# Patient Record
Sex: FEMALE | Race: WHITE | NOT HISPANIC OR LATINO | Employment: UNEMPLOYED | URBAN - METROPOLITAN AREA
[De-identification: names, ages, dates, MRNs, and addresses within clinical notes are randomized per-mention and may not be internally consistent; named-entity substitution may affect disease eponyms.]

---

## 2022-01-01 ENCOUNTER — APPOINTMENT (OUTPATIENT)
Dept: RADIOLOGY | Facility: HOSPITAL | Age: 82
DRG: 871 | End: 2022-01-01
Payer: MEDICARE

## 2022-01-01 ENCOUNTER — HOSPITAL ENCOUNTER (INPATIENT)
Facility: HOSPITAL | Age: 82
LOS: 3 days | DRG: 871 | End: 2022-09-17
Attending: EMERGENCY MEDICINE | Admitting: FAMILY MEDICINE
Payer: MEDICARE

## 2022-01-01 VITALS
TEMPERATURE: 98.2 F | OXYGEN SATURATION: 98 % | HEART RATE: 92 BPM | SYSTOLIC BLOOD PRESSURE: 104 MMHG | DIASTOLIC BLOOD PRESSURE: 52 MMHG | RESPIRATION RATE: 16 BRPM | BODY MASS INDEX: 20.06 KG/M2 | WEIGHT: 130 LBS

## 2022-01-01 DIAGNOSIS — I50.33 ACUTE ON CHRONIC DIASTOLIC CHF (CONGESTIVE HEART FAILURE) (HCC): ICD-10-CM

## 2022-01-01 DIAGNOSIS — J18.9 SEPSIS DUE TO PNEUMONIA (HCC): Primary | ICD-10-CM

## 2022-01-01 DIAGNOSIS — A41.9 SEPSIS DUE TO PNEUMONIA (HCC): Primary | ICD-10-CM

## 2022-01-01 DIAGNOSIS — E87.1 HYPONATREMIA: ICD-10-CM

## 2022-01-01 DIAGNOSIS — J96.11 CHRONIC HYPOXEMIC RESPIRATORY FAILURE (HCC): ICD-10-CM

## 2022-01-01 DIAGNOSIS — S31.000A WOUND OF SACRAL REGION, INITIAL ENCOUNTER: ICD-10-CM

## 2022-01-01 DIAGNOSIS — R65.10 SIRS (SYSTEMIC INFLAMMATORY RESPONSE SYNDROME) (HCC): ICD-10-CM

## 2022-01-01 DIAGNOSIS — D72.829 LEUKOCYTOSIS: ICD-10-CM

## 2022-01-01 DIAGNOSIS — I50.9 ACUTE EXACERBATION OF CHF (CONGESTIVE HEART FAILURE) (HCC): ICD-10-CM

## 2022-01-01 DIAGNOSIS — J96.21 ACUTE ON CHRONIC RESPIRATORY FAILURE WITH HYPOXIA (HCC): ICD-10-CM

## 2022-01-01 LAB
ALBUMIN SERPL BCP-MCNC: 1.8 G/DL (ref 3.5–5)
ALBUMIN SERPL BCP-MCNC: 2.7 G/DL (ref 3.5–5)
ALP SERPL-CCNC: 87 U/L (ref 46–116)
ALP SERPL-CCNC: 93 U/L (ref 46–116)
ALT SERPL W P-5'-P-CCNC: 12 U/L (ref 12–78)
ALT SERPL W P-5'-P-CCNC: 14 U/L (ref 12–78)
ANION GAP SERPL CALCULATED.3IONS-SCNC: 4 MMOL/L (ref 4–13)
ANION GAP SERPL CALCULATED.3IONS-SCNC: 6 MMOL/L (ref 4–13)
APTT PPP: 32 SECONDS (ref 23–37)
ARTERIAL PATENCY WRIST A: NO
AST SERPL W P-5'-P-CCNC: 15 U/L (ref 5–45)
AST SERPL W P-5'-P-CCNC: 22 U/L (ref 5–45)
ATRIAL RATE: 117 BPM
BACTERIA BLD CULT: ABNORMAL
BACTERIA UR CULT: ABNORMAL
BACTERIA UR CULT: ABNORMAL
BACTERIA UR QL AUTO: ABNORMAL /HPF
BASE EX.OXY STD BLDV CALC-SCNC: 77.1 % (ref 60–80)
BASE EX.OXY STD BLDV CALC-SCNC: 94.6 % (ref 60–80)
BASE EXCESS BLDV CALC-SCNC: 6.7 MMOL/L
BASE EXCESS BLDV CALC-SCNC: 7.4 MMOL/L
BASOPHILS # BLD AUTO: 0.03 THOUSANDS/ΜL (ref 0–0.1)
BASOPHILS NFR BLD AUTO: 0 % (ref 0–1)
BILIRUB SERPL-MCNC: 0.27 MG/DL (ref 0.2–1)
BILIRUB SERPL-MCNC: 0.41 MG/DL (ref 0.2–1)
BILIRUB UR QL STRIP: NEGATIVE
BUN SERPL-MCNC: 14 MG/DL (ref 5–25)
BUN SERPL-MCNC: 17 MG/DL (ref 5–25)
CALCIUM ALBUM COR SERPL-MCNC: 10.2 MG/DL (ref 8.3–10.1)
CALCIUM ALBUM COR SERPL-MCNC: 10.4 MG/DL (ref 8.3–10.1)
CALCIUM SERPL-MCNC: 8.4 MG/DL (ref 8.3–10.1)
CALCIUM SERPL-MCNC: 9.4 MG/DL (ref 8.3–10.1)
CARDIAC TROPONIN I PNL SERPL HS: 20 NG/L
CHLORIDE SERPL-SCNC: 93 MMOL/L (ref 96–108)
CHLORIDE SERPL-SCNC: 93 MMOL/L (ref 96–108)
CLARITY UR: ABNORMAL
CO2 SERPL-SCNC: 31 MMOL/L (ref 21–32)
CO2 SERPL-SCNC: 32 MMOL/L (ref 21–32)
COLOR UR: YELLOW
CREAT SERPL-MCNC: 0.24 MG/DL (ref 0.6–1.3)
CREAT SERPL-MCNC: 0.33 MG/DL (ref 0.6–1.3)
EOSINOPHIL # BLD AUTO: 0.16 THOUSAND/ΜL (ref 0–0.61)
EOSINOPHIL NFR BLD AUTO: 1 % (ref 0–6)
ERYTHROCYTE [DISTWIDTH] IN BLOOD BY AUTOMATED COUNT: 17.3 % (ref 11.6–15.1)
ERYTHROCYTE [DISTWIDTH] IN BLOOD BY AUTOMATED COUNT: 17.4 % (ref 11.6–15.1)
FLUAV RNA RESP QL NAA+PROBE: NEGATIVE
FLUBV RNA RESP QL NAA+PROBE: NEGATIVE
GFR SERPL CREATININE-BSD FRML MDRD: 105 ML/MIN/1.73SQ M
GFR SERPL CREATININE-BSD FRML MDRD: 117 ML/MIN/1.73SQ M
GLUCOSE SERPL-MCNC: 103 MG/DL (ref 65–140)
GLUCOSE SERPL-MCNC: 142 MG/DL (ref 65–140)
GLUCOSE UR STRIP-MCNC: NEGATIVE MG/DL
GRAM STN SPEC: ABNORMAL
HCO3 BLDV-SCNC: 31.2 MMOL/L (ref 24–30)
HCO3 BLDV-SCNC: 33.1 MMOL/L (ref 24–30)
HCT VFR BLD AUTO: 27.4 % (ref 34.8–46.1)
HCT VFR BLD AUTO: 27.9 % (ref 34.8–46.1)
HFNC FLOW LPM: 50
HGB BLD-MCNC: 8.3 G/DL (ref 11.5–15.4)
HGB BLD-MCNC: 8.6 G/DL (ref 11.5–15.4)
HGB UR QL STRIP.AUTO: NEGATIVE
IMM GRANULOCYTES # BLD AUTO: 0.27 THOUSAND/UL (ref 0–0.2)
IMM GRANULOCYTES NFR BLD AUTO: 2 % (ref 0–2)
INR PPP: 0.93 (ref 0.84–1.19)
KETONES UR STRIP-MCNC: NEGATIVE MG/DL
LACTATE SERPL-SCNC: 2.1 MMOL/L (ref 0.5–2)
LACTATE SERPL-SCNC: 2.3 MMOL/L (ref 0.5–2)
LEUKOCYTE ESTERASE UR QL STRIP: ABNORMAL
LYMPHOCYTES # BLD AUTO: 0.66 THOUSANDS/ΜL (ref 0.6–4.47)
LYMPHOCYTES NFR BLD AUTO: 4 % (ref 14–44)
MCH RBC QN AUTO: 30.4 PG (ref 26.8–34.3)
MCH RBC QN AUTO: 30.5 PG (ref 26.8–34.3)
MCHC RBC AUTO-ENTMCNC: 30.3 G/DL (ref 31.4–37.4)
MCHC RBC AUTO-ENTMCNC: 30.8 G/DL (ref 31.4–37.4)
MCV RBC AUTO: 100 FL (ref 82–98)
MCV RBC AUTO: 99 FL (ref 82–98)
MECA+MECC ISLT/SPM QL: DETECTED
MONOCYTES # BLD AUTO: 1.05 THOUSAND/ΜL (ref 0.17–1.22)
MONOCYTES NFR BLD AUTO: 6 % (ref 4–12)
MRSA NOSE QL CULT: NORMAL
MUCOUS THREADS UR QL AUTO: ABNORMAL
NEUTROPHILS # BLD AUTO: 16.2 THOUSANDS/ΜL (ref 1.85–7.62)
NEUTS SEG NFR BLD AUTO: 87 % (ref 43–75)
NITRITE UR QL STRIP: POSITIVE
NON VENT HFNC FIO2: 80
NON VENT TYPE HFNC: ABNORMAL
NON-SQ EPI CELLS URNS QL MICRO: ABNORMAL /HPF
NRBC BLD AUTO-RTO: 0 /100 WBCS
NT-PROBNP SERPL-MCNC: 1755 PG/ML
O2 CT BLDV-SCNC: 13.3 ML/DL
O2 CT BLDV-SCNC: 8.9 ML/DL
P AXIS: 79 DEGREES
PCO2 BLDV: 44.7 MM HG (ref 42–50)
PCO2 BLDV: 53.9 MM HG (ref 42–50)
PH BLDV: 7.41 [PH] (ref 7.3–7.4)
PH BLDV: 7.46 [PH] (ref 7.3–7.4)
PH UR STRIP.AUTO: 6 [PH]
PLATELET # BLD AUTO: 395 THOUSANDS/UL (ref 149–390)
PLATELET # BLD AUTO: 418 THOUSANDS/UL (ref 149–390)
PMV BLD AUTO: 9 FL (ref 8.9–12.7)
PMV BLD AUTO: 9.8 FL (ref 8.9–12.7)
PO2 BLDV: 44.6 MM HG (ref 35–45)
PO2 BLDV: 81.3 MM HG (ref 35–45)
POTASSIUM SERPL-SCNC: 4 MMOL/L (ref 3.5–5.3)
POTASSIUM SERPL-SCNC: 4.3 MMOL/L (ref 3.5–5.3)
PR INTERVAL: 142 MS
PROCALCITONIN SERPL-MCNC: 0.2 NG/ML
PROCALCITONIN SERPL-MCNC: 0.26 NG/ML
PROT SERPL-MCNC: 6.8 G/DL (ref 6.4–8.4)
PROT SERPL-MCNC: 7.2 G/DL (ref 6.4–8.4)
PROT UR STRIP-MCNC: ABNORMAL MG/DL
PROTHROMBIN TIME: 12.6 SECONDS (ref 11.6–14.5)
QRS AXIS: -48 DEGREES
QRSD INTERVAL: 90 MS
QT INTERVAL: 296 MS
QTC INTERVAL: 412 MS
RBC # BLD AUTO: 2.73 MILLION/UL (ref 3.81–5.12)
RBC # BLD AUTO: 2.82 MILLION/UL (ref 3.81–5.12)
RBC #/AREA URNS AUTO: ABNORMAL /HPF
RSV RNA RESP QL NAA+PROBE: NEGATIVE
S EPIDERMIDIS DNA BLD POS QL NAA+NON-PRB: DETECTED
SARS-COV-2 RNA RESP QL NAA+PROBE: NEGATIVE
SODIUM SERPL-SCNC: 128 MMOL/L (ref 135–147)
SODIUM SERPL-SCNC: 131 MMOL/L (ref 135–147)
SP GR UR STRIP.AUTO: 1.02 (ref 1–1.03)
T WAVE AXIS: 63 DEGREES
UROBILINOGEN UR STRIP-ACNC: <2 MG/DL
VENTRICULAR RATE: 117 BPM
WBC # BLD AUTO: 18.37 THOUSAND/UL (ref 4.31–10.16)
WBC # BLD AUTO: 27.85 THOUSAND/UL (ref 4.31–10.16)
WBC #/AREA URNS AUTO: ABNORMAL /HPF
WBC CLUMPS # UR AUTO: PRESENT /UL

## 2022-01-01 PROCEDURE — NC001 PR NO CHARGE: Performed by: INTERNAL MEDICINE

## 2022-01-01 PROCEDURE — 96367 TX/PROPH/DG ADDL SEQ IV INF: CPT

## 2022-01-01 PROCEDURE — 85730 THROMBOPLASTIN TIME PARTIAL: CPT

## 2022-01-01 PROCEDURE — 99291 CRITICAL CARE FIRST HOUR: CPT | Performed by: EMERGENCY MEDICINE

## 2022-01-01 PROCEDURE — 93005 ELECTROCARDIOGRAM TRACING: CPT

## 2022-01-01 PROCEDURE — 94760 N-INVAS EAR/PLS OXIMETRY 1: CPT

## 2022-01-01 PROCEDURE — 85610 PROTHROMBIN TIME: CPT

## 2022-01-01 PROCEDURE — 99497 ADVNCD CARE PLAN 30 MIN: CPT | Performed by: INTERNAL MEDICINE

## 2022-01-01 PROCEDURE — 87040 BLOOD CULTURE FOR BACTERIA: CPT

## 2022-01-01 PROCEDURE — 87154 CUL TYP ID BLD PTHGN 6+ TRGT: CPT

## 2022-01-01 PROCEDURE — 80053 COMPREHEN METABOLIC PANEL: CPT | Performed by: PHYSICIAN ASSISTANT

## 2022-01-01 PROCEDURE — 87077 CULTURE AEROBIC IDENTIFY: CPT

## 2022-01-01 PROCEDURE — 99239 HOSP IP/OBS DSCHRG MGMT >30: CPT | Performed by: STUDENT IN AN ORGANIZED HEALTH CARE EDUCATION/TRAINING PROGRAM

## 2022-01-01 PROCEDURE — 87186 SC STD MICRODIL/AGAR DIL: CPT

## 2022-01-01 PROCEDURE — 80053 COMPREHEN METABOLIC PANEL: CPT

## 2022-01-01 PROCEDURE — 99291 CRITICAL CARE FIRST HOUR: CPT | Performed by: INTERNAL MEDICINE

## 2022-01-01 PROCEDURE — 84145 PROCALCITONIN (PCT): CPT | Performed by: INTERNAL MEDICINE

## 2022-01-01 PROCEDURE — 99233 SBSQ HOSP IP/OBS HIGH 50: CPT | Performed by: INTERNAL MEDICINE

## 2022-01-01 PROCEDURE — 99283 EMERGENCY DEPT VISIT LOW MDM: CPT | Performed by: INTERNAL MEDICINE

## 2022-01-01 PROCEDURE — 94640 AIRWAY INHALATION TREATMENT: CPT

## 2022-01-01 PROCEDURE — 81001 URINALYSIS AUTO W/SCOPE: CPT

## 2022-01-01 PROCEDURE — 36415 COLL VENOUS BLD VENIPUNCTURE: CPT

## 2022-01-01 PROCEDURE — 94002 VENT MGMT INPAT INIT DAY: CPT

## 2022-01-01 PROCEDURE — 96365 THER/PROPH/DIAG IV INF INIT: CPT

## 2022-01-01 PROCEDURE — 0241U HB NFCT DS VIR RESP RNA 4 TRGT: CPT

## 2022-01-01 PROCEDURE — 87081 CULTURE SCREEN ONLY: CPT | Performed by: EMERGENCY MEDICINE

## 2022-01-01 PROCEDURE — 93010 ELECTROCARDIOGRAM REPORT: CPT | Performed by: INTERNAL MEDICINE

## 2022-01-01 PROCEDURE — 99223 1ST HOSP IP/OBS HIGH 75: CPT | Performed by: INTERNAL MEDICINE

## 2022-01-01 PROCEDURE — 82805 BLOOD GASES W/O2 SATURATION: CPT

## 2022-01-01 PROCEDURE — 99232 SBSQ HOSP IP/OBS MODERATE 35: CPT | Performed by: FAMILY MEDICINE

## 2022-01-01 PROCEDURE — 99285 EMERGENCY DEPT VISIT HI MDM: CPT

## 2022-01-01 PROCEDURE — 85025 COMPLETE CBC W/AUTO DIFF WBC: CPT

## 2022-01-01 PROCEDURE — 84484 ASSAY OF TROPONIN QUANT: CPT

## 2022-01-01 PROCEDURE — 84145 PROCALCITONIN (PCT): CPT

## 2022-01-01 PROCEDURE — 99498 ADVNCD CARE PLAN ADDL 30 MIN: CPT | Performed by: INTERNAL MEDICINE

## 2022-01-01 PROCEDURE — 82805 BLOOD GASES W/O2 SATURATION: CPT | Performed by: PHYSICIAN ASSISTANT

## 2022-01-01 PROCEDURE — 83605 ASSAY OF LACTIC ACID: CPT

## 2022-01-01 PROCEDURE — 87086 URINE CULTURE/COLONY COUNT: CPT

## 2022-01-01 PROCEDURE — 96374 THER/PROPH/DIAG INJ IV PUSH: CPT

## 2022-01-01 PROCEDURE — 83880 ASSAY OF NATRIURETIC PEPTIDE: CPT

## 2022-01-01 PROCEDURE — 99497 ADVNCD CARE PLAN 30 MIN: CPT | Performed by: PHYSICIAN ASSISTANT

## 2022-01-01 PROCEDURE — 85027 COMPLETE CBC AUTOMATED: CPT | Performed by: INTERNAL MEDICINE

## 2022-01-01 PROCEDURE — 71045 X-RAY EXAM CHEST 1 VIEW: CPT

## 2022-01-01 RX ORDER — METHYLPREDNISOLONE SODIUM SUCCINATE 40 MG/ML
40 INJECTION, POWDER, LYOPHILIZED, FOR SOLUTION INTRAMUSCULAR; INTRAVENOUS EVERY 12 HOURS SCHEDULED
Status: DISCONTINUED | OUTPATIENT
Start: 2022-01-01 | End: 2022-01-01

## 2022-01-01 RX ORDER — ASPIRIN 81 MG/1
81 TABLET, CHEWABLE ORAL DAILY
Status: DISCONTINUED | OUTPATIENT
Start: 2022-01-01 | End: 2022-01-01

## 2022-01-01 RX ORDER — HALOPERIDOL 5 MG/ML
0.5 INJECTION INTRAMUSCULAR EVERY 2 HOUR PRN
Status: DISCONTINUED | OUTPATIENT
Start: 2022-01-01 | End: 2022-01-01 | Stop reason: HOSPADM

## 2022-01-01 RX ORDER — VANCOMYCIN HYDROCHLORIDE 1 G/200ML
20 INJECTION, SOLUTION INTRAVENOUS ONCE
Status: COMPLETED | OUTPATIENT
Start: 2022-01-01 | End: 2022-01-01

## 2022-01-01 RX ORDER — LEVOTHYROXINE SODIUM 0.03 MG/1
25 TABLET ORAL
Status: DISCONTINUED | OUTPATIENT
Start: 2022-01-01 | End: 2022-01-01

## 2022-01-01 RX ORDER — FUROSEMIDE 10 MG/ML
40 INJECTION INTRAMUSCULAR; INTRAVENOUS
Status: DISCONTINUED | OUTPATIENT
Start: 2022-01-01 | End: 2022-01-01

## 2022-01-01 RX ORDER — HEPARIN SODIUM 5000 [USP'U]/ML
5000 INJECTION, SOLUTION INTRAVENOUS; SUBCUTANEOUS EVERY 8 HOURS SCHEDULED
Status: DISCONTINUED | OUTPATIENT
Start: 2022-01-01 | End: 2022-01-01

## 2022-01-01 RX ORDER — LORAZEPAM 2 MG/ML
0.5 INJECTION INTRAMUSCULAR EVERY 6 HOURS PRN
Status: DISCONTINUED | OUTPATIENT
Start: 2022-01-01 | End: 2022-01-01

## 2022-01-01 RX ORDER — ALBUMIN (HUMAN) 12.5 G/50ML
12.5 SOLUTION INTRAVENOUS ONCE
Status: COMPLETED | OUTPATIENT
Start: 2022-01-01 | End: 2022-01-01

## 2022-01-01 RX ORDER — LORAZEPAM 2 MG/ML
0.5 INJECTION INTRAMUSCULAR ONCE
Status: COMPLETED | OUTPATIENT
Start: 2022-01-01 | End: 2022-01-01

## 2022-01-01 RX ORDER — BUSPIRONE HYDROCHLORIDE 10 MG/1
10 TABLET ORAL 3 TIMES DAILY
Status: DISCONTINUED | OUTPATIENT
Start: 2022-01-01 | End: 2022-01-01

## 2022-01-01 RX ORDER — LORAZEPAM 2 MG/ML
1 INJECTION INTRAMUSCULAR
Status: COMPLETED | OUTPATIENT
Start: 2022-01-01 | End: 2022-01-01

## 2022-01-01 RX ORDER — ACETAMINOPHEN 325 MG/1
650 TABLET ORAL EVERY 6 HOURS PRN
Status: DISCONTINUED | OUTPATIENT
Start: 2022-01-01 | End: 2022-01-01

## 2022-01-01 RX ORDER — SILDENAFIL CITRATE 20 MG/1
10 TABLET ORAL 3 TIMES DAILY
Status: DISCONTINUED | OUTPATIENT
Start: 2022-01-01 | End: 2022-01-01

## 2022-01-01 RX ORDER — ACETAMINOPHEN 325 MG/1
650 TABLET ORAL EVERY 8 HOURS SCHEDULED
Status: DISCONTINUED | OUTPATIENT
Start: 2022-01-01 | End: 2022-01-01

## 2022-01-01 RX ORDER — SACCHAROMYCES BOULARDII 250 MG
250 CAPSULE ORAL 2 TIMES DAILY
Status: DISCONTINUED | OUTPATIENT
Start: 2022-01-01 | End: 2022-01-01

## 2022-01-01 RX ORDER — SODIUM CHLORIDE FOR INHALATION 0.9 %
3 VIAL, NEBULIZER (ML) INHALATION
Status: DISCONTINUED | OUTPATIENT
Start: 2022-01-01 | End: 2022-01-01

## 2022-01-01 RX ORDER — LORAZEPAM 2 MG/ML
0.5 INJECTION INTRAMUSCULAR EVERY 2 HOUR PRN
Status: DISCONTINUED | OUTPATIENT
Start: 2022-01-01 | End: 2022-01-01 | Stop reason: HOSPADM

## 2022-01-01 RX ORDER — VANCOMYCIN HYDROCHLORIDE 1 G/200ML
20 INJECTION, SOLUTION INTRAVENOUS EVERY 12 HOURS
Status: DISCONTINUED | OUTPATIENT
Start: 2022-01-01 | End: 2022-01-01

## 2022-01-01 RX ORDER — ALBUMIN (HUMAN) 12.5 G/50ML
25 SOLUTION INTRAVENOUS ONCE
Status: COMPLETED | OUTPATIENT
Start: 2022-01-01 | End: 2022-01-01

## 2022-01-01 RX ORDER — FUROSEMIDE 10 MG/ML
20 INJECTION INTRAMUSCULAR; INTRAVENOUS ONCE
Status: CANCELLED | OUTPATIENT
Start: 2022-01-01

## 2022-01-01 RX ORDER — ONDANSETRON 2 MG/ML
4 INJECTION INTRAMUSCULAR; INTRAVENOUS EVERY 6 HOURS PRN
Status: DISCONTINUED | OUTPATIENT
Start: 2022-01-01 | End: 2022-01-01 | Stop reason: HOSPADM

## 2022-01-01 RX ORDER — GLYCOPYRROLATE 0.2 MG/ML
0.1 INJECTION INTRAMUSCULAR; INTRAVENOUS EVERY 4 HOURS PRN
Status: DISCONTINUED | OUTPATIENT
Start: 2022-01-01 | End: 2022-01-01 | Stop reason: HOSPADM

## 2022-01-01 RX ORDER — LEVALBUTEROL 1.25 MG/.5ML
1.25 SOLUTION, CONCENTRATE RESPIRATORY (INHALATION)
Status: DISCONTINUED | OUTPATIENT
Start: 2022-01-01 | End: 2022-01-01

## 2022-01-01 RX ADMIN — MORPHINE SULFATE 2 MG: 2 INJECTION, SOLUTION INTRAMUSCULAR; INTRAVENOUS at 21:32

## 2022-01-01 RX ADMIN — LEVALBUTEROL HYDROCHLORIDE 1.25 MG: 1.25 SOLUTION, CONCENTRATE RESPIRATORY (INHALATION) at 19:18

## 2022-01-01 RX ADMIN — SILDENAFIL 10 MG: 20 TABLET ORAL at 16:54

## 2022-01-01 RX ADMIN — FUROSEMIDE 40 MG: 10 INJECTION, SOLUTION INTRAMUSCULAR; INTRAVENOUS at 15:01

## 2022-01-01 RX ADMIN — LORAZEPAM 1 MG: 2 INJECTION INTRAMUSCULAR; INTRAVENOUS at 21:32

## 2022-01-01 RX ADMIN — BUSPIRONE HYDROCHLORIDE 10 MG: 10 TABLET ORAL at 08:56

## 2022-01-01 RX ADMIN — LORAZEPAM 0.5 MG: 2 INJECTION INTRAMUSCULAR; INTRAVENOUS at 09:11

## 2022-01-01 RX ADMIN — MORPHINE SULFATE 2 MG: 2 INJECTION, SOLUTION INTRAMUSCULAR; INTRAVENOUS at 03:50

## 2022-01-01 RX ADMIN — MORPHINE SULFATE 2 MG: 2 INJECTION, SOLUTION INTRAMUSCULAR; INTRAVENOUS at 04:54

## 2022-01-01 RX ADMIN — SILDENAFIL 10 MG: 20 TABLET ORAL at 15:01

## 2022-01-01 RX ADMIN — VANCOMYCIN HYDROCHLORIDE 1000 MG: 1 INJECTION, SOLUTION INTRAVENOUS at 10:05

## 2022-01-01 RX ADMIN — ISODIUM CHLORIDE 3 ML: 0.03 SOLUTION RESPIRATORY (INHALATION) at 08:24

## 2022-01-01 RX ADMIN — SODIUM BICARBONATE 20 MG: 84 INJECTION, SOLUTION INTRAVENOUS at 17:06

## 2022-01-01 RX ADMIN — LORAZEPAM 0.5 MG: 2 INJECTION INTRAMUSCULAR; INTRAVENOUS at 17:06

## 2022-01-01 RX ADMIN — MORPHINE SULFATE 2 MG: 2 INJECTION, SOLUTION INTRAMUSCULAR; INTRAVENOUS at 08:55

## 2022-01-01 RX ADMIN — MORPHINE SULFATE 2 MG: 2 INJECTION, SOLUTION INTRAMUSCULAR; INTRAVENOUS at 04:28

## 2022-01-01 RX ADMIN — LEVOTHYROXINE SODIUM 25 MCG: 25 TABLET ORAL at 05:18

## 2022-01-01 RX ADMIN — NOREPINEPHRINE BITARTRATE 2 MCG/MIN: 1 SOLUTION INTRAVENOUS at 00:13

## 2022-01-01 RX ADMIN — FUROSEMIDE 40 MG: 10 INJECTION, SOLUTION INTRAMUSCULAR; INTRAVENOUS at 16:54

## 2022-01-01 RX ADMIN — METHYLPREDNISOLONE SODIUM SUCCINATE 40 MG: 40 INJECTION, POWDER, FOR SOLUTION INTRAMUSCULAR; INTRAVENOUS at 22:13

## 2022-01-01 RX ADMIN — GLYCOPYRROLATE 0.1 MG: 0.2 INJECTION, SOLUTION INTRAMUSCULAR; INTRAVENOUS at 21:42

## 2022-01-01 RX ADMIN — MORPHINE SULFATE 2 MG: 2 INJECTION, SOLUTION INTRAMUSCULAR; INTRAVENOUS at 21:10

## 2022-01-01 RX ADMIN — ALBUMIN (HUMAN) 25 G: 0.25 INJECTION, SOLUTION INTRAVENOUS at 21:02

## 2022-01-01 RX ADMIN — CEFEPIME 2000 MG: 2 INJECTION, POWDER, FOR SOLUTION INTRAVENOUS at 09:27

## 2022-01-01 RX ADMIN — CEFEPIME 2000 MG: 2 INJECTION, POWDER, FOR SOLUTION INTRAVENOUS at 20:32

## 2022-01-01 RX ADMIN — FUROSEMIDE 40 MG: 10 INJECTION, SOLUTION INTRAMUSCULAR; INTRAVENOUS at 07:52

## 2022-01-01 RX ADMIN — MORPHINE SULFATE 2 MG: 2 INJECTION, SOLUTION INTRAMUSCULAR; INTRAVENOUS at 13:59

## 2022-01-01 RX ADMIN — ISODIUM CHLORIDE 3 ML: 0.03 SOLUTION RESPIRATORY (INHALATION) at 19:18

## 2022-01-01 RX ADMIN — ACETAMINOPHEN 650 MG: 325 TABLET ORAL at 05:18

## 2022-01-01 RX ADMIN — ASPIRIN 81 MG CHEWABLE TABLET 81 MG: 81 TABLET CHEWABLE at 14:33

## 2022-01-01 RX ADMIN — HALOPERIDOL LACTATE 0.5 MG: 5 INJECTION, SOLUTION INTRAMUSCULAR at 04:07

## 2022-01-01 RX ADMIN — GLYCOPYRROLATE 0.1 MG: 0.2 INJECTION, SOLUTION INTRAMUSCULAR; INTRAVENOUS at 15:00

## 2022-01-01 RX ADMIN — GUAIFENESIN 400 MG: 200 SOLUTION ORAL at 15:01

## 2022-01-01 RX ADMIN — GUAIFENESIN 400 MG: 200 SOLUTION ORAL at 22:13

## 2022-01-01 RX ADMIN — GUAIFENESIN 400 MG: 200 SOLUTION ORAL at 08:55

## 2022-01-01 RX ADMIN — ISODIUM CHLORIDE 3 ML: 0.03 SOLUTION RESPIRATORY (INHALATION) at 12:58

## 2022-01-01 RX ADMIN — MORPHINE SULFATE 2 MG: 2 INJECTION, SOLUTION INTRAMUSCULAR; INTRAVENOUS at 02:17

## 2022-01-01 RX ADMIN — MORPHINE SULFATE 2 MG: 2 INJECTION, SOLUTION INTRAMUSCULAR; INTRAVENOUS at 22:59

## 2022-01-01 RX ADMIN — VANCOMYCIN HYDROCHLORIDE 1000 MG: 1 INJECTION, SOLUTION INTRAVENOUS at 21:22

## 2022-01-01 RX ADMIN — HEPARIN SODIUM 5000 UNITS: 5000 INJECTION INTRAVENOUS; SUBCUTANEOUS at 14:27

## 2022-01-01 RX ADMIN — Medication 250 MG: at 08:56

## 2022-01-01 RX ADMIN — GLYCOPYRROLATE 0.1 MG: 0.2 INJECTION, SOLUTION INTRAMUSCULAR; INTRAVENOUS at 07:52

## 2022-01-01 RX ADMIN — MORPHINE SULFATE 2 MG: 2 INJECTION, SOLUTION INTRAMUSCULAR; INTRAVENOUS at 01:05

## 2022-01-01 RX ADMIN — MORPHINE SULFATE 2 MG: 2 INJECTION, SOLUTION INTRAMUSCULAR; INTRAVENOUS at 04:07

## 2022-01-01 RX ADMIN — SODIUM CHLORIDE 0.5 MG/HR: 9 INJECTION INTRAMUSCULAR; INTRAVENOUS; SUBCUTANEOUS at 05:30

## 2022-01-01 RX ADMIN — GLYCOPYRROLATE 0.1 MG: 0.2 INJECTION, SOLUTION INTRAMUSCULAR; INTRAVENOUS at 18:10

## 2022-01-01 RX ADMIN — LORAZEPAM 1 MG: 2 INJECTION INTRAMUSCULAR; INTRAVENOUS at 21:09

## 2022-01-01 RX ADMIN — LEVALBUTEROL HYDROCHLORIDE 1.25 MG: 1.25 SOLUTION, CONCENTRATE RESPIRATORY (INHALATION) at 12:58

## 2022-01-01 RX ADMIN — ASPIRIN 81 MG CHEWABLE TABLET 81 MG: 81 TABLET CHEWABLE at 08:56

## 2022-01-01 RX ADMIN — MORPHINE SULFATE 2 MG: 2 INJECTION, SOLUTION INTRAMUSCULAR; INTRAVENOUS at 02:40

## 2022-01-01 RX ADMIN — MORPHINE SULFATE 2 MG: 2 INJECTION, SOLUTION INTRAMUSCULAR; INTRAVENOUS at 00:24

## 2022-01-01 RX ADMIN — GLYCOPYRROLATE 0.1 MG: 0.2 INJECTION, SOLUTION INTRAMUSCULAR; INTRAVENOUS at 02:40

## 2022-01-01 RX ADMIN — GLYCERIN, HYPROMELLOSE, POLYETHYLENE GLYCOL 2 DROP: .2; .2; 1 LIQUID OPHTHALMIC at 18:01

## 2022-01-01 RX ADMIN — ACETAMINOPHEN 650 MG: 325 TABLET ORAL at 15:00

## 2022-01-01 RX ADMIN — MORPHINE SULFATE 2 MG: 2 INJECTION, SOLUTION INTRAMUSCULAR; INTRAVENOUS at 12:07

## 2022-01-01 RX ADMIN — ALBUMIN (HUMAN) 12.5 G: 0.25 INJECTION, SOLUTION INTRAVENOUS at 22:12

## 2022-01-01 RX ADMIN — Medication 250 MG: at 17:26

## 2022-01-01 RX ADMIN — FUROSEMIDE 40 MG: 10 INJECTION, SOLUTION INTRAMUSCULAR; INTRAVENOUS at 08:55

## 2022-01-01 RX ADMIN — LORAZEPAM 0.5 MG: 2 INJECTION INTRAMUSCULAR; INTRAVENOUS at 09:53

## 2022-01-01 RX ADMIN — LORAZEPAM 0.5 MG: 2 INJECTION INTRAMUSCULAR; INTRAVENOUS at 00:56

## 2022-01-01 RX ADMIN — METHYLPREDNISOLONE SODIUM SUCCINATE 40 MG: 40 INJECTION, POWDER, FOR SOLUTION INTRAMUSCULAR; INTRAVENOUS at 08:56

## 2022-01-01 RX ADMIN — MORPHINE SULFATE 2 MG: 2 INJECTION, SOLUTION INTRAMUSCULAR; INTRAVENOUS at 22:28

## 2022-01-01 RX ADMIN — MORPHINE SULFATE 2 MG: 2 INJECTION, SOLUTION INTRAMUSCULAR; INTRAVENOUS at 01:17

## 2022-01-01 RX ADMIN — MORPHINE SULFATE 2 MG: 2 INJECTION, SOLUTION INTRAMUSCULAR; INTRAVENOUS at 21:51

## 2022-01-01 RX ADMIN — LEVALBUTEROL HYDROCHLORIDE 1.25 MG: 1.25 SOLUTION, CONCENTRATE RESPIRATORY (INHALATION) at 19:58

## 2022-01-01 RX ADMIN — METHYLPREDNISOLONE SODIUM SUCCINATE 40 MG: 40 INJECTION, POWDER, FOR SOLUTION INTRAMUSCULAR; INTRAVENOUS at 14:24

## 2022-01-01 RX ADMIN — HEPARIN SODIUM 5000 UNITS: 5000 INJECTION INTRAVENOUS; SUBCUTANEOUS at 05:18

## 2022-01-01 RX ADMIN — GLYCOPYRROLATE 0.1 MG: 0.2 INJECTION, SOLUTION INTRAMUSCULAR; INTRAVENOUS at 00:51

## 2022-01-01 RX ADMIN — GUAIFENESIN 400 MG: 200 SOLUTION ORAL at 16:54

## 2022-01-01 RX ADMIN — BUSPIRONE HYDROCHLORIDE 10 MG: 10 TABLET ORAL at 00:47

## 2022-01-01 RX ADMIN — SILDENAFIL 10 MG: 20 TABLET ORAL at 08:55

## 2022-01-01 RX ADMIN — MORPHINE SULFATE 2 MG: 2 INJECTION, SOLUTION INTRAMUSCULAR; INTRAVENOUS at 23:29

## 2022-01-01 RX ADMIN — MORPHINE SULFATE 2 MG: 2 INJECTION, SOLUTION INTRAMUSCULAR; INTRAVENOUS at 00:49

## 2022-01-01 RX ADMIN — LEVALBUTEROL HYDROCHLORIDE 1.25 MG: 1.25 SOLUTION, CONCENTRATE RESPIRATORY (INHALATION) at 08:24

## 2022-01-01 RX ADMIN — ISODIUM CHLORIDE 3 ML: 0.03 SOLUTION RESPIRATORY (INHALATION) at 15:53

## 2022-01-01 RX ADMIN — ACETAMINOPHEN 650 MG: 325 TABLET ORAL at 19:34

## 2022-01-01 RX ADMIN — LORAZEPAM 1 MG: 2 INJECTION INTRAMUSCULAR; INTRAVENOUS at 22:42

## 2022-01-01 RX ADMIN — BUSPIRONE HYDROCHLORIDE 10 MG: 10 TABLET ORAL at 16:58

## 2022-01-01 RX ADMIN — HEPARIN SODIUM 5000 UNITS: 5000 INJECTION INTRAVENOUS; SUBCUTANEOUS at 22:13

## 2022-01-01 RX ADMIN — VANCOMYCIN HYDROCHLORIDE 1000 MG: 1 INJECTION, SOLUTION INTRAVENOUS at 10:11

## 2022-01-01 RX ADMIN — ISODIUM CHLORIDE 3 ML: 0.03 SOLUTION RESPIRATORY (INHALATION) at 19:58

## 2022-06-14 ENCOUNTER — ANESTHESIA (EMERGENCY)
Dept: PERIOP | Facility: HOSPITAL | Age: 82
DRG: 853 | End: 2022-06-14
Payer: COMMERCIAL

## 2022-06-14 ENCOUNTER — APPOINTMENT (EMERGENCY)
Dept: RADIOLOGY | Facility: HOSPITAL | Age: 82
End: 2022-06-14
Payer: COMMERCIAL

## 2022-06-14 ENCOUNTER — HOSPITAL ENCOUNTER (EMERGENCY)
Facility: HOSPITAL | Age: 82
End: 2022-06-14
Attending: EMERGENCY MEDICINE
Payer: COMMERCIAL

## 2022-06-14 ENCOUNTER — ANESTHESIA EVENT (EMERGENCY)
Dept: PERIOP | Facility: HOSPITAL | Age: 82
DRG: 853 | End: 2022-06-14
Payer: COMMERCIAL

## 2022-06-14 ENCOUNTER — HOSPITAL ENCOUNTER (INPATIENT)
Facility: HOSPITAL | Age: 82
LOS: 29 days | Discharge: NURSING, REHAB OR LONG-TERM CARE | DRG: 853 | End: 2022-07-13
Attending: SURGERY | Admitting: SURGERY
Payer: COMMERCIAL

## 2022-06-14 VITALS
HEART RATE: 89 BPM | TEMPERATURE: 97.6 F | DIASTOLIC BLOOD PRESSURE: 59 MMHG | HEIGHT: 68 IN | WEIGHT: 127.65 LBS | SYSTOLIC BLOOD PRESSURE: 113 MMHG | OXYGEN SATURATION: 93 % | BODY MASS INDEX: 19.35 KG/M2 | RESPIRATION RATE: 20 BRPM

## 2022-06-14 DIAGNOSIS — S31.000A WOUND OF SACRAL REGION, INITIAL ENCOUNTER: Primary | ICD-10-CM

## 2022-06-14 DIAGNOSIS — M34.9 SCLERODERMA (HCC): ICD-10-CM

## 2022-06-14 DIAGNOSIS — S31.000A WOUND OF SACRAL REGION, INITIAL ENCOUNTER: ICD-10-CM

## 2022-06-14 DIAGNOSIS — R29.90 STROKE-LIKE EPISODE: ICD-10-CM

## 2022-06-14 DIAGNOSIS — E03.9 ACQUIRED HYPOTHYROIDISM: ICD-10-CM

## 2022-06-14 DIAGNOSIS — I27.20 PULMONARY HYPERTENSION (HCC): ICD-10-CM

## 2022-06-14 DIAGNOSIS — J18.9 PNEUMONIA: ICD-10-CM

## 2022-06-14 DIAGNOSIS — H91.93 BILATERAL HEARING LOSS, UNSPECIFIED HEARING LOSS TYPE: ICD-10-CM

## 2022-06-14 DIAGNOSIS — M79.89 NECROTIZING SOFT TISSUE INFECTION: Primary | ICD-10-CM

## 2022-06-14 DIAGNOSIS — J96.01 ACUTE RESPIRATORY FAILURE WITH HYPOXIA (HCC): ICD-10-CM

## 2022-06-14 DIAGNOSIS — R33.9 URINARY RETENTION: ICD-10-CM

## 2022-06-14 LAB
ALBUMIN SERPL BCP-MCNC: 2.2 G/DL (ref 3.5–5)
ALP SERPL-CCNC: 111 U/L (ref 46–116)
ALT SERPL W P-5'-P-CCNC: 26 U/L (ref 12–78)
ANION GAP SERPL CALCULATED.3IONS-SCNC: 4 MMOL/L (ref 4–13)
APTT PPP: 31 SECONDS (ref 23–37)
AST SERPL W P-5'-P-CCNC: 19 U/L (ref 5–45)
BACTERIA UR QL AUTO: ABNORMAL /HPF
BASOPHILS # BLD MANUAL: 0 THOUSAND/UL (ref 0–0.1)
BASOPHILS NFR MAR MANUAL: 0 % (ref 0–1)
BILIRUB SERPL-MCNC: 0.19 MG/DL (ref 0.2–1)
BILIRUB UR QL STRIP: NEGATIVE
BUN SERPL-MCNC: 19 MG/DL (ref 5–25)
CALCIUM ALBUM COR SERPL-MCNC: 10.3 MG/DL (ref 8.3–10.1)
CALCIUM SERPL-MCNC: 8.9 MG/DL (ref 8.3–10.1)
CHLORIDE SERPL-SCNC: 97 MMOL/L (ref 100–108)
CLARITY UR: CLEAR
CO2 SERPL-SCNC: 32 MMOL/L (ref 21–32)
COLOR UR: ABNORMAL
CREAT SERPL-MCNC: 0.45 MG/DL (ref 0.6–1.3)
CRP SERPL QL: 35.9 MG/L
EOSINOPHIL # BLD MANUAL: 0 THOUSAND/UL (ref 0–0.4)
EOSINOPHIL NFR BLD MANUAL: 0 % (ref 0–6)
ERYTHROCYTE [DISTWIDTH] IN BLOOD BY AUTOMATED COUNT: 15.8 % (ref 11.6–15.1)
FLUAV RNA RESP QL NAA+PROBE: NEGATIVE
FLUBV RNA RESP QL NAA+PROBE: NEGATIVE
GFR SERPL CREATININE-BSD FRML MDRD: 95 ML/MIN/1.73SQ M
GLUCOSE SERPL-MCNC: 117 MG/DL (ref 65–140)
GLUCOSE UR STRIP-MCNC: NEGATIVE MG/DL
HCT VFR BLD AUTO: 27.9 % (ref 34.8–46.1)
HGB BLD-MCNC: 8.6 G/DL (ref 11.5–15.4)
HGB UR QL STRIP.AUTO: NEGATIVE
HYPERCHROMIA BLD QL SMEAR: PRESENT
INR PPP: 0.94 (ref 0.84–1.19)
KETONES UR STRIP-MCNC: NEGATIVE MG/DL
LACTATE SERPL-SCNC: 1.2 MMOL/L (ref 0.5–2)
LEUKOCYTE ESTERASE UR QL STRIP: ABNORMAL
LYMPHOCYTES # BLD AUTO: 0.53 THOUSAND/UL (ref 0.6–4.47)
LYMPHOCYTES # BLD AUTO: 3 % (ref 14–44)
MCH RBC QN AUTO: 30.2 PG (ref 26.8–34.3)
MCHC RBC AUTO-ENTMCNC: 30.8 G/DL (ref 31.4–37.4)
MCV RBC AUTO: 98 FL (ref 82–98)
METAMYELOCYTES NFR BLD MANUAL: 4 % (ref 0–1)
MONOCYTES # BLD AUTO: 0.18 THOUSAND/UL (ref 0–1.22)
MONOCYTES NFR BLD: 1 % (ref 4–12)
MYELOCYTES NFR BLD MANUAL: 2 % (ref 0–1)
NEUTROPHILS # BLD MANUAL: 15.98 THOUSAND/UL (ref 1.85–7.62)
NEUTS BAND NFR BLD MANUAL: 19 % (ref 0–8)
NEUTS SEG NFR BLD AUTO: 71 % (ref 43–75)
NITRITE UR QL STRIP: NEGATIVE
NON-SQ EPI CELLS URNS QL MICRO: ABNORMAL /HPF
NRBC BLD AUTO-RTO: 1 /100 WBC (ref 0–2)
PH UR STRIP.AUTO: 7.5 [PH]
PLATELET # BLD AUTO: 476 THOUSANDS/UL (ref 149–390)
PLATELET BLD QL SMEAR: ABNORMAL
PMV BLD AUTO: 9.1 FL (ref 8.9–12.7)
POLYCHROMASIA BLD QL SMEAR: PRESENT
POTASSIUM SERPL-SCNC: 4.3 MMOL/L (ref 3.5–5.3)
PROCALCITONIN SERPL-MCNC: 0.1 NG/ML
PROT SERPL-MCNC: 7 G/DL (ref 6.4–8.2)
PROT UR STRIP-MCNC: NEGATIVE MG/DL
PROTHROMBIN TIME: 12.4 SECONDS (ref 11.6–14.5)
RBC # BLD AUTO: 2.85 MILLION/UL (ref 3.81–5.12)
RBC #/AREA URNS AUTO: ABNORMAL /HPF
RBC MORPH BLD: PRESENT
RSV RNA RESP QL NAA+PROBE: NEGATIVE
SARS-COV-2 RNA RESP QL NAA+PROBE: NEGATIVE
SODIUM SERPL-SCNC: 133 MMOL/L (ref 136–145)
SP GR UR STRIP.AUTO: 1.01 (ref 1–1.03)
UROBILINOGEN UR QL STRIP.AUTO: 0.2 E.U./DL
WBC # BLD AUTO: 17.76 THOUSAND/UL (ref 4.31–10.16)
WBC #/AREA URNS AUTO: ABNORMAL /HPF

## 2022-06-14 PROCEDURE — 11046 DBRDMT MUSC&/FSCA EA ADDL: CPT | Performed by: SURGERY

## 2022-06-14 PROCEDURE — 71045 X-RAY EXAM CHEST 1 VIEW: CPT

## 2022-06-14 PROCEDURE — G1004 CDSM NDSC: HCPCS

## 2022-06-14 PROCEDURE — 87040 BLOOD CULTURE FOR BACTERIA: CPT | Performed by: EMERGENCY MEDICINE

## 2022-06-14 PROCEDURE — 74176 CT ABD & PELVIS W/O CONTRAST: CPT

## 2022-06-14 PROCEDURE — 86140 C-REACTIVE PROTEIN: CPT | Performed by: EMERGENCY MEDICINE

## 2022-06-14 PROCEDURE — 0241U HB NFCT DS VIR RESP RNA 4 TRGT: CPT | Performed by: EMERGENCY MEDICINE

## 2022-06-14 PROCEDURE — 99285 EMERGENCY DEPT VISIT HI MDM: CPT | Performed by: EMERGENCY MEDICINE

## 2022-06-14 PROCEDURE — 96367 TX/PROPH/DG ADDL SEQ IV INF: CPT

## 2022-06-14 PROCEDURE — 81001 URINALYSIS AUTO W/SCOPE: CPT | Performed by: EMERGENCY MEDICINE

## 2022-06-14 PROCEDURE — 85027 COMPLETE CBC AUTOMATED: CPT | Performed by: EMERGENCY MEDICINE

## 2022-06-14 PROCEDURE — 36415 COLL VENOUS BLD VENIPUNCTURE: CPT | Performed by: EMERGENCY MEDICINE

## 2022-06-14 PROCEDURE — 97606 NEG PRS WND THER DME>50 SQCM: CPT | Performed by: SURGERY

## 2022-06-14 PROCEDURE — 70450 CT HEAD/BRAIN W/O DYE: CPT

## 2022-06-14 PROCEDURE — 93005 ELECTROCARDIOGRAM TRACING: CPT

## 2022-06-14 PROCEDURE — 99223 1ST HOSP IP/OBS HIGH 75: CPT | Performed by: SURGERY

## 2022-06-14 PROCEDURE — 85730 THROMBOPLASTIN TIME PARTIAL: CPT | Performed by: EMERGENCY MEDICINE

## 2022-06-14 PROCEDURE — 99285 EMERGENCY DEPT VISIT HI MDM: CPT

## 2022-06-14 PROCEDURE — 0KBP0ZZ EXCISION OF LEFT HIP MUSCLE, OPEN APPROACH: ICD-10-PCS | Performed by: SURGERY

## 2022-06-14 PROCEDURE — 96365 THER/PROPH/DIAG IV INF INIT: CPT

## 2022-06-14 PROCEDURE — 85007 BL SMEAR W/DIFF WBC COUNT: CPT | Performed by: EMERGENCY MEDICINE

## 2022-06-14 PROCEDURE — 85610 PROTHROMBIN TIME: CPT | Performed by: EMERGENCY MEDICINE

## 2022-06-14 PROCEDURE — 84145 PROCALCITONIN (PCT): CPT | Performed by: EMERGENCY MEDICINE

## 2022-06-14 PROCEDURE — 83605 ASSAY OF LACTIC ACID: CPT | Performed by: EMERGENCY MEDICINE

## 2022-06-14 PROCEDURE — 96375 TX/PRO/DX INJ NEW DRUG ADDON: CPT

## 2022-06-14 PROCEDURE — 0KBN0ZZ EXCISION OF RIGHT HIP MUSCLE, OPEN APPROACH: ICD-10-PCS | Performed by: SURGERY

## 2022-06-14 PROCEDURE — 80053 COMPREHEN METABOLIC PANEL: CPT | Performed by: EMERGENCY MEDICINE

## 2022-06-14 PROCEDURE — 99284 EMERGENCY DEPT VISIT MOD MDM: CPT

## 2022-06-14 PROCEDURE — 11043 DBRDMT MUSC&/FSCA 1ST 20/<: CPT | Performed by: SURGERY

## 2022-06-14 RX ORDER — SODIUM CHLORIDE 9 MG/ML
INJECTION, SOLUTION INTRAVENOUS CONTINUOUS PRN
Status: DISCONTINUED | OUTPATIENT
Start: 2022-06-14 | End: 2022-06-14

## 2022-06-14 RX ORDER — PROPOFOL 10 MG/ML
INJECTION, EMULSION INTRAVENOUS AS NEEDED
Status: DISCONTINUED | OUTPATIENT
Start: 2022-06-14 | End: 2022-06-14

## 2022-06-14 RX ORDER — ALBUMIN, HUMAN INJ 5% 5 %
SOLUTION INTRAVENOUS CONTINUOUS PRN
Status: DISCONTINUED | OUTPATIENT
Start: 2022-06-14 | End: 2022-06-14

## 2022-06-14 RX ORDER — ONDANSETRON 2 MG/ML
4 INJECTION INTRAMUSCULAR; INTRAVENOUS EVERY 6 HOURS PRN
Status: DISCONTINUED | OUTPATIENT
Start: 2022-06-14 | End: 2022-07-13 | Stop reason: HOSPADM

## 2022-06-14 RX ORDER — SUCCINYLCHOLINE/SOD CL,ISO/PF 100 MG/5ML
SYRINGE (ML) INTRAVENOUS AS NEEDED
Status: DISCONTINUED | OUTPATIENT
Start: 2022-06-14 | End: 2022-06-14

## 2022-06-14 RX ORDER — OXYCODONE HCL 5 MG/5 ML
2.5 SOLUTION, ORAL ORAL EVERY 4 HOURS PRN
Status: DISCONTINUED | OUTPATIENT
Start: 2022-06-14 | End: 2022-07-13 | Stop reason: HOSPADM

## 2022-06-14 RX ORDER — ACETAMINOPHEN 160 MG
TABLET,DISINTEGRATING ORAL AS NEEDED
Status: DISCONTINUED | OUTPATIENT
Start: 2022-06-14 | End: 2022-06-14 | Stop reason: HOSPADM

## 2022-06-14 RX ORDER — SODIUM CHLORIDE FOR INHALATION 3 %
4 VIAL, NEBULIZER (ML) INHALATION ONCE
Status: COMPLETED | OUTPATIENT
Start: 2022-06-14 | End: 2022-06-14

## 2022-06-14 RX ORDER — LEVALBUTEROL 1.25 MG/.5ML
1.25 SOLUTION, CONCENTRATE RESPIRATORY (INHALATION)
Status: DISCONTINUED | OUTPATIENT
Start: 2022-06-14 | End: 2022-06-14

## 2022-06-14 RX ORDER — SODIUM CHLORIDE FOR INHALATION 3 %
4 VIAL, NEBULIZER (ML) INHALATION
Status: DISCONTINUED | OUTPATIENT
Start: 2022-06-14 | End: 2022-06-14 | Stop reason: HOSPADM

## 2022-06-14 RX ORDER — ALBUTEROL SULFATE 2.5 MG/3ML
2.5 SOLUTION RESPIRATORY (INHALATION) ONCE AS NEEDED
Status: DISCONTINUED | OUTPATIENT
Start: 2022-06-14 | End: 2022-06-15 | Stop reason: HOSPADM

## 2022-06-14 RX ORDER — LABETALOL HYDROCHLORIDE 5 MG/ML
10 INJECTION, SOLUTION INTRAVENOUS EVERY 6 HOURS PRN
Status: DISCONTINUED | OUTPATIENT
Start: 2022-06-14 | End: 2022-06-22

## 2022-06-14 RX ORDER — LIDOCAINE HYDROCHLORIDE 10 MG/ML
INJECTION, SOLUTION EPIDURAL; INFILTRATION; INTRACAUDAL; PERINEURAL AS NEEDED
Status: DISCONTINUED | OUTPATIENT
Start: 2022-06-14 | End: 2022-06-14

## 2022-06-14 RX ORDER — LEVALBUTEROL 1.25 MG/.5ML
1.25 SOLUTION, CONCENTRATE RESPIRATORY (INHALATION) ONCE
Status: COMPLETED | OUTPATIENT
Start: 2022-06-14 | End: 2022-06-14

## 2022-06-14 RX ORDER — DEXAMETHASONE SODIUM PHOSPHATE 10 MG/ML
INJECTION, SOLUTION INTRAMUSCULAR; INTRAVENOUS AS NEEDED
Status: DISCONTINUED | OUTPATIENT
Start: 2022-06-14 | End: 2022-06-14

## 2022-06-14 RX ORDER — LANOLIN ALCOHOL/MO/W.PET/CERES
3 CREAM (GRAM) TOPICAL
Status: DISCONTINUED | OUTPATIENT
Start: 2022-06-14 | End: 2022-07-13 | Stop reason: HOSPADM

## 2022-06-14 RX ORDER — MAGNESIUM HYDROXIDE 1200 MG/15ML
LIQUID ORAL AS NEEDED
Status: DISCONTINUED | OUTPATIENT
Start: 2022-06-14 | End: 2022-06-14 | Stop reason: HOSPADM

## 2022-06-14 RX ORDER — CLINDAMYCIN PHOSPHATE 600 MG/50ML
600 INJECTION INTRAVENOUS ONCE
Status: COMPLETED | OUTPATIENT
Start: 2022-06-14 | End: 2022-06-14

## 2022-06-14 RX ORDER — OXYCODONE HCL 5 MG/5 ML
5 SOLUTION, ORAL ORAL EVERY 4 HOURS PRN
Status: DISCONTINUED | OUTPATIENT
Start: 2022-06-14 | End: 2022-07-13 | Stop reason: HOSPADM

## 2022-06-14 RX ORDER — ACETAMINOPHEN 160 MG/5ML
650 SUSPENSION, ORAL (FINAL DOSE FORM) ORAL EVERY 6 HOURS
Status: DISCONTINUED | OUTPATIENT
Start: 2022-06-14 | End: 2022-07-13 | Stop reason: HOSPADM

## 2022-06-14 RX ORDER — METRONIDAZOLE 500 MG/100ML
500 INJECTION, SOLUTION INTRAVENOUS
Status: CANCELLED | OUTPATIENT
Start: 2022-06-15 | End: 2022-06-16

## 2022-06-14 RX ORDER — CEFEPIME HYDROCHLORIDE 2 G/50ML
2000 INJECTION, SOLUTION INTRAVENOUS ONCE
Status: DISCONTINUED | OUTPATIENT
Start: 2022-06-14 | End: 2022-06-14 | Stop reason: HOSPADM

## 2022-06-14 RX ORDER — SODIUM CHLORIDE, SODIUM LACTATE, POTASSIUM CHLORIDE, CALCIUM CHLORIDE 600; 310; 30; 20 MG/100ML; MG/100ML; MG/100ML; MG/100ML
100 INJECTION, SOLUTION INTRAVENOUS CONTINUOUS
Status: DISCONTINUED | OUTPATIENT
Start: 2022-06-15 | End: 2022-06-15

## 2022-06-14 RX ORDER — ONDANSETRON 2 MG/ML
INJECTION INTRAMUSCULAR; INTRAVENOUS AS NEEDED
Status: DISCONTINUED | OUTPATIENT
Start: 2022-06-14 | End: 2022-06-14

## 2022-06-14 RX ORDER — HYDROMORPHONE HCL/PF 1 MG/ML
0.5 SYRINGE (ML) INJECTION ONCE
Status: COMPLETED | OUTPATIENT
Start: 2022-06-14 | End: 2022-06-14

## 2022-06-14 RX ORDER — HYDROMORPHONE HCL IN WATER/PF 6 MG/30 ML
0.2 PATIENT CONTROLLED ANALGESIA SYRINGE INTRAVENOUS
Status: DISCONTINUED | OUTPATIENT
Start: 2022-06-14 | End: 2022-06-15 | Stop reason: HOSPADM

## 2022-06-14 RX ORDER — ASPIRIN 325 MG
325 TABLET ORAL ONCE
Status: DISCONTINUED | OUTPATIENT
Start: 2022-06-14 | End: 2022-06-14 | Stop reason: HOSPADM

## 2022-06-14 RX ORDER — HEPARIN SODIUM 5000 [USP'U]/ML
5000 INJECTION, SOLUTION INTRAVENOUS; SUBCUTANEOUS EVERY 8 HOURS SCHEDULED
Status: DISCONTINUED | OUTPATIENT
Start: 2022-06-15 | End: 2022-06-19

## 2022-06-14 RX ORDER — FENTANYL CITRATE 50 UG/ML
INJECTION, SOLUTION INTRAMUSCULAR; INTRAVENOUS AS NEEDED
Status: DISCONTINUED | OUTPATIENT
Start: 2022-06-14 | End: 2022-06-14

## 2022-06-14 RX ORDER — ONDANSETRON 2 MG/ML
4 INJECTION INTRAMUSCULAR; INTRAVENOUS ONCE AS NEEDED
Status: DISCONTINUED | OUTPATIENT
Start: 2022-06-14 | End: 2022-06-15 | Stop reason: HOSPADM

## 2022-06-14 RX ADMIN — LEVALBUTEROL HYDROCHLORIDE 1.25 MG: 1.25 SOLUTION, CONCENTRATE RESPIRATORY (INHALATION) at 17:59

## 2022-06-14 RX ADMIN — HYDROMORPHONE HYDROCHLORIDE 0.2 MG: 0.2 INJECTION, SOLUTION INTRAMUSCULAR; INTRAVENOUS; SUBCUTANEOUS at 23:10

## 2022-06-14 RX ADMIN — LIDOCAINE HYDROCHLORIDE 50 MG: 10 INJECTION, SOLUTION EPIDURAL; INFILTRATION; INTRACAUDAL; PERINEURAL at 21:34

## 2022-06-14 RX ADMIN — HYDROMORPHONE HYDROCHLORIDE 0.2 MG: 0.2 INJECTION, SOLUTION INTRAMUSCULAR; INTRAVENOUS; SUBCUTANEOUS at 22:54

## 2022-06-14 RX ADMIN — CLINDAMYCIN PHOSPHATE 600 MG: 600 INJECTION, SOLUTION INTRAVENOUS at 19:17

## 2022-06-14 RX ADMIN — SODIUM CHLORIDE: 0.9 INJECTION, SOLUTION INTRAVENOUS at 21:26

## 2022-06-14 RX ADMIN — DEXAMETHASONE SODIUM PHOSPHATE 5 MG: 10 INJECTION, SOLUTION INTRAMUSCULAR; INTRAVENOUS at 21:43

## 2022-06-14 RX ADMIN — FENTANYL CITRATE 50 MCG: 50 INJECTION INTRAMUSCULAR; INTRAVENOUS at 22:45

## 2022-06-14 RX ADMIN — ONDANSETRON 4 MG: 2 INJECTION INTRAMUSCULAR; INTRAVENOUS at 21:43

## 2022-06-14 RX ADMIN — SODIUM CHLORIDE SOLN NEBU 3% 4 ML: 3 NEBU SOLN at 17:59

## 2022-06-14 RX ADMIN — Medication 100 MG: at 21:34

## 2022-06-14 RX ADMIN — ALBUMIN (HUMAN): 12.5 INJECTION, SOLUTION INTRAVENOUS at 21:59

## 2022-06-14 RX ADMIN — SODIUM CHLORIDE, SODIUM LACTATE, POTASSIUM CHLORIDE, AND CALCIUM CHLORIDE 100 ML/HR: .6; .31; .03; .02 INJECTION, SOLUTION INTRAVENOUS at 23:39

## 2022-06-14 RX ADMIN — HYDROMORPHONE HYDROCHLORIDE 0.5 MG: 1 INJECTION, SOLUTION INTRAMUSCULAR; INTRAVENOUS; SUBCUTANEOUS at 18:47

## 2022-06-14 RX ADMIN — ALBUMIN (HUMAN): 12.5 INJECTION, SOLUTION INTRAVENOUS at 21:29

## 2022-06-14 RX ADMIN — HYDROMORPHONE HYDROCHLORIDE 0.2 MG: 0.2 INJECTION, SOLUTION INTRAMUSCULAR; INTRAVENOUS; SUBCUTANEOUS at 23:24

## 2022-06-14 RX ADMIN — VANCOMYCIN HYDROCHLORIDE 750 MG: 750 INJECTION, SOLUTION INTRAVENOUS at 17:51

## 2022-06-14 RX ADMIN — FENTANYL CITRATE 50 MCG: 50 INJECTION INTRAMUSCULAR; INTRAVENOUS at 21:48

## 2022-06-14 RX ADMIN — PROPOFOL 80 MG: 10 INJECTION, EMULSION INTRAVENOUS at 21:34

## 2022-06-14 NOTE — ED NOTES
When writer of this note went into pt room her daughter stated that pt self suctions at care center and needs to be suctioned  Pt stated that she did not need to suction today because she had very little sections  Writer of this note attempted to suction with success but respiratory was made aware to assist  Nurse Miguelina Gordon at bedside assisting with suction and attempting to get second blood culture  Pt Spo2 fluctuating at 91-93% room air  Placed pt on 2L  Respiratory now at bedside        Sandy Mccarthy RN  06/14/22 8672

## 2022-06-14 NOTE — RESPIRATORY THERAPY NOTE
NT suctioned for moderate amount clear secretions and also with yankauer for small amount clear secretions

## 2022-06-14 NOTE — LETTER
[facility] CRITICAL CARE UNIT  [address] [place], [place] [id number]  Dept: [phone]    July 3, 2022     Patient: Jose Raul Watson   YOB: 1940   Date of Visit: 6/14/2022       To Whom it May Concern:    Jose Raul Watson is under my professional care  She was seen in the hospital from 6/14/2022   to 07/03/22  If you have any questions or concerns, please don't hesitate to call           Sincerely,          Solange Gallo MD

## 2022-06-14 NOTE — ED PROVIDER NOTES
History  Chief Complaint   Patient presents with    Wound Check     Pt arrived EMS from [facility] for wound check on sacrum  Pt denies N/V  Pt denies fever  Pt states pain 10/10  HPI  81F sacral wound since April 2022  Became bedbound around that time secondary to worsening of scleroderma  Wound has been cared for by nursing staff, noted to have deeper tunneling over the past month, now with greater drainage over the past few days  Patient denies fevers, chills, nausea, vomiting, chest pain, abdominal pain  Patient has severe pain/pressure when sitting up or rolling but no pain when lying supine  States that she was treated with single dose of vancomycin the previous day  Patient had a PICC line performed yesterday for this purpose  Patient separately stating that over the course of the past 2 weeks she has had multiple stroke-like episodes, which she believes most recently was 10 days ago including a left-sided facial droop, left leg weakness, and difficulty writing with her right hand  Patient denies any continued word-finding difficulty, difficulty writing  Patient does have a continue left-sided facial droop and is noted to be weaker on the left leg than the right leg  Patient denies prior history of stroke  Patient denies headache  Patient denies focal numbness  Patient denies any recent falls or head trauma  None       Past Medical History:   Diagnosis Date    Dysphagia, oropharyngeal phase 06/06/2022       No past surgical history on file  No family history on file  I have reviewed and agree with the history as documented  E-Cigarette/Vaping     E-Cigarette/Vaping Substances          Review of Systems   Constitutional: Negative for chills, fatigue and fever  HENT: Negative for sore throat  Eyes: Negative for visual disturbance  Respiratory: Negative for cough, chest tightness and shortness of breath  Cardiovascular: Negative for chest pain     Gastrointestinal: Negative for abdominal distention, abdominal pain, constipation, diarrhea, nausea and vomiting  Endocrine: Negative for polydipsia and polyuria  Genitourinary: Negative for dysuria and hematuria  Musculoskeletal: Negative for arthralgias and myalgias  Skin: Positive for color change and wound  Negative for pallor and rash  Neurological: Positive for facial asymmetry and weakness  Negative for dizziness, speech difficulty, light-headedness, numbness and headaches  Psychiatric/Behavioral: Negative for confusion  All other systems reviewed and are negative  Physical Exam  Physical Exam  Vitals reviewed  Constitutional:       General: She is not in acute distress  Appearance: She is well-developed  She is not diaphoretic  Comments: Chronically ill-appearing but nondistressed   HENT:      Head: Normocephalic and atraumatic  Comments: Moist mucous membranes     Right Ear: External ear normal       Left Ear: External ear normal       Nose: Nose normal       Mouth/Throat:      Pharynx: No oropharyngeal exudate  Eyes:      Pupils: Pupils are equal, round, and reactive to light  Cardiovascular:      Rate and Rhythm: Normal rate and regular rhythm  Heart sounds: Normal heart sounds  No murmur heard  No friction rub  No gallop  Comments: Regular rate and rhythm, no murmurs rubs or gallops, extremities warm and well perfused  Pulmonary:      Effort: Pulmonary effort is normal  No respiratory distress  Breath sounds: Normal breath sounds  No wheezing  Comments: No increased work of breathing  Lungs clear to auscultation bilaterally  Satting 96% on room air indicating adequate oxygenation  On re-examination, patient with worsening secretions, suctioning her own mouth, transition to 5 L nasal cannula, able to speak in complete  Chest:      Chest wall: No tenderness  Abdominal:      General: Bowel sounds are normal  There is no distension        Palpations: Abdomen is soft  There is no mass  Tenderness: There is no abdominal tenderness  There is no guarding  Comments: Abdomen soft, nontender, nondistended   Musculoskeletal:         General: No deformity  Normal range of motion  Cervical back: Normal range of motion  Comments: Large sacral decubitus ulcer in place, packing removed  Moderate purulence, trace surrounding erythema without significant warmth or tenderness  No palpable crepitus  No associated necrotic tissue  Lymphadenopathy:      Cervical: No cervical adenopathy  Skin:     General: Skin is warm and dry  Capillary Refill: Capillary refill takes less than 2 seconds  Neurological:      Mental Status: She is alert and oriented to person, place, and time  Comments: Left-sided facial droop noted the left corner of the mouth at rest   CN 2 through 12 otherwise intact  Full sensation bilaterally on face and in upper and lower extremities  Bilateral upper extremity strength and range of motion significantly limited secondary to patient's scleroderma   3/5 Strength left lower extremity, 4/5 right lower extremity         Vital Signs  ED Triage Vitals [06/14/22 1531]   Temperature Pulse Respirations Blood Pressure SpO2   97 6 °F (36 4 °C) 64 18 120/58 96 %      Temp Source Heart Rate Source Patient Position - Orthostatic VS BP Location FiO2 (%)   Oral Monitor Lying Left arm --      Pain Score       10 - Worst Possible Pain           Vitals:    06/14/22 1531 06/14/22 1838   BP: 120/58 113/59   Pulse: 64 89   Patient Position - Orthostatic VS: Lying          Visual Acuity      ED Medications  Medications   vancomycin (VANCOCIN) IVPB (premix in dextrose) 750 mg 150 mL (0 mg/kg × 57 9 kg Intravenous Stopped 6/14/22 1851)   levalbuterol (XOPENEX) inhalation solution 1 25 mg (1 25 mg Nebulization Given 6/14/22 1759)   sodium chloride 3 % inhalation solution 4 mL (4 mL Nebulization Given 6/14/22 1759)   clindamycin (CLEOCIN) IVPB (premix in dextrose) 600 mg 50 mL (600 mg Intravenous New Bag 6/14/22 1917)   HYDROmorphone (DILAUDID) injection 0 5 mg (0 5 mg Intravenous Given 6/14/22 1847)       Diagnostic Studies  Results Reviewed     Procedure Component Value Units Date/Time    COVID/FLU/RSV [[id number]]  (Normal) Collected: 06/14/22 1836    Lab Status: Final result Specimen: Nares from Nose Updated: 06/14/22 1929     SARS-CoV-2 Negative     INFLUENZA A PCR Negative     INFLUENZA B PCR Negative     RSV PCR Negative    Narrative:      FOR PEDIATRIC PATIENTS - copy/paste COVID Guidelines URL to browser: [email or web address]  [employer]    SARS-CoV-2 assay is a Nucleic Acid Amplification assay intended for the  qualitative detection of nucleic acid from SARS-CoV-2 in nasopharyngeal  swabs  Results are for the presumptive identification of SARS-CoV-2 RNA  Positive results are indicative of infection with SARS-CoV-2, the virus  causing COVID-19, but do not rule out bacterial infection or co-infection  with other viruses  Laboratories within the [place] and its  territories are required to report all positive results to the appropriate  public health authorities  Negative results do not preclude SARS-CoV-2  infection and should not be used as the sole basis for treatment or other  patient management decisions  Negative results must be combined with  clinical observations, patient history, and epidemiological information  This test has not been FDA cleared or approved  This test has been authorized by FDA under an Emergency Use Authorization  (EUA)  This test is only authorized for the duration of time the  declaration that circumstances exist justifying the authorization of the  emergency use of an in vitro diagnostic tests for detection of SARS-CoV-2  virus and/or diagnosis of COVID-19 infection under section 564(b)(1) of  the Act, 21 U  S C  527UKS-2(X)(2), unless the authorization is terminated  or revoked sooner  The test has been validated but independent review by FDA  and CLIA is pending  Test performed using [employer] GeneXpert: This RT-PCR assay targets N2,  a region unique to SARS-CoV-2  A conserved region in the E-gene was chosen  for pan-Sarbecovirus detection which includes SARS-CoV-2  C-reactive protein [[id number]]  (Abnormal) Collected: 06/14/22 1634    Lab Status: Final result Specimen: Blood from Arm, Right Updated: 06/14/22 1837     CRP 35 9 mg/L     Blood culture #1 [[id number]] Collected: 06/14/22 1749    Lab Status: In process Specimen: Blood from Arm, Right Updated: 06/14/22 1802    CBC and differential [[id number]]  (Abnormal) Collected: 06/14/22 1634    Lab Status: Final result Specimen: Blood from Arm, Right Updated: 06/14/22 1724     WBC 17 76 Thousand/uL      RBC 2 85 Million/uL      Hemoglobin 8 6 g/dL      Hematocrit 27 9 %      MCV 98 fL      MCH 30 2 pg      MCHC 30 8 g/dL      RDW 15 8 %      MPV 9 1 fL      Platelets 945 Thousands/uL     Narrative: This is an appended report  These results have been appended to a previously verified report      Manual Differential(PHLEBS Do Not Order) [[id number]]  (Abnormal) Collected: 06/14/22 1634    Lab Status: Final result Specimen: Blood from Arm, Right Updated: 06/14/22 1724     Segmented % 71 %      Bands % 19 %      Lymphocytes % 3 %      Monocytes % 1 %      Eosinophils, % 0 %      Basophils % 0 %      Metamyelocytes% 4 %      Myelocytes % 2 %      Absolute Neutrophils 15 98 Thousand/uL      Lymphocytes Absolute 0 53 Thousand/uL      Monocytes Absolute 0 18 Thousand/uL      Eosinophils Absolute 0 00 Thousand/uL      Basophils Absolute 0 00 Thousand/uL      Total Counted --     nRBC 1 /100 WBC      RBC Morphology Present     Hypochromia Present     Polychromasia Present     Platelet Estimate Increased    Procalcitonin [[id number]]  (Normal) Collected: 06/14/22 1634    Lab Status: Final result Specimen: Blood from Arm, Right Updated: 06/14/22 1713     Procalcitonin 0 10 ng/ml     Lactic acid [[id number]]  (Normal) Collected: 06/14/22 1634    Lab Status: Final result Specimen: Blood from Arm, Right Updated: 06/14/22 1709     LACTIC ACID 1 2 mmol/L     Narrative:      Result may be elevated if tourniquet was used during collection  Blood culture #2 [[id number]] Collected: 06/14/22 1651    Lab Status:  In process Specimen: Blood from Arm, Left Updated: 06/14/22 1707    Comprehensive metabolic panel [[id number]]  (Abnormal) Collected: 06/14/22 1634    Lab Status: Final result Specimen: Blood from Arm, Right Updated: 06/14/22 1704     Sodium 133 mmol/L      Potassium 4 3 mmol/L      Chloride 97 mmol/L      CO2 32 mmol/L      ANION GAP 4 mmol/L      BUN 19 mg/dL      Creatinine 0 45 mg/dL      Glucose 117 mg/dL      Calcium 8 9 mg/dL      Corrected Calcium 10 3 mg/dL      AST 19 U/L      ALT 26 U/L      Alkaline Phosphatase 111 U/L      Total Protein 7 0 g/dL      Albumin 2 2 g/dL      Total Bilirubin 0 19 mg/dL      eGFR 95 ml/min/1 73sq m     Narrative:      [place] guidelines for Chronic Kidney Disease (CKD):     Stage 1 with normal or high GFR (GFR > 90 mL/min/1 73 square meters)    Stage 2 Mild CKD (GFR = 60-89 mL/min/1 73 square meters)    Stage 3A Moderate CKD (GFR = 45-59 mL/min/1 73 square meters)    Stage 3B Moderate CKD (GFR = 30-44 mL/min/1 73 square meters)    Stage 4 Severe CKD (GFR = 15-29 mL/min/1 73 square meters)    Stage 5 End Stage CKD (GFR <15 mL/min/1 73 square meters)  Note: GFR calculation is accurate only with a steady state creatinine    Protime-INR [[id number]]  (Normal) Collected: 06/14/22 1634    Lab Status: Final result Specimen: Blood from Arm, Right Updated: 06/14/22 1658     Protime 12 4 seconds      INR 0 94    APTT [[id number]]  (Normal) Collected: 06/14/22 1634    Lab Status: Final result Specimen: Blood from Arm, Right Updated: 06/14/22 1658     PTT 31 seconds     Urine Microscopic [[id number]]  (Abnormal) Collected: 06/14/22 1640    Lab Status: Final result Specimen: Urine, Indwelling Tejada Catheter Updated: 06/14/22 1658     RBC, UA 0-1 /hpf      WBC, UA 4-10 /hpf      Epithelial Cells Occasional /hpf      Bacteria, UA Occasional /hpf     UA w Reflex to Microscopic w Reflex to Culture [[id number]]  (Abnormal) Collected: 06/14/22 1640    Lab Status: Final result Specimen: Urine, Indwelling Tejada Catheter Updated: 06/14/22 1648     Color, UA Light Yellow     Clarity, UA Clear     Specific Gravity, UA 1 015     pH, UA 7 5     Leukocytes, UA Small     Nitrite, UA Negative     Protein, UA Negative mg/dl      Glucose, UA Negative mg/dl      Ketones, UA Negative mg/dl      Urobilinogen, UA 0 2 E U /dl      Bilirubin, UA Negative     Blood, UA Negative                 CT head without contrast   Final Result by Fernandez Jennings MD (06/14 4666)      No acute intracranial hemorrhage  If symptoms persistent or worsening focal neurologic deficit, proceed with noncontrast brain MRI and/or CTA  Workstation performed: [id number]         CT abdomen pelvis wo contrast   Final Result by Fernandez Jennings MD (06/14 7068)      Soft tissue air and soft tissue defect identified posterior to the sacrum suggesting infection  I cannot exclude superimposed posterior sacral osteomyelitis although no bony erosion identified  Soft tissue air identified around the posterior aspect of the rectum in addition to the left side of the rectum/perineum, correlate for necrotizing fasciitis soft tissue infection  Surgical consult      Bilateral lower lobe pneumonia  Left upper pole renal 1 cm likely hemorrhagic cyst, correlate with nonemergent outpatient renal ultrasound               I personally discussed this study with Armin Betancourt on 6/14/2022 at 5:54 PM                         Workstation performed: [id number]         XR chest 1 view portable    (Results Pending)              Procedures  Procedures ED Course                                             MDM  Number of Diagnoses or Management Options  Necrotizing soft tissue infection  Pneumonia  Stroke-like episode  Wound of sacral region, initial encounter  Diagnosis management comments: Plan for sepsis labs including CBC, CMP, lactate, procalcitonin, blood cultures, urinalysis, PTT  Patient denying constitutional symptoms in ED  Noted to have some surrounding cellulitis in purulence and wound  Treated with vanc/cefepime/clindamycin  Plan for CT scan to visualize depth of wound  Patient additionally describing stroke-like episodes  Most recent episode approximately 10 days ago, patient outside of any window for tPA or clot retrieval   Plan for CT head, admission on stroke pathway  CT demonstrating tracking air concerning for necrotizing soft tissue infection  Discussed this with radiologist and with Dr Eden Bob with surgery who recommended transfer to [address] [place] [address] given degree of tissue involvement for escalation of care  Patient and daughter updated on this and agreeable with plan  Unremarkable CT head  Patient continuing to deny constitutional symptoms in emergency department  Patient with worsening respiratory secretions in emergency department requiring suctioning and increased supplemental oxygen  Patient accepted by Dr Flaco Lundberg with general surgery, stable at time of transfer        Disposition  Final diagnoses:   Necrotizing soft tissue infection   Wound of sacral region, initial encounter   Pneumonia   Stroke-like episode     Time reflects when diagnosis was documented in both MDM as applicable and the Disposition within this note     Time User Action Codes Description Comment    6/14/2022  6:55 PM Daphnie Owen [M79 89] Necrotizing soft tissue infection     6/14/2022  6:55 PM Daphnie Owen [S31 000A] Wound of sacral region, initial encounter     6/14/2022  6:55 PM Daphnie Owen [J18 9] Pneumonia 6/14/2022  6:55 PM Keyla Stovermann Add [R29 90] Stroke-like episode       ED Disposition     ED Disposition   Transfer to Another Facility-In Network    Condition   --    Date/Time   Tue Jun 14, 2022  6:54 PM    Comment   Bridgett Beltrán should be transferred out to B  MD Documentation    Sergio Sutton Most Recent Value   Patient Condition The patient has been stabilized such that within reasonable medical probability, no material deterioration of the patient condition or the condition of the unborn child(roberth) is likely to result from the transfer   Reason for Transfer Level of Care needed not available at this facility   Benefits of Transfer Specialized equipment and/or services available at the receiving facility (Include comment)________________________   Accepting Physician [address] Name, [address] [place] [address]   Sending [facility]   Provider Certification General risk, such as traffic hazards, adverse weather conditions, rough terrain or turbulence, possible failure of equipment (including vehicle or aircraft), or consequences of actions of persons outside the control of the transport personnel      RN Documentation    Flowsheet Row Most [facility] Name, [address] [place] [address]   Transport Mode Ambulance   Level of Care Advanced life support      Follow-up Information    None         There are no discharge medications for this patient  No discharge procedures on file      PDMP Review     None          ED Provider  Electronically Signed by           Key Oreilly MD  06/15/22 8913

## 2022-06-14 NOTE — EMTALA/ACUTE CARE TRANSFER
[address] [facility] [address]  [place] [address]  Dept: [phone]      EMTALA TRANSFER CONSENT    NAME Ifeoma Quiros                                         1940                              MRN [id number]    I have been informed of my rights regarding examination, treatment, and transfer   by Dr Mayela Gunter MD    Benefits: Specialized equipment and/or services available at the receiving facility (Include comment)________________________    Risks:        Consent for Transfer:  I acknowledge that my medical condition has been evaluated and explained to me by the emergency department physician or other qualified medical person and/or my attending physician, who has recommended that I be transferred to the service of  Accepting Physician: Krysten Gerber at [address] Name, [address] : [address]  The above potential benefits of such transfer, the potential risks associated with such transfer, and the probable risks of not being transferred have been explained to me, and I fully understand them  The doctor has explained that, in my case, the benefits of transfer outweigh the risks  I agree to be transferred  I authorize the performance of emergency medical procedures and treatments upon me in both transit and upon arrival at the receiving facility  Additionally, I authorize the release of any and all medical records to the receiving facility and request they be transported with me, if possible  I understand that the safest mode of transportation during a medical emergency is an ambulance and that the Hospital advocates the use of this mode of transport  Risks of traveling to the receiving facility by car, including absence of medical control, life sustaining equipment, such as oxygen, and medical personnel has been explained to me and I fully understand them      (BESSY CORRECT BOX BELOW)  [  ]  I consent to the stated transfer and to be transported by ambulance/helicopter  [  ]  I consent to the stated transfer, but refuse transportation by ambulance and accept full responsibility for my transportation by car  I understand the risks of non-ambulance transfers and I exonerate the Hospital and its staff from any deterioration in my condition that results from this refusal     X___________________________________________    DATE  22  TIME________  Signature of patient or legally responsible individual signing on patient behalf           RELATIONSHIP TO PATIENT_________________________          Provider Certification    NAME Estrella Olmstead                                         1940                              MRN [id number]    A medical screening exam was performed on the above named patient  Based on the examination:    Condition Necessitating Transfer The primary encounter diagnosis was Necrotizing soft tissue infection  Diagnoses of Wound of sacral region, initial encounter, Pneumonia, and Stroke-like episode were also pertinent to this visit      Patient Condition: The patient has been stabilized such that within reasonable medical probability, no material deterioration of the patient condition or the condition of the unborn child(roberth) is likely to result from the transfer    Reason for Transfer: Level of Care needed not available at this facility    Transfer Requirements: [address]   · Space available and qualified personnel available for treatment as acknowledged by    · Agreed to accept transfer and to provide appropriate medical treatment as acknowledged by       [employer]  · Appropriate medical records of the examination and treatment of the patient are provided at the time of transfer   [address], [address] _______  · Transfer will be performed by qualified personnel from    and appropriate transfer equipment as required, including the use of necessary and appropriate life support measures  Provider Certification: I have examined the patient and explained the following risks and benefits of being transferred/refusing transfer to the patient/family:  General risk, such as traffic hazards, adverse weather conditions, rough terrain or turbulence, possible failure of equipment (including vehicle or aircraft), or consequences of actions of persons outside the control of the transport personnel      Based on these reasonable risks and benefits to the patient and/or the unborn child(roberth), and based upon the information available at the time of the patients examination, I certify that the medical benefits reasonably to be expected from the provision of appropriate medical treatments at another medical facility outweigh the increasing risks, if any, to the individuals medical condition, and in the case of labor to the unborn child, from effecting the transfer      X____________________________________________ DATE 06/14/22        TIME_______      ORIGINAL - SEND TO MEDICAL RECORDS   COPY - SEND WITH PATIENT DURING TRANSFER

## 2022-06-14 NOTE — ED NOTES
During pt neb treatment pt become SOB with secretions  Pt suctioned, provider at bedside, and respiratory called for deep suction   Will continue to monitor pt and suction periodically      Evens Kaur RN  06/14/22 9423

## 2022-06-15 LAB
ABO GROUP BLD: NORMAL
ALBUMIN SERPL BCP-MCNC: 2.1 G/DL (ref 3.5–5)
ALP SERPL-CCNC: 94 U/L (ref 46–116)
ALT SERPL W P-5'-P-CCNC: 19 U/L (ref 12–78)
ANION GAP SERPL CALCULATED.3IONS-SCNC: 4 MMOL/L (ref 4–13)
AST SERPL W P-5'-P-CCNC: 15 U/L (ref 5–45)
BILIRUB SERPL-MCNC: 0.36 MG/DL (ref 0.2–1)
BLD GP AB SCN SERPL QL: NEGATIVE
BUN SERPL-MCNC: 18 MG/DL (ref 5–25)
CALCIUM ALBUM COR SERPL-MCNC: 10.2 MG/DL (ref 8.3–10.1)
CALCIUM SERPL-MCNC: 8.7 MG/DL (ref 8.3–10.1)
CHLORIDE SERPL-SCNC: 99 MMOL/L (ref 100–108)
CO2 SERPL-SCNC: 31 MMOL/L (ref 21–32)
CREAT SERPL-MCNC: 0.4 MG/DL (ref 0.6–1.3)
ERYTHROCYTE [DISTWIDTH] IN BLOOD BY AUTOMATED COUNT: 15.9 % (ref 11.6–15.1)
ERYTHROCYTE [DISTWIDTH] IN BLOOD BY AUTOMATED COUNT: 17 % (ref 11.6–15.1)
GFR SERPL CREATININE-BSD FRML MDRD: 99 ML/MIN/1.73SQ M
GLUCOSE SERPL-MCNC: 107 MG/DL (ref 65–140)
HCT VFR BLD AUTO: 22.1 % (ref 34.8–46.1)
HCT VFR BLD AUTO: 23.3 % (ref 34.8–46.1)
HGB BLD-MCNC: 6.9 G/DL (ref 11.5–15.4)
HGB BLD-MCNC: 6.9 G/DL (ref 11.5–15.4)
LACTATE SERPL-SCNC: 0.8 MMOL/L (ref 0.5–2)
MAGNESIUM SERPL-MCNC: 2.1 MG/DL (ref 1.6–2.6)
MCH RBC QN AUTO: 30 PG (ref 26.8–34.3)
MCH RBC QN AUTO: 30.4 PG (ref 26.8–34.3)
MCHC RBC AUTO-ENTMCNC: 29.6 G/DL (ref 31.4–37.4)
MCHC RBC AUTO-ENTMCNC: 30.8 G/DL (ref 31.4–37.4)
MCV RBC AUTO: 101 FL (ref 82–98)
MCV RBC AUTO: 99 FL (ref 82–98)
NRBC BLD AUTO-RTO: 0 /100 WBCS
PHOSPHATE SERPL-MCNC: 3.4 MG/DL (ref 2.3–4.1)
PLATELET # BLD AUTO: 301 THOUSANDS/UL (ref 149–390)
PLATELET # BLD AUTO: 350 THOUSANDS/UL (ref 149–390)
PMV BLD AUTO: 9.1 FL (ref 8.9–12.7)
PMV BLD AUTO: 9.3 FL (ref 8.9–12.7)
POTASSIUM SERPL-SCNC: 4.5 MMOL/L (ref 3.5–5.3)
PROT SERPL-MCNC: 6.1 G/DL (ref 6.4–8.2)
RBC # BLD AUTO: 2.24 MILLION/UL (ref 3.81–5.12)
RBC # BLD AUTO: 2.3 MILLION/UL (ref 3.81–5.12)
RH BLD: POSITIVE
SODIUM SERPL-SCNC: 134 MMOL/L (ref 136–145)
SPECIMEN EXPIRATION DATE: NORMAL
WBC # BLD AUTO: 10.28 THOUSAND/UL (ref 4.31–10.16)
WBC # BLD AUTO: 12.97 THOUSAND/UL (ref 4.31–10.16)

## 2022-06-15 PROCEDURE — 85025 COMPLETE CBC W/AUTO DIFF WBC: CPT | Performed by: SURGERY

## 2022-06-15 PROCEDURE — 86920 COMPATIBILITY TEST SPIN: CPT

## 2022-06-15 PROCEDURE — 83735 ASSAY OF MAGNESIUM: CPT | Performed by: SURGERY

## 2022-06-15 PROCEDURE — 97163 PT EVAL HIGH COMPLEX 45 MIN: CPT

## 2022-06-15 PROCEDURE — 86901 BLOOD TYPING SEROLOGIC RH(D): CPT | Performed by: SURGERY

## 2022-06-15 PROCEDURE — P9016 RBC LEUKOCYTES REDUCED: HCPCS

## 2022-06-15 PROCEDURE — 86850 RBC ANTIBODY SCREEN: CPT | Performed by: SURGERY

## 2022-06-15 PROCEDURE — 99024 POSTOP FOLLOW-UP VISIT: CPT | Performed by: SURGERY

## 2022-06-15 PROCEDURE — 97167 OT EVAL HIGH COMPLEX 60 MIN: CPT

## 2022-06-15 PROCEDURE — 85027 COMPLETE CBC AUTOMATED: CPT

## 2022-06-15 PROCEDURE — 86900 BLOOD TYPING SEROLOGIC ABO: CPT | Performed by: SURGERY

## 2022-06-15 PROCEDURE — 84100 ASSAY OF PHOSPHORUS: CPT | Performed by: SURGERY

## 2022-06-15 PROCEDURE — 80053 COMPREHEN METABOLIC PANEL: CPT | Performed by: SURGERY

## 2022-06-15 PROCEDURE — 83605 ASSAY OF LACTIC ACID: CPT | Performed by: NURSE PRACTITIONER

## 2022-06-15 RX ORDER — METRONIDAZOLE 500 MG/100ML
500 INJECTION, SOLUTION INTRAVENOUS
Status: ACTIVE | OUTPATIENT
Start: 2022-06-15 | End: 2022-06-16

## 2022-06-15 RX ORDER — VANCOMYCIN HYDROCHLORIDE 1 G/200ML
15 INJECTION, SOLUTION INTRAVENOUS EVERY 12 HOURS
Status: DISCONTINUED | OUTPATIENT
Start: 2022-06-15 | End: 2022-06-15

## 2022-06-15 RX ORDER — SODIUM CHLORIDE, SODIUM GLUCONATE, SODIUM ACETATE, POTASSIUM CHLORIDE, MAGNESIUM CHLORIDE, SODIUM PHOSPHATE, DIBASIC, AND POTASSIUM PHOSPHATE .53; .5; .37; .037; .03; .012; .00082 G/100ML; G/100ML; G/100ML; G/100ML; G/100ML; G/100ML; G/100ML
500 INJECTION, SOLUTION INTRAVENOUS ONCE
Status: COMPLETED | OUTPATIENT
Start: 2022-06-15 | End: 2022-06-15

## 2022-06-15 RX ADMIN — CEFEPIME HYDROCHLORIDE 2000 MG: 2 INJECTION, POWDER, FOR SOLUTION INTRAVENOUS at 02:27

## 2022-06-15 RX ADMIN — VANCOMYCIN HYDROCHLORIDE 1250 MG: 10 INJECTION, POWDER, LYOPHILIZED, FOR SOLUTION INTRAVENOUS at 15:41

## 2022-06-15 RX ADMIN — ACETAMINOPHEN 650 MG: 650 SUSPENSION ORAL at 17:08

## 2022-06-15 RX ADMIN — VANCOMYCIN HYDROCHLORIDE 1250 MG: 10 INJECTION, POWDER, LYOPHILIZED, FOR SOLUTION INTRAVENOUS at 03:40

## 2022-06-15 RX ADMIN — SODIUM CHLORIDE, SODIUM GLUCONATE, SODIUM ACETATE, POTASSIUM CHLORIDE, MAGNESIUM CHLORIDE, SODIUM PHOSPHATE, DIBASIC, AND POTASSIUM PHOSPHATE 500 ML: .53; .5; .37; .037; .03; .012; .00082 INJECTION, SOLUTION INTRAVENOUS at 07:54

## 2022-06-15 RX ADMIN — HEPARIN SODIUM 5000 UNITS: 5000 INJECTION INTRAVENOUS; SUBCUTANEOUS at 21:16

## 2022-06-15 RX ADMIN — SODIUM CHLORIDE, SODIUM LACTATE, POTASSIUM CHLORIDE, AND CALCIUM CHLORIDE 500 ML: .6; .31; .03; .02 INJECTION, SOLUTION INTRAVENOUS at 21:13

## 2022-06-15 RX ADMIN — HEPARIN SODIUM 5000 UNITS: 5000 INJECTION INTRAVENOUS; SUBCUTANEOUS at 04:57

## 2022-06-15 RX ADMIN — CEFEPIME HYDROCHLORIDE 2000 MG: 2 INJECTION, POWDER, FOR SOLUTION INTRAVENOUS at 18:23

## 2022-06-15 RX ADMIN — ACETAMINOPHEN 650 MG: 650 SUSPENSION ORAL at 04:56

## 2022-06-15 RX ADMIN — CEFEPIME HYDROCHLORIDE 2000 MG: 2 INJECTION, POWDER, FOR SOLUTION INTRAVENOUS at 09:44

## 2022-06-15 RX ADMIN — HEPARIN SODIUM 5000 UNITS: 5000 INJECTION INTRAVENOUS; SUBCUTANEOUS at 15:41

## 2022-06-15 NOTE — PHYSICAL THERAPY NOTE
Physical Therapy Evaluation     Patient's Name: Jeannie Vargas    Admitting Diagnosis  Necrotizing fasciitis [M72 6]    Problem List  Patient Active Problem List   Diagnosis    Sacral wound       Past Medical History  Past Medical History:   Diagnosis Date    Dysphagia, oropharyngeal phase 06/06/2022       Past Surgical History  Past Surgical History:   Procedure Laterality Date    WOUND DEBRIDEMENT N/A 6/14/2022    Procedure: DEBRIDEMENT WOUND [facility] OUT); SACRUM AND BUTTOCKS;  Surgeon: Tessie Bonds MD;  Location: [facility];  Service: General        06/15/22 1502   PT Last Visit   PT Visit Date 06/15/22   Note Type   Note type Evaluation   Pain Assessment   Pain Assessment Tool 0-10   Pain Score 1   Pain Location/Orientation Location: [address] [place] [address] Pain Intervention(s) Repositioned   Restrictions/Precautions   Weight Bearing Precautions Per Order No   Braces or Orthoses   (NONE)   Other Precautions Pain; Fall Risk;O2;Multiple lines  (wound vac, 2  L O2)   Home Living   Type of [address] [place] [address] Two level;Stairs to enter with rails; Able to live on main level with bedroom/bathroom  (4 argenis)   [address] [place] [address],[address]; Other (Comment)  (rollator)   Additional Comments Per patient, in March of 2022 she was residing at home with her spouse in a 2 SH (1st floor set up, 4 ARGENIS)  At that time, she was I with mobility (no use of AD inside the home and rollator outside of the home)  She was I with ADLS and iADLs  0 falls  Patient was admitted to hospital in March of 2022 after reportedly having a bad reaction to a medication she was taking  She got swollen and weak and since March has been either been in the hospital or rehab  Patient reports the first rehab she went to she mainly sat in a   The most recent rehab where patient was admitted from ([facility]) patient states that approx 1 week ago she was patricia to stand and march in place however largely she is in bed "because of my wound"   Patient reports receiving therapy 5 days/week  Patient desires to get "healed" and go home with her spouse  Prior Function   Level of [place] Independent with ADLs and functional mobility   Lives With Spouse   Receives Help From Family   ADL Assistance Independent   IADLs Independent   Falls in the last 6 months 0   General   Additional Pertinent History 80year old female admitted to [facility] on 6/14/2022 as transfer from [facility] for worsening sacral wound  Patient was transferred to [facility] and underwent sacrum and buttock debridement and wound vac application on 4/72  Family/Caregiver Present Yes  (sister)   Cognition   Overall Cognitive Status WFL   Arousal/Participation Alert   Orientation Level Oriented X4   Memory Within functional limits   Following Commands Follows one step commands with increased time or repetition   Comments hard of hearing (does best speaking into patient's R ear), pleasant, cooperative   Subjective   Subjective "I want to get healed"   RUE Assessment   RUE Assessment X  (gross weakness; see OT evaluation)   LUE Assessment   LUE Assessment X  (gross weakness; see OT evaluation)   RLE Assessment   RLE Assessment X  (bed level assessment, grossly 2/5)   LLE Assessment   LLE Assessment X  (bed level assessment, grossly 2/5)   Bed Mobility   Rolling R 1  Dependent   Additional items Assist x 2   Rolling L 1  Dependent   Additional items Assist x 2   Supine to Sit 1  Dependent   Additional items Assist x 2   Sit to Supine 1  Dependent   Additional items Assist x 2   Additional Comments From sidelying position, patient required dependent AX2 for supine-->sit transfer  She maintained sitting EOB x 1 minutes (did not prolong sit due to sacral wound and hypotension)  Did not test standing secondary to hypotension     Balance   Static Sitting Poor -   Endurance Deficit   Endurance Deficit Yes   Endurance Deficit Description gross weakness, decreased nutrition   Activity Tolerance   Activity Tolerance Patient tolerated treatment well  (hypotension; BP supine pre mobility 83/46 and supine post mobility 92/44)   Medical Staff Made Aware This high complexity evaluation was performed with an occupational therapist due to the patient's co-morbidities, clinically unstable presentation, and present impairments which are a regression from the patient's baseline  Nurse Made Aware rajeev to see per RN Hope   Assessment   Prognosis Fair   Problem List Decreased strength;Decreased endurance; Impaired balance;Decreased mobility;Pain;Decreased skin integrity   Assessment PT completed evaluation of 80year old female admitted to [facility] on 6/14/2022 as transfer from [facility] for worsening sacral wound  Patient was transferred to [facility] and underwent sacrum and buttock debridement and wound vac application on 5/04  Current status instabilities include wound vac, supplemental O2, IV fluids, falls risk, and a regression in functional status from baseline  PMH is significant for scleroderma, interstital lung disease, dysphagia (currently utilizing tube feeds), pulmonary HTN  Per patient, in March of 2022 she was residing at home with her spouse in a 2 SH (1st floor set up, 4 MARY)  At that time, she was I with mobility (no use of AD inside the home and rollator outside of the home)  She was I with ADLS and iADLs  0 falls  Patient was admitted to hospital in March of 2022 after reportedly having a bad reaction to a medication she was taking  She got swollen and weak and since March has been either been in the hospital or rehab  Patient reports the first rehab she went to she mainly sat in a   The most recent rehab where patient was admitted from ([facility]) patient states that approx 1 week ago she was patricia to stand and march in place however largely she is in bed "because of my wound"  Patient reports receiving therapy 5 days/week  Patient desires to get "healed" and go home with her spouse       Current impairments include pain, decreased skin integrity, strength, and balance  During PT evaluation patient's gross strength was assess in supine  She required dependent AX2 to roll toward L and R sides (incontinent of bowel)  From sidelying position, patient required dependent AX2 for supine-->sit transfer  She maintained sitting EOB x 1 minutes (did not prolong sit due to sacral wound and hypotension)  Did not test standing secondary to hypotension  In future sessions plan to have patient participate in bedlevel there-ex and also to trial standing as appropriate  PT d/c recommendation is for return to rehab  Patient will continue to benefit from continued skilled PT this admission to achieve maximal function and safety  Goals   Patient Goals "to be healed"   LT Expiration Date 06/29/22   Long Term Goal #1 1) Perform bed mobility min-AX1 to participate in frequent repositioning and improve skin integrity; 2) Improve b/l LE strength by 1/2 grade in order to improve efficiency of tranfers; 3) Improve overall activity tolerance to 60 minutes in order to increase patient's ability to engage in mobility tasks (PT TO SEE NEXT SESSION TO ASSESS STANDING AS APPROPRIATE)   PT Treatment Day 0   Plan   Treatment/Interventions LE strengthening/ROM; Therapeutic exercise; Endurance training;Patient/family training;Equipment eval/education; Bed mobility;OT;Spoke to nursing;Continued evaluation   PT Frequency 2-3x/wk   Recommendation   PT Discharge Recommendation Return to facility with rehabilitation services   AM-PAC Basic Mobility Inpatient   Turning in Bed Without Bedrails 1   Lying on Back to Sitting on Edge of Flat Bed 1   Moving Bed to Chair 1   Standing Up From Chair 1   Walk in Room 1   Climb 3-5 Stairs 1   Basic Mobility Inpatient Raw Score 6   Turning Head Towards Sound 3   Follow Simple Instructions 3   Low Function Basic Mobility Raw Score 12   Low Function Basic Mobility Standardized Score 18 33   Highest Level Of Mobility   -[facility] Goal 2: Bed activities/Dependent transfer   [address] [facility] Achieved 3: Sit at edge of bed     The patient's AM-PAC Basic Mobility Inpatient Standardized Score is less than 42 9, suggesting this patient may benefit from discharge to post-acute rehabilitation services  Please also refer to the recommendation of the Physical Therapist for safe discharge planning          Keisha Britton, PT, DPT

## 2022-06-15 NOTE — PLAN OF CARE
Problem: PHYSICAL THERAPY ADULT  Goal: Performs mobility at highest level of function for planned discharge setting  See evaluation for individualized goals  Description: Treatment/Interventions: LE strengthening/ROM, Therapeutic exercise, Endurance training, Patient/family training, Equipment eval/education, Bed mobility, OT, Spoke to nursing, Continued evaluation          See flowsheet documentation for full assessment, interventions and recommendations  Note: Prognosis: Fair  Problem List: Decreased strength, Decreased endurance, Impaired balance, Decreased mobility, Pain, Decreased skin integrity  Assessment: PT completed evaluation of 80year old female admitted to [facility] on 6/14/2022 as transfer from [facility] for worsening sacral wound  Patient was transferred to [facility] and underwent sacrum and buttock debridement and wound vac application on 5/41  PMH is significant for scleroderma, interstital lung disease, dysphagia (currently utilizing tube feeds), pulmonary HTN  Per patient, in March of 2022 she was residing at home with her spouse in a 2 SH (1st floor set up, 4 MARY)  At that time, she was I with mobility (no use of AD inside the home and rollator outside of the home)  She was I with ADLS and iADLs  0 falls  Patient was admitted to hospital in March of 2022 after reportedly having a bad reaction to a medication she was taking  She got swollen and weak and since March has been either been in the hospital or rehab  Patient reports the first rehab she went to she mainly sat in a   The most recent rehab where patient was admitted from ([facility]) patient states that approx 1 week ago she was patricia to stand and march in place however largely she is in bed "because of my wound"  Patient reports receiving therapy 5 days/week  Patient desires to get "healed" and go home with her spouse  Current impairments include pain, decreased skin integrity, strength, and balance   During PT evaluation patient's gross strength was assess in supine  She required dependent AX2 to roll toward L and R sides (incontinent of bowel)  From sidelying position, patient required dependent AX2 for supine-->sit transfer  She maintained sitting EOB x 1 minutes (did not prolong sit due to sacral wound and hypotension)  Did not test standing secondary to hypotension  In future sessions plan to have patient participate in bedlevel there-ex and also to trial standing as appropriate  PT d/c recommendation is for return to rehab  Patient will continue to benefit from continued skilled PT this admission to achieve maximal function and safety  PT Discharge Recommendation: (S) Return to facility with rehabilitation services          See flowsheet documentation for full assessment

## 2022-06-15 NOTE — H&P
H&P - General Surgery  Zainab Mccoy 80 y o  female MRN: [id number]  Unit/Bed#: ED 14 Encounter: [id number]        Assessment/Plan     Assessment:  80 F with scleroderma, bed-bound, interstitial lung disease, dysphagia, pulmonary hypertension who presents with stage IV sacral wound with concern for necrotizing soft tissue infection  Plan:   OR for debridement  o Risks and benefits discussed  o Consent obtained   NPO   IVF   ABX   Further workup for facial droop, stroke-like symptoms   Please tigertext on call red surgery resident role or acute care floor call role with any questions    History of Present Illness     HPI:  Zainab Mccoy is a 80 y o  female  with scleroderma, bed-bound, interstitial lung disease, dysphagia, pulmonary hypertension who presents for evaluation of worsening sacral wound  She was sent in from rehab center to the emergency room at [facility] for evaluation  Patient has been bed-bound for approximately the last 3 months with worsening sacral wound  States that it has been intermittently draining that she does get frequent turning from nursing staff  Daughter confirms this  However, they were concerned with worsening pain and drainage  At emergency room, patient was found have leukocytosis to 17, no lactic acidosis, and CT with concerns for soft tissue air and necrotizing soft tissue infection  In regards to facial droop patient and family member states that this has been happening over the past 10 days to 2 weeks  She had been seen by neurologist   Neurologist had advised this this could be due to a concurrent musculoskeletal condition  It has been advised that further CTA or MRI could be pursued if further neurologic symptoms  Additionally, patient needs frequent suctioning for copious amount of secretions that are worsened with lying flat       Review of Systems   Constitutional: Negative for activity change, chills and fever     HENT: Negative for congestion, ear pain and sore throat  Eyes: Negative for pain and visual disturbance  Respiratory: Negative for chest tightness and shortness of breath  Cardiovascular: Negative for chest pain and palpitations  Gastrointestinal: Negative for abdominal distention and abdominal pain  Endocrine: Negative for cold intolerance and heat intolerance  Genitourinary: Negative for difficulty urinating and dysuria  Musculoskeletal: Negative for arthralgias and myalgias  Skin: Negative for color change and pallor  Neurological: Negative for dizziness and weakness  Hematological: Negative for adenopathy  Does not bruise/bleed easily  Psychiatric/Behavioral: Negative for agitation and behavioral problems  All other systems reviewed and are negative  Historical Information   Past Medical History:   Diagnosis Date    Dysphagia, oropharyngeal phase 06/06/2022     History reviewed  No pertinent surgical history  Social History   Social History     Substance and Sexual Activity   Alcohol Use None     Social History     Substance and Sexual Activity   Drug Use Not on file     Social History     Tobacco Use   Smoking Status Not on file   Smokeless Tobacco Not on file     Family History: History reviewed  No pertinent family history      Meds/Allergies   all medications and allergies reviewed  Allergies   Allergen Reactions    Uptravi [Selexipag] Anaphylaxis    Sulfa Antibiotics Tachycardia    Penicillins Rash       Objective   First Vitals:   Blood Pressure: 129/59 (06/14/22 2015)  Pulse: 94 (06/14/22 2015)  Temperature: 98 6 °F (37 °C) (06/14/22 2015)  Temp Source: Tympanic (06/14/22 2015)  Respirations: 16 (06/14/22 2015)  Weight - Scale: 57 6 kg (127 lb) (06/14/22 2015)  SpO2: 96 % (06/14/22 2015)    Current Vitals:   Blood Pressure: 129/59 (06/14/22 2015)  Pulse: 94 (06/14/22 2015)  Temperature: 98 6 °F (37 °C) (06/14/22 2015)  Temp Source: Tympanic (06/14/22 2015)  Respirations: 16 (06/14/22 2015)  Weight - Scale: 57 6 kg (127 lb) (06/14/22 2015)  SpO2: 96 % (06/14/22 2015)    No intake or output data in the 24 hours ending 06/14/22 2108    Invasive Devices  Report    Peripheral Intravenous Line  Duration           Long-Dwell Peripheral IV (Midline) 06/14/22 Right Brachial <1 day                Physical Exam  Vitals reviewed  Constitutional:       General: She is not in acute distress  Appearance: She is not toxic-appearing  HENT:      Head: Normocephalic and atraumatic  Right Ear: External ear normal       Left Ear: External ear normal       Nose: Nose normal  No rhinorrhea  Mouth/Throat:      Mouth: Mucous membranes are moist       Pharynx: Oropharynx is clear  Eyes:      General: No scleral icterus  Extraocular Movements: Extraocular movements intact  Conjunctiva/sclera: Conjunctivae normal       Pupils: Pupils are equal, round, and reactive to light  Cardiovascular:      Rate and Rhythm: Normal rate and regular rhythm  Pulses: Normal pulses  Pulmonary:      Effort: Pulmonary effort is normal  No respiratory distress  Abdominal:      Comments: Soft, nontender nondistended   Genitourinary:     Comments: See below for images  Musculoskeletal:         General: No swelling or deformity  Cervical back: Normal range of motion and neck supple  Skin:     General: Skin is warm  Coloration: Skin is not jaundiced  Neurological:      General: No focal deficit present  Mental Status: She is alert and oriented to person, place, and time  Psychiatric:         Mood and Affect: Mood normal          Behavior: Behavior normal                Lab Results: I have personally reviewed pertinent lab results  Imaging: I have personally reviewed pertinent reports  EKG, Pathology, and Other Studies: I have personally reviewed pertinent reports        Code Status: No Order  Advance Directive and Living Will:      Power of :    POLST:

## 2022-06-15 NOTE — PROGRESS NOTES
Vancomycin Assessment    Jose Raul Watson is a 80 y o  female who is currently receiving vancomycin 1250mg iv q12 (pt recieved a 750mg dose at [address] at approx 1800 06/14/2022 started 1250mg 8 hours after 750mg dose) for skin-soft tissue infection     Relevant clinical data and objective history reviewed:  Creatinine   Date Value Ref Range Status   06/14/2022 0 45 (L) 0 60 - 1 30 mg/dL Final     Comment:     Standardized to IDMS reference method     /57   Pulse 70   Temp 97 6 °F (36 4 °C) (Oral)   Resp 16   Ht 5' 7 5" (1 715 m)   Wt 66 5 kg (146 lb 11 2 oz)   SpO2 98%   BMI 22 64 kg/m²   No intake/output data recorded  Lab Results   Component Value Date/Time    BUN 19 06/14/2022 04:34 PM    WBC 17 76 (H) 06/14/2022 04:34 PM    HGB 8 6 (L) 06/14/2022 04:34 PM    HCT 27 9 (L) 06/14/2022 04:34 PM    MCV 98 06/14/2022 04:34 PM     (H) 06/14/2022 04:34 PM     Temp Readings from Last 3 Encounters:   06/15/22 97 6 °F (36 4 °C) (Oral)   06/14/22 97 6 °F (36 4 °C) (Oral)     Vancomycin Days of Therapy: 1    Assessment/Plan  The patient is currently on vancomycin utilizing scheduled dosing based on actual body weight  Baseline risks associated with therapy include: concomitant nephrotoxic medications, advanced age, and dehydration  The patient is currently receiving 1250mg iv q12 (pt recieved a 750mg dose at [address] at approx 1800 06/14/2022 started 1250mg 8 hours after 750mg dose) and is clinically appropriate and dose will be continued  Pharmacy will also follow closely for s/sx of nephrotoxicity, infusion reactions, and appropriateness of therapy  BMP and CBC will be ordered per protocol  Plan for trough as patient approaches steady state, prior to the 5th  dose at approximately 1330 06/16/2022  Due to infection severity, will target a trough of 15-20 (appropriate for most indications)     Pharmacy will continue to follow the patients culture results and clinical progress daily      Rd Gloria, Pharmacist

## 2022-06-15 NOTE — CONSULTS
Suggest starting Jevity 1 5 cycled from 4pm-6am (14hrs ) with goal 90ml/hr (start at 45ml/hr gradually increase by 10ml/hr Q4hrs to goal 90ml/hr add 200ml free H20 flushes Q6hrs  Add additional 240ml free h20 flushes during daytime via PEG  Recommend Robert mixed with h20 as slurry TID via PEG  Providing total 1890 kcal iwth 80gms Pro  ~1778ml free H20  with med flushes   Monitor labs, wt & tolerance

## 2022-06-15 NOTE — QUICK NOTE
Spoke with Dr Elisha Garcia from [facility]  Consult placed for medicine with the plan for patient to be seen by them tonight or tomorrow morning and then transfer service to them secondary to her medical issues

## 2022-06-15 NOTE — RESPIRATORY THERAPY NOTE
06/15/22 0325   Respiratory Protocol   Protocol Initiated? Yes   Protocol Selection Respiratory   Language Barrier? No   Medical & Social History Reviewed? Yes   Diagnostic Studies Reviewed? Yes   Physical Assessment Performed? Yes   Respiratory Plan Discontinue Protocol   Respiratory Assessment   Assessment Type Assess only   General Appearance Awake; Alert   Respiratory Pattern Normal   Chest Assessment Chest expansion symmetrical   Bilateral Breath Sounds Diminished;Coarse   Cough None   Resp Comments Pt assessed per protocol, bs are diminish but coarse  Pt was admitted for sacral wound  No respiratory medication / diagnosis on file  At this time, I will d/c respiratory protocol and reassess later

## 2022-06-15 NOTE — ANESTHESIA PREPROCEDURE EVALUATION
Procedure:  DEBRIDEMENT WOUND [facility] OUT) (N/A Buttocks)    Relevant Problems   No relevant active problems     Hemoglobin    8 6 Low           Bedbound secondary to Scleroderma  CREST    Patient separately stating that over the course of the past 2 weeks she has had multiple stroke-like episodes, which she believes most recently was 10 days ago including a left-sided facial droop, left leg weakness, and difficulty writing with her right hand  Patient denies any continued word-finding difficulty, difficulty writing  Patient does have a continue left-sided facial droop and is noted to be weaker on the left leg than the right leg  Patient denies prior history of stroke  Left-sided facial droop noted the left corner of the mouth at rest   ILD (interstitial lung disease)    Pulmonary hypertension secondary to scleroderma         Physical Exam    Airway    Mallampati score: II  TM Distance: >3 FB  Neck ROM: full     Dental       Cardiovascular      Pulmonary      Other Findings        Anesthesia Plan  ASA Score- 4 Emergent    Anesthesia Type- general with ASA Monitors  Additional Monitors:   Airway Plan: ETT  Comment: Possible post op ventilation due to pulmonary issues secondary to Scleroderma / CREST  Alisha Cervantes Plan Factors-    Chart reviewed  Induction- intravenous  Postoperative Plan-     Informed Consent- Anesthetic plan and risks discussed with patient  I personally reviewed this patient with the CRNA  Discussed and agreed on the Anesthesia Plan with the LISA Cervantes

## 2022-06-15 NOTE — RESPIRATORY THERAPY NOTE
RT Protocol Note  Paco Schuler 80 y o  female MRN: [id number]  Unit/Bed#: [facility] 714-01 Encounter: [id number]    Assessment    Principal Problem:    Sacral wound      Home Pulmonary Medications:         Past Medical History:   Diagnosis Date    Dysphagia, oropharyngeal phase 06/06/2022     Social History     Socioeconomic History    Marital status: /Civil Union     Spouse name: None    Number of children: None    Years of education: None    Highest education level: None   Occupational History    None   Tobacco Use    Smoking status: Never Smoker    Smokeless tobacco: Never Used   Substance and Sexual Activity    Alcohol use: Never    Drug use: None    Sexual activity: None   Other Topics Concern    None   Social History Narrative    None     Social Determinants of Health     Financial Resource Strain: Not on file   Food Insecurity: Not on file   Transportation Needs: Not on file   Physical Activity: Not on file   Stress: Not on file   Social Connections: Not on file   Intimate Partner Violence: Not on file   Housing Stability: Not on file       Subjective         Objective    Physical Exam:   Assessment Type: (P) Assess only  General Appearance: (P) Awake, Alert  Respiratory Pattern: (P) Normal  Chest Assessment: (P) Chest expansion symmetrical  Bilateral Breath Sounds: (P) Diminished, Coarse  Cough: (P) None    Vitals:  Blood pressure (!) 96/44, pulse 64, temperature 97 6 °F (36 4 °C), temperature source Oral, resp  rate 16, height 5' 7 5" (1 715 m), weight 66 5 kg (146 lb 11 2 oz), SpO2 99 %  Imaging and other studies:           Plan    Respiratory Plan: (P) No distress/Pulmonary history, Discontinue Protocol        Resp Comments: (P) Pt assessed per protocol, bs are diminish but coarse  Pt was admitted for sacral wound  No respiratory medication / diagnosis on file  At this time, I will d/c respiratory protocol and reassess later

## 2022-06-15 NOTE — ANESTHESIA POSTPROCEDURE EVALUATION
Post-Op Assessment Note    CV Status:  Stable    Pain management: adequate     Mental Status:  Alert and awake   Hydration Status:  Stable   PONV Controlled:  Controlled   Airway Patency:  Patent      Post Op Vitals Reviewed: Yes      Staff: Anesthesiologist, CRNA         No complications documented      /67 (06/14/22 2243)    Temp 97 9 °F (36 6 °C) (06/14/22 2243)    Pulse 76 (06/14/22 2243)   Resp 16 (06/14/22 2243)    SpO2 100 % (06/14/22 2243)

## 2022-06-15 NOTE — UTILIZATION REVIEW
Initial Clinical Review    Admission: Date/Time/Statement:   Admission Orders (From admission, onward)     Ordered        06/14/22 2134  Inpatient Admission  Once                      Orders Placed This Encounter   Procedures    Inpatient Admission     Standing Status:   Standing     Number of Occurrences:   1     Order Specific Question:   Level of Care     Answer:   Med Surg [16]     Order Specific Question:   Estimated length of stay     Answer:   More than 2 Midnights     Order Specific Question:   Certification     Answer:   I certify that inpatient services are medically necessary for this patient for a duration of greater than two midnights  See H&P and MD Progress Notes for additional information about the patient's course of treatment  ED Arrival Information     Expected   6/14/2022     Arrival   6/14/2022 20:07    Acuity   Urgent            Means of arrival   Ambulance    Escorted by   [facility]    Service   Surgery-General    Admission type   Urgent            Arrival complaint   Ajay allen           Chief Complaint   Patient presents with    Medical Problem     Pt is ryan transfer here for surgical consult for sacral wound      6/14/2022 (4 hours)  [address] Emergency Department   Necrotizing soft tissue infection  Wound of sacral region, initial encounter  Pneumonia  Stroke-like episode  Transfer to [facility] for higher level of care  Prior to arrival iv dilaudid, iv vancomycin, iv dilaudid  Blood cultures drawn  Initial Presentation: 80 y o  female received from [facility] ed via ems for inpatient med surg admission to further evaluate and treat sacral wound suspicious for necrotizing tissue infection  PMHX:  Patient resides in nursing home and is bed bound  Scleroderma, interstitial lung disease, dysphagia , stage 5 sacral wound, hypertension  Plan includes: NPO, OR for debridement, iv fluids and iv antibiotics      SURGERY DATE: 2022   Preop Diagnosis:  Necrotizing soft tissue infection [M79 89]     Post-Op Diagnosis Codes:     Necrotizing soft tissue infection [M79 89]     Procedure  DEBRIDEMENT WOUND ([address] OUT); SACRUM AND BUTTOCKS     Estimated Blood Loss:   Minimal    Anesthesia Type:   General     Operative Indications:  Necrotizing soft tissue infection [M79 89]        Operative Findings:  -predebridment: [id number] sacral wound with necrotic slough  -post debridement: [id number] cm to bone (Stage IV)   - Underminin cm laterally, 5cm inferiorly, 2 cm superiorly     Complications:   None           Date: 6-15-22   Day 2: inpatient med surg  Post op day 1  Continue vac therapy  Minimal output  Continue iv cefepime, iv flagyl, iv vancomycin, lactated ringers 100/hr  Map 54, hypotensive  Give 500 ml multi- electrolyte bolus  Requires 2 L O2nc to maintain sats at least 98%  Breath sounds diminished and coarse  Patient is on tube feeds  Diet NPO  HB 6 9  transfuse 1U PRBC         Arrival    22   98 6 °F (37 °C) 94 16 129/59 96 %      Tympanic Monitor         10 - Worst Possible Pain          06/15/22 66 5 kg (146 lb 11 2 oz)     Additional Vital Signs:     Date/Time Temp Pulse Resp BP MAP  SpO2 O2 Flow Rate (L/min) Nasal Cannula O2 Flow Rate (L/min) O2 Device   06/15/22 1045 -- 61 -- 88/37 Abnormal  54 Abnormal  100 % -- -- --   06/15/22 1015 98 1 °F (36 7 °C) 57 18 84/37 Abnormal  -- 100 % -- 2 L/min Nasal cannula   06/15/22 1012 98 1 °F (36 7 °C) 60 18 84/37 Abnormal  -- 100 % -- 2 L/min Nasal cannula   06/15/22 1010 -- 64 -- 85/37 Abnormal  53 Abnormal  100 % -- -- --   06/15/22 1005 -- -- 18 -- -- -- -- -- --   06/15/22 10:04:57 98 3 °F (36 8 °C) -- -- -- -- -- -- -- --   06/15/22 0958 -- 58 -- 92/43 Abnormal  -- 99 % -- -- --   06/15/22 08 -- -- -- 88/40 Abnormal  56 Abnormal  -- -- 2 L/min Nasal cannula   06/15/22 07:36:02 -- -- -- 65/00 NSXLSIXI  61 Abnormal  -- -- -- --   06/15/22 0500 -- -- -- -- -- -- -- 2 L/min --   06/15/22 04:56:41 97 3 °F (36 3 °C)   Abnormal  67 -- 104/47 Abnormal  66 97 % -- -- --   06/15/22 04:54:13 -- 69 -- 104/47 Abnormal  66 97 % -- -- --   06/15/22 03:25:49 -- -- -- 96/44 Abnormal  61 Abnormal  -- -- -- --   06/15/22 0300 -- 64 -- 92/46 Abnormal  61 Abnormal  99 % -- -- --   06/15/22 0200 -- 67 -- 103/51 68 97 % -- -- --   06/15/22 0122 -- -- -- -- -- -- -- 2 L/min --   06/15/22 0100 97 6 °F (36 4 °C) -- 16 -- -- -- -- -- --   06/15/22 0030 97 6 °F (36 4 °C) -- -- 115/57 76 -- -- -- --   06/14/22 2345 -- 70 16 106/54 70 98 % -- 3 L/min Nasal cannula   06/14/22 2330 97 6 °F (36 4 °C) 76 16 100/53 65 96 % -- 4 L/min --   06/14/22 2315 -- 82  18 106/53 68 94 % 4 L/min -- Nasal cannula   06/14/22 2300 -- 85 16 111/54 72 95 % -- 4 L/min Nasal cannula   06/14/22 2243 97 9 °F (36 6 °C) 76 16 140/67 104 100 % -- 6 L/min Simple mask   06/14/22 2230 -- -- -- -- -- -- -- -- --   06/14/22 2105 -- -- -- -- -- -- -- -- Mid flow nasal cannula   06/14/22 2015 98 6 °F (37 °C) 94 16 129/59 -- 96 % -- 6 L/min Mid flow nasal cannula             Pertinent Labs/Diagnostic Test Results:             Physical examination Large sacral decubitus ulcer in place, packing removed  Moderate purulence, trace surrounding erythema without significant warmth or tenderness  No palpable crepitus  No associated necrotic tissue  CT abdomen pelvis wo contrast   Final  (06/14 1755)       Soft tissue air and soft tissue defect identified posterior to the sacrum suggesting infection  I cannot exclude superimposed posterior sacral osteomyelitis although no bony erosion identified        Soft tissue air identified around the posterior aspect of the rectum in addition to the left side of the rectum/perineum, correlate for necrotizing fasciitis soft tissue infection   Surgical consult       Bilateral lower lobe pneumonia        Left upper pole renal 1 cm likely hemorrhagic cyst, correlate with nonemergent outpatient renal ultrasound            Results from last 7 days   Lab Units 06/14/22  1836   SARS-COV-2  Negative     Results from last 7 days   Lab Units 06/15/22  0614 06/14/22  1634   WBC Thousand/uL 12 97* 17 76*   HEMOGLOBIN g/dL 6 9* 8 6*   HEMATOCRIT % 23 3* 27 9*   PLATELETS Thousands/uL 350 476*   BANDS PCT %  --  19*         Results from last 7 days   Lab Units 06/15/22  0614 06/14/22  1634   SODIUM mmol/L 134* 133*   POTASSIUM mmol/L 4 5 4 3   CHLORIDE mmol/L 99* 97*   CO2 mmol/L 31 32   ANION GAP mmol/L 4 4   BUN mg/dL 18 19   CREATININE mg/dL 0 40* 0 45*   EGFR ml/min/1 73sq m 99 95   CALCIUM mg/dL 8 7 8 9   MAGNESIUM mg/dL 2 1  --    PHOSPHORUS mg/dL 3 4  --      Results from last 7 days   Lab Units 06/15/22  0614 06/14/22  1634   AST U/L 15 19   ALT U/L 19 26   ALK PHOS U/L 94 111   TOTAL PROTEIN g/dL 6 1* 7 0   ALBUMIN g/dL 2 1* 2 2*   TOTAL BILIRUBIN mg/dL 0 36 0 19*         Results from last 7 days   Lab Units 06/15/22  0614 06/14/22  1634   GLUCOSE RANDOM mg/dL 107 117       Results from last 7 days   Lab Units 06/14/22  1634   PROTIME seconds 12 4   INR  0 94   PTT seconds 31         Results from last 7 days   Lab Units 06/14/22  1634   PROCALCITONIN ng/ml 0 10     Results from last 7 days   Lab Units 06/15/22  0814 06/14/22  1634   LACTIC ACID mmol/L 0 8 1 2       Results from last 7 days   Lab Units 06/14/22  1634   CRP mg/L 35 9*       Results from last 7 days   Lab Units 06/14/22  1640   CLARITY UA  Clear   COLOR UA  Light Yellow   SPEC GRAV UA  1 015   PH UA  7 5   GLUCOSE UA mg/dl Negative   KETONES UA mg/dl Negative   BLOOD UA  Negative   PROTEIN UA mg/dl Negative   NITRITE UA  Negative   BILIRUBIN UA  Negative   UROBILINOGEN UA E U /dl 0 2   LEUKOCYTES UA  Small*   WBC UA /hpf 4-10*   RBC UA /hpf 0-1   BACTERIA UA /hpf Occasional   EPITHELIAL CELLS WET PREP /hpf Occasional     Results from last 7 days   Lab Units 06/14/22  3798   INFLUENZA A PCR  Negative   INFLUENZA B PCR  Negative   RSV PCR  Negative       Results from last 7 days   Lab Units 06/14/22  1749 06/14/22  1651   BLOOD CULTURE  Received in Microbiology Lab  Culture in Progress  Received in Microbiology Lab  Culture in Progress  ED Treatment:   Medication Administration from 06/14/2022 1856 to 06/14/2022 2114     None        Past Medical History:   Diagnosis Date    Dysphagia, oropharyngeal phase 06/06/2022     Present on Admission:  **None**      Admitting Diagnosis: Necrotizing fasciitis [M72 6]     Age/Sex: 80 y o  female    Scheduled Medications:  acetaminophen, 650 mg, Per G Tube, Q6H  cefepime, 2,000 mg, Intravenous, Q8H  heparin (porcine), 5,000 Units, Subcutaneous, Q8H BELLA  metroNIDAZOLE, 500 mg, Intravenous, On Call To OR  vancomycin, 20 mg/kg, Intravenous, Q12H      Continuous IV Infusions:  lactated ringers, 100 mL/hr, Intravenous, Continuous      PRN Meds:  labetalol, 10 mg, Intravenous, Q6H PRN  melatonin, 3 mg, Oral, HS PRN  ondansetron, 4 mg, Intravenous, Q6H PRN  oxyCODONE, 2 5 mg, Per G Tube, Q4H PRN  oxyCODONE, 5 mg, Per G Tube, Q4H PRN        IP CONSULT TO CASE MANAGEMENT  IP CONSULT TO NUTRITION SERVICES  IP CONSULT TO PHARMACY  IP CONSULT TO INTERNAL MEDICINE    Network Utilization Review Department  ATTENTION: Please call with any questions or concerns to [phone] and carefully listen to the prompts so that you are directed to the right person  All voicemails are confidential   Nury Michel all requests for admission clinical reviews, approved or denied determinations and any other requests to dedicated fax number below belonging to the campus where the patient is receiving treatment   List of dedicated fax numbers for the Facilities:  [address] DENIALS (Administrative/Medical Necessity) [phone]   [address] (Maternity/NICU/Pediatrics) [phone]   5000 [facility] Esha Lips [phone]   8049 [facility] [phone]   [address] [address] [place] [address] Kate Coronado [address] [phone]   [address] [phone]

## 2022-06-15 NOTE — PLAN OF CARE
Problem: MOBILITY - ADULT  Goal: Maintain or return to baseline ADL function  Description: INTERVENTIONS:  -  Assess patient's ability to carry out ADLs; assess patient's baseline for ADL function and identify physical deficits which impact ability to perform ADLs (bathing, care of mouth/teeth, toileting, grooming, dressing, etc )  - Assess/evaluate cause of self-care deficits   - Assess range of motion  - Assess patient's mobility; develop plan if impaired  - Assess patient's need for assistive devices and provide as appropriate  - Encourage maximum independence but intervene and supervise when necessary  - Involve family in performance of ADLs  - Assess for home care needs following discharge   - Consider OT consult to assist with ADL evaluation and planning for discharge  - Provide patient education as appropriate  Outcome: Progressing  Goal: Maintains/Returns to pre admission functional level  Description: INTERVENTIONS:  - Perform BMAT or MOVE assessment daily    - Set and communicate daily mobility goal to care team and patient/family/caregiver  - Collaborate with rehabilitation services on mobility goals if consulted  - Perform Range of Motion 3 times a day  - Reposition patient every 2 hours        - Out of bed for toileting  - Record patient progress and toleration of activity level   Outcome: Progressing     Problem: Potential for Falls  Goal: Patient will remain free of falls  Description: INTERVENTIONS:  - Educate patient/family on patient safety including physical limitations  - Instruct patient to call for assistance with activity   - Consult OT/PT to assist with strengthening/mobility   - Keep Call bell within reach  - Keep bed low and locked with side rails adjusted as appropriate  - Keep care items and personal belongings within reach  - Initiate and maintain comfort rounds  - Make Fall Risk Sign visible to staff  - Offer Toileting every 2 Hours, in advance of need  - Initiate/Maintain bed alarm    - Apply yellow socks and bracelet for high fall risk patients  - Consider moving patient to room near nurses station  Outcome: Progressing     Problem: Prexisting or High Potential for Compromised Skin Integrity  Goal: Skin integrity is maintained or improved  Description: INTERVENTIONS:  - Identify patients at risk for skin breakdown  - Assess and monitor skin integrity  - Assess and monitor nutrition and hydration status  - Monitor labs   - Assess for incontinence   - Turn and reposition patient  - Assist with mobility/ambulation  - Relieve pressure over bony prominences  - Avoid friction and shearing  - Provide appropriate hygiene as needed including keeping skin clean and dry  - Evaluate need for skin moisturizer/barrier cream  - Collaborate with interdisciplinary team   - Patient/family teaching  - Consider wound care consult   Outcome: Progressing     Problem: PAIN - ADULT  Goal: Verbalizes/displays adequate comfort level or baseline comfort level  Description: Interventions:  - Encourage patient to monitor pain and request assistance  - Assess pain using appropriate pain scale  - Administer analgesics based on type and severity of pain and evaluate response  - Implement non-pharmacological measures as appropriate and evaluate response  - Consider cultural and social influences on pain and pain management  - Notify physician/advanced practitioner if interventions unsuccessful or patient reports new pain  Outcome: Progressing     Problem: INFECTION - ADULT  Goal: Absence or prevention of progression during hospitalization  Description: INTERVENTIONS:  - Assess and monitor for signs and symptoms of infection  - Monitor lab/diagnostic results  - Monitor all insertion sites, i e  indwelling lines, tubes, and drains  - Monitor endotracheal if appropriate and nasal secretions for changes in amount and color  - Roby appropriate cooling/warming therapies per order  - Administer medications as ordered  - Instruct and encourage patient and family to use good hand hygiene technique  - Identify and instruct in appropriate isolation precautions for identified infection/condition  Outcome: Progressing  Goal: Absence of fever/infection during neutropenic period  Description: INTERVENTIONS:  - Monitor WBC    Outcome: Progressing     Problem: SAFETY ADULT  Goal: Maintain or return to baseline ADL function  Description: INTERVENTIONS:  -  Assess patient's ability to carry out ADLs; assess patient's baseline for ADL function and identify physical deficits which impact ability to perform ADLs (bathing, care of mouth/teeth, toileting, grooming, dressing, etc )  - Assess/evaluate cause of self-care deficits   - Assess range of motion  - Assess patient's mobility; develop plan if impaired  - Assess patient's need for assistive devices and provide as appropriate  - Encourage maximum independence but intervene and supervise when necessary  - Involve family in performance of ADLs  - Assess for home care needs following discharge   - Consider OT consult to assist with ADL evaluation and planning for discharge  - Provide patient education as appropriate  Outcome: Progressing  Goal: Maintains/Returns to pre admission functional level  Description: INTERVENTIONS:  - Perform BMAT or MOVE assessment daily    - Set and communicate daily mobility goal to care team and patient/family/caregiver  - Collaborate with rehabilitation services on mobility goals if consulted  - Perform Range of Motion 3 times a day  - Reposition patient every 2 hours      - Out of bed for toileting  - Record patient progress and toleration of activity level   Outcome: Progressing  Goal: Patient will remain free of falls  Description: INTERVENTIONS:  - Educate patient/family on patient safety including physical limitations  - Instruct patient to call for assistance with activity   - Consult OT/PT to assist with strengthening/mobility   - Keep Call bell within reach  - Keep bed low and locked with side rails adjusted as appropriate  - Keep care items and personal belongings within reach  - Initiate and maintain comfort rounds  - Make Fall Risk Sign visible to staff  - Offer Toileting every 2 Hours, in advance of need  - Initiate/Maintain bed alarm    - Apply yellow socks and bracelet for high fall risk patients  - Consider moving patient to room near nurses station  Outcome: Progressing     Problem: DISCHARGE PLANNING  Goal: Discharge to home or other facility with appropriate resources  Description: INTERVENTIONS:  - Identify barriers to discharge w/patient and caregiver  - Arrange for needed discharge resources and transportation as appropriate  - Identify discharge learning needs (meds, wound care, etc )  - Arrange for interpretive services to assist at discharge as needed  - Refer to Case Management Department for coordinating discharge planning if the patient needs post-hospital services based on physician/advanced practitioner order or complex needs related to functional status, cognitive ability, or social support system  Outcome: Progressing     Problem: Knowledge Deficit  Goal: Patient/family/caregiver demonstrates understanding of disease process, treatment plan, medications, and discharge instructions  Description: Complete learning assessment and assess knowledge base    Interventions:  - Provide teaching at level of understanding  - Provide teaching via preferred learning methods  Outcome: Progressing

## 2022-06-15 NOTE — MALNUTRITION/BMI
This medical record reflects one or more clinical indicators suggestive of malnutrition and underweight    Malnutrition Findings:   Adult Malnutrition type: Chronic illness  Adult Degree of Malnutrition: Other severe protein calorie malnutrition  Malnutrition Characteristics: Muscle loss, Fat loss, Weight loss, Inadequate energy                  360 Statement: related to disease/condition evidenced by increased kcal/pro needs for wound healing Stage IV sacral decub, BMI 18 4 adjusted per prior facility wt 6/7/22;severe buccal fat pads loss, severe deltoid muscle loss, Patient lost 31# (20 6%) since 3/15/22 past 3 months with illness <75% energy intake versus needs for > 1 month  Treating with EN support and Robert TID via PEG for wound healing    BMI Findings:  Adult BMI Classifications: Underweight < 18 5   Adjusted BMI using wt from SNF from 6/7/22 of 54kg is 18 4 kg/m2        Body mass index is 22 64 kg/m²  See Nutrition note dated 06/15/2022   for additional details  Completed nutrition assessment is viewable in the nutrition documentation

## 2022-06-15 NOTE — PLAN OF CARE
Problem: OCCUPATIONAL THERAPY ADULT  Goal: Performs self-care activities at highest level of function for planned discharge setting  See evaluation for individualized goals  Description: Treatment Interventions: ADL retraining, Functional transfer training, UE strengthening/ROM, Endurance training, Cognitive reorientation, Patient/family training, Fine motor coordination activities, Compensatory technique education, Continued evaluation, Energy conservation, Activityengagement, Equipment evaluation/education          See flowsheet documentation for full assessment, interventions and recommendations  Note: Limitation: Decreased ADL status, Decreased UE ROM, Decreased UE strength, Decreased Safe judgement during ADL, Decreased endurance, Decreased fine motor control, Decreased self-care trans, Decreased high-level ADLs  Prognosis: Fair  Assessment: Pt is a 80 y o  female who was admitted to [facility] on 6/14/2022 with Sacral wound , s/p wash out 6/14  Pt's  has a past medical history of Dysphagia, oropharyngeal phase  At baseline pt was completing ADLs independently in March  Per pt report, in March 2022, pt was I with ADLs and functional mobility and was living with her   Pt had a poor reaction to a medication she was taking and reports become very weak  Pt has been either in a hospital or rehab since then  Pt was at [facility] approx 1 week ago and was mostly in her W C or bed  Pt lives in a [facility] with her   Currently pt requires total A for overall ADLS and total A for bed mobility  Pt currently presents with impairments in the following categories -difficulty performing ADLS and difficulty performing IADLS  activity tolerance, endurance, standing balance/tolerance, sitting balance/tolerance, UE strength, UE ROM, FMC, GMC and memory   These impairments, as well as pt's fatigue and pain  limit pt's ability to safely engage in all baseline areas of occupation, includingeating, grooming, bathing, dressing, toileting, functional mobility/transfers, house maintenance and medication management From OT standpoint, recommend STR upon D/C  OT will continue to follow to address the below stated goals  OT Discharge Recommendation: Post acute rehabilitation services  OT - OK to Discharge:  Yes

## 2022-06-15 NOTE — OP NOTE
OPERATIVE REPORT  PATIENT NAME: Kristal Robert    :  1940  MRN: [id number]  Pt Location: [facility] OR ROOM 10    SURGERY DATE: 2022    Surgeon(s) and Role:     * Lucinda Phillips MD - Primary     * Ale Blanco MD - Assisting     * Fany Toussaint MD - Assisting    Preop Diagnosis:  Necrotizing soft tissue infection [M79 89]    Post-Op Diagnosis Codes:     * Necrotizing soft tissue infection [M79 89]    Procedure(s) (LRB):  DEBRIDEMENT WOUND ([facility] OUT); SACRUM AND BUTTOCKS (N/A)    Specimen(s):  * No specimens in log *    Estimated Blood Loss:   Minimal    Drains:  * No LDAs found *    Anesthesia Type:   General    Operative Indications:  Necrotizing soft tissue infection [M79 89]      Operative Findings:  -predebridment: [id number] sacral wound with necrotic slough  -post debridement: 47w95e1 cm to bone (Stage IV)   - Underminin cm laterally, 5cm inferiorly, 2 cm superiorly    Complications:   None    Procedure and Technique:  Patient was brought to preop holding area and identified both verbally by name and by armband  Patient was brought to the operating room, and placed prone on the operating room table  General anesthesia was induced, airway was secured with ETT  Patient was prepped and draped in the usual sterile fashion  Time-out was called, and all were in agreement begin the procedure  Wound dimensions were measured as above  The wound was inspected for undermining and undrained pockets of purulence  Necrotic slough is noted through the base of the wound and on the edges of the wound with fat necrosis on the edges  Skin edges contain necrotic slough  Skin edges were sharply incised with a 10 blade scalpel  The base and the edges of the wound were debrided with electrocautery  Tissue was excised circumferentially  Tissue excised included skin, subcutaneous tissue, fat, and muscle  Debridement continued until bone  A curette was used through the area of the wound    Healthy bleeding tissue was observed  Hemostasis was achieved with direct pressure, electrocautery, and hydrogen peroxide  The wound was pulse lavaged with 3 L solution  On final inspection the wound bed was clean  There were no pockets of purulence  No concern for necrotizing soft tissue infection  Therefore, a wound VAC was applied to the wound bed  One black sponge was placed within the wound bed and was bridged to the left hip of the patient  The VAC was secured and set to -125 mmHg  All sponge and instrument counts x2 were correct  The patient was awakened from anesthesia extubated and transported to PACU in stable condition  I was present for the entire procedure  Dr Katerine Cuello was present for the entire procedure         I was present for the entire procedure    Patient Disposition:  PACU       SIGNATURE: Marge Andersen MD  DATE: June 14, 2022  TIME: 11:03 PM

## 2022-06-15 NOTE — SPEECH THERAPY NOTE
Speech Language/Pathology  Speech-Language Pathology Screen      Patient Name: Tate Ellison    XJMMU'P Date: 6/15/2022     Problem List  Principal Problem:    Sacral wound      Past Medical History  Past Medical History:   Diagnosis Date    Dysphagia, oropharyngeal phase 06/06/2022       Past Surgical History  History reviewed  No pertinent surgical history  Summary   Order received, chart reviewed, called pt's nsg home- she is NPO with only tube feeds there  No formal swallow eval at this time  Please continue oral care while in hospital      Current Medical Status  Pt is a 80 y o  female who presented to [facility] with a sacral wound since April 2022  Became bedbound around that time secondary to worsening of scleroderma  Wound has been cared for by nursing staff, noted to have deeper tunneling over the past month, now with greater drainage over the past few days  Patient denies fevers, chills, nausea, vomiting, chest pain, abdominal pain  Patient has severe pain/pressure when sitting up or rolling but no pain when lying supine  States that she was treated with single dose of vancomycin the previous day  Patient had a PICC line performed yesterday for this purpose  Patient separately stating that over the course of the past 2 weeks she has had multiple stroke-like episodes, which she believes most recently was 10 days ago including a left-sided facial droop, left leg weakness, and difficulty writing with her right hand  Patient denies any continued word-finding difficulty, difficulty writing  Patient does have a continue left-sided facial droop and is noted to be weaker on the left leg than the right leg  Patient denies prior history of stroke  Patient denies headache  Patient denies focal numbness  Patient denies any recent falls or head trauma  Pt underwent debridement & wash out in OR         Social/Education/Vocational Hx:  Pt lives in [address] [place] [address] Baseline Diet: NPO with tube feeds

## 2022-06-15 NOTE — PROGRESS NOTES
Progress Note - General Surgery   [facility] 80 y o  female MRN: [id number]  [facility] Encounter: [id number]    Assessment:  80 F with scleroderma, bed-bound, interstitial lung disease, dysphagia, pulmonary hypertension with stage IV sacral wound  With B/l pneumonia on recent imaging  VAC: minimal SS output    Plan:   Continue VAC   Appreciate RT assistance with management of secretions   Continue ABX for pneumonia   Please TigerText on call Red Surgery or Acute Care Surgery Floor Call with any questions     Subjective/Objective     Subjective:   No acute events overnight  Pain controlled  Frequent suctioning needed  Pertinent review of systems as above  All other review of systems negative  Objective:    Blood pressure (!) 104/47, pulse 67, temperature (!) 97 3 °F (36 3 °C), resp  rate 16, height 5' 7 5" (1 715 m), weight 66 5 kg (146 lb 11 2 oz), SpO2 97 %  ,Body mass index is 22 64 kg/m²  Intake/Output Summary (Last 24 hours) at 6/15/2022 0556  Last data filed at 6/15/2022 0504  Gross per 24 hour   Intake 1000 ml   Output 600 ml   Net 400 ml       Invasive Devices  Report    Peripheral Intravenous Line  Duration           Long-Dwell Peripheral IV (Midline) 06/14/22 Right Brachial <1 day                Physical Exam:   Gen:  NAD  HEENT: NCAT  MMM  CV: well perfused  Lungs: Normal respiratory effort  Abd: soft, nt/nd,incisions cdi  : vac intact  Skin: warm/ dry  Extremities: no peripheral edema, no clubbing or cyanosis  Neuro:  AxO x3      Results from last 7 days   Lab Units 06/14/22  1634   WBC Thousand/uL 17 76*   HEMOGLOBIN g/dL 8 6*   HEMATOCRIT % 27 9*   PLATELETS Thousands/uL 476*     Results from last 7 days   Lab Units 06/14/22  1634   POTASSIUM mmol/L 4 3   CHLORIDE mmol/L 97*   CO2 mmol/L 32   BUN mg/dL 19   CREATININE mg/dL 0 45*   CALCIUM mg/dL 8 9     Results from last 7 days   Lab Units 06/14/22  1634   INR  0 94   PTT seconds 31        I have personally reviewed pertinent films in PACS      Medications:   Scheduled Meds:  Current Facility-Administered Medications   Medication Dose Route Frequency Provider Last Rate    acetaminophen  650 mg Per G Tube Q6H Olinda Ansari MD      cefepime  2,000 mg Intravenous Q8H Cleaster Cowden, MD 2,000 mg (06/15/22 0227)    heparin (porcine)  5,000 Units Subcutaneous [facility] Maicol White MD      labetalol  10 mg Intravenous Q6H PRN Olinda Ansari MD      lactated ringers  100 mL/hr Intravenous Continuous Cleaster Cowden,  mL/hr (06/14/22 2339)    melatonin  3 mg Oral HS PRN Olinda Ansari MD      metroNIDAZOLE  500 mg Intravenous On Call To Fidelia Malave MD      ondansetron  4 mg Intravenous Q6H PRN Olinda Ansari MD      oxyCODONE  2 5 mg Per G Tube Q4H PRN Olinda Ansari MD      oxyCODONE  5 mg Per G Tube Q4H PRN Olinda Ansari MD      vancomycin  20 mg/kg Intravenous Q12H Cleaster Cowden, MD 1,250 mg (06/15/22 0340)     Continuous Infusions:lactated ringers, 100 mL/hr, Last Rate: 100 mL/hr (06/14/22 2339)      PRN Meds:  labetalol, 10 mg, Q6H PRN  melatonin, 3 mg, HS PRN  ondansetron, 4 mg, Q6H PRN  oxyCODONE, 2 5 mg, Q4H PRN  oxyCODONE, 5 mg, Q4H PRN      VTE Pharmacologic Prophylaxis: Heparin  VTE Mechanical Prophylaxis: sequential compression device

## 2022-06-15 NOTE — PLAN OF CARE
Problem: MOBILITY - ADULT  Goal: Maintain or return to baseline ADL function  Description: INTERVENTIONS:  -  Assess patient's ability to carry out ADLs; assess patient's baseline for ADL function and identify physical deficits which impact ability to perform ADLs (bathing, care of mouth/teeth, toileting, grooming, dressing, etc )  - Assess/evaluate cause of self-care deficits   - Assess range of motion  - Assess patient's mobility; develop plan if impaired  - Assess patient's need for assistive devices and provide as appropriate  - Encourage maximum independence but intervene and supervise when necessary  - Involve family in performance of ADLs  - Assess for home care needs following discharge   - Consider OT consult to assist with ADL evaluation and planning for discharge  - Provide patient education as appropriate  Outcome: Progressing  Goal: Maintains/Returns to pre admission functional level  Description: INTERVENTIONS:  - Perform BMAT or MOVE assessment daily    - Set and communicate daily mobility goal to care team and patient/family/caregiver  - Collaborate with rehabilitation services on mobility goals if consulted  - Perform Range of Motion 3 times a day  - Reposition patient every 2 hours    - Dangle patient 3 times a day  - Stand patient 3 times a day  - Ambulate patient 3 times a day  - Out of bed to chair 3 times a day   - Out of bed for meals 3 times a day  - Out of bed for toileting  - Record patient progress and toleration of activity level   Outcome: Progressing     Problem: Potential for Falls  Goal: Patient will remain free of falls  Description: INTERVENTIONS:  - Educate patient/family on patient safety including physical limitations  - Instruct patient to call for assistance with activity   - Consult OT/PT to assist with strengthening/mobility   - Keep Call bell within reach  - Keep bed low and locked with side rails adjusted as appropriate  - Keep care items and personal belongings within reach  - Initiate and maintain comfort rounds  - Make Fall Risk Sign visible to staff  - Offer Toileting every 2 Hours, in advance of need  - Initiate/Maintain bed alarm  - Obtain necessary fall risk management equipment: alarms  - Apply yellow socks and bracelet for high fall risk patients  - Consider moving patient to room near nurses station  Outcome: Progressing     Problem: Prexisting or High Potential for Compromised Skin Integrity  Goal: Skin integrity is maintained or improved  Description: INTERVENTIONS:  - Identify patients at risk for skin breakdown  - Assess and monitor skin integrity  - Assess and monitor nutrition and hydration status  - Monitor labs   - Assess for incontinence   - Turn and reposition patient  - Assist with mobility/ambulation  - Relieve pressure over bony prominences  - Avoid friction and shearing  - Provide appropriate hygiene as needed including keeping skin clean and dry  - Evaluate need for skin moisturizer/barrier cream  - Collaborate with interdisciplinary team   - Patient/family teaching  - Consider wound care consult   Outcome: Progressing     Problem: PAIN - ADULT  Goal: Verbalizes/displays adequate comfort level or baseline comfort level  Description: Interventions:  - Encourage patient to monitor pain and request assistance  - Assess pain using appropriate pain scale  - Administer analgesics based on type and severity of pain and evaluate response  - Implement non-pharmacological measures as appropriate and evaluate response  - Consider cultural and social influences on pain and pain management  - Notify physician/advanced practitioner if interventions unsuccessful or patient reports new pain  Outcome: Progressing     Problem: INFECTION - ADULT  Goal: Absence or prevention of progression during hospitalization  Description: INTERVENTIONS:  - Assess and monitor for signs and symptoms of infection  - Monitor lab/diagnostic results  - Monitor all insertion sites, i e  indwelling lines, tubes, and drains  - Monitor endotracheal if appropriate and nasal secretions for changes in amount and color  - [place] appropriate cooling/warming therapies per order  - Administer medications as ordered  - Instruct and encourage patient and family to use good hand hygiene technique  - Identify and instruct in appropriate isolation precautions for identified infection/condition  Outcome: Progressing  Goal: Absence of fever/infection during neutropenic period  Description: INTERVENTIONS:  - Monitor WBC    Outcome: Progressing     Problem: SAFETY ADULT  Goal: Maintain or return to baseline ADL function  Description: INTERVENTIONS:  -  Assess patient's ability to carry out ADLs; assess patient's baseline for ADL function and identify physical deficits which impact ability to perform ADLs (bathing, care of mouth/teeth, toileting, grooming, dressing, etc )  - Assess/evaluate cause of self-care deficits   - Assess range of motion  - Assess patient's mobility; develop plan if impaired  - Assess patient's need for assistive devices and provide as appropriate  - Encourage maximum independence but intervene and supervise when necessary  - Involve family in performance of ADLs  - Assess for home care needs following discharge   - Consider OT consult to assist with ADL evaluation and planning for discharge  - Provide patient education as appropriate  Outcome: Progressing  Goal: Maintains/Returns to pre admission functional level  Description: INTERVENTIONS:  - Perform BMAT or MOVE assessment daily    - Set and communicate daily mobility goal to care team and patient/family/caregiver  - Collaborate with rehabilitation services on mobility goals if consulted  - Perform Range of Motion 3 times a day  - Reposition patient every 2 hours    - Dangle patient 3 times a day  - Stand patient 3 times a day  - Ambulate patient 3 times a day  - Out of bed to chair 3 times a day   - Out of bed for meals 3 times a day  - Out of bed for toileting  - Record patient progress and toleration of activity level   Outcome: Progressing  Goal: Patient will remain free of falls  Description: INTERVENTIONS:  - Educate patient/family on patient safety including physical limitations  - Instruct patient to call for assistance with activity   - Consult OT/PT to assist with strengthening/mobility   - Keep Call bell within reach  - Keep bed low and locked with side rails adjusted as appropriate  - Keep care items and personal belongings within reach  - Initiate and maintain comfort rounds  - Make Fall Risk Sign visible to staff  - Offer Toileting every 2 Hours, in advance of need  - Initiate/Maintain bed alarm  - Obtain necessary fall risk management equipment: alarms  - Apply yellow socks and bracelet for high fall risk patients  - Consider moving patient to room near nurses station  Outcome: Progressing     Problem: DISCHARGE PLANNING  Goal: Discharge to home or other facility with appropriate resources  Description: INTERVENTIONS:  - Identify barriers to discharge w/patient and caregiver  - Arrange for needed discharge resources and transportation as appropriate  - Identify discharge learning needs (meds, wound care, etc )  - Arrange for interpretive services to assist at discharge as needed  - Refer to Case Management Department for coordinating discharge planning if the patient needs post-hospital services based on physician/advanced practitioner order or complex needs related to functional status, cognitive ability, or social support system  Outcome: Progressing     Problem: Knowledge Deficit  Goal: Patient/family/caregiver demonstrates understanding of disease process, treatment plan, medications, and discharge instructions  Description: Complete learning assessment and assess knowledge base    Interventions:  - Provide teaching at level of understanding  - Provide teaching via preferred learning methods  Outcome: Progressing Problem: Nutrition/Hydration-ADULT  Goal: Nutrient/Hydration intake appropriate for improving, restoring or maintaining nutritional needs  Description: Monitor and assess patient's nutrition/hydration status for malnutrition  Collaborate with interdisciplinary team and initiate plan and interventions as ordered  Monitor patient's weight and dietary intake as ordered or per policy  Utilize nutrition screening tool and intervene as necessary  Determine patient's food preferences and provide high-protein, high-caloric foods as appropriate       INTERVENTIONS:  - Monitor oral intake, urinary output, labs, and treatment plans  - Assess nutrition and hydration status and recommend course of action  - Evaluate amount of meals eaten  - Assist patient with eating if necessary   - Allow adequate time for meals  - Recommend/ encourage appropriate diets, oral nutritional supplements, and vitamin/mineral supplements  - Order, calculate, and assess calorie counts as needed  - Recommend, monitor, and adjust tube feedings and TPN/PPN based on assessed needs  - Assess need for intravenous fluids  - Provide specific nutrition/hydration education as appropriate  - Include patient/family/caregiver in decisions related to nutrition  Outcome: Progressing

## 2022-06-15 NOTE — OCCUPATIONAL THERAPY NOTE
Occupational Therapy Evaluation     Patient Name: Rachel Guerrero  XZZAZ'D Date: 6/15/2022  Problem List  Principal Problem:    Sacral wound    Past Medical History  Past Medical History:   Diagnosis Date    Dysphagia, oropharyngeal phase 06/06/2022     Past Surgical History  Past Surgical History:   Procedure Laterality Date    WOUND DEBRIDEMENT N/A 6/14/2022    Procedure: DEBRIDEMENT WOUND [facility] OUT); SACRUM AND BUTTOCKS;  Surgeon: Kelli Mcneil MD;  Location: [facility];  Service: General           06/15/22 1430   OT Last Visit   OT Visit Date 06/15/22   Note Type   Note type Evaluation   Restrictions/Precautions   Weight Bearing Precautions Per Order No   Other Precautions Pain; Fall Risk;Multiple lines;O2;Hard of hearing  (2L of O2, wound vac  Pt hears better on the R)   Pain Assessment   Pain Assessment Tool 0-10   Pain Score 1   Pain Location/Orientation Location: [address] [place] [address] Pain Intervention(s) Repositioned   Home Living   Type of [address] [place] [address] Two level;Stairs to enter with rails; Able to live on main level with bedroom/bathroom  (4 MARY, FFSU)   Bathroom Shower/Tub Walk-in shower   Bathroom Toilet Standard   Bathroom Equipment Grab bars in shower; [address]; Other (Comment)  (rollator)   Additional Comments Per pt report, in March 2022, pt was I with ADLs and functional mobility and was living with her   Pt had a poor reaction to a medication she was taking and reports become very weak  Pt has been either in a hospital or rehab since then  Pt was at [facility] approx 1 week ago and was mostly in her W C or bed     Prior Function   Level of [place] Independent with ADLs and functional mobility   Lives With Spouse   Receives Help From Family   ADL Assistance Independent   IADLs Independent   Falls in the last 6 months 0   Vocational Retired   Lifestyle   Autonomy Pt reports that in March she was I with [facility]ing, and mobility w/o AD, at [address] assist with ADL's/IADL's, assist of 1 with RW with mobility/transfers  Reciprocal Relationships Pt has a supportive sister and a    Service to Others Pt is retired   Intrinsic Gratification Pt enjoys reading   ADL   Eating Assistance 1  Total Assistance   Eating Deficit   (Pt has PEG tube)   Grooming Assistance 1  Total Assistance   UB Bathing Assistance 1  Total Assistance   LB Bathing Assistance 1  Total Assistance   UB Dressing Assistance 1  Total Assistance   LB Dressing Assistance 1  Total Assistance   Toileting Assistance  1  Total Assistance   Functional Assistance 1  Total Assistance   Bed Mobility   Rolling R 1  Dependent   Additional items Assist x 2   Rolling L 1  Dependent   Additional items Assist x 2   Supine to Sit 1  Dependent   Additional items Assist x 2   Sit to Supine 1  Dependent   Additional items Assist x 2   Additional Comments pt sat at EOB for 1 min and Mod A for sitting balance/trunk control   Balance   Static Sitting Poor   Activity Tolerance   Activity Tolerance Patient tolerated treatment well  (limited by hypotension  BP in supine 83/46 and post EOB sitting BP 92/44)   Medical Staff Made Aware PT Vanessa due to the patient's co-morbidities, clinically unstable presentation, and present impairments which are a regression from the patient's baseline  Nurse Made Aware RN notified and cleared to see pt   RUE Assessment   RUE Assessment X  (PROM shoulder limited to approx 80 degrees  elbow to distal 3-/5)   LUE Assessment   LUE Assessment X  (PROM shoulder limited to 90  elbow to distal 3-/5)   Hand Function   Gross Motor Coordination Impaired   Fine Motor Coordination Impaired   Vision - Complex Assessment   Tracking   (tracked therapist around room)   Acuity Able to read clock/calendar on wall without difficulty; Able to read employee name badge without difficulty   Cognition   Arousal/Participation Alert; Cooperative   Attention Attends with cues to redirect   Orientation Level Oriented X4   Memory Within functional limits  (continue to assess)   Following Commands Follows one step commands with increased time or repetition   Comments Pt is very hard of hearing vs slow processing/increased time to respond  Pt is able to follow simple commands with increased time and/or repetition Continue to assess   Assessment   Limitation Decreased ADL status; Decreased UE ROM; Decreased UE strength;Decreased Safe judgement during ADL;Decreased endurance;Decreased fine motor control;Decreased self-care trans;Decreased high-level ADLs   Prognosis Fair   Assessment Pt is a 80 y o  female who was [facility] to [facility] on 6/14/2022 with Sacral wound , s/p wash out 6/14  Pt's  has a past medical history of Dysphagia, oropharyngeal phase  At baseline pt was completing ADLs independently in March  Per pt report, in March 2022, pt was I with ADLs and functional mobility and was living with her   Pt had a poor reaction to a medication she was taking and reports become very weak  Pt has been either in a hospital or rehab since then  Pt was at [facility] approx 1 week ago and was mostly in her W C or bed  Pt lives in a [facility] with her   Currently pt requires total A for overall ADLS and total A for bed mobility  Pt currently presents with impairments in the following categories -difficulty performing ADLS and difficulty performing IADLS  activity tolerance, endurance, standing balance/tolerance, sitting balance/tolerance, UE strength, UE ROM, FMC, GMC and memory  These impairments, as well as pt's fatigue and pain  limit pt's ability to safely engage in all baseline areas of occupation, includingeating, grooming, bathing, dressing, toileting, functional mobility/transfers, house maintenance and medication management From OT standpoint, recommend STR upon D/C  OT will continue to follow to address the below stated goals     Goals   Patient Goals To get better   LTG Time Frame 10-14   Long Term Goal #1 see goals below   Plan   Treatment Interventions ADL retraining;Functional transfer training;UE strengthening/ROM; Endurance training;Cognitive reorientation;Patient/family training;Fine motor coordination activities; Compensatory technique education;Continued evaluation; Energy conservation; Activityengagement;Equipment evaluation/education   Goal Expiration Date 06/29/22   OT Frequency 3-5x/wk   Recommendation   OT Discharge Recommendation Post acute rehabilitation services   OT - OK to Discharge Yes   AM-PAC Daily Activity Inpatient   Lower Body Dressing 1   Bathing 1   Toileting 1   Upper Body Dressing 1   Grooming 1   Eating 1   Daily Activity Raw Score 6   Turning Head Towards Sound 3   Follow Simple Instructions 4   Low Function Daily Activity Raw Score 13   Low Function Daily Activity Standardized Score 23 16   AM-PAC Applied Cognition Inpatient   Following a Speech/Presentation 3   Understanding Ordinary Conversation 3   Taking Medications 3   Remembering Where Things Are Placed or Put Away 3   Remembering List of 4-5 Errands 3   Taking Care of Complicated Tasks 1   Applied Cognition Raw Score 16   Applied Cognition Standardized Score 35 03   Occupational Therapy Goals:  Pt will increase bed mobility to min A to participate in functional activities  Pt will increase static and dynamic sitting  balance to Mod I to participate in functional activities  Pt will increase activity tolerance to participate in OT session for 10-15 minutes to increase independence with purposeful tasks  Pt will complete UB/LB dressing with Min A to increase independence and safety  Pt will complete bathing w/ min A and use of DME and AD as needed to increase independence and safety  Pt will complete toileting with Min A to increase independence and safety  Pt will be AxOx4 for tx sessions without environmental cues    Pt will participate in cognition assessment with good carryover for safe discharge  Pt will follow 5 step directions to complete purposeful tasks  Pt will demonstrate good problem solving skills/sequencing during ADL tasks

## 2022-06-15 NOTE — RESTORATIVE TECHNICIAN NOTE
Restorative Technician Note      Patient Name: Laureano Ornelas     Note Type: Mobility (Pt on hold per nursing 2* to low BP's )    [facility] JEREMIAH BS, Restorative Technician, [employer]

## 2022-06-16 ENCOUNTER — APPOINTMENT (INPATIENT)
Dept: RADIOLOGY | Facility: HOSPITAL | Age: 82
DRG: 853 | End: 2022-06-16
Attending: INTERNAL MEDICINE
Payer: COMMERCIAL

## 2022-06-16 PROBLEM — E43 SEVERE PROTEIN-CALORIE MALNUTRITION (HCC): Status: ACTIVE | Noted: 2022-06-16

## 2022-06-16 PROBLEM — J18.9 PNEUMONIA: Status: ACTIVE | Noted: 2022-06-16

## 2022-06-16 PROBLEM — R33.9 URINARY RETENTION: Status: ACTIVE | Noted: 2022-01-01

## 2022-06-16 PROBLEM — M34.9 SCLERODERMA (HCC): Status: ACTIVE | Noted: 2022-01-01

## 2022-06-16 PROBLEM — E03.9 ACQUIRED HYPOTHYROIDISM: Status: ACTIVE | Noted: 2022-06-06

## 2022-06-16 PROBLEM — R13.12 DYSPHAGIA, OROPHARYNGEAL PHASE: Status: ACTIVE | Noted: 2022-01-01

## 2022-06-16 PROBLEM — D64.9 ANEMIA: Status: ACTIVE | Noted: 2022-01-01

## 2022-06-16 PROBLEM — J96.01 ACUTE RESPIRATORY FAILURE WITH HYPOXIA (HCC): Status: ACTIVE | Noted: 2022-01-01

## 2022-06-16 LAB
ABO GROUP BLD BPU: NORMAL
ANION GAP SERPL CALCULATED.3IONS-SCNC: 5 MMOL/L (ref 4–13)
BPU ID: NORMAL
BUN SERPL-MCNC: 21 MG/DL (ref 5–25)
CALCIUM SERPL-MCNC: 8.1 MG/DL (ref 8.3–10.1)
CHLORIDE SERPL-SCNC: 101 MMOL/L (ref 100–108)
CO2 SERPL-SCNC: 30 MMOL/L (ref 21–32)
CREAT SERPL-MCNC: 0.32 MG/DL (ref 0.6–1.3)
CROSSMATCH: NORMAL
ERYTHROCYTE [DISTWIDTH] IN BLOOD BY AUTOMATED COUNT: 16.9 % (ref 11.6–15.1)
ERYTHROCYTE [DISTWIDTH] IN BLOOD BY AUTOMATED COUNT: 17 % (ref 11.6–15.1)
FERRITIN SERPL-MCNC: 186 NG/ML (ref 8–388)
FOLATE SERPL-MCNC: 6.6 NG/ML (ref 3.1–17.5)
GFR SERPL CREATININE-BSD FRML MDRD: 106 ML/MIN/1.73SQ M
GLUCOSE SERPL-MCNC: 133 MG/DL (ref 65–140)
HCT VFR BLD AUTO: 20.8 % (ref 34.8–46.1)
HCT VFR BLD AUTO: 22.6 % (ref 34.8–46.1)
HCT VFR BLD AUTO: 29.4 % (ref 34.8–46.1)
HGB BLD-MCNC: 6.5 G/DL (ref 11.5–15.4)
HGB BLD-MCNC: 6.8 G/DL (ref 11.5–15.4)
HGB BLD-MCNC: 9.3 G/DL (ref 11.5–15.4)
IRON SATN MFR SERPL: 23 % (ref 15–50)
IRON SERPL-MCNC: 37 UG/DL (ref 50–170)
MCH RBC QN AUTO: 30.1 PG (ref 26.8–34.3)
MCH RBC QN AUTO: 30.5 PG (ref 26.8–34.3)
MCHC RBC AUTO-ENTMCNC: 30.1 G/DL (ref 31.4–37.4)
MCHC RBC AUTO-ENTMCNC: 31.3 G/DL (ref 31.4–37.4)
MCV RBC AUTO: 100 FL (ref 82–98)
MCV RBC AUTO: 98 FL (ref 82–98)
NRBC BLD AUTO-RTO: 1 /100 WBCS
PLATELET # BLD AUTO: 319 THOUSANDS/UL (ref 149–390)
PLATELET # BLD AUTO: 342 THOUSANDS/UL (ref 149–390)
PMV BLD AUTO: 9.3 FL (ref 8.9–12.7)
PMV BLD AUTO: 9.8 FL (ref 8.9–12.7)
POTASSIUM SERPL-SCNC: 3.9 MMOL/L (ref 3.5–5.3)
RBC # BLD AUTO: 2.13 MILLION/UL (ref 3.81–5.12)
RBC # BLD AUTO: 2.26 MILLION/UL (ref 3.81–5.12)
RETICS # AUTO: ABNORMAL 10*3/UL (ref 14097–95744)
RETICS # CALC: 3.56 % (ref 0.37–1.87)
SODIUM SERPL-SCNC: 136 MMOL/L (ref 136–145)
TIBC SERPL-MCNC: 161 UG/DL (ref 250–450)
TSH SERPL DL<=0.05 MIU/L-ACNC: 3.08 UIU/ML (ref 0.45–4.5)
UNIT DISPENSE STATUS: NORMAL
UNIT PRODUCT CODE: NORMAL
UNIT PRODUCT VOLUME: 350 ML
UNIT RH: NORMAL
VANCOMYCIN TROUGH SERPL-MCNC: 32.9 UG/ML (ref 10–20)
VIT B12 SERPL-MCNC: 1931 PG/ML (ref 100–900)
WBC # BLD AUTO: 10.1 THOUSAND/UL (ref 4.31–10.16)
WBC # BLD AUTO: 13.4 THOUSAND/UL (ref 4.31–10.16)

## 2022-06-16 PROCEDURE — 85045 AUTOMATED RETICULOCYTE COUNT: CPT | Performed by: INTERNAL MEDICINE

## 2022-06-16 PROCEDURE — P9016 RBC LEUKOCYTES REDUCED: HCPCS

## 2022-06-16 PROCEDURE — 83540 ASSAY OF IRON: CPT | Performed by: INTERNAL MEDICINE

## 2022-06-16 PROCEDURE — 82728 ASSAY OF FERRITIN: CPT | Performed by: INTERNAL MEDICINE

## 2022-06-16 PROCEDURE — 82607 VITAMIN B-12: CPT | Performed by: INTERNAL MEDICINE

## 2022-06-16 PROCEDURE — 97606 NEG PRS WND THER DME>50 SQCM: CPT | Performed by: SURGERY

## 2022-06-16 PROCEDURE — 83550 IRON BINDING TEST: CPT | Performed by: INTERNAL MEDICINE

## 2022-06-16 PROCEDURE — 2W05X6Z CHANGE PRESSURE DRESSING ON BACK: ICD-10-PCS | Performed by: STUDENT IN AN ORGANIZED HEALTH CARE EDUCATION/TRAINING PROGRAM

## 2022-06-16 PROCEDURE — 80202 ASSAY OF VANCOMYCIN: CPT | Performed by: SURGERY

## 2022-06-16 PROCEDURE — 99233 SBSQ HOSP IP/OBS HIGH 50: CPT | Performed by: INTERNAL MEDICINE

## 2022-06-16 PROCEDURE — 71045 X-RAY EXAM CHEST 1 VIEW: CPT

## 2022-06-16 PROCEDURE — 80048 BASIC METABOLIC PNL TOTAL CA: CPT

## 2022-06-16 PROCEDURE — 85027 COMPLETE CBC AUTOMATED: CPT

## 2022-06-16 PROCEDURE — 85027 COMPLETE CBC AUTOMATED: CPT | Performed by: PHYSICIAN ASSISTANT

## 2022-06-16 PROCEDURE — 94664 DEMO&/EVAL PT USE INHALER: CPT

## 2022-06-16 PROCEDURE — 94760 N-INVAS EAR/PLS OXIMETRY 1: CPT

## 2022-06-16 PROCEDURE — 94669 MECHANICAL CHEST WALL OSCILL: CPT

## 2022-06-16 PROCEDURE — 99024 POSTOP FOLLOW-UP VISIT: CPT | Performed by: SURGERY

## 2022-06-16 PROCEDURE — 85018 HEMOGLOBIN: CPT | Performed by: INTERNAL MEDICINE

## 2022-06-16 PROCEDURE — 94760 N-INVAS EAR/PLS OXIMETRY 1: CPT | Performed by: SOCIAL WORKER

## 2022-06-16 PROCEDURE — 84443 ASSAY THYROID STIM HORMONE: CPT | Performed by: INTERNAL MEDICINE

## 2022-06-16 PROCEDURE — 82746 ASSAY OF FOLIC ACID SERUM: CPT | Performed by: INTERNAL MEDICINE

## 2022-06-16 PROCEDURE — 85014 HEMATOCRIT: CPT | Performed by: INTERNAL MEDICINE

## 2022-06-16 RX ORDER — VANCOMYCIN HYDROCHLORIDE 1 G/200ML
15 INJECTION, SOLUTION INTRAVENOUS DAILY PRN
Status: DISCONTINUED | OUTPATIENT
Start: 2022-06-17 | End: 2022-06-17

## 2022-06-16 RX ORDER — LEVOTHYROXINE SODIUM 0.03 MG/1
25 TABLET ORAL
Status: DISCONTINUED | OUTPATIENT
Start: 2022-06-17 | End: 2022-07-13 | Stop reason: HOSPADM

## 2022-06-16 RX ORDER — SACCHAROMYCES BOULARDII 250 MG
250 CAPSULE ORAL 2 TIMES DAILY
Status: DISCONTINUED | OUTPATIENT
Start: 2022-06-16 | End: 2022-07-13 | Stop reason: HOSPADM

## 2022-06-16 RX ORDER — LEVOTHYROXINE SODIUM 0.03 MG/1
25 TABLET ORAL
Status: DISCONTINUED | OUTPATIENT
Start: 2022-06-17 | End: 2022-06-16

## 2022-06-16 RX ADMIN — ACETAMINOPHEN 650 MG: 650 SUSPENSION ORAL at 11:57

## 2022-06-16 RX ADMIN — VANCOMYCIN HYDROCHLORIDE 1250 MG: 10 INJECTION, POWDER, LYOPHILIZED, FOR SOLUTION INTRAVENOUS at 02:10

## 2022-06-16 RX ADMIN — HEPARIN SODIUM 5000 UNITS: 5000 INJECTION INTRAVENOUS; SUBCUTANEOUS at 05:55

## 2022-06-16 RX ADMIN — CEFEPIME HYDROCHLORIDE 2000 MG: 2 INJECTION, POWDER, FOR SOLUTION INTRAVENOUS at 01:05

## 2022-06-16 RX ADMIN — HEPARIN SODIUM 5000 UNITS: 5000 INJECTION INTRAVENOUS; SUBCUTANEOUS at 15:54

## 2022-06-16 RX ADMIN — CEFEPIME HYDROCHLORIDE 2000 MG: 2 INJECTION, POWDER, FOR SOLUTION INTRAVENOUS at 10:57

## 2022-06-16 RX ADMIN — RIOCIGUAT 2.5 MG: 2.5 TABLET, FILM COATED ORAL at 21:43

## 2022-06-16 RX ADMIN — ACETAMINOPHEN 650 MG: 650 SUSPENSION ORAL at 21:43

## 2022-06-16 RX ADMIN — Medication 250 MG: at 21:51

## 2022-06-16 RX ADMIN — HEPARIN SODIUM 5000 UNITS: 5000 INJECTION INTRAVENOUS; SUBCUTANEOUS at 21:43

## 2022-06-16 RX ADMIN — GLYCERIN, HYPROMELLOSE, POLYETHYLENE GLYCOL 1 DROP: .2; .2; 1 LIQUID OPHTHALMIC at 21:45

## 2022-06-16 RX ADMIN — CEFEPIME HYDROCHLORIDE 2000 MG: 2 INJECTION, POWDER, FOR SOLUTION INTRAVENOUS at 17:21

## 2022-06-16 RX ADMIN — ACETAMINOPHEN 649.6 MG: 650 SUSPENSION ORAL at 05:55

## 2022-06-16 RX ADMIN — ACETAMINOPHEN 650 MG: 650 SUSPENSION ORAL at 17:20

## 2022-06-16 NOTE — PLAN OF CARE
Problem: MOBILITY - ADULT  Goal: Maintain or return to baseline ADL function  Description: INTERVENTIONS:  -  Assess patient's ability to carry out ADLs; assess patient's baseline for ADL function and identify physical deficits which impact ability to perform ADLs (bathing, care of mouth/teeth, toileting, grooming, dressing, etc )  - Assess/evaluate cause of self-care deficits   - Assess range of motion  - Assess patient's mobility; develop plan if impaired  - Assess patient's need for assistive devices and provide as appropriate  - Encourage maximum independence but intervene and supervise when necessary  - Involve family in performance of ADLs  - Assess for home care needs following discharge   - Consider OT consult to assist with ADL evaluation and planning for discharge  - Provide patient education as appropriate  Outcome: Progressing  Goal: Maintains/Returns to pre admission functional level  Description: INTERVENTIONS:  - Perform BMAT or MOVE assessment daily    - Set and communicate daily mobility goal to care team and patient/family/caregiver  - Collaborate with rehabilitation services on mobility goals if consulted  - Perform Range of Motion 3 times a day  - Reposition patient every 2 hours    - Dangle patient 3 times a day  - Stand patient 3 times a day  - Ambulate patient 3 times a day  - Out of bed to chair 3 times a day   - Out of bed for meals 3 times a day  - Out of bed for toileting  - Record patient progress and toleration of activity level   Outcome: Progressing     Problem: Potential for Falls  Goal: Patient will remain free of falls  Description: INTERVENTIONS:  - Educate patient/family on patient safety including physical limitations  - Instruct patient to call for assistance with activity   - Consult OT/PT to assist with strengthening/mobility   - Keep Call bell within reach  - Keep bed low and locked with side rails adjusted as appropriate  - Keep care items and personal belongings within reach  - Initiate and maintain comfort rounds  - Make Fall Risk Sign visible to staff  - Offer Toileting every 2 Hours, in advance of need  - Initiate/Maintain bed alarm  - Obtain necessary fall risk management equipment: alarms  - Apply yellow socks and bracelet for high fall risk patients  - Consider moving patient to room near nurses station  Outcome: Progressing     Problem: Prexisting or High Potential for Compromised Skin Integrity  Goal: Skin integrity is maintained or improved  Description: INTERVENTIONS:  - Identify patients at risk for skin breakdown  - Assess and monitor skin integrity  - Assess and monitor nutrition and hydration status  - Monitor labs   - Assess for incontinence   - Turn and reposition patient  - Assist with mobility/ambulation  - Relieve pressure over bony prominences  - Avoid friction and shearing  - Provide appropriate hygiene as needed including keeping skin clean and dry  - Evaluate need for skin moisturizer/barrier cream  - Collaborate with interdisciplinary team   - Patient/family teaching  - Consider wound care consult   Outcome: Progressing     Problem: PAIN - ADULT  Goal: Verbalizes/displays adequate comfort level or baseline comfort level  Description: Interventions:  - Encourage patient to monitor pain and request assistance  - Assess pain using appropriate pain scale  - Administer analgesics based on type and severity of pain and evaluate response  - Implement non-pharmacological measures as appropriate and evaluate response  - Consider cultural and social influences on pain and pain management  - Notify physician/advanced practitioner if interventions unsuccessful or patient reports new pain  Outcome: Progressing     Problem: INFECTION - ADULT  Goal: Absence or prevention of progression during hospitalization  Description: INTERVENTIONS:  - Assess and monitor for signs and symptoms of infection  - Monitor lab/diagnostic results  - Monitor all insertion sites, i e  indwelling lines, tubes, and drains  - Monitor endotracheal if appropriate and nasal secretions for changes in amount and color  - Alex appropriate cooling/warming therapies per order  - Administer medications as ordered  - Instruct and encourage patient and family to use good hand hygiene technique  - Identify and instruct in appropriate isolation precautions for identified infection/condition  Outcome: Progressing  Goal: Absence of fever/infection during neutropenic period  Description: INTERVENTIONS:  - Monitor WBC    Outcome: Progressing     Problem: SAFETY ADULT  Goal: Maintain or return to baseline ADL function  Description: INTERVENTIONS:  -  Assess patient's ability to carry out ADLs; assess patient's baseline for ADL function and identify physical deficits which impact ability to perform ADLs (bathing, care of mouth/teeth, toileting, grooming, dressing, etc )  - Assess/evaluate cause of self-care deficits   - Assess range of motion  - Assess patient's mobility; develop plan if impaired  - Assess patient's need for assistive devices and provide as appropriate  - Encourage maximum independence but intervene and supervise when necessary  - Involve family in performance of ADLs  - Assess for home care needs following discharge   - Consider OT consult to assist with ADL evaluation and planning for discharge  - Provide patient education as appropriate  Outcome: Progressing  Goal: Maintains/Returns to pre admission functional level  Description: INTERVENTIONS:  - Perform BMAT or MOVE assessment daily    - Set and communicate daily mobility goal to care team and patient/family/caregiver  - Collaborate with rehabilitation services on mobility goals if consulted  - Perform Range of Motion 3 times a day  - Reposition patient every 2 hours    - Dangle patient 3 times a day  - Stand patient 3 times a day  - Ambulate patient 3 times a day  - Out of bed to chair 3 times a day   - Out of bed for meals 3 times a day  - Out of bed for toileting  - Record patient progress and toleration of activity level   Outcome: Progressing  Goal: Patient will remain free of falls  Description: INTERVENTIONS:  - Educate patient/family on patient safety including physical limitations  - Instruct patient to call for assistance with activity   - Consult OT/PT to assist with strengthening/mobility   - Keep Call bell within reach  - Keep bed low and locked with side rails adjusted as appropriate  - Keep care items and personal belongings within reach  - Initiate and maintain comfort rounds  - Make Fall Risk Sign visible to staff  - Offer Toileting every 2 Hours, in advance of need  - Initiate/Maintain bed alarm  - Obtain necessary fall risk management equipment: alarms  - Apply yellow socks and bracelet for high fall risk patients  - Consider moving patient to room near nurses station  Outcome: Progressing     Problem: DISCHARGE PLANNING  Goal: Discharge to home or other facility with appropriate resources  Description: INTERVENTIONS:  - Identify barriers to discharge w/patient and caregiver  - Arrange for needed discharge resources and transportation as appropriate  - Identify discharge learning needs (meds, wound care, etc )  - Arrange for interpretive services to assist at discharge as needed  - Refer to Case Management Department for coordinating discharge planning if the patient needs post-hospital services based on physician/advanced practitioner order or complex needs related to functional status, cognitive ability, or social support system  Outcome: Progressing     Problem: Knowledge Deficit  Goal: Patient/family/caregiver demonstrates understanding of disease process, treatment plan, medications, and discharge instructions  Description: Complete learning assessment and assess knowledge base    Interventions:  - Provide teaching at level of understanding  - Provide teaching via preferred learning methods  Outcome: Progressing Problem: Nutrition/Hydration-ADULT  Goal: Nutrient/Hydration intake appropriate for improving, restoring or maintaining nutritional needs  Description: Monitor and assess patient's nutrition/hydration status for malnutrition  Collaborate with interdisciplinary team and initiate plan and interventions as ordered  Monitor patient's weight and dietary intake as ordered or per policy  Utilize nutrition screening tool and intervene as necessary  Determine patient's food preferences and provide high-protein, high-caloric foods as appropriate       INTERVENTIONS:  - Monitor oral intake, urinary output, labs, and treatment plans  - Assess nutrition and hydration status and recommend course of action  - Evaluate amount of meals eaten  - Assist patient with eating if necessary   - Allow adequate time for meals  - Recommend/ encourage appropriate diets, oral nutritional supplements, and vitamin/mineral supplements  - Order, calculate, and assess calorie counts as needed  - Recommend, monitor, and adjust tube feedings and TPN/PPN based on assessed needs  - Assess need for intravenous fluids  - Provide specific nutrition/hydration education as appropriate  - Include patient/family/caregiver in decisions related to nutrition  Outcome: Progressing

## 2022-06-16 NOTE — PLAN OF CARE
Problem: MOBILITY - ADULT  Goal: Maintain or return to baseline ADL function  Description: INTERVENTIONS:  -  Assess patient's ability to carry out ADLs; assess patient's baseline for ADL function and identify physical deficits which impact ability to perform ADLs (bathing, care of mouth/teeth, toileting, grooming, dressing, etc )  - Assess/evaluate cause of self-care deficits   - Assess range of motion  - Assess patient's mobility; develop plan if impaired  - Assess patient's need for assistive devices and provide as appropriate  - Encourage maximum independence but intervene and supervise when necessary  - Involve family in performance of ADLs  - Assess for home care needs following discharge   - Consider OT consult to assist with ADL evaluation and planning for discharge  - Provide patient education as appropriate  Outcome: Progressing  Goal: Maintains/Returns to pre admission functional level  Description: INTERVENTIONS:  - Perform BMAT or MOVE assessment daily    - Set and communicate daily mobility goal to care team and patient/family/caregiver  - Collaborate with rehabilitation services on mobility goals if consulted  - Perform Range of Motion 2 times a day  - Reposition patient every 2 hours    - Dangle patient 2 times a day  - Stand patient 2 times a day  - Ambulate patient 2 times a day  - Out of bed to chair 2 times a day   - Out of bed for meals 2 times a day  - Out of bed for toileting  - Record patient progress and toleration of activity level   Outcome: Progressing     Problem: Potential for Falls  Goal: Patient will remain free of falls  Description: INTERVENTIONS:  - Educate patient/family on patient safety including physical limitations  - Instruct patient to call for assistance with activity   - Consult OT/PT to assist with strengthening/mobility   - Keep Call bell within reach  - Keep bed low and locked with side rails adjusted as appropriate  - Keep care items and personal belongings within reach  - Initiate and maintain comfort rounds  - Make Fall Risk Sign visible to staff  - Offer Toileting every 2 Hours, in advance of need  - Initiate/Maintain bed alarm  - Apply yellow socks and bracelet for high fall risk patients  - Consider moving patient to room near nurses station  Outcome: Progressing     Problem: Prexisting or High Potential for Compromised Skin Integrity  Goal: Skin integrity is maintained or improved  Description: INTERVENTIONS:  - Identify patients at risk for skin breakdown  - Assess and monitor skin integrity  - Assess and monitor nutrition and hydration status  - Monitor labs   - Assess for incontinence   - Turn and reposition patient  - Assist with mobility/ambulation  - Relieve pressure over bony prominences  - Avoid friction and shearing  - Provide appropriate hygiene as needed including keeping skin clean and dry  - Evaluate need for skin moisturizer/barrier cream  - Collaborate with interdisciplinary team   - Patient/family teaching  - Consider wound care consult   Outcome: Progressing     Problem: PAIN - ADULT  Goal: Verbalizes/displays adequate comfort level or baseline comfort level  Description: Interventions:  - Encourage patient to monitor pain and request assistance  - Assess pain using appropriate pain scale  - Administer analgesics based on type and severity of pain and evaluate response  - Implement non-pharmacological measures as appropriate and evaluate response  - Consider cultural and social influences on pain and pain management  - Notify physician/advanced practitioner if interventions unsuccessful or patient reports new pain  Outcome: Progressing     Problem: INFECTION - ADULT  Goal: Absence or prevention of progression during hospitalization  Description: INTERVENTIONS:  - Assess and monitor for signs and symptoms of infection  - Monitor lab/diagnostic results  - Monitor all insertion sites, i e  indwelling lines, tubes, and drains  - Monitor endotracheal if appropriate and nasal secretions for changes in amount and color  - [employer] appropriate cooling/warming therapies per order  - Administer medications as ordered  - Instruct and encourage patient and family to use good hand hygiene technique  - Identify and instruct in appropriate isolation precautions for identified infection/condition  Outcome: Progressing  Goal: Absence of fever/infection during neutropenic period  Description: INTERVENTIONS:  - Monitor WBC    Outcome: Progressing     Problem: SAFETY ADULT  Goal: Maintain or return to baseline ADL function  Description: INTERVENTIONS:  -  Assess patient's ability to carry out ADLs; assess patient's baseline for ADL function and identify physical deficits which impact ability to perform ADLs (bathing, care of mouth/teeth, toileting, grooming, dressing, etc )  - Assess/evaluate cause of self-care deficits   - Assess range of motion  - Assess patient's mobility; develop plan if impaired  - Assess patient's need for assistive devices and provide as appropriate  - Encourage maximum independence but intervene and supervise when necessary  - Involve family in performance of ADLs  - Assess for home care needs following discharge   - Consider OT consult to assist with ADL evaluation and planning for discharge  - Provide patient education as appropriate  Outcome: Progressing  Goal: Maintains/Returns to pre admission functional level  Description: INTERVENTIONS:  - Perform BMAT or MOVE assessment daily    - Set and communicate daily mobility goal to care team and patient/family/caregiver  - Collaborate with rehabilitation services on mobility goals if consulted  - Perform Range of Motion 2 times a day  - Reposition patient every 2 hours    - Dangle patient 2 times a day  - Stand patient 2 times a day  - Ambulate patient 2 times a day  - Out of bed to chair 2 times a day   - Out of bed for meals 2 times a day  - Out of bed for toileting  - Record patient progress and toleration of activity level   Outcome: Progressing  Goal: Patient will remain free of falls  Description: INTERVENTIONS:  - Educate patient/family on patient safety including physical limitations  - Instruct patient to call for assistance with activity   - Consult OT/PT to assist with strengthening/mobility   - Keep Call bell within reach  - Keep bed low and locked with side rails adjusted as appropriate  - Keep care items and personal belongings within reach  - Initiate and maintain comfort rounds  - Make Fall Risk Sign visible to staff  - Offer Toileting every 2 Hours, in advance of need  - Initiate/Maintain bed alarm  - Apply yellow socks and bracelet for high fall risk patients  - Consider moving patient to room near nurses station  Outcome: Progressing     Problem: DISCHARGE PLANNING  Goal: Discharge to home or other facility with appropriate resources  Description: INTERVENTIONS:  - Identify barriers to discharge w/patient and caregiver  - Arrange for needed discharge resources and transportation as appropriate  - Identify discharge learning needs (meds, wound care, etc )  - Arrange for interpretive services to assist at discharge as needed  - Refer to Case Management Department for coordinating discharge planning if the patient needs post-hospital services based on physician/advanced practitioner order or complex needs related to functional status, cognitive ability, or social support system  Outcome: Progressing     Problem: Knowledge Deficit  Goal: Patient/family/caregiver demonstrates understanding of disease process, treatment plan, medications, and discharge instructions  Description: Complete learning assessment and assess knowledge base    Interventions:  - Provide teaching at level of understanding  - Provide teaching via preferred learning methods  Outcome: Progressing     Problem: Nutrition/Hydration-ADULT  Goal: Nutrient/Hydration intake appropriate for improving, restoring or maintaining nutritional needs  Description: Monitor and assess patient's nutrition/hydration status for malnutrition  Collaborate with interdisciplinary team and initiate plan and interventions as ordered  Monitor patient's weight and dietary intake as ordered or per policy  Utilize nutrition screening tool and intervene as necessary  Determine patient's food preferences and provide high-protein, high-caloric foods as appropriate       INTERVENTIONS:  - Monitor oral intake, urinary output, labs, and treatment plans  - Assess nutrition and hydration status and recommend course of action  - Evaluate amount of meals eaten  - Assist patient with eating if necessary   - Allow adequate time for meals  - Recommend/ encourage appropriate diets, oral nutritional supplements, and vitamin/mineral supplements  - Order, calculate, and assess calorie counts as needed  - Recommend, monitor, and adjust tube feedings and TPN/PPN based on assessed needs  - Assess need for intravenous fluids  - Provide specific nutrition/hydration education as appropriate  - Include patient/family/caregiver in decisions related to nutrition  Outcome: Progressing

## 2022-06-16 NOTE — PROGRESS NOTES
Vancomycin IV Pharmacy-to-Dose Consultation    Sonya Weaver is a 80 y o  female who is currently receiving vancomycin IV with management by the Pharmacy Consult service  Assessment/Plan:    The patient's chart was reviewed  Renal function is stable  There are no signs or symptoms of nephrotoxicity and/or infusion reactions documented  Based on today's assessment and supratherapeutic level, will adjust current vancomycin (Day # 3) dosing to 1000 mg (15 mg/kg) IV daily PRN when random vancomycin level is less than 20 with a plan for random level to be drawn 6/17 with AM labs    We will continue to follow the patient's culture results and clinical progress daily      Thank you,  Shelly Peter, PharmD, [facility] 6 Pharmacist  [phone]

## 2022-06-16 NOTE — CASE MANAGEMENT
Case Management Assessment & Discharge Planning Note    Patient name Alejandra Perez  Location [address] 7[address] 166-11 MRN [id number]  : 1940 Date 2022       Current Admission Date: 2022  Current Admission Diagnosis:Sacral wound   Patient Active Problem List    Diagnosis Date Noted    Severe protein-calorie malnutrition ([facility] ) 2022    Pneumonia 2022    Sacral wound 2022    Dysphagia, oropharyngeal phase 2022    Scleroderma ([facility] ) 2022    Acquired hypothyroidism 2022    Anemia 2022    Urinary retention 2022    Acute respiratory failure with hypoxia ([facility] [address] ) 2022      LOS (days): 2  Geometric Mean LOS (GMLOS) (days): 7 30  Days to GMLOS:5 5     OBJECTIVE:    Risk of Unplanned Readmission Score: 13 68         Current admission status: Inpatient  Referral Reason: SNF, Initial    Preferred Pharmacy:   PATIENT/FAMILY REPORTS NO PREFERRED PHARMACY  No address on file      Primary Care Provider: No primary care provider on file  Primary Insurance: [facility]  Secondary Insurance:     ASSESSMENT:  [address] Proxies    There are no active Health Care Proxies on file                   Readmission Root Cause  30 Day Readmission: No    Patient Information  Mental Status: Alert  During Assessment patient was accompanied by: Not accompanied during assessment  Assessment information provided by[de-identified] Daughter  Support Systems: Daughter  Type of Current Residence:  ([facility])  In the last 12 months, was there a time when you were not able to pay the mortgage or rent on time?: No  In the last 12 months, how many places have you lived?: 1  In the last 12 months, was there a time when you did not have a steady place to sleep or slept in a shelter (including now)?: No  Homeless/housing insecurity resource given?: N/A  Living Arrangements: Lives w/ Spouse/significant other  Is patient a ?: No    Activities of Daily Living Prior to Admission  Functional Status: Total dependent  Completes ADLs independently?: No  Level of ADL dependence: Total Dependent  Ambulates independently?: No  Level of ambulatory dependence:  Total Dependent  Does patient use assisted devices?: No  Does patient currently own DME?: Yes  What DME does the patient currently own?: Wheelchair  Does patient have a history of Outpatient Therapy (PT/OT)?: No  Does the patient have a history of Short-Term Rehab?: Yes (Pt adm from [facility])  Does patient currently have Antonio Maurer?: No         Patient Information Continued  Income Source: Pension/snf  Does patient have prescription coverage?: Yes  Within the past 12 months, you worried that your food would run out before you got the money to buy more : Never true  Within the past 12 months, the food you bought just didn't last and you didn't have money to get more : Never true  Food insecurity resource given?: N/A  Does patient receive dialysis treatments?: No  Does patient have a history of substance abuse?: No  Does patient have a history of Mental Health Diagnosis?: No    PHQ 2/9 Screening   Reviewed PHQ 2/9 Depression Screening Score?: No    Means of Transportation  Means of Transport to Appts[de-identified] Other (Comment) (SNF transports to and from appts)  In the past 12 months, has lack of transportation kept you from medical appointments or from getting medications?: No  In the past 12 months, has lack of transportation kept you from meetings, work, or from getting things needed for daily living?: No  Was application for public transport provided?: N/A        DISCHARGE DETAILS:    Discharge planning discussed with[de-identified] CM spoke with the pt's dtr  Freedom of Choice: Yes  Comments - Freedom of Choice: FOC explored, pt adm from [facility]  CM contacted family/caregiver?: Yes  Were Treatment Team discharge recommendations reviewed with patient/caregiver?: Yes     Were patient/caregiver advised of the risks associated with not following Treatment Team discharge recommendations?: Yes    Contacts  Relationship to Patient[de-identified] Family  Contact Method: Phone  Reason/Outcome: Continuity of Care, Discharge [address]         Is the patient interested in [facility] at discharge?: No    DME Referral Provided  Referral made for DME?: No    Other Referral/Resources/Interventions Provided:  Interventions: Short Term Rehab    Would you like to participate in our [address] service program?  : No - Declined    Treatment Team Recommendation: SNF  Discharge Destination Plan[de-identified] SNF  Transport at Discharge : BLS Ambulance         Additional Comments: Pt adm from [facility]  CM contacted the admission office at [facility], and confirmed the pt was receiving short term rehab as of 6/3/22  Authorization from [employer] will be needed to facilitate return  CM will continue to follow

## 2022-06-16 NOTE — RESPIRATORY THERAPY NOTE
Resp Care   06/16/22 0846   Respiratory Protocol   Protocol Initiated? Yes   Protocol Selection Airway Clearance   Language Barrier? No   Medical & Social History Reviewed? Yes   Diagnostic Studies Reviewed? Yes   Physical Assessment Performed? Yes   Respiratory Plan Discontinue Protocol   Airway Clearance Plan Percussive Vest   Respiratory Assessment   Assessment Type Assess only   General Appearance Alert; Awake   Respiratory Pattern Dyspnea at rest   Chest Assessment Chest expansion symmetrical   Bilateral Breath Sounds Rhonchi   Suction Nasal   Resp Comments pt assessed post sacral wound debridement  called by RN to see pt for increased congestion and c/o SOB  pt assessed per RCP and sx for large amounts of creamy/yellow secretions  pt placed on vest per ACP  pt uses no resp meds at home  has hx of ILD and PH  spoke to resident and agrees with vest  will continue to monitor     Nasal Suctioning/Secretions   Suction Type Nasal   Suction Device Catheter   Secretion Amount Large   Secretion Color Yellow

## 2022-06-16 NOTE — PROCEDURES
[facility]  Raghu Bee 80 y o  female MRN: [id number]  [facility] Encounter: [id number]    V  A C  Procedure Note    Date: 6/16/2022   Time: 2:08 PM      Location of wound:  Sacrum    Sponges removed:  2 Black Sponges  0 White Sponges    Dimensions of wound: 8 cm x 7 cm x 2 5 cm    Description of wound:  Wound base is predominantly in clean and dry  Some areas of fibrinous exudate directly overlying the bone  No purulence or necrotic tissue appreciated        Sponges placed:  2 Black Sponges ( 1 film and side +1 bridge)  0 White Sponges    VAC settings:  125 mmHg  Continuous    Pt tolerated procedure well  VAC sticker placed to wound dressing, per protocol      Vianney Stratton MD  6/16/2022

## 2022-06-16 NOTE — ASSESSMENT & PLAN NOTE
Patient with history of dysphagia, peg tube in place  Continue current tube feeds; appreciate nutritional recommendations

## 2022-06-16 NOTE — NUTRITION
06/16/22 1545   Recommendations/Interventions   Recommendations to Provider Suggest decrease Robert 1 pkt mixed as slurry via PEG to BID once patient d/c back to facility

## 2022-06-16 NOTE — ASSESSMENT & PLAN NOTE
Patient noted to have low hemoglobin on admission-6 9  Has received 1 unit PRBCs on 06/15, but repeat CBC now 6 5  Will obtain iron panel with reticulocytes; MCV high normal-considered microcytic anemia  Have also obtain B12 and folate labs; transfuse 1 unit PRBCs and monitor for improvement

## 2022-06-16 NOTE — ASSESSMENT & PLAN NOTE
Patient with worsening respiratory requirement; currently on 10 L nasal cannula  Appreciate respiratory recommendations, has been suctioned with improvement in oxygenation  X-ray chest shows mild vascular congestion; will repeat chest x-ray today  Consider volume overload (appears euvolemic on exam) versus PE (unlikely, heart rate normal and blood pressure stable) versus pneumonia (bilateral lower lobe PNA demonstrated on CT chest)  Currently on antibiotics for treatment of sacral ulcer; consult to ID  Respiratory protocol, monitor consult pulmonology as needed  History of pulmonary hypertension; continue home Adempas and Opsumit    --patient placed on step-down level 2 and confirmed with patient and family that she is full code and accepting of intubation as needed

## 2022-06-16 NOTE — ASSESSMENT & PLAN NOTE
Patient with history of urinary retention and spasm; previously on myrbetriq and Gemtesa  Continue lilly catheter at this time; treat symptomatically  Follows with urology in the outpatient setting

## 2022-06-16 NOTE — ASSESSMENT & PLAN NOTE
Malnutrition Findings:   Adult Malnutrition type: Chronic illness  Adult Degree of Malnutrition: Other severe protein calorie malnutrition  Malnutrition Characteristics: Muscle loss, Fat loss, Weight loss, Inadequate energy          Severe protein-calorie malnutrition in the setting of chronic disease, as evidenced by severe buccal fat pad loss, severe deltoid muscle loss, 20 6% weight loss in past 3 months, and <75% energy intake vs needs for >1 month, requiring dietician consult EN support and Robert tid  360 Statement: related to disease/condition evidenced by increased kcal/pro needs for wound healing Stage IV sacral decub, BMI 18 4 adjusted per prior facility wt 6/7/22;severe buccal fat pads loss, severe deltoid muscle loss, Patient lost 31# (20 6%) since 3/15/22 past 3 months with illness <75% energy intake versus needs for > 1 month  Treating with EN support and Robert TID via PEG for wound healing    BMI Findings:  Adult BMI Classifications: Underweight < 18 5        Body mass index is 22 64 kg/m²

## 2022-06-16 NOTE — ASSESSMENT & PLAN NOTE
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  Underwent surgical debridement on 06/14/2022 with general surgery; transferred to medicine service for chronic illness  Currently on vanc and cefepime for antibiotic therapy, will continue at this time as per surgery  Patient with 2/4 sirs criteria (white count and heart rate); monitor and consult ID as needed  Will consider discontinuing vanc if patient shows improvement  Repositioning; wound care consult  Supportive care, pain medications  Nutritional support

## 2022-06-16 NOTE — PROGRESS NOTES
Progress Note - general Surgery  Ronald Paradise 80 y o  female MRN: [id number]  Unit/Bed#: [facility] 714-01 Encounter: [id number]    Assessment:  80 y o  female with sacral decubitus ulcer, is now 2 Days Post-Op s/p Procedure(s) (LRB):  DEBRIDEMENT WOUND ([facility] OUT); SACRUM AND BUTTOCKS (N/A)  No signs of infection  Patient appears to have anemia with hemoglobin of 6 9  Received 1 PRBC yesterday for hypotension  Continues to be hypotensive  Plan:  - Diet Enteral/Parenteral; Tube Feeding No Oral Diet; Jevity 1 5; Cyclic; 90; 16 hours; 105; Water; Every 6 hours  - Pain and Nausea control PRN  - Incentive spirometry  - continue antibiotics for pneumonia  - plan for internal medicine to assume care patient  - will change wound VAC today  - patient appears asymptomatic but will likely receive PRBC today for Hgb < 7    Subjective/Objective     Subjective:  No acute events overnight  Objective:   Vitals: Blood pressure (!) 104/42, pulse 73, temperature 98 °F (36 7 °C), temperature source Oral, resp  rate 19, height 5' 7 5" (1 715 m), weight 66 5 kg (146 lb 11 2 oz), SpO2 95 %  ,Body mass index is 22 64 kg/m²  I/O       06/14 0701  06/15 0700 06/15 0701 06/16 0700    I V  (mL/kg) 500 (7 5)     Blood  350    IV Piggyback 500     Total Intake(mL/kg) 1000 (15) 350 (5 3)    Urine (mL/kg/hr) 600 1000 (0 6)    Total Output 600 1000    Net +400 -650                Physical Exam:  Gen: NAD, Aox3, Comfortable in bed  Chest: Normal work of breathing, no respiratory distress  Abd: Soft, ND, NT  Ext: No Edema  Buttocks:  Wound VAC in place to suction  Skin: Warm, Dry, Intact      Lab, Imaging and other studies:   I have personally reviewed pertinent reports    , CBC with diff:   Lab Results   Component Value Date    WBC 10 28 (H) 06/15/2022    HGB 6 9 (LL) 06/15/2022    HCT 22 1 (L) 06/15/2022    MCV 99 (H) 06/15/2022     06/15/2022    MCH 30 4 06/15/2022    MCHC 30 8 (L) 06/15/2022    RDW 17 0 (H) 06/15/2022    MPV 9 1 06/15/2022   VTE Pharmacologic Prophylaxis: Heparin  VTE Mechanical Prophylaxis: sequential compression device        Lei Ramirez MD  6/16/2022  6:27 AM

## 2022-06-16 NOTE — PROGRESS NOTES
[facility]  Progress Note Uli Shore 1940, 80 y o  female MRN: [id number]  Unit/Bed#: [facility] 714-01 Encounter: [id number]  Primary Care Provider: No primary care provider on file  Date and time admitted to hospital: 6/14/2022  8:07 PM    * Sacral wound  Assessment & Plan  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  Underwent surgical debridement on 06/14/2022 with general surgery; transferred to medicine service for chronic illness  Currently on vanc and cefepime for antibiotic therapy, will continue at this time as per surgery  Patient with 2/4 sirs criteria (white count and heart rate); monitor and consult ID as needed  Will consider discontinuing vanc if patient shows improvement  Repositioning; wound care consult  Supportive care, pain medications  Nutritional support      Pneumonia  Assessment & Plan  See accompanying plan under acute respiratory failure    Acute respiratory failure with hypoxia [facility])  Assessment & Plan  Patient with worsening respiratory requirement; currently on 10 L nasal cannula  Appreciate respiratory recommendations, has been suctioned with improvement in oxygenation  X-ray chest shows mild vascular congestion; will repeat chest x-ray today  Consider volume overload (appears euvolemic on exam) versus PE (unlikely, heart rate normal and blood pressure stable) versus pneumonia (bilateral lower lobe PNA demonstrated on CT chest)  Currently on antibiotics for treatment of sacral ulcer; consult to ID  Respiratory protocol, monitor consult pulmonology as needed  History of pulmonary hypertension; continue home Adempas and Opsumit    --patient placed on step-down level 2 and confirmed with patient and family that she is full code and accepting of intubation as needed      Urinary retention  Assessment & Plan  Patient with history of urinary retention and spasm; previously on myrbetriq and Gemtesa  Continue lilly catheter at this time; treat symptomatically  Follows with urology in the outpatient setting     Anemia  Assessment & Plan  Patient noted to have low hemoglobin on admission-6 9  Has received 1 unit PRBCs on 06/15, but repeat CBC now 6 5  Will obtain iron panel with reticulocytes; MCV high normal-considered microcytic anemia  Have also obtain B12 and folate labs; transfuse 1 unit PRBCs and monitor for improvement      Acquired hypothyroidism  Assessment & Plan  As per recent PCP note, appears to take 25 mcg daily  Continue home medication, TSH ordered    Scleroderma ([facility] [address] )  Assessment & Plan  Follows with Rheumatology in the outpatient setting    Dysphagia, oropharyngeal phase  Assessment & Plan  Patient with history of dysphagia, peg tube in place  Continue current tube feeds; appreciate nutritional recommendations    Severe protein-calorie malnutrition ([facility] [address] )  Assessment & Plan  Malnutrition Findings:   Adult Malnutrition type: Chronic illness  Adult Degree of Malnutrition: Other severe protein calorie malnutrition  Malnutrition Characteristics: Muscle loss, Fat loss, Weight loss, Inadequate energy          Severe protein-calorie malnutrition in the setting of chronic disease, as evidenced by severe buccal fat pad loss, severe deltoid muscle loss, 20 6% weight loss in past 3 months, and <75% energy intake vs needs for >1 month, requiring dietician consult EN support and Robert tid  360 Statement: related to disease/condition evidenced by increased kcal/pro needs for wound healing Stage IV sacral decub, BMI 18 4 adjusted per prior facility wt 6/7/22;severe buccal fat pads loss, severe deltoid muscle loss, Patient lost 31# (20 6%) since 3/15/22 past 3 months with illness <75% energy intake versus needs for > 1 month  Treating with EN support and Robert TID via PEG for wound healing    BMI Findings:  Adult BMI Classifications: Underweight < 18 5        Body mass index is 22 64 kg/m²             VTE Pharmacologic Prophylaxis:   Pharmacologic: Heparin  Mechanical VTE Prophylaxis in Place: Yes    Patient Centered Rounds: I have performed bedside rounds with nursing staff today  Discussions with Specialists or Other Care Team Provider:  Surgery    Education and Discussions with Family / Patient: Discussed treatment plan with family and patient who agree with current plan; encouraged to ask questions and participate  Time Spent for Care: 1 hour  More than 50% of total time spent on counseling and coordination of care as described above  Current Length of Stay: 2 day(s)    Current Patient Status: Inpatient   Certification Statement: The patient will continue to require additional inpatient hospital stay due to Sacral wound and respiratory failure    Discharge Plan: To be determined    Code Status: Level 1 - Full Code      Subjective:   Patient seen examined bedside, has worsening respiratory failure  Appears comfortable but now requiring 10 L mid flow  Appears to have bibasilar pneumonia on recent CT chest, treating with broad-spectrum antibiotics  Patient also status post sacral wound debridement with General surgery, wound VAC in place and broad-spectrum antibiotics  Have consulted ID to assist with antibiotic therapy; vanc at therapeutic levels and cefepime scheduled  Patient full code, alert oriented x3 and able to answer all examiner questions appropriate  Patient also with a history of pulmonary hypertension  Review of care everywhere is somewhat challenging as she has care at multiple institutions, but patient states that she takes meds for PH  If does not improve overnight, will consult pulmonology to follow along as well; respiratory protocol in place now  Med list to be faxed from her current facility at [facility]; will continue current treatment and add medications once med list accepted  Patient requesting p r n  Eye drops and probiotic      Objective:     Vitals:   Temp (24hrs), Av 2 °F (36 8 °C), Min:97 6 °F (36 4 °C), Max:98 6 °F (37 °C)    Temp:  [97 6 °F (36 4 °C)-98 6 °F (37 °C)] 98 3 °F (36 8 °C)  HR:  [62-95] 67  Resp:  [18-19] 18  BP: ()/(37-84) 126/70  SpO2:  [88 %-98 %] 98 %  Body mass index is 22 64 kg/m²  Input and Output Summary (last 24 hours): Intake/Output Summary (Last 24 hours) at 6/16/2022 1644  Last data filed at 6/16/2022 1001  Gross per 24 hour   Intake --   Output 1175 ml   Net -1175 ml       Physical Exam:     Physical Exam  Vitals and nursing note reviewed  Constitutional:       General: She is not in acute distress  Appearance: She is well-developed  She is ill-appearing  HENT:      Head: Normocephalic and atraumatic  Eyes:      Conjunctiva/sclera: Conjunctivae normal    Cardiovascular:      Rate and Rhythm: Normal rate and regular rhythm  Heart sounds: No murmur heard  Pulmonary:      Effort: Pulmonary effort is normal  No respiratory distress  Breath sounds: Rhonchi present  Abdominal:      Palpations: Abdomen is soft  Tenderness: There is no abdominal tenderness  Comments: Peg tube in place   Musculoskeletal:         General: Deformity present  Cervical back: Neck supple  Skin:     General: Skin is warm and dry  Findings: Erythema, lesion and rash present  Comments: Stage IV sacral ulcer with wound VAC in place   Neurological:      Mental Status: She is alert and oriented to person, place, and time  Motor: Weakness present  Psychiatric:         Behavior: Behavior normal             Additional Data:     Labs:    Results from last 7 days   Lab Units 06/16/22  1158 06/15/22  0614 06/14/22  1634   WBC Thousand/uL 13 40*   < > 17 76*   HEMOGLOBIN g/dL 6 5*   < > 8 6*   HEMATOCRIT % 20 8*   < > 27 9*   PLATELETS Thousands/uL 342   < > 476*   BANDS PCT %  --   --  19*   LYMPHO PCT %  --   --  3*   MONO PCT %  --   --  1*   EOS PCT %  --   --  0    < > = values in this interval not displayed       Results from last 7 days   Lab Units 06/16/22  0423 06/15/22  0614   SODIUM mmol/L 136 134*   POTASSIUM mmol/L 3 9 4 5   CHLORIDE mmol/L 101 99*   CO2 mmol/L 30 31   BUN mg/dL 21 18   CREATININE mg/dL 0 32* 0 40*   ANION GAP mmol/L 5 4   CALCIUM mg/dL 8 1* 8 7   ALBUMIN g/dL  --  2 1*   TOTAL BILIRUBIN mg/dL  --  0 36   ALK PHOS U/L  --  94   ALT U/L  --  19   AST U/L  --  15   GLUCOSE RANDOM mg/dL 133 107     Results from last 7 days   Lab Units 06/14/22  1634   INR  0 94             Results from last 7 days   Lab Units 06/15/22  0814 06/14/22  1634   LACTIC ACID mmol/L 0 8 1 2   PROCALCITONIN ng/ml  --  0 10           * I Have Reviewed All Lab Data Listed Above  * Additional Pertinent Lab Tests Reviewed: All Labs Within Last 24 Hours Reviewed    Imaging:    Imaging Reports Reviewed Today Include:  CT chest  Imaging Personally Reviewed by Myself Includes:      Recent Cultures (last 7 days):     Results from last 7 days   Lab Units 06/14/22  1749 06/14/22  1651   BLOOD CULTURE  No Growth at 24 hrs  No Growth at 24 hrs         Last 24 Hours Medication List:   Current Facility-Administered Medications   Medication Dose Route Frequency Provider Last Rate    acetaminophen  650 mg Per G Tube Q6H Reinaldo Jackson MD      cefepime  2,000 mg Intravenous Ludin Delgado MD 2,000 mg (06/16/22 1057)    heparin (porcine)  5,000 Units Subcutaneous [facility] Maicol Murillo MD      labetalol  10 mg Intravenous Q6H PRN Reinaldo Jackson MD      [START ON 6/17/2022] levothyroxine  25 mcg Per PEG Tube Early Morning Bob Ramos MD      melatonin  3 mg Oral HS PRN Reinaldo Jackson MD      NON FORMULARY   Per G Tube TID Bob Ramos MD      [START ON 6/17/2022] NON FORMULARY   Per G Tube Daily Bob Ramos MD      ondansetron  4 mg Intravenous Q6H PRN Reinaldo Jackson MD      oxyCODONE  2 5 mg Per G Tube Q4H PRN Reinaldo Jackson MD      oxyCODONE  5 mg Per G Tube Q4H PRN [facility] [START ON 6/17/2022] vancomycin  15 mg/kg Intravenous Daily PRN Jeannette Lowe MD          Today, Patient Was Seen By: Traci Mason MD    ** Please Note: Dictation voice to text software may have been used in the creation of this document   **

## 2022-06-17 LAB
ABO GROUP BLD BPU: NORMAL
ALBUMIN SERPL BCP-MCNC: 2 G/DL (ref 3.5–5)
ALP SERPL-CCNC: 83 U/L (ref 46–116)
ALT SERPL W P-5'-P-CCNC: 19 U/L (ref 12–78)
ANION GAP SERPL CALCULATED.3IONS-SCNC: 2 MMOL/L (ref 4–13)
AST SERPL W P-5'-P-CCNC: 20 U/L (ref 5–45)
BILIRUB SERPL-MCNC: 0.29 MG/DL (ref 0.2–1)
BPU ID: NORMAL
BUN SERPL-MCNC: 28 MG/DL (ref 5–25)
CALCIUM ALBUM COR SERPL-MCNC: 9.7 MG/DL (ref 8.3–10.1)
CALCIUM SERPL-MCNC: 8.1 MG/DL (ref 8.3–10.1)
CHLORIDE SERPL-SCNC: 99 MMOL/L (ref 100–108)
CO2 SERPL-SCNC: 33 MMOL/L (ref 21–32)
CREAT SERPL-MCNC: 0.28 MG/DL (ref 0.6–1.3)
CROSSMATCH: NORMAL
ERYTHROCYTE [DISTWIDTH] IN BLOOD BY AUTOMATED COUNT: 17.2 % (ref 11.6–15.1)
GFR SERPL CREATININE-BSD FRML MDRD: 111 ML/MIN/1.73SQ M
GLUCOSE SERPL-MCNC: 106 MG/DL (ref 65–140)
HCT VFR BLD AUTO: 29.1 % (ref 34.8–46.1)
HGB BLD-MCNC: 9.3 G/DL (ref 11.5–15.4)
MAGNESIUM SERPL-MCNC: 2 MG/DL (ref 1.6–2.6)
MCH RBC QN AUTO: 30.8 PG (ref 26.8–34.3)
MCHC RBC AUTO-ENTMCNC: 32 G/DL (ref 31.4–37.4)
MCV RBC AUTO: 96 FL (ref 82–98)
NRBC BLD AUTO-RTO: 1 /100 WBCS
PHOSPHATE SERPL-MCNC: 1.7 MG/DL (ref 2.3–4.1)
PLATELET # BLD AUTO: 297 THOUSANDS/UL (ref 149–390)
PMV BLD AUTO: 9.3 FL (ref 8.9–12.7)
POTASSIUM SERPL-SCNC: 3.8 MMOL/L (ref 3.5–5.3)
PROCALCITONIN SERPL-MCNC: 0.13 NG/ML
PROT SERPL-MCNC: 5.9 G/DL (ref 6.4–8.2)
RBC # BLD AUTO: 3.02 MILLION/UL (ref 3.81–5.12)
SODIUM SERPL-SCNC: 134 MMOL/L (ref 136–145)
UNIT DISPENSE STATUS: NORMAL
UNIT PRODUCT CODE: NORMAL
UNIT PRODUCT VOLUME: 350 ML
UNIT RH: NORMAL
VANCOMYCIN SERPL-MCNC: 23.6 UG/ML (ref 10–20)
WBC # BLD AUTO: 14.02 THOUSAND/UL (ref 4.31–10.16)

## 2022-06-17 PROCEDURE — 94669 MECHANICAL CHEST WALL OSCILL: CPT

## 2022-06-17 PROCEDURE — 99233 SBSQ HOSP IP/OBS HIGH 50: CPT | Performed by: INTERNAL MEDICINE

## 2022-06-17 PROCEDURE — 94669 MECHANICAL CHEST WALL OSCILL: CPT | Performed by: SOCIAL WORKER

## 2022-06-17 PROCEDURE — 80053 COMPREHEN METABOLIC PANEL: CPT | Performed by: INTERNAL MEDICINE

## 2022-06-17 PROCEDURE — 85027 COMPLETE CBC AUTOMATED: CPT | Performed by: INTERNAL MEDICINE

## 2022-06-17 PROCEDURE — 83735 ASSAY OF MAGNESIUM: CPT | Performed by: INTERNAL MEDICINE

## 2022-06-17 PROCEDURE — 99223 1ST HOSP IP/OBS HIGH 75: CPT | Performed by: INTERNAL MEDICINE

## 2022-06-17 PROCEDURE — 94760 N-INVAS EAR/PLS OXIMETRY 1: CPT

## 2022-06-17 PROCEDURE — 84145 PROCALCITONIN (PCT): CPT | Performed by: INTERNAL MEDICINE

## 2022-06-17 PROCEDURE — 80202 ASSAY OF VANCOMYCIN: CPT | Performed by: INTERNAL MEDICINE

## 2022-06-17 PROCEDURE — 84100 ASSAY OF PHOSPHORUS: CPT | Performed by: INTERNAL MEDICINE

## 2022-06-17 RX ORDER — ATOVAQUONE 750 MG/5ML
750 SUSPENSION ORAL
Status: DISCONTINUED | OUTPATIENT
Start: 2022-06-17 | End: 2022-07-13 | Stop reason: HOSPADM

## 2022-06-17 RX ORDER — ASPIRIN 81 MG/1
81 TABLET, CHEWABLE ORAL DAILY
Status: DISCONTINUED | OUTPATIENT
Start: 2022-06-18 | End: 2022-07-13 | Stop reason: HOSPADM

## 2022-06-17 RX ORDER — ACETAMINOPHEN 160 MG/5ML
650 SUSPENSION, ORAL (FINAL DOSE FORM) ORAL EVERY 4 HOURS PRN
Status: DISCONTINUED | OUTPATIENT
Start: 2022-06-17 | End: 2022-07-03

## 2022-06-17 RX ORDER — PANTOPRAZOLE SODIUM 40 MG/10ML
40 INJECTION, POWDER, LYOPHILIZED, FOR SOLUTION INTRAVENOUS
Status: DISCONTINUED | OUTPATIENT
Start: 2022-06-18 | End: 2022-07-13 | Stop reason: HOSPADM

## 2022-06-17 RX ORDER — LORATADINE 10 MG/1
10 TABLET ORAL DAILY
Status: DISCONTINUED | OUTPATIENT
Start: 2022-06-18 | End: 2022-07-13 | Stop reason: HOSPADM

## 2022-06-17 RX ORDER — MELATONIN
1000 DAILY
Status: DISCONTINUED | OUTPATIENT
Start: 2022-06-18 | End: 2022-07-13 | Stop reason: HOSPADM

## 2022-06-17 RX ADMIN — Medication 250 MG: at 17:36

## 2022-06-17 RX ADMIN — ACETAMINOPHEN 650 MG: 650 SUSPENSION ORAL at 05:33

## 2022-06-17 RX ADMIN — GLYCERIN, HYPROMELLOSE, POLYETHYLENE GLYCOL 1 DROP: .2; .2; 1 LIQUID OPHTHALMIC at 21:24

## 2022-06-17 RX ADMIN — RIOCIGUAT 2.5 MG: 2.5 TABLET, FILM COATED ORAL at 15:29

## 2022-06-17 RX ADMIN — Medication 250 MG: at 08:48

## 2022-06-17 RX ADMIN — ACETAMINOPHEN 650 MG: 650 SUSPENSION ORAL at 21:26

## 2022-06-17 RX ADMIN — RIOCIGUAT 2.5 MG: 2.5 TABLET, FILM COATED ORAL at 21:25

## 2022-06-17 RX ADMIN — CEFEPIME HYDROCHLORIDE 2000 MG: 2 INJECTION, POWDER, FOR SOLUTION INTRAVENOUS at 10:29

## 2022-06-17 RX ADMIN — RIOCIGUAT 2.5 MG: 2.5 TABLET, FILM COATED ORAL at 08:49

## 2022-06-17 RX ADMIN — ACETAMINOPHEN 650 MG: 650 SUSPENSION ORAL at 15:47

## 2022-06-17 RX ADMIN — PREDNISONE 30 MG: 20 TABLET ORAL at 21:24

## 2022-06-17 RX ADMIN — POTASSIUM & SODIUM PHOSPHATES POWDER PACK 280-160-250 MG 2 PACKET: 280-160-250 PACK at 10:23

## 2022-06-17 RX ADMIN — MACITENTAN 10 MG: 10 TABLET, FILM COATED ORAL at 08:50

## 2022-06-17 RX ADMIN — ATOVAQUONE 750 MG: 750 SUSPENSION ORAL at 21:24

## 2022-06-17 RX ADMIN — CEFEPIME HYDROCHLORIDE 2000 MG: 2 INJECTION, POWDER, FOR SOLUTION INTRAVENOUS at 02:57

## 2022-06-17 RX ADMIN — HEPARIN SODIUM 5000 UNITS: 5000 INJECTION INTRAVENOUS; SUBCUTANEOUS at 15:26

## 2022-06-17 RX ADMIN — GLYCERIN, HYPROMELLOSE, POLYETHYLENE GLYCOL 1 DROP: .2; .2; 1 LIQUID OPHTHALMIC at 08:48

## 2022-06-17 RX ADMIN — ACETAMINOPHEN 650 MG: 650 SUSPENSION ORAL at 10:23

## 2022-06-17 RX ADMIN — HEPARIN SODIUM 5000 UNITS: 5000 INJECTION INTRAVENOUS; SUBCUTANEOUS at 05:34

## 2022-06-17 RX ADMIN — GLYCERIN, HYPROMELLOSE, POLYETHYLENE GLYCOL 1 DROP: .2; .2; 1 LIQUID OPHTHALMIC at 15:29

## 2022-06-17 RX ADMIN — HEPARIN SODIUM 5000 UNITS: 5000 INJECTION INTRAVENOUS; SUBCUTANEOUS at 21:24

## 2022-06-17 RX ADMIN — LEVOTHYROXINE SODIUM 25 MCG: 25 TABLET ORAL at 05:33

## 2022-06-17 NOTE — CONSULTS
Vancomycin IV Pharmacy-to-Dose Consultation    Chencho Padilla is a 80 y o  female who was receiving vancomycin IV with management by the Pharmacy Consult service  The patient's vancomycin therapy has been discontinued  Thank you for allowing us to take part in this patient's care  Pharmacy will sign-off at this point; please call if there are any questions        Fab CamarenaD, Rishi [address] Pharmacist   [phone]

## 2022-06-17 NOTE — CONSULTS
Consultation - Infectious Disease   Kyle Fields 80 y o  female MRN: [id number]  Unit/Bed#: [facility] 667-65 Encounter: [id number]      IMPRESSION & RECOMMENDATIONS:   Impression/Recommendations:  1  SIRS, POA  HR, WBC  Due to #2  No appreciable source of acute infection  Serial procalcitonin, UA, Flu/RSV/COVID PCR, blood cultures, CXR, CT C/A/P negative  Infiltrates at bases of lungs on CT are quire minimal and may be due to chronic aspiratoon versus atelectasis versus CREST  Doubt clinical pneumonia  Hemodynamically stable and non-toxic  Rec:  · Discontinue antibiotics and follow closely  · Follow temperatures closely  · Check CBC in AM  · Supportive care as per the primary service    2  Stage IV sacral ulcer  Presented to deterioration with necrotic slough, likely due to ongoing pressure injury  Status post excisional debridement down to bone, VAC 6/14  No intraoperative purulence noted  No cellulitis  Rec:  · Continue LWC/VAC  · Offloading per nursing  · No role for long-term IV antibiotics at this point    3  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, status post IVIG, IV steroids  On recent steroid taper, should now be on 20 mg daily until seen outpatient by Rheum at [facility]  Rec:  · Would restart steroids  · Continue Atovaquone for PCP prophylaxis  · Outpatient Rheum follow-up at [facility]    4  Dysphagia  Status post PEG  History of aspiration, mucus plugging in the past     5   Urinary retention  With chronic indwelling Tejada catheter  6  PAH  On Opsumit, Viociguat  The above impression and plan was discussed in detail with Dr Oliverio Tabor  The patient is stable from an ID standpoint  I will reassess the patient on Monday 6/20  Please call in the interim with new questions  Antibiotics:  Cefepime # 4  Vancomycin # 4    Thank you for this consultation  We will follow along with you      HISTORY OF PRESENT ILLNESS:  Reason for Consult:  Sacral wound    HPI: Magan Styles Silviano is a 80 y o  female with PMH of CREST, recent prolonged hospitalization at [facility] May through early June, presenting with muscle weakness  Diagnosed with dermatomyositis treated with IVIG, IV steroids  Some clinical improvement noted  Started on a prednisone taper per Rheumatology with atovaquone PCP prophylaxis  [facility]  Was referred to the ED at [facility] on 06/14 for wound check in the setting of deterioration of a chronic sacral wound over the past several weeks  Upon presentation she was noted to be afebrile but had mild tachycardia and leukocytosis  A CT scan suggested possible necrotizing infection  She was transferred to [facility]  She was evaluated by surgery and taken to the OR on 06/14 for debridement of mostly necrotic tissue  No intraoperative purulence was described  A VAC was placed  She has been on vancomycin and cefepime  No purulence or necrotic tissue was noted at follow-up wound VAC change on 06/16  In performing this consult, I have reviewed prior admission and outpatient visit records in detail  REVIEW OF SYSTEMS:  Feels phlegm that she needs to be suctioned  Larissa tinsley  A complete system-based review of systems is otherwise negative      PAST MEDICAL HISTORY:  Past Medical History:   Diagnosis Date    Dysphagia, oropharyngeal phase 06/06/2022     Past Surgical History:   Procedure Laterality Date    WOUND DEBRIDEMENT N/A 6/14/2022    Procedure: DEBRIDEMENT WOUND [facility] OUT); SACRUM AND BUTTOCKS;  Surgeon: Maggie Spatz, MD;  Location: [facility];  Service: General       FAMILY HISTORY:  Non-contributory    SOCIAL HISTORY:  Social History     Substance and Sexual Activity   Alcohol Use Never     Social History     Substance and Sexual Activity   Drug Use Not on file     Social History     Tobacco Use   Smoking Status Never Smoker   Smokeless Tobacco Never Used       ALLERGIES:  Allergies   Allergen Reactions   Pasqual Decent [Selexipag] Anaphylaxis    Sulfa Antibiotics Tachycardia    Penicillins Rash       MEDICATIONS:  All current active medications have been reviewed  PHYSICAL EXAM:  Vitals:  Temp:  [96 7 °F (35 9 °C)-98 9 °F (37 2 °C)] 97 8 °F (36 6 °C)  HR:  [67-74] 74  Resp:  [18] 18  BP: (108-134)/(48-70) 108/55  SpO2:  [94 %-98 %] 94 %  Temp (24hrs), Av 1 °F (36 7 °C), Min:96 7 °F (35 9 °C), Max:98 9 °F (37 2 °C)  Current: Temperature: 97 8 °F (36 6 °C)     Physical Exam:  General:  Chronically ill appearing female, in no acute distress  Eyes:  Conjunctive clear with no hemorrhages or effusions  Oropharynx:  No ulcers, no lesions  Neck:  Supple, no lymphadenopathy  Lungs:  Weak respiratory excursion, coarse upper airway sounds  Cardiac:  Regular rate and rhythm, extremities well perfused  Abdomen:  Soft, non-tender, non-distended  Extremities:  No peripheral cyanosis, clubbing, or edema  Skin:  No rashes, no ulcers  Neurological:  Diffuse weakness  :  Tejada with clear yellow urine    LABS, IMAGING, & OTHER STUDIES:  Lab Results:  I have personally reviewed pertinent labs  Results from last 7 days   Lab Units 2242 223 06/15/22  0614 22  1634   POTASSIUM mmol/L 3 8 3 9 4 5 4 3   CHLORIDE mmol/L 99* 101 99* 97*   CO2 mmol/L 33* 30 31 32   BUN mg/dL 28* 21 18 19   CREATININE mg/dL 0 28* 0 32* 0 40* 0 45*   EGFR ml/min/1 73sq m 111 106 99 95   CALCIUM mg/dL 8 1* 8 1* 8 7 8 9   AST U/L 20  --  15 19   ALT U/L 19  --  19 26   ALK PHOS U/L 83  --  94 111     Results from last 7 days   Lab Units 22  0542 225 22  1158 22  0423   WBC Thousand/uL 14 02*  --  13 40* 10 10   HEMOGLOBIN g/dL 9 3* 9 3* 6 5* 6 8*   PLATELETS Thousands/uL 297  --  342 319     Results from last 7 days   Lab Units 22  1749 22  1651   BLOOD CULTURE  No Growth at 48 hrs  No Growth at 48 hrs         Imaging Studies:   I have personally reviewed pertinent imaging study reports and images in PACS  CT chest reviewed personally faint bibasilar infiltrates    EKG, Pathology, and Other Studies:   I have personally reviewed pertinent reports

## 2022-06-17 NOTE — PLAN OF CARE
Problem: MOBILITY - ADULT  Goal: Maintain or return to baseline ADL function  Description: INTERVENTIONS:  -  Assess patient's ability to carry out ADLs; assess patient's baseline for ADL function and identify physical deficits which impact ability to perform ADLs (bathing, care of mouth/teeth, toileting, grooming, dressing, etc )  - Assess/evaluate cause of self-care deficits   - Assess range of motion  - Assess patient's mobility; develop plan if impaired  - Assess patient's need for assistive devices and provide as appropriate  - Encourage maximum independence but intervene and supervise when necessary  - Involve family in performance of ADLs  - Assess for home care needs following discharge   - Consider OT consult to assist with ADL evaluation and planning for discharge  - Provide patient education as appropriate  Outcome: Progressing  Goal: Maintains/Returns to pre admission functional level  Description: INTERVENTIONS:  - Perform BMAT or MOVE assessment daily    - Set and communicate daily mobility goal to care team and patient/family/caregiver  - Collaborate with rehabilitation services on mobility goals if consulted  - Perform Range of Motion 3 times a day  - Reposition patient every 2 hours    - Out of bed for toileting  - Record patient progress and toleration of activity level   Outcome: Progressing     Problem: Potential for Falls  Goal: Patient will remain free of falls  Description: INTERVENTIONS:  - Educate patient/family on patient safety including physical limitations  - Instruct patient to call for assistance with activity   - Consult OT/PT to assist with strengthening/mobility   - Keep Call bell within reach  - Keep bed low and locked with side rails adjusted as appropriate  - Keep care items and personal belongings within reach  - Initiate and maintain comfort rounds  - Make Fall Risk Sign visible to staff  - Offer Toileting every 2 Hours, in advance of need  - Initiate/Maintain bedalarm  - Obtain necessary fall risk management equipment:   - Apply yellow socks and bracelet for high fall risk patients  - Consider moving patient to room near nurses station  Outcome: Progressing     Problem: Prexisting or High Potential for Compromised Skin Integrity  Goal: Skin integrity is maintained or improved  Description: INTERVENTIONS:  - Identify patients at risk for skin breakdown  - Assess and monitor skin integrity  - Assess and monitor nutrition and hydration status  - Monitor labs   - Assess for incontinence   - Turn and reposition patient  - Assist with mobility/ambulation  - Relieve pressure over bony prominences  - Avoid friction and shearing  - Provide appropriate hygiene as needed including keeping skin clean and dry  - Evaluate need for skin moisturizer/barrier cream  - Collaborate with interdisciplinary team   - Patient/family teaching  - Consider wound care consult   Outcome: Progressing     Problem: PAIN - ADULT  Goal: Verbalizes/displays adequate comfort level or baseline comfort level  Description: Interventions:  - Encourage patient to monitor pain and request assistance  - Assess pain using appropriate pain scale  - Administer analgesics based on type and severity of pain and evaluate response  - Implement non-pharmacological measures as appropriate and evaluate response  - Consider cultural and social influences on pain and pain management  - Notify physician/advanced practitioner if interventions unsuccessful or patient reports new pain  Outcome: Progressing     Problem: INFECTION - ADULT  Goal: Absence or prevention of progression during hospitalization  Description: INTERVENTIONS:  - Assess and monitor for signs and symptoms of infection  - Monitor lab/diagnostic results  - Monitor all insertion sites, i e  indwelling lines, tubes, and drains  - Monitor endotracheal if appropriate and nasal secretions for changes in amount and color  - [place] appropriate cooling/warming therapies per order  - Administer medications as ordered  - Instruct and encourage patient and family to use good hand hygiene technique  - Identify and instruct in appropriate isolation precautions for identified infection/condition  Outcome: Progressing  Goal: Absence of fever/infection during neutropenic period  Description: INTERVENTIONS:  - Monitor WBC    Outcome: Progressing     Problem: SAFETY ADULT  Goal: Maintain or return to baseline ADL function  Description: INTERVENTIONS:  -  Assess patient's ability to carry out ADLs; assess patient's baseline for ADL function and identify physical deficits which impact ability to perform ADLs (bathing, care of mouth/teeth, toileting, grooming, dressing, etc )  - Assess/evaluate cause of self-care deficits   - Assess range of motion  - Assess patient's mobility; develop plan if impaired  - Assess patient's need for assistive devices and provide as appropriate  - Encourage maximum independence but intervene and supervise when necessary  - Involve family in performance of ADLs  - Assess for home care needs following discharge   - Consider OT consult to assist with ADL evaluation and planning for discharge  - Provide patient education as appropriate  Outcome: Progressing  Goal: Maintains/Returns to pre admission functional level  Description: INTERVENTIONS:  - Perform BMAT or MOVE assessment daily    - Set and communicate daily mobility goal to care team and patient/family/caregiver  - Collaborate with rehabilitation services on mobility goals if consulted  - Perform Range of Motion 3 times a day  - Reposition patient every 2 hours    - Out of bed for toileting  - Record patient progress and toleration of activity level   Outcome: Progressing  Goal: Patient will remain free of falls  Description: INTERVENTIONS:  - Educate patient/family on patient safety including physical limitations  - Instruct patient to call for assistance with activity   - Consult OT/PT to assist with strengthening/mobility   - Keep Call bell within reach  - Keep bed low and locked with side rails adjusted as appropriate  - Keep care items and personal belongings within reach  - Initiate and maintain comfort rounds  - Make Fall Risk Sign visible to staff  - Offer Toileting every 2 Hours, in advance of need  - Initiate/Maintain bedalarm  - Obtain necessary fall risk management equipment:   - Apply yellow socks and bracelet for high fall risk patients  - Consider moving patient to room near nurses station  Outcome: Progressing     Problem: DISCHARGE PLANNING  Goal: Discharge to home or other facility with appropriate resources  Description: INTERVENTIONS:  - Identify barriers to discharge w/patient and caregiver  - Arrange for needed discharge resources and transportation as appropriate  - Identify discharge learning needs (meds, wound care, etc )  - Arrange for interpretive services to assist at discharge as needed  - Refer to Case Management Department for coordinating discharge planning if the patient needs post-hospital services based on physician/advanced practitioner order or complex needs related to functional status, cognitive ability, or social support system  Outcome: Progressing     Problem: Knowledge Deficit  Goal: Patient/family/caregiver demonstrates understanding of disease process, treatment plan, medications, and discharge instructions  Description: Complete learning assessment and assess knowledge base  Interventions:  - Provide teaching at level of understanding  - Provide teaching via preferred learning methods  Outcome: Progressing     Problem: Nutrition/Hydration-ADULT  Goal: Nutrient/Hydration intake appropriate for improving, restoring or maintaining nutritional needs  Description: Monitor and assess patient's nutrition/hydration status for malnutrition  Collaborate with interdisciplinary team and initiate plan and interventions as ordered    Monitor patient's weight and dietary intake as ordered or per policy  Utilize nutrition screening tool and intervene as necessary  Determine patient's food preferences and provide high-protein, high-caloric foods as appropriate       INTERVENTIONS:  - Monitor oral intake, urinary output, labs, and treatment plans  - Assess nutrition and hydration status and recommend course of action  - Evaluate amount of meals eaten  - Assist patient with eating if necessary   - Allow adequate time for meals  - Recommend/ encourage appropriate diets, oral nutritional supplements, and vitamin/mineral supplements  - Order, calculate, and assess calorie counts as needed  - Recommend, monitor, and adjust tube feedings and TPN/PPN based on assessed needs  - Assess need for intravenous fluids  - Provide specific nutrition/hydration education as appropriate  - Include patient/family/caregiver in decisions related to nutrition  Outcome: Progressing

## 2022-06-17 NOTE — PLAN OF CARE
Problem: Nutrition/Hydration-ADULT  Goal: Nutrient/Hydration intake appropriate for improving, restoring or maintaining nutritional needs  Description: Monitor and assess patient's nutrition/hydration status for malnutrition  Collaborate with interdisciplinary team and initiate plan and interventions as ordered  Monitor patient's weight and dietary intake as ordered or per policy  Utilize nutrition screening tool and intervene as necessary  Determine patient's food preferences and provide high-protein, high-caloric foods as appropriate       INTERVENTIONS:  - Monitor oral intake, urinary output, labs, and treatment plans  - Assess nutrition and hydration status and recommend course of action  - Evaluate amount of meals eaten  - Assist patient with eating if necessary   - Allow adequate time for meals  - Recommend/ encourage appropriate diets, oral nutritional supplements, and vitamin/mineral supplements  - Order, calculate, and assess calorie counts as needed  - Recommend, monitor, and adjust tube feedings and TPN/PPN based on assessed needs  - Assess need for intravenous fluids  - Provide specific nutrition/hydration education as appropriate  - Include patient/family/caregiver in decisions related to nutrition  6/17/2022 1830 by Charley Rodrigez RD  Outcome: Progressing  6/17/2022 1829 by Charley Rodrigez RD  Outcome: Progressing

## 2022-06-17 NOTE — PROGRESS NOTES
Vancomycin IV Pharmacy-to-Dose Consultation    Luciano Goltz is a 80 y o  female who is currently receiving Vancomycin IV with management by the Pharmacy Consult service  Assessment/Plan:  The patient was reviewed  Renal function is stable and no signs or symptoms of nephrotoxicity and/or infusion reactions were documented in the chart  Based on todays assessment, continue current vancomycin (day # 4) dosing of 1000mg (15mg/kg) IV daily prn level is less than 20 with a plan for a random level to be drawn on 6/18 with am labs  We will continue to follow the patients culture results and clinical progress daily      Susan Patten, Pharmacist

## 2022-06-17 NOTE — CONSULTS
[address] 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  Underwent surgical debridement on 06/14/2022 with general surgery; transferred to medicine service for chronic illness  Currently on vanc and cefepime for antibiotic therapy, will continue at this time as per surgery  Patient with 2/4 sirs criteria (white count and heart rate); monitor and consult ID as needed  Will consider discontinuing vanc if patient shows improvement  Repositioning; wound care consult  Supportive care, pain medications  Nutritional support      Pneumonia  Assessment & Plan  See accompanying plan under acute respiratory failure    Acute respiratory failure with hypoxia [facility])  Assessment & Plan  Patient with worsening respiratory requirement; currently on 10 L nasal cannula  Appreciate respiratory recommendations, has been suctioned with improvement in oxygenation  X-ray chest shows mild vascular congestion; will repeat chest x-ray today  Consider volume overload (appears euvolemic on exam) versus PE (unlikely, heart rate normal and blood pressure stable) versus pneumonia (bilateral lower lobe PNA demonstrated on CT chest)  Currently on antibiotics for treatment of sacral ulcer; consult to ID  Respiratory protocol, monitor consult pulmonology as needed  History of pulmonary hypertension; continue home Adempas and Opsumit    --patient placed on step-down level 2 and confirmed with patient and family that she is full code and accepting of intubation as needed      Urinary retention  Assessment & Plan  Patient with history of urinary retention and spasm; previously on myrbetriq and Gemtesa  Continue lilly catheter at this time; treat symptomatically  Follows with urology in the outpatient setting     Anemia  Assessment & Plan  Patient noted to have low hemoglobin on admission-6 9  Has received 1 unit PRBCs on 06/15, but repeat CBC now 6 5  Will obtain iron panel with reticulocytes; MCV high normal-considered microcytic anemia  Have also obtain B12 and folate labs; transfuse 1 unit PRBCs and monitor for improvement      Acquired hypothyroidism  Assessment & Plan  As per recent PCP note, appears to take 25 mcg daily  Continue home medication, TSH ordered    Scleroderma ([facility] [address] )  Assessment & Plan  Follows with Rheumatology in the outpatient setting    Dysphagia, oropharyngeal phase  Assessment & Plan  Patient with history of dysphagia, peg tube in place  Continue current tube feeds; appreciate nutritional recommendations    Severe protein-calorie malnutrition ([facility] [address] )  Assessment & Plan  Malnutrition Findings:   Adult Malnutrition type: Chronic illness  Adult Degree of Malnutrition: Other severe protein calorie malnutrition  Malnutrition Characteristics: Muscle loss, Fat loss, Weight loss, Inadequate energy          Severe protein-calorie malnutrition in the setting of chronic disease, as evidenced by severe buccal fat pad loss, severe deltoid muscle loss, 20 6% weight loss in past 3 months, and <75% energy intake vs needs for >1 month, requiring dietician consult EN support and Robert tid  360 Statement: related to disease/condition evidenced by increased kcal/pro needs for wound healing Stage IV sacral decub, BMI 18 4 adjusted per prior facility wt 6/7/22;severe buccal fat pads loss, severe deltoid muscle loss, Patient lost 31# (20 6%) since 3/15/22 past 3 months with illness <75% energy intake versus needs for > 1 month  Treating with EN support and Robert TID via PEG for wound healing    BMI Findings:  Adult BMI Classifications: Underweight < 18 5        Body mass index is 22 64 kg/m²           VTE Pharmacologic Prophylaxis: Pharmacologic: Heparin  Mechanical VTE Prophylaxis in Place: Yes    Patient Centered Rounds: I have performed bedside rounds with nursing staff today  Discussions with Specialists or Other Care Team Provider:  Surgery    Education and Discussions with Family / Patient: Discussed treatment plan with family and patient who agree with current plan; encouraged to ask questions and participate  Time Spent for Care: 1 hour  More than 50% of total time spent on counseling and coordination of care as described above  Current Length of Stay: 3 day(s)    Current Patient Status: Inpatient   Certification Statement: The patient will continue to require additional inpatient hospital stay due to Sacral wound and respiratory failure    Discharge Plan: To be determined    Code Status: Level 1 - Full Code      Subjective:   Patient seen examined bedside, has worsening respiratory failure  Appears comfortable but now requiring 10 L mid flow  Appears to have bibasilar pneumonia on recent CT chest, treating with broad-spectrum antibiotics  Patient also status post sacral wound debridement with General surgery, wound VAC in place and broad-spectrum antibiotics  Have consulted ID to assist with antibiotic therapy; vanc at therapeutic levels and cefepime scheduled  Patient full code, alert oriented x3 and able to answer all examiner questions appropriate  Patient also with a history of pulmonary hypertension  Review of care everywhere is somewhat challenging as she has care at multiple institutions, but patient states that she takes meds for PH  If does not improve overnight, will consult pulmonology to follow along as well; respiratory protocol in place now  Med list to be faxed from her current facility at [facility]; will continue current treatment and add medications once med list accepted  Patient requesting p r n  Eye drops and probiotic      Objective:     Vitals:   Temp (24hrs), Av 1 °F (36 7 °C), Min:96 7 °F (35 9 °C), Max:98 9 °F (37 2 °C)    Temp:  [96 7 °F (35 9 °C)-98 9 °F (37 2 °C)] 97 8 °F (36 6 °C)  HR:  [67-92] 74  Resp:  [18] 18  BP: (108-157)/(48-71) 108/55  SpO2:  [93 %-98 %] 94 %  Body mass index is 22 64 kg/m²  Input and Output Summary (last 24 hours): Intake/Output Summary (Last 24 hours) at 6/17/2022 0910  Last data filed at 6/16/2022 2212  Gross per 24 hour   Intake 350 ml   Output 2225 ml   Net -1875 ml       Physical Exam:     Physical Exam  Vitals and nursing note reviewed  Constitutional:       General: She is not in acute distress  Appearance: She is well-developed  She is ill-appearing  HENT:      Head: Normocephalic and atraumatic  Eyes:      Conjunctiva/sclera: Conjunctivae normal    Cardiovascular:      Rate and Rhythm: Normal rate and regular rhythm  Heart sounds: No murmur heard  Pulmonary:      Effort: Pulmonary effort is normal  No respiratory distress  Breath sounds: Rhonchi present  Abdominal:      Palpations: Abdomen is soft  Tenderness: There is no abdominal tenderness  Comments: Peg tube in place   Musculoskeletal:         General: Deformity present  Cervical back: Neck supple  Skin:     General: Skin is warm and dry  Findings: Erythema, lesion and rash present  Comments: Stage IV sacral ulcer with wound VAC in place   Neurological:      Mental Status: She is alert and oriented to person, place, and time  Motor: Weakness present  Psychiatric:         Behavior: Behavior normal             Additional Data:     Labs:    Results from last 7 days   Lab Units 06/17/22  0542 06/15/22  0614 06/14/22  1634   WBC Thousand/uL 14 02*   < > 17 76*   HEMOGLOBIN g/dL 9 3*   < > 8 6*   HEMATOCRIT % 29 1*   < > 27 9*   PLATELETS Thousands/uL 297   < > 476*   BANDS PCT %  --   --  19*   LYMPHO PCT %  --   --  3*   MONO PCT %  --   --  1*   EOS PCT %  --   --  0    < > = values in this interval not displayed       Results from last 7 days   Lab Units 06/17/22  0542   SODIUM mmol/L 134*   POTASSIUM mmol/L 3 8   CHLORIDE mmol/L 99*   CO2 mmol/L 33*   BUN mg/dL 28*   CREATININE mg/dL 0 28*   ANION GAP mmol/L 2*   CALCIUM mg/dL 8 1*   ALBUMIN g/dL 2 0*   TOTAL BILIRUBIN mg/dL 0 29   ALK PHOS U/L 83   ALT U/L 19   AST U/L 20   GLUCOSE RANDOM mg/dL 106     Results from last 7 days   Lab Units 06/14/22  1634   INR  0 94             Results from last 7 days   Lab Units 06/17/22  0542 06/15/22  0814 06/14/22  1634   LACTIC ACID mmol/L  --  0 8 1 2   PROCALCITONIN ng/ml 0 13  --  0 10           * I Have Reviewed All Lab Data Listed Above  * Additional Pertinent Lab Tests Reviewed: All Labs Within Last 24 Hours Reviewed    Imaging:    Imaging Reports Reviewed Today Include:  CT chest  Imaging Personally Reviewed by Myself Includes:      Recent Cultures (last 7 days):     Results from last 7 days   Lab Units 06/14/22  1749 06/14/22  1651   BLOOD CULTURE  No Growth at 48 hrs  No Growth at 48 hrs         Last 24 Hours Medication List:   Current Facility-Administered Medications   Medication Dose Route Frequency Provider Last Rate    acetaminophen  650 mg Per G Tube Q6H Archana Muñoz MD      cefepime  2,000 mg Intravenous Casi Casillas MD 2,000 mg (06/17/22 0257)    glycerin-hypromellose-  1 drop Both Eyes TID Hollie Das MD      heparin (porcine)  5,000 Units Subcutaneous [facility] Maicol Paul MD      labetalol  10 mg Intravenous Q6H PRN Archana Muñoz MD      levothyroxine  25 mcg Per PEG Tube Early Morning Hollie Das MD      Macitentan  10 mg Per G Tube Daily Hollie Das MD      melatonin  3 mg Oral HS PRN Archana Muñoz MD      ondansetron  4 mg Intravenous Q6H PRN Archana Muñoz MD      oxyCODONE  2 5 mg Per G Tube Q4H PRN Archana Muñoz MD      oxyCODONE  5 mg Per G Tube Q4H PRN Archana Muñoz MD      potassium-sodium phosphates  2 packet Per G Tube Once Hollie Das MD  Riociguat  2 5 mg Per G Tube TID Mary Adame MD      saccharomyces boulardii  250 mg Per G Tube BID Mary Adame MD      vancomycin  15 mg/kg Intravenous Daily PRN Mary Adame MD          Today, Patient Was Seen By: Srini Galdamez MD    ** Please Note: Dictation voice to text software may have been used in the creation of this document   **

## 2022-06-17 NOTE — ASSESSMENT & PLAN NOTE
As per recent PCP note, appears to take 25 mcg daily  Continue home medication, TSH within normal limits

## 2022-06-17 NOTE — RESPIRATORY THERAPY NOTE
Resp care   06/17/22 0752   Respiratory Assessment   Resp Comments Pt less congested  NT sx not needed  Oral sx performed, sx small amt white  BS clear after tx     Oxygen Therapy/Pulse Ox   O2 Device Nasal cannula   Nasal Cannula O2 Flow Rate (L/min) 6 L/min   Calculated FIO2 (%) - Nasal Cannula 44   SpO2 94 %

## 2022-06-17 NOTE — PROGRESS NOTES
[facility]  Progress Note Max Shore 1940, 80 y o  female MRN: [id number]  Unit/Bed#: 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  Underwent surgical debridement on 06/14/2022 with general surgery; transferred to medicine service for chronic illness  Currently on vanc and cefepime for antibiotic therapy, will continue at this time as per surgery  Patient with 2/4 sirs criteria (white count and heart rate); monitor and consult ID as needed  Will consider discontinuing vanc if patient shows improvement  Repositioning; wound care consult  Supportive care, pain medications  Nutritional support      Assessment & Plan  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  Underwent surgical debridement on 06/14/2022 with general surgery; transferred to medicine service for chronic illness  Currently on vanc and cefepime for antibiotic therapy, will continue at this time as per surgery  Patient with 2/4 sirs criteria (white count and heart rate); monitor and consult ID as needed  Will consider discontinuing vanc if patient shows improvement  Repositioning; wound care consult  Supportive care, pain medications  Nutritional support      Pneumonia  Assessment & Plan  See accompanying plan under acute respiratory failure    Assessment & Plan  See accompanying plan under acute respiratory failure    Acute respiratory failure with hypoxia [facility])  Assessment & Plan  Patient with worsening respiratory requirement; initially require 10 L nasal cannula  Appreciate respiratory recommendations, has been suctioned with improvement in oxygenation  X-ray chest shows mild vascular congestion  Consider volume overload (appears euvolemic on exam) versus PE (unlikely, heart rate normal and blood pressure stable) versus pneumonia (bilateral lower lobe PNA demonstrated on CT chest)  Currently on antibiotics for treatment of sacral ulcer; consult to ID  Respiratory protocol, monitor consult pulmonology as needed  History of pulmonary hypertension; continue home Adempas and Opsumit    --patient placed on step-down level 2 and confirmed with patient and family that she is full code and accepting of intubation as needed  6/17-oxygen requirement improved overnight to 3 L; procalcitonin negative x2; currently afebrile and white count is only sirs criteria  Id recommending holding off on antibiotic therapy; all antibiotics held  Assessment & Plan  Patient with worsening respiratory requirement; currently on 10 L nasal cannula  Appreciate respiratory recommendations, has been suctioned with improvement in oxygenation  X-ray chest shows mild vascular congestion; will repeat chest x-ray today  Consider volume overload (appears euvolemic on exam) versus PE (unlikely, heart rate normal and blood pressure stable) versus pneumonia (bilateral lower lobe PNA demonstrated on CT chest)  Currently on antibiotics for treatment of sacral ulcer; consult to ID  Respiratory protocol, monitor consult pulmonology as needed  History of pulmonary hypertension; continue home Adempas and Opsumit    --patient placed on step-down level 2 and confirmed with patient and family that she is full code and accepting of intubation as needed      Urinary retention  Assessment & Plan  Patient with history of urinary retention and spasm; previously on myrbetriq and Gemtesa  Continue lilly catheter at this time; treat symptomatically  Follows with urology in the outpatient setting     Assessment & Plan  Patient with history of urinary retention and spasm; previously on myrbetriq and Gemtesa  Continue lilly catheter at this time; treat symptomatically  Follows with urology in the outpatient setting Anemia  Assessment & Plan  Patient noted to have low hemoglobin on admission-6 9  Has received 1 unit PRBCs on 06/15, but repeat CBC was 6 5  Iron panel shows low iron and TIBC consistent with anemia of chronic disease; reticulocytes elevated  B12 and folate within normal limits; will consider iron supplementation once infection resolved  In the meantime, hemoglobin stable at 9 3, monitor      Assessment & Plan  Patient noted to have low hemoglobin on admission-6 9  Has received 1 unit PRBCs on 06/15, but repeat CBC now 6 5  Will obtain iron panel with reticulocytes; MCV high normal-considered microcytic anemia  Have also obtain B12 and folate labs; transfuse 1 unit PRBCs and monitor for improvement      Acquired hypothyroidism  Assessment & Plan  As per recent PCP note, appears to take 25 mcg daily  Continue home medication, TSH within normal limits    Assessment & Plan  As per recent PCP note, appears to take 25 mcg daily  Continue home medication, TSH ordered    [facility]  Assessment & Plan  Follows with Rheumatology in the outpatient setting  Will restart prednisone 30 mg daily  Atovaquone for PCP prophylaxis    Assessment & Plan  Follows with Rheumatology in the outpatient setting    Dysphagia, oropharyngeal phase  Assessment & Plan  Patient with history of dysphagia, peg tube in place  Continue current tube feeds; appreciate nutritional recommendations    Assessment & Plan  Patient with history of dysphagia, peg tube in place  Continue current tube feeds; appreciate nutritional recommendations    Severe protein-calorie malnutrition ([facility] )  Assessment & Plan  Malnutrition Findings:   Adult Malnutrition type: Chronic illness  Adult Degree of Malnutrition: Other severe protein calorie malnutrition  Malnutrition Characteristics: Muscle loss, Fat loss, Weight loss, Inadequate energy           360 Statement: related to disease/condition evidenced by increased kcal/pro needs for wound healing Stage IV sacral decub, BMI 18 4 adjusted per prior facility wt 6/7/22;severe buccal fat pads loss, severe deltoid muscle loss, Patient lost 31# (20 6%) since 3/15/22 past 3 months with illness <75% energy intake versus needs for > 1 month  Treating with EN support and Robert TID via PEG for wound healing    BMI Findings:  Adult BMI Classifications: Underweight < 18 5        Body mass index is 22 64 kg/m²  Assessment & Plan  Malnutrition Findings:   Adult Malnutrition type: Chronic illness  Adult Degree of Malnutrition: Other severe protein calorie malnutrition  Malnutrition Characteristics: Muscle loss, Fat loss, Weight loss, Inadequate energy          Severe protein-calorie malnutrition in the setting of chronic disease, as evidenced by severe buccal fat pad loss, severe deltoid muscle loss, 20 6% weight loss in past 3 months, and <75% energy intake vs needs for >1 month, requiring dietician consult EN support and Robert tid  360 Statement: related to disease/condition evidenced by increased kcal/pro needs for wound healing Stage IV sacral decub, BMI 18 4 adjusted per prior facility wt 6/7/22;severe buccal fat pads loss, severe deltoid muscle loss, Patient lost 31# (20 6%) since 3/15/22 past 3 months with illness <75% energy intake versus needs for > 1 month  Treating with EN support and Robert TID via PEG for wound healing    BMI Findings:  Adult BMI Classifications: Underweight < 18 5        Body mass index is 22 64 kg/m²  VTE Pharmacologic Prophylaxis:   Pharmacologic: Heparin  Mechanical VTE Prophylaxis in Place: Yes    Patient Centered Rounds: I have performed bedside rounds with nursing staff today  Discussions with Specialists or Other Care Team Provider:  Infectious Disease    Education and Discussions with Family / Patient: Discussed treatment plan with family and patient who agree with current plan; encouraged to ask questions and participate  Time Spent for Care: 1 hour    More than 50% of total time spent on counseling and coordination of care as described above  Current Length of Stay: 3 day(s)    Current Patient Status: Inpatient   Certification Statement: The patient will continue to require additional inpatient hospital stay due to Treatment of respiratory failure and sacral wound    Discharge Plan: To be determined,> 72 hours    Code Status: Level 1 - Full Code      Subjective:   Patient seen examined bedside, appears much improved over yesterday  Oxygen requirement down to 2-3 L  Id recommending stopping antibiotics, antibiotics on hold  As per past notes and med list from her current rehab facility, have started steroids and PCP prophylaxis  Long meeting with family at bedside; will consult Rheumatology in the future  Await further surgical recommendations  Objective:     Vitals:   Temp (24hrs), Av 1 °F (36 7 °C), Min:96 7 °F (35 9 °C), Max:98 9 °F (37 2 °C)    Temp:  [96 7 °F (35 9 °C)-98 9 °F (37 2 °C)] 97 8 °F (36 6 °C)  HR:  [74] 74  Resp:  [18] 18  BP: (108-115)/(48-55) 108/55  SpO2:  [94 %-98 %] 94 %  Body mass index is 22 64 kg/m²  Input and Output Summary (last 24 hours): Intake/Output Summary (Last 24 hours) at 2022  Last data filed at 2022 2212  Gross per 24 hour   Intake 350 ml   Output 2050 ml   Net -1700 ml       Physical Exam:     Physical Exam  Vitals and nursing note reviewed  Constitutional:       General: She is not in acute distress  Appearance: She is well-developed  She is ill-appearing  HENT:      Head: Normocephalic and atraumatic  Eyes:      Conjunctiva/sclera: Conjunctivae normal    Cardiovascular:      Rate and Rhythm: Normal rate and regular rhythm  Heart sounds: No murmur heard  Pulmonary:      Effort: Pulmonary effort is normal  No respiratory distress  Breath sounds: Rhonchi present  Abdominal:      Palpations: Abdomen is soft  Tenderness: There is no abdominal tenderness        Comments: Peg tube in place   Musculoskeletal:         General: Deformity present  Cervical back: Neck supple  Skin:     General: Skin is warm and dry  Findings: Erythema, lesion and rash present  Comments: Stage IV sacral ulcer with wound VAC in place   Neurological:      Mental Status: She is alert and oriented to person, place, and time  Motor: Weakness present  Psychiatric:         Behavior: Behavior normal            Additional Data:     Labs:    Results from last 7 days   Lab Units 06/17/22  0542 06/15/22  0614 06/14/22  1634   WBC Thousand/uL 14 02*   < > 17 76*   HEMOGLOBIN g/dL 9 3*   < > 8 6*   HEMATOCRIT % 29 1*   < > 27 9*   PLATELETS Thousands/uL 297   < > 476*   BANDS PCT %  --   --  19*   LYMPHO PCT %  --   --  3*   MONO PCT %  --   --  1*   EOS PCT %  --   --  0    < > = values in this interval not displayed  Results from last 7 days   Lab Units 06/17/22  0542   SODIUM mmol/L 134*   POTASSIUM mmol/L 3 8   CHLORIDE mmol/L 99*   CO2 mmol/L 33*   BUN mg/dL 28*   CREATININE mg/dL 0 28*   ANION GAP mmol/L 2*   CALCIUM mg/dL 8 1*   ALBUMIN g/dL 2 0*   TOTAL BILIRUBIN mg/dL 0 29   ALK PHOS U/L 83   ALT U/L 19   AST U/L 20   GLUCOSE RANDOM mg/dL 106     Results from last 7 days   Lab Units 06/14/22  1634   INR  0 94             Results from last 7 days   Lab Units 06/17/22  0542 06/15/22  0814 06/14/22  1634   LACTIC ACID mmol/L  --  0 8 1 2   PROCALCITONIN ng/ml 0 13  --  0 10           * I Have Reviewed All Lab Data Listed Above  * Additional Pertinent Lab Tests Reviewed: All Labs Within Last 24 Hours Reviewed    Imaging:    Imaging Reports Reviewed Today Include:   Imaging Personally Reviewed by Myself Includes:      Recent Cultures (last 7 days):     Results from last 7 days   Lab Units 06/14/22  1749 06/14/22  1651   BLOOD CULTURE  No Growth at 48 hrs  No Growth at 48 hrs         Last 24 Hours Medication List:   Current Facility-Administered Medications   Medication Dose Route Frequency Provider Last Rate    acetaminophen  650 mg Per G Tube Q6H Leesa Martinez MD      acetaminophen  650 mg Per G Tube Q4H PRN Debby Finch MD      [START ON 6/18/2022] aspirin  81 mg Per G Tube Daily Debby Finch MD      atovaquone  750 mg Per G Tube Daily With Breakfast Debby Finch MD      [START ON 6/18/2022] cholecalciferol  1,000 Units Per G Tube Daily Debby Finch MD      glycerin-hypromellose-  1 drop Both Eyes TID Debby Finch MD      heparin (porcine)  5,000 Units Subcutaneous [facility] [address] Maicol Chavarria MD      labetalol  10 mg Intravenous Q6H PRN Leesa Martinez MD      levothyroxine  25 mcg Per PEG Tube Early Morning Debby Finch MD     [facility] ON 6/18/2022] loratadine  10 mg Per G Tube Daily Debby Finch MD      Macitentan  10 mg Per G Tube Daily Debby Finch MD      melatonin  3 mg Oral HS PRN Leesa Martinez MD      ondansetron  4 mg Intravenous Q6H PRN Leesa Martinez MD      oxyCODONE  2 5 mg Per G Tube Q4H PRN Leesa Martinez MD      oxyCODONE  5 mg Per G Tube Q4H PRN Leesa Martinez MD      [START ON 6/18/2022] pantoprazole  40 mg Intravenous Q24H [address] Debby Finch MD      predniSONE  30 mg Per G Tube Daily Debby Finch MD      Riociguat  2 5 mg Per G Tube TID Debby Finch MD      saccharomyces boulardii  250 mg Per Geofm Councilman BID Debby Finch MD          Today, Patient Was Seen By: Korey Lubin MD    ** Please Note: Dictation voice to text software may have been used in the creation of this document   **

## 2022-06-17 NOTE — ASSESSMENT & PLAN NOTE
Patient noted to have low hemoglobin on admission-6 9  Has received 1 unit PRBCs on 06/15, but repeat CBC was 6 5  Iron panel shows low iron and TIBC consistent with anemia of chronic disease; reticulocytes elevated  B12 and folate within normal limits; will consider iron supplementation once infection resolved  In the meantime, hemoglobin stable at 9 3, monitor

## 2022-06-17 NOTE — ASSESSMENT & PLAN NOTE
Patient with worsening respiratory requirement; initially require 10 L nasal cannula  Appreciate respiratory recommendations, has been suctioned with improvement in oxygenation  X-ray chest shows mild vascular congestion  Consider volume overload (appears euvolemic on exam) versus PE (unlikely, heart rate normal and blood pressure stable) versus pneumonia (bilateral lower lobe PNA demonstrated on CT chest)  Currently on antibiotics for treatment of sacral ulcer; consult to ID  Respiratory protocol, monitor consult pulmonology as needed  History of pulmonary hypertension; continue home Adempas and Opsumit    --patient placed on step-down level 2 and confirmed with patient and family that she is full code and accepting of intubation as needed  6/17-oxygen requirement improved overnight to 3 L; procalcitonin negative x2; currently afebrile and white count is only sirs criteria  Id recommending holding off on antibiotic therapy; all antibiotics held

## 2022-06-17 NOTE — NUTRITION
06/17/22 4225   Recommendations/Interventions   Recommendations to Provider Continue Jevity 1 5 cycled from 4pm-6am x 14hrs at 90ml/hr Add 200ml free h20 Q6hrs   Add 1 pkg Robert mixed with 120ml H20 as slurry via PEG BID to promote wound healing and  also meet fluid needs

## 2022-06-17 NOTE — PLAN OF CARE
Problem: MOBILITY - ADULT  Goal: Maintain or return to baseline ADL function  Description: INTERVENTIONS:  -  Assess patient's ability to carry out ADLs; assess patient's baseline for ADL function and identify physical deficits which impact ability to perform ADLs (bathing, care of mouth/teeth, toileting, grooming, dressing, etc )  - Assess/evaluate cause of self-care deficits   - Assess range of motion  - Assess patient's mobility; develop plan if impaired  - Assess patient's need for assistive devices and provide as appropriate  - Encourage maximum independence but intervene and supervise when necessary  - Involve family in performance of ADLs  - Assess for home care needs following discharge   - Consider OT consult to assist with ADL evaluation and planning for discharge  - Provide patient education as appropriate  Outcome: Progressing  Goal: Maintains/Returns to pre admission functional level  Description: INTERVENTIONS:  - Perform BMAT or MOVE assessment daily    - Set and communicate daily mobility goal to care team and patient/family/caregiver  - Collaborate with rehabilitation services on mobility goals if consulted  - Perform Range of Motion 2 times a day  - Reposition patient every 2 hours    - Dangle patient 2 times a day  - Stand patient 2 times a day  - Ambulate patient 2 times a day  - Out of bed to chair 2 times a day   - Out of bed for meals 2 times a day  - Out of bed for toileting  - Record patient progress and toleration of activity level   Outcome: Progressing     Problem: Potential for Falls  Goal: Patient will remain free of falls  Description: INTERVENTIONS:  - Educate patient/family on patient safety including physical limitations  - Instruct patient to call for assistance with activity   - Consult OT/PT to assist with strengthening/mobility   - Keep Call bell within reach  - Keep bed low and locked with side rails adjusted as appropriate  - Keep care items and personal belongings within reach  - Initiate and maintain comfort rounds  - Make Fall Risk Sign visible to staff  - Offer Toileting every 2 Hours, in advance of need  - Initiate/Maintain bed alarm  - Apply yellow socks and bracelet for high fall risk patients  - Consider moving patient to room near nurses station  Outcome: Progressing     Problem: Prexisting or High Potential for Compromised Skin Integrity  Goal: Skin integrity is maintained or improved  Description: INTERVENTIONS:  - Identify patients at risk for skin breakdown  - Assess and monitor skin integrity  - Assess and monitor nutrition and hydration status  - Monitor labs   - Assess for incontinence   - Turn and reposition patient  - Assist with mobility/ambulation  - Relieve pressure over bony prominences  - Avoid friction and shearing  - Provide appropriate hygiene as needed including keeping skin clean and dry  - Evaluate need for skin moisturizer/barrier cream  - Collaborate with interdisciplinary team   - Patient/family teaching  - Consider wound care consult   Outcome: Progressing     Problem: PAIN - ADULT  Goal: Verbalizes/displays adequate comfort level or baseline comfort level  Description: Interventions:  - Encourage patient to monitor pain and request assistance  - Assess pain using appropriate pain scale  - Administer analgesics based on type and severity of pain and evaluate response  - Implement non-pharmacological measures as appropriate and evaluate response  - Consider cultural and social influences on pain and pain management  - Notify physician/advanced practitioner if interventions unsuccessful or patient reports new pain  Outcome: Progressing     Problem: INFECTION - ADULT  Goal: Absence or prevention of progression during hospitalization  Description: INTERVENTIONS:  - Assess and monitor for signs and symptoms of infection  - Monitor lab/diagnostic results  - Monitor all insertion sites, i e  indwelling lines, tubes, and drains  - Monitor endotracheal if appropriate and nasal secretions for changes in amount and color  - [place] appropriate cooling/warming therapies per order  - Administer medications as ordered  - Instruct and encourage patient and family to use good hand hygiene technique  - Identify and instruct in appropriate isolation precautions for identified infection/condition  Outcome: Progressing  Goal: Absence of fever/infection during neutropenic period  Description: INTERVENTIONS:  - Monitor WBC    Outcome: Progressing     Problem: SAFETY ADULT  Goal: Maintain or return to baseline ADL function  Description: INTERVENTIONS:  -  Assess patient's ability to carry out ADLs; assess patient's baseline for ADL function and identify physical deficits which impact ability to perform ADLs (bathing, care of mouth/teeth, toileting, grooming, dressing, etc )  - Assess/evaluate cause of self-care deficits   - Assess range of motion  - Assess patient's mobility; develop plan if impaired  - Assess patient's need for assistive devices and provide as appropriate  - Encourage maximum independence but intervene and supervise when necessary  - Involve family in performance of ADLs  - Assess for home care needs following discharge   - Consider OT consult to assist with ADL evaluation and planning for discharge  - Provide patient education as appropriate  Outcome: Progressing  Goal: Maintains/Returns to pre admission functional level  Description: INTERVENTIONS:  - Perform BMAT or MOVE assessment daily    - Set and communicate daily mobility goal to care team and patient/family/caregiver  - Collaborate with rehabilitation services on mobility goals if consulted  - Perform Range of Motion 2 times a day  - Reposition patient every 2 hours    - Dangle patient 2 times a day  - Stand patient 2 times a day  - Ambulate patient 2 times a day  - Out of bed to chair 2 times a day   - Out of bed for meals 2 times a day  - Out of bed for toileting  - Record patient progress and toleration of activity level   Outcome: Progressing  Goal: Patient will remain free of falls  Description: INTERVENTIONS:  - Educate patient/family on patient safety including physical limitations  - Instruct patient to call for assistance with activity   - Consult OT/PT to assist with strengthening/mobility   - Keep Call bell within reach  - Keep bed low and locked with side rails adjusted as appropriate  - Keep care items and personal belongings within reach  - Initiate and maintain comfort rounds  - Make Fall Risk Sign visible to staff  - Offer Toileting every 2 Hours, in advance of need  - Initiate/Maintain bed alarm  - Apply yellow socks and bracelet for high fall risk patients  - Consider moving patient to room near nurses station  Outcome: Progressing     Problem: DISCHARGE PLANNING  Goal: Discharge to home or other facility with appropriate resources  Description: INTERVENTIONS:  - Identify barriers to discharge w/patient and caregiver  - Arrange for needed discharge resources and transportation as appropriate  - Identify discharge learning needs (meds, wound care, etc )  - Arrange for interpretive services to assist at discharge as needed  - Refer to Case Management Department for coordinating discharge planning if the patient needs post-hospital services based on physician/advanced practitioner order or complex needs related to functional status, cognitive ability, or social support system  Outcome: Progressing     Problem: Knowledge Deficit  Goal: Patient/family/caregiver demonstrates understanding of disease process, treatment plan, medications, and discharge instructions  Description: Complete learning assessment and assess knowledge base    Interventions:  - Provide teaching at level of understanding  - Provide teaching via preferred learning methods  Outcome: Progressing     Problem: Nutrition/Hydration-ADULT  Goal: Nutrient/Hydration intake appropriate for improving, restoring or maintaining nutritional needs  Description: Monitor and assess patient's nutrition/hydration status for malnutrition  Collaborate with interdisciplinary team and initiate plan and interventions as ordered  Monitor patient's weight and dietary intake as ordered or per policy  Utilize nutrition screening tool and intervene as necessary  Determine patient's food preferences and provide high-protein, high-caloric foods as appropriate       INTERVENTIONS:  - Monitor oral intake, urinary output, labs, and treatment plans  - Assess nutrition and hydration status and recommend course of action  - Evaluate amount of meals eaten  - Assist patient with eating if necessary   - Allow adequate time for meals  - Recommend/ encourage appropriate diets, oral nutritional supplements, and vitamin/mineral supplements  - Order, calculate, and assess calorie counts as needed  - Recommend, monitor, and adjust tube feedings and TPN/PPN based on assessed needs  - Assess need for intravenous fluids  - Provide specific nutrition/hydration education as appropriate  - Include patient/family/caregiver in decisions related to nutrition  Outcome: Progressing

## 2022-06-17 NOTE — ASSESSMENT & PLAN NOTE
Follows with Rheumatology in the outpatient setting  Will restart prednisone 30 mg daily  Atovaquone for PCP prophylaxis

## 2022-06-17 NOTE — ASSESSMENT & PLAN NOTE
Malnutrition Findings:   Adult Malnutrition type: Chronic illness  Adult Degree of Malnutrition: Other severe protein calorie malnutrition  Malnutrition Characteristics: Muscle loss, Fat loss, Weight loss, Inadequate energy           360 Statement: related to disease/condition evidenced by increased kcal/pro needs for wound healing Stage IV sacral decub, BMI 18 4 adjusted per prior facility wt 6/7/22;severe buccal fat pads loss, severe deltoid muscle loss, Patient lost 31# (20 6%) since 3/15/22 past 3 months with illness <75% energy intake versus needs for > 1 month  Treating with EN support and Robert TID via PEG for wound healing    BMI Findings:  Adult BMI Classifications: Underweight < 18 5        Body mass index is 22 64 kg/m²

## 2022-06-18 ENCOUNTER — APPOINTMENT (INPATIENT)
Dept: NON INVASIVE DIAGNOSTICS | Facility: HOSPITAL | Age: 82
DRG: 853 | End: 2022-06-18
Payer: COMMERCIAL

## 2022-06-18 PROBLEM — H91.90 HEARING LOSS: Status: ACTIVE | Noted: 2022-06-18

## 2022-06-18 LAB
ALBUMIN SERPL BCP-MCNC: 2.2 G/DL (ref 3.5–5)
ALP SERPL-CCNC: 79 U/L (ref 46–116)
ALT SERPL W P-5'-P-CCNC: 17 U/L (ref 12–78)
ANION GAP SERPL CALCULATED.3IONS-SCNC: 4 MMOL/L (ref 4–13)
ANISOCYTOSIS BLD QL SMEAR: PRESENT
AST SERPL W P-5'-P-CCNC: 17 U/L (ref 5–45)
BASOPHILS # BLD MANUAL: 0 THOUSAND/UL (ref 0–0.1)
BASOPHILS NFR MAR MANUAL: 0 % (ref 0–1)
BILIRUB SERPL-MCNC: 0.33 MG/DL (ref 0.2–1)
BUN SERPL-MCNC: 28 MG/DL (ref 5–25)
CALCIUM ALBUM COR SERPL-MCNC: 9.7 MG/DL (ref 8.3–10.1)
CALCIUM SERPL-MCNC: 8.3 MG/DL (ref 8.3–10.1)
CHLORIDE SERPL-SCNC: 96 MMOL/L (ref 100–108)
CO2 SERPL-SCNC: 31 MMOL/L (ref 21–32)
CREAT SERPL-MCNC: 0.36 MG/DL (ref 0.6–1.3)
EOSINOPHIL # BLD MANUAL: 0 THOUSAND/UL (ref 0–0.4)
EOSINOPHIL NFR BLD MANUAL: 0 % (ref 0–6)
ERYTHROCYTE [DISTWIDTH] IN BLOOD BY AUTOMATED COUNT: 17.3 % (ref 11.6–15.1)
GFR SERPL CREATININE-BSD FRML MDRD: 102 ML/MIN/1.73SQ M
GLUCOSE SERPL-MCNC: 188 MG/DL (ref 65–140)
HCT VFR BLD AUTO: 28.8 % (ref 34.8–46.1)
HGB BLD-MCNC: 8.9 G/DL (ref 11.5–15.4)
HYPERCHROMIA BLD QL SMEAR: PRESENT
LYMPHOCYTES # BLD AUTO: 0.42 THOUSAND/UL (ref 0.6–4.47)
LYMPHOCYTES # BLD AUTO: 3 % (ref 14–44)
MAGNESIUM SERPL-MCNC: 2.3 MG/DL (ref 1.6–2.6)
MCH RBC QN AUTO: 30.8 PG (ref 26.8–34.3)
MCHC RBC AUTO-ENTMCNC: 30.9 G/DL (ref 31.4–37.4)
MCV RBC AUTO: 100 FL (ref 82–98)
MONOCYTES # BLD AUTO: 0.28 THOUSAND/UL (ref 0–1.22)
MONOCYTES NFR BLD: 2 % (ref 4–12)
NEUTROPHILS # BLD MANUAL: 13.37 THOUSAND/UL (ref 1.85–7.62)
NEUTS BAND NFR BLD MANUAL: 8 % (ref 0–8)
NEUTS SEG NFR BLD AUTO: 87 % (ref 43–75)
NRBC BLD AUTO-RTO: 1 /100 WBC (ref 0–2)
PHOSPHATE SERPL-MCNC: 1.9 MG/DL (ref 2.3–4.1)
PLATELET # BLD AUTO: 288 THOUSANDS/UL (ref 149–390)
PLATELET BLD QL SMEAR: ADEQUATE
PMV BLD AUTO: 9.7 FL (ref 8.9–12.7)
POIKILOCYTOSIS BLD QL SMEAR: PRESENT
POTASSIUM SERPL-SCNC: 4.2 MMOL/L (ref 3.5–5.3)
PROT SERPL-MCNC: 6.1 G/DL (ref 6.4–8.2)
RBC # BLD AUTO: 2.89 MILLION/UL (ref 3.81–5.12)
RBC MORPH BLD: PRESENT
SODIUM SERPL-SCNC: 131 MMOL/L (ref 136–145)
STOMATOCYTES BLD QL SMEAR: PRESENT
WBC # BLD AUTO: 14.07 THOUSAND/UL (ref 4.31–10.16)

## 2022-06-18 PROCEDURE — 97606 NEG PRS WND THER DME>50 SQCM: CPT | Performed by: SURGERY

## 2022-06-18 PROCEDURE — 94760 N-INVAS EAR/PLS OXIMETRY 1: CPT

## 2022-06-18 PROCEDURE — 99233 SBSQ HOSP IP/OBS HIGH 50: CPT | Performed by: INTERNAL MEDICINE

## 2022-06-18 PROCEDURE — C9113 INJ PANTOPRAZOLE SODIUM, VIA: HCPCS | Performed by: INTERNAL MEDICINE

## 2022-06-18 PROCEDURE — 83735 ASSAY OF MAGNESIUM: CPT | Performed by: INTERNAL MEDICINE

## 2022-06-18 PROCEDURE — 84100 ASSAY OF PHOSPHORUS: CPT | Performed by: INTERNAL MEDICINE

## 2022-06-18 PROCEDURE — 85007 BL SMEAR W/DIFF WBC COUNT: CPT | Performed by: INTERNAL MEDICINE

## 2022-06-18 PROCEDURE — 93923 UPR/LXTR ART STDY 3+ LVLS: CPT

## 2022-06-18 PROCEDURE — 85027 COMPLETE CBC AUTOMATED: CPT | Performed by: INTERNAL MEDICINE

## 2022-06-18 PROCEDURE — 80053 COMPREHEN METABOLIC PANEL: CPT | Performed by: INTERNAL MEDICINE

## 2022-06-18 PROCEDURE — 94664 DEMO&/EVAL PT USE INHALER: CPT

## 2022-06-18 PROCEDURE — 94669 MECHANICAL CHEST WALL OSCILL: CPT

## 2022-06-18 RX ORDER — ALBUMIN (HUMAN) 12.5 G/50ML
12.5 SOLUTION INTRAVENOUS ONCE
Status: COMPLETED | OUTPATIENT
Start: 2022-06-18 | End: 2022-06-18

## 2022-06-18 RX ADMIN — GLYCERIN, HYPROMELLOSE, POLYETHYLENE GLYCOL 1 DROP: .2; .2; 1 LIQUID OPHTHALMIC at 16:26

## 2022-06-18 RX ADMIN — GLYCERIN, HYPROMELLOSE, POLYETHYLENE GLYCOL 1 DROP: .2; .2; 1 LIQUID OPHTHALMIC at 21:23

## 2022-06-18 RX ADMIN — HEPARIN SODIUM 5000 UNITS: 5000 INJECTION INTRAVENOUS; SUBCUTANEOUS at 04:52

## 2022-06-18 RX ADMIN — RIOCIGUAT 2.5 MG: 2.5 TABLET, FILM COATED ORAL at 08:24

## 2022-06-18 RX ADMIN — Medication 250 MG: at 16:07

## 2022-06-18 RX ADMIN — Medication 250 MG: at 08:23

## 2022-06-18 RX ADMIN — POTASSIUM & SODIUM PHOSPHATES POWDER PACK 280-160-250 MG 2 PACKET: 280-160-250 PACK at 16:07

## 2022-06-18 RX ADMIN — ASPIRIN 81 MG CHEWABLE TABLET 81 MG: 81 TABLET CHEWABLE at 08:23

## 2022-06-18 RX ADMIN — PREDNISONE 30 MG: 20 TABLET ORAL at 08:23

## 2022-06-18 RX ADMIN — ACETAMINOPHEN 650 MG: 650 SUSPENSION ORAL at 22:17

## 2022-06-18 RX ADMIN — ACETAMINOPHEN 650 MG: 650 SUSPENSION ORAL at 10:06

## 2022-06-18 RX ADMIN — Medication 1000 UNITS: at 08:23

## 2022-06-18 RX ADMIN — LORATADINE 10 MG: 10 TABLET ORAL at 08:23

## 2022-06-18 RX ADMIN — ACETAMINOPHEN 650 MG: 650 SUSPENSION ORAL at 04:53

## 2022-06-18 RX ADMIN — ATOVAQUONE 750 MG: 750 SUSPENSION ORAL at 10:07

## 2022-06-18 RX ADMIN — ACETAMINOPHEN 650 MG: 650 SUSPENSION ORAL at 16:07

## 2022-06-18 RX ADMIN — OXYCODONE HYDROCHLORIDE 5 MG: 5 SOLUTION ORAL at 22:21

## 2022-06-18 RX ADMIN — GLYCERIN, HYPROMELLOSE, POLYETHYLENE GLYCOL 1 DROP: .2; .2; 1 LIQUID OPHTHALMIC at 08:42

## 2022-06-18 RX ADMIN — HEPARIN SODIUM 5000 UNITS: 5000 INJECTION INTRAVENOUS; SUBCUTANEOUS at 21:23

## 2022-06-18 RX ADMIN — POTASSIUM & SODIUM PHOSPHATES POWDER PACK 280-160-250 MG 2 PACKET: 280-160-250 PACK at 21:23

## 2022-06-18 RX ADMIN — HEPARIN SODIUM 5000 UNITS: 5000 INJECTION INTRAVENOUS; SUBCUTANEOUS at 13:10

## 2022-06-18 RX ADMIN — RIOCIGUAT 2.5 MG: 2.5 TABLET, FILM COATED ORAL at 16:08

## 2022-06-18 RX ADMIN — ALBUMIN (HUMAN) 12.5 G: 0.25 INJECTION, SOLUTION INTRAVENOUS at 03:05

## 2022-06-18 RX ADMIN — LEVOTHYROXINE SODIUM 25 MCG: 25 TABLET ORAL at 04:51

## 2022-06-18 RX ADMIN — RIOCIGUAT 2.5 MG: 2.5 TABLET, FILM COATED ORAL at 22:17

## 2022-06-18 RX ADMIN — MACITENTAN 10 MG: 10 TABLET, FILM COATED ORAL at 08:24

## 2022-06-18 RX ADMIN — PANTOPRAZOLE SODIUM 40 MG: 40 INJECTION, POWDER, FOR SOLUTION INTRAVENOUS at 08:23

## 2022-06-18 NOTE — PROGRESS NOTES
[address] [facility]  Progress Note Sanjiv Shore 1940, 80 y o  female MRN: [id number]  Unit/Bed#: [facility] 714-01 Encounter: [id number]  Primary Care Provider: No primary care provider on file  Date and time admitted to hospital: 6/14/2022  8:07 PM    * Sacral wound  Assessment & Plan  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  Underwent surgical debridement on 06/14/2022 with general surgery; transferred to medicine service for chronic illness  Initially placed on vanc and cefepime for antibiotic therapy; however ID evaluate and stabbed antibiotics  Repositioning; wound care consult  Supportive care, pain medications  Nutritional support      Hearing loss  Assessment & Plan  Patient reports 3 months of progressively worsening hearing  As per previous admission, concerning for atovaquone versus Lasix  Discussed with ENT, have declines seeing patient in hospital; consider outpatient referral  Trial different antibiotic prophylaxis    Pneumonia  Assessment & Plan  See accompanying plan under acute respiratory failure    Acute respiratory failure with hypoxia [facility])  Assessment & Plan  Patient with worsening respiratory requirement; initially require 10 L nasal cannula  Appreciate respiratory recommendations, has been suctioned with improvement in oxygenation  X-ray chest shows mild vascular congestion  Consider volume overload (appears euvolemic on exam) versus PE (unlikely, heart rate normal and blood pressure stable) versus pneumonia (bilateral lower lobe PNA demonstrated on CT chest)  Currently on antibiotics for treatment of sacral ulcer; consult to ID  Respiratory protocol, monitor consult pulmonology as needed  History of pulmonary hypertension; continue home Adempas and Opsumit    --patient placed on step-down level 2 and confirmed with patient and family that she is full code and accepting of intubation as needed      6/17-oxygen requirement improved overnight to 3 L; procalcitonin negative x2; currently afebrile and white count is only sirs criteria  Id recommending holding off on antibiotic therapy; all antibiotics held      Urinary retention  Assessment & Plan  Patient with history of urinary retention and spasm; previously on myrbetriq and Gemtesa  Continue lilly catheter at this time; treat symptomatically  Follows with urology in the outpatient setting     Anemia  Assessment & Plan  Patient noted to have low hemoglobin on admission-6 9  Has received 1 unit PRBCs on 06/15, but repeat CBC was 6 5  Iron panel shows low iron and TIBC consistent with anemia of chronic disease; reticulocytes elevated  B12 and folate within normal limits; will consider iron supplementation once infection resolved  In the meantime, hemoglobin stable at 8 9, monitor      Acquired hypothyroidism  Assessment & Plan  As per recent PCP note, appears to take 25 mcg daily  Continue home medication, TSH within normal limits    Scleroderma ([facility] [address] )  Assessment & Plan  Follows with Rheumatology in the outpatient setting  Will restart prednisone 30 mg daily  Atovaquone for PCP prophylaxis    Dysphagia, oropharyngeal phase  Assessment & Plan  Patient with history of dysphagia, peg tube in place  Continue current tube feeds; appreciate nutritional recommendations    Severe protein-calorie malnutrition ([facility] [address] )  Assessment & Plan  Malnutrition Findings:   Adult Malnutrition type: Chronic illness  Adult Degree of Malnutrition: Other severe protein calorie malnutrition  Malnutrition Characteristics: Muscle loss, Fat loss, Weight loss, Inadequate energy           360 Statement: related to disease/condition evidenced by increased kcal/pro needs for wound healing Stage IV sacral decub, BMI 18 4 adjusted per prior facility wt 6/7/22;severe buccal fat pads loss, severe deltoid muscle loss, Patient lost 31# (20 6%) since 3/15/22 past 3 months with illness <75% energy intake versus needs for > 1 month Treating with EN support and Robert TID via PEG for wound healing    BMI Findings:  Adult BMI Classifications: Underweight < 18 5        Body mass index is 22 64 kg/m²  VTE Pharmacologic Prophylaxis:   Pharmacologic: Heparin  Mechanical VTE Prophylaxis in Place: Yes    Patient Centered Rounds: I have performed bedside rounds with nursing staff today  Discussions with Specialists or Other Care Team Provider:  ENT    Education and Discussions with Family / Patient: Discussed treatment plan with family and patient who agree with current plan; encouraged to ask questions and participate  Time Spent for Care: 1 hour  More than 50% of total time spent on counseling and coordination of care as described above  Current Length of Stay: 4 day(s)    Current Patient Status: Inpatient   Certification Statement: The patient will continue to require additional inpatient hospital stay due to Treatment of sacral ulcer    Discharge Plan: To be determined    Code Status: Level 1 - Full Code      Subjective:   Patient seen examined bedside, no acute distress or discomfort noted  Oxygen requirement now 2 L nasal cannula; as per ID, antibiotic stopped  White count remains elevated, but stable likely secondary to wound debridement  Wound VAC in place; await further surgical recommendations  Family pointed out the patient's arms and hands seem well perfused; legs and feet appear cooler to the touch  Have ordered ABIs  Blood pressure soft (102/50 on manual), monitor and consider fluid resuscitation  Reviewed previous records w ith family, steroids and atovaquone restarted  Patient scheduled to have outpatient follow-up for scleroderma at [facility]; but will discuss with Rheumatology here to make sure that we have appropriate therapy      Objective:     Vitals:   Temp (24hrs), Av 9 °F (37 2 °C), Min:98 3 °F (36 8 °C), Max:99 4 °F (37 4 °C)    Temp:  [98 3 °F (36 8 °C)-99 4 °F (37 4 °C)] 98 3 °F (36 8 °C)  HR:  [] 69  Resp:  [15-18] 16  BP: ()/(37-66) 100/50  SpO2:  [91 %-99 %] 96 %  Body mass index is 22 64 kg/m²  Input and Output Summary (last 24 hours): Intake/Output Summary (Last 24 hours) at 6/18/2022 1710  Last data filed at 6/18/2022 1626  Gross per 24 hour   Intake --   Output 2800 ml   Net -2800 ml       Physical Exam:     Physical Exam  Vitals and nursing note reviewed  Constitutional:       General: She is not in acute distress  Appearance: She is well-developed  She is ill-appearing  HENT:      Head: Normocephalic and atraumatic  Ears:      Comments: Hard of hearing  Eyes:      Conjunctiva/sclera: Conjunctivae normal    Cardiovascular:      Rate and Rhythm: Normal rate and regular rhythm  Heart sounds: No murmur heard  Pulmonary:      Effort: Pulmonary effort is normal  No respiratory distress  Breath sounds: Rhonchi present  Abdominal:      Palpations: Abdomen is soft  Tenderness: There is no abdominal tenderness  Comments: Peg tube in place   Musculoskeletal:         General: Deformity present  Cervical back: Neck supple  Skin:     General: Skin is warm and dry  Findings: Erythema, lesion and rash present  Comments: Stage IV sacral ulcer with wound VAC in place   Neurological:      Mental Status: She is alert and oriented to person, place, and time  Motor: Weakness present        Gait: Gait abnormal    Psychiatric:         Behavior: Behavior normal            Additional Data:     Labs:    Results from last 7 days   Lab Units 06/18/22  0451   WBC Thousand/uL 14 07*   HEMOGLOBIN g/dL 8 9*   HEMATOCRIT % 28 8*   PLATELETS Thousands/uL 288   BANDS PCT % 8   LYMPHO PCT % 3*   MONO PCT % 2*   EOS PCT % 0     Results from last 7 days   Lab Units 06/18/22  0451   SODIUM mmol/L 131*   POTASSIUM mmol/L 4 2   CHLORIDE mmol/L 96*   CO2 mmol/L 31   BUN mg/dL 28*   CREATININE mg/dL 0 36*   ANION GAP mmol/L 4   CALCIUM mg/dL 8 3 ALBUMIN g/dL 2 2*   TOTAL BILIRUBIN mg/dL 0 33   ALK PHOS U/L 79   ALT U/L 17   AST U/L 17   GLUCOSE RANDOM mg/dL 188*     Results from last 7 days   Lab Units 06/14/22  1634   INR  0 94             Results from last 7 days   Lab Units 06/17/22  0542 06/15/22  0814 06/14/22  1634   LACTIC ACID mmol/L  --  0 8 1 2   PROCALCITONIN ng/ml 0 13  --  0 10           * I Have Reviewed All Lab Data Listed Above  * Additional Pertinent Lab Tests Reviewed: All Labs Within Last 24 Hours Reviewed    Imaging:    Imaging Reports Reviewed Today Include:   Imaging Personally Reviewed by Myself Includes:      Recent Cultures (last 7 days):     Results from last 7 days   Lab Units 06/14/22  1749 06/14/22  1651   BLOOD CULTURE  No Growth at 72 hrs  No Growth at 72 hrs         Last 24 Hours Medication List:   Current Facility-Administered Medications   Medication Dose Route Frequency Provider Last Rate    acetaminophen  650 mg Per G Tube Q6H Carlos Purdy MD      acetaminophen  650 mg Per G Tube Q4H PRN Jessica Sotomayor MD      aspirin  81 mg Per G Tube Daily Jessica Sotomayor MD      atovaquone  750 mg Per G Tube Daily With Breakfast Jessica Sotomayor MD      cholecalciferol  1,000 Units Per G Tube Daily Jessica Sotomayor MD      glycerin-hypromellose-  1 drop Both Eyes TID Jessica Sotomayor MD      heparin (porcine)  5,000 Units Subcutaneous [facility] [address] Maicol Gomez MD      labetalol  10 mg Intravenous Q6H PRN Carlos Purdy MD      levothyroxine  25 mcg Per PEG Tube Early Morning Jessica Sotomayor MD      loratadine  10 mg Per G Tube Daily Jessica Sotomayor MD      Macitentan  10 mg Per G Tube Daily Jessica Sotomayor MD      melatonin  3 mg Oral HS PRN Carlos Purdy MD      ondansetron  4 mg Intravenous Q6H PRN Carlos Purdy MD      oxyCODONE  2 5 mg Per G Tube Q4H PRN Carlos Purdy MD      oxyCODONE  5 mg Per G Tube Q4H PRN Carlos Purdy MD      pantoprazole  40 mg Intravenous Q24H [address] Edwin Hood MD      potassium-sodium phosphates  2 packet Per G Tube 4x Daily (with meals and at bedtime) Edwin Hood MD      predniSONE  30 mg Per G Tube Daily Edwin Hood MD      Riociguat  2 5 mg Per G Tube TID Edwin Hood MD      saccharomyces boulardii  250 mg Per Giraldo Shady BID Edwin Hood MD          Today, Patient Was Seen By: Marco Haro MD    ** Please Note: Dictation voice to text software may have been used in the creation of this document   **

## 2022-06-18 NOTE — PROGRESS NOTES
Acute Care Surgery  Bedside V A C  Procedure Note    Location of wound: Sacral    Dressings and Foam removed:  2 Black Foam    Dimensions of wound: 10 cm x 10 cm x 1 5 cm w 4cm lateral undermining    Description of wound: Wound base appears clean w healthy bleeding tissue, viable, some fibrinous exudate present mainly centrally, area wiped w sterile 4x4 gauze to remove some fibrinous material, patted dry w gauze to assure no active bleeding/oozing  No purulent discharge or signs of infection, no surrounding erythema  The periwound skin remains clean and intact and unremarkable  VAC dressing application:  The periwound skin was cleaned and dried  1 black foam was cut to size of the wound and placed into the wound  The dressings were then covered with VAC drape  Additional VAC drape and black foam was used to create a bridge to the patient's left hip laterally and a base for the track pad  The track pad was then placed over the base of black foam  The VAC was then set to 125 mmHg low Continuous suction  The patient tolerated the procedure well and there were no complications  The patient did not require any excisional debridement during today's dressing change  VAC settings:  125 mmHg  Continuous    Wound Images: Additional Notes: The [facility] sticker placed over the dressing per protocol  The next [facility] dressing change will be planned for Monday 6/20      Yara Duran MD  6/18/2022 5:23 PM

## 2022-06-18 NOTE — PLAN OF CARE
Problem: MOBILITY - ADULT  Goal: Maintain or return to baseline ADL function  Description: INTERVENTIONS:  -  Assess patient's ability to carry out ADLs; assess patient's baseline for ADL function and identify physical deficits which impact ability to perform ADLs (bathing, care of mouth/teeth, toileting, grooming, dressing, etc )  - Assess/evaluate cause of self-care deficits   - Assess range of motion  - Assess patient's mobility; develop plan if impaired  - Assess patient's need for assistive devices and provide as appropriate  - Encourage maximum independence but intervene and supervise when necessary  - Involve family in performance of ADLs  - Assess for home care needs following discharge   - Consider OT consult to assist with ADL evaluation and planning for discharge  - Provide patient education as appropriate  Outcome: Progressing  Goal: Maintains/Returns to pre admission functional level  Description: INTERVENTIONS:  - Perform BMAT or MOVE assessment daily    - Set and communicate daily mobility goal to care team and patient/family/caregiver  - Collaborate with rehabilitation services on mobility goals if consulted  - Perform Range of Motion 2 times a day  - Reposition patient every 2 hours    - Dangle patient 2 times a day  - Stand patient 2 times a day  - Ambulate patient 2 times a day  - Out of bed to chair 2 times a day   - Out of bed for meals 2 times a day  - Out of bed for toileting  - Record patient progress and toleration of activity level   Outcome: Progressing     Problem: Potential for Falls  Goal: Patient will remain free of falls  Description: INTERVENTIONS:  - Educate patient/family on patient safety including physical limitations  - Instruct patient to call for assistance with activity   - Consult OT/PT to assist with strengthening/mobility   - Keep Call bell within reach  - Keep bed low and locked with side rails adjusted as appropriate  - Keep care items and personal belongings within reach  - Initiate and maintain comfort rounds  - Make Fall Risk Sign visible to staff  - Offer Toileting every 2 Hours, in advance of need  - Initiate/Maintain bed alarm  - Apply yellow socks and bracelet for high fall risk patients  - Consider moving patient to room near nurses station  Outcome: Progressing     Problem: Prexisting or High Potential for Compromised Skin Integrity  Goal: Skin integrity is maintained or improved  Description: INTERVENTIONS:  - Identify patients at risk for skin breakdown  - Assess and monitor skin integrity  - Assess and monitor nutrition and hydration status  - Monitor labs   - Assess for incontinence   - Turn and reposition patient  - Assist with mobility/ambulation  - Relieve pressure over bony prominences  - Avoid friction and shearing  - Provide appropriate hygiene as needed including keeping skin clean and dry  - Evaluate need for skin moisturizer/barrier cream  - Collaborate with interdisciplinary team   - Patient/family teaching  - Consider wound care consult   Outcome: Progressing     Problem: PAIN - ADULT  Goal: Verbalizes/displays adequate comfort level or baseline comfort level  Description: Interventions:  - Encourage patient to monitor pain and request assistance  - Assess pain using appropriate pain scale  - Administer analgesics based on type and severity of pain and evaluate response  - Implement non-pharmacological measures as appropriate and evaluate response  - Consider cultural and social influences on pain and pain management  - Notify physician/advanced practitioner if interventions unsuccessful or patient reports new pain  Outcome: Progressing     Problem: INFECTION - ADULT  Goal: Absence or prevention of progression during hospitalization  Description: INTERVENTIONS:  - Assess and monitor for signs and symptoms of infection  - Monitor lab/diagnostic results  - Monitor all insertion sites, i e  indwelling lines, tubes, and drains  - Monitor endotracheal if appropriate and nasal secretions for changes in amount and color  - [place] appropriate cooling/warming therapies per order  - Administer medications as ordered  - Instruct and encourage patient and family to use good hand hygiene technique  - Identify and instruct in appropriate isolation precautions for identified infection/condition  Outcome: Progressing  Goal: Absence of fever/infection during neutropenic period  Description: INTERVENTIONS:  - Monitor WBC    Outcome: Progressing     Problem: SAFETY ADULT  Goal: Maintain or return to baseline ADL function  Description: INTERVENTIONS:  -  Assess patient's ability to carry out ADLs; assess patient's baseline for ADL function and identify physical deficits which impact ability to perform ADLs (bathing, care of mouth/teeth, toileting, grooming, dressing, etc )  - Assess/evaluate cause of self-care deficits   - Assess range of motion  - Assess patient's mobility; develop plan if impaired  - Assess patient's need for assistive devices and provide as appropriate  - Encourage maximum independence but intervene and supervise when necessary  - Involve family in performance of ADLs  - Assess for home care needs following discharge   - Consider OT consult to assist with ADL evaluation and planning for discharge  - Provide patient education as appropriate  Outcome: Progressing  Goal: Maintains/Returns to pre admission functional level  Description: INTERVENTIONS:  - Perform BMAT or MOVE assessment daily    - Set and communicate daily mobility goal to care team and patient/family/caregiver  - Collaborate with rehabilitation services on mobility goals if consulted  - Perform Range of Motion 2 times a day  - Reposition patient every 2 hours    - Dangle patient 2 times a day  - Stand patient 2 times a day  - Ambulate patient 2 times a day  - Out of bed to chair 2 times a day   - Out of bed for meals 2 times a day  - Out of bed for toileting  - Record patient progress and toleration of activity level   Outcome: Progressing  Goal: Patient will remain free of falls  Description: INTERVENTIONS:  - Educate patient/family on patient safety including physical limitations  - Instruct patient to call for assistance with activity   - Consult OT/PT to assist with strengthening/mobility   - Keep Call bell within reach  - Keep bed low and locked with side rails adjusted as appropriate  - Keep care items and personal belongings within reach  - Initiate and maintain comfort rounds  - Make Fall Risk Sign visible to staff  - Offer Toileting every 2 Hours, in advance of need  - Initiate/Maintain bed alarm  - Apply yellow socks and bracelet for high fall risk patients  - Consider moving patient to room near nurses station  Outcome: Progressing     Problem: DISCHARGE PLANNING  Goal: Discharge to home or other facility with appropriate resources  Description: INTERVENTIONS:  - Identify barriers to discharge w/patient and caregiver  - Arrange for needed discharge resources and transportation as appropriate  - Identify discharge learning needs (meds, wound care, etc )  - Arrange for interpretive services to assist at discharge as needed  - Refer to Case Management Department for coordinating discharge planning if the patient needs post-hospital services based on physician/advanced practitioner order or complex needs related to functional status, cognitive ability, or social support system  Outcome: Progressing     Problem: Knowledge Deficit  Goal: Patient/family/caregiver demonstrates understanding of disease process, treatment plan, medications, and discharge instructions  Description: Complete learning assessment and assess knowledge base    Interventions:  - Provide teaching at level of understanding  - Provide teaching via preferred learning methods  Outcome: Progressing     Problem: Nutrition/Hydration-ADULT  Goal: Nutrient/Hydration intake appropriate for improving, restoring or maintaining nutritional needs  Description: Monitor and assess patient's nutrition/hydration status for malnutrition  Collaborate with interdisciplinary team and initiate plan and interventions as ordered  Monitor patient's weight and dietary intake as ordered or per policy  Utilize nutrition screening tool and intervene as necessary  Determine patient's food preferences and provide high-protein, high-caloric foods as appropriate       INTERVENTIONS:  - Monitor oral intake, urinary output, labs, and treatment plans  - Assess nutrition and hydration status and recommend course of action  - Evaluate amount of meals eaten  - Assist patient with eating if necessary   - Allow adequate time for meals  - Recommend/ encourage appropriate diets, oral nutritional supplements, and vitamin/mineral supplements  - Order, calculate, and assess calorie counts as needed  - Recommend, monitor, and adjust tube feedings and TPN/PPN based on assessed needs  - Assess need for intravenous fluids  - Provide specific nutrition/hydration education as appropriate  - Include patient/family/caregiver in decisions related to nutrition  Outcome: Progressing

## 2022-06-18 NOTE — ASSESSMENT & PLAN NOTE
Patient noted to have low hemoglobin on admission-6 9  Has received 1 unit PRBCs on 06/15, but repeat CBC was 6 5  Iron panel shows low iron and TIBC consistent with anemia of chronic disease; reticulocytes elevated  B12 and folate within normal limits; will consider iron supplementation once infection resolved  In the meantime, hemoglobin stable at 8 9, monitor

## 2022-06-18 NOTE — ASSESSMENT & PLAN NOTE
Patient reports 3 months of progressively worsening hearing  As per previous admission, concerning for atovaquone versus Lasix  Discussed with ENT, have declines seeing patient in hospital; consider outpatient referral  Trial different antibiotic prophylaxis

## 2022-06-19 ENCOUNTER — APPOINTMENT (INPATIENT)
Dept: RADIOLOGY | Facility: HOSPITAL | Age: 82
DRG: 853 | End: 2022-06-19
Payer: COMMERCIAL

## 2022-06-19 LAB
ALBUMIN SERPL BCP-MCNC: 2.1 G/DL (ref 3.5–5)
ALP SERPL-CCNC: 86 U/L (ref 46–116)
ALT SERPL W P-5'-P-CCNC: 19 U/L (ref 12–78)
ANION GAP SERPL CALCULATED.3IONS-SCNC: 3 MMOL/L (ref 4–13)
ANISOCYTOSIS BLD QL SMEAR: PRESENT
AST SERPL W P-5'-P-CCNC: 19 U/L (ref 5–45)
ATRIAL RATE: 95 BPM
BASOPHILS # BLD MANUAL: 0.15 THOUSAND/UL (ref 0–0.1)
BASOPHILS NFR MAR MANUAL: 1 % (ref 0–1)
BILIRUB SERPL-MCNC: 0.39 MG/DL (ref 0.2–1)
BUN SERPL-MCNC: 38 MG/DL (ref 5–25)
CALCIUM ALBUM COR SERPL-MCNC: 9.7 MG/DL (ref 8.3–10.1)
CALCIUM SERPL-MCNC: 8.2 MG/DL (ref 8.3–10.1)
CHLORIDE SERPL-SCNC: 98 MMOL/L (ref 100–108)
CO2 SERPL-SCNC: 32 MMOL/L (ref 21–32)
CREAT SERPL-MCNC: 0.36 MG/DL (ref 0.6–1.3)
EOSINOPHIL # BLD MANUAL: 0.15 THOUSAND/UL (ref 0–0.4)
EOSINOPHIL NFR BLD MANUAL: 1 % (ref 0–6)
ERYTHROCYTE [DISTWIDTH] IN BLOOD BY AUTOMATED COUNT: 17.4 % (ref 11.6–15.1)
GFR SERPL CREATININE-BSD FRML MDRD: 102 ML/MIN/1.73SQ M
GLUCOSE SERPL-MCNC: 133 MG/DL (ref 65–140)
HCT VFR BLD AUTO: 28.2 % (ref 34.8–46.1)
HGB BLD-MCNC: 8.6 G/DL (ref 11.5–15.4)
LYMPHOCYTES # BLD AUTO: 1.34 THOUSAND/UL (ref 0.6–4.47)
LYMPHOCYTES # BLD AUTO: 9 % (ref 14–44)
MAGNESIUM SERPL-MCNC: 2.2 MG/DL (ref 1.6–2.6)
MCH RBC QN AUTO: 30.6 PG (ref 26.8–34.3)
MCHC RBC AUTO-ENTMCNC: 30.5 G/DL (ref 31.4–37.4)
MCV RBC AUTO: 100 FL (ref 82–98)
METAMYELOCYTES NFR BLD MANUAL: 1 % (ref 0–1)
MONOCYTES # BLD AUTO: 0.75 THOUSAND/UL (ref 0–1.22)
MONOCYTES NFR BLD: 5 % (ref 4–12)
MYELOCYTES NFR BLD MANUAL: 2 % (ref 0–1)
NEUTROPHILS # BLD MANUAL: 12.08 THOUSAND/UL (ref 1.85–7.62)
NEUTS BAND NFR BLD MANUAL: 11 % (ref 0–8)
NEUTS SEG NFR BLD AUTO: 70 % (ref 43–75)
NRBC BLD AUTO-RTO: 1 /100 WBC (ref 0–2)
NT-PROBNP SERPL-MCNC: 1860 PG/ML
P AXIS: 77 DEGREES
PHOSPHATE SERPL-MCNC: 2.6 MG/DL (ref 2.3–4.1)
PLATELET # BLD AUTO: 283 THOUSANDS/UL (ref 149–390)
PLATELET BLD QL SMEAR: ADEQUATE
PMV BLD AUTO: 9.7 FL (ref 8.9–12.7)
POLYCHROMASIA BLD QL SMEAR: PRESENT
POTASSIUM SERPL-SCNC: 4.1 MMOL/L (ref 3.5–5.3)
PR INTERVAL: 146 MS
PROT SERPL-MCNC: 6 G/DL (ref 6.4–8.2)
QRS AXIS: -44 DEGREES
QRSD INTERVAL: 96 MS
QT INTERVAL: 362 MS
QTC INTERVAL: 454 MS
RBC # BLD AUTO: 2.81 MILLION/UL (ref 3.81–5.12)
RBC MORPH BLD: PRESENT
SODIUM SERPL-SCNC: 133 MMOL/L (ref 136–145)
T WAVE AXIS: 68 DEGREES
VENTRICULAR RATE: 95 BPM
WBC # BLD AUTO: 14.91 THOUSAND/UL (ref 4.31–10.16)

## 2022-06-19 PROCEDURE — C9113 INJ PANTOPRAZOLE SODIUM, VIA: HCPCS | Performed by: INTERNAL MEDICINE

## 2022-06-19 PROCEDURE — 99233 SBSQ HOSP IP/OBS HIGH 50: CPT | Performed by: INTERNAL MEDICINE

## 2022-06-19 PROCEDURE — 80053 COMPREHEN METABOLIC PANEL: CPT | Performed by: INTERNAL MEDICINE

## 2022-06-19 PROCEDURE — 2W05X6Z CHANGE PRESSURE DRESSING ON BACK: ICD-10-PCS | Performed by: STUDENT IN AN ORGANIZED HEALTH CARE EDUCATION/TRAINING PROGRAM

## 2022-06-19 PROCEDURE — 94664 DEMO&/EVAL PT USE INHALER: CPT

## 2022-06-19 PROCEDURE — 83880 ASSAY OF NATRIURETIC PEPTIDE: CPT | Performed by: INTERNAL MEDICINE

## 2022-06-19 PROCEDURE — 85027 COMPLETE CBC AUTOMATED: CPT | Performed by: INTERNAL MEDICINE

## 2022-06-19 PROCEDURE — 94668 MNPJ CHEST WALL SBSQ: CPT

## 2022-06-19 PROCEDURE — 93923 UPR/LXTR ART STDY 3+ LVLS: CPT | Performed by: SURGERY

## 2022-06-19 PROCEDURE — 94669 MECHANICAL CHEST WALL OSCILL: CPT

## 2022-06-19 PROCEDURE — 71045 X-RAY EXAM CHEST 1 VIEW: CPT

## 2022-06-19 PROCEDURE — 83735 ASSAY OF MAGNESIUM: CPT | Performed by: INTERNAL MEDICINE

## 2022-06-19 PROCEDURE — 85007 BL SMEAR W/DIFF WBC COUNT: CPT | Performed by: INTERNAL MEDICINE

## 2022-06-19 PROCEDURE — 93010 ELECTROCARDIOGRAM REPORT: CPT | Performed by: INTERNAL MEDICINE

## 2022-06-19 PROCEDURE — 84100 ASSAY OF PHOSPHORUS: CPT | Performed by: INTERNAL MEDICINE

## 2022-06-19 PROCEDURE — 94760 N-INVAS EAR/PLS OXIMETRY 1: CPT

## 2022-06-19 RX ORDER — ENOXAPARIN SODIUM 100 MG/ML
40 INJECTION SUBCUTANEOUS
Status: DISCONTINUED | OUTPATIENT
Start: 2022-06-19 | End: 2022-07-13 | Stop reason: HOSPADM

## 2022-06-19 RX ADMIN — RIOCIGUAT 2.5 MG: 2.5 TABLET, FILM COATED ORAL at 15:52

## 2022-06-19 RX ADMIN — Medication 250 MG: at 07:39

## 2022-06-19 RX ADMIN — GLYCERIN, HYPROMELLOSE, POLYETHYLENE GLYCOL 1 DROP: .2; .2; 1 LIQUID OPHTHALMIC at 07:43

## 2022-06-19 RX ADMIN — ATOVAQUONE 750 MG: 750 SUSPENSION ORAL at 07:43

## 2022-06-19 RX ADMIN — ACETAMINOPHEN 650 MG: 650 SUSPENSION ORAL at 21:39

## 2022-06-19 RX ADMIN — Medication 1000 UNITS: at 07:39

## 2022-06-19 RX ADMIN — ENOXAPARIN SODIUM 40 MG: 40 INJECTION SUBCUTANEOUS at 15:51

## 2022-06-19 RX ADMIN — LEVOTHYROXINE SODIUM 25 MCG: 25 TABLET ORAL at 05:41

## 2022-06-19 RX ADMIN — MACITENTAN 10 MG: 10 TABLET, FILM COATED ORAL at 07:43

## 2022-06-19 RX ADMIN — RIOCIGUAT 2.5 MG: 2.5 TABLET, FILM COATED ORAL at 21:37

## 2022-06-19 RX ADMIN — HEPARIN SODIUM 5000 UNITS: 5000 INJECTION INTRAVENOUS; SUBCUTANEOUS at 05:41

## 2022-06-19 RX ADMIN — ACETAMINOPHEN 650 MG: 650 SUSPENSION ORAL at 05:41

## 2022-06-19 RX ADMIN — GLYCERIN, HYPROMELLOSE, POLYETHYLENE GLYCOL 1 DROP: .2; .2; 1 LIQUID OPHTHALMIC at 15:54

## 2022-06-19 RX ADMIN — PREDNISONE 30 MG: 20 TABLET ORAL at 07:39

## 2022-06-19 RX ADMIN — PANTOPRAZOLE SODIUM 40 MG: 40 INJECTION, POWDER, FOR SOLUTION INTRAVENOUS at 07:40

## 2022-06-19 RX ADMIN — POTASSIUM & SODIUM PHOSPHATES POWDER PACK 280-160-250 MG 2 PACKET: 280-160-250 PACK at 07:38

## 2022-06-19 RX ADMIN — RIOCIGUAT 2.5 MG: 2.5 TABLET, FILM COATED ORAL at 07:42

## 2022-06-19 RX ADMIN — ACETAMINOPHEN 650 MG: 650 SUSPENSION ORAL at 15:51

## 2022-06-19 RX ADMIN — ASPIRIN 81 MG CHEWABLE TABLET 81 MG: 81 TABLET CHEWABLE at 07:38

## 2022-06-19 RX ADMIN — GLYCERIN, HYPROMELLOSE, POLYETHYLENE GLYCOL 1 DROP: .2; .2; 1 LIQUID OPHTHALMIC at 21:37

## 2022-06-19 RX ADMIN — Medication 250 MG: at 16:10

## 2022-06-19 RX ADMIN — ACETAMINOPHEN 650 MG: 650 SUSPENSION ORAL at 11:41

## 2022-06-19 RX ADMIN — LORATADINE 10 MG: 10 TABLET ORAL at 07:40

## 2022-06-19 NOTE — PLAN OF CARE
Problem: MOBILITY - ADULT  Goal: Maintain or return to baseline ADL function  Description: INTERVENTIONS:  -  Assess patient's ability to carry out ADLs; assess patient's baseline for ADL function and identify physical deficits which impact ability to perform ADLs (bathing, care of mouth/teeth, toileting, grooming, dressing, etc )  - Assess/evaluate cause of self-care deficits   - Assess range of motion  - Assess patient's mobility; develop plan if impaired  - Assess patient's need for assistive devices and provide as appropriate  - Encourage maximum independence but intervene and supervise when necessary  - Involve family in performance of ADLs  - Assess for home care needs following discharge   - Consider OT consult to assist with ADL evaluation and planning for discharge  - Provide patient education as appropriate  Outcome: Progressing  Goal: Maintains/Returns to pre admission functional level  Description: INTERVENTIONS:  - Perform BMAT or MOVE assessment daily    - Set and communicate daily mobility goal to care team and patient/family/caregiver  - Collaborate with rehabilitation services on mobility goals if consulted  - Perform Range of Motion 2 times a day  - Reposition patient every 2 hours    - Dangle patient 2 times a day  - Stand patient 2 times a day  - Ambulate patient 2 times a day  - Out of bed to chair 2 times a day   - Out of bed for meals 2 times a day  - Out of bed for toileting  - Record patient progress and toleration of activity level   Outcome: Progressing     Problem: Potential for Falls  Goal: Patient will remain free of falls  Description: INTERVENTIONS:  - Educate patient/family on patient safety including physical limitations  - Instruct patient to call for assistance with activity   - Consult OT/PT to assist with strengthening/mobility   - Keep Call bell within reach  - Keep bed low and locked with side rails adjusted as appropriate  - Keep care items and personal belongings within reach  - Initiate and maintain comfort rounds  - Make Fall Risk Sign visible to staff  - Offer Toileting every 2 Hours, in advance of need  - Initiate/Maintain bed alarm  - Apply yellow socks and bracelet for high fall risk patients  - Consider moving patient to room near nurses station  Outcome: Progressing     Problem: Prexisting or High Potential for Compromised Skin Integrity  Goal: Skin integrity is maintained or improved  Description: INTERVENTIONS:  - Identify patients at risk for skin breakdown  - Assess and monitor skin integrity  - Assess and monitor nutrition and hydration status  - Monitor labs   - Assess for incontinence   - Turn and reposition patient  - Assist with mobility/ambulation  - Relieve pressure over bony prominences  - Avoid friction and shearing  - Provide appropriate hygiene as needed including keeping skin clean and dry  - Evaluate need for skin moisturizer/barrier cream  - Collaborate with interdisciplinary team   - Patient/family teaching  - Consider wound care consult   Outcome: Progressing     Problem: PAIN - ADULT  Goal: Verbalizes/displays adequate comfort level or baseline comfort level  Description: Interventions:  - Encourage patient to monitor pain and request assistance  - Assess pain using appropriate pain scale  - Administer analgesics based on type and severity of pain and evaluate response  - Implement non-pharmacological measures as appropriate and evaluate response  - Consider cultural and social influences on pain and pain management  - Notify physician/advanced practitioner if interventions unsuccessful or patient reports new pain  Outcome: Progressing     Problem: INFECTION - ADULT  Goal: Absence or prevention of progression during hospitalization  Description: INTERVENTIONS:  - Assess and monitor for signs and symptoms of infection  - Monitor lab/diagnostic results  - Monitor all insertion sites, i e  indwelling lines, tubes, and drains  - Monitor endotracheal if appropriate and nasal secretions for changes in amount and color  - [place] appropriate cooling/warming therapies per order  - Administer medications as ordered  - Instruct and encourage patient and family to use good hand hygiene technique  - Identify and instruct in appropriate isolation precautions for identified infection/condition  Outcome: Progressing  Goal: Absence of fever/infection during neutropenic period  Description: INTERVENTIONS:  - Monitor WBC    Outcome: Progressing     Problem: SAFETY ADULT  Goal: Maintain or return to baseline ADL function  Description: INTERVENTIONS:  -  Assess patient's ability to carry out ADLs; assess patient's baseline for ADL function and identify physical deficits which impact ability to perform ADLs (bathing, care of mouth/teeth, toileting, grooming, dressing, etc )  - Assess/evaluate cause of self-care deficits   - Assess range of motion  - Assess patient's mobility; develop plan if impaired  - Assess patient's need for assistive devices and provide as appropriate  - Encourage maximum independence but intervene and supervise when necessary  - Involve family in performance of ADLs  - Assess for home care needs following discharge   - Consider OT consult to assist with ADL evaluation and planning for discharge  - Provide patient education as appropriate  Outcome: Progressing  Goal: Maintains/Returns to pre admission functional level  Description: INTERVENTIONS:  - Perform BMAT or MOVE assessment daily    - Set and communicate daily mobility goal to care team and patient/family/caregiver  - Collaborate with rehabilitation services on mobility goals if consulted  - Perform Range of Motion 2 times a day  - Reposition patient every 2 hours    - Dangle patient 2 times a day  - Stand patient 2 times a day  - Ambulate patient 2 times a day  - Out of bed to chair 2 times a day   - Out of bed for meals 2 times a day  - Out of bed for toileting  - Record patient progress and toleration of activity level   Outcome: Progressing  Goal: Patient will remain free of falls  Description: INTERVENTIONS:  - Educate patient/family on patient safety including physical limitations  - Instruct patient to call for assistance with activity   - Consult OT/PT to assist with strengthening/mobility   - Keep Call bell within reach  - Keep bed low and locked with side rails adjusted as appropriate  - Keep care items and personal belongings within reach  - Initiate and maintain comfort rounds  - Make Fall Risk Sign visible to staff  - Offer Toileting every 2 Hours, in advance of need  - Initiate/Maintain bed alarm  - Apply yellow socks and bracelet for high fall risk patients  - Consider moving patient to room near nurses station  Outcome: Progressing     Problem: DISCHARGE PLANNING  Goal: Discharge to home or other facility with appropriate resources  Description: INTERVENTIONS:  - Identify barriers to discharge w/patient and caregiver  - Arrange for needed discharge resources and transportation as appropriate  - Identify discharge learning needs (meds, wound care, etc )  - Arrange for interpretive services to assist at discharge as needed  - Refer to Case Management Department for coordinating discharge planning if the patient needs post-hospital services based on physician/advanced practitioner order or complex needs related to functional status, cognitive ability, or social support system  Outcome: Progressing     Problem: Knowledge Deficit  Goal: Patient/family/caregiver demonstrates understanding of disease process, treatment plan, medications, and discharge instructions  Description: Complete learning assessment and assess knowledge base    Interventions:  - Provide teaching at level of understanding  - Provide teaching via preferred learning methods  Outcome: Progressing     Problem: Nutrition/Hydration-ADULT  Goal: Nutrient/Hydration intake appropriate for improving, restoring or maintaining nutritional needs  Description: Monitor and assess patient's nutrition/hydration status for malnutrition  Collaborate with interdisciplinary team and initiate plan and interventions as ordered  Monitor patient's weight and dietary intake as ordered or per policy  Utilize nutrition screening tool and intervene as necessary  Determine patient's food preferences and provide high-protein, high-caloric foods as appropriate       INTERVENTIONS:  - Monitor oral intake, urinary output, labs, and treatment plans  - Assess nutrition and hydration status and recommend course of action  - Evaluate amount of meals eaten  - Assist patient with eating if necessary   - Allow adequate time for meals  - Recommend/ encourage appropriate diets, oral nutritional supplements, and vitamin/mineral supplements  - Order, calculate, and assess calorie counts as needed  - Recommend, monitor, and adjust tube feedings and TPN/PPN based on assessed needs  - Assess need for intravenous fluids  - Provide specific nutrition/hydration education as appropriate  - Include patient/family/caregiver in decisions related to nutrition  Outcome: Progressing

## 2022-06-19 NOTE — ASSESSMENT & PLAN NOTE
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

## 2022-06-19 NOTE — ASSESSMENT & PLAN NOTE
Patient noted to have low hemoglobin on admission-6 9  Has received 1 unit PRBCs on 06/15, but repeat CBC was 6 5  Iron panel shows low iron and TIBC consistent with anemia of chronic disease; reticulocytes elevated  B12 and folate within normal limits; will consider iron supplementation once infection resolved  In the meantime, hemoglobin stable at 8 6, monitor

## 2022-06-19 NOTE — ASSESSMENT & PLAN NOTE
Follows with Rheumatology in the outpatient setting  Will restart prednisone 30 mg daily  Atovaquone for PCP prophylaxis (consider substituting Bactrim or other prophylactic agent in setting of possible ototoxicity)

## 2022-06-19 NOTE — PROGRESS NOTES
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  Underwent surgical debridement on 06/14/2022 with general surgery; transferred to medicine service for chronic illness  Initially placed on vanc and cefepime for antibiotic therapy; however ID evaluated and stopped antibiotics  Repositioning; wound care consult  Supportive care, pain medications  Nutritional support  Wound VAC in place with frequent wound VAC changes as per surgical team  A discussion with surgical team yesterday, no further surgery planned; wound VAC change on 6/20 (see photo in surgical note of 06/18/2022)      Hearing loss  Assessment & Plan  Patient reports 3 months of progressively worsening hearing  As per previous admission, concerning for atovaquone versus Lasix  Discussed with ENT, have declines seeing patient in hospital; consider outpatient referral  Trial different antibiotic prophylaxis    Pneumonia  Assessment & Plan  See accompanying plan under acute respiratory failure    Acute respiratory failure with hypoxia [facility])  Assessment & Plan  Patient with worsening respiratory requirement; initially require 10 L nasal cannula  Appreciate respiratory recommendations, has been suctioned with improvement in oxygenation  X-ray chest shows mild vascular congestion  Consider volume overload (appears euvolemic on exam) versus PE (unlikely, heart rate normal and blood pressure stable) versus pneumonia (bilateral lower lobe PNA demonstrated on CT chest)  Currently on antibiotics for treatment of sacral ulcer; consult to ID  Respiratory protocol, monitor consult pulmonology as needed  History of pulmonary hypertension; continue home Adempas and Opsumit    --patient placed on step-down level 2 and confirmed with patient and family that she is full code and accepting of intubation as needed  6/17-oxygen requirement improved overnight to 3 L; procalcitonin negative x2; currently afebrile and white count is only sirs criteria  Id recommending holding off on antibiotic therapy; all antibiotics held      Urinary retention  Assessment & Plan  Patient with history of urinary retention and spasm; previously on myrbetriq and Gemtesa  Continue lilly catheter at this time; treat symptomatically  Follows with urology in the outpatient setting     Anemia  Assessment & Plan  Patient noted to have low hemoglobin on admission-6 9  Has received 1 unit PRBCs on 06/15, but repeat CBC was 6 5  Iron panel shows low iron and TIBC consistent with anemia of chronic disease; reticulocytes elevated  B12 and folate within normal limits; will consider iron supplementation once infection resolved  In the meantime, hemoglobin stable at 8 6, monitor      Acquired hypothyroidism  Assessment & Plan  As per recent PCP note, appears to take 25 mcg daily  Continue home medication, TSH within normal limits    Scleroderma ([facility] [address] )  Assessment & Plan  Follows with Rheumatology in the outpatient setting  Will restart prednisone 30 mg daily  Atovaquone for PCP prophylaxis (consider substituting Bactrim or other prophylactic agent in setting of possible ototoxicity)    Dysphagia, oropharyngeal phase  Assessment & Plan  Patient with history of dysphagia, peg tube in place  Continue current tube feeds; appreciate nutritional recommendations    Severe protein-calorie malnutrition ([address] )  Assessment & Plan  Malnutrition Findings:   Adult Malnutrition type: Chronic illness  Adult Degree of Malnutrition: Other severe protein calorie malnutrition  Malnutrition Characteristics: Muscle loss, Fat loss, Weight loss, Inadequate energy           360 Statement: related to disease/condition evidenced by increased kcal/pro needs for wound healing Stage IV sacral decub, BMI 18 4 adjusted per prior facility wt 6/7/22;severe buccal fat pads loss, severe deltoid muscle loss, Patient lost 31# (20 6%) since 3/15/22 past 3 months with illness <75% energy intake versus needs for > 1 month  Treating with EN support and Robert TID via PEG for wound healing    BMI Findings:  Adult BMI Classifications: Underweight < 18 5        Body mass index is 22 64 kg/m²  VTE Pharmacologic Prophylaxis:   Pharmacologic: Enoxaparin (Lovenox)  Mechanical VTE Prophylaxis in Place: Yes    Patient Centered Rounds: I have performed bedside rounds with nursing staff today  Discussions with Specialists or Other Care Team Provider:  Pulmonology    Education and Discussions with Family / Patient: Discussed treatment plan with family and patient who agree with current plan; encouraged to ask questions and participate  Time Spent for Care: 45 minutes  More than 50% of total time spent on counseling and coordination of care as described above  Current Length of Stay: 5 day(s)    Current Patient Status: Inpatient   Certification Statement: The patient will continue to require additional inpatient hospital stay due to Treatment of acute respiratory failure and sepsis    Discharge Plan: To be determined    Code Status: Level 1 - Full Code      Subjective:   Patient seen and examined bedside, no acute distress or discomfort noted  ABIs from yesterday were normal; blood pressure remains borderline low  Discussed with Rheumatology yesterday and today; will continue a chart check in place additional labs orders as needed  To be seen by Rheumatology and person the patient still here on Wednesday  Rheumatology agrees with current steroid therapy  Also discussed with pulmonology regarding possibility of interstitial lung disease  CT chest reviewed and feel that patient may be fluid overloaded    Recommend BNP, echo, and repeat chest x-ray  Really appreciate pulmonology recommendations, labs and studies ordered; on exam, patient appears euvolemic  Reduced breath sounds bilaterally; but no crackles appreciated  UA ordered; based on BNP and echo, may consult heart failure team   Patient states she has required home oxygen for over a year; typically sleeping with it nightly  Desaturated 87% SpO2 on room air during examination  As per ID, monitor off antibiotics; this was echoed by pulmonology  Surgery reports no further debridement; will continue routine wound VAC exchanges while patient in hospital; otherwise could have wound VAC changes done at rehab facility  Objective:     Vitals:   Temp (24hrs), Av 6 °F (36 4 °C), Min:97 °F (36 1 °C), Max:98 3 °F (36 8 °C)    Temp:  [97 °F (36 1 °C)-98 3 °F (36 8 °C)] 97 8 °F (36 6 °C)  HR:  [69-80] 80  Resp:  [15-17] 17  BP: (100-107)/(47-50) 103/47  SpO2:  [92 %-96 %] 95 %  Body mass index is 22 64 kg/m²  Input and Output Summary (last 24 hours): Intake/Output Summary (Last 24 hours) at 2022 1412  Last data filed at 2022 2300  Gross per 24 hour   Intake --   Output 1850 ml   Net -1850 ml       Physical Exam:     Physical Exam  Vitals and nursing note reviewed  Constitutional:       General: She is not in acute distress  Appearance: She is well-developed  She is ill-appearing  HENT:      Head: Normocephalic and atraumatic  Ears:      Comments: Hard of hearing  Eyes:      Conjunctiva/sclera: Conjunctivae normal    Cardiovascular:      Rate and Rhythm: Normal rate and regular rhythm  Heart sounds: No murmur heard  Pulmonary:      Effort: Pulmonary effort is normal  No respiratory distress  Comments: Reduced breath sounds  Abdominal:      Palpations: Abdomen is soft  Tenderness: There is no abdominal tenderness  Comments: Peg tube in place   Musculoskeletal:         General: Deformity present        Cervical back: Neck supple  Skin:     General: Skin is warm and dry  Findings: Erythema, lesion and rash present  Comments: Stage IV sacral ulcer with wound VAC in place   Neurological:      Mental Status: She is alert and oriented to person, place, and time  Motor: Weakness present  Gait: Gait abnormal    Psychiatric:         Behavior: Behavior normal            Additional Data:     Labs:    Results from last 7 days   Lab Units 06/19/22  0537   WBC Thousand/uL 14 91*   HEMOGLOBIN g/dL 8 6*   HEMATOCRIT % 28 2*   PLATELETS Thousands/uL 283   BANDS PCT % 11*   LYMPHO PCT % 9*   MONO PCT % 5   EOS PCT % 1     Results from last 7 days   Lab Units 06/19/22  0537   SODIUM mmol/L 133*   POTASSIUM mmol/L 4 1   CHLORIDE mmol/L 98*   CO2 mmol/L 32   BUN mg/dL 38*   CREATININE mg/dL 0 36*   ANION GAP mmol/L 3*   CALCIUM mg/dL 8 2*   ALBUMIN g/dL 2 1*   TOTAL BILIRUBIN mg/dL 0 39   ALK PHOS U/L 86   ALT U/L 19   AST U/L 19   GLUCOSE RANDOM mg/dL 133     Results from last 7 days   Lab Units 06/14/22  1634   INR  0 94             Results from last 7 days   Lab Units 06/17/22  0542 06/15/22  0814 06/14/22  1634   LACTIC ACID mmol/L  --  0 8 1 2   PROCALCITONIN ng/ml 0 13  --  0 10           * I Have Reviewed All Lab Data Listed Above  * Additional Pertinent Lab Tests Reviewed: All Labs Within Last 24 Hours Reviewed    Imaging:    Imaging Reports Reviewed Today Include:  Chest x-ray, ZA  Imaging Personally Reviewed by Myself Includes:  Chest x-ray    Recent Cultures (last 7 days):     Results from last 7 days   Lab Units 06/14/22  1749 06/14/22  1651   BLOOD CULTURE  No Growth After 4 Days  No Growth After 4 Days         Last 24 Hours Medication List:   Current Facility-Administered Medications   Medication Dose Route Frequency Provider Last Rate    acetaminophen  650 mg Per G Tube Q6H Enzo Quarles MD      acetaminophen  650 mg Per G Tube Q4H PRN Madi Hernandez MD      aspirin  81 mg Per Ethyl Kayser Daily Madi Hernandez MD  atovaquone  750 mg Per G Tube Daily With Breakfast Onesimo Hernandez MD      cholecalciferol  1,000 Units Per G Tube Daily Onesimo Hernandez MD      glycerin-hypromellose-  1 drop Both Eyes TID Onesimo Hernandez MD      heparin (porcine)  5,000 Units Subcutaneous [facility] Maicol Tejeda MD      labetalol  10 mg Intravenous Q6H PRN Giles Phillips MD      levothyroxine  25 mcg Per PEG Tube Early Morning Onesimo Hernandez MD      loratadine  10 mg Per G Tube Daily Onesimo Hernandez MD      Macitentan  10 mg Per G Tube Daily Onesimo Hernandez MD      melatonin  3 mg Oral HS PRN Giles Phillips MD      ondansetron  4 mg Intravenous Q6H PRN Giles Phillips MD      oxyCODONE  2 5 mg Per G Tube Q4H PRN Giles Phillips, MD      oxyCODONE  5 mg Per G Tube Q4H PRN Giles Phillips, MD      pantoprazole  40 mg Intravenous Q24H [address] Onesimo Hernandez MD      predniSONE  30 mg Per G Tube Daily Onesimo Hernandez MD      Riociguat  2 5 mg Per G Tube TID Onesimo Hernandez MD      saccharomyces boulardii  250 mg Per Glennda Tyson BID Onesimo Hernandez MD          Today, Patient Was Seen By: Jayna Coello MD    ** Please Note: Dictation voice to text software may have been used in the creation of this document   **

## 2022-06-20 ENCOUNTER — APPOINTMENT (INPATIENT)
Dept: NON INVASIVE DIAGNOSTICS | Facility: HOSPITAL | Age: 82
DRG: 853 | End: 2022-06-20
Payer: COMMERCIAL

## 2022-06-20 LAB
ANION GAP SERPL CALCULATED.3IONS-SCNC: 3 MMOL/L (ref 4–13)
AORTIC ROOT: 3 CM
AORTIC VALVE MEAN VELOCITY: 16.2 M/S
APICAL FOUR CHAMBER EJECTION FRACTION: 60 %
ASCENDING AORTA: 3 CM
AV AREA BY CONTINUOUS VTI: 1.8 CM2
AV AREA PEAK VELOCITY: 1.7 CM2
AV LVOT MEAN GRADIENT: 4 MMHG
AV LVOT PEAK GRADIENT: 7 MMHG
AV MEAN GRADIENT: 12 MMHG
AV PEAK GRADIENT: 19 MMHG
AV VALVE AREA: 1.82 CM2
AV VELOCITY RATIO: 0.59
BACTERIA BLD CULT: NORMAL
BACTERIA BLD CULT: NORMAL
BACTERIA UR QL AUTO: ABNORMAL /HPF
BILIRUB UR QL STRIP: NEGATIVE
BUN SERPL-MCNC: 34 MG/DL (ref 5–25)
CALCIUM SERPL-MCNC: 8.1 MG/DL (ref 8.3–10.1)
CHLORIDE SERPL-SCNC: 99 MMOL/L (ref 100–108)
CLARITY UR: CLEAR
CO2 SERPL-SCNC: 34 MMOL/L (ref 21–32)
COLOR UR: YELLOW
CREAT SERPL-MCNC: 0.34 MG/DL (ref 0.6–1.3)
DOP CALC AO PEAK VEL: 2.18 M/S
DOP CALC AO VTI: 46.09 CM
DOP CALC LVOT AREA: 2.83 CM2
DOP CALC LVOT DIAMETER: 1.9 CM
DOP CALC LVOT PEAK VEL VTI: 29.55 CM
DOP CALC LVOT PEAK VEL: 1.28 M/S
DOP CALC LVOT STROKE INDEX: 47.2 ML/M2
DOP CALC LVOT STROKE VOLUME: 83.74
E WAVE DECELERATION TIME: 241 MS
ERYTHROCYTE [DISTWIDTH] IN BLOOD BY AUTOMATED COUNT: 17.8 % (ref 11.6–15.1)
FRACTIONAL SHORTENING: 35 (ref 28–44)
GFR SERPL CREATININE-BSD FRML MDRD: 104 ML/MIN/1.73SQ M
GLUCOSE SERPL-MCNC: 162 MG/DL (ref 65–140)
GLUCOSE UR STRIP-MCNC: NEGATIVE MG/DL
HCT VFR BLD AUTO: 27.7 % (ref 34.8–46.1)
HGB BLD-MCNC: 8.6 G/DL (ref 11.5–15.4)
HGB UR QL STRIP.AUTO: ABNORMAL
INTERVENTRICULAR SEPTUM IN DIASTOLE (PARASTERNAL SHORT AXIS VIEW): 0.9 CM
INTERVENTRICULAR SEPTUM: 0.9 CM (ref 0.6–1.1)
IVC: 16 MM
KETONES UR STRIP-MCNC: NEGATIVE MG/DL
LAAS-AP2: 19.4 CM2
LAAS-AP4: 22.8 CM2
LEFT ATRIUM SIZE: 3.5 CM
LEFT INTERNAL DIMENSION IN SYSTOLE: 2.4 CM (ref 2.1–4)
LEFT VENTRICULAR INTERNAL DIMENSION IN DIASTOLE: 3.7 CM (ref 3.5–6)
LEFT VENTRICULAR POSTERIOR WALL IN END DIASTOLE: 0.9 CM
LEFT VENTRICULAR STROKE VOLUME: 40 ML
LEUKOCYTE ESTERASE UR QL STRIP: ABNORMAL
LVSV (TEICH): 40 ML
MCH RBC QN AUTO: 31.3 PG (ref 26.8–34.3)
MCHC RBC AUTO-ENTMCNC: 31 G/DL (ref 31.4–37.4)
MCV RBC AUTO: 101 FL (ref 82–98)
MV E'TISSUE VEL-SEP: 8 CM/S
MV PEAK A VEL: 1.09 M/S
MV PEAK E VEL: 148 CM/S
MV STENOSIS PRESSURE HALF TIME: 70 MS
MV VALVE AREA P 1/2 METHOD: 3.14
NITRITE UR QL STRIP: NEGATIVE
NON-SQ EPI CELLS URNS QL MICRO: ABNORMAL /HPF
PH UR STRIP.AUTO: 7 [PH]
PLATELET # BLD AUTO: 259 THOUSANDS/UL (ref 149–390)
PMV BLD AUTO: 9.8 FL (ref 8.9–12.7)
POTASSIUM SERPL-SCNC: 4.2 MMOL/L (ref 3.5–5.3)
PROT UR STRIP-MCNC: ABNORMAL MG/DL
RA PRESSURE ESTIMATED: 3 MMHG
RBC # BLD AUTO: 2.75 MILLION/UL (ref 3.81–5.12)
RBC #/AREA URNS AUTO: ABNORMAL /HPF
RIGHT ATRIUM AREA SYSTOLE A4C: 20.4 CM2
RIGHT VENTRICLE ID DIMENSION: 4.2 CM
RV PSP: 56 MMHG
SL CV LEFT ATRIUM LENGTH A2C: 5.8 CM
SL CV LV EF: 70
SL CV PED ECHO LEFT VENTRICLE DIASTOLIC VOLUME (MOD BIPLANE) 2D: 60 ML
SL CV PED ECHO LEFT VENTRICLE SYSTOLIC VOLUME (MOD BIPLANE) 2D: 20 ML
SODIUM SERPL-SCNC: 136 MMOL/L (ref 136–145)
SP GR UR STRIP.AUTO: 1.02 (ref 1–1.03)
TR MAX PG: 53 MMHG
TR PEAK VELOCITY: 3.7 M/S
TRICUSPID VALVE PEAK REGURGITATION VELOCITY: 3.65 M/S
UROBILINOGEN UR STRIP-ACNC: <2 MG/DL
WBC # BLD AUTO: 13.16 THOUSAND/UL (ref 4.31–10.16)
WBC #/AREA URNS AUTO: ABNORMAL /HPF

## 2022-06-20 PROCEDURE — 85027 COMPLETE CBC AUTOMATED: CPT | Performed by: INTERNAL MEDICINE

## 2022-06-20 PROCEDURE — 97606 NEG PRS WND THER DME>50 SQCM: CPT | Performed by: PHYSICIAN ASSISTANT

## 2022-06-20 PROCEDURE — 99232 SBSQ HOSP IP/OBS MODERATE 35: CPT | Performed by: INTERNAL MEDICINE

## 2022-06-20 PROCEDURE — 93306 TTE W/DOPPLER COMPLETE: CPT

## 2022-06-20 PROCEDURE — 81001 URINALYSIS AUTO W/SCOPE: CPT | Performed by: INTERNAL MEDICINE

## 2022-06-20 PROCEDURE — C9113 INJ PANTOPRAZOLE SODIUM, VIA: HCPCS | Performed by: INTERNAL MEDICINE

## 2022-06-20 PROCEDURE — 99233 SBSQ HOSP IP/OBS HIGH 50: CPT | Performed by: INTERNAL MEDICINE

## 2022-06-20 PROCEDURE — 80048 BASIC METABOLIC PNL TOTAL CA: CPT | Performed by: INTERNAL MEDICINE

## 2022-06-20 PROCEDURE — 93306 TTE W/DOPPLER COMPLETE: CPT | Performed by: INTERNAL MEDICINE

## 2022-06-20 RX ORDER — MINERAL OIL AND PETROLATUM 150; 830 MG/G; MG/G
OINTMENT OPHTHALMIC
Status: DISCONTINUED | OUTPATIENT
Start: 2022-06-20 | End: 2022-07-02

## 2022-06-20 RX ORDER — FUROSEMIDE 10 MG/ML
20 INJECTION INTRAMUSCULAR; INTRAVENOUS ONCE
Status: COMPLETED | OUTPATIENT
Start: 2022-06-20 | End: 2022-06-20

## 2022-06-20 RX ADMIN — ACETAMINOPHEN 650 MG: 650 SUSPENSION ORAL at 22:24

## 2022-06-20 RX ADMIN — RIOCIGUAT 2.5 MG: 2.5 TABLET, FILM COATED ORAL at 09:02

## 2022-06-20 RX ADMIN — GLYCERIN, HYPROMELLOSE, POLYETHYLENE GLYCOL 1 DROP: .2; .2; 1 LIQUID OPHTHALMIC at 17:32

## 2022-06-20 RX ADMIN — ASPIRIN 81 MG CHEWABLE TABLET 81 MG: 81 TABLET CHEWABLE at 09:01

## 2022-06-20 RX ADMIN — PANTOPRAZOLE SODIUM 40 MG: 40 INJECTION, POWDER, FOR SOLUTION INTRAVENOUS at 09:01

## 2022-06-20 RX ADMIN — ACETAMINOPHEN 650 MG: 650 SUSPENSION ORAL at 17:32

## 2022-06-20 RX ADMIN — GLYCERIN, HYPROMELLOSE, POLYETHYLENE GLYCOL 1 DROP: .2; .2; 1 LIQUID OPHTHALMIC at 09:02

## 2022-06-20 RX ADMIN — ENOXAPARIN SODIUM 40 MG: 40 INJECTION SUBCUTANEOUS at 09:00

## 2022-06-20 RX ADMIN — SILVER NITRATE APPLICATORS 5 APPLICATOR: 25; 75 STICK TOPICAL at 18:40

## 2022-06-20 RX ADMIN — ACETAMINOPHEN 650 MG: 650 SUSPENSION ORAL at 13:13

## 2022-06-20 RX ADMIN — OXYCODONE HYDROCHLORIDE 5 MG: 5 SOLUTION ORAL at 13:13

## 2022-06-20 RX ADMIN — ATOVAQUONE 750 MG: 750 SUSPENSION ORAL at 09:01

## 2022-06-20 RX ADMIN — Medication 250 MG: at 09:00

## 2022-06-20 RX ADMIN — PREDNISONE 30 MG: 20 TABLET ORAL at 09:00

## 2022-06-20 RX ADMIN — LEVOTHYROXINE SODIUM 25 MCG: 25 TABLET ORAL at 05:28

## 2022-06-20 RX ADMIN — RIOCIGUAT 2.5 MG: 2.5 TABLET, FILM COATED ORAL at 17:33

## 2022-06-20 RX ADMIN — FUROSEMIDE 20 MG: 10 INJECTION, SOLUTION INTRAMUSCULAR; INTRAVENOUS at 18:52

## 2022-06-20 RX ADMIN — LORATADINE 10 MG: 10 TABLET ORAL at 09:00

## 2022-06-20 RX ADMIN — Medication 250 MG: at 17:32

## 2022-06-20 RX ADMIN — MINERAL OIL AND WHITE PETROLATUM: 150; 830 OINTMENT OPHTHALMIC at 22:23

## 2022-06-20 RX ADMIN — Medication 1000 UNITS: at 09:00

## 2022-06-20 RX ADMIN — MACITENTAN 10 MG: 10 TABLET, FILM COATED ORAL at 09:01

## 2022-06-20 RX ADMIN — ACETAMINOPHEN 650 MG: 650 SUSPENSION ORAL at 05:28

## 2022-06-20 NOTE — PROGRESS NOTES
[address] [facility]  Progress Note Nickie Shore 1940, 80 y o  female MRN: [id number]  Unit/Bed#: [facility] 714-01 Encounter: [id number]  Primary Care Provider: No primary care provider on file     Date and time admitted to hospital: 6/14/2022  8:07 PM    * Sacral wound  Assessment & Plan  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  Underwent surgical debridement on 06/14/2022 with general surgery; transferred to medicine service for chronic illness  Initially placed on vanc and cefepime for antibiotic therapy; however ID evaluated and stopped antibiotics  Repositioning; wound care consult  Supportive care, pain medications  Nutritional support  Wound VAC in place with frequent wound VAC changes as per surgical team  A discussion with surgical team yesterday, no further surgery planned; wound VAC change on 6/20 (see photo in surgical note of 06/18/2022)      Hearing loss  Assessment & Plan  Patient reports 3 months of progressively worsening hearing  As per previous admission, concerning for atovaquone versus Lasix  Discussed with ENT, have declines seeing patient in hospital; consider outpatient referral  Trial different antibiotic prophylaxis    Pneumonia  Assessment & Plan  See accompanying plan under acute respiratory failure    Acute respiratory failure with hypoxia [facility])  Assessment & Plan  Patient with worsening respiratory requirement; initially require 10 L nasal cannula  Appreciate respiratory recommendations, has been suctioned with improvement in oxygenation  X-ray chest shows mild vascular congestion  Consider volume overload (appears euvolemic on exam) versus PE (unlikely, heart rate normal and blood pressure stable) versus pneumonia (bilateral lower lobe PNA demonstrated on CT chest)  Currently on antibiotics for treatment of sacral ulcer; consult to ID  Respiratory protocol, monitor consult pulmonology as needed  History of pulmonary hypertension; continue home Adempas and Opsumit    --patient placed on step-down level 2 and confirmed with patient and family that she is full code and accepting of intubation as needed  6/17-oxygen requirement improved overnight to 3 L; procalcitonin negative x2; currently afebrile and white count is only sirs criteria  Id recommending holding off on antibiotic therapy; all antibiotics held      Urinary retention  Assessment & Plan  Patient with history of urinary retention and spasm; previously on myrbetriq and Gemtesa  Continue lilly catheter at this time; treat symptomatically  Follows with urology in the outpatient setting     Anemia  Assessment & Plan  Patient noted to have low hemoglobin on admission-6 9  Has received 1 unit PRBCs on 06/15, but repeat CBC was 6 5  Iron panel shows low iron and TIBC consistent with anemia of chronic disease; reticulocytes elevated  B12 and folate within normal limits; will consider iron supplementation once infection resolved  In the meantime, hemoglobin stable at 8 6, monitor      Acquired hypothyroidism  Assessment & Plan  As per recent PCP note, appears to take 25 mcg daily  Continue home medication, TSH within normal limits    Scleroderma ([facility] [address] )  Assessment & Plan  Follows with Rheumatology in the outpatient setting  Will restart prednisone 30 mg daily  Atovaquone for PCP prophylaxis (consider substituting Bactrim or other prophylactic agent in setting of possible ototoxicity)    Dysphagia, oropharyngeal phase  Assessment & Plan  Patient with history of dysphagia, peg tube in place  Continue current tube feeds; appreciate nutritional recommendations    Severe protein-calorie malnutrition ([address] )  Assessment & Plan  Malnutrition Findings:   Adult Malnutrition type: Chronic illness  Adult Degree of Malnutrition: Other severe protein calorie malnutrition  Malnutrition Characteristics: Muscle loss, Fat loss, Weight loss, Inadequate energy           360 Statement: related to disease/condition evidenced by increased kcal/pro needs for wound healing Stage IV sacral decub, BMI 18 4 adjusted per prior facility wt 6/7/22;severe buccal fat pads loss, severe deltoid muscle loss, Patient lost 31# (20 6%) since 3/15/22 past 3 months with illness <75% energy intake versus needs for > 1 month  Treating with EN support and Robert TID via PEG for wound healing    BMI Findings:  Adult BMI Classifications: Underweight < 18 5        Body mass index is 22 53 kg/m²  VTE Pharmacologic Prophylaxis:   Pharmacologic: Enoxaparin (Lovenox)  Mechanical VTE Prophylaxis in Place: Yes    Patient Centered Rounds: I have performed bedside rounds with nursing staff today  Discussions with Specialists or Other Care Team Provider:     Education and Discussions with Family / Patient: Discussed treatment plan with family and patient who agree with current plan; encouraged to ask questions and participate  Time Spent for Care: 45 minutes  More than 50% of total time spent on counseling and coordination of care as described above  Current Length of Stay: 6 day(s)    Current Patient Status: Inpatient   Certification Statement: The patient will continue to require additional inpatient hospital stay due to Improve respiratory status    Discharge Plan: To be determined, likely 24-48 hours    Code Status: Level 1 - Full Code      Subjective:   Patient seen and examined bedside, no acute distress or discomfort noted  Patient currently on 2 L nasal cannula with SpO2 of 97%  Sister at bedside verified the patient has been wearing oxygen q h s  for over 1 year  2 L nasal cannula continuous is a progression of her chronic respiratory failure; however echo shows slight pulmonary hypertension with EF 70%    Chest x-ray and CT show possible pneumonia for which I was treating her initially; procalcitonin is negative x2, though, and Infectious Disease and pulmonology have reviewed CT chest and feel this may be related to underlying interstitial lung disease with scleroderma; id recommending monitor off antibiotics  Will trial 20 mg of Lasix X 1 and see if there is improvement in her respiratory status overnight  Either way, she is currently resident of skilled nursing facility in [place]; skilled nursing facility can titrate oxygen and patient can follow-up in the outpatient setting at [facility] as is currently planned  Have discussed with rheumatology who reviewed patient's chart and agrees with steroids and patient  follow-up with rheumatology as previously arranged  Surgery reports no further surgical intervention and wound VAC can be changed periodically at skilled nursing facility; as per most recent note, state they have given paperwork to  for ordering portable wound VAC  All this explained to patient and her sister at bedside; however pain made it clear that they remain extremely unhappy with the plan  When asked, patient's sister said she does not know if her sisters current skilled nursing facility has the patient's best interest at heart  Additionally, she feels that wound VAC changes should be done in the hospital and not at a skilled nursing facility  She also was concerned about patient's respiratory status which has improved significantly and may, quite frankly, be at her baseline  I explained to both that we would try Lasix tonight and reassess respiratory status tomorrow  Additionally, I arranged have case management speak to patient and her sister at bedside to discuss her current skilled nursing facility placement  Both seem to agree with the plan as I described it to them; however, as I was walking out of the room, I could over hear them saying they did not like the plan to 1 another    Unclear what objections they have at this point; attempted to alleviate any concerns they may have and explained to them repeatedly throughout this admission  that patient would not be discharged before she was medically stable  Objective:     Vitals:   Temp (24hrs), Av 1 °F (36 7 °C), Min:97 4 °F (36 3 °C), Max:98 8 °F (37 1 °C)    Temp:  [97 4 °F (36 3 °C)-98 8 °F (37 1 °C)] 98 6 °F (37 °C)  HR:  [69-83] 69  Resp:  [18-19] 19  BP: (104-112)/(47-49) 104/47  SpO2:  [94 %-100 %] 97 %  Body mass index is 22 53 kg/m²  Input and Output Summary (last 24 hours): Intake/Output Summary (Last 24 hours) at 2022 1749  Last data filed at 2022 1101  Gross per 24 hour   Intake 140 ml   Output 755 ml   Net -615 ml       Physical Exam:     Physical Exam  Vitals and nursing note reviewed  Constitutional:       General: She is not in acute distress  Appearance: She is well-developed  She is ill-appearing  HENT:      Head: Normocephalic and atraumatic  Ears:      Comments: Hard of hearing  Eyes:      Conjunctiva/sclera: Conjunctivae normal    Cardiovascular:      Rate and Rhythm: Normal rate and regular rhythm  Heart sounds: No murmur heard  Pulmonary:      Effort: Pulmonary effort is normal  No respiratory distress  Comments: Reduced breath sounds  Abdominal:      Palpations: Abdomen is soft  Tenderness: There is no abdominal tenderness  Comments: Peg tube in place   Musculoskeletal:         General: Deformity present  Cervical back: Neck supple  Skin:     General: Skin is warm and dry  Findings: Erythema, lesion and rash present  Comments: Stage IV sacral ulcer with wound VAC in place   Neurological:      Mental Status: She is alert and oriented to person, place, and time  Motor: Weakness present        Gait: Gait abnormal    Psychiatric:         Behavior: Behavior normal            Additional Data:     Labs:    Results from last 7 days   Lab Units 22  0527 22  0537   WBC Thousand/uL 13 16* 14 91*   HEMOGLOBIN g/dL 8 6* 8 6*   HEMATOCRIT % 27 7* 28 2*   PLATELETS Thousands/uL 259 283   BANDS PCT %  --  11*   LYMPHO PCT %  --  9*   MONO PCT %  --  5   EOS PCT %  --  1     Results from last 7 days   Lab Units 06/20/22  0527 06/19/22  0537   SODIUM mmol/L 136 133*   POTASSIUM mmol/L 4 2 4 1   CHLORIDE mmol/L 99* 98*   CO2 mmol/L 34* 32   BUN mg/dL 34* 38*   CREATININE mg/dL 0 34* 0 36*   ANION GAP mmol/L 3* 3*   CALCIUM mg/dL 8 1* 8 2*   ALBUMIN g/dL  --  2 1*   TOTAL BILIRUBIN mg/dL  --  0 39   ALK PHOS U/L  --  86   ALT U/L  --  19   AST U/L  --  19   GLUCOSE RANDOM mg/dL 162* 133     Results from last 7 days   Lab Units 06/14/22  1634   INR  0 94             Results from last 7 days   Lab Units 06/17/22  0542 06/15/22  0814 06/14/22  1634   LACTIC ACID mmol/L  --  0 8 1 2   PROCALCITONIN ng/ml 0 13  --  0 10           * I Have Reviewed All Lab Data Listed Above  * Additional Pertinent Lab Tests Reviewed: All Labs Within Last 24 Hours Reviewed    Imaging:    Imaging Reports Reviewed Today Include:  Chest x-ray, echo  Imaging Personally Reviewed by Myself Includes:      Recent Cultures (last 7 days):     Results from last 7 days   Lab Units 06/14/22  1749 06/14/22  1651   BLOOD CULTURE  No Growth After 5 Days  No Growth After 5 Days         Last 24 Hours Medication List:   Current Facility-Administered Medications   Medication Dose Route Frequency Provider Last Rate    acetaminophen  650 mg Per G Tube Q6H Alexis Griffin MD      acetaminophen  650 mg Per G Tube Q4H PRN Junie Briceño MD      aspirin  81 mg Per G Tube Daily Junie Briceño MD      atovaquone  750 mg Per G Tube Daily With Breakfast Junie Briceño MD      cholecalciferol  1,000 Units Per G Tube Daily Junie Briceño MD      enoxaparin  40 mg Subcutaneous Q24H [facility] Junie Briceño MD      glycerin-hypromellose-  1 drop Both Eyes TID Junie Briceño MD      labetalol  10 mg Intravenous Q6H PRN Alexis Griffin MD      levothyroxine  25 mcg Per PEG Tube Early Morning Junie Briceño MD      loratadine  10 mg Per Tomás Pin Daily Junie Briceño MD     [facility] Macitentan  10 mg Per G Tube Daily Flaquita Emerson MD      melatonin  3 mg Oral HS PRN Zuleika Fisher MD      ondansetron  4 mg Intravenous Q6H PRN Zuleika Fisher MD      oxyCODONE  2 5 mg Per G Tube Q4H PRN Zuleika Fisher MD      oxyCODONE  5 mg Per G Tube Q4H PRN Zuleika Fisher MD      pantoprazole  40 mg Intravenous Q24H [address] Flaquita Emerson MD      predniSONE  30 mg Per G Tube Daily Flaquita Emerson MD      Riociguat  2 5 mg Per G Tube TID Flaquita Emerson MD      saccharomyces boulardii  250 mg Per G Tube BID Flaquita Emerson MD      silver nitrate-potassium nitrate  5 applicator Topical Once Rimma Spicer MD          Today, Patient Was Seen By: Raudel Wright MD    ** Please Note: Dictation voice to text software may have been used in the creation of this document   **

## 2022-06-20 NOTE — PROGRESS NOTES
Acute Care Surgery  Bedside V A C  Procedure Note    A timeout was performed with the patient's nurse, Emil Zhao, prior to beginning the dressing change  The nurse remained present to confirm the correct dressing counts on removal of the VAC dressing and was debriefed at the completion of the dressing change  Location of wound:  Sacrum    Dressings and Foam removed:  2 Black Foam      Dimensions of wound:  10 cm x 8 cm x 1 cm    Description of wound: On inspection of the wound today, wound base is healthy and pink  There is granulation tissue that is starting to form  Fibrotic tissue was removed with sterile gauze  There was no necrotic or nonviable tissue requiring debridement  Approximately The periwound skin remains clean and intact and unremarkable  There was no active bleeding at time of completion of VAC  VAC dressing application:  The periwound skin was cleaned and dried  1 black foam were cut to size of the wound and placed into the wound  The dressings were then covered with VAC drape  Additional VAC drape and black foam was used to create a bridge to the patient's left lateral hip and a base for the track pad  The track pad was then placed over the base of black foam  The VAC was then set to 125 mmHg low Continuous suction  The patient tolerated the procedure well and there were no complications  The patient did not require any excisional debridement during today's dressing change  VAC settings:  125 mmHg  Continuous    Wound Images: Total VAC count: Total of 2 VAC sponges:  1 for bridge, 1 in wound    Additional Notes: The [facility] sticker placed over the dressing per protocol  The next [facility] dressing change will be planned for 06/22/2022  The [facility] paperwork was completed and give to the case management staff to arrange home VAC therapy  This dressing change took greater than 26 minutes to complete      Cayden Hunter PA-C  6/20/2022 11:55 AM

## 2022-06-20 NOTE — ASSESSMENT & PLAN NOTE
Malnutrition Findings:   Adult Malnutrition type: Chronic illness  Adult Degree of Malnutrition: Other severe protein calorie malnutrition  Malnutrition Characteristics: Muscle loss, Fat loss, Weight loss, Inadequate energy           360 Statement: related to disease/condition evidenced by increased kcal/pro needs for wound healing Stage IV sacral decub, BMI 18 4 adjusted per prior facility wt 6/7/22;severe buccal fat pads loss, severe deltoid muscle loss, Patient lost 31# (20 6%) since 3/15/22 past 3 months with illness <75% energy intake versus needs for > 1 month  Treating with EN support and Robert TID via PEG for wound healing    BMI Findings:  Adult BMI Classifications: Underweight < 18 5        Body mass index is 22 53 kg/m²

## 2022-06-20 NOTE — PROGRESS NOTES
Progress Note - Infectious Disease   Minal Michaels 80 y o  female MRN: [id number]  [facility] Encounter: [id number]      Impression/Recommendations:  1  SIRS, POA  HR, WBC  Due to #2  No appreciable source of acute infection  Serial procalcitonin, UA, Flu/RSV/COVID PCR, blood cultures, CXR, CT C/A/P negative  Infiltrates at bases of lungs on CT are quire minimal and may be due to chronic aspiration versus atelectasis versus CREST  Doubt clinical pneumonia  Hemodynamically stable and non-toxic  Rec:  · Continue to follow closely off antibiotics  · Follow temperatures and WBC closely  · Supportive care as per the primary service     2   Stage IV sacral ulcer  Presented to deterioration with necrotic slough, likely due to ongoing pressure injury  Status post excisional debridement down to bone, VAC 6/14  No intraoperative purulence noted  No cellulitis  Rec:  · Continue LWC/VAC  · Offloading per nursing  · No role for long-term IV antibiotics at this point     3  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, status post IVIG, IV steroids  On recent steroid taper, should now be on 20 mg daily until seen outpatient by Rheum at [facility]  Rec:  · Continue steroids  · Continue Atovaquone for PCP prophylaxis  · Outpatient Rheum follow-up at [facility]     4   Dysphagia  Status post PEG  History of aspiration, mucus plugging in the past      5   Urinary retention  With chronic indwelling Tejada catheter      6  PAH  On Opsumit, Viociguat  The patient is stable from an ID standpoint     Antibiotics:  Off antibiotics #3    Subjective:  Patient seen on AM rounds  Has a lot of oral secretions which she has been using [id number] [facility] [address] for  Wants to know why her percussion vest was D/C'ed as she thought it was helping  Medhat Manifold [email or web address]    24 Hour Events:  No documented fevers, chills, sweats, nausea, vomiting, or diarrhea      Objective:  Vitals:  Temp:  [97 4 °F (36 3 °C)-98 8 °F (37 1 °C)] 98 8 °F (37 1 °C)  HR:  [70-83] 75  Resp:  [18] 18  BP: (108-112)/(48-49) 111/48  SpO2:  [94 %-100 %] 98 %  Temp (24hrs), Av °F (36 7 °C), Min:97 4 °F (36 3 °C), Max:98 8 °F (37 1 °C)  Current: Temperature: 98 8 °F (37 1 °C)    Physical Exam:   General:  No acute distress  Psychiatric:  Awake and alert  Pulmonary:  Normal respiratory excursion without accessory muscle use  Abdomen:  Soft, nontender  Extremities:  No edema  Skin:  No rashes    Lab Results:  I have personally reviewed pertinent labs  Results from last 7 days   Lab Units 22  0527 22  0537 22  0451 22  0542   POTASSIUM mmol/L 4 2 4 1 4 2 3 8   CHLORIDE mmol/L 99* 98* 96* 99*   CO2 mmol/L 34* 32 31 33*   BUN mg/dL 34* 38* 28* 28*   CREATININE mg/dL 0 34* 0 36* 0 36* 0 28*   EGFR ml/min/1 73sq m 104 102 102 111   CALCIUM mg/dL 8 1* 8 2* 8 3 8 1*   AST U/L  --  19 17 20   ALT U/L  --  19 17 19   ALK PHOS U/L  --  86 79 83     Results from last 7 days   Lab Units 22  0527 22  0537 22  0451   WBC Thousand/uL 13 16* 14 91* 14 07*   HEMOGLOBIN g/dL 8 6* 8 6* 8 9*   PLATELETS Thousands/uL 259 283 288     Results from last 7 days   Lab Units 22  1749 22  1651   BLOOD CULTURE  No Growth After 5 Days  No Growth After 5 Days  Imaging Studies:   I have personally reviewed pertinent imaging study reports and images in PACS  EKG, Pathology, and Other Studies:   I have personally reviewed pertinent reports

## 2022-06-20 NOTE — PLAN OF CARE
Problem: MOBILITY - ADULT  Goal: Maintain or return to baseline ADL function  Description: INTERVENTIONS:  -  Assess patient's ability to carry out ADLs; assess patient's baseline for ADL function and identify physical deficits which impact ability to perform ADLs (bathing, care of mouth/teeth, toileting, grooming, dressing, etc )  - Assess/evaluate cause of self-care deficits   - Assess range of motion  - Assess patient's mobility; develop plan if impaired  - Assess patient's need for assistive devices and provide as appropriate  - Encourage maximum independence but intervene and supervise when necessary  - Involve family in performance of ADLs  - Assess for home care needs following discharge   - Consider OT consult to assist with ADL evaluation and planning for discharge  - Provide patient education as appropriate  Outcome: Progressing  Goal: Maintains/Returns to pre admission functional level  Description: INTERVENTIONS:  - Perform BMAT or MOVE assessment daily    - Set and communicate daily mobility goal to care team and patient/family/caregiver  - Collaborate with rehabilitation services on mobility goals if consulted  - Perform Range of Motion  times a day  - Reposition patient every  hours    - Dangle patient  times a day  - Stand patient  times a day  - Ambulate patient  times a day  - Out of bed to chair  times a day   - Out of bed for meals  times a day  - Out of bed for toileting  - Record patient progress and toleration of activity level   Outcome: Progressing     Problem: Potential for Falls  Goal: Patient will remain free of falls  Description: INTERVENTIONS:  - Educate patient/family on patient safety including physical limitations  - Instruct patient to call for assistance with activity   - Consult OT/PT to assist with strengthening/mobility   - Keep Call bell within reach  - Keep bed low and locked with side rails adjusted as appropriate  - Keep care items and personal belongings within reach  - Initiate and maintain comfort rounds  - Make Fall Risk Sign visible to staff  - Offer Toileting every Hours, in advance of need  - Initiate/Maintain alarm  - Obtain necessary fall risk management equipment:   - Apply yellow socks and bracelet for high fall risk patients  - Consider moving patient to room near nurses station  Outcome: Progressing     Problem: Prexisting or High Potential for Compromised Skin Integrity  Goal: Skin integrity is maintained or improved  Description: INTERVENTIONS:  - Identify patients at risk for skin breakdown  - Assess and monitor skin integrity  - Assess and monitor nutrition and hydration status  - Monitor labs   - Assess for incontinence   - Turn and reposition patient  - Assist with mobility/ambulation  - Relieve pressure over bony prominences  - Avoid friction and shearing  - Provide appropriate hygiene as needed including keeping skin clean and dry  - Evaluate need for skin moisturizer/barrier cream  - Collaborate with interdisciplinary team   - Patient/family teaching  - Consider wound care consult   Outcome: Progressing     Problem: PAIN - ADULT  Goal: Verbalizes/displays adequate comfort level or baseline comfort level  Description: Interventions:  - Encourage patient to monitor pain and request assistance  - Assess pain using appropriate pain scale  - Administer analgesics based on type and severity of pain and evaluate response  - Implement non-pharmacological measures as appropriate and evaluate response  - Consider cultural and social influences on pain and pain management  - Notify physician/advanced practitioner if interventions unsuccessful or patient reports new pain  Outcome: Progressing     Problem: INFECTION - ADULT  Goal: Absence or prevention of progression during hospitalization  Description: INTERVENTIONS:  - Assess and monitor for signs and symptoms of infection  - Monitor lab/diagnostic results  - Monitor all insertion sites, i e  indwelling lines, tubes, and drains  - Monitor endotracheal if appropriate and nasal secretions for changes in amount and color  - [place] appropriate cooling/warming therapies per order  - Administer medications as ordered  - Instruct and encourage patient and family to use good hand hygiene technique  - Identify and instruct in appropriate isolation precautions for identified infection/condition  Outcome: Progressing  Goal: Absence of fever/infection during neutropenic period  Description: INTERVENTIONS:  - Monitor WBC    Outcome: Progressing     Problem: SAFETY ADULT  Goal: Maintain or return to baseline ADL function  Description: INTERVENTIONS:  -  Assess patient's ability to carry out ADLs; assess patient's baseline for ADL function and identify physical deficits which impact ability to perform ADLs (bathing, care of mouth/teeth, toileting, grooming, dressing, etc )  - Assess/evaluate cause of self-care deficits   - Assess range of motion  - Assess patient's mobility; develop plan if impaired  - Assess patient's need for assistive devices and provide as appropriate  - Encourage maximum independence but intervene and supervise when necessary  - Involve family in performance of ADLs  - Assess for home care needs following discharge   - Consider OT consult to assist with ADL evaluation and planning for discharge  - Provide patient education as appropriate  Outcome: Progressing  Goal: Maintains/Returns to pre admission functional level  Description: INTERVENTIONS:  - Perform BMAT or MOVE assessment daily    - Set and communicate daily mobility goal to care team and patient/family/caregiver  - Collaborate with rehabilitation services on mobility goals if consulted  - Perform Range of Motion  times a day  - Reposition patient every  hours    - Dangle patient  times a day  - Stand patient  times a day  - Ambulate patient  times a day  - Out of bed to chair  times a day   - Out of bed for meals  times a day  - Out of bed for toileting  - Record patient progress and toleration of activity level   Outcome: Progressing  Goal: Patient will remain free of falls  Description: INTERVENTIONS:  - Educate patient/family on patient safety including physical limitations  - Instruct patient to call for assistance with activity   - Consult OT/PT to assist with strengthening/mobility   - Keep Call bell within reach  - Keep bed low and locked with side rails adjusted as appropriate  - Keep care items and personal belongings within reach  - Initiate and maintain comfort rounds  - Make Fall Risk Sign visible to staff  - Offer Toileting every  Hours, in advance of need  - Initiate/Maintain alarm  - Obtain necessary fall risk management equipment:   - Apply yellow socks and bracelet for high fall risk patients  - Consider moving patient to room near nurses station  Outcome: Progressing     Problem: DISCHARGE PLANNING  Goal: Discharge to home or other facility with appropriate resources  Description: INTERVENTIONS:  - Identify barriers to discharge w/patient and caregiver  - Arrange for needed discharge resources and transportation as appropriate  - Identify discharge learning needs (meds, wound care, etc )  - Arrange for interpretive services to assist at discharge as needed  - Refer to Case Management Department for coordinating discharge planning if the patient needs post-hospital services based on physician/advanced practitioner order or complex needs related to functional status, cognitive ability, or social support system  Outcome: Progressing     Problem: Knowledge Deficit  Goal: Patient/family/caregiver demonstrates understanding of disease process, treatment plan, medications, and discharge instructions  Description: Complete learning assessment and assess knowledge base    Interventions:  - Provide teaching at level of understanding  - Provide teaching via preferred learning methods  Outcome: Progressing     Problem: Nutrition/Hydration-ADULT  Goal: Nutrient/Hydration intake appropriate for improving, restoring or maintaining nutritional needs  Description: Monitor and assess patient's nutrition/hydration status for malnutrition  Collaborate with interdisciplinary team and initiate plan and interventions as ordered  Monitor patient's weight and dietary intake as ordered or per policy  Utilize nutrition screening tool and intervene as necessary  Determine patient's food preferences and provide high-protein, high-caloric foods as appropriate       INTERVENTIONS:  - Monitor oral intake, urinary output, labs, and treatment plans  - Assess nutrition and hydration status and recommend course of action  - Evaluate amount of meals eaten  - Assist patient with eating if necessary   - Allow adequate time for meals  - Recommend/ encourage appropriate diets, oral nutritional supplements, and vitamin/mineral supplements  - Order, calculate, and assess calorie counts as needed  - Recommend, monitor, and adjust tube feedings and TPN/PPN based on assessed needs  - Assess need for intravenous fluids  - Provide specific nutrition/hydration education as appropriate  - Include patient/family/caregiver in decisions related to nutrition  Outcome: Progressing

## 2022-06-20 NOTE — QUICK NOTE
Informed by nursing that patient had saturated her VAC dressing and sheets with bloody output, asked to come evaluate VAC and seal   Upon observation of VAC observed bloody drainage around VAC tape, adhered clot to VAC sponge, upon takedown of VAC dressing additional clot was found around the lateral edges of the sacral wound, cleared wound of any loose clot and inspected the area for active oozing/bleeding  Some pooling was noted in the right inferiolateral portion of the wound, silver nitrate applied to this area, wound was inspected with additional silver nitrate applied in areas of oozing  Sacral wound was packed with saline soaked Kerlix, covered with ABD x2   Patient tolerated this well without complications  No oozing/bleeding was observed at the completion of dressing change  Nursing made aware, told reach out with any questions or concerns      Plan:  - WTD Kerlix dressing with overlying ABD x2 to remain for now, will reassess with team tomorrow 6/21  - AM CBC tomorrow 6/21  - monitor for continued oozing/bleeding  - care per primary team

## 2022-06-21 LAB
ANION GAP SERPL CALCULATED.3IONS-SCNC: 4 MMOL/L (ref 4–13)
BASOPHILS # BLD AUTO: 0.04 THOUSANDS/ΜL (ref 0–0.1)
BASOPHILS NFR BLD AUTO: 0 % (ref 0–1)
BUN SERPL-MCNC: 29 MG/DL (ref 5–25)
CALCIUM SERPL-MCNC: 8.3 MG/DL (ref 8.3–10.1)
CHLORIDE SERPL-SCNC: 98 MMOL/L (ref 100–108)
CO2 SERPL-SCNC: 32 MMOL/L (ref 21–32)
CREAT SERPL-MCNC: 0.36 MG/DL (ref 0.6–1.3)
EOSINOPHIL # BLD AUTO: 0.14 THOUSAND/ΜL (ref 0–0.61)
EOSINOPHIL NFR BLD AUTO: 1 % (ref 0–6)
ERYTHROCYTE [DISTWIDTH] IN BLOOD BY AUTOMATED COUNT: 18.1 % (ref 11.6–15.1)
GFR SERPL CREATININE-BSD FRML MDRD: 102 ML/MIN/1.73SQ M
GLUCOSE SERPL-MCNC: 153 MG/DL (ref 65–140)
HCT VFR BLD AUTO: 24.8 % (ref 34.8–46.1)
HGB BLD-MCNC: 7.4 G/DL (ref 11.5–15.4)
IMM GRANULOCYTES # BLD AUTO: 0.36 THOUSAND/UL (ref 0–0.2)
IMM GRANULOCYTES NFR BLD AUTO: 2 % (ref 0–2)
LYMPHOCYTES # BLD AUTO: 1.56 THOUSANDS/ΜL (ref 0.6–4.47)
LYMPHOCYTES NFR BLD AUTO: 10 % (ref 14–44)
MCH RBC QN AUTO: 30.3 PG (ref 26.8–34.3)
MCHC RBC AUTO-ENTMCNC: 29.8 G/DL (ref 31.4–37.4)
MCV RBC AUTO: 102 FL (ref 82–98)
MONOCYTES # BLD AUTO: 1.09 THOUSAND/ΜL (ref 0.17–1.22)
MONOCYTES NFR BLD AUTO: 7 % (ref 4–12)
NEUTROPHILS # BLD AUTO: 11.95 THOUSANDS/ΜL (ref 1.85–7.62)
NEUTS SEG NFR BLD AUTO: 80 % (ref 43–75)
NRBC BLD AUTO-RTO: 0 /100 WBCS
PLATELET # BLD AUTO: 258 THOUSANDS/UL (ref 149–390)
PMV BLD AUTO: 9.5 FL (ref 8.9–12.7)
POTASSIUM SERPL-SCNC: 4 MMOL/L (ref 3.5–5.3)
RBC # BLD AUTO: 2.44 MILLION/UL (ref 3.81–5.12)
SODIUM SERPL-SCNC: 134 MMOL/L (ref 136–145)
WBC # BLD AUTO: 15.14 THOUSAND/UL (ref 4.31–10.16)

## 2022-06-21 PROCEDURE — 80048 BASIC METABOLIC PNL TOTAL CA: CPT | Performed by: INTERNAL MEDICINE

## 2022-06-21 PROCEDURE — C9113 INJ PANTOPRAZOLE SODIUM, VIA: HCPCS | Performed by: INTERNAL MEDICINE

## 2022-06-21 PROCEDURE — 97530 THERAPEUTIC ACTIVITIES: CPT

## 2022-06-21 PROCEDURE — 99232 SBSQ HOSP IP/OBS MODERATE 35: CPT | Performed by: INTERNAL MEDICINE

## 2022-06-21 PROCEDURE — 85025 COMPLETE CBC W/AUTO DIFF WBC: CPT | Performed by: INTERNAL MEDICINE

## 2022-06-21 RX ADMIN — ATOVAQUONE 750 MG: 750 SUSPENSION ORAL at 08:35

## 2022-06-21 RX ADMIN — PREDNISONE 30 MG: 20 TABLET ORAL at 08:35

## 2022-06-21 RX ADMIN — ASPIRIN 81 MG CHEWABLE TABLET 81 MG: 81 TABLET CHEWABLE at 08:35

## 2022-06-21 RX ADMIN — ENOXAPARIN SODIUM 40 MG: 40 INJECTION SUBCUTANEOUS at 08:34

## 2022-06-21 RX ADMIN — Medication 1000 UNITS: at 08:35

## 2022-06-21 RX ADMIN — GLYCERIN, HYPROMELLOSE, POLYETHYLENE GLYCOL 2 DROP: .2; .2; 1 LIQUID OPHTHALMIC at 18:00

## 2022-06-21 RX ADMIN — ACETAMINOPHEN 650 MG: 650 SUSPENSION ORAL at 18:00

## 2022-06-21 RX ADMIN — LORATADINE 10 MG: 10 TABLET ORAL at 08:35

## 2022-06-21 RX ADMIN — Medication 250 MG: at 10:18

## 2022-06-21 RX ADMIN — ACETAMINOPHEN 649.6 MG: 650 SUSPENSION ORAL at 10:18

## 2022-06-21 RX ADMIN — PANTOPRAZOLE SODIUM 40 MG: 40 INJECTION, POWDER, FOR SOLUTION INTRAVENOUS at 08:35

## 2022-06-21 RX ADMIN — RIOCIGUAT 2.5 MG: 2.5 TABLET, FILM COATED ORAL at 22:34

## 2022-06-21 RX ADMIN — GLYCERIN, HYPROMELLOSE, POLYETHYLENE GLYCOL 2 DROP: .2; .2; 1 LIQUID OPHTHALMIC at 08:35

## 2022-06-21 RX ADMIN — MACITENTAN 10 MG: 10 TABLET, FILM COATED ORAL at 08:35

## 2022-06-21 RX ADMIN — LEVOTHYROXINE SODIUM 25 MCG: 25 TABLET ORAL at 05:00

## 2022-06-21 RX ADMIN — ACETAMINOPHEN 650 MG: 650 SUSPENSION ORAL at 05:00

## 2022-06-21 RX ADMIN — SODIUM CHLORIDE 250 ML: 0.9 INJECTION, SOLUTION INTRAVENOUS at 13:11

## 2022-06-21 RX ADMIN — ACETAMINOPHEN 649.6 MG: 650 SUSPENSION ORAL at 22:35

## 2022-06-21 RX ADMIN — Medication 250 MG: at 18:00

## 2022-06-21 NOTE — PROGRESS NOTES
[address] [facility]  Progress Note Mary Ellen Shore 1940, 80 y o  female MRN: [id number]  [facility]: [id number]  Primary Care Provider: No primary care provider on file  Date and time admitted to hospital: 6/14/2022  8:07 PM    Hearing loss  Assessment & Plan  Patient reports 3 months of progressively worsening hearing  As per previous admission, concerning for atovaquone versus Lasix  Discussed with ENT, have declines seeing patient in hospital; consider outpatient referral  Trial different antibiotic prophylaxis    Pneumonia  Assessment & Plan  See accompanying plan under acute respiratory failure    Acute respiratory failure with hypoxia [facility])  Assessment & Plan  Patient with worsening respiratory requirement; initially require 10 L nasal cannula  Appreciate respiratory recommendations, has been suctioned with improvement in oxygenation  X-ray chest shows mild vascular congestion  Consider volume overload (appears euvolemic on exam) versus PE (unlikely, heart rate normal and blood pressure stable) versus pneumonia (bilateral lower lobe PNA demonstrated on CT chest)  Currently on antibiotics for treatment of sacral ulcer; consult to ID  Respiratory protocol, monitor consult pulmonology as needed  History of pulmonary hypertension; continue home Adempas and Opsumit    --patient placed on step-down level 2 and confirmed with patient and family that she is full code and accepting of intubation as needed      Now on 2 L     Urinary retention  Assessment & Plan  Patient with history of urinary retention and spasm; previously on myrbetriq and Gemtesa  Continue lilly catheter at this time; treat symptomatically  Follows with urology in the outpatient setting     Anemia  Assessment & Plan  Patient noted to have low hemoglobin on admission-6 9  Has received 1 unit PRBCs on 06/15, but repeat CBC was 6 5  Iron panel shows low iron and TIBC consistent with anemia of chronic disease; reticulocytes elevated  B12 and folate within normal limits; will consider iron supplementation once infection resolved  In the meantime, hemoglobin stable at 7 4, monitor      Acquired hypothyroidism  Assessment & Plan  As per recent PCP note, appears to take 25 mcg daily  Continue home medication, TSH within normal limits    [facility]  Assessment & Plan  Follows with Rheumatology in the outpatient setting  Will restart prednisone 30 mg daily  Atovaquone for PCP prophylaxis (consider substituting Bactrim or other prophylactic agent in setting of possible ototoxicity)    Dysphagia, oropharyngeal phase  Assessment & Plan  Patient with history of dysphagia, peg tube in place  Continue current tube feeds; appreciate nutritional recommendations    Severe protein-calorie malnutrition ([facility] )  Assessment & Plan  Malnutrition Findings:   Adult Malnutrition type: Chronic illness  Adult Degree of Malnutrition: Other severe protein calorie malnutrition  Malnutrition Characteristics: Muscle loss, Fat loss, Weight loss, Inadequate energy           360 Statement: related to disease/condition evidenced by increased kcal/pro needs for wound healing Stage IV sacral decub, BMI 18 4 adjusted per prior facility wt 6/7/22;severe buccal fat pads loss, severe deltoid muscle loss, Patient lost 31# (20 6%) since 3/15/22 past 3 months with illness <75% energy intake versus needs for > 1 month  Treating with EN support and Robert TID via PEG for wound healing    BMI Findings:  Adult BMI Classifications: Underweight < 18 5        Body mass index is 22 53 kg/m²         * Sacral wound  Assessment & Plan  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  Underwent surgical debridement on 06/14/2022 with general surgery; transferred to medicine service for chronic illness  Initially placed on vanc and cefepime for antibiotic therapy; however ID evaluated and stopped antibiotics  Repositioning; wound care consult  Supportive care, pain medications  Nutritional support  Wound VAC in place with frequent wound VAC changes as per surgical team  A discussion with surgical team yesterday, no further surgery planned; wound VAC change on              VTE Pharmacologic Prophylaxis: VTE Score: 3 Moderate Risk (Score 3-4) - Pharmacological DVT Prophylaxis Ordered: heparin  Patient Centered Rounds: I performed bedside rounds with nursing staff today  Discussions with Specialists or Other Care Team Provider: Discussed with nursing  Education and Discussions with Family / Patient: Updated  (daughter and sister and brother ) at bedside  Time Spent for Care: 30 minutes  More than 50% of total time spent on counseling and coordination of care as described above  Current Length of Stay: 7 day(s)  Current Patient Status: Inpatient   Certification Statement: The patient will continue to require additional inpatient hospital stay due to sacral ulcer , hypotension, [facility] rising   Discharge Plan: Anticipate discharge in 48 hrs to rehab facility  Code Status: Level 1 - Full Code    Subjective:   Patient seen and examined   No new symptoms   No dizziness   No headache   No weakness   No syncope    Objective:     Vitals:   Temp (24hrs), Av 4 °F (36 9 °C), Min:97 8 °F (36 6 °C), Max:98 7 °F (37 1 °C)    Temp:  [97 8 °F (36 6 °C)-98 7 °F (37 1 °C)] 97 8 °F (36 6 °C)  HR:  [74-81] 74  Resp:  [19] 19  BP: ()/(42-53) 100/44  SpO2:  [95 %-98 %] 98 %  Body mass index is 22 53 kg/m²  Input and Output Summary (last 24 hours): Intake/Output Summary (Last 24 hours) at 2022 1643  Last data filed at 2022 1421  Gross per 24 hour   Intake 80 ml   Output 2050 ml   Net -1970 ml       Physical Exam:   Physical Exam  Constitutional:       General: She is not in acute distress  Appearance: She is ill-appearing (chroncially )  She is not toxic-appearing or diaphoretic     HENT:      Head: Normocephalic  Eyes:      Pupils: Pupils are equal, round, and reactive to light  Cardiovascular:      Rate and Rhythm: Normal rate  Pulmonary:      Effort: Pulmonary effort is normal    Abdominal:      General: Abdomen is flat  Skin:     Coloration: Skin is pale  Comments: Viewed ulcer on imaging done by wound and surgery      Psychiatric:         Mood and Affect: Mood normal           Additional Data:     Labs:  Results from last 7 days   Lab Units 06/21/22  0505 06/20/22  0527 06/19/22  0537   WBC Thousand/uL 15 14*   < > 14 91*   HEMOGLOBIN g/dL 7 4*   < > 8 6*   HEMATOCRIT % 24 8*   < > 28 2*   PLATELETS Thousands/uL 258   < > 283   BANDS PCT %  --   --  11*   NEUTROS PCT % 80*  --   --    LYMPHS PCT % 10*  --   --    LYMPHO PCT %  --   --  9*   MONOS PCT % 7  --   --    MONO PCT %  --   --  5   EOS PCT % 1  --  1    < > = values in this interval not displayed  Results from last 7 days   Lab Units 06/21/22  0505 06/20/22  0527 06/19/22  0537   SODIUM mmol/L 134*   < > 133*   POTASSIUM mmol/L 4 0   < > 4 1   CHLORIDE mmol/L 98*   < > 98*   CO2 mmol/L 32   < > 32   BUN mg/dL 29*   < > 38*   CREATININE mg/dL 0 36*   < > 0 36*   ANION GAP mmol/L 4   < > 3*   CALCIUM mg/dL 8 3   < > 8 2*   ALBUMIN g/dL  --   --  2 1*   TOTAL BILIRUBIN mg/dL  --   --  0 39   ALK PHOS U/L  --   --  86   ALT U/L  --   --  19   AST U/L  --   --  19   GLUCOSE RANDOM mg/dL 153*   < > 133    < > = values in this interval not displayed  Results from last 7 days   Lab Units 06/17/22  0542 06/15/22  0814   LACTIC ACID mmol/L  --  0 8   PROCALCITONIN ng/ml 0 13  --        Lines/Drains:  Invasive Devices  Report    Peripheral Intravenous Line  Duration           Long-Dwell Peripheral IV (Midline) 06/14/22 Right Brachial 7 days    Peripheral IV 06/19/22 Dorsal (posterior); Left;Proximal Forearm 2 days          Drain  Duration           Gastrostomy/Enterostomy -- days    Urethral Catheter -- days Urinary Catheter:  Goal for removal: NA                Imaging: No pertinent imaging reviewed  Recent Cultures (last 7 days):   Results from last 7 days   Lab Units 06/14/22  1749 06/14/22  1651   BLOOD CULTURE  No Growth After 5 Days  No Growth After 5 Days  Last 24 Hours Medication List:   Current Facility-Administered Medications   Medication Dose Route Frequency Provider Last Rate    acetaminophen  650 mg Per G Tube Q6H Duane Doyle MD      acetaminophen  650 mg Per G Tube Q4H PRN Edu Olson MD      artificial tear   Both Eyes HS Edu Olson MD      aspirin  81 mg Per G Tube Daily Edu Olson MD      atovaquone  750 mg Per G Tube Daily With Breakfast Edu Olson MD      cholecalciferol  1,000 Units Per G Tube Daily Edu Olson MD      enoxaparin  40 mg Subcutaneous Q24H [address] Edu Olson MD      glycerin-hypromellose-  2 drop Both Eyes BID Edu Olson MD      labetalol  10 mg Intravenous Q6H PRN Duane Doyle MD      levothyroxine  25 mcg Per PEG Tube Early Morning Edu Olson MD      loratadine  10 mg Per G Tube Daily Edu Olson MD      Macitentan  10 mg Per G Tube Daily Edu Olson MD      melatonin  3 mg Oral HS PRN Duane Doyle MD      ondansetron  4 mg Intravenous Q6H PRN Duane Doyle MD      oxyCODONE  2 5 mg Per G Tube Q4H PRN Duane Doyle MD      oxyCODONE  5 mg Per G Tube Q4H PRN Duane Doyle MD      pantoprazole  40 mg Intravenous Q24H [address] Edu Olson MD      predniSONE  30 mg Per G Tube Daily Edu Olson MD      Riociguat  2 5 mg Per G Tube TID Edu Olson MD      saccharomyces boulardii  250 mg Per Bharat Bars BID Edu Olson MD          Today, Patient Was Seen By: Moody Rushing MD    **Please Note: This note may have been constructed using a voice recognition system  **

## 2022-06-21 NOTE — QUICK NOTE
Wound check completed  Good hemostasis seen  Some fibrinous material at base  Did gentle scraping  Placed mesalt, WTD kerlex and allevyn applied  Will re-eval tomorrow for wound vac

## 2022-06-21 NOTE — PLAN OF CARE
Problem: PHYSICAL THERAPY ADULT  Goal: Performs mobility at highest level of function for planned discharge setting  See evaluation for individualized goals  Description: Treatment/Interventions: LE strengthening/ROM, Therapeutic exercise, Endurance training, Patient/family training, Equipment eval/education, Bed mobility, OT, Spoke to nursing, Continued evaluation          See flowsheet documentation for full assessment, interventions and recommendations  Note: Prognosis: Fair  Problem List: Decreased strength, Impaired balance, Decreased endurance, Decreased mobility, Decreased coordination, Decreased cognition, Decreased safety awareness, Impaired judgement, Pain  Assessment: Pt seen for session for completion of mobility assessment  Session consisted of setup, bed mob, time spent EOB w/ wt shifting activities, transfers/standing trial, repositioning  Pt cooperative, limited by pain, dizziness, fatigue  Needed a bit less assist w/ bed mob, and note improved sitting tolerance  some BP drop but less symptomatic   able to bear some wt in standing, but not enough to transition to chair or ambulate  continue to recommend rehab at d/c           PT Discharge Recommendation: Return to facility with rehabilitation services          See flowsheet documentation for full assessment

## 2022-06-21 NOTE — DISCHARGE INSTR - OTHER ORDERS
Skin care plans:  1  Irrigate sacral wound with normal saline, apply 1/4 strength dakins moistened gauze, cover with ABD, secure with medfix/paper tape  Change daily and PRN soilage/displacement  2  Apply skin nourishing cream the entire skin daily for moisture  3  Turn and reposition patient every  2 hours   4  Elevate heels off of bed with pillows to offload pressure   5  Apply EHOB waffle cushion to chair when OOB, if able  6  Cleanse meseret-anal with soap and water, pat dry, and apply calazime cream TID and PRN  7  Cleanse L heel wound with NSS, pat dry, and apply calcium alginate and cover with bordered Allevyn foam dressing  [place] dressing with T  Change every other day and as needed for soilage/dislodgement  8  Cleanse B/L elbow wound with NSS, pat dry, and apply bordered Allevyn foam dressing  [place] dressing with T  Change every other day and as needed for soilage/dislodgement  9  Continue on P-500 mattress, low air loss mattress at facility upon discharge   10   Cleanse b/l groin with soap and water, pat dry, and dust the skin lightly with nystatin powder BID

## 2022-06-21 NOTE — ASSESSMENT & PLAN NOTE
Patient with worsening respiratory requirement; initially require 10 L nasal cannula  Appreciate respiratory recommendations, has been suctioned with improvement in oxygenation  X-ray chest shows mild vascular congestion  Consider volume overload (appears euvolemic on exam) versus PE (unlikely, heart rate normal and blood pressure stable) versus pneumonia (bilateral lower lobe PNA demonstrated on CT chest)  Currently on antibiotics for treatment of sacral ulcer; consult to ID  Respiratory protocol, monitor consult pulmonology as needed  History of pulmonary hypertension; continue home Adempas and Opsumit    --patient placed on step-down level 2 and confirmed with patient and family that she is full code and accepting of intubation as needed      Now on 2 L

## 2022-06-21 NOTE — PROGRESS NOTES
Progress Note - Infectious Disease   Tate Ellison 80 y o  female MRN: [id number]  [facility] Encounter: [id number]      Impression/Recommendations:  1   SIRS, POA   HR, WBC   Due to #2   No appreciable source of acute infection   Serial procalcitonin, UA, Flu/RSV/COVID PCR, blood cultures, CXR, CT C/A/P negative   Infiltrates at bases of lungs on CT are quire minimal and may be due to chronic aspiration versus atelectasis versus CREST   Doubt clinical pneumonia   Hemodynamically stable and non-toxic  Persistent WBC likely non-infectious and multifactorial due to steroids, reactive to anemia  Remains stable off antibiotics  Rec:  · Continue to follow closely off antibiotics  · Follow temperatures and WBC closely  · Supportive care as per the primary service     2   Stage IV sacral ulcer   Presented to deterioration with necrotic slough, likely due to ongoing pressure injury   Status post excisional debridement down to bone, VAC 6/14   No intraoperative purulence noted   No cellulitis  Complicated by bleeding 5/45 requiring VAC removal  Rec:  · Continue LWC per surgery  · Offloading per nursing  · No role for long-term IV antibiotics at this point     3   CREST   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, status post IVIG, IV steroids   On recent steroid taper, should now be on 20 mg daily until seen outpatient by Rheum at [facility]  Rec:  · Continue steroids  · Continue Atovaquone for PCP prophylaxis  · Outpatient Rheum follow-up at [facility]     4   Dysphagia   Status post PEG   History of aspiration, mucus plugging in the past      5   Urinary retention   With chronic indwelling Tejada catheter      6   PAH   On Opsumit, Viociguat      Antibiotics:  Off antibiotics #4    Subjective:  Patient seen on AM rounds  Feels well today  Offers no complaints  Denies fevers, chills, sweats, nausea, vomiting, or diarrhea      24 Hour Events:  No documented fevers, chills, sweats, nausea, vomiting, or diarrhea  Developed bleeding from wound requiring VAC removal   Hgb dropped 1g  Objective:  Vitals:  Temp:  [98 4 °F (36 9 °C)-98 7 °F (37 1 °C)] 98 4 °F (36 9 °C)  HR:  [69-81] 81  Resp:  [19] 19  BP: ()/(46-53) 104/53  SpO2:  [95 %-98 %] 98 %  Temp (24hrs), Av 6 °F (37 °C), Min:98 4 °F (36 9 °C), Max:98 7 °F (37 1 °C)  Current: Temperature: 98 4 °F (36 9 °C)    Physical Exam:   General:  No acute distress  Psychiatric:  Awake and alert  Pulmonary:  Normal respiratory excursion without accessory muscle use  Abdomen:  Soft, nontender  Extremities:  No edema  Skin:  No rashes    Lab Results:  I have personally reviewed pertinent labs  Results from last 7 days   Lab Units 22  0505 22  0527 22  0537 22  0451 22  0542   POTASSIUM mmol/L 4 0 4 2 4 1 4 2 3 8   CHLORIDE mmol/L 98* 99* 98* 96* 99*   CO2 mmol/L 32 34* 32 31 33*   BUN mg/dL 29* 34* 38* 28* 28*   CREATININE mg/dL 0 36* 0 34* 0 36* 0 36* 0 28*   EGFR ml/min/1 73sq m 102 104 102 102 111   CALCIUM mg/dL 8 3 8 1* 8 2* 8 3 8 1*   AST U/L  --   --  19 17 20   ALT U/L  --   --  19 17 19   ALK PHOS U/L  --   --  86 79 83     Results from last 7 days   Lab Units 22  0505 22  0527 22  0537   WBC Thousand/uL 15 14* 13 16* 14 91*   HEMOGLOBIN g/dL 7 4* 8 6* 8 6*   PLATELETS Thousands/uL 258 259 283     Results from last 7 days   Lab Units 22  1749 22  1651   BLOOD CULTURE  No Growth After 5 Days  No Growth After 5 Days  Imaging Studies:   I have personally reviewed pertinent imaging study reports and images in PACS  EKG, Pathology, and Other Studies:   I have personally reviewed pertinent reports

## 2022-06-21 NOTE — ASSESSMENT & PLAN NOTE
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  Underwent surgical debridement on 06/14/2022 with general surgery; transferred to medicine service for chronic illness  Initially placed on vanc and cefepime for antibiotic therapy; however ID evaluated and stopped antibiotics  Repositioning; wound care consult  Supportive care, pain medications  Nutritional support  Wound VAC in place with frequent wound VAC changes as per surgical team  A discussion with surgical team yesterday, no further surgery planned; wound VAC change on 6/20

## 2022-06-21 NOTE — CASE MANAGEMENT
Case Management Discharge Planning Note    Patient name Waymond Simmonds  Location [facility]-01 MRN [id number]  : 1940 Date 2022       Current Admission Date: 2022  Current Admission Diagnosis:Sacral wound   Patient Active Problem List    Diagnosis Date Noted    Hearing loss 2022    Severe protein-calorie malnutrition ([facility] [address] ) 2022    Pneumonia 2022    Sacral wound 2022    Dysphagia, oropharyngeal phase 2022    Scleroderma ([facility] [address] ) 2022    Acquired hypothyroidism 2022    Anemia 2022    Urinary retention 2022    Acute respiratory failure with hypoxia ([facility] [address] ) 2022      LOS (days): 7  Geometric Mean LOS (GMLOS) (days): 9 60  Days to GMLOS:2 9     OBJECTIVE:  Risk of Unplanned Readmission Score: 17 38         Current admission status: Inpatient   Preferred Pharmacy:   PATIENT/FAMILY REPORTS NO PREFERRED PHARMACY  No address on file      Primary Care Provider: No primary care provider on file  Primary Insurance: [facility]  Secondary Insurance:     DISCHARGE DETAILS:                          Additional Comments: Patient transferred to [facility]  Reviewed chart, patient is being followed by [facility], where patient had been receiving STR as of 6/3/22  Patient accepted for return to [facility], pending insurance authorization through [place] [employer]  CM to follow

## 2022-06-21 NOTE — WOUND OSTOMY CARE
Consult Note - Wound   Nikky Weldon 80 y o  female MRN: [id number]  Unit/Bed#: [facility] Encounter: [id number]      History and Present Illness: Patient is seen for wound care consult today   80year old female with scleroderma , bed-bound, interstitial lung disease,dysphagia ,pulmonary hypertension and the stage 4 on admission  The patient is in bed for the assessment of the and is on the P - 500 low air loss mattress   She is a Mod A of 2 for rolling in the bed   Tejada in place and noted incontinence of stool   She is being followed by surgery for debridement and the plan is to place a wound VAC for management of the large stage 4   Assessment Findings:   1  Bilateral heels dry and intact with allevyn foams on for prevention   Patient does not want her heels offloaded with the pillow and is not sure about  Wearing the prevalon boots due to movement   Discussed and to just try the boots for offloading   2  Sacral large stage 4 - POA Noted bone in the base of the wound with granular tissue and slough in the center of the wound bed  remaining   Surgery is following and has debrided the wound   Will evaluate on 6/22 for [facility] placement   Discussed with the surgical PA the plan   3  Left elbow - hospital acquired unstageable area with slough in the wound bed of yellow and noted on bony area - allevyn foam placed  4  Right elbow unable to assess at this time   Will reassess   Skin care plans:  1Sacral - cleanse with Normal saline then apply moistened kerlix with saline then top with allevyn foam change daily - VAC to be placed by surgery 6/22   2-Bilateral heels apply allevyn foam gianna with a P and date peel and check skin integrity every shift change every 3 days   3-Elevate heels to offload pressure  4-Ehob cushion when out of bed  5-Turn/repoisiton q2h or when medically stable for pressure re-distribution on skin  6-Moisturize skin daily with skin nourishing cream  7   Left elbow - cleanse with Normal saline then apply allevynfoam gianna with a T and date change every other day   8  P 500 low air loss           Vitals: Blood pressure (!) 100/44, pulse 74, temperature 97 8 °F (36 6 °C), temperature source Oral, resp  rate 19, height 5' 7 5" (1 715 m), weight 66 2 kg (146 lb), SpO2 98 %  ,Body mass index is 22 53 kg/m²  Wound 06/14/22 Pressure Injury Buttocks N/A (Active)   Wound Image   06/21/22 1259   Wound Description Beefy red;Fragile; Exposed bone;Slough 06/21/22 1259   Pressure Injury Stage 4 06/21/22 1259   Romy-wound Assessment Fragile; Maceration 06/21/22 1259   Wound Length (cm) 11 cm 06/21/22 1259   Wound Width (cm) 10 cm 06/21/22 1259   Wound Depth (cm) 6 cm 06/21/22 1259   Wound Surface Area (cm^2) 110 cm^2 06/21/22 1259   Wound Volume (cm^3) 660 cm^3 06/21/22 1259   Calculated Wound Volume (cm^3) 660 cm^3 06/21/22 1259   Undermining 4 06/21/22 1259   Undermining is depth extending from 12-11 06/21/22 1259   Wound Site Closure TERE 06/21/22 0825   Drainage Amount Small 06/21/22 1259   Drainage Description Serosanguineous 06/21/22 1259   Non-staged Wound Description Full thickness 06/21/22 1259   Treatments Cleansed;Irrigation with NSS;Site care 06/21/22 1259   Dressing Foam, Silicon (eg  Allevyn, etc); Moist to Moist 06/21/22 1259   Wound packed? Yes 06/21/22 1259   Packing- # removed 1 06/21/22 1259   Packing- # inserted 1 06/21/22 1259   Dressing Changed Changed 06/21/22 1259   Patient Tolerance Tolerated well 06/21/22 1259   Dressing Status New drainage 06/21/22 0825       Wound 06/21/22 Pressure Injury Arm Left;Posterior;Proximal;Lower (Active)   Wound Image   06/21/22 1253   Wound Description Clean;Fragile; Tan 06/21/22 1253   Pressure Injury Stage U 06/21/22 1253   Romy-wound Assessment Clean;Dry; Intact 06/21/22 1253   Wound Length (cm) 1 cm 06/21/22 1253   Wound Width (cm) 0 7 cm 06/21/22 1253   Wound Depth (cm) 0 cm 06/21/22 1253   Wound Surface Area (cm^2) 0 7 cm^2 06/21/22 1253 Wound Volume (cm^3) 0 cm^3 06/21/22 1253   Calculated Wound Volume (cm^3) 0 cm^3 06/21/22 1253   Drainage Amount None 06/21/22 1253   Treatments Site care 06/21/22 1253   Dressing Foam, Silicon (eg  Allevyn, etc) 06/21/22 1253   Dressing Changed Reinforced 06/21/22 1253   Patient Tolerance Tolerated well 06/21/22 1253       Wound 06/21/22 Pressure Injury Other (Comment) Elbow Posterior;Right (Active)   Wound Description Clean;Dry; Intact;Fragile;Pink 06/21/22 0825   Pressure Injury Stage 2 06/21/22 0825   Romy-wound Assessment Clean;Dry; Intact;Pink;Fragile 06/21/22 0825   Wound Site Closure TERE 06/21/22 0825   Drainage Amount Small 06/21/22 0825   Drainage Description Clear 06/21/22 0825   Treatments Cleansed;Site care 06/21/22 0825   Dressing Foam, Silicon (eg  Allevyn, etc) 06/21/22 0825   Dressing Status Clean;Dry; Intact 06/21/22 0825       Wound care will follow weekly call or tiger text with questions     Jerome Tejeda RN BSN CWOCN

## 2022-06-21 NOTE — ASSESSMENT & PLAN NOTE
Patient noted to have low hemoglobin on admission-6 9  Has received 1 unit PRBCs on 06/15, but repeat CBC was 6 5  Iron panel shows low iron and TIBC consistent with anemia of chronic disease; reticulocytes elevated  B12 and folate within normal limits; will consider iron supplementation once infection resolved  In the meantime, hemoglobin stable at 7 4, monitor

## 2022-06-22 LAB
ABO GROUP BLD: NORMAL
ALBUMIN SERPL BCP-MCNC: 1.7 G/DL (ref 3.5–5)
ALP SERPL-CCNC: 87 U/L (ref 46–116)
ALT SERPL W P-5'-P-CCNC: 21 U/L (ref 12–78)
ANION GAP SERPL CALCULATED.3IONS-SCNC: 3 MMOL/L (ref 4–13)
AST SERPL W P-5'-P-CCNC: 20 U/L (ref 5–45)
BASOPHILS # BLD AUTO: 0.04 THOUSANDS/ΜL (ref 0–0.1)
BASOPHILS NFR BLD AUTO: 0 % (ref 0–1)
BILIRUB SERPL-MCNC: 0.15 MG/DL (ref 0.2–1)
BLD GP AB SCN SERPL QL: NEGATIVE
BUN SERPL-MCNC: 22 MG/DL (ref 5–25)
CALCIUM ALBUM COR SERPL-MCNC: 10.1 MG/DL (ref 8.3–10.1)
CALCIUM SERPL-MCNC: 8.3 MG/DL (ref 8.3–10.1)
CHLORIDE SERPL-SCNC: 99 MMOL/L (ref 100–108)
CO2 SERPL-SCNC: 32 MMOL/L (ref 21–32)
CREAT SERPL-MCNC: 0.28 MG/DL (ref 0.6–1.3)
EOSINOPHIL # BLD AUTO: 0.08 THOUSAND/ΜL (ref 0–0.61)
EOSINOPHIL NFR BLD AUTO: 1 % (ref 0–6)
ERYTHROCYTE [DISTWIDTH] IN BLOOD BY AUTOMATED COUNT: 18.3 % (ref 11.6–15.1)
GFR SERPL CREATININE-BSD FRML MDRD: 111 ML/MIN/1.73SQ M
GLUCOSE SERPL-MCNC: 120 MG/DL (ref 65–140)
HCT VFR BLD AUTO: 24.1 % (ref 34.8–46.1)
HGB BLD-MCNC: 7.2 G/DL (ref 11.5–15.4)
IMM GRANULOCYTES # BLD AUTO: 0.3 THOUSAND/UL (ref 0–0.2)
IMM GRANULOCYTES NFR BLD AUTO: 2 % (ref 0–2)
LACTATE SERPL-SCNC: 1.1 MMOL/L (ref 0.5–2)
LYMPHOCYTES # BLD AUTO: 1.54 THOUSANDS/ΜL (ref 0.6–4.47)
LYMPHOCYTES NFR BLD AUTO: 11 % (ref 14–44)
MCH RBC QN AUTO: 30.6 PG (ref 26.8–34.3)
MCHC RBC AUTO-ENTMCNC: 29.9 G/DL (ref 31.4–37.4)
MCV RBC AUTO: 103 FL (ref 82–98)
MONOCYTES # BLD AUTO: 0.95 THOUSAND/ΜL (ref 0.17–1.22)
MONOCYTES NFR BLD AUTO: 7 % (ref 4–12)
NEUTROPHILS # BLD AUTO: 11.18 THOUSANDS/ΜL (ref 1.85–7.62)
NEUTS SEG NFR BLD AUTO: 79 % (ref 43–75)
NRBC BLD AUTO-RTO: 0 /100 WBCS
PLATELET # BLD AUTO: 264 THOUSANDS/UL (ref 149–390)
PMV BLD AUTO: 10 FL (ref 8.9–12.7)
POTASSIUM SERPL-SCNC: 3.9 MMOL/L (ref 3.5–5.3)
PROT SERPL-MCNC: 5.7 G/DL (ref 6.4–8.2)
RBC # BLD AUTO: 2.35 MILLION/UL (ref 3.81–5.12)
RH BLD: POSITIVE
SODIUM SERPL-SCNC: 134 MMOL/L (ref 136–145)
SPECIMEN EXPIRATION DATE: NORMAL
WBC # BLD AUTO: 14.09 THOUSAND/UL (ref 4.31–10.16)

## 2022-06-22 PROCEDURE — P9016 RBC LEUKOCYTES REDUCED: HCPCS

## 2022-06-22 PROCEDURE — 86850 RBC ANTIBODY SCREEN: CPT | Performed by: PHYSICIAN ASSISTANT

## 2022-06-22 PROCEDURE — 86923 COMPATIBILITY TEST ELECTRIC: CPT

## 2022-06-22 PROCEDURE — 99223 1ST HOSP IP/OBS HIGH 75: CPT | Performed by: INTERNAL MEDICINE

## 2022-06-22 PROCEDURE — 99232 SBSQ HOSP IP/OBS MODERATE 35: CPT | Performed by: INTERNAL MEDICINE

## 2022-06-22 PROCEDURE — 86901 BLOOD TYPING SEROLOGIC RH(D): CPT | Performed by: PHYSICIAN ASSISTANT

## 2022-06-22 PROCEDURE — C9113 INJ PANTOPRAZOLE SODIUM, VIA: HCPCS | Performed by: INTERNAL MEDICINE

## 2022-06-22 PROCEDURE — 97606 NEG PRS WND THER DME>50 SQCM: CPT | Performed by: PHYSICIAN ASSISTANT

## 2022-06-22 PROCEDURE — 86900 BLOOD TYPING SEROLOGIC ABO: CPT | Performed by: PHYSICIAN ASSISTANT

## 2022-06-22 PROCEDURE — 30233N1 TRANSFUSION OF NONAUTOLOGOUS RED BLOOD CELLS INTO PERIPHERAL VEIN, PERCUTANEOUS APPROACH: ICD-10-PCS | Performed by: INTERNAL MEDICINE

## 2022-06-22 PROCEDURE — 85025 COMPLETE CBC W/AUTO DIFF WBC: CPT | Performed by: PHYSICIAN ASSISTANT

## 2022-06-22 PROCEDURE — 83605 ASSAY OF LACTIC ACID: CPT | Performed by: INTERNAL MEDICINE

## 2022-06-22 PROCEDURE — 80053 COMPREHEN METABOLIC PANEL: CPT | Performed by: PHYSICIAN ASSISTANT

## 2022-06-22 RX ADMIN — MACITENTAN 10 MG: 10 TABLET, FILM COATED ORAL at 08:11

## 2022-06-22 RX ADMIN — ACETAMINOPHEN 650 MG: 650 SUSPENSION ORAL at 05:34

## 2022-06-22 RX ADMIN — RIOCIGUAT 2.5 MG: 2.5 TABLET, FILM COATED ORAL at 16:22

## 2022-06-22 RX ADMIN — MINERAL OIL AND WHITE PETROLATUM: 150; 830 OINTMENT OPHTHALMIC at 05:34

## 2022-06-22 RX ADMIN — GLYCERIN, HYPROMELLOSE, POLYETHYLENE GLYCOL 2 DROP: .2; .2; 1 LIQUID OPHTHALMIC at 08:12

## 2022-06-22 RX ADMIN — ACETAMINOPHEN 650 MG: 650 SUSPENSION ORAL at 10:19

## 2022-06-22 RX ADMIN — ASPIRIN 81 MG CHEWABLE TABLET 81 MG: 81 TABLET CHEWABLE at 08:11

## 2022-06-22 RX ADMIN — LEVOTHYROXINE SODIUM 25 MCG: 25 TABLET ORAL at 05:35

## 2022-06-22 RX ADMIN — LORATADINE 10 MG: 10 TABLET ORAL at 08:11

## 2022-06-22 RX ADMIN — Medication 250 MG: at 16:24

## 2022-06-22 RX ADMIN — PANTOPRAZOLE SODIUM 40 MG: 40 INJECTION, POWDER, FOR SOLUTION INTRAVENOUS at 08:11

## 2022-06-22 RX ADMIN — GLYCERIN, HYPROMELLOSE, POLYETHYLENE GLYCOL 2 DROP: .2; .2; 1 LIQUID OPHTHALMIC at 16:41

## 2022-06-22 RX ADMIN — Medication 1000 UNITS: at 08:11

## 2022-06-22 RX ADMIN — Medication 250 MG: at 08:11

## 2022-06-22 RX ADMIN — ACETAMINOPHEN 650 MG: 650 SUSPENSION ORAL at 16:21

## 2022-06-22 RX ADMIN — RIOCIGUAT 2.5 MG: 2.5 TABLET, FILM COATED ORAL at 08:12

## 2022-06-22 RX ADMIN — ACETAMINOPHEN 650 MG: 650 SUSPENSION ORAL at 23:43

## 2022-06-22 RX ADMIN — RIOCIGUAT 2.5 MG: 2.5 TABLET, FILM COATED ORAL at 23:44

## 2022-06-22 RX ADMIN — SODIUM CHLORIDE 250 ML: 0.9 INJECTION, SOLUTION INTRAVENOUS at 08:37

## 2022-06-22 RX ADMIN — ENOXAPARIN SODIUM 40 MG: 40 INJECTION SUBCUTANEOUS at 08:11

## 2022-06-22 RX ADMIN — ATOVAQUONE 750 MG: 750 SUSPENSION ORAL at 08:11

## 2022-06-22 RX ADMIN — OXYCODONE HYDROCHLORIDE 2.5 MG: 5 SOLUTION ORAL at 10:22

## 2022-06-22 RX ADMIN — PREDNISONE 30 MG: 20 TABLET ORAL at 08:11

## 2022-06-22 RX ADMIN — MINERAL OIL AND WHITE PETROLATUM: 150; 830 OINTMENT OPHTHALMIC at 23:44

## 2022-06-22 NOTE — ASSESSMENT & PLAN NOTE
Patient with worsening respiratory requirement; initially require 10 L nasal cannula  Appreciate respiratory recommendations, has been suctioned with improvement in oxygenation  X-ray chest shows mild vascular congestion  Consider volume overload (appears euvolemic on exam) versus PE (unlikely, heart rate normal and blood pressure stable) versus pneumonia (bilateral lower lobe PNA demonstrated on CT chest)  Currently on antibiotics for treatment of sacral ulcer; consult to ID  Respiratory protocol, monitor consult pulmonology as needed  History of pulmonary hypertension; continue home Adempas and Opsumit    --patient placed on step-down level 2 and confirmed with patient and family that she is full code and accepting of intubation as needed  Consulted Pulmonology primarily for hypotension and patient on meds for pulmonary hypertension- they will see patient today

## 2022-06-22 NOTE — CONSULTS
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  6  Heart failure with preserved ejection fraction (EF 70%, G2DD)    Plan:  · At this point she has chronically been on these pulmonary hypertension medications and her SBP generally runs in the 100s to 110s  This is not far from her baseline  · The risk of adjustment in these medications would outweigh the benefit as she is asymptomatic at this point with no significant respiratory complaints  · Likely she has on going issues will bleeding from the wound with delayed response to bleeding from bone marrow to mount adequate increased circulating volume  · She has received a blood transfusion and would continue to monitor hemoglobin to look for appropriate responsiveness to volume resuscitation  · She should remain on steroid taper for dermatomyositis flare  Rheumatology has been consulted  · May required diuresis at some point if BP improves with volume resuscitation  · Continue to titrate supplemental O2 to a SpO2 goal of >88%  · There may be some adrenal insufficiency due to the prednisone taper and recent surgery also contributing      History of Present Illness   HPI:  Alejandra Perez is a 80 y o  female who has a complex past medical history which is significant from a pulmonary standpoint for limited (CREST) scleroderma, dermatomyositis (5/2022 s/p IVIG and steroid taper), ?pulmonary fibrosis, severe pulmonary hypertension,and KOREY infection s/p ABX 10 years prior presented to [facility] on 6/14 from [facility] for a wound check of her sacrum  She was having severe pain with sitting/rolling  She had a PICC line placed the day prior to presentation and received a 1 time dose of vancomycin  Additionally she stated she had multiple "stroke-like" episodes over the weeks prior to presentation and was noted to have left-sided facial droop, left leg weakness and right arm weakness  She underwent CT scans of her head which found not acute intracranial abnormalities and CT abdomen/pelvis which there was concern for soft tissue air concerning for a necrotizing infection  She was transferred to [facility] for surgical evaluation  She underwent emergent debridement and washout of the sacral wound on 6/14  Since this time she has been maintained with wound VAC changes  On admission there was concern for PNA however, imaging was not consistent and ID was consulted  After receiving 2 days of antibiotics they were discontinued and there have been no further signs of infection  She origininally required 10 L of supplemental O2 on admission however, has now been de-escalated to 2 L NC  On 6/20 the patient developed worsening hypotension  Hemoglobin at that time was noted to drop from 8 6 to 7 4  Also that day the [facility] dressing was noted to be saturated with blood and an adherent clot was noted on wound change with some pooling noted  BP has remained Pulmonary has been consulted regarding the persistent hypotension and if her pulmonary hypertension medications were playing a role in her persistent hypotension  On examination the patient states she is fatigued but does not feel increasingly short of breath  She had sacral pain but had recently undergone a wound dressing change and had received oxycodone for the pain  Review of systems:  12 point review of systems was completed and was otherwise negative except as listed in HPI        Historical Information   Past Medical History:   Diagnosis Date    Dysphagia, oropharyngeal phase 06/06/2022     Past Surgical History:   Procedure Laterality Date    WOUND DEBRIDEMENT N/A 6/14/2022    Procedure: DEBRIDEMENT WOUND [facility] OUT); SACRUM AND BUTTOCKS;  Surgeon: Darlene Arteaga MD;  Location: [facility];  Service: General     History reviewed  No pertinent family history  Occupational History: former ICU RN    Social History:   Social History     Socioeconomic History    Marital status: /Civil Union     Spouse name: Not on file    Number of children: Not on file    Years of education: Not on file    Highest education level: Not on file   Occupational History    Not on file   Tobacco Use    Smoking status: Never Smoker    Smokeless tobacco: Never Used   Substance and Sexual Activity    Alcohol use: Never    Drug use: Not on file    Sexual activity: Not on file   Other Topics Concern    Not on file   Social History Narrative    Not on file     Social Determinants of Health     Financial Resource Strain: Not on file   Food Insecurity: No Food Insecurity    Worried About Running Out of Food in the Last Year: Never true    [address] in the Last Year: Never true   Transportation Needs: No Transportation Needs    Lack of Transportation (Medical): No    Lack of Transportation (Non-Medical):  No   Physical Activity: Not on file   Stress: Not on file   Social Connections: Not on file   Intimate Partner Violence: Not on file   Housing Stability: Low Risk     Unable to Pay for Housing in the Last Year: No    Number of Places Lived in the Last Year: 1    Unstable Housing in the Last Year: No         Meds/Allergies   Current Facility-Administered Medications   Medication Dose Route Frequency    acetaminophen (TYLENOL) oral suspension 650 mg  650 mg Per G Tube Q6H    acetaminophen (TYLENOL) oral suspension 650 mg  650 mg Per G Tube Q4H PRN    artificial tear (LUBRIFRESH P M ) ophthalmic ointment   Both Eyes HS    aspirin chewable tablet 81 mg  81 mg Per G Tube Daily    atovaquone (MEPRON) oral suspension 750 mg  750 mg Per G Tube Daily With Breakfast    cholecalciferol (VITAMIN D3) tablet 1,000 Units  1,000 Units Per G Tube Daily    enoxaparin (LOVENOX) subcutaneous injection 40 mg  40 mg Subcutaneous Q24H [facility]    glycerin-hypromellose- (ARTIFICIAL TEARS) ophthalmic solution 2 drop  2 drop Both Eyes BID    labetalol (NORMODYNE) injection 10 mg  10 mg Intravenous Q6H PRN    levothyroxine tablet 25 mcg  25 mcg Per PEG Tube Early Morning    loratadine (CLARITIN) tablet 10 mg  10 mg Per G Tube Daily    Macitentan (OPSUMIT) tablet 10 mg  10 mg Per G Tube Daily    melatonin tablet 3 mg  3 mg Oral HS PRN    ondansetron (ZOFRAN) injection 4 mg  4 mg Intravenous Q6H PRN    oxyCODONE (ROXICODONE) oral solution 2 5 mg  2 5 mg Per G Tube Q4H PRN    oxyCODONE (ROXICODONE) oral solution 5 mg  5 mg Per G Tube Q4H PRN    pantoprazole (PROTONIX) injection 40 mg  40 mg Intravenous Q24H BELLA    predniSONE tablet 30 mg  30 mg Per G Tube Daily    Riociguat TABS 2 5 mg  2 5 mg Per G Tube TID    saccharomyces boulardii (FLORASTOR) capsule 250 mg  250 mg Per G Tube BID     No medications prior to admission  Allergies   Allergen Reactions   Venetta Pushpa [Selexipag] Anaphylaxis    Sulfa Antibiotics Tachycardia    Penicillins Rash       Vitals: Blood pressure (!) 92/39, pulse 68, temperature 98 4 °F (36 9 °C), temperature source Axillary, resp  rate 16, height 5' 7 5" (1 715 m), weight 66 2 kg (146 lb), SpO2 98 %  , 3 L NC, Body mass index is 22 53 kg/m²  Intake/Output Summary (Last 24 hours) at 6/22/2022 1116  Last data filed at 6/22/2022 1009  Gross per 24 hour   Intake 420 ml   Output 1600 ml   Net -1180 ml       Physical Exam  Vitals and nursing note reviewed  Constitutional:       General: She is not in acute distress  Appearance: Normal appearance  She is well-developed and normal weight  She is ill-appearing  She is not toxic-appearing  Interventions: She is not intubated  HENT:      Head: Normocephalic and atraumatic  Right Ear: External ear normal       Left Ear: External ear normal       Nose: Nose normal       Mouth/Throat:      Mouth: Mucous membranes are moist       Pharynx: Oropharynx is clear  Eyes:      General: No scleral icterus  Conjunctiva/sclera: Conjunctivae normal    Cardiovascular:      Rate and Rhythm: Normal rate and regular rhythm  Pulses: Normal pulses  Heart sounds: Normal heart sounds  No murmur heard  No friction rub  No gallop  Pulmonary:      Effort: Pulmonary effort is normal  No tachypnea, bradypnea, accessory muscle usage or respiratory distress  She is not intubated  Breath sounds: Normal breath sounds and air entry  No decreased air movement  No decreased breath sounds, wheezing, rhonchi or rales  Abdominal:      General: Abdomen is flat  Bowel sounds are normal       Palpations: Abdomen is soft  Musculoskeletal:         General: No swelling or tenderness  Cervical back: Neck supple  No tenderness  Skin:     General: Skin is warm and dry  Neurological:      General: No focal deficit present  Mental Status: She is alert and oriented to person, place, and time  Mental status is at baseline  Labs: I have personally reviewed pertinent lab results    Laboratory and Diagnostics  Results from last 7 days   Lab Units 06/22/22  0204 06/21/22  0505 06/20/22  0527 06/19/22  0537 06/18/22  0451 06/17/22  0542 06/16/22 2025 06/16/22  1158   WBC Thousand/uL 14 09* 15 14* 13 16* 14 91* 14 07* 14 02*  --  13 40*   HEMOGLOBIN g/dL 7 2* 7 4* 8 6* 8 6* 8 9* 9 3* 9 3* 6 5*   HEMATOCRIT % 24 1* 24 8* 27 7* 28 2* 28 8* 29 1* 29 4* 20 8*   PLATELETS Thousands/uL 264 258 259 283 288 297  --  342   NEUTROS PCT % 79* 80*  --   --   --   --   --   --    BANDS PCT %  --   --   --  11* 8  --   --   --    MONOS PCT % 7 7  --   --   --   --   --   --    MONO PCT %  --   --   --  5 2*  -- --   --      Results from last 7 days   Lab Units 06/22/22  0204 06/21/22  0505 06/20/22  0527 06/19/22  0537 06/18/22  0451 06/17/22  0542 06/16/22  0423   SODIUM mmol/L 134* 134* 136 133* 131* 134* 136   POTASSIUM mmol/L 3 9 4 0 4 2 4 1 4 2 3 8 3 9   CHLORIDE mmol/L 99* 98* 99* 98* 96* 99* 101   CO2 mmol/L 32 32 34* 32 31 33* 30   ANION GAP mmol/L 3* 4 3* 3* 4 2* 5   BUN mg/dL 22 29* 34* 38* 28* 28* 21   CREATININE mg/dL 0 28* 0 36* 0 34* 0 36* 0 36* 0 28* 0 32*   CALCIUM mg/dL 8 3 8 3 8 1* 8 2* 8 3 8 1* 8 1*   GLUCOSE RANDOM mg/dL 120 153* 162* 133 188* 106 133   ALT U/L 21  --   --  19 17 19  --    AST U/L 20  --   --  19 17 20  --    ALK PHOS U/L 87  --   --  86 79 83  --    ALBUMIN g/dL 1 7*  --   --  2 1* 2 2* 2 0*  --    TOTAL BILIRUBIN mg/dL 0 15*  --   --  0 39 0 33 0 29  --      Results from last 7 days   Lab Units 06/19/22  0537 06/18/22  0451 06/17/22  0542   MAGNESIUM mg/dL 2 2 2 3 2 0   PHOSPHORUS mg/dL 2 6 1 9* 1 7*               Results from last 7 days   Lab Units 06/22/22  0850   LACTIC ACID mmol/L 1 1     Results from last 7 days   Lab Units 06/16/22  0423   FERRITIN ng/mL 186                 Results from last 7 days   Lab Units 06/17/22  0542   PROCALCITONIN ng/ml 0 13       ABG:       Imaging and other studies: I have personally reviewed pertinent reports  and I have personally reviewed pertinent films in PACS  6/14/2022 CT abd/p: LOWER CHEST:  Bibasilar groundglass opacities and streaky opacities  Small bilateral pleural effusions  CXR 6/16/322: Diffuse interstitial lung interstitial prominence consistent with pulmonary edema  Scattered areas of more focal consolidation  CXR 6/19/22: Persistent bilateral pulmonary opacities unchanged to slightly more prominent       Pulmonary function testing: per documentation from OSH: mild obstructive disease and severely decreased DLCO    EKG, Pathology, and Other Studies: I have personally reviewed pertinent reports      Echo 6/20/222   Left Ventricle: Left ventricular cavity size is normal  Wall thickness is normal  There is no concentric hypertrophy  The left ventricular ejection fraction is 70%  Systolic function is vigorous  Wall motion is normal  Diastolic function is moderately abnormal, consistent with grade II (pseudonormal) relaxation  Left atrial filling pressure is elevated    IVS: There is systolic flattening of the interventricular septum consistent with right ventricle pressure overload    Right Ventricle: Right ventricular cavity size is mildly dilated  Wall thickness is increased    Left Atrium: The atrium is mildly dilated    Right Atrium: The atrium is mildly dilated    Atrial Septum: The septum bows into the right atrium, suggesting increased left atrial pressure    Aortic Valve: The leaflets are moderately thickened  The leaflets are moderately calcified  There is mildly reduced mobility  There is mild stenosis  The aortic valve peak velocity is 2 18 m/s  The aortic valve mean gradient is 12 mmHg  The DVI is 0 59  The aortic valve area is 1 82 cm2    Mitral Valve: There is mild annular calcification    Tricuspid Valve: There is moderate regurgitation  The right ventricular systolic pressure is moderately elevated  The estimated right ventricular systolic pressure is 15 22 mmHg    Pulmonary Artery: Notching of the RVOT Doppler waveform is noted    Pulmonary Veins: There is systolic blunting in the pulmonary veins      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 with presence of RVOT notching  Code Status: Level 1 - Full Code    VTE Pharmacologic Prophylaxis: Enoxaparin (Lovenox)  VTE Mechanical Prophylaxis: sequential compression device    Disclaimer: Portions of the record may have been created with voice recognition software  Occasional wrong word or "sound a like" substitutions may have occurred due to the inherent limitations of voice recognition software  Careful consideration should be taken to recognize, using context, where substitutions have occurred      Alessandra Hooper DO   Pulmonary/Critical Care Fellowship PGY-IV  [facility]

## 2022-06-22 NOTE — PROGRESS NOTES
[facility] [facility]  Progress Note Amanuel hSore 1940, 80 y o  female MRN: [id number]  [facility]: [id number]  Primary Care Provider: No primary care provider on file  Date and time admitted to hospital: 6/14/2022  8:07 PM    Hearing loss  Assessment & Plan  Patient reports 3 months of progressively worsening hearing  As per previous admission, concerning for atovaquone versus Lasix  Discussed with ENT, have declines seeing patient in hospital; consider outpatient referral  Trial different antibiotic prophylaxis    Pneumonia  Assessment & Plan  See accompanying plan under acute respiratory failure  Not on abx currently  CXR shows bilateral infiltrations on 6/19  Appreciate Pulmonology input    Acute respiratory failure with hypoxia [facility])  Assessment & Plan  Patient with worsening respiratory requirement; initially require 10 L nasal cannula  Appreciate respiratory recommendations, has been suctioned with improvement in oxygenation  X-ray chest shows mild vascular congestion  Consider volume overload (appears euvolemic on exam) versus PE (unlikely, heart rate normal and blood pressure stable) versus pneumonia (bilateral lower lobe PNA demonstrated on CT chest)  Currently on antibiotics for treatment of sacral ulcer; consult to ID  Respiratory protocol, monitor consult pulmonology as needed  History of pulmonary hypertension; continue home Adempas and Opsumit    --patient placed on step-down level 2 and confirmed with patient and family that she is full code and accepting of intubation as needed  Consulted Pulmonology primarily for hypotension and patient on meds for pulmonary hypertension- they will see patient today       Urinary retention  Assessment & Plan  Patient with history of urinary retention and spasm; previously on myrbetriq and Gemtesa  Continue lilly catheter at this time; treat symptomatically  Follows with urology in the outpatient setting     Anemia  Assessment & Plan  Patient noted to have low hemoglobin on admission-6 9  Has received 1 unit PRBCs on 06/15, but repeat CBC was 6 5  Iron panel shows low iron and TIBC consistent with anemia of chronic disease; reticulocytes elevated  B12 and folate within normal limits; will consider iron supplementation once infection resolved  In the meantime, hemoglobin stable at 7 2, monitor      Acquired hypothyroidism  Assessment & Plan  As per recent PCP note, appears to take 25 mcg daily  Continue home medication, TSH within normal limits    Scleroderma ([facility] )  Assessment & Plan  Follows with Rheumatology in the outpatient setting  Will restart prednisone 30 mg daily  Atovaquone for PCP prophylaxis (consider substituting Bactrim or other prophylactic agent in setting of possible ototoxicity)    Dysphagia, oropharyngeal phase  Assessment & Plan  Patient with history of dysphagia, peg tube in place  Continue current tube feeds; appreciate nutritional recommendations    Severe protein-calorie malnutrition ([facility] )  Assessment & Plan  Malnutrition Findings:   Adult Malnutrition type: Chronic illness  Adult Degree of Malnutrition: Other severe protein calorie malnutrition  Malnutrition Characteristics: Muscle loss, Fat loss, Weight loss, Inadequate energy           360 Statement: related to disease/condition evidenced by increased kcal/pro needs for wound healing Stage IV sacral decub, BMI 18 4 adjusted per prior facility wt 6/7/22;severe buccal fat pads loss, severe deltoid muscle loss, Patient lost 31# (20 6%) since 3/15/22 past 3 months with illness <75% energy intake versus needs for > 1 month  Treating with EN support and Robert TID via PEG for wound healing    BMI Findings:  Adult BMI Classifications: Underweight < 18 5        Body mass index is 22 53 kg/m²         * Sacral wound  Assessment & Plan  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  Underwent surgical debridement on 2022 with general surgery; transferred to medicine service for chronic illness  Initially placed on vanc and cefepime for antibiotic therapy; however ID evaluated and stopped antibiotics  Repositioning; wound care consult  Supportive care, pain medications  Nutritional support  Wound VAC in place with frequent wound VAC changes as per surgical team  A discussion with surgical team today- they have cleaned wound and examined  They will update family with regard to healing  VTE Pharmacologic Prophylaxis: VTE Score: 3 Moderate Risk (Score 3-4) - Pharmacological DVT Prophylaxis Ordered: heparin  Patient Centered Rounds: I performed bedside rounds with nursing staff today  Discussions with Specialists or Other Care Team Provider: discussed with Pulmonology they will see patient  Discussed with surgery - they will update family regarding wound  Education and Discussions with Family / Patient: called daughter Larry Painter -   Time Spent for Care: 30 minutes  More than 50% of total time spent on counseling and coordination of care as described above  Current Length of Stay: 8 day(s)  Current Patient Status: Inpatient   Certification Statement: The patient will continue to require additional inpatient hospital stay due to ongoing hypotension   Discharge Plan: Anticipate discharge in 48 hrs to depending on family  Code Status: Level 1 - Full Code    Subjective:   Patient seen and examined   No new complaints  Denies dizziness, no syncope      Objective:     Vitals:   Temp (24hrs), Av 2 °F (36 8 °C), Min:97 °F (36 1 °C), Max:99 2 °F (37 3 °C)    Temp:  [97 °F (36 1 °C)-99 2 °F (37 3 °C)] 98 4 °F (36 9 °C)  HR:  [50-76] 68  Resp:  [15-18] 16  BP: (85-99)/(39-47) 92/39  SpO2:  [95 %-99 %] 98 %  Body mass index is 22 53 kg/m²  Input and Output Summary (last 24 hours):      Intake/Output Summary (Last 24 hours) at 2022 1248  Last data filed at 2022 1009  Gross per 24 hour   Intake 420 ml   Output 1600 ml   Net -1180 ml       Physical Exam:   Physical Exam  Constitutional:       General: She is not in acute distress  Appearance: She is not ill-appearing (chronically ill), toxic-appearing or diaphoretic  HENT:      Head: Normocephalic  Nose: Nose normal    Eyes:      General: No scleral icterus  Right eye: No discharge  Left eye: No discharge  Pupils: Pupils are equal, round, and reactive to light  Cardiovascular:      Rate and Rhythm: Normal rate  Pulmonary:      Effort: Pulmonary effort is normal  No respiratory distress  Breath sounds: No stridor  No wheezing, rhonchi or rales  Chest:      Chest wall: No tenderness  Abdominal:      General: Abdomen is flat  There is no distension  Palpations: There is no mass  Tenderness: There is no abdominal tenderness  There is no right CVA tenderness, left CVA tenderness, guarding or rebound  Hernia: No hernia is present  Musculoskeletal:         General: No swelling, tenderness, deformity or signs of injury  Right lower leg: No edema  Left lower leg: No edema  Skin:     Capillary Refill: Capillary refill takes less than 2 seconds  Coloration: Skin is pale  Skin is not jaundiced  Findings: No bruising, erythema, lesion or rash  Comments: Sacral ulcer  Neurological:      General: No focal deficit present  Mental Status: She is alert  Cranial Nerves: No cranial nerve deficit  Sensory: No sensory deficit  Motor: No weakness        Coordination: Coordination normal       Gait: Gait normal       Deep Tendon Reflexes: Reflexes normal    Psychiatric:         Mood and Affect: Mood normal           Additional Data:     Labs:  Results from last 7 days   Lab Units 06/22/22  0204 06/20/22  0527 06/19/22  0537   WBC Thousand/uL 14 09*   < > 14 91*   HEMOGLOBIN g/dL 7 2*   < > 8 6*   HEMATOCRIT % 24 1*   < > 28 2*   PLATELETS Thousands/uL 264   < > 283   BANDS PCT %  --   --  11*   NEUTROS PCT % 79*   < >  --    LYMPHS PCT % 11*   < >  --    LYMPHO PCT %  --   --  9*   MONOS PCT % 7   < >  --    MONO PCT %  --   --  5   EOS PCT % 1   < > 1    < > = values in this interval not displayed  Results from last 7 days   Lab Units 06/22/22  0204   SODIUM mmol/L 134*   POTASSIUM mmol/L 3 9   CHLORIDE mmol/L 99*   CO2 mmol/L 32   BUN mg/dL 22   CREATININE mg/dL 0 28*   ANION GAP mmol/L 3*   CALCIUM mg/dL 8 3   ALBUMIN g/dL 1 7*   TOTAL BILIRUBIN mg/dL 0 15*   ALK PHOS U/L 87   ALT U/L 21   AST U/L 20   GLUCOSE RANDOM mg/dL 120                 Results from last 7 days   Lab Units 06/22/22  0850 06/17/22  0542   LACTIC ACID mmol/L 1 1  --    PROCALCITONIN ng/ml  --  0 13       Lines/Drains:  Invasive Devices  Report    Peripheral Intravenous Line  Duration           Long-Dwell Peripheral IV (Midline) 06/14/22 Right Brachial 7 days    Peripheral IV 06/19/22 Dorsal (posterior); Left;Proximal Forearm 3 days          Drain  Duration           Gastrostomy/Enterostomy -- days    Urethral Catheter -- days              Urinary Catheter:  Goal for removal: NA                Imaging: No pertinent imaging reviewed      Recent Cultures (last 7 days):         Last 24 Hours Medication List:   Current Facility-Administered Medications   Medication Dose Route Frequency Provider Last Rate    acetaminophen  650 mg Per G Tube Q6H Alexey Bender MD      acetaminophen  650 mg Per G Tube Q4H PRN Deanna Vyas MD      artificial tear   Both Eyes HS Deanna Vyas MD      aspirin  81 mg Per G Tube Daily Deanna Vyas MD      atovaquone  750 mg Per G Tube Daily With Breakfast Deanna Vyas MD      cholecalciferol  1,000 Units Per G Tube Daily Deanna Vyas MD      enoxaparin  40 mg Subcutaneous Q24H [facility] Deanna Vyas MD      glycerin-hypromellose-  2 drop Both Eyes BID Deanna Vyas MD      levothyroxine  25 mcg Per PEG Tube Early Morning Deanna Vyas MD      loratadine  10 mg Per Kelly Damon Tube Daily Tania Esquivel MD      Macitentan  10 mg Per G Tube Daily Tania Esquivel MD      melatonin  3 mg Oral HS PRN Ana Machado MD      ondansetron  4 mg Intravenous Q6H PRN Ana Machado MD      oxyCODONE  2 5 mg Per G Tube Q4H PRN Ana Machado MD      oxyCODONE  5 mg Per G Tube Q4H PRN Ana Machado MD      pantoprazole  40 mg Intravenous Q24H [address] Tania Esquivel MD      predniSONE  30 mg Per G Tube Daily Tania Esquivel MD      Riociguat  2 5 mg Per G Tube TID Tania Esquivel MD      saccharomyces boulardii  250 mg Per Edwinna Farrow BID Tania Esquivel MD          Today, Patient Was Seen By: Low Barber MD    **Please Note: This note may have been constructed using a voice recognition system  **

## 2022-06-22 NOTE — ASSESSMENT & PLAN NOTE
Patient noted to have low hemoglobin on admission-6 9  Has received 1 unit PRBCs on 06/15, but repeat CBC was 6 5  Iron panel shows low iron and TIBC consistent with anemia of chronic disease; reticulocytes elevated  B12 and folate within normal limits; will consider iron supplementation once infection resolved  In the meantime, hemoglobin stable at 7 2, monitor

## 2022-06-22 NOTE — PROGRESS NOTES
Progress Note - Infectious Disease   Raghu Bee 80 y o  female MRN: [id number]  [facility] [id number]      Impression/Recommendations:  1   SIRS, POA   HR, WBC   Due to #2   No appreciable source of acute infection   Serial procalcitonin, UA, Flu/RSV/COVID PCR, blood cultures, CXR, CT C/A/P negative   Infiltrates at bases of lungs on CT are quire minimal and may be due to chronic aspiration versus atelectasis versus CREST   Doubt clinical pneumonia   Hemodynamically stable and non-toxic  Persistent WBC likely non-infectious and multifactorial due to steroids, reactive to anemia  Remains stable off antibiotics  Rec:  · Continue to follow closely off antibiotics  · Follow temperatures and WBC closely  · Supportive care as per the primary service     2   Stage IV sacral ulcer   Presented to deterioration with necrotic slough, likely due to ongoing pressure injury   Status post excisional debridement down to bone, VAC 6/14   No intraoperative purulence noted   No cellulitis  Complicated by bleeding 5/27 requiring VAC removal   VAC replaced 6/22  Rec:  · Continue LWC per surgery  · Offloading per nursing  · No role for long-term IV antibiotics at this point     3   CREST   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, status post IVIG, IV steroids   On recent steroid taper, should now be on 20 mg daily until [facility]  Rec:  · Continue steroids  · Continue Atovaquone for PCP prophylaxis  · Outpatient Rheum follow-up at [facility]     4   Dysphagia   Status post PEG   History of aspiration, mucus plugging in the past      5   Urinary retention   With chronic indwelling Tejada catheter      6   PAH   On Opsumit, Viociguat      Antibiotics:  Off antibiotics #5    Subjective:  Patient seen on AM rounds  Busy morning, now tired  Denies fevers, chills, sweats, nausea, vomiting, or diarrhea      24 Hour Events:  No documented fevers, chills, sweats, nausea, vomiting, or diarrhea  VAC replaced by surgery  Objective:  Vitals:  Temp:  [97 °F (36 1 °C)-99 2 °F (37 3 °C)] 97 9 °F (36 6 °C)  HR:  [50-76] 68  Resp:  [15-18] 16  BP: ()/(39-47) 92/39  SpO2:  [95 %-99 %] 98 %  Temp (24hrs), Av 2 °F (36 8 °C), Min:97 °F (36 1 °C), Max:99 2 °F (37 3 °C)  Current: Temperature: 97 9 °F (36 6 °C)    Physical Exam:   General:  No acute distress  Psychiatric:  Awake and alert  Pulmonary:  Normal respiratory excursion without accessory muscle use  Abdomen:  Soft, nontender  Extremities:  No edema  Skin:  No rashes    Lab Results:  I have personally reviewed pertinent labs  Results from last 7 days   Lab Units 22  0204 22  0505 22  0527 22  0537 22  0451   POTASSIUM mmol/L 3 9 4 0 4 2 4 1 4 2   CHLORIDE mmol/L 99* 98* 99* 98* 96*   CO2 mmol/L 32 32 34* 32 31   BUN mg/dL 22 29* 34* 38* 28*   CREATININE mg/dL 0 28* 0 36* 0 34* 0 36* 0 36*   EGFR ml/min/1 73sq m 111 102 104 102 102   CALCIUM mg/dL 8 3 8 3 8 1* 8 2* 8 3   AST U/L 20  --   --  19 17   ALT U/L 21  --   --  19 17   ALK PHOS U/L 87  --   --  86 79     Results from last 7 days   Lab Units 22  0204 22  0505 22  0527   WBC Thousand/uL 14 09* 15 14* 13 16*   HEMOGLOBIN g/dL 7 2* 7 4* 8 6*   PLATELETS Thousands/uL 264 258 259           Imaging Studies:   I have personally reviewed pertinent imaging study reports and images in PACS  EKG, Pathology, and Other Studies:   I have personally reviewed pertinent reports

## 2022-06-22 NOTE — PLAN OF CARE
Problem: MOBILITY - ADULT  Goal: Maintain or return to baseline ADL function  Description: INTERVENTIONS:  -  Assess patient's ability to carry out ADLs; assess patient's baseline for ADL function and identify physical deficits which impact ability to perform ADLs (bathing, care of mouth/teeth, toileting, grooming, dressing, etc )  - Assess/evaluate cause of self-care deficits   - Assess range of motion  - Assess patient's mobility; develop plan if impaired  - Assess patient's need for assistive devices and provide as appropriate  - Encourage maximum independence but intervene and supervise when necessary  - Involve family in performance of ADLs  - Assess for home care needs following discharge   - Consider OT consult to assist with ADL evaluation and planning for discharge  - Provide patient education as appropriate  Outcome: Progressing  Goal: Maintains/Returns to pre admission functional level  Description: INTERVENTIONS:  - Perform BMAT or MOVE assessment daily    - Set and communicate daily mobility goal to care team and patient/family/caregiver  - Collaborate with rehabilitation services on mobility goals if consulted  - Perform Range of Motion 3 times a day  - Reposition patient every 2 hours    - Dangle patient 3 times a day  - Stand patient 3 times a day  - Ambulate patient 3 times a day  - Out of bed to chair 3 times a day   - Out of bed for meals 3 times a day  - Out of bed for toileting  - Record patient progress and toleration of activity level   Outcome: Progressing     Problem: Potential for Falls  Goal: Patient will remain free of falls  Description: INTERVENTIONS:  - Educate patient/family on patient safety including physical limitations  - Instruct patient to call for assistance with activity   - Consult OT/PT to assist with strengthening/mobility   - Keep Call bell within reach  - Keep bed low and locked with side rails adjusted as appropriate  - Keep care items and personal belongings within reach  - Initiate and maintain comfort rounds  - Make Fall Risk Sign visible to staff  - Offer Toileting every 2 Hours, in advance of need  - Initiate/Maintain bed alarm  - Obtain necessary fall risk management equipment: bed alarm  - Apply yellow socks and bracelet for high fall risk patients  - Consider moving patient to room near nurses station  Outcome: Progressing     Problem: Prexisting or High Potential for Compromised Skin Integrity  Goal: Skin integrity is maintained or improved  Description: INTERVENTIONS:  - Identify patients at risk for skin breakdown  - Assess and monitor skin integrity  - Assess and monitor nutrition and hydration status  - Monitor labs   - Assess for incontinence   - Turn and reposition patient  - Assist with mobility/ambulation  - Relieve pressure over bony prominences  - Avoid friction and shearing  - Provide appropriate hygiene as needed including keeping skin clean and dry  - Evaluate need for skin moisturizer/barrier cream  - Collaborate with interdisciplinary team   - Patient/family teaching  - Consider wound care consult   Outcome: Progressing     Problem: PAIN - ADULT  Goal: Verbalizes/displays adequate comfort level or baseline comfort level  Description: Interventions:  - Encourage patient to monitor pain and request assistance  - Assess pain using appropriate pain scale  - Administer analgesics based on type and severity of pain and evaluate response  - Implement non-pharmacological measures as appropriate and evaluate response  - Consider cultural and social influences on pain and pain management  - Notify physician/advanced practitioner if interventions unsuccessful or patient reports new pain  Outcome: Progressing     Problem: INFECTION - ADULT  Goal: Absence or prevention of progression during hospitalization  Description: INTERVENTIONS:  - Assess and monitor for signs and symptoms of infection  - Monitor lab/diagnostic results  - Monitor all insertion sites, i e  indwelling lines, tubes, and drains  - Monitor endotracheal if appropriate and nasal secretions for changes in amount and color  - [place] appropriate cooling/warming therapies per order  - Administer medications as ordered  - Instruct and encourage patient and family to use good hand hygiene technique  - Identify and instruct in appropriate isolation precautions for identified infection/condition  Outcome: Progressing  Goal: Absence of fever/infection during neutropenic period  Description: INTERVENTIONS:  - Monitor WBC    Outcome: Progressing     Problem: SAFETY ADULT  Goal: Maintain or return to baseline ADL function  Description: INTERVENTIONS:  -  Assess patient's ability to carry out ADLs; assess patient's baseline for ADL function and identify physical deficits which impact ability to perform ADLs (bathing, care of mouth/teeth, toileting, grooming, dressing, etc )  - Assess/evaluate cause of self-care deficits   - Assess range of motion  - Assess patient's mobility; develop plan if impaired  - Assess patient's need for assistive devices and provide as appropriate  - Encourage maximum independence but intervene and supervise when necessary  - Involve family in performance of ADLs  - Assess for home care needs following discharge   - Consider OT consult to assist with ADL evaluation and planning for discharge  - Provide patient education as appropriate  Outcome: Progressing  Goal: Maintains/Returns to pre admission functional level  Description: INTERVENTIONS:  - Perform BMAT or MOVE assessment daily    - Set and communicate daily mobility goal to care team and patient/family/caregiver  - Collaborate with rehabilitation services on mobility goals if consulted  - Perform Range of Motion 3 times a day  - Reposition patient every 2 hours    - Dangle patient 3 times a day  - Stand patient 3 times a day  - Ambulate patient 3 times a day  - Out of bed to chair 3 times a day   - Out of bed for meals 3 times a day  - Out of bed for toileting  - Record patient progress and toleration of activity level   Outcome: Progressing  Goal: Patient will remain free of falls  Description: INTERVENTIONS:  - Educate patient/family on patient safety including physical limitations  - Instruct patient to call for assistance with activity   - Consult OT/PT to assist with strengthening/mobility   - Keep Call bell within reach  - Keep bed low and locked with side rails adjusted as appropriate  - Keep care items and personal belongings within reach  - Initiate and maintain comfort rounds  - Make Fall Risk Sign visible to staff  - Offer Toileting every 2 Hours, in advance of need  - Initiate/Maintain bed alarm  - Obtain necessary fall risk management equipment: bed alarm  - Apply yellow socks and bracelet for high fall risk patients  - Consider moving patient to room near nurses station  Outcome: Progressing     Problem: DISCHARGE PLANNING  Goal: Discharge to home or other facility with appropriate resources  Description: INTERVENTIONS:  - Identify barriers to discharge w/patient and caregiver  - Arrange for needed discharge resources and transportation as appropriate  - Identify discharge learning needs (meds, wound care, etc )  - Arrange for interpretive services to assist at discharge as needed  - Refer to Case Management Department for coordinating discharge planning if the patient needs post-hospital services based on physician/advanced practitioner order or complex needs related to functional status, cognitive ability, or social support system  Outcome: Progressing     Problem: Knowledge Deficit  Goal: Patient/family/caregiver demonstrates understanding of disease process, treatment plan, medications, and discharge instructions  Description: Complete learning assessment and assess knowledge base    Interventions:  - Provide teaching at level of understanding  - Provide teaching via preferred learning methods  Outcome: Progressing Problem: Nutrition/Hydration-ADULT  Goal: Nutrient/Hydration intake appropriate for improving, restoring or maintaining nutritional needs  Description: Monitor and assess patient's nutrition/hydration status for malnutrition  Collaborate with interdisciplinary team and initiate plan and interventions as ordered  Monitor patient's weight and dietary intake as ordered or per policy  Utilize nutrition screening tool and intervene as necessary  Determine patient's food preferences and provide high-protein, high-caloric foods as appropriate       INTERVENTIONS:  - Monitor oral intake, urinary output, labs, and treatment plans  - Assess nutrition and hydration status and recommend course of action  - Evaluate amount of meals eaten  - Assist patient with eating if necessary   - Allow adequate time for meals  - Recommend/ encourage appropriate diets, oral nutritional supplements, and vitamin/mineral supplements  - Order, calculate, and assess calorie counts as needed  - Recommend, monitor, and adjust tube feedings and TPN/PPN based on assessed needs  - Assess need for intravenous fluids  - Provide specific nutrition/hydration education as appropriate  - Include patient/family/caregiver in decisions related to nutrition  Outcome: Progressing

## 2022-06-22 NOTE — ASSESSMENT & PLAN NOTE
See accompanying plan under acute respiratory failure  Not on abx currently     CXR shows bilateral infiltrations on 6/19  Appreciate Pulmonology input

## 2022-06-22 NOTE — ASSESSMENT & PLAN NOTE
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  Underwent surgical debridement on 06/14/2022 with general surgery; transferred to medicine service for chronic illness  Initially placed on vanc and cefepime for antibiotic therapy; however ID evaluated and stopped antibiotics  Repositioning; wound care consult  Supportive care, pain medications  Nutritional support  Wound VAC in place with frequent wound VAC changes as per surgical team  A discussion with surgical team today- they have cleaned wound and examined  They will update family with regard to healing

## 2022-06-22 NOTE — CONSULTS
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  Has also been diagnosed and treated for pulmonary HTN  She is followed by a rheumatologist at [facility]  She is currently c/o some mild hand stiffness but no swelling  She is being treated here for a sacral decubitus ulcer  Her CREST symptoms are esophageal dysmotility, Raynaud's and GERD and dysphagia  She also has telangiectasias but no visible calcinosis  Heart with III/VI MALAIKA  No sclerodactyly on PE  Imp/P: CREST sydrome with pulmonary HTN  Will check RNA James III antibody  Continue to treat her pulmonary HTN  Patient to f/u with her private rheumatologist upon hospital discharge

## 2022-06-22 NOTE — PROGRESS NOTES
Acute Care Surgery  Bedside V A C  Procedure Note    A timeout was performed prior to beginning the dressing change  There was no previous VAC within the wound bed  Previous dressing of packing was removed and a new VAC would be placed  Location of wound:  Sacral    Dressings and Foam removed:  Mesalt ribbon and foam dressing removed    Dimensions of wound:  11 cm x 8 cm x 3 cm    Description of wound: On inspection of the wound today, there appears to be less fibrinous tissue  There was no necrotic or nonviable tissue requiring debridement  The periwound was then dressed with Maxorb dressing  VAC dressing application:  The periwound skin was cleaned and dried  1 black foam were cut to size of the wound and placed into the wound  The dressings were then covered with VAC drape  Additional VAC drape and black foam was used to create a bridge to the patient's right hip and a base for the track pad  The track pad was then placed over the base of black foam  The VAC was then set to 125 mmHg low Continuous suction  The patient tolerated the procedure well and there were no complications  The patient did not require any excisional debridement during today's dressing change  VAC settings:  125 mmHg  Continuous    Wound Images: Total VAC count:   1 in wound and 1 for bridge = total 2  Maxorb dressing on top of periwound areas  Additional Notes: [facility] sticker placed over the dressing per protocol  The next [facility] dressing change will be planned for 06/24/2022  [facility] paperwork was completed and give to the case management staff to arrange home VAC therapy  This dressing change took greater than 32 minutes to complete      Jesus Godinez PA-C  6/22/2022 9:29 AM

## 2022-06-22 NOTE — RESPIRATORY THERAPY NOTE
Pt requsted to be NT suctioned   Suctioned right nare times for some thick yellow secretions  Pt's main complaint is "Stuffy nose" requested nasal speay from RN

## 2022-06-22 NOTE — ASSESSMENT & PLAN NOTE
Reason for Disposition   [1] Caller requesting NON-URGENT health information AND [2] PCP's office is the best resource    Protocols used: INFORMATION ONLY CALL-A-    Pt has questions regarding discharge diet. Diet reviewed per discharge paperwork. Pt also advised to call clinic tomorrow and pt verbalizes understanding.    Malnutrition Findings:   Adult Malnutrition type: Chronic illness  Adult Degree of Malnutrition: Other severe protein calorie malnutrition  Malnutrition Characteristics: Muscle loss, Fat loss, Weight loss, Inadequate energy           360 Statement: related to disease/condition evidenced by increased kcal/pro needs for wound healing Stage IV sacral decub, BMI 18 4 adjusted per prior facility wt 6/7/22;severe buccal fat pads loss, severe deltoid muscle loss, Patient lost 31# (20 6%) since 3/15/22 past 3 months with illness <75% energy intake versus needs for > 1 month  Treating with EN support and Robert TID via PEG for wound healing    BMI Findings:  Adult BMI Classifications: Underweight < 18 5        Body mass index is 22 53 kg/m²

## 2022-06-22 NOTE — WOUND OSTOMY CARE
Progress Note - Wound   Paco Schuler 80 y o  female MRN: [id number]  Unit/Bed#: [facility] 204-01 Encounter: [id number]        Assessment:   Patient is seen for wound care follow-up  80year old female with scleroderma , bed-bound, interstitial lung disease,dysphagia ,pulmonary hypertension and the stage 4 on admission  The patient is in bed for the assessment of the and is on the P - 500 low air loss mattress   She is a Mod A of 2 for rolling in the bed   Tejada in place and noted incontinence of stool   Findings:    1  POA Buttocks pressure wound- wound vac placed today by surgery  2  HA Unstageable left elbow- covered with Dermagran and Allevyn foam   3 HA Unstageable on right elbow - 100% slough and romy-wound is intact  No drainage noted  To be cleansed and covered with Lisa Loss and covered with an Allevyn foam  To be changed every other day          Orders listed below and wound care will continue to follow, call or tiger text with questions  Bedside nurse updated of findings and orders  Flowsheets below with pictures and measurements  Skin care plans:  1Sacral -VAC placed by surgery 6/22   2-Bilateral heels apply allevyn foam gianna with a P and date peel and check skin integrity every shift change every 3 days   3-Elevate heels to offload pressure  4-Ehob cushion when out of bed  5-Turn/repoisiton q2h or when medically stable for pressure re-distribution on skin  6-Moisturize skin daily with skin nourishing cream  7  Left and Right elbow - cleanse with Normal saline then apply Dermagran and allevyn foam gianna with a T and date change every other day  8  P 500 low air loss           Wound 06/21/22 Pressure Injury Other (Comment) Elbow Posterior;Right (Active)   Wound Image   06/22/22 1357   Wound Description Yellow 06/22/22 1357   Pressure Injury Stage U 06/22/22 1357   Romy-wound Assessment Intact; Erythema 06/22/22 1357   Wound Length (cm) 0 5 cm 06/22/22 1357   Wound Width (cm) 0 5 cm 06/22/22 1357   Wound Depth (cm) 0 2 cm 06/22/22 1357   Wound Surface Area (cm^2) 0 25 cm^2 06/22/22 1357   Wound Volume (cm^3) 0 05 cm^3 06/22/22 1357   Calculated Wound Volume (cm^3) 0 05 cm^3 06/22/22 1357   Wound Site Closure TERE 06/22/22 1200   Drainage Amount None 06/22/22 1357   Drainage Description Clear 06/22/22 1200   Treatments Cleansed 06/22/22 1357   Dressing Foam, Silicon (eg  Allevyn, etc); Dermagran gauze 06/22/22 1357   Dressing Changed Reinforced 06/22/22 1357   Patient Tolerance Tolerated well 06/22/22 1357   Dressing Status Clean;Dry; Intact 06/22/22 1357            Patient seen with Reyes Ovalle RN, BSN, Osman Kuo RN, BSN

## 2022-06-23 ENCOUNTER — APPOINTMENT (INPATIENT)
Dept: RADIOLOGY | Facility: HOSPITAL | Age: 82
DRG: 853 | End: 2022-06-23
Attending: INTERNAL MEDICINE
Payer: COMMERCIAL

## 2022-06-23 LAB
ABO GROUP BLD BPU: NORMAL
ALBUMIN SERPL BCP-MCNC: 1.9 G/DL (ref 3.5–5)
ALP SERPL-CCNC: 85 U/L (ref 46–116)
ALT SERPL W P-5'-P-CCNC: 19 U/L (ref 12–78)
ANION GAP SERPL CALCULATED.3IONS-SCNC: 1 MMOL/L (ref 4–13)
AST SERPL W P-5'-P-CCNC: 21 U/L (ref 5–45)
BASOPHILS # BLD AUTO: 0.04 THOUSANDS/ΜL (ref 0–0.1)
BASOPHILS NFR BLD AUTO: 0 % (ref 0–1)
BILIRUB SERPL-MCNC: 0.19 MG/DL (ref 0.2–1)
BPU ID: NORMAL
BUN SERPL-MCNC: 20 MG/DL (ref 5–25)
CALCIUM ALBUM COR SERPL-MCNC: 10 MG/DL (ref 8.3–10.1)
CALCIUM SERPL-MCNC: 8.3 MG/DL (ref 8.3–10.1)
CHLORIDE SERPL-SCNC: 100 MMOL/L (ref 100–108)
CO2 SERPL-SCNC: 34 MMOL/L (ref 21–32)
CREAT SERPL-MCNC: 0.32 MG/DL (ref 0.6–1.3)
CROSSMATCH: NORMAL
EOSINOPHIL # BLD AUTO: 0.13 THOUSAND/ΜL (ref 0–0.61)
EOSINOPHIL NFR BLD AUTO: 1 % (ref 0–6)
ERYTHROCYTE [DISTWIDTH] IN BLOOD BY AUTOMATED COUNT: 20 % (ref 11.6–15.1)
GFR SERPL CREATININE-BSD FRML MDRD: 106 ML/MIN/1.73SQ M
GLUCOSE SERPL-MCNC: 116 MG/DL (ref 65–140)
HCT VFR BLD AUTO: 28.4 % (ref 34.8–46.1)
HGB BLD-MCNC: 8.5 G/DL (ref 11.5–15.4)
IMM GRANULOCYTES # BLD AUTO: 0.19 THOUSAND/UL (ref 0–0.2)
IMM GRANULOCYTES NFR BLD AUTO: 2 % (ref 0–2)
LYMPHOCYTES # BLD AUTO: 1.11 THOUSANDS/ΜL (ref 0.6–4.47)
LYMPHOCYTES NFR BLD AUTO: 9 % (ref 14–44)
MCH RBC QN AUTO: 29.6 PG (ref 26.8–34.3)
MCHC RBC AUTO-ENTMCNC: 29.9 G/DL (ref 31.4–37.4)
MCV RBC AUTO: 99 FL (ref 82–98)
MONOCYTES # BLD AUTO: 0.88 THOUSAND/ΜL (ref 0.17–1.22)
MONOCYTES NFR BLD AUTO: 7 % (ref 4–12)
NEUTROPHILS # BLD AUTO: 10.02 THOUSANDS/ΜL (ref 1.85–7.62)
NEUTS SEG NFR BLD AUTO: 81 % (ref 43–75)
NRBC BLD AUTO-RTO: 1 /100 WBCS
PLATELET # BLD AUTO: 251 THOUSANDS/UL (ref 149–390)
PMV BLD AUTO: 9.7 FL (ref 8.9–12.7)
POTASSIUM SERPL-SCNC: 4.2 MMOL/L (ref 3.5–5.3)
PROT SERPL-MCNC: 6.1 G/DL (ref 6.4–8.2)
RBC # BLD AUTO: 2.87 MILLION/UL (ref 3.81–5.12)
SODIUM SERPL-SCNC: 135 MMOL/L (ref 136–145)
UNIT DISPENSE STATUS: NORMAL
UNIT PRODUCT CODE: NORMAL
UNIT PRODUCT VOLUME: 350 ML
UNIT RH: NORMAL
WBC # BLD AUTO: 12.37 THOUSAND/UL (ref 4.31–10.16)

## 2022-06-23 PROCEDURE — 97110 THERAPEUTIC EXERCISES: CPT

## 2022-06-23 PROCEDURE — 99232 SBSQ HOSP IP/OBS MODERATE 35: CPT | Performed by: INTERNAL MEDICINE

## 2022-06-23 PROCEDURE — 94640 AIRWAY INHALATION TREATMENT: CPT

## 2022-06-23 PROCEDURE — 99497 ADVNCD CARE PLAN 30 MIN: CPT | Performed by: PHYSICIAN ASSISTANT

## 2022-06-23 PROCEDURE — 97530 THERAPEUTIC ACTIVITIES: CPT

## 2022-06-23 PROCEDURE — 94760 N-INVAS EAR/PLS OXIMETRY 1: CPT

## 2022-06-23 PROCEDURE — 94668 MNPJ CHEST WALL SBSQ: CPT

## 2022-06-23 PROCEDURE — 85025 COMPLETE CBC W/AUTO DIFF WBC: CPT | Performed by: INTERNAL MEDICINE

## 2022-06-23 PROCEDURE — 80053 COMPREHEN METABOLIC PANEL: CPT | Performed by: INTERNAL MEDICINE

## 2022-06-23 PROCEDURE — 71045 X-RAY EXAM CHEST 1 VIEW: CPT

## 2022-06-23 PROCEDURE — 94664 DEMO&/EVAL PT USE INHALER: CPT

## 2022-06-23 PROCEDURE — 99222 1ST HOSP IP/OBS MODERATE 55: CPT | Performed by: PHYSICIAN ASSISTANT

## 2022-06-23 PROCEDURE — C9113 INJ PANTOPRAZOLE SODIUM, VIA: HCPCS | Performed by: INTERNAL MEDICINE

## 2022-06-23 PROCEDURE — 94669 MECHANICAL CHEST WALL OSCILL: CPT

## 2022-06-23 PROCEDURE — 99498 ADVNCD CARE PLAN ADDL 30 MIN: CPT | Performed by: PHYSICIAN ASSISTANT

## 2022-06-23 RX ORDER — SODIUM CHLORIDE FOR INHALATION 3 %
4 VIAL, NEBULIZER (ML) INHALATION
Status: DISCONTINUED | OUTPATIENT
Start: 2022-06-23 | End: 2022-06-23

## 2022-06-23 RX ORDER — SODIUM CHLORIDE FOR INHALATION 3 %
4 VIAL, NEBULIZER (ML) INHALATION 3 TIMES DAILY
Status: DISCONTINUED | OUTPATIENT
Start: 2022-06-23 | End: 2022-06-28

## 2022-06-23 RX ORDER — SODIUM CHLORIDE FOR INHALATION 0.9 %
VIAL, NEBULIZER (ML) INHALATION
Status: COMPLETED
Start: 2022-06-23 | End: 2022-06-23

## 2022-06-23 RX ORDER — LEVALBUTEROL 1.25 MG/.5ML
SOLUTION, CONCENTRATE RESPIRATORY (INHALATION)
Status: COMPLETED
Start: 2022-06-23 | End: 2022-06-23

## 2022-06-23 RX ORDER — LEVALBUTEROL INHALATION SOLUTION 0.63 MG/3ML
0.63 SOLUTION RESPIRATORY (INHALATION)
Status: DISCONTINUED | OUTPATIENT
Start: 2022-06-23 | End: 2022-06-28

## 2022-06-23 RX ADMIN — LEVALBUTEROL HYDROCHLORIDE 0.63 MG: 0.63 SOLUTION RESPIRATORY (INHALATION) at 19:29

## 2022-06-23 RX ADMIN — SODIUM CHLORIDE SOLN NEBU 3% 4 ML: 3 NEBU SOLN at 19:29

## 2022-06-23 RX ADMIN — PANTOPRAZOLE SODIUM 40 MG: 40 INJECTION, POWDER, FOR SOLUTION INTRAVENOUS at 08:21

## 2022-06-23 RX ADMIN — ACETAMINOPHEN 650 MG: 650 SUSPENSION ORAL at 06:13

## 2022-06-23 RX ADMIN — Medication 250 MG: at 18:17

## 2022-06-23 RX ADMIN — LEVALBUTEROL HYDROCHLORIDE 1.25 MG: 1.25 SOLUTION, CONCENTRATE RESPIRATORY (INHALATION) at 12:28

## 2022-06-23 RX ADMIN — RIOCIGUAT 2.5 MG: 2.5 TABLET, FILM COATED ORAL at 08:20

## 2022-06-23 RX ADMIN — LORATADINE 10 MG: 10 TABLET ORAL at 08:19

## 2022-06-23 RX ADMIN — ACETAMINOPHEN 650 MG: 650 SUSPENSION ORAL at 18:13

## 2022-06-23 RX ADMIN — MACITENTAN 10 MG: 10 TABLET, FILM COATED ORAL at 08:21

## 2022-06-23 RX ADMIN — SODIUM CHLORIDE 500 ML: 0.9 INJECTION, SOLUTION INTRAVENOUS at 08:26

## 2022-06-23 RX ADMIN — Medication 250 MG: at 08:20

## 2022-06-23 RX ADMIN — Medication 1000 UNITS: at 08:19

## 2022-06-23 RX ADMIN — ASPIRIN 81 MG CHEWABLE TABLET 81 MG: 81 TABLET CHEWABLE at 08:19

## 2022-06-23 RX ADMIN — GLYCERIN, HYPROMELLOSE, POLYETHYLENE GLYCOL 2 DROP: .2; .2; 1 LIQUID OPHTHALMIC at 18:14

## 2022-06-23 RX ADMIN — ATOVAQUONE 750 MG: 750 SUSPENSION ORAL at 08:20

## 2022-06-23 RX ADMIN — ACETAMINOPHEN 650 MG: 650 SUSPENSION ORAL at 11:30

## 2022-06-23 RX ADMIN — LEVOTHYROXINE SODIUM 25 MCG: 25 TABLET ORAL at 06:13

## 2022-06-23 RX ADMIN — ENOXAPARIN SODIUM 40 MG: 40 INJECTION SUBCUTANEOUS at 08:19

## 2022-06-23 RX ADMIN — RIOCIGUAT 2.5 MG: 2.5 TABLET, FILM COATED ORAL at 18:16

## 2022-06-23 RX ADMIN — ISODIUM CHLORIDE 3 ML: 0.03 SOLUTION RESPIRATORY (INHALATION) at 12:28

## 2022-06-23 RX ADMIN — GLYCERIN, HYPROMELLOSE, POLYETHYLENE GLYCOL 2 DROP: .2; .2; 1 LIQUID OPHTHALMIC at 18:13

## 2022-06-23 RX ADMIN — ACETAMINOPHEN 650 MG: 650 SUSPENSION ORAL at 23:07

## 2022-06-23 RX ADMIN — PREDNISONE 30 MG: 20 TABLET ORAL at 08:19

## 2022-06-23 NOTE — CASE MANAGEMENT
Case Management Progress Note    Patient name Angy Moffat  Location [facility]U 204/[facility] 204-01 MRN [id number]  : 1940 Date 2022       LOS (days): 9  Geometric Mean LOS (GMLOS) (days): 9 60  Days to GMLOS:1        OBJECTIVE:        Current admission status: Inpatient  Preferred Pharmacy:   PATIENT/FAMILY REPORTS NO PREFERRED PHARMACY  No address on file      Primary Care Provider: No primary care provider on file  Primary Insurance: [address] [place] [address],[address] [facility]  Secondary Insurance:     PROGRESS NOTE:      Spoke to patient's daughter Elvira Roman via telephone  Daughter is concerned about [facility]'s ability to provide necessary wound care and daily suctioning for patient  Elvira Roman has SNF list that she had researched previously, will call CM later today with additional facilities to try  In the meantime, she would like CM to make an additional referral to [facility] in [place], [place], and reach out to [facility] to discuss their ability to provide the necessary care  Referrals placed, message left for Lorilj SARAH to follow  Lulu Lee HOSSEIN [facility]UM[facility]  2022  11:02 AM      Spoke again to patient's daughter Elvira Roman via telephone  She requested additional SNF referrals to [facility]; [facility]; [id number] [place] [address]; [facility] Alcocer; and [facility]  Referrals placed  Discussed with Elvira Roman that team is requesting a family meeting for tomorrow; she will be at bedside at 6 AM, as well as her aunt, and she can have her father (patient's spouse/POA) available by telephone  CM to follow      Lulu Lee [facility] [place]  2022  4:10 PM

## 2022-06-23 NOTE — PROGRESS NOTES
Progress Note - Infectious Disease   Rachel Guerrero 80 y o  female MRN: [id number]  Unit/Bed#: [facility] 204-01 Encounter: [id number]      Impression/Recommendations:  1   SIRS, POA   HR, WBC   Due to #2   No appreciable source of acute infection   Serial procalcitonin, UA, Flu/RSV/COVID PCR, blood cultures, CXR, CT C/A/P negative   Infiltrates at bases of lungs on CT are quire minimal and may be due to chronic aspiration versus atelectasis versus CREST   Doubt clinical pneumonia   Hemodynamically stable and non-toxic   Persistent WBC likely non-infectious and multifactorial due to steroids, reactive to anemia, slowly improving   Remains stable off antibiotics  Rec:  · Continue to follow closely off antibiotics  · Follow temperatures and WBC closely  · Supportive care as per the primary service     2   Stage IV sacral ulcer   Presented to deterioration with necrotic slough, likely due to ongoing pressure injury   Status post excisional debridement down to bone, VAC 6/14   No intraoperative purulence noted   No cellulitis   Complicated by bleeding 9/70 requiring VAC removal   VAC replaced 6/22  Rec:  · Continue LWC per surgery  · Offloading per nursing  · No role for long-term IV antibiotics at this point     3   CREST   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, status post IVIG, IV steroids   On recent steroid taper, should now be on 20 mg daily until seen outpatient by Rheum at [facility]  Rec:  · Continue steroids  · Continue Atovaquone for PCP prophylaxis  · Outpatient Rheum follow-up at [facility]     4   Dysphagia   Status post PEG   History of aspiration, mucus plugging in the past      5   Urinary retention   With chronic indwelling Tejada catheter      6   PAH   On Opsumit, Viociguat      The patient is stable from an ID standpoint  We will sign off for now  Please call with new questions  Antibiotics:  Off antibiotics #6    Subjective:  Patient seen on AM rounds    Denies fevers, chills, sweats, nausea, vomiting, or diarrhea  24 Hour Events:  No documented fevers, chills, sweats, nausea, vomiting, or diarrhea  Objective:  Vitals:  Temp:  [98 °F (36 7 °C)-98 7 °F (37 1 °C)] 98 7 °F (37 1 °C)  HR:  [64-78] 75  Resp:  [15-16] 15  BP: ()/(39-49) 94/43  SpO2:  [96 %-100 %] 97 %  Temp (24hrs), Av 4 °F (36 9 °C), Min:98 °F (36 7 °C), Max:98 7 °F (37 1 °C)  Current: Temperature: 98 7 °F (37 1 °C)    Physical Exam:   General:  No acute distress  Psychiatric:  Awake and alert  Pulmonary:  Normal respiratory excursion without accessory muscle use  Abdomen:  Soft, nontender  Extremities:  No edema  Skin:  No rashes    Lab Results:  I have personally reviewed pertinent labs  Results from last 7 days   Lab Units 22  0838 22  0204 22  0505 22  0527 22  0537   POTASSIUM mmol/L 4 2 3 9 4 0   < > 4 1   CHLORIDE mmol/L 100 99* 98*   < > 98*   CO2 mmol/L 34* 32 32   < > 32   BUN mg/dL 20 22 29*   < > 38*   CREATININE mg/dL 0 32* 0 28* 0 36*   < > 0 36*   EGFR ml/min/1 73sq m 106 111 102   < > 102   CALCIUM mg/dL 8 3 8 3 8 3   < > 8 2*   AST U/L 21 20  --   --  19   ALT U/L 19 21  --   --  19   ALK PHOS U/L 85 87  --   --  86    < > = values in this interval not displayed  Results from last 7 days   Lab Units 22  0838 22  0204 22  0505   WBC Thousand/uL 12 37* 14 09* 15 14*   HEMOGLOBIN g/dL 8 5* 7 2* 7 4*   PLATELETS Thousands/uL 251 264 258           Imaging Studies:   I have personally reviewed pertinent imaging study reports and images in PACS  EKG, Pathology, and Other Studies:   I have personally reviewed pertinent reports

## 2022-06-23 NOTE — ASSESSMENT & PLAN NOTE
Patient noted to have low hemoglobin on admission-6 9  Has received 1 unit PRBCs on 06/15, but repeat CBC was 6 5  Iron panel shows low iron and TIBC consistent with anemia of chronic disease; reticulocytes elevated  B12 and folate within normal limits; will consider iron supplementation once infection resolved  In the meantime, hemoglobin stable at 8 5 monitor

## 2022-06-23 NOTE — RESPIRATORY THERAPY NOTE
RT Protocol Note  Delta Randy 80 y o  female MRN: [id number]  [facility] [id number]    Assessment    Principal Problem:    Sacral wound  Active Problems:    Severe protein-calorie malnutrition ([address] )    Dysphagia, oropharyngeal phase    Scleroderma ([address] )    Acquired hypothyroidism    Anemia    Urinary retention    Acute respiratory failure with hypoxia (HCC)    Pneumonia    Hearing loss      Home Pulmonary Medications:NA       Past Medical History:   Diagnosis Date    Dysphagia, oropharyngeal phase 06/06/2022     Social History     Socioeconomic History    Marital status: /Civil Union     Spouse name: None    Number of children: None    Years of education: None    Highest education level: None   Occupational History    None   Tobacco Use    Smoking status: Never Smoker    Smokeless tobacco: Never Used   Substance and Sexual Activity    Alcohol use: Never    Drug use: None    Sexual activity: None   Other Topics Concern    None   Social History Narrative    None     Social Determinants of Health     Financial Resource Strain: Not on file   Food Insecurity: No Food Insecurity    Worried About Running Out of Food in the Last Year: Never true    Ran Out of Food in the Last Year: Never true   Transportation Needs: No Transportation Needs    Lack of Transportation (Medical): No    Lack of Transportation (Non-Medical):  No   Physical Activity: Not on file   Stress: Not on file   Social Connections: Not on file   Intimate Partner Violence: Not on file   Housing Stability: Low Risk     Unable to Pay for Housing in the Last Year: No    Number of Places Lived in the Last Year: 1    Unstable Housing in the Last Year: No       Subjective         Objective    Physical Exam:   Assessment Type: Assess only  General Appearance: Alert, Awake  Respiratory Pattern: Labored  Chest Assessment: Chest expansion symmetrical  Bilateral Breath Sounds: Coarse, Rhonchi, Diminished  Cough: Weak, Non-productive  Suction: Oral, Nasal    Vitals:  Blood pressure (!) 94/43, pulse 75, temperature 98 7 °F (37 1 °C), temperature source Axillary, resp  rate 15, height 5' 7 5" (1 715 m), weight 66 2 kg (146 lb), SpO2 97 %  Imaging and other studies: I have personally reviewed pertinent reports  Plan    Respiratory Plan: Mild Distress pathway  Airway Clearance Plan: Percussive Vest     Resp Comments: (P) Pt assessed for RT protocol and airway clearance  She has a history of Scleroderma  Pt is having lg ammounts of thick secretions that she is unable to clear on her own  Pt was NT and orally suctioned for lg ammounts of thick mucus  Pt has very weak cough w/ bilat rhonchi/wheeze  Pt ordered vest therapy and nebs TID for recretion management

## 2022-06-23 NOTE — QUICK NOTE
Called to bedside because patient had increased oxygen requirements and was struggling to clear secretions  Respiratory at bedside and 3% saline nebulizers as well as percussion vest and Xopenex ordered  IV fluids stopped  Furthermore updated sister at bedside  Given that patient remains hypotensive and now has increased oxygen requirements and appears to be struggling with clearing secretions most likely all secondary to underlying chronic conditions and deconditioning from those  Her hypertension is probably from heart failure and pulmonary hypertension and the leukocytosis although still present is stable and in fact trending down  Discussed with critical care fellow who did bring up possibility of osteomyelitis treatment  Did discuss with Infectious Disease regarding any possible benefit from adding antibiotics at this time and id did get back to me stating that I antibiotics are not needed  Overall poor prognosis and since she does not seem to be getting better, but also continues to be hypotensive and now with some worsening of her respiratory status all team is in agreement that a family meeting would be advised and I reached out to CM to see if this can be set up in the near future, particularly if there is no improvement  As of now patient is a level 1 Full code

## 2022-06-23 NOTE — CONSULTS
Consultation - [facility] [place] [facility]  80 y o   female  [facility] 204[facility]01   MRN: [id number]  Encounter: [id number]    ASSESSMENT:    Patient Active Problem List   Diagnosis    Sacral wound    Severe protein-calorie malnutrition ([address] )    Dysphagia, oropharyngeal phase    Scleroderma ([address] )    Acquired hypothyroidism    Anemia    Urinary retention    Acute respiratory failure with hypoxia (HCC)    Pneumonia    Hearing loss       Active problems addressed:  Stage IV sacral decubitus wound  Severe protein calorie malnutrition  S/p PEG  Acute hypoxic respiratory failure  Rayna[place]'s [place]rest  Dermatomyositis  Goals of care     PLAN:    1  Goals:    Ongoing medical optimization with strict limit of DNR/DNI   Ongoing goals of care pending patient's ability to near her goal of becoming more physically active and eventually returning home   She and her family are aware of high risk for sudden onset deterioration for any number of reasons   Remainder of GOC discussion detailed in HPI    Code status: Level 3 - DNAR and DNI   Decisional apparatus:  Patient does have capacity to make medical decisions on my exam today  If such capacity is lost, patient's substitute decision maker would default to  by [place] 169  Advance Directive / Living Will / POLST:  None on file    2  Social Support:   Supportive listening provided   Patient is supported by her 3 children (daughter and 2 sons), sister, brother, and spouse   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69   Patient's spouse is a retired dentist      3  Symptom management:   Per yossi    I have reviewed the patient's controlled substance dispensing history in the Prescription Drug Monitoring Program in compliance with the [facility] regulations before prescribing any controlled substances  We appreciate the opportunity to participate in this patient's care  We will continue to follow   Please do not hesitate to contact our on-call provider through our clinic answering service at [phone] should you have acute symptom control concerns  IDENTIFICATION:  Inpatient consult to Palliative Care  Consult performed by: Lucía Thomas PA-C  Consult ordered by: Cyndy Boone MD        Reason for Consult / Principal Problem: goals of care    HISTORY OF PRESENT ILLNESS:    Dung Murillo is a 80 y o  female with a palliative diagnoses of severe protein calorie malnutrition, dysphagia s/p PEG and stage IV sacral decubitus ulcer  She also has a history of Raynaud's CREST, pulmonary HTN, ILD, dysphagia, pulmonary HTN  She received IVIG and on steroid for recently diagnosed dermatomyositis in 2022  She was admitted on  from rehab due to worsening sacral wound  CT on admission was concerning for necrotizing soft tissue infection  She underwent OR debridement with wound vac on   She then started to have acute respiratory failure and was started on antibiotics for PNA  She is still requiring frequent vac changes  On , she appears to have increased oxygen requirement with increased secretions  Given overall poor prognosis, PSC consulted for [address]  Interview and exam limited by: n/a    Record of Family Meeting - Palliative and Peg Shore 80 y o  female [phone]    Recommendations and Plan:  · Ongoing medical optimization with limit of DNR/DNI  · Patient would ultimately like to return home  Ideally, when medically stable and after recuperating physical strength  However, she is aware that hospice is an option should she wish to focus on comfort focused care as opposed to medical management  A family meeting was held to provide medical update and discuss goals of care  This meeting was necessary for determine the appropriate course of treatment    Time of Meetinam  Meeting Location: patient's room  Participants:   Patient  Sister, Brother  Daughter  Spouse Lainey Ford) and son Yessi Gerber) present via phone  Dr Jeanna Garcia, TYREL Art PA-C     Patient Participation: alert, coherent, and able to participate by verbalizing her wishes and asking appropriate questions    Patient Support System: family as above     Advanced Directive of POLST available: none on file    Topics of Discussion:  · Dr Jennifer Xiao provided medical update including concern of hypotension and worsening hypoxia yesterday  Patient is currently stable, but nevertheless still very fragile from multiple comorbidities including stage IV sacral ulcer, anemia, pulmonary hypertension, malnutrition, impaired wound healing secondary to scleroderma  · Addressed code status at which time patient clearly and repeatedly requests limit of DNR/DNI  She expresses that she would NOT consider ventilator dependence or trach dependence to be a meaningful QOL and would not want even short term intubation  Family ultimately came to support patient's wishes  · Son inquired about hospice cares considering patient's previously expressed wish to go home  · Discussed that home hospice would be an available option to her should she choose to pursue comfort focused care  · Patient expresses she would want that "eventually"  When asked what may influence her decisions to make that transition she stated "I'd like to be more physically able"  She is aware that that is a possibility, but could also have further setbacks which prohibit her from regaining strength  Other Content of Meeting:  · Time spent providing supportive listening  · Spouse expresses being treatment focused  He did appear to have some opposition to DNR/DNI limit and to palliative involvement, but ultimately supported her decision  · Discussed nature of patient's time being limited to "some degree" and encouraging patient considers the best use of her time according to her wishes  Time Involved in Meetin minutes, beginning at approximately  11am and ending at approximately 12 noon  Review of Systems   Constitutional: Positive for fatigue  Negative for activity change  HENT: Positive for trouble swallowing  Respiratory: Positive for shortness of breath  Genitourinary: Positive for difficulty urinating (urinary catheter in place)  Skin:        Sacral wound   Neurological:        Hard of hearing   Psychiatric/Behavioral: Negative for confusion  All other systems reviewed and are negative  Past Medical History:   Diagnosis Date    Dysphagia, oropharyngeal phase 06/06/2022     Past Surgical History:   Procedure Laterality Date    WOUND DEBRIDEMENT N/A 6/14/2022    Procedure: DEBRIDEMENT WOUND [facility] OUT); SACRUM AND BUTTOCKS;  Surgeon: Sophie Travis MD;  [facility];  Service: General     Social History     Socioeconomic History    Marital status: /Civil Union     Spouse name: Not on file    Number of children: Not on file    Years of education: Not on file    Highest education level: Not on file   Occupational History    Not on file   Tobacco Use    Smoking status: Never Smoker    Smokeless tobacco: Never Used   Substance and Sexual Activity    Alcohol use: Never    Drug use: Not on file    Sexual activity: Not on file   Other Topics Concern    Not on file   Social History Narrative    Not on file     Social Determinants of Health     Financial Resource Strain: Not on file   Food Insecurity: No Food Insecurity    Worried About [facility] [facility] in the Last Year: Never true    [address] in the Last Year: Never true   Transportation Needs: No Transportation Needs    Lack of Transportation (Medical): No    Lack of Transportation (Non-Medical):  No   Physical Activity: Not on file   Stress: Not on file   Social Connections: Not on file   Intimate Partner Violence: Not on file   Housing Stability: Low Risk     Unable to Pay for Housing in the Last Year: No    Number of Places Lived in the Last Year: 1    Unstable Housing in the Last Year: No     History reviewed  No pertinent family history  MEDICATIONS / ALLERGIES:  all current active meds have been reviewed    Allergies   Allergen Reactions    Uptravi [Selexipag] Anaphylaxis    Sulfa Antibiotics Tachycardia    Penicillins Rash       OBJECTIVE:  BP 91/51   Pulse 68   Temp 98 7 °F (37 1 °C) (Axillary)   Resp 18   Ht 5' 7 5" (1 715 m)   Wt 66 2 kg (146 lb)   SpO2 100%   BMI 22 53 kg/m²   Physical Exam:  Constitutional: Appears frail  In no acute physical or emotional distress  Head: Normocephalic and atraumatic  Eyes: EOM are normal  No ocular discharge  No scleral icterus  Neck: No visible adenopathy or masses  Respiratory: Effort normal  No stridor  No respiratory distress  On O2 via NC  Gastrointestinal: No abdominal distension  Musculoskeletal: No edema  Neurological: Alert, oriented and appropriately conversant  Skin: Dry, no diaphoresis  Wound visualized via media tab photos  Psychiatric: Displays a normal mood and affect  Behavior, judgment and thought content appear normal      Lab Results:   I have personally reviewed pertinent labs  , CBC:   Lab Results   Component Value Date    WBC 10 70 (H) 06/24/2022    HGB 8 2 (L) 06/24/2022    HCT 25 6 (L) 06/24/2022    MCV 97 06/24/2022     06/24/2022    MCH 30 9 06/24/2022    MCHC 32 0 06/24/2022    RDW 20 0 (H) 06/24/2022    MPV 9 7 06/24/2022    NRBC 0 06/24/2022   , CMP:   Lab Results   Component Value Date    SODIUM 134 (L) 06/24/2022    K 4 0 06/24/2022    CL 99 (L) 06/24/2022    CO2 30 06/24/2022    BUN 19 06/24/2022    CREATININE 0 27 (L) 06/24/2022    CALCIUM 8 5 06/24/2022    AST 19 06/24/2022    ALT 21 06/24/2022    ALKPHOS 88 06/24/2022    EGFR 113 06/24/2022     Imaging Studies: I have personally reviewed pertinent reports  EKG, Pathology, and Other Studies: I have personally reviewed pertinent reports        Counseling / Coordination of Care:  Counseling / Coordination of Care  Total floor / unit time spent today 75+ minutes  Greater than 50% of total time was spent with the patient and / or family counseling and / or coordination of care  A description of the counseling / coordination of care: provided medical updates, discussed palliative care, discussed hospice care, determined competency, determined goals of care, determined POA, determined social/family support, discussed plans of care, discussed symptom management, provided psychosocial support   Time spent in family meeting from approximately 11am-12noon in addition to time spent documenting and coordinating care with multidisciplinary team      ASTRID [address] and Supportive Care

## 2022-06-23 NOTE — OCCUPATIONAL THERAPY NOTE
Occupational Therapy Treatment Note      Jose Raul Watson    6/23/2022    Principal Problem:    Sacral wound  Active Problems:    Severe protein-calorie malnutrition (HCC)    Dysphagia, oropharyngeal phase    Scleroderma (HCC)    Acquired hypothyroidism    Anemia    Urinary retention    Acute respiratory failure with hypoxia (HCC)    Pneumonia    Hearing loss      Past Medical History:   Diagnosis Date    Dysphagia, oropharyngeal phase 06/06/2022       Past Surgical History:   Procedure Laterality Date    WOUND DEBRIDEMENT N/A 6/14/2022    Procedure: DEBRIDEMENT WOUND [facility] OUT); SACRUM AND BUTTOCKS;  Surgeon: Jim Rossi MD;  [facility];  Service: General        06/23/22 1414   OT Last Visit   OT Visit Date 06/23/22   Note Type   Note Type Treatment   Restrictions/Precautions   Weight Bearing Precautions Per Order No   Other Precautions Multiple lines;Telemetry;O2;Fall Risk;Pain   Lifestyle   Autonomy Pt reports that in March she was I with ADLS, IADLS, and mobility w/o AD, at [facility] assist with ADL's/IADL's, assist of 1 with RW with mobility/transfers  Reciprocal Relationships Pt has a supportive sister and a    Service to Others Pt is retired   Intrinsic Gratification Pt enjoys reading   Pain Assessment   Pain Assessment Tool FLACC   Pain Rating: FLACC (Rest) - Face 1   Pain Rating: FLACC (Rest) - Legs 0   Pain Rating: FLACC (Rest) - Activity 0   Pain Rating: FLACC (Rest) - Cry 1   Pain Rating: FLACC (Rest) - Consolability 0   Score: FLACC (Rest) 2   Pain Rating: FLACC (Activity) - Face 1   Pain Rating: FLACC (Activity) - Legs 0   Pain Rating: FLACC (Activity) - Activity 0   Pain Rating: FLACC (Activity) - Cry 1   Pain Rating: FLACC (Activity) - Consolability 0   Score: FLACC (Activity) 2   Bed Mobility   Rolling R 2  Maximal assistance   Additional items Assist x 1; Increased time required;Verbal cues;LE management   Rolling L 2  Maximal assistance   Additional items Assist x 1; Increased time required;Verbal cues;LE management   Supine to Sit 2  Maximal assistance   Additional items Assist x 2;HOB elevated; Increased time required;Verbal cues;LE management   Sit to Supine 2  Maximal assistance   Additional items Assist x 2; Increased time required;Verbal cues;LE management   Additional Comments Pt sat EOB w/ Min A for sitting balance/trunk control; sat EOB w/ Min A x1 for approx 10 mins  Has posterior lean  VC/TC for upright posture   Therapeutic Exercise - ROM   UE-ROM Yes   ROM- Right Upper Extremities   R Shoulder AAROM; Flexion   R Elbow AAROM;Elbow flexion;Elbow extension   R Hand AAROM; Thumb; Index finger; Long finger;Little finger;Ring finger   R Position Seated   R Weight/Reps/Sets 1 set of 5   RUE ROM Comment tight end feel w/ shoulder flexion; pt has difficulty extending elbows  Has increased pain w/ full ROM of shoulder  ROM - Left Upper Extremities    L Shoulder AAROM   L Elbow AAROM;Elbow flexion;Elbow extension   L Hand Long finger; Index finger;Ring finger;Little finger;AAROM   L Position Seated   L Weight/Reps/Sets 1 set of 5   LUE ROM Comment tight end feel w/ shoulder ROM   Coordination   Fine Motor fair grasp bilaterally   Cognition   Overall Cognitive Status Impaired   Arousal/Participation Responsive; Cooperative   Attention Attends with cues to redirect   Orientation Level Oriented to person;Oriented to place;Oriented to time;Oriented to situation   Memory Decreased recall of precautions   Following Commands Follows one step commands without difficulty   Comments Pt is pleasant and cooperative; needs occasional cues for safety   Activity Tolerance   Activity Tolerance Patient limited by pain; Patient limited by fatigue   Medical Staff Made Aware PT, RN   Assessment   Assessment Patient participated in Skilled OT session 6/23/2022 with interventions consisting of Energy Conservation techniques, deep breathing technique, safety awareness and fall prevention techniques, therapeutic exercise to: increase functional use of BUEs, increase BUE muscle strength ,  therapeutic activities to: increase activity tolerance, increase standing tolerance time with unilateral UE support to complete sink level ADLs, increase dynamic sit/ stand balance during functional activity , increase postural control and increase trunk control   Patient agreeable to OT treatment session, upon arrival patient was found supine in bed  In comparison to previous session, patient with improvements in bed mobility, EOB sitting tolerance, endurance, activity tolerance and UE ROM  Patient requiring verbal cues for safety  Patient continues to be functioning below baseline level, occupational performance remains limited secondary to factors listed above and increased risk for falls and injury  From OT standpoint, recommendation at time of d/c would be Short Term Rehab when medically stable  Patient to benefit from continued Occupational Therapy treatment while in the hospital to address deficits as defined above and maximize level of functional independence with ADLs and functional mobility  Plan   Treatment Interventions ADL retraining;Functional transfer training;UE strengthening/ROM; Endurance training;Cognitive reorientation;Patient/family training; Compensatory technique education;Continued evaluation; Activityengagement; Energy conservation;Equipment evaluation/education; Fine motor coordination activities   Goal Expiration Date 06/29/22   OT Treatment Day 1   OT Frequency 3-5x/wk   Recommendation   OT Discharge Recommendation Post acute rehabilitation services   OT - OK to Discharge Yes  (when medically stable)   Additional Comments  The patient's raw score on the AM-PAC Daily Activity inpatient short form is 11, standardized score is 29 04, less than 39 4  Patients at this level are likely to benefit from discharge to post-acute rehabilitation services   Please refer to the recommendation of the Occupational Therapist for safe discharge planning  Additional Comments 2 Pt seen as a co-treatment due to the patient's co-morbidities, clinically unstable presentation, and present impairments which are a regression from the patient's baseline     AM-PAC Daily Activity Inpatient   Lower Body Dressing 1   Bathing 1   Toileting 1   Upper Body Dressing 2   Grooming 3   Eating 3   Daily Activity Raw Score 11   Daily Activity Standardized Score (Calc for Raw Score >=11) 29 04   AM-PAC Applied Cognition Inpatient   Following a Speech/Presentation 3   Understanding Ordinary Conversation 4   Taking Medications 3   Remembering Where Things Are Placed or Put Away 3   Remembering List of 4-5 Errands 3   Taking Care of Complicated Tasks 2   Applied Cognition Raw Score 18   Applied Cognition Standardized Score 38 07       Bess Mustafa MS, OTR/L

## 2022-06-23 NOTE — ASSESSMENT & PLAN NOTE
Follows with Rheumatology in the outpatient setting  Will restart prednisone 30 mg daily  Atovaquone for PCP prophylaxis (consider substituting Bactrim or other prophylactic agent in setting of possible ototoxicity)  Appreciate rheumatology input yesterday

## 2022-06-23 NOTE — PLAN OF CARE
Problem: OCCUPATIONAL THERAPY ADULT  Goal: Performs self-care activities at highest level of function for planned discharge setting  See evaluation for individualized goals  Description: Treatment Interventions: ADL retraining, Functional transfer training, UE strengthening/ROM, Endurance training, Cognitive reorientation, Patient/family training, Compensatory technique education, Continued evaluation, Activityengagement, Energy conservation, Equipment evaluation/education, Fine motor coordination activities          See flowsheet documentation for full assessment, interventions and recommendations  Outcome: Progressing  Note: Limitation: Decreased ADL status, Decreased UE ROM, Decreased UE strength, Decreased Safe judgement during ADL, Decreased endurance, Decreased fine motor control, Decreased self-care trans, Decreased high-level ADLs  Prognosis: Fair  Assessment: Patient participated in Skilled OT session 6/23/2022 with interventions consisting of Energy Conservation techniques, deep breathing technique, safety awareness and fall prevention techniques, therapeutic exercise to: increase functional use of BUEs, increase BUE muscle strength ,  therapeutic activities to: increase activity tolerance, increase standing tolerance time with unilateral UE support to complete sink level ADLs, increase dynamic sit/ stand balance during functional activity , increase postural control and increase trunk control   Patient agreeable to OT treatment session, upon arrival patient was found supine in bed  In comparison to previous session, patient with improvements in bed mobility, EOB sitting tolerance, endurance, activity tolerance and UE ROM  Patient requiring verbal cues for safety  Patient continues to be functioning below baseline level, occupational performance remains limited secondary to factors listed above and increased risk for falls and injury     From OT standpoint, recommendation at time of d/c would be Short Term Rehab when medically stable  Patient to benefit from continued Occupational Therapy treatment while in the hospital to address deficits as defined above and maximize level of functional independence with ADLs and functional mobility       OT Discharge Recommendation: Post acute rehabilitation services  OT - OK to Discharge: Yes (when medically stable)  Magda Wilson MS, OTR/L

## 2022-06-23 NOTE — PLAN OF CARE
Problem: PHYSICAL THERAPY ADULT  Goal: Performs mobility at highest level of function for planned discharge setting  See evaluation for individualized goals  Description: Treatment/Interventions: LE strengthening/ROM, Therapeutic exercise, Endurance training, Patient/family training, Equipment eval/education, Bed mobility, OT, Spoke to nursing, Continued evaluation          See flowsheet documentation for full assessment, interventions and recommendations  Note: Prognosis: Fair  Problem List: Decreased strength, Decreased endurance, Impaired balance, Decreased mobility, Decreased cognition, Decreased safety awareness, Impaired hearing  Assessment: Pt presents to therapy today with reduced mobility, poor endurance, high risk of falling, poor activity tolerance, [place], confusion, poor sitting tolerance, reduced ROM of BLE And B UE, high risk of falling    These impairments limit the patient by requiring increased assistance for mobility and places her at rsik of falling  Pt would benefit from continued skilled therapy while in the hospital to improve overall mobility and work towards a safe d/c  Recommend return to facility  At end of session patient was left supine with call bell within reach  Pt's family member present  Pt and her family given HEP AP, quad sets, glute sets and UE AROM  The patient's AM-PAC Basic Mobility Inpatient Short Form Raw Score is 7  A Raw score of less than or equal to 16 suggests the patient may benefit from discharge to post-acute rehabilitation services  Please also refer to the recommendation of the Physical Therapist for safe discharge planning  PT Discharge Recommendation: Return to facility with rehabilitation services          See flowsheet documentation for full assessment

## 2022-06-23 NOTE — PROGRESS NOTES
Progress Note - Pulmonary   Laureano Sovereign 80 y o  female MRN: [id number]  Unit/Bed#: [facility] 204-01 Encounter: [id number]      Assessment:  · Acute hypoxemic respiratory failure  Multifactorial   Improving  · Pulmonary hypertension  · Sacral wound infection  · Scleroderma  · Dermatomyositis is on steroids taper  Plan:  · Titrate oxygen to maintain O2 saturation above 88%  · Continue pulmonary hypertension medications [place] and [place]  · Aspiration precautions  Subjective: The patient is feeling better  She had no pain today  Breathing is at her baseline  Denies significant cough  Her oxygen requirement is improving  The blood pressure is better  It was 116/48 when it was in the room  Vitals: Blood pressure (!) 94/43, pulse 75, temperature 98 7 °F (37 1 °C), temperature source Axillary, resp  rate 15, height 5' 7 5" (1 715 m), weight 66 2 kg (146 lb), SpO2 97 %  , Body mass index is 22 53 kg/m²  Intake/Output Summary (Last 24 hours) at 6/23/2022 1139  Last data filed at 6/23/2022 1056  Gross per 24 hour   Intake --   Output 1975 ml   Net -1975 ml       Physical Exam  Gen: Awake, alert, oriented x 3, no acute distress  HEENT: Mucous membranes moist, no oral lesions, no thrush  NECK: No accessory muscle use, JVP not elevated  Cardiac: Regular, single S1, single S2, no murmurs, no rubs, no gallops  Lungs:  Decreased breath sounds    Abdomen: normoactive bowel sounds, soft nontender, nondistended, no rebound or rigidity, no guarding  Extremities: no cyanosis, no clubbing, no edema    Labs:   CBC:   Lab Results   Component Value Date    WBC 12 37 (H) 06/23/2022    HGB 8 5 (L) 06/23/2022    HCT 28 4 (L) 06/23/2022    MCV 99 (H) 06/23/2022     06/23/2022    MCH 29 6 06/23/2022    MCHC 29 9 (L) 06/23/2022    RDW 20 0 (H) 06/23/2022    MPV 9 7 06/23/2022    NRBC 1 06/23/2022   , CMP:   Lab Results   Component Value Date    SODIUM 135 (L) 06/23/2022    K 4 2 06/23/2022     06/23/2022 CO2 34 (H) 06/23/2022    BUN 20 06/23/2022    CREATININE 0 32 (L) 06/23/2022    CALCIUM 8 3 06/23/2022    AST 21 06/23/2022    ALT 19 06/23/2022    ALKPHOS 85 06/23/2022    EGFR 106 06/23/2022           Monroe Sanchez MD

## 2022-06-23 NOTE — ASSESSMENT & PLAN NOTE
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

## 2022-06-23 NOTE — PROGRESS NOTES
[address] [facility]  Progress Note Agustina Shore 1940, 80 y o  female MRN: [id number]  [facility]: [id number]  Primary Care Provider: No primary care provider on file  Date and time admitted to hospital: 6/14/2022  8:07 PM    Hearing loss  Assessment & Plan  Patient reports 3 months of progressively worsening hearing  As per previous admission, concerning for atovaquone versus Lasix  Discussed with ENT, have declines seeing patient in hospital; consider outpatient referral  Trial different antibiotic prophylaxis    Pneumonia  Assessment & Plan  See accompanying plan under acute respiratory failure  Not on abx currently  CXR shows bilateral infiltrations on 6/19  Appreciate Pulmonology input    Acute respiratory failure with hypoxia [facility])  Assessment & Plan  Patient with worsening respiratory requirement; initially require 10 L nasal cannula  Appreciate respiratory recommendations, has been suctioned with improvement in oxygenation  X-ray chest shows mild vascular congestion  Consider volume overload (appears euvolemic on exam) versus PE (unlikely, heart rate normal and blood pressure stable) versus pneumonia (bilateral lower lobe PNA demonstrated on CT chest)  Currently on antibiotics for treatment of sacral ulcer; consult to ID  Respiratory protocol, monitor consult pulmonology as needed  History of pulmonary hypertension; continue home Adempas and Opsumit    --patient placed on step-down level 2 and confirmed with patient and family that she is full code and accepting of intubation as needed        Consulted Pulmonology primarily for hypotension and patient on meds for pulmonary hypertension- no adjustment to meds      Urinary retention  Assessment & Plan  Patient with history of urinary retention and spasm; previously on myrbetriq and Gemtesa  Continue lilly catheter at this time; treat symptomatically  Follows with urology in the outpatient setting Anemia  Assessment & Plan  Patient noted to have low hemoglobin on admission-6 9  Has received 1 unit PRBCs on 06/15, but repeat CBC was 6 5  Iron panel shows low iron and TIBC consistent with anemia of chronic disease; reticulocytes elevated  B12 and folate within normal limits; will consider iron supplementation once infection resolved  In the meantime, hemoglobin stable at 8 5 monitor      Acquired hypothyroidism  Assessment & Plan  As per recent PCP note, appears to take 25 mcg daily  Continue home medication, TSH within normal limits    [facility]  Assessment & Plan  Follows with Rheumatology in the outpatient setting  Will restart prednisone 30 mg daily  Atovaquone for PCP prophylaxis (consider substituting Bactrim or other prophylactic agent in setting of possible ototoxicity)  Appreciate rheumatology input yesterday    Dysphagia, oropharyngeal phase  Assessment & Plan  Patient with history of dysphagia, peg tube in place  Continue current tube feeds; appreciate nutritional recommendations    Severe protein-calorie malnutrition ([facility] [address] )  Assessment & Plan  Malnutrition Findings:   Adult Malnutrition type: Chronic illness  Adult Degree of Malnutrition: Other severe protein calorie malnutrition  Malnutrition Characteristics: Muscle loss, Fat loss, Weight loss, Inadequate energy           360 Statement: related to disease/condition evidenced by increased kcal/pro needs for wound healing Stage IV sacral decub, BMI 18 4 adjusted per prior facility wt 6/7/22;severe buccal fat pads loss, severe deltoid muscle loss, Patient lost 31# (20 6%) since 3/15/22 past 3 months with illness <75% energy intake versus needs for > 1 month  Treating with EN support and Robert TID via PEG for wound healing    BMI Findings:  Adult BMI Classifications: Underweight < 18 5        Body mass index is 22 53 kg/m²         * Sacral wound  Assessment & Plan  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  Underwent surgical debridement on 2022 with general surgery; transferred to medicine service for chronic illness  Initially placed on vanc and cefepime for antibiotic therapy; however ID evaluated and stopped antibiotics  Repositioning; wound care consult  Supportive care, pain medications  Nutritional support  Wound VAC in place with frequent wound VAC changes as per surgical team  A discussion with surgical team yesterday- they have cleaned wound and examined  They will update family with regard to healing  VTE Pharmacologic Prophylaxis: VTE Score: 3 Moderate Risk (Score 3-4) - Pharmacological DVT Prophylaxis Ordered: heparin  Patient Centered Rounds: I performed bedside rounds with nursing staff today  Discussions with Specialists or Other Care Team Provider: Discussed with nursing - they will get medical release form signed by patient/POA and fax to [facility] to obtain biopsy results  Education and Discussions with Family / Patient: Updated  (daughter) via phone  Time Spent for Care: 30 minutes  More than 50% of total time spent on counseling and coordination of care as described above  Current Length of Stay: 9 day(s)  Current Patient Status: Inpatient   Certification Statement: The patient will continue to require additional inpatient hospital stay due to iV fluids  Hypotension  Discharge Plan: Anticipate discharge in 48-72 hrs to rehab facility  Likely Monday-  aware and working on discharge destination  They will call daughter today       Code Status: Level 1 - Full Code    Subjective:   Patient seen and examined  Feeling "better"  No complaints  No dizziness, no lightheadedness  No SOB or CP    Objective:     Vitals:   Temp (24hrs), Av 4 °F (36 9 °C), Min:98 °F (36 7 °C), Max:98 7 °F (37 1 °C)    Temp:  [98 °F (36 7 °C)-98 7 °F (37 1 °C)] 98 7 °F (37 1 °C)  HR:  [64-78] 75  Resp:  [15-16] 15  BP: ()/(39-49) 94/43  SpO2:  [96 %-100 %] 97 %  Body mass index is 22 53 kg/m²  Input and Output Summary (last 24 hours): Intake/Output Summary (Last 24 hours) at 6/23/2022 1013  Last data filed at 6/23/2022 3672  Gross per 24 hour   Intake --   Output 1725 ml   Net -1725 ml       Physical Exam:   Physical Exam  Constitutional:       General: She is not in acute distress  Appearance: She is ill-appearing ( chronic, cachectic )  She is not toxic-appearing  Eyes:      Pupils: Pupils are equal, round, and reactive to light  Cardiovascular:      Rate and Rhythm: Normal rate  Pulmonary:      Effort: No respiratory distress  Breath sounds: No wheezing, rhonchi or rales  Comments: Few crackles both bases    Chest:      Chest wall: No tenderness  Abdominal:      General: Abdomen is flat  There is no distension  Palpations: There is no mass  Tenderness: There is no abdominal tenderness  There is no right CVA tenderness or guarding  Skin:     Capillary Refill: Capillary refill takes less than 2 seconds  Coloration: Skin is pale  Skin is not jaundiced  Findings: No lesion  Comments: Large sacral ulcer  No surrounding erythema  Neurological:      General: No focal deficit present  Mental Status: She is alert  Sensory: No sensory deficit  Motor: No weakness  Coordination: Coordination normal       Gait: Gait normal    Psychiatric:         Mood and Affect: Mood normal           Additional Data:     Labs:  Results from last 7 days   Lab Units 06/23/22  0838 06/20/22  0527 06/19/22  0537   WBC Thousand/uL 12 37*   < > 14 91*   HEMOGLOBIN g/dL 8 5*   < > 8 6*   HEMATOCRIT % 28 4*   < > 28 2*   PLATELETS Thousands/uL 251   < > 283   BANDS PCT %  --   --  11*   NEUTROS PCT % 81*   < >  --    LYMPHS PCT % 9*   < >  --    LYMPHO PCT %  --   --  9*   MONOS PCT % 7   < >  --    MONO PCT %  --   --  5   EOS PCT % 1   < > 1    < > = values in this interval not displayed       Results from last 7 days   Lab Units 06/23/22  7256 SODIUM mmol/L 135*   POTASSIUM mmol/L 4 2   CHLORIDE mmol/L 100   CO2 mmol/L 34*   BUN mg/dL 20   CREATININE mg/dL 0 32*   ANION GAP mmol/L 1*   CALCIUM mg/dL 8 3   ALBUMIN g/dL 1 9*   TOTAL BILIRUBIN mg/dL 0 19*   ALK PHOS U/L 85   ALT U/L 19   AST U/L 21   GLUCOSE RANDOM mg/dL 116                 Results from last 7 days   Lab Units 06/22/22  0850 06/17/22  0542   LACTIC ACID mmol/L 1 1  --    PROCALCITONIN ng/ml  --  0 13       Lines/Drains:  Invasive Devices  Report    Peripheral Intravenous Line  Duration           Long-Dwell Peripheral IV (Midline) 06/14/22 Right Brachial 8 days    Peripheral IV 06/19/22 Dorsal (posterior); Left;Proximal Forearm 4 days          Drain  Duration           Gastrostomy/Enterostomy -- days    Urethral Catheter -- days              Urinary Catheter:  Goal for removal: N/A - Chronic [place]               Imaging: No pertinent imaging reviewed      Recent Cultures (last 7 days):         Last 24 Hours Medication List:   Current Facility-Administered Medications   Medication Dose Route Frequency Provider Last Rate    acetaminophen  650 mg Per G Tube Q6H Zuleika Fisher MD      acetaminophen  650 mg Per G Tube Q4H PRN Flaquita Emerson MD      artificial tear   Both Eyes HS Flaquita Emerson MD      aspirin  81 mg Per G Tube Daily Flaquita Emerson MD      atovaquone  750 mg Per G Tube Daily With Breakfast Flaquita Emerson MD      cholecalciferol  1,000 Units Per G Tube Daily Flaquita Emerson MD      enoxaparin  40 mg Subcutaneous Q24H [address] Flaquita Emerson MD      glycerin-hypromellose-  2 drop Both Eyes BID Flaquita Emerson MD      levothyroxine  25 mcg Per PEG Tube Early Morning Flaquita Emerson MD      loratadine  10 mg Per G Tube Daily Flaquita Emerson MD      Macitentan  10 mg Per G Tube Daily Flaquita Emerson MD      melatonin  3 mg Oral HS PRN Zuleika Fisher MD      ondansetron  4 mg Intravenous Q6H PRN Zuleika Fisher MD      oxyCODONE  2 5 mg Per G Tube Q4H PRN Alphonse Elias Sylvia Borrero MD      oxyCODONE  5 mg Per G Tube Q4H PRN Ana Machado MD      pantoprazole  40 mg Intravenous Q24H [facility] Tania Esquivel MD      predniSONE  30 mg Per G Tube Daily Tania Esquivel MD      Riociguat  2 5 mg Per G Tube TID Tania Esquivel MD      saccharomyces boulardii  250 mg Per G Tube BID Tania Esquivel MD      sodium chloride  500 mL Intravenous Once Low Barber  mL (06/23/22 3377)        Today, Patient Was Seen By: Low Barebr MD    **Please Note: This note may have been constructed using a voice recognition system  **

## 2022-06-23 NOTE — PROGRESS NOTES
Spiritual Care Progress Note    2022  Patient: Rachel Guerrero : 1940  Admission Date & Time: 2022  MRN: [id number] [facility]: [id number]         made introductory visit to patient at bedside  Patient shared that her brother is a  and has flown in from [place] to come see here while she is in the hospital  Patient also identified her sister and her son and daughter as important support for her   provided chaplaincy education and cultivated a relationship of care and support  Patient expressed gratitude for 's visit  Spiritual Care will remain available                Chaplaincy Interventions Utilized:   Empowerment: Normalized experience of patient/family    Exploration: Explored emotional needs & resources and Explored relational needs & resources    Relationship Building: Listened empathically and Provided silent and supportive presence    Chaplaincy Outcomes Achieved:  Expressed gratitude

## 2022-06-23 NOTE — ASSESSMENT & PLAN NOTE
Patient with worsening respiratory requirement; initially require 10 L nasal cannula  Appreciate respiratory recommendations, has been suctioned with improvement in oxygenation  X-ray chest shows mild vascular congestion  Consider volume overload (appears euvolemic on exam) versus PE (unlikely, heart rate normal and blood pressure stable) versus pneumonia (bilateral lower lobe PNA demonstrated on CT chest)  Currently on antibiotics for treatment of sacral ulcer; consult to ID  Respiratory protocol, monitor consult pulmonology as needed  History of pulmonary hypertension; continue home Adempas and Opsumit    --patient placed on step-down level 2 and confirmed with patient and family that she is full code and accepting of intubation as needed        Consulted Pulmonology primarily for hypotension and patient on meds for pulmonary hypertension- no adjustment to meds

## 2022-06-24 LAB
ALBUMIN SERPL BCP-MCNC: 2 G/DL (ref 3.5–5)
ALP SERPL-CCNC: 88 U/L (ref 46–116)
ALT SERPL W P-5'-P-CCNC: 21 U/L (ref 12–78)
ANION GAP SERPL CALCULATED.3IONS-SCNC: 5 MMOL/L (ref 4–13)
AST SERPL W P-5'-P-CCNC: 19 U/L (ref 5–45)
BASOPHILS # BLD AUTO: 0.02 THOUSANDS/ΜL (ref 0–0.1)
BASOPHILS NFR BLD AUTO: 0 % (ref 0–1)
BILIRUB SERPL-MCNC: 0.21 MG/DL (ref 0.2–1)
BUN SERPL-MCNC: 19 MG/DL (ref 5–25)
CALCIUM ALBUM COR SERPL-MCNC: 10.1 MG/DL (ref 8.3–10.1)
CALCIUM SERPL-MCNC: 8.5 MG/DL (ref 8.3–10.1)
CHLORIDE SERPL-SCNC: 99 MMOL/L (ref 100–108)
CO2 SERPL-SCNC: 30 MMOL/L (ref 21–32)
CREAT SERPL-MCNC: 0.27 MG/DL (ref 0.6–1.3)
EOSINOPHIL # BLD AUTO: 0.13 THOUSAND/ΜL (ref 0–0.61)
EOSINOPHIL NFR BLD AUTO: 1 % (ref 0–6)
ERYTHROCYTE [DISTWIDTH] IN BLOOD BY AUTOMATED COUNT: 20 % (ref 11.6–15.1)
GFR SERPL CREATININE-BSD FRML MDRD: 113 ML/MIN/1.73SQ M
GLUCOSE SERPL-MCNC: 134 MG/DL (ref 65–140)
HCT VFR BLD AUTO: 25.6 % (ref 34.8–46.1)
HGB BLD-MCNC: 8.2 G/DL (ref 11.5–15.4)
IMM GRANULOCYTES # BLD AUTO: 0.15 THOUSAND/UL (ref 0–0.2)
IMM GRANULOCYTES NFR BLD AUTO: 1 % (ref 0–2)
LYMPHOCYTES # BLD AUTO: 1.32 THOUSANDS/ΜL (ref 0.6–4.47)
LYMPHOCYTES NFR BLD AUTO: 12 % (ref 14–44)
MCH RBC QN AUTO: 30.9 PG (ref 26.8–34.3)
MCHC RBC AUTO-ENTMCNC: 32 G/DL (ref 31.4–37.4)
MCV RBC AUTO: 97 FL (ref 82–98)
MONOCYTES # BLD AUTO: 0.82 THOUSAND/ΜL (ref 0.17–1.22)
MONOCYTES NFR BLD AUTO: 8 % (ref 4–12)
NEUTROPHILS # BLD AUTO: 8.26 THOUSANDS/ΜL (ref 1.85–7.62)
NEUTS SEG NFR BLD AUTO: 78 % (ref 43–75)
NRBC BLD AUTO-RTO: 0 /100 WBCS
PLATELET # BLD AUTO: 291 THOUSANDS/UL (ref 149–390)
PMV BLD AUTO: 9.7 FL (ref 8.9–12.7)
POTASSIUM SERPL-SCNC: 4 MMOL/L (ref 3.5–5.3)
PROT SERPL-MCNC: 6.2 G/DL (ref 6.4–8.2)
RBC # BLD AUTO: 2.65 MILLION/UL (ref 3.81–5.12)
SODIUM SERPL-SCNC: 134 MMOL/L (ref 136–145)
WBC # BLD AUTO: 10.7 THOUSAND/UL (ref 4.31–10.16)

## 2022-06-24 PROCEDURE — 94760 N-INVAS EAR/PLS OXIMETRY 1: CPT

## 2022-06-24 PROCEDURE — 99232 SBSQ HOSP IP/OBS MODERATE 35: CPT | Performed by: INTERNAL MEDICINE

## 2022-06-24 PROCEDURE — 94668 MNPJ CHEST WALL SBSQ: CPT

## 2022-06-24 PROCEDURE — 94664 DEMO&/EVAL PT USE INHALER: CPT

## 2022-06-24 PROCEDURE — 94640 AIRWAY INHALATION TREATMENT: CPT

## 2022-06-24 PROCEDURE — 85025 COMPLETE CBC W/AUTO DIFF WBC: CPT | Performed by: INTERNAL MEDICINE

## 2022-06-24 PROCEDURE — 94669 MECHANICAL CHEST WALL OSCILL: CPT

## 2022-06-24 PROCEDURE — C9113 INJ PANTOPRAZOLE SODIUM, VIA: HCPCS | Performed by: INTERNAL MEDICINE

## 2022-06-24 PROCEDURE — 97606 NEG PRS WND THER DME>50 SQCM: CPT | Performed by: SURGERY

## 2022-06-24 PROCEDURE — 80053 COMPREHEN METABOLIC PANEL: CPT | Performed by: INTERNAL MEDICINE

## 2022-06-24 RX ORDER — SODIUM CHLORIDE FOR INHALATION 0.9 %
VIAL, NEBULIZER (ML) INHALATION
Status: COMPLETED
Start: 2022-06-24 | End: 2022-06-24

## 2022-06-24 RX ADMIN — RIOCIGUAT 2.5 MG: 2.5 TABLET, FILM COATED ORAL at 17:19

## 2022-06-24 RX ADMIN — ACETAMINOPHEN 650 MG: 650 SUSPENSION ORAL at 17:17

## 2022-06-24 RX ADMIN — PANTOPRAZOLE SODIUM 40 MG: 40 INJECTION, POWDER, FOR SOLUTION INTRAVENOUS at 08:24

## 2022-06-24 RX ADMIN — ATOVAQUONE 750 MG: 750 SUSPENSION ORAL at 11:11

## 2022-06-24 RX ADMIN — ASPIRIN 81 MG CHEWABLE TABLET 81 MG: 81 TABLET CHEWABLE at 08:24

## 2022-06-24 RX ADMIN — GLYCERIN, HYPROMELLOSE, POLYETHYLENE GLYCOL 2 DROP: .2; .2; 1 LIQUID OPHTHALMIC at 08:25

## 2022-06-24 RX ADMIN — SODIUM CHLORIDE SOLN NEBU 3% 4 ML: 3 NEBU SOLN at 20:36

## 2022-06-24 RX ADMIN — LORATADINE 10 MG: 10 TABLET ORAL at 08:35

## 2022-06-24 RX ADMIN — GLYCERIN, HYPROMELLOSE, POLYETHYLENE GLYCOL 2 DROP: .2; .2; 1 LIQUID OPHTHALMIC at 17:18

## 2022-06-24 RX ADMIN — Medication 1000 UNITS: at 08:24

## 2022-06-24 RX ADMIN — ACETAMINOPHEN 650 MG: 650 SUSPENSION ORAL at 06:30

## 2022-06-24 RX ADMIN — LEVOTHYROXINE SODIUM 25 MCG: 25 TABLET ORAL at 06:30

## 2022-06-24 RX ADMIN — LEVALBUTEROL HYDROCHLORIDE 0.63 MG: 0.63 SOLUTION RESPIRATORY (INHALATION) at 08:04

## 2022-06-24 RX ADMIN — OXYCODONE HYDROCHLORIDE 5 MG: 5 SOLUTION ORAL at 22:16

## 2022-06-24 RX ADMIN — ENOXAPARIN SODIUM 40 MG: 40 INJECTION SUBCUTANEOUS at 08:24

## 2022-06-24 RX ADMIN — LEVALBUTEROL HYDROCHLORIDE 0.63 MG: 0.63 SOLUTION RESPIRATORY (INHALATION) at 20:36

## 2022-06-24 RX ADMIN — ACETAMINOPHEN 650 MG: 650 SUSPENSION ORAL at 21:45

## 2022-06-24 RX ADMIN — PREDNISONE 30 MG: 20 TABLET ORAL at 08:24

## 2022-06-24 RX ADMIN — RIOCIGUAT 2.5 MG: 2.5 TABLET, FILM COATED ORAL at 08:53

## 2022-06-24 RX ADMIN — Medication 250 MG: at 08:53

## 2022-06-24 RX ADMIN — ISODIUM CHLORIDE 3 ML: 0.03 SOLUTION RESPIRATORY (INHALATION) at 08:04

## 2022-06-24 RX ADMIN — Medication 250 MG: at 17:20

## 2022-06-24 RX ADMIN — MACITENTAN 10 MG: 10 TABLET, FILM COATED ORAL at 08:33

## 2022-06-24 RX ADMIN — ACETAMINOPHEN 650 MG: 650 SUSPENSION ORAL at 11:10

## 2022-06-24 RX ADMIN — RIOCIGUAT 2.5 MG: 2.5 TABLET, FILM COATED ORAL at 21:12

## 2022-06-24 NOTE — ASSESSMENT & PLAN NOTE
Follows with Rheumatology in the outpatient setting  Will restart prednisone 30 mg daily  Atovaquone for PCP prophylaxis (consider substituting Bactrim or other prophylactic agent in setting of possible ototoxicity)  Appreciate rheumatology input yesterday and day before  Will have family meeting today at 11 am to discuss goals of care and Code status  Discussed with patient already and she is aware of the discussion - she does want her  involved and he will be on speaker phone as per family  Also if there is a need and family in agreement may benefit from HF/PHTN specialist inpatient or outpatient  Will discuss

## 2022-06-24 NOTE — ASSESSMENT & PLAN NOTE
See accompanying plan under acute respiratory failure  Not on abx currently     CXR shows bilateral infiltrations on 6/19  CXR from yesterday shows improvement in infiltrates   No indication for ABX at this time

## 2022-06-24 NOTE — ASSESSMENT & PLAN NOTE
Still on 2 L   Improved since yesterday   Discussed with Pulmonology extensively and they do not feel pneumonia   CXR also shows resolution of opacities present on 6/19  Discussed with ID too and no indication for ABx     Agree with pulmonology and ID assessments at this time and will continue supportive care and try to wean off oxygen

## 2022-06-24 NOTE — ASSESSMENT & PLAN NOTE
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

## 2022-06-24 NOTE — PROGRESS NOTES
[address] [facility]  Progress Note Vernona Severin Stryker 1940, 80 y o  female MRN: [id number]  [facility] Encounter: [id number]  Primary Care Provider: No primary care provider on file  Date and time admitted to hospital: 6/14/2022  8:07 PM    Hearing loss  Assessment & Plan  Patient reports 3 months of progressively worsening hearing  As per previous admission, concerning for atovaquone versus Lasix  Discussed with ENT, have declines seeing patient in hospital; consider outpatient referral  Trial different antibiotic prophylaxis    Pneumonia  Assessment & Plan  See accompanying plan under acute respiratory failure  Not on abx currently  CXR shows bilateral infiltrations on 6/19  CXR from yesterday shows improvement in infiltrates   No indication for ABX at this time     Acute respiratory failure with hypoxia [facility])  Assessment & Plan  Still on 2 L   Improved since yesterday   Discussed with Pulmonology extensively and they do not feel pneumonia   CXR also shows resolution of opacities present on 6/19  Discussed with ID too and no indication for ABx  Agree with pulmonology and ID assessments at this time and will continue supportive care and try to wean off oxygen       Urinary retention  Assessment & Plan  Patient with history of urinary retention and spasm; previously on myrbetriq and Gemtesa  Continue lilly catheter at this time; treat symptomatically  Follows with urology in the outpatient setting     Anemia  Assessment & Plan  Patient noted to have low hemoglobin on admission-6 9  Has received 1 unit PRBCs on 06/15, but repeat CBC was 6 5  Iron panel shows low iron and TIBC consistent with anemia of chronic disease; reticulocytes elevated  B12 and folate within normal limits; will consider iron supplementation once infection resolved  In the meantime, hemoglobin stable at 8 2           Acquired hypothyroidism  Assessment & Plan  As per recent PCP note, appears to take 25 mcg daily  Continue home medication, TSH within normal limits    Scleroderma ([facility] [address] )  Assessment & Plan  Follows with Rheumatology in the outpatient setting  Will restart prednisone 30 mg daily  Atovaquone for PCP prophylaxis (consider substituting Bactrim or other prophylactic agent in setting of possible ototoxicity)  Appreciate rheumatology input yesterday and day before  Will have family meeting today at 11 am to discuss goals of care and Code status  Discussed with patient already and she is aware of the discussion - she does want her  involved and he will be on speaker phone as per family  Also if there is a need and family in agreement may benefit from HF/PHTN specialist inpatient or outpatient  Will discuss  Dysphagia, oropharyngeal phase  Assessment & Plan  Patient with history of dysphagia, peg tube in place  Continue current tube feeds; appreciate nutritional recommendations    Severe protein-calorie malnutrition ([facility] [address] )  Assessment & Plan  Malnutrition Findings:   Adult Malnutrition type: Chronic illness  Adult Degree of Malnutrition: Other severe protein calorie malnutrition  Malnutrition Characteristics: Muscle loss, Fat loss, Weight loss, Inadequate energy           360 Statement: related to disease/condition evidenced by increased kcal/pro needs for wound healing Stage IV sacral decub, BMI 18 4 adjusted per prior facility wt 6/7/22;severe buccal fat pads loss, severe deltoid muscle loss, Patient lost 31# (20 6%) since 3/15/22 past 3 months with illness <75% energy intake versus needs for > 1 month  Treating with EN support and Robert TID via PEG for wound healing    BMI Findings:  Adult BMI Classifications: Underweight < 18 5        Body mass index is 22 53 kg/m²         * Sacral wound  Assessment & Plan  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  Underwent surgical debridement on 06/14/2022 with general surgery; transferred to medicine service for chronic illness  Initially placed on vanc and cefepime for antibiotic therapy; however ID evaluated and stopped antibiotics  Repositioning; wound care consult  Supportive care, pain medications  Nutritional support  Wound VAC in place with frequent wound VAC changes as per surgical team  A discussion with surgical team two days ago- they have cleaned wound and examined  They will update family with regard to healing  Also due for re-visit from them today             VTE Pharmacologic Prophylaxis: VTE Score: 3 Moderate Risk (Score 3-4) - Pharmacological DVT Prophylaxis Ordered: heparin  Patient Centered Rounds: I performed bedside rounds with nursing staff today  Discussions with Specialists or Other Care Team Provider: discussed with Palliative care yesterday  Extensive discussions with Pulm and ID- no further medical changes on their end and no benefit from antibiotics at this point  Discussed with rheum who did mention room for HF involvement - could consider this  Discussed with palliative regarding goals of care  Education and Discussions with Family / Patient: extensive talk with daughter yesterday  will discuss again at 11 am   Time Spent for Care: 30 minutes  More than 50% of total time spent on counseling and coordination of care as described above  Current Length of Stay: 10 day(s)  Current Patient Status: Inpatient   Certification Statement: The patient will continue to require additional inpatient hospital stay due to hypotension, hypoxia, sacral wound  Goals of care and medical optimization   Discharge Plan: Anticipate discharge in 48-72 hrs to rehab facility      Code Status: Level 1 - Full Code    Subjective:   Patient seen and examined   Feeling "I think a little better"    Objective:     Vitals:   Temp (24hrs), Av 4 °F (36 9 °C), Min:98 1 °F (36 7 °C), Max:98 7 °F (37 1 °C)    Temp:  [98 1 °F (36 7 °C)-98 7 °F (37 1 °C)] 98 7 °F (37 1 °C)  HR:  [0-98] 68  Resp:  [18] 18  BP: ()/(40-63) 91/51  SpO2:  [68 %-100 %] 100 %  Body mass index is 22 53 kg/m²  Input and Output Summary (last 24 hours): Intake/Output Summary (Last 24 hours) at 6/24/2022 0945  Last data filed at 6/24/2022 0640  Gross per 24 hour   Intake 1032 ml   Output 1050 ml   Net -18 ml       Physical Exam:   Physical Exam  Constitutional:       Appearance: She is ill-appearing (cachectic  pale   chronically ill appearing)  HENT:      Head: Normocephalic  Mouth/Throat:      Mouth: Mucous membranes are moist    Eyes:      Pupils: Pupils are equal, round, and reactive to light  Cardiovascular:      Rate and Rhythm: Normal rate  Pulmonary:      Effort: No respiratory distress  Breath sounds: No stridor  No wheezing, rhonchi or rales  Comments: Few crackles both bases     Chest:      Chest wall: No tenderness  Abdominal:      General: There is no distension  Palpations: There is no mass  Tenderness: There is no abdominal tenderness  There is no right CVA tenderness, left CVA tenderness or guarding  Skin:     Coloration: Skin is pale  Skin is not jaundiced  Findings: No bruising or lesion  Neurological:      Cranial Nerves: No cranial nerve deficit  Sensory: No sensory deficit  Motor: No weakness  Coordination: Coordination normal       Gait: Gait normal       Deep Tendon Reflexes: Reflexes normal    Psychiatric:         Mood and Affect: Mood normal       Comments: Rachel 3 able to make her own medical decisions at this time  In my opinion she does have full medical decision making capacity                Additional Data:     Labs:  Results from last 7 days   Lab Units 06/24/22  0511 06/20/22  0527 06/19/22  0537   WBC Thousand/uL 10 70*   < > 14 91*   HEMOGLOBIN g/dL 8 2*   < > 8 6*   HEMATOCRIT % 25 6*   < > 28 2*   PLATELETS Thousands/uL 291   < > 283   BANDS PCT %  --   --  11*   NEUTROS PCT % 78*   < >  --    LYMPHS PCT % 12*   < >  --    LYMPHO PCT %  --   -- 9*   MONOS PCT % 8   < >  --    MONO PCT %  --   --  5   EOS PCT % 1   < > 1    < > = values in this interval not displayed  Results from last 7 days   Lab Units 06/24/22  0511   SODIUM mmol/L 134*   POTASSIUM mmol/L 4 0   CHLORIDE mmol/L 99*   CO2 mmol/L 30   BUN mg/dL 19   CREATININE mg/dL 0 27*   ANION GAP mmol/L 5   CALCIUM mg/dL 8 5   ALBUMIN g/dL 2 0*   TOTAL BILIRUBIN mg/dL 0 21   ALK PHOS U/L 88   ALT U/L 21   AST U/L 19   GLUCOSE RANDOM mg/dL 134                 Results from last 7 days   Lab Units 06/22/22  0850   LACTIC ACID mmol/L 1 1       Lines/Drains:  Invasive Devices  Report    Peripheral Intravenous Line  Duration           Long-Dwell Peripheral IV (Midline) 06/14/22 Right Brachial 9 days    Peripheral IV 06/19/22 Dorsal (posterior); Left;Proximal Forearm 5 days          Drain  Duration           Gastrostomy/Enterostomy -- days    Urethral Catheter -- days              Urinary Catheter:  Goal for removal: N/A- Discharging with [place]               Imaging: No pertinent imaging reviewed      Recent Cultures (last 7 days):         Last 24 Hours Medication List:   Current Facility-Administered Medications   Medication Dose Route Frequency Provider Last Rate    acetaminophen  650 mg Per G Tube Q6H Smita Ojeda MD      acetaminophen  650 mg Per G Tube Q4H PRN Monique Morocho MD      artificial tear   Both Eyes HS Monique Morocho MD      aspirin  81 mg Per G Tube Daily Monique Morocho MD      atovaquone  750 mg Per G Tube Daily With Breakfast Monique Morocho MD      cholecalciferol  1,000 Units Per G Tube Daily Monique Morocho MD      enoxaparin  40 mg Subcutaneous Q24H [address] Monique Morocho MD      glycerin-hypromellose-  2 drop Both Eyes BID Monique Morocho MD      levalbuterol  0 63 mg Nebulization TID Cyndy Boone MD      levothyroxine  25 mcg Per PEG Tube Early Morning Monique Morocho MD      loratadine  10 mg Per G Tube Daily Monique Morocho MD      Macitentan  10 mg Per G Tube Daily Kalyani Giang MD      melatonin  3 mg Oral HS PRN Olinda Ansari MD      ondansetron  4 mg Intravenous Q6H PRN Olinda Ansari MD      oxyCODONE  2 5 mg Per G Tube Q4H PRN Olinda Ansari MD      oxyCODONE  5 mg Per G Tube Q4H PRN Olinda Ansari MD      pantoprazole  40 mg Intravenous Q24H [address] Kalyani Giang MD      predniSONE  30 mg Per G Tube Daily Kalyani Giang MD      Riociguat  2 5 mg Per G Tube TID Kalyani Giang MD      saccharomyces boulardii  250 mg Per G Tube BID Kalyani Giang MD      sodium chloride  4 mL Nebulization TID Darling Menendez MD          Today, Patient Was Seen By: Darling Menendez MD    **Please Note: This note may have been constructed using a voice recognition system  **

## 2022-06-24 NOTE — PROGRESS NOTES
PULMONOLOGY PROGRESS NOTE     Name: Raghu Bee   Age & Sex: 80 y o  female   MRN: [id number]  Unit/Bed#: [facility] 204-01   Encounter: [id number]    PATIENT INFORMATION     Name: Raghu Bee   Age & Sex: 80 y o  female   MRN: [id number]  Hospital Stay Days: 10    ASSESSMENT/PLAN     Assessment:   1  Acute respiratory failure with hypoxia  2  Severe pulmonary hypertension (chronically on Opsumit and [place]as, RVSP 56 mm Hg on these meds)  3  Dermatomyositis (s/p IVIG and steroid taper, 5/2022)  4  CREST Scleroderma  5  Hx of KOREY infection previously on chronic ABX  6  Heart failure with preserved ejection fraction (EF 70%, G2DD)      Plan:  · At this point she has chronically been on these pulmonary hypertension medications and her SBP generally runs in the 100s to 110s  This is not far from her baseline  · The risk of adjustment in these medications would outweigh the benefit as she is asymptomatic at this point with no significant respiratory complaints  · Likely she has on going issues will bleeding from the wound with delayed response to bleeding from bone marrow to mount adequate increased circulating volume  · She has received a blood transfusion and would continue to monitor hemoglobin to look for appropriate responsiveness to volume resuscitation  · She should remain on steroid taper for dermatomyositis flare  Rheumatology has been consulted  · Continue to titrate supplemental O2 to a SpO2 goal of >88%  · There may be some adrenal insufficiency due to the prednisone taper and recent surgery also contributing  SUBJECTIVE     Patient seen and examined  No acute events overnight  No acute complaints  Weaned down on oxygen today      OBJECTIVE     Vitals:    06/24/22 0300 06/24/22 0500 06/24/22 0724 06/24/22 0812   BP: 109/51 91/51     BP Location:       Pulse: 68 68     Resp: 18      Temp: 98 4 °F (36 9 °C)  98 7 °F (37 1 °C)    TempSrc: Axillary  Axillary    SpO2: 99% 99%  100%   Weight: Height:          Temperature:   Temp (24hrs), Av 4 °F (36 9 °C), Min:98 1 °F (36 7 °C), Max:98 7 °F (37 1 °C)    Temperature: 98 7 °F (37 1 °C)  Intake & Output:  I/O        0701   0700  0701   0700  0701   0700    P  O   0     Blood 330      NG/GT  1032     Total Intake(mL/kg) 330 (5) 1032 (15 6)     Urine (mL/kg/hr) 2225 (1 4) 1050 (0 7)     Emesis/NG output  0     Stool  0     Total Output 2225 1050     Net -1895 -18            Unmeasured Stool Occurrence  1 x         Weights:        Body mass index is 22 53 kg/m²  Weight (last 2 days)     None        Physical Exam  Vitals and nursing note reviewed  Constitutional:       General: She is not in acute distress  Appearance: Normal appearance  She is well-developed and underweight  She is ill-appearing  She is not toxic-appearing  Interventions: She is not intubated  HENT:      Head: Normocephalic and atraumatic  Right Ear: External ear normal       Left Ear: External ear normal       Nose: Nose normal       Mouth/Throat:      Mouth: Mucous membranes are moist       Pharynx: Oropharynx is clear  Eyes:      General: No scleral icterus  Conjunctiva/sclera: Conjunctivae normal    Cardiovascular:      Rate and Rhythm: Normal rate and regular rhythm  Pulses: Normal pulses  Heart sounds: Normal heart sounds  No murmur heard  No friction rub  No gallop  Pulmonary:      Effort: Pulmonary effort is normal  No tachypnea, bradypnea, accessory muscle usage or respiratory distress  She is not intubated  Breath sounds: Normal breath sounds and air entry  No decreased air movement  No decreased breath sounds, wheezing, rhonchi or rales  Abdominal:      General: Abdomen is flat  Bowel sounds are normal       Palpations: Abdomen is soft  Tenderness: There is no abdominal tenderness  Musculoskeletal:         General: No swelling or tenderness  Cervical back: Neck supple  No tenderness  Right lower leg: No edema  Left lower leg: No edema  Skin:     General: Skin is warm and dry  Neurological:      General: No focal deficit present  Mental Status: She is alert and oriented to person, place, and time  Mental status is at baseline  LABORATORY DATA     Labs: I have personally reviewed pertinent reports  Results from last 7 days   Lab Units 06/24/22  0511 06/23/22  0838 06/22/22  0204   WBC Thousand/uL 10 70* 12 37* 14 09*   HEMOGLOBIN g/dL 8 2* 8 5* 7 2*   HEMATOCRIT % 25 6* 28 4* 24 1*   PLATELETS Thousands/uL 291 251 264   NEUTROS PCT % 78* 81* 79*   MONOS PCT % 8 7 7      Results from last 7 days   Lab Units 06/24/22  0511 06/23/22  0838 06/22/22  0204   POTASSIUM mmol/L 4 0 4 2 3 9   CHLORIDE mmol/L 99* 100 99*   CO2 mmol/L 30 34* 32   BUN mg/dL 19 20 22   CREATININE mg/dL 0 27* 0 32* 0 28*   CALCIUM mg/dL 8 5 8 3 8 3   ALK PHOS U/L 88 85 87   ALT U/L 21 19 21   AST U/L 19 21 20     Results from last 7 days   Lab Units 06/19/22  0537 06/18/22  0451   MAGNESIUM mg/dL 2 2 2 3     Results from last 7 days   Lab Units 06/19/22  0537 06/18/22  0451   PHOSPHORUS mg/dL 2 6 1 9*          Results from last 7 days   Lab Units 06/22/22  0850   LACTIC ACID mmol/L 1 1             ABG:       IMAGING & DIAGNOSTIC TESTING     Radiology Results: I have personally reviewed pertinent reports  CT abdomen pelvis wo contrast    Result Date: 6/14/2022  Impression: Soft tissue air and soft tissue defect identified posterior to the sacrum suggesting infection  I cannot exclude superimposed posterior sacral osteomyelitis although no bony erosion identified  Soft tissue air identified around the posterior aspect of the rectum in addition to the left side of the rectum/perineum, correlate for necrotizing fasciitis soft tissue infection  Surgical consult Bilateral lower lobe pneumonia  Left upper pole renal 1 cm likely hemorrhagic cyst, correlate with nonemergent outpatient renal ultrasound    I personally discussed this study with Carter Dey on 6/14/2022 at 5:54 PM  Workstation performed: [id number]     XR chest portable    Result Date: 6/19/2022  Impression: Persistent, slightly worsened, bilateral pulmonary opacities in keeping with multilobar pneumonia  Workstation performed: [id number]     XR chest portable    Result Date: 6/16/2022  Impression: Stable vascular congestion  Workstation performed: [id number]     XR chest 1 view portable    Result Date: 6/15/2022  Impression: Mild vascular congestion  Right-sided PICC line terminates at the right axilla Workstation performed: [id number]     CT head without contrast    Result Date: 6/14/2022  Impression: No acute intracranial hemorrhage  If symptoms persistent or worsening focal neurologic deficit, proceed with noncontrast brain MRI and/or CTA  Workstation performed: [id number]     Other Diagnostic Testing: I have personally reviewed pertinent reports      ACTIVE MEDICATIONS     Current Facility-Administered Medications   Medication Dose Route Frequency    acetaminophen (TYLENOL) oral suspension 650 mg  650 mg Per G Tube Q6H    acetaminophen (TYLENOL) oral suspension 650 mg  650 mg Per G Tube Q4H PRN    artificial tear (LUBRIFRESH P M ) ophthalmic ointment   Both Eyes HS    aspirin chewable tablet 81 mg  81 mg Per G Tube Daily    atovaquone (MEPRON) oral suspension 750 mg  750 mg Per G Tube Daily With Breakfast    cholecalciferol (VITAMIN D3) tablet 1,000 Units  1,000 Units Per G Tube Daily    enoxaparin (LOVENOX) subcutaneous injection 40 mg  40 mg Subcutaneous Q24H [address]    glycerin-hypromellose- (ARTIFICIAL TEARS) ophthalmic solution 2 drop  2 drop Both Eyes BID    levalbuterol (XOPENEX) inhalation solution 0 63 mg  0 63 mg Nebulization TID    levothyroxine tablet 25 mcg  25 mcg Per PEG Tube Early Morning    loratadine (CLARITIN) tablet 10 mg  10 mg Per G Tube Daily    Macitentan (OPSUMIT) tablet 10 mg  10 mg Per G Tube Daily    melatonin tablet 3 mg  3 mg Oral HS PRN    ondansetron (ZOFRAN) injection 4 mg  4 mg Intravenous Q6H PRN    oxyCODONE (ROXICODONE) oral solution 2 5 mg  2 5 mg Per G Tube Q4H PRN    oxyCODONE (ROXICODONE) oral solution 5 mg  5 mg Per G Tube Q4H PRN    pantoprazole (PROTONIX) injection 40 mg  40 mg Intravenous Q24H BELLA    predniSONE tablet 30 mg  30 mg Per G Tube Daily    Riociguat TABS 2 5 mg  2 5 mg Per G Tube TID    saccharomyces boulardii (FLORASTOR) capsule 250 mg  250 mg Per G Tube BID    sodium chloride 3 % inhalation solution 4 mL  4 mL Nebulization TID       VTE Pharmacologic Prophylaxis: Enoxaparin (Lovenox)  VTE Mechanical Prophylaxis: sequential compression device      Disclaimer: Portions of the record may have been created with voice recognition software  Occasional wrong word or "sound a like" substitutions may have occurred due to the inherent limitations of voice recognition software  Careful consideration should be taken to recognize, using context, where substitutions have occurred      Florinda Hartman DO   Pulmonary and Critical Care Fellow, PGY-IV  [facility]

## 2022-06-24 NOTE — CASE MANAGEMENT
Case Management Discharge Planning Note    Patient name Raegan Ye  Location [facility] 204/[facility] 204-01 MRN [id number]  : 1940 Date 2022       Current Admission Date: 2022  Current Admission Diagnosis:Sacral wound   Patient Active Problem List    Diagnosis Date Noted    Hearing loss 2022    Severe protein-calorie malnutrition ([facility] [address] ) 2022    Pneumonia 2022    Sacral wound 2022    Dysphagia, oropharyngeal phase 2022    Scleroderma ([facility] [address] ) 2022    Acquired hypothyroidism 2022    Anemia 2022    Urinary retention 2022    Acute respiratory failure with hypoxia ([facility] [address] ) 2022      LOS (days): 10  Geometric Mean LOS (GMLOS) (days): 9 60  Days to GMLOS:-0 1     OBJECTIVE:  Risk of Unplanned Readmission Score: 17 2         Current admission status: Inpatient   Preferred Pharmacy:   PATIENT/FAMILY REPORTS NO PREFERRED PHARMACY  No address on file      Primary Care Provider: No primary care provider on file  Primary Insurance: Vickie Castillo [address] [facility] [address]  Secondary Insurance:     DISCHARGE DETAILS:    Discharge planning discussed with[de-identified] Patient, family at bedside and via phone  Freedom of Choice: Yes     CM contacted family/caregiver?: Yes  Were Treatment Team discharge recommendations reviewed with patient/caregiver?: Yes  Did patient/caregiver verbalize understanding of patient care needs?: Yes  Were patient/caregiver advised of the risks associated with not following Treatment Team discharge recommendations?: Yes    Contacts  Relationship to Patient[de-identified] Family  Reason/Outcome: Continuity of Care, Emergency [address]         Is the patient interested in [facility] at discharge?: No    DME Referral Provided  Referral made for DME?: No           Additional Comments: Family meeting today with SL[facility] & Palliative team to discuss patient's wishes to NOT be intubated or coded  Patient is not yet ready to discuss hospice    Followed up with [facility] referrals, provided daughter Lauren Art with a list of referrals made, will follow up with her next week to discuss possible facilties  CM to follow

## 2022-06-24 NOTE — ASSESSMENT & PLAN NOTE
Patient noted to have low hemoglobin on admission-6 9  Has received 1 unit PRBCs on 06/15, but repeat CBC was 6 5  Iron panel shows low iron and TIBC consistent with anemia of chronic disease; reticulocytes elevated  B12 and folate within normal limits; will consider iron supplementation once infection resolved  In the meantime, hemoglobin stable at 8 2

## 2022-06-24 NOTE — PROCEDURES
V  A C  Procedure Note  Tate Ellison 80 y o  female MRN: [id number]  Unit/Bed#: [facility] 204-01 Encounter: [id number]     Date: 06/24/22      Time: 1:51 PM      Location of wound: Sacrum     Description of wound: fibrinous material  Good granulation tissue           Sponges removed:  2 black sponge(s)     Dimensions of wound:   10 5 cm x 7 5 cm x 2 5 cm     Sponges placed:  2 black sponge(s)  (1 Black 1 Bridge)     VAC settings:  125 mmHg  Continuous     The patient tolerated the procedure well  A VAC sticker was placed to wound dressing, per protocol          Howard Salazar MD  General Surgery

## 2022-06-24 NOTE — QUICK NOTE
Discussed with patient , daughter, sister at bedside   on speaker phone  Patient voiced that she does not want CPR or intubation in the event of cardiac or respiratory arrest      She does want to pursue full medical management short of CPR and intubation and full disease directed care for now

## 2022-06-25 LAB
ALBUMIN SERPL BCP-MCNC: 2 G/DL (ref 3.5–5)
ALP SERPL-CCNC: 90 U/L (ref 46–116)
ALT SERPL W P-5'-P-CCNC: 22 U/L (ref 12–78)
ANION GAP SERPL CALCULATED.3IONS-SCNC: 4 MMOL/L (ref 4–13)
AST SERPL W P-5'-P-CCNC: 20 U/L (ref 5–45)
BASOPHILS # BLD AUTO: 0.04 THOUSANDS/ΜL (ref 0–0.1)
BASOPHILS NFR BLD AUTO: 0 % (ref 0–1)
BILIRUB SERPL-MCNC: 0.31 MG/DL (ref 0.2–1)
BUN SERPL-MCNC: 33 MG/DL (ref 5–25)
CALCIUM ALBUM COR SERPL-MCNC: 10 MG/DL (ref 8.3–10.1)
CALCIUM SERPL-MCNC: 8.4 MG/DL (ref 8.3–10.1)
CHLORIDE SERPL-SCNC: 100 MMOL/L (ref 100–108)
CO2 SERPL-SCNC: 31 MMOL/L (ref 21–32)
CREAT SERPL-MCNC: 0.31 MG/DL (ref 0.6–1.3)
EOSINOPHIL # BLD AUTO: 0.11 THOUSAND/ΜL (ref 0–0.61)
EOSINOPHIL NFR BLD AUTO: 1 % (ref 0–6)
ERYTHROCYTE [DISTWIDTH] IN BLOOD BY AUTOMATED COUNT: 20.1 % (ref 11.6–15.1)
GFR SERPL CREATININE-BSD FRML MDRD: 108 ML/MIN/1.73SQ M
GLUCOSE SERPL-MCNC: 134 MG/DL (ref 65–140)
HCT VFR BLD AUTO: 26.9 % (ref 34.8–46.1)
HGB BLD-MCNC: 8.1 G/DL (ref 11.5–15.4)
IMM GRANULOCYTES # BLD AUTO: 0.14 THOUSAND/UL (ref 0–0.2)
IMM GRANULOCYTES NFR BLD AUTO: 1 % (ref 0–2)
LYMPHOCYTES # BLD AUTO: 1.37 THOUSANDS/ΜL (ref 0.6–4.47)
LYMPHOCYTES NFR BLD AUTO: 13 % (ref 14–44)
MCH RBC QN AUTO: 30.3 PG (ref 26.8–34.3)
MCHC RBC AUTO-ENTMCNC: 30.1 G/DL (ref 31.4–37.4)
MCV RBC AUTO: 101 FL (ref 82–98)
MONOCYTES # BLD AUTO: 0.75 THOUSAND/ΜL (ref 0.17–1.22)
MONOCYTES NFR BLD AUTO: 7 % (ref 4–12)
NEUTROPHILS # BLD AUTO: 7.95 THOUSANDS/ΜL (ref 1.85–7.62)
NEUTS SEG NFR BLD AUTO: 78 % (ref 43–75)
NRBC BLD AUTO-RTO: 0 /100 WBCS
PLATELET # BLD AUTO: 320 THOUSANDS/UL (ref 149–390)
PMV BLD AUTO: 9.7 FL (ref 8.9–12.7)
POTASSIUM SERPL-SCNC: 4.2 MMOL/L (ref 3.5–5.3)
PROT SERPL-MCNC: 6.2 G/DL (ref 6.4–8.2)
RBC # BLD AUTO: 2.67 MILLION/UL (ref 3.81–5.12)
SODIUM SERPL-SCNC: 135 MMOL/L (ref 136–145)
WBC # BLD AUTO: 10.36 THOUSAND/UL (ref 4.31–10.16)

## 2022-06-25 PROCEDURE — 94669 MECHANICAL CHEST WALL OSCILL: CPT

## 2022-06-25 PROCEDURE — 94640 AIRWAY INHALATION TREATMENT: CPT

## 2022-06-25 PROCEDURE — 99232 SBSQ HOSP IP/OBS MODERATE 35: CPT | Performed by: INTERNAL MEDICINE

## 2022-06-25 PROCEDURE — 85025 COMPLETE CBC W/AUTO DIFF WBC: CPT | Performed by: INTERNAL MEDICINE

## 2022-06-25 PROCEDURE — 94760 N-INVAS EAR/PLS OXIMETRY 1: CPT

## 2022-06-25 PROCEDURE — 80053 COMPREHEN METABOLIC PANEL: CPT | Performed by: INTERNAL MEDICINE

## 2022-06-25 PROCEDURE — C9113 INJ PANTOPRAZOLE SODIUM, VIA: HCPCS | Performed by: INTERNAL MEDICINE

## 2022-06-25 PROCEDURE — 94668 MNPJ CHEST WALL SBSQ: CPT

## 2022-06-25 RX ADMIN — ASPIRIN 81 MG CHEWABLE TABLET 81 MG: 81 TABLET CHEWABLE at 10:22

## 2022-06-25 RX ADMIN — SODIUM CHLORIDE 500 ML: 0.9 INJECTION, SOLUTION INTRAVENOUS at 04:29

## 2022-06-25 RX ADMIN — Medication 1000 UNITS: at 10:22

## 2022-06-25 RX ADMIN — LEVOTHYROXINE SODIUM 25 MCG: 25 TABLET ORAL at 08:36

## 2022-06-25 RX ADMIN — GLYCERIN, HYPROMELLOSE, POLYETHYLENE GLYCOL 2 DROP: .2; .2; 1 LIQUID OPHTHALMIC at 18:27

## 2022-06-25 RX ADMIN — PREDNISONE 30 MG: 20 TABLET ORAL at 10:22

## 2022-06-25 RX ADMIN — ACETAMINOPHEN 650 MG: 650 SUSPENSION ORAL at 10:23

## 2022-06-25 RX ADMIN — ATOVAQUONE 750 MG: 750 SUSPENSION ORAL at 08:36

## 2022-06-25 RX ADMIN — SODIUM CHLORIDE SOLN NEBU 3% 4 ML: 3 NEBU SOLN at 19:21

## 2022-06-25 RX ADMIN — MACITENTAN 10 MG: 10 TABLET, FILM COATED ORAL at 10:24

## 2022-06-25 RX ADMIN — RIOCIGUAT 2.5 MG: 2.5 TABLET, FILM COATED ORAL at 16:16

## 2022-06-25 RX ADMIN — PANTOPRAZOLE SODIUM 40 MG: 40 INJECTION, POWDER, FOR SOLUTION INTRAVENOUS at 10:23

## 2022-06-25 RX ADMIN — Medication 250 MG: at 18:27

## 2022-06-25 RX ADMIN — LEVALBUTEROL HYDROCHLORIDE 0.63 MG: 0.63 SOLUTION RESPIRATORY (INHALATION) at 13:29

## 2022-06-25 RX ADMIN — ACETAMINOPHEN 650 MG: 650 SUSPENSION ORAL at 22:17

## 2022-06-25 RX ADMIN — RIOCIGUAT 2.5 MG: 2.5 TABLET, FILM COATED ORAL at 10:25

## 2022-06-25 RX ADMIN — ACETAMINOPHEN 650 MG: 650 SUSPENSION ORAL at 04:28

## 2022-06-25 RX ADMIN — GLYCERIN, HYPROMELLOSE, POLYETHYLENE GLYCOL 2 DROP: .2; .2; 1 LIQUID OPHTHALMIC at 10:26

## 2022-06-25 RX ADMIN — Medication 250 MG: at 10:24

## 2022-06-25 RX ADMIN — LEVALBUTEROL HYDROCHLORIDE 0.63 MG: 0.63 SOLUTION RESPIRATORY (INHALATION) at 19:21

## 2022-06-25 RX ADMIN — ACETAMINOPHEN 650 MG: 650 SUSPENSION ORAL at 16:08

## 2022-06-25 RX ADMIN — ENOXAPARIN SODIUM 40 MG: 40 INJECTION SUBCUTANEOUS at 10:23

## 2022-06-25 RX ADMIN — RIOCIGUAT 2.5 MG: 2.5 TABLET, FILM COATED ORAL at 22:17

## 2022-06-25 RX ADMIN — LORATADINE 10 MG: 10 TABLET ORAL at 10:22

## 2022-06-25 NOTE — ASSESSMENT & PLAN NOTE
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  Underwent surgical debridement on 06/14/2022 with general surgery; transferred to medicine service for chronic illness  Initially placed on vanc and cefepime for antibiotic therapy; however ID evaluated and stopped antibiotics  Repositioning; wound care consult  Supportive care, pain medications  Nutritional support  VAC dressing changed by surgery yesterday

## 2022-06-25 NOTE — PROGRESS NOTES
[address] [facility]  Progress Note Freeman Shore 1940, 80 y o  female MRN: [id number]  Unit/Bed#: ICCU 204-01 Encounter: [id number]  Primary Care Provider: No primary care provider on file  Date and time admitted to hospital: 6/14/2022  8:07 PM    Hearing loss  Assessment & Plan  Patient reports 3 months of progressively worsening hearing  As per previous admission, concerning for atovaquone versus Lasix  Discussed with ENT, have declines seeing patient in hospital; consider outpatient referral  Trial different antibiotic prophylaxis    Pneumonia  Assessment & Plan  See accompanying plan under acute respiratory failure  Not on abx currently     CXR shows bilateral infiltrations on 6/19  CXR from yesterday shows improvement in infiltrates   No indication for ABX at this time     Acute respiratory failure with hypoxia [facility])  Assessment & Plan  Still on 2 L   Stable  Will give more chest PT today   Pulm and ID following peripherally and will give input if needed        Urinary retention  Assessment & Plan  Patient with history of urinary retention and spasm; previously on myrbetriq and Gemtesa  Continue lilly catheter at this time; treat symptomatically  Follows with urology in the outpatient setting     Anemia  Assessment & Plan  Patient noted to have low hemoglobin on admission-6 9  Has received 1 unit PRBCs on 06/15, but repeat CBC was 6 5  Iron panel shows low iron and TIBC consistent with anemia of chronic disease; reticulocytes elevated  B12 and folate within normal limits; will consider iron supplementation once infection resolved  In the meantime, hemoglobin stable at 8 1        Acquired hypothyroidism  Assessment & Plan  As per recent PCP note, appears to take 25 mcg daily  Continue home medication, TSH within normal limits    Scleroderma [facility])  Assessment & Plan  Appreciate rheumatology recs   No change to current meds    Dysphagia, oropharyngeal phase  Assessment & Plan  Patient with history of dysphagia, peg tube in place  Continue current tube feeds; appreciate nutritional recommendations    Severe protein-calorie malnutrition ([address] )  Assessment & Plan  Malnutrition Findings:   Adult Malnutrition type: Chronic illness  Adult Degree of Malnutrition: Other severe protein calorie malnutrition  Malnutrition Characteristics: Muscle loss, Fat loss, Weight loss, Inadequate energy           360 Statement: related to disease/condition evidenced by increased kcal/pro needs for wound healing Stage IV sacral decub, BMI 18 4 adjusted per prior facility wt 6/7/22;severe buccal fat pads loss, severe deltoid muscle loss, Patient lost 31# (20 6%) since 3/15/22 past 3 months with illness <75% energy intake versus needs for > 1 month  Treating with EN support and Robert TID via PEG for wound healing    BMI Findings:  Adult BMI Classifications: Underweight < 18 5        Body mass index is 22 53 kg/m²  * Sacral wound  Assessment & Plan  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  Underwent surgical debridement on 06/14/2022 with general surgery; transferred to medicine service for chronic illness  Initially placed on vanc and cefepime for antibiotic therapy; however ID evaluated and stopped antibiotics  Repositioning; wound care consult  Supportive care, pain medications  Nutritional support  VAC dressing changed by surgery yesterday             VTE Pharmacologic Prophylaxis: VTE Score: 3 Moderate Risk (Score 3-4) - Pharmacological DVT Prophylaxis Ordered: heparin  Patient Centered Rounds: I performed bedside rounds with nursing staff today  Discussions with Specialists or Other Care Team Provider: Discussed with nursing  Education and Discussions with Family / Patient: called Nickie Petty -   updated her       Time Spent for Care: 30 minutes  More than 50% of total time spent on counseling and coordination of care as described above      Current Length of Stay: 11 day(s)  Current Patient Status: Inpatient   Certification Statement: The patient will continue to require additional inpatient hospital stay due to hypotension, hypoxia  Discharge Plan: Anticipate discharge in 48 hrs to rehab facility  Code Status: Level 3 - DNAR and DNI    Subjective:   Patient seen and examined   Feeling "good"    Objective:     Vitals:   Temp (24hrs), Av °F (37 2 °C), Min:98 4 °F (36 9 °C), Max:99 3 °F (37 4 °C)    Temp:  [98 4 °F (36 9 °C)-99 3 °F (37 4 °C)] 99 3 °F (37 4 °C)  HR:  [70-84] 74  Resp:  [14-26] 18  BP: ()/(21-52) 101/51  SpO2:  [91 %-100 %] 100 %  Body mass index is 22 53 kg/m²  Input and Output Summary (last 24 hours): Intake/Output Summary (Last 24 hours) at 2022 1436  Last data filed at 2022 1358  Gross per 24 hour   Intake 1892 ml   Output 2070 ml   Net -178 ml       Physical Exam:   Physical Exam  Constitutional:       General: She is not in acute distress  Appearance: She is ill-appearing (chronically )  She is not toxic-appearing  HENT:      Head: Normocephalic  Mouth/Throat:      Mouth: Mucous membranes are moist    Eyes:      General:         Right eye: No discharge  Left eye: No discharge  Pupils: Pupils are equal, round, and reactive to light  Cardiovascular:      Rate and Rhythm: Normal rate  Heart sounds: No murmur heard  No friction rub  No gallop  Pulmonary:      Effort: Pulmonary effort is normal  No respiratory distress  Breath sounds: No stridor  No wheezing, rhonchi or rales  Chest:      Chest wall: No tenderness  Abdominal:      General: There is no distension  Palpations: There is no mass  Tenderness: There is no abdominal tenderness  There is no right CVA tenderness or guarding  Hernia: No hernia is present  Musculoskeletal:         General: No swelling, tenderness or signs of injury  Right lower leg: No edema  Left lower leg: No edema     Skin: Capillary Refill: Capillary refill takes less than 2 seconds  Coloration: Skin is pale  Skin is not jaundiced  Findings: No bruising, lesion or rash  Neurological:      General: No focal deficit present  Cranial Nerves: No cranial nerve deficit  Sensory: No sensory deficit  Motor: No weakness  Gait: Gait normal       Deep Tendon Reflexes: Reflexes normal    Psychiatric:         Mood and Affect: Mood normal           Additional Data:     Labs:  Results from last 7 days   Lab Units 06/25/22  0435 06/20/22  0527 06/19/22  0537   WBC Thousand/uL 10 36*   < > 14 91*   HEMOGLOBIN g/dL 8 1*   < > 8 6*   HEMATOCRIT % 26 9*   < > 28 2*   PLATELETS Thousands/uL 320   < > 283   BANDS PCT %  --   --  11*   NEUTROS PCT % 78*   < >  --    LYMPHS PCT % 13*   < >  --    LYMPHO PCT %  --   --  9*   MONOS PCT % 7   < >  --    MONO PCT %  --   --  5   EOS PCT % 1   < > 1    < > = values in this interval not displayed  Results from last 7 days   Lab Units 06/25/22  0435   SODIUM mmol/L 135*   POTASSIUM mmol/L 4 2   CHLORIDE mmol/L 100   CO2 mmol/L 31   BUN mg/dL 33*   CREATININE mg/dL 0 31*   ANION GAP mmol/L 4   CALCIUM mg/dL 8 4   ALBUMIN g/dL 2 0*   TOTAL BILIRUBIN mg/dL 0 31   ALK PHOS U/L 90   ALT U/L 22   AST U/L 20   GLUCOSE RANDOM mg/dL 134                 Results from last 7 days   Lab Units 06/22/22  0850   LACTIC ACID mmol/L 1 1       Lines/Drains:  Invasive Devices  Report    Peripheral Intravenous Line  Duration           Long-Dwell Peripheral IV (Midline) 06/14/22 Right Brachial 10 days    Peripheral IV 06/19/22 Dorsal (posterior); Left;Proximal Forearm 6 days          Drain  Duration           Gastrostomy/Enterostomy -- days    Urethral Catheter -- days              Urinary Catheter:  Goal for removal: N/A - Chronic [place]               Imaging: No pertinent imaging reviewed      Recent Cultures (last 7 days):         Last 24 Hours Medication List:   Current Facility-Administered Medications   Medication Dose Route Frequency Provider Last Rate    acetaminophen  650 mg Per G Tube Q6H Berto Meredith MD      acetaminophen  650 mg Per G Tube Q4H PRN Vernon Fletcher MD      artificial tear   Both Eyes HS Vernon Fletcher MD      aspirin  81 mg Per G Tube Daily Vernon Fletcher MD      atovaquone  750 mg Per G Tube Daily With Breakfast Vernon Fletcher MD      cholecalciferol  1,000 Units Per G Tube Daily Vernon Fletcher MD      enoxaparin  40 mg Subcutaneous Q24H [address] Vernon Fletcher MD      glycerin-hypromellose-  2 drop Both Eyes BID Vernon Fletcher MD      levalbuterol  0 63 mg Nebulization TID Jan Whittington MD      levothyroxine  25 mcg Per PEG Tube Early Morning Vernon Fletcher MD      loratadine  10 mg Per G Tube Daily Vernon Fletcher MD      Macitentan  10 mg Per G Tube Daily Vernon Fletcher MD      melatonin  3 mg Oral HS PRN Berto Meredith MD      ondansetron  4 mg Intravenous Q6H PRN Berto Meredith MD      oxyCODONE  2 5 mg Per G Tube Q4H PRN Berto Meredith MD      oxyCODONE  5 mg Per G Tube Q4H PRN Berto Meredith MD      pantoprazole  40 mg Intravenous Q24H [address] Vernon Fletcher MD      predniSONE  30 mg Per G Tube Daily Vernon Fletcher MD      Riociguat  2 5 mg Per G Tube TID Vernon Fletcher MD      saccharomyces boulardii  250 mg Per G Tube BID Vernon Fletcher MD      sodium chloride  4 mL Nebulization TID Jan Whittington MD          Today, Patient Was Seen By: Jan Whittington MD    **Please Note: This note may have been constructed using a voice recognition system  **

## 2022-06-25 NOTE — PLAN OF CARE
Problem: MOBILITY - ADULT  Goal: Maintain or return to baseline ADL function  Description: INTERVENTIONS:  -  Assess patient's ability to carry out ADLs; assess patient's baseline for ADL function and identify physical deficits which impact ability to perform ADLs (bathing, care of mouth/teeth, toileting, grooming, dressing, etc )  - Assess/evaluate cause of self-care deficits   - Assess range of motion  - Assess patient's mobility; develop plan if impaired  - Assess patient's need for assistive devices and provide as appropriate  - Encourage maximum independence but intervene and supervise when necessary  - Involve family in performance of ADLs  - Assess for home care needs following discharge   - Consider OT consult to assist with ADL evaluation and planning for discharge  - Provide patient education as appropriate  Outcome: Progressing  Goal: Maintains/Returns to pre admission functional level  Description: INTERVENTIONS:  - Perform BMAT or MOVE assessment daily    - Set and communicate daily mobility goal to care team and patient/family/caregiver  - Collaborate with rehabilitation services on mobility goals if consulted  - Perform Range of Motion  times a day  - Reposition patient every  hours    - Dangle patient  times a day  - Stand patient  times a day  - Ambulate patient  times a day  - Out of bed to chair  times a day   - Out of bed for meals  times a day  - Out of bed for toileting  - Record patient progress and toleration of activity level   Outcome: Progressing     Problem: Potential for Falls  Goal: Patient will remain free of falls  Description: INTERVENTIONS:  - Educate patient/family on patient safety including physical limitations  - Instruct patient to call for assistance with activity   - Consult OT/PT to assist with strengthening/mobility   - Keep Call bell within reach  - Keep bed low and locked with side rails adjusted as appropriate  - Keep care items and personal belongings within reach  - Initiate and maintain comfort rounds  - Make Fall Risk Sign visible to staff  - Offer Toileting every  Hours, in advance of need  - Initiate/Maintain alarm  - Obtain necessary fall risk management equipment:   - Apply yellow socks and bracelet for high fall risk patients  - Consider moving patient to room near nurses station  Outcome: Progressing     Problem: Prexisting or High Potential for Compromised Skin Integrity  Goal: Skin integrity is maintained or improved  Description: INTERVENTIONS:  - Identify patients at risk for skin breakdown  - Assess and monitor skin integrity  - Assess and monitor nutrition and hydration status  - Monitor labs   - Assess for incontinence   - Turn and reposition patient  - Assist with mobility/ambulation  - Relieve pressure over bony prominences  - Avoid friction and shearing  - Provide appropriate hygiene as needed including keeping skin clean and dry  - Evaluate need for skin moisturizer/barrier cream  - Collaborate with interdisciplinary team   - Patient/family teaching  - Consider wound care consult   Outcome: Progressing     Problem: PAIN - ADULT  Goal: Verbalizes/displays adequate comfort level or baseline comfort level  Description: Interventions:  - Encourage patient to monitor pain and request assistance  - Assess pain using appropriate pain scale  - Administer analgesics based on type and severity of pain and evaluate response  - Implement non-pharmacological measures as appropriate and evaluate response  - Consider cultural and social influences on pain and pain management  - Notify physician/advanced practitioner if interventions unsuccessful or patient reports new pain  Outcome: Progressing     Problem: INFECTION - ADULT  Goal: Absence or prevention of progression during hospitalization  Description: INTERVENTIONS:  - Assess and monitor for signs and symptoms of infection  - Monitor lab/diagnostic results  - Monitor all insertion sites, i e  indwelling lines, tubes, and drains  - Monitor endotracheal if appropriate and nasal secretions for changes in amount and color  - [place] appropriate cooling/warming therapies per order  - Administer medications as ordered  - Instruct and encourage patient and family to use good hand hygiene technique  - Identify and instruct in appropriate isolation precautions for identified infection/condition  Outcome: Progressing  Goal: Absence of fever/infection during neutropenic period  Description: INTERVENTIONS:  - Monitor WBC    Outcome: Progressing     Problem: SAFETY ADULT  Goal: Maintain or return to baseline ADL function  Description: INTERVENTIONS:  -  Assess patient's ability to carry out ADLs; assess patient's baseline for ADL function and identify physical deficits which impact ability to perform ADLs (bathing, care of mouth/teeth, toileting, grooming, dressing, etc )  - Assess/evaluate cause of self-care deficits   - Assess range of motion  - Assess patient's mobility; develop plan if impaired  - Assess patient's need for assistive devices and provide as appropriate  - Encourage maximum independence but intervene and supervise when necessary  - Involve family in performance of ADLs  - Assess for home care needs following discharge   - Consider OT consult to assist with ADL evaluation and planning for discharge  - Provide patient education as appropriate  Outcome: Progressing  Goal: Maintains/Returns to pre admission functional level  Description: INTERVENTIONS:  - Perform BMAT or MOVE assessment daily    - Set and communicate daily mobility goal to care team and patient/family/caregiver  - Collaborate with rehabilitation services on mobility goals if consulted  - Perform Range of Motion  times a day  - Reposition patient every  hours    - Dangle patient  times a day  - Stand patient  times a day  - Ambulate patient  times a day  - Out of bed to chair  times a day   - Out of bed for meals  times a day  - Out of bed for toileting  - Record patient progress and toleration of activity level   Outcome: Progressing  Goal: Patient will remain free of falls  Description: INTERVENTIONS:  - Educate patient/family on patient safety including physical limitations  - Instruct patient to call for assistance with activity   - Consult OT/PT to assist with strengthening/mobility   - Keep Call bell within reach  - Keep bed low and locked with side rails adjusted as appropriate  - Keep care items and personal belongings within reach  - Initiate and maintain comfort rounds  - Make Fall Risk Sign visible to staff  - Offer Toileting every  Hours, in advance of need  - Initiate/Maintain alarm  - Obtain necessary fall risk management equipment  - Apply yellow socks and bracelet for high fall risk patients  - Consider moving patient to room near nurses station  Outcome: Progressing     Problem: DISCHARGE PLANNING  Goal: Discharge to home or other facility with appropriate resources  Description: INTERVENTIONS:  - Identify barriers to discharge w/patient and caregiver  - Arrange for needed discharge resources and transportation as appropriate  - Identify discharge learning needs (meds, wound care, etc )  - Arrange for interpretive services to assist at discharge as needed  - Refer to Case Management Department for coordinating discharge planning if the patient needs post-hospital services based on physician/advanced practitioner order or complex needs related to functional status, cognitive ability, or social support system  Outcome: Progressing     Problem: Knowledge Deficit  Goal: Patient/family/caregiver demonstrates understanding of disease process, treatment plan, medications, and discharge instructions  Description: Complete learning assessment and assess knowledge base    Interventions:  - Provide teaching at level of understanding  - Provide teaching via preferred learning methods  Outcome: Progressing     Problem: Nutrition/Hydration-ADULT  Goal: Nutrient/Hydration intake appropriate for improving, restoring or maintaining nutritional needs  Description: Monitor and assess patient's nutrition/hydration status for malnutrition  Collaborate with interdisciplinary team and initiate plan and interventions as ordered  Monitor patient's weight and dietary intake as ordered or per policy  Utilize nutrition screening tool and intervene as necessary  Determine patient's food preferences and provide high-protein, high-caloric foods as appropriate       INTERVENTIONS:  - Monitor oral intake, urinary output, labs, and treatment plans  - Assess nutrition and hydration status and recommend course of action  - Evaluate amount of meals eaten  - Assist patient with eating if necessary   - Allow adequate time for meals  - Recommend/ encourage appropriate diets, oral nutritional supplements, and vitamin/mineral supplements  - Order, calculate, and assess calorie counts as needed  - Recommend, monitor, and adjust tube feedings and TPN/PPN based on assessed needs  - Assess need for intravenous fluids  - Provide specific nutrition/hydration education as appropriate  - Include patient/family/caregiver in decisions related to nutrition  Outcome: Progressing

## 2022-06-25 NOTE — ASSESSMENT & PLAN NOTE
Patient noted to have low hemoglobin on admission-6 9  Has received 1 unit PRBCs on 06/15, but repeat CBC was 6 5  Iron panel shows low iron and TIBC consistent with anemia of chronic disease; reticulocytes elevated  B12 and folate within normal limits; will consider iron supplementation once infection resolved  In the meantime, hemoglobin stable at 8 1

## 2022-06-25 NOTE — ASSESSMENT & PLAN NOTE
Still on 2 L   Stable  Will give more chest PT today   Pulm and ID following peripherally and will give input if needed

## 2022-06-25 NOTE — PLAN OF CARE
Problem: MOBILITY - ADULT  Goal: Maintain or return to baseline ADL function  Description: INTERVENTIONS:  -  Assess patient's ability to carry out ADLs; assess patient's baseline for ADL function and identify physical deficits which impact ability to perform ADLs (bathing, care of mouth/teeth, toileting, grooming, dressing, etc )  - Assess/evaluate cause of self-care deficits   - Assess range of motion  - Assess patient's mobility; develop plan if impaired  - Assess patient's need for assistive devices and provide as appropriate  - Encourage maximum independence but intervene and supervise when necessary  - Involve family in performance of ADLs  - Assess for home care needs following discharge   - Consider OT consult to assist with ADL evaluation and planning for discharge  - Provide patient education as appropriate  Outcome: Progressing  Goal: Maintains/Returns to pre admission functional level  Description: INTERVENTIONS:  - Perform BMAT or MOVE assessment daily    - Set and communicate daily mobility goal to care team and patient/family/caregiver  - Collaborate with rehabilitation services on mobility goals if consulted  - Perform Range of Motion 3 times a day  - Reposition patient every 2 hours    - Dangle patient 3 times a day  - Stand patient 3 times a day  - Ambulate patient 3 times a day  - Out of bed to chair 3 times a day   - Out of bed for meals 3 times a day  - Out of bed for toileting  - Record patient progress and toleration of activity level   Outcome: Progressing     Problem: Potential for Falls  Goal: Patient will remain free of falls  Description: INTERVENTIONS:  - Educate patient/family on patient safety including physical limitations  - Instruct patient to call for assistance with activity   - Consult OT/PT to assist with strengthening/mobility   - Keep Call bell within reach  - Keep bed low and locked with side rails adjusted as appropriate  - Keep care items and personal belongings within reach  - Initiate and maintain comfort rounds  - Make Fall Risk Sign visible to staff  - Offer Toileting every  Hours, in advance of need  - Initiate/Maintain alarm  - Obtain necessary fall risk management equipment:   - Apply yellow socks and bracelet for high fall risk patients  - Consider moving patient to room near nurses station  Outcome: Progressing     Problem: Prexisting or High Potential for Compromised Skin Integrity  Goal: Skin integrity is maintained or improved  Description: INTERVENTIONS:  - Identify patients at risk for skin breakdown  - Assess and monitor skin integrity  - Assess and monitor nutrition and hydration status  - Monitor labs   - Assess for incontinence   - Turn and reposition patient  - Assist with mobility/ambulation  - Relieve pressure over bony prominences  - Avoid friction and shearing  - Provide appropriate hygiene as needed including keeping skin clean and dry  - Evaluate need for skin moisturizer/barrier cream  - Collaborate with interdisciplinary team   - Patient/family teaching  - Consider wound care consult   Outcome: Progressing     Problem: PAIN - ADULT  Goal: Verbalizes/displays adequate comfort level or baseline comfort level  Description: Interventions:  - Encourage patient to monitor pain and request assistance  - Assess pain using appropriate pain scale  - Administer analgesics based on type and severity of pain and evaluate response  - Implement non-pharmacological measures as appropriate and evaluate response  - Consider cultural and social influences on pain and pain management  - Notify physician/advanced practitioner if interventions unsuccessful or patient reports new pain  Outcome: Progressing     Problem: INFECTION - ADULT  Goal: Absence or prevention of progression during hospitalization  Description: INTERVENTIONS:  - Assess and monitor for signs and symptoms of infection  - Monitor lab/diagnostic results  - Monitor all insertion sites, i e  indwelling lines, tubes, and drains  - Monitor endotracheal if appropriate and nasal secretions for changes in amount and color  - [place] appropriate cooling/warming therapies per order  - Administer medications as ordered  - Instruct and encourage patient and family to use good hand hygiene technique  - Identify and instruct in appropriate isolation precautions for identified infection/condition  Outcome: Progressing  Goal: Absence of fever/infection during neutropenic period  Description: INTERVENTIONS:  - Monitor WBC    Outcome: Progressing     Problem: SAFETY ADULT  Goal: Maintain or return to baseline ADL function  Description: INTERVENTIONS:  -  Assess patient's ability to carry out ADLs; assess patient's baseline for ADL function and identify physical deficits which impact ability to perform ADLs (bathing, care of mouth/teeth, toileting, grooming, dressing, etc )  - Assess/evaluate cause of self-care deficits   - Assess range of motion  - Assess patient's mobility; develop plan if impaired  - Assess patient's need for assistive devices and provide as appropriate  - Encourage maximum independence but intervene and supervise when necessary  - Involve family in performance of ADLs  - Assess for home care needs following discharge   - Consider OT consult to assist with ADL evaluation and planning for discharge  - Provide patient education as appropriate  Outcome: Progressing  Goal: Maintains/Returns to pre admission functional level  Description: INTERVENTIONS:  - Perform BMAT or MOVE assessment daily    - Set and communicate daily mobility goal to care team and patient/family/caregiver  - Collaborate with rehabilitation services on mobility goals if consulted  - Perform Range of Motion 3 times a day  - Reposition patient every 2 hours    - Dangle patient 3 times a day  - Stand patient 3 times a day  - Ambulate patient 3 times a day  - Out of bed to chair 3 times a day   - Out of bed for meals 3 times a day  - Out of bed for toileting  - Record patient progress and toleration of activity level   Outcome: Progressing  Goal: Patient will remain free of falls  Description: INTERVENTIONS:  - Educate patient/family on patient safety including physical limitations  - Instruct patient to call for assistance with activity   - Consult OT/PT to assist with strengthening/mobility   - Keep Call bell within reach  - Keep bed low and locked with side rails adjusted as appropriate  - Keep care items and personal belongings within reach  - Initiate and maintain comfort rounds  - Make Fall Risk Sign visible to staff  - Offer Toileting every  Hours, in advance of need  - Initiate/Maintain alarm  - Obtain necessary fall risk management equipment:   - Apply yellow socks and bracelet for high fall risk patients  - Consider moving patient to room near nurses station  Outcome: Progressing     Problem: DISCHARGE PLANNING  Goal: Discharge to home or other facility with appropriate resources  Description: INTERVENTIONS:  - Identify barriers to discharge w/patient and caregiver  - Arrange for needed discharge resources and transportation as appropriate  - Identify discharge learning needs (meds, wound care, etc )  - Arrange for interpretive services to assist at discharge as needed  - Refer to Case Management Department for coordinating discharge planning if the patient needs post-hospital services based on physician/advanced practitioner order or complex needs related to functional status, cognitive ability, or social support system  Outcome: Progressing     Problem: Knowledge Deficit  Goal: Patient/family/caregiver demonstrates understanding of disease process, treatment plan, medications, and discharge instructions  Description: Complete learning assessment and assess knowledge base    Interventions:  - Provide teaching at level of understanding  - Provide teaching via preferred learning methods  Outcome: Progressing     Problem: Nutrition/Hydration-ADULT  Goal: Nutrient/Hydration intake appropriate for improving, restoring or maintaining nutritional needs  Description: Monitor and assess patient's nutrition/hydration status for malnutrition  Collaborate with interdisciplinary team and initiate plan and interventions as ordered  Monitor patient's weight and dietary intake as ordered or per policy  Utilize nutrition screening tool and intervene as necessary  Determine patient's food preferences and provide high-protein, high-caloric foods as appropriate       INTERVENTIONS:  - Monitor oral intake, urinary output, labs, and treatment plans  - Assess nutrition and hydration status and recommend course of action  - Evaluate amount of meals eaten  - Assist patient with eating if necessary   - Allow adequate time for meals  - Recommend/ encourage appropriate diets, oral nutritional supplements, and vitamin/mineral supplements  - Order, calculate, and assess calorie counts as needed  - Recommend, monitor, and adjust tube feedings and TPN/PPN based on assessed needs  - Assess need for intravenous fluids  - Provide specific nutrition/hydration education as appropriate  - Include patient/family/caregiver in decisions related to nutrition  Outcome: Progressing

## 2022-06-26 PROBLEM — I27.20 PULMONARY HYPERTENSION (HCC): Status: ACTIVE | Noted: 2022-01-01

## 2022-06-26 LAB
ALBUMIN SERPL BCP-MCNC: 1.8 G/DL (ref 3.5–5)
ALP SERPL-CCNC: 80 U/L (ref 46–116)
ALT SERPL W P-5'-P-CCNC: 18 U/L (ref 12–78)
ANION GAP SERPL CALCULATED.3IONS-SCNC: 6 MMOL/L (ref 4–13)
AST SERPL W P-5'-P-CCNC: 16 U/L (ref 5–45)
BASOPHILS # BLD AUTO: 0.02 THOUSANDS/ΜL (ref 0–0.1)
BASOPHILS NFR BLD AUTO: 0 % (ref 0–1)
BILIRUB SERPL-MCNC: 0.7 MG/DL (ref 0.2–1)
BUN SERPL-MCNC: 34 MG/DL (ref 5–25)
CALCIUM ALBUM COR SERPL-MCNC: 9.8 MG/DL (ref 8.3–10.1)
CALCIUM SERPL-MCNC: 8 MG/DL (ref 8.3–10.1)
CHLORIDE SERPL-SCNC: 101 MMOL/L (ref 100–108)
CO2 SERPL-SCNC: 30 MMOL/L (ref 21–32)
CREAT SERPL-MCNC: 0.3 MG/DL (ref 0.6–1.3)
EOSINOPHIL # BLD AUTO: 0.1 THOUSAND/ΜL (ref 0–0.61)
EOSINOPHIL NFR BLD AUTO: 1 % (ref 0–6)
ERYTHROCYTE [DISTWIDTH] IN BLOOD BY AUTOMATED COUNT: 20 % (ref 11.6–15.1)
GFR SERPL CREATININE-BSD FRML MDRD: 109 ML/MIN/1.73SQ M
GLUCOSE SERPL-MCNC: 140 MG/DL (ref 65–140)
HCT VFR BLD AUTO: 23.4 % (ref 34.8–46.1)
HGB BLD-MCNC: 7.3 G/DL (ref 11.5–15.4)
IMM GRANULOCYTES # BLD AUTO: 0.16 THOUSAND/UL (ref 0–0.2)
IMM GRANULOCYTES NFR BLD AUTO: 2 % (ref 0–2)
LYMPHOCYTES # BLD AUTO: 1.18 THOUSANDS/ΜL (ref 0.6–4.47)
LYMPHOCYTES NFR BLD AUTO: 12 % (ref 14–44)
MCH RBC QN AUTO: 30 PG (ref 26.8–34.3)
MCHC RBC AUTO-ENTMCNC: 31.2 G/DL (ref 31.4–37.4)
MCV RBC AUTO: 96 FL (ref 82–98)
MONOCYTES # BLD AUTO: 0.81 THOUSAND/ΜL (ref 0.17–1.22)
MONOCYTES NFR BLD AUTO: 9 % (ref 4–12)
NEUTROPHILS # BLD AUTO: 7.31 THOUSANDS/ΜL (ref 1.85–7.62)
NEUTS SEG NFR BLD AUTO: 76 % (ref 43–75)
NRBC BLD AUTO-RTO: 0 /100 WBCS
PLATELET # BLD AUTO: 302 THOUSANDS/UL (ref 149–390)
PMV BLD AUTO: 9.5 FL (ref 8.9–12.7)
POTASSIUM SERPL-SCNC: 3.8 MMOL/L (ref 3.5–5.3)
PROT SERPL-MCNC: 5.7 G/DL (ref 6.4–8.2)
RBC # BLD AUTO: 2.43 MILLION/UL (ref 3.81–5.12)
SODIUM SERPL-SCNC: 137 MMOL/L (ref 136–145)
WBC # BLD AUTO: 9.58 THOUSAND/UL (ref 4.31–10.16)

## 2022-06-26 PROCEDURE — 99232 SBSQ HOSP IP/OBS MODERATE 35: CPT | Performed by: INTERNAL MEDICINE

## 2022-06-26 PROCEDURE — 80053 COMPREHEN METABOLIC PANEL: CPT | Performed by: INTERNAL MEDICINE

## 2022-06-26 PROCEDURE — 94640 AIRWAY INHALATION TREATMENT: CPT

## 2022-06-26 PROCEDURE — 94760 N-INVAS EAR/PLS OXIMETRY 1: CPT

## 2022-06-26 PROCEDURE — 85025 COMPLETE CBC W/AUTO DIFF WBC: CPT | Performed by: INTERNAL MEDICINE

## 2022-06-26 PROCEDURE — C9113 INJ PANTOPRAZOLE SODIUM, VIA: HCPCS | Performed by: INTERNAL MEDICINE

## 2022-06-26 PROCEDURE — 94669 MECHANICAL CHEST WALL OSCILL: CPT

## 2022-06-26 RX ADMIN — ENOXAPARIN SODIUM 40 MG: 40 INJECTION SUBCUTANEOUS at 08:59

## 2022-06-26 RX ADMIN — GLYCERIN, HYPROMELLOSE, POLYETHYLENE GLYCOL 2 DROP: .2; .2; 1 LIQUID OPHTHALMIC at 16:17

## 2022-06-26 RX ADMIN — ACETAMINOPHEN 650 MG: 650 SUSPENSION ORAL at 16:16

## 2022-06-26 RX ADMIN — ASPIRIN 81 MG CHEWABLE TABLET 81 MG: 81 TABLET CHEWABLE at 08:59

## 2022-06-26 RX ADMIN — LEVALBUTEROL HYDROCHLORIDE 0.63 MG: 0.63 SOLUTION RESPIRATORY (INHALATION) at 19:34

## 2022-06-26 RX ADMIN — Medication 250 MG: at 09:02

## 2022-06-26 RX ADMIN — LEVALBUTEROL HYDROCHLORIDE 0.63 MG: 0.63 SOLUTION RESPIRATORY (INHALATION) at 13:19

## 2022-06-26 RX ADMIN — SODIUM CHLORIDE SOLN NEBU 3% 4 ML: 3 NEBU SOLN at 19:34

## 2022-06-26 RX ADMIN — OXYCODONE HYDROCHLORIDE 2.5 MG: 5 SOLUTION ORAL at 03:24

## 2022-06-26 RX ADMIN — PANTOPRAZOLE SODIUM 40 MG: 40 INJECTION, POWDER, FOR SOLUTION INTRAVENOUS at 08:59

## 2022-06-26 RX ADMIN — PREDNISONE 30 MG: 20 TABLET ORAL at 08:59

## 2022-06-26 RX ADMIN — ATOVAQUONE 750 MG: 750 SUSPENSION ORAL at 09:01

## 2022-06-26 RX ADMIN — GLYCERIN, HYPROMELLOSE, POLYETHYLENE GLYCOL 2 DROP: .2; .2; 1 LIQUID OPHTHALMIC at 09:02

## 2022-06-26 RX ADMIN — ACETAMINOPHEN 650 MG: 650 SUSPENSION ORAL at 06:20

## 2022-06-26 RX ADMIN — RIOCIGUAT 2.5 MG: 2.5 TABLET, FILM COATED ORAL at 09:01

## 2022-06-26 RX ADMIN — ACETAMINOPHEN 650 MG: 650 SUSPENSION ORAL at 21:56

## 2022-06-26 RX ADMIN — LORATADINE 10 MG: 10 TABLET ORAL at 08:59

## 2022-06-26 RX ADMIN — Medication 1000 UNITS: at 08:59

## 2022-06-26 RX ADMIN — RIOCIGUAT 2.5 MG: 2.5 TABLET, FILM COATED ORAL at 16:16

## 2022-06-26 RX ADMIN — RIOCIGUAT 2.5 MG: 2.5 TABLET, FILM COATED ORAL at 21:56

## 2022-06-26 RX ADMIN — SODIUM CHLORIDE SOLN NEBU 3% 4 ML: 3 NEBU SOLN at 07:35

## 2022-06-26 RX ADMIN — Medication 250 MG: at 16:17

## 2022-06-26 RX ADMIN — SODIUM CHLORIDE SOLN NEBU 3% 4 ML: 3 NEBU SOLN at 13:20

## 2022-06-26 RX ADMIN — MINERAL OIL AND WHITE PETROLATUM: 150; 830 OINTMENT OPHTHALMIC at 21:58

## 2022-06-26 RX ADMIN — LEVALBUTEROL HYDROCHLORIDE 0.63 MG: 0.63 SOLUTION RESPIRATORY (INHALATION) at 07:35

## 2022-06-26 RX ADMIN — LEVOTHYROXINE SODIUM 25 MCG: 25 TABLET ORAL at 06:20

## 2022-06-26 RX ADMIN — MACITENTAN 10 MG: 10 TABLET, FILM COATED ORAL at 09:02

## 2022-06-26 NOTE — ASSESSMENT & PLAN NOTE
With hypotension   Outpatient cardiology discussed with  regarding adjusting medications here since pressures are low  Seen by Pulmonology and they are not recommending any changes at this time  Will discuss with HF team tomorrow as it was recommended by other specialities to consult with them

## 2022-06-26 NOTE — ASSESSMENT & PLAN NOTE
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

## 2022-06-26 NOTE — PROGRESS NOTES
[address] [facility]  Progress Note Malvin Shore 1940, 80 y o  female MRN: [id number]  Unit/Bed#: ICCU 204-01 Encounter: [id number]  Primary Care Provider: No primary care provider on file  Date and time admitted to hospital: 6/14/2022  8:07 PM    Pulmonary hypertension ([address] )  Assessment & Plan  With hypotension   Outpatient cardiology discussed with  regarding adjusting medications here since pressures are low  Seen by Pulmonology and they are not recommending any changes at this time  Will discuss with HF team tomorrow as it was recommended by other specialities to consult with them  Hearing loss  Assessment & Plan  Patient reports 3 months of progressively worsening hearing  As per previous admission, concerning for atovaquone versus Lasix  Discussed with ENT, have declines seeing patient in hospital; consider outpatient referral  Trial different antibiotic prophylaxis    Pneumonia  Assessment & Plan  See accompanying plan under acute respiratory failure  Not on abx currently     CXR shows bilateral infiltrations on 6/19  CXR from yesterday shows improvement in infiltrates   No indication for ABX at this time     Acute respiratory failure with hypoxia [facility])  Assessment & Plan  Still on 2 L   Stable  Will give more chest PT today   Pulm and ID following peripherally and will give input if needed        Urinary retention  Assessment & Plan  Patient with history of urinary retention and spasm; previously on myrbetriq and Gemtesa  Continue lilly catheter at this time; treat symptomatically  Follows with urology in the outpatient setting     Anemia  Assessment & Plan  Patient noted to have low hemoglobin on admission-6 9  Has received 1 unit PRBCs on 06/15, but repeat CBC was 6 5  Iron panel shows low iron and TIBC consistent with anemia of chronic disease; reticulocytes elevated  B12 and folate within normal limits; will consider iron supplementation once infection resolved  In the meantime, hemoglobin stable at 7 3        Acquired hypothyroidism  Assessment & Plan  As per recent PCP note, appears to take 25 mcg daily  Continue home medication, TSH within normal limits    Scleroderma ([facility] [address] )  Assessment & Plan  Appreciate rheumatology recs   No change to current meds    Dysphagia, oropharyngeal phase  Assessment & Plan  Patient with history of dysphagia, peg tube in place  Continue current tube feeds; appreciate nutritional recommendations    Severe protein-calorie malnutrition ([facility] [address] )  Assessment & Plan  Malnutrition Findings:   Adult Malnutrition type: Chronic illness  Adult Degree of Malnutrition: Other severe protein calorie malnutrition  Malnutrition Characteristics: Muscle loss, Fat loss, Weight loss, Inadequate energy           360 Statement: related to disease/condition evidenced by increased kcal/pro needs for wound healing Stage IV sacral decub, BMI 18 4 adjusted per prior facility wt 6/7/22;severe buccal fat pads loss, severe deltoid muscle loss, Patient lost 31# (20 6%) since 3/15/22 past 3 months with illness <75% energy intake versus needs for > 1 month  Treating with EN support and Robert TID via PEG for wound healing    BMI Findings:  Adult BMI Classifications: Underweight < 18 5        Body mass index is 22 53 kg/m²  * Sacral wound  Assessment & Plan  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  Underwent surgical debridement on 06/14/2022 with general surgery; transferred to medicine service for chronic illness  Initially placed on vanc and cefepime for antibiotic therapy; however ID evaluated and stopped antibiotics  Repositioning; wound care consult  Supportive care, pain medications  Nutritional support  VAC dressing changed by surgery Friday             VTE Pharmacologic Prophylaxis: VTE Score: 3 Moderate Risk (Score 3-4) - Pharmacological DVT Prophylaxis Ordered: heparin      Patient Centered Rounds: I performed bedside rounds with nursing staff today  Discussions with Specialists or Other Care Team Provider: discussed with nursing  Education and Discussions with Family / Patient: catherine Petty -   Time Spent for Care: 30 minutes  More than 50% of total time spent on counseling and coordination of care as described above  Current Length of Stay: 12 day(s)  Current Patient Status: Inpatient   Certification Statement: The patient will continue to require additional inpatient hospital stay due to monitor vitals and lab work   Discharge Plan: Anticipate discharge in 48-72 hrs to rehab facility  Code Status: Level 3 - DNAR and DNI    Subjective:   Patient seen and examined   Feeling "pretty good"    Objective:     Vitals:   Temp (24hrs), Av 3 °F (36 8 °C), Min:97 8 °F (36 6 °C), Max:98 7 °F (37 1 °C)    Temp:  [97 8 °F (36 6 °C)-98 7 °F (37 1 °C)] 98 1 °F (36 7 °C)  HR:  [73-84] 76  Resp:  [16-23] 21  BP: ()/(36-57) 96/41  SpO2:  [90 %-100 %] 92 %  Body mass index is 22 53 kg/m²  Input and Output Summary (last 24 hours): Intake/Output Summary (Last 24 hours) at 2022 1202  Last data filed at 2022 0919  Gross per 24 hour   Intake 2049 ml   Output 1100 ml   Net 949 ml       Physical Exam:   Physical Exam  Constitutional:       General: She is not in acute distress  Appearance: She is ill-appearing (frail )  She is not toxic-appearing  HENT:      Head: Normocephalic  Eyes:      Pupils: Pupils are equal, round, and reactive to light  Cardiovascular:      Rate and Rhythm: Normal rate  Heart sounds: No murmur heard  No friction rub  No gallop  Pulmonary:      Effort: No respiratory distress  Breath sounds: No stridor  No wheezing, rhonchi or rales  Chest:      Chest wall: No tenderness  Abdominal:      General: Abdomen is flat  There is no distension  Tenderness: There is no right CVA tenderness     Skin:     Capillary Refill: Capillary refill takes less than 2 seconds  Coloration: Skin is pale  Skin is not jaundiced  Findings: No erythema or lesion  Neurological:      Cranial Nerves: No cranial nerve deficit  Sensory: No sensory deficit  Motor: No weakness        Coordination: Coordination normal       Gait: Gait normal       Deep Tendon Reflexes: Reflexes normal    Psychiatric:         Mood and Affect: Mood normal           Additional Data:     Labs:  Results from last 7 days   Lab Units 06/26/22  0535   WBC Thousand/uL 9 58   HEMOGLOBIN g/dL 7 3*   HEMATOCRIT % 23 4*   PLATELETS Thousands/uL 302   NEUTROS PCT % 76*   LYMPHS PCT % 12*   MONOS PCT % 9   EOS PCT % 1     Results from last 7 days   Lab Units 06/26/22  0535   SODIUM mmol/L 137   POTASSIUM mmol/L 3 8   CHLORIDE mmol/L 101   CO2 mmol/L 30   BUN mg/dL 34*   CREATININE mg/dL 0 30*   ANION GAP mmol/L 6   CALCIUM mg/dL 8 0*   ALBUMIN g/dL 1 8*   TOTAL BILIRUBIN mg/dL 0 70   ALK PHOS U/L 80   ALT U/L 18   AST U/L 16   GLUCOSE RANDOM mg/dL 140                 Results from last 7 days   Lab Units 06/22/22  0850   LACTIC ACID mmol/L 1 1       Lines/Drains:  Invasive Devices  Report    Peripheral Intravenous Line  Duration           Long-Dwell Peripheral IV (Midline) 06/14/22 Right Brachial 11 days          Drain  Duration           Gastrostomy/Enterostomy -- days    Urethral Catheter -- days              Urinary Catheter:  Goal for removal: N/A - Chronic [place]               Imaging: none pertinent to this encounter    Recent Cultures (last 7 days):         Last 24 Hours Medication List:   Current Facility-Administered Medications   Medication Dose Route Frequency Provider Last Rate    acetaminophen  650 mg Per G Tube Q6H Smita Ojeda MD      acetaminophen  650 mg Per G Tube Q4H PRN Monique Morocho MD      artificial tear   Both Eyes HS Monique Morocho MD      aspirin  81 mg Per G Tube Daily Monique Morocho MD      atovaquone  750 mg Per G Tube Daily With Breakfast Monique Morocho MD     [facility] cholecalciferol  1,000 Units Per G Tube Daily Apolonia Ching MD      enoxaparin  40 mg Subcutaneous Q24H [address] Apolonia Ching MD      glycerin-hypromellose-  2 drop Both Eyes BID Apolonia Ching MD      levalbuterol  0 63 mg Nebulization TID Brii Berger MD      levothyroxine  25 mcg Per PEG Tube Early Morning Apolonia Ching MD      loratadine  10 mg Per G Tube Daily Apolonia Ching MD      Macitentan  10 mg Per G Tube Daily Apolonia Ching MD      melatonin  3 mg Oral HS PRN Camilo Hammond MD      ondansetron  4 mg Intravenous Q6H PRN Camilo Hammond MD      oxyCODONE  2 5 mg Per G Tube Q4H PRN Camilo Hammond MD      oxyCODONE  5 mg Per G Tube Q4H PRN Camilo Hammond MD      pantoprazole  40 mg Intravenous Q24H [address] Apolonia Ching MD      predniSONE  30 mg Per G Tube Daily Apolonia Ching MD      Riociguat  2 5 mg Per G Tube TID Apolonia Ching MD      saccharomyces boulardii  250 mg Per G Tube BID Apolonia Ching MD      sodium chloride  4 mL Nebulization TID Brii Berger MD          Today, Patient Was Seen By: Brii Berger MD    **Please Note: This note may have been constructed using a voice recognition system  **

## 2022-06-26 NOTE — PLAN OF CARE
Problem: MOBILITY - ADULT  Goal: Maintain or return to baseline ADL function  Description: INTERVENTIONS:  -  Assess patient's ability to carry out ADLs; assess patient's baseline for ADL function and identify physical deficits which impact ability to perform ADLs (bathing, care of mouth/teeth, toileting, grooming, dressing, etc )  - Assess/evaluate cause of self-care deficits   - Assess range of motion  - Assess patient's mobility; develop plan if impaired  - Assess patient's need for assistive devices and provide as appropriate  - Encourage maximum independence but intervene and supervise when necessary  - Involve family in performance of ADLs  - Assess for home care needs following discharge   - Consider OT consult to assist with ADL evaluation and planning for discharge  - Provide patient education as appropriate  Outcome: Progressing  Goal: Maintains/Returns to pre admission functional level  Description: INTERVENTIONS:  - Perform BMAT or MOVE assessment daily    - Set and communicate daily mobility goal to care team and patient/family/caregiver  - Collaborate with rehabilitation services on mobility goals if consulted  - Perform Range of Motion  times a day  - Reposition patient every  hours    - Dangle patient  times a day  - Stand patient  times a day  - Ambulate patient  times a day  - Out of bed to chair  times a day   - Out of bed for meals  times a day  - Out of bed for toileting  - Record patient progress and toleration of activity level   Outcome: Progressing     Problem: Potential for Falls  Goal: Patient will remain free of falls  Description: INTERVENTIONS:  - Educate patient/family on patient safety including physical limitations  - Instruct patient to call for assistance with activity   - Consult OT/PT to assist with strengthening/mobility   - Keep Call bell within reach  - Keep bed low and locked with side rails adjusted as appropriate  - Keep care items and personal belongings within reach  - Initiate and maintain comfort rounds  - Make Fall Risk Sign visible to staff  - Offer Toileting every  Hours, in advance of need  - Initiate/Maintain alarm  - Obtain necessary fall risk management equipment:   - Apply yellow socks and bracelet for high fall risk patients  - Consider moving patient to room near nurses station  Outcome: Progressing     Problem: Prexisting or High Potential for Compromised Skin Integrity  Goal: Skin integrity is maintained or improved  Description: INTERVENTIONS:  - Identify patients at risk for skin breakdown  - Assess and monitor skin integrity  - Assess and monitor nutrition and hydration status  - Monitor labs   - Assess for incontinence   - Turn and reposition patient  - Assist with mobility/ambulation  - Relieve pressure over bony prominences  - Avoid friction and shearing  - Provide appropriate hygiene as needed including keeping skin clean and dry  - Evaluate need for skin moisturizer/barrier cream  - Collaborate with interdisciplinary team   - Patient/family teaching  - Consider wound care consult   Outcome: Progressing     Problem: PAIN - ADULT  Goal: Verbalizes/displays adequate comfort level or baseline comfort level  Description: Interventions:  - Encourage patient to monitor pain and request assistance  - Assess pain using appropriate pain scale  - Administer analgesics based on type and severity of pain and evaluate response  - Implement non-pharmacological measures as appropriate and evaluate response  - Consider cultural and social influences on pain and pain management  - Notify physician/advanced practitioner if interventions unsuccessful or patient reports new pain  Outcome: Progressing     Problem: INFECTION - ADULT  Goal: Absence or prevention of progression during hospitalization  Description: INTERVENTIONS:  - Assess and monitor for signs and symptoms of infection  - Monitor lab/diagnostic results  - Monitor all insertion sites, i e  indwelling lines, tubes, and drains  - Monitor endotracheal if appropriate and nasal secretions for changes in amount and color  - [place] appropriate cooling/warming therapies per order  - Administer medications as ordered  - Instruct and encourage patient and family to use good hand hygiene technique  - Identify and instruct in appropriate isolation precautions for identified infection/condition  Outcome: Progressing  Goal: Absence of fever/infection during neutropenic period  Description: INTERVENTIONS:  - Monitor WBC    Outcome: Progressing     Problem: SAFETY ADULT  Goal: Maintain or return to baseline ADL function  Description: INTERVENTIONS:  -  Assess patient's ability to carry out ADLs; assess patient's baseline for ADL function and identify physical deficits which impact ability to perform ADLs (bathing, care of mouth/teeth, toileting, grooming, dressing, etc )  - Assess/evaluate cause of self-care deficits   - Assess range of motion  - Assess patient's mobility; develop plan if impaired  - Assess patient's need for assistive devices and provide as appropriate  - Encourage maximum independence but intervene and supervise when necessary  - Involve family in performance of ADLs  - Assess for home care needs following discharge   - Consider OT consult to assist with ADL evaluation and planning for discharge  - Provide patient education as appropriate  Outcome: Progressing  Goal: Maintains/Returns to pre admission functional level  Description: INTERVENTIONS:  - Perform BMAT or MOVE assessment daily    - Set and communicate daily mobility goal to care team and patient/family/caregiver  - Collaborate with rehabilitation services on mobility goals if consulted  - Perform Range of Motion  times a day  - Reposition patient every  hours    - Dangle patient  times a day  - Stand patient  times a day  - Ambulate patient  times a day  - Out of bed to chair  times a day   - Out of bed for meals  times a day  - Out of bed for toileting  - Record patient progress and toleration of activity level   Outcome: Progressing  Goal: Patient will remain free of falls  Description: INTERVENTIONS:  - Educate patient/family on patient safety including physical limitations  - Instruct patient to call for assistance with activity   - Consult OT/PT to assist with strengthening/mobility   - Keep Call bell within reach  - Keep bed low and locked with side rails adjusted as appropriate  - Keep care items and personal belongings within reach  - Initiate and maintain comfort rounds  - Make Fall Risk Sign visible to staff  - Offer Toileting every  Hours, in advance of need  - Initiate/Maintain alarm  - Obtain necessary fall risk management equipment  - Apply yellow socks and bracelet for high fall risk patients  - Consider moving patient to room near nurses station  Outcome: Progressing     Problem: DISCHARGE PLANNING  Goal: Discharge to home or other facility with appropriate resources  Description: INTERVENTIONS:  - Identify barriers to discharge w/patient and caregiver  - Arrange for needed discharge resources and transportation as appropriate  - Identify discharge learning needs (meds, wound care, etc )  - Arrange for interpretive services to assist at discharge as needed  - Refer to Case Management Department for coordinating discharge planning if the patient needs post-hospital services based on physician/advanced practitioner order or complex needs related to functional status, cognitive ability, or social support system  Outcome: Progressing Billing Type: Third-Party Bill

## 2022-06-26 NOTE — PLAN OF CARE
Problem: MOBILITY - ADULT  Goal: Maintain or return to baseline ADL function  Description: INTERVENTIONS:  -  Assess patient's ability to carry out ADLs; assess patient's baseline for ADL function and identify physical deficits which impact ability to perform ADLs (bathing, care of mouth/teeth, toileting, grooming, dressing, etc )  - Assess/evaluate cause of self-care deficits   - Assess range of motion  - Assess patient's mobility; develop plan if impaired  - Assess patient's need for assistive devices and provide as appropriate  - Encourage maximum independence but intervene and supervise when necessary  - Involve family in performance of ADLs  - Assess for home care needs following discharge   - Consider OT consult to assist with ADL evaluation and planning for discharge  - Provide patient education as appropriate  Outcome: Progressing  Goal: Maintains/Returns to pre admission functional level  Description: INTERVENTIONS:  - Perform BMAT or MOVE assessment daily    - Set and communicate daily mobility goal to care team and patient/family/caregiver  - Collaborate with rehabilitation services on mobility goals if consulted  - Perform Range of Motion 3 times a day  - Reposition patient every 2 hours    - Dangle patient 3 times a day  - Stand patient 3 times a day  - Ambulate patient 3 times a day  - Out of bed to chair 3 times a day   - Out of bed for meals 3 times a day  - Out of bed for toileting  - Record patient progress and toleration of activity level   Outcome: Progressing     Problem: Potential for Falls  Goal: Patient will remain free of falls  Description: INTERVENTIONS:  - Educate patient/family on patient safety including physical limitations  - Instruct patient to call for assistance with activity   - Consult OT/PT to assist with strengthening/mobility   - Keep Call bell within reach  - Keep bed low and locked with side rails adjusted as appropriate  - Keep care items and personal belongings within reach  - Initiate and maintain comfort rounds  - Make Fall Risk Sign visible to staff  - Offer Toileting every  Hours, in advance of need  - Initiate/Maintain alarm  - Obtain necessary fall risk management equipment:   - Apply yellow socks and bracelet for high fall risk patients  - Consider moving patient to room near nurses station  Outcome: Progressing     Problem: Prexisting or High Potential for Compromised Skin Integrity  Goal: Skin integrity is maintained or improved  Description: INTERVENTIONS:  - Identify patients at risk for skin breakdown  - Assess and monitor skin integrity  - Assess and monitor nutrition and hydration status  - Monitor labs   - Assess for incontinence   - Turn and reposition patient  - Assist with mobility/ambulation  - Relieve pressure over bony prominences  - Avoid friction and shearing  - Provide appropriate hygiene as needed including keeping skin clean and dry  - Evaluate need for skin moisturizer/barrier cream  - Collaborate with interdisciplinary team   - Patient/family teaching  - Consider wound care consult   Outcome: Progressing     Problem: PAIN - ADULT  Goal: Verbalizes/displays adequate comfort level or baseline comfort level  Description: Interventions:  - Encourage patient to monitor pain and request assistance  - Assess pain using appropriate pain scale  - Administer analgesics based on type and severity of pain and evaluate response  - Implement non-pharmacological measures as appropriate and evaluate response  - Consider cultural and social influences on pain and pain management  - Notify physician/advanced practitioner if interventions unsuccessful or patient reports new pain  Outcome: Progressing     Problem: INFECTION - ADULT  Goal: Absence or prevention of progression during hospitalization  Description: INTERVENTIONS:  - Assess and monitor for signs and symptoms of infection  - Monitor lab/diagnostic results  - Monitor all insertion sites, i e  indwelling lines, tubes, and drains  - Monitor endotracheal if appropriate and nasal secretions for changes in amount and color  - [place] appropriate cooling/warming therapies per order  - Administer medications as ordered  - Instruct and encourage patient and family to use good hand hygiene technique  - Identify and instruct in appropriate isolation precautions for identified infection/condition  Outcome: Progressing  Goal: Absence of fever/infection during neutropenic period  Description: INTERVENTIONS:  - Monitor WBC    Outcome: Progressing     Problem: SAFETY ADULT  Goal: Maintain or return to baseline ADL function  Description: INTERVENTIONS:  -  Assess patient's ability to carry out ADLs; assess patient's baseline for ADL function and identify physical deficits which impact ability to perform ADLs (bathing, care of mouth/teeth, toileting, grooming, dressing, etc )  - Assess/evaluate cause of self-care deficits   - Assess range of motion  - Assess patient's mobility; develop plan if impaired  - Assess patient's need for assistive devices and provide as appropriate  - Encourage maximum independence but intervene and supervise when necessary  - Involve family in performance of ADLs  - Assess for home care needs following discharge   - Consider OT consult to assist with ADL evaluation and planning for discharge  - Provide patient education as appropriate  Outcome: Progressing  Goal: Maintains/Returns to pre admission functional level  Description: INTERVENTIONS:  - Perform BMAT or MOVE assessment daily    - Set and communicate daily mobility goal to care team and patient/family/caregiver  - Collaborate with rehabilitation services on mobility goals if consulted  - Perform Range of Motion 3 times a day  - Reposition patient every 2 hours    - Dangle patient 3 times a day  - Stand patient 3 times a day  - Ambulate patient 3 times a day  - Out of bed to chair 3 times a day   - Out of bed for meals 3 times a day  - Out of bed for toileting  - Record patient progress and toleration of activity level   Outcome: Progressing  Goal: Patient will remain free of falls  Description: INTERVENTIONS:  - Educate patient/family on patient safety including physical limitations  - Instruct patient to call for assistance with activity   - Consult OT/PT to assist with strengthening/mobility   - Keep Call bell within reach  - Keep bed low and locked with side rails adjusted as appropriate  - Keep care items and personal belongings within reach  - Initiate and maintain comfort rounds  - Make Fall Risk Sign visible to staff  - Offer Toileting every  Hours, in advance of need  - Initiate/Maintain alarm  - Obtain necessary fall risk management equipment:   - Apply yellow socks and bracelet for high fall risk patients  - Consider moving patient to room near nurses station  Outcome: Progressing     Problem: DISCHARGE PLANNING  Goal: Discharge to home or other facility with appropriate resources  Description: INTERVENTIONS:  - Identify barriers to discharge w/patient and caregiver  - Arrange for needed discharge resources and transportation as appropriate  - Identify discharge learning needs (meds, wound care, etc )  - Arrange for interpretive services to assist at discharge as needed  - Refer to Case Management Department for coordinating discharge planning if the patient needs post-hospital services based on physician/advanced practitioner order or complex needs related to functional status, cognitive ability, or social support system  Outcome: Progressing     Problem: Knowledge Deficit  Goal: Patient/family/caregiver demonstrates understanding of disease process, treatment plan, medications, and discharge instructions  Description: Complete learning assessment and assess knowledge base    Interventions:  - Provide teaching at level of understanding  - Provide teaching via preferred learning methods  Outcome: Progressing     Problem: Nutrition/Hydration-ADULT  Goal: Nutrient/Hydration intake appropriate for improving, restoring or maintaining nutritional needs  Description: Monitor and assess patient's nutrition/hydration status for malnutrition  Collaborate with interdisciplinary team and initiate plan and interventions as ordered  Monitor patient's weight and dietary intake as ordered or per policy  Utilize nutrition screening tool and intervene as necessary  Determine patient's food preferences and provide high-protein, high-caloric foods as appropriate       INTERVENTIONS:  - Monitor oral intake, urinary output, labs, and treatment plans  - Assess nutrition and hydration status and recommend course of action  - Evaluate amount of meals eaten  - Assist patient with eating if necessary   - Allow adequate time for meals  - Recommend/ encourage appropriate diets, oral nutritional supplements, and vitamin/mineral supplements  - Order, calculate, and assess calorie counts as needed  - Recommend, monitor, and adjust tube feedings and TPN/PPN based on assessed needs  - Assess need for intravenous fluids  - Provide specific nutrition/hydration education as appropriate  - Include patient/family/caregiver in decisions related to nutrition  Outcome: Progressing

## 2022-06-26 NOTE — ASSESSMENT & PLAN NOTE
Patient noted to have low hemoglobin on admission-6 9  Has received 1 unit PRBCs on 06/15, but repeat CBC was 6 5  Iron panel shows low iron and TIBC consistent with anemia of chronic disease; reticulocytes elevated  B12 and folate within normal limits; will consider iron supplementation once infection resolved  In the meantime, hemoglobin stable at 7 3

## 2022-06-27 LAB
ALBUMIN SERPL BCP-MCNC: 2 G/DL (ref 3.5–5)
ALP SERPL-CCNC: 88 U/L (ref 46–116)
ALT SERPL W P-5'-P-CCNC: 19 U/L (ref 12–78)
ANION GAP SERPL CALCULATED.3IONS-SCNC: 6 MMOL/L (ref 4–13)
AST SERPL W P-5'-P-CCNC: 17 U/L (ref 5–45)
BASOPHILS # BLD AUTO: 0.04 THOUSANDS/ΜL (ref 0–0.1)
BASOPHILS NFR BLD AUTO: 0 % (ref 0–1)
BILIRUB SERPL-MCNC: 0.24 MG/DL (ref 0.2–1)
BUN SERPL-MCNC: 37 MG/DL (ref 5–25)
CALCIUM ALBUM COR SERPL-MCNC: 10.2 MG/DL (ref 8.3–10.1)
CALCIUM SERPL-MCNC: 8.6 MG/DL (ref 8.3–10.1)
CHLORIDE SERPL-SCNC: 100 MMOL/L (ref 100–108)
CO2 SERPL-SCNC: 32 MMOL/L (ref 21–32)
CREAT SERPL-MCNC: 0.33 MG/DL (ref 0.6–1.3)
EOSINOPHIL # BLD AUTO: 0.14 THOUSAND/ΜL (ref 0–0.61)
EOSINOPHIL NFR BLD AUTO: 1 % (ref 0–6)
ERYTHROCYTE [DISTWIDTH] IN BLOOD BY AUTOMATED COUNT: 19.9 % (ref 11.6–15.1)
GFR SERPL CREATININE-BSD FRML MDRD: 105 ML/MIN/1.73SQ M
GLUCOSE SERPL-MCNC: 109 MG/DL (ref 65–140)
GLUCOSE SERPL-MCNC: 115 MG/DL (ref 65–140)
GLUCOSE SERPL-MCNC: 125 MG/DL (ref 65–140)
HCT VFR BLD AUTO: 26.6 % (ref 34.8–46.1)
HGB BLD-MCNC: 7.9 G/DL (ref 11.5–15.4)
IMM GRANULOCYTES # BLD AUTO: 0.12 THOUSAND/UL (ref 0–0.2)
IMM GRANULOCYTES NFR BLD AUTO: 1 % (ref 0–2)
LYMPHOCYTES # BLD AUTO: 1.35 THOUSANDS/ΜL (ref 0.6–4.47)
LYMPHOCYTES NFR BLD AUTO: 13 % (ref 14–44)
MCH RBC QN AUTO: 30.2 PG (ref 26.8–34.3)
MCHC RBC AUTO-ENTMCNC: 29.7 G/DL (ref 31.4–37.4)
MCV RBC AUTO: 102 FL (ref 82–98)
MONOCYTES # BLD AUTO: 1.01 THOUSAND/ΜL (ref 0.17–1.22)
MONOCYTES NFR BLD AUTO: 10 % (ref 4–12)
NEUTROPHILS # BLD AUTO: 7.63 THOUSANDS/ΜL (ref 1.85–7.62)
NEUTS SEG NFR BLD AUTO: 75 % (ref 43–75)
NRBC BLD AUTO-RTO: 0 /100 WBCS
PLATELET # BLD AUTO: 378 THOUSANDS/UL (ref 149–390)
PMV BLD AUTO: 9.5 FL (ref 8.9–12.7)
POTASSIUM SERPL-SCNC: 3.9 MMOL/L (ref 3.5–5.3)
PROT SERPL-MCNC: 6.2 G/DL (ref 6.4–8.2)
RBC # BLD AUTO: 2.62 MILLION/UL (ref 3.81–5.12)
SODIUM SERPL-SCNC: 138 MMOL/L (ref 136–145)
WBC # BLD AUTO: 10.29 THOUSAND/UL (ref 4.31–10.16)

## 2022-06-27 PROCEDURE — C9113 INJ PANTOPRAZOLE SODIUM, VIA: HCPCS | Performed by: INTERNAL MEDICINE

## 2022-06-27 PROCEDURE — 82948 REAGENT STRIP/BLOOD GLUCOSE: CPT

## 2022-06-27 PROCEDURE — 99223 1ST HOSP IP/OBS HIGH 75: CPT | Performed by: INTERNAL MEDICINE

## 2022-06-27 PROCEDURE — 94640 AIRWAY INHALATION TREATMENT: CPT

## 2022-06-27 PROCEDURE — 94760 N-INVAS EAR/PLS OXIMETRY 1: CPT

## 2022-06-27 PROCEDURE — 80053 COMPREHEN METABOLIC PANEL: CPT | Performed by: INTERNAL MEDICINE

## 2022-06-27 PROCEDURE — 99232 SBSQ HOSP IP/OBS MODERATE 35: CPT | Performed by: INTERNAL MEDICINE

## 2022-06-27 PROCEDURE — 94669 MECHANICAL CHEST WALL OSCILL: CPT

## 2022-06-27 PROCEDURE — 85025 COMPLETE CBC W/AUTO DIFF WBC: CPT | Performed by: INTERNAL MEDICINE

## 2022-06-27 PROCEDURE — 97606 NEG PRS WND THER DME>50 SQCM: CPT | Performed by: PHYSICIAN ASSISTANT

## 2022-06-27 RX ADMIN — OXYCODONE HYDROCHLORIDE 2.5 MG: 5 SOLUTION ORAL at 02:39

## 2022-06-27 RX ADMIN — Medication 250 MG: at 17:51

## 2022-06-27 RX ADMIN — GLYCERIN, HYPROMELLOSE, POLYETHYLENE GLYCOL 2 DROP: .2; .2; 1 LIQUID OPHTHALMIC at 17:52

## 2022-06-27 RX ADMIN — ACETAMINOPHEN 650 MG: 650 SUSPENSION ORAL at 21:51

## 2022-06-27 RX ADMIN — LEVALBUTEROL HYDROCHLORIDE 0.63 MG: 0.63 SOLUTION RESPIRATORY (INHALATION) at 13:58

## 2022-06-27 RX ADMIN — ASPIRIN 81 MG CHEWABLE TABLET 81 MG: 81 TABLET CHEWABLE at 08:58

## 2022-06-27 RX ADMIN — ENOXAPARIN SODIUM 40 MG: 40 INJECTION SUBCUTANEOUS at 08:58

## 2022-06-27 RX ADMIN — ACETAMINOPHEN 650 MG: 650 SUSPENSION ORAL at 17:50

## 2022-06-27 RX ADMIN — GLYCERIN, HYPROMELLOSE, POLYETHYLENE GLYCOL 2 DROP: .2; .2; 1 LIQUID OPHTHALMIC at 08:59

## 2022-06-27 RX ADMIN — SODIUM CHLORIDE SOLN NEBU 3% 4 ML: 3 NEBU SOLN at 07:56

## 2022-06-27 RX ADMIN — LORATADINE 10 MG: 10 TABLET ORAL at 08:58

## 2022-06-27 RX ADMIN — OXYCODONE HYDROCHLORIDE 2.5 MG: 5 SOLUTION ORAL at 23:58

## 2022-06-27 RX ADMIN — RIOCIGUAT 2.5 MG: 2.5 TABLET, FILM COATED ORAL at 08:59

## 2022-06-27 RX ADMIN — ACETAMINOPHEN 650 MG: 650 SUSPENSION ORAL at 04:44

## 2022-06-27 RX ADMIN — SODIUM CHLORIDE SOLN NEBU 3% 4 ML: 3 NEBU SOLN at 22:02

## 2022-06-27 RX ADMIN — SODIUM CHLORIDE SOLN NEBU 3% 4 ML: 3 NEBU SOLN at 13:58

## 2022-06-27 RX ADMIN — LEVALBUTEROL HYDROCHLORIDE 0.63 MG: 0.63 SOLUTION RESPIRATORY (INHALATION) at 22:02

## 2022-06-27 RX ADMIN — ACETAMINOPHEN 650 MG: 650 SUSPENSION ORAL at 01:51

## 2022-06-27 RX ADMIN — OXYCODONE HYDROCHLORIDE 2.5 MG: 5 SOLUTION ORAL at 16:39

## 2022-06-27 RX ADMIN — ACETAMINOPHEN 650 MG: 650 SUSPENSION ORAL at 12:23

## 2022-06-27 RX ADMIN — PREDNISONE 30 MG: 20 TABLET ORAL at 08:58

## 2022-06-27 RX ADMIN — ATOVAQUONE 750 MG: 750 SUSPENSION ORAL at 09:00

## 2022-06-27 RX ADMIN — Medication 1000 UNITS: at 08:58

## 2022-06-27 RX ADMIN — LEVOTHYROXINE SODIUM 25 MCG: 25 TABLET ORAL at 05:54

## 2022-06-27 RX ADMIN — MACITENTAN 10 MG: 10 TABLET, FILM COATED ORAL at 08:59

## 2022-06-27 RX ADMIN — PANTOPRAZOLE SODIUM 40 MG: 40 INJECTION, POWDER, FOR SOLUTION INTRAVENOUS at 08:58

## 2022-06-27 RX ADMIN — Medication 250 MG: at 09:00

## 2022-06-27 RX ADMIN — LEVALBUTEROL HYDROCHLORIDE 0.63 MG: 0.63 SOLUTION RESPIRATORY (INHALATION) at 07:56

## 2022-06-27 NOTE — QUICK NOTE
6/27/2022 10:40 AM -  [facility]ch[facility] chart and case were reviewed by Benton Bruno PA-C  Mode of review included electronic chart check and communication with primary team       Per review, symptoms remain controlled by the primary team  She continues to undergo medical optimization  No acute events over the weekend requiring goals of care to be readdressed  Primary team reports no palliative needs today  Palliative care will check in on 6/28  Patient is appropriate for outpatient follow-up  We will make arrangements through our office  For urgent issues or any questions/concerns, please notify on-call provider via [address] [place] [address]  You may also call our answering service 24/7 at   Benton Bruno PA-C  Palliative and Supportive Care  Clinic/Answering Service: [phone]  You can find me on [employer]!

## 2022-06-27 NOTE — PROGRESS NOTES
PULMONOLOGY PROGRESS NOTE     Name: Tate Ellison   Age & Sex: 80 y o  female   MRN: [id number]  Unit/Bed#: [facility] 204-01   Encounter: [id number]    PATIENT INFORMATION     Name: Tate Ellison   Age & Sex: 80 y o  female   MRN: [id number]  Hospital Stay Days: 13    ASSESSMENT/PLAN     Assessment:   1  Acute respiratory failure with hypoxia  2  Severe pulmonary hypertension (chronically on Opsumit and [place], RVSP 56 mm Hg on these meds)  3  Dermatomyositis (s/p IVIG and steroid taper, 5/2022)  4  CREST Scleroderma  5  Hx of KOREY infection previously on chronic ABX (stopped about 10 years ago)  6  Heart failure with preserved ejection fraction (EF 70%, G2DD)      Plan:  · At this point she has chronically been on these pulmonary hypertension medications and her SBP generally runs in the 100s to 110s  This is not far from her baseline  · The risk of adjustment in these medications would outweigh the benefit as she is asymptomatic at this point with no significant respiratory complaints  · Likely she has on going issues will bleeding from the wound with delayed response to bleeding from bone marrow to mount adequate increased circulating volume  · She has received a blood transfusion and would continue to monitor hemoglobin to look for appropriate responsiveness to volume resuscitation  · She should remain on steroid taper for dermatomyositis flare  Rheumatology has been consulted  · Continue to titrate supplemental O2 to a SpO2 goal of >88%  · There may be some adrenal insufficiency due to the prednisone taper and recent surgery also contributing  SUBJECTIVE     Patient seen and examined  No acute events overnight  No acute complaints  Had a rough night with frequent repositioning      OBJECTIVE     Vitals:    06/26/22 1934 06/26/22 2155 06/27/22 0731 06/27/22 0759   BP:   (!) 93/45    BP Location:       Pulse:   74    Resp:   18    Temp:  98 5 °F (36 9 °C)     TempSrc:  Axillary     SpO2: 92%  98% 95%   Weight:       Height:          Temperature:   Temp (24hrs), Av 4 °F (36 9 °C), Min:98 1 °F (36 7 °C), Max:98 5 °F (36 9 °C)    Temperature: 98 5 °F (36 9 °C)  Intake & Output:  I/O        0701   0700  0701   0700  0701   0700    P  O   0     Blood 330      NG/GT  1032     Total Intake(mL/kg) 330 (5) 1032 (15 6)     Urine (mL/kg/hr) 2225 (1 4) 1050 (0 7)     Emesis/NG output  0     Stool  0     Total Output 2225 1050     Net -1895 -18            Unmeasured Stool Occurrence  1 x         Weights:        Body mass index is 22 53 kg/m²  Weight (last 2 days)     None        Physical Exam  Vitals and nursing note reviewed  Constitutional:       General: She is awake  She is not in acute distress  Appearance: Normal appearance  She is normal weight  She is ill-appearing  She is not toxic-appearing  Interventions: She is not intubated  HENT:      Head: Normocephalic and atraumatic  Right Ear: External ear normal       Left Ear: External ear normal       Nose: Nose normal       Mouth/Throat:      Mouth: Mucous membranes are moist       Pharynx: Oropharynx is clear  Eyes:      General: No scleral icterus  Conjunctiva/sclera: Conjunctivae normal    Cardiovascular:      Rate and Rhythm: Normal rate and regular rhythm  Pulses: Normal pulses  Heart sounds: Normal heart sounds  No murmur heard  No friction rub  No gallop  Pulmonary:      Effort: Pulmonary effort is normal  No tachypnea, bradypnea, accessory muscle usage or respiratory distress  She is not intubated  Breath sounds: Normal breath sounds and air entry  No decreased air movement  No decreased breath sounds, wheezing, rhonchi or rales  Abdominal:      General: Abdomen is flat  Bowel sounds are normal       Palpations: Abdomen is soft  Musculoskeletal:         General: No swelling or tenderness  Cervical back: Neck supple  No tenderness  Skin:     General: Skin is warm and dry  Neurological:      General: No focal deficit present  Mental Status: She is alert and oriented to person, place, and time  Mental status is at baseline  Motor: Weakness present  LABORATORY DATA     Labs: I have personally reviewed pertinent reports  Results from last 7 days   Lab Units 06/27/22  0510 06/26/22  0535 06/25/22  0435   WBC Thousand/uL 10 29* 9 58 10 36*   HEMOGLOBIN g/dL 7 9* 7 3* 8 1*   HEMATOCRIT % 26 6* 23 4* 26 9*   PLATELETS Thousands/uL 378 302 320   NEUTROS PCT % 75 76* 78*   MONOS PCT % 10 9 7      Results from last 7 days   Lab Units 06/27/22  0510 06/26/22  0535 06/25/22  0435   POTASSIUM mmol/L 3 9 3 8 4 2   CHLORIDE mmol/L 100 101 100   CO2 mmol/L 32 30 31   BUN mg/dL 37* 34* 33*   CREATININE mg/dL 0 33* 0 30* 0 31*   CALCIUM mg/dL 8 6 8 0* 8 4   ALK PHOS U/L 88 80 90   ALT U/L 19 18 22   AST U/L 17 16 20                  Results from last 7 days   Lab Units 06/22/22  0850   LACTIC ACID mmol/L 1 1             ABG:       IMAGING & DIAGNOSTIC TESTING     Radiology Results: I have personally reviewed pertinent reports  CT abdomen pelvis wo contrast    Result Date: 6/14/2022  Impression: Soft tissue air and soft tissue defect identified posterior to the sacrum suggesting infection  I cannot exclude superimposed posterior sacral osteomyelitis although no bony erosion identified  Soft tissue air identified around the posterior aspect of the rectum in addition to the left side of the rectum/perineum, correlate for necrotizing fasciitis soft tissue infection  Surgical consult Bilateral lower lobe pneumonia  Left upper pole renal 1 cm likely hemorrhagic cyst, correlate with nonemergent outpatient renal ultrasound    I personally discussed this study with Jose Juan Tucker on 6/14/2022 at 5:54 PM  Workstation performed: [id number]     XR chest portable    Result Date: 6/19/2022  Impression: Persistent, slightly worsened, bilateral pulmonary opacities in keeping with multilobar pneumonia  Workstation performed: [id number]     XR chest portable    Result Date: 6/16/2022  Impression: Stable vascular congestion  Workstation performed: [id number]     XR chest 1 view portable    Result Date: 6/15/2022  Impression: Mild vascular congestion  Right-sided PICC line terminates at the right axilla Workstation performed: [id number]     CT head without contrast    Result Date: 6/14/2022  Impression: No acute intracranial hemorrhage  If symptoms persistent or worsening focal neurologic deficit, proceed with noncontrast brain MRI and/or CTA  Workstation performed: [id number]     Other Diagnostic Testing: I have personally reviewed pertinent reports      ACTIVE MEDICATIONS     Current Facility-Administered Medications   Medication Dose Route Frequency    acetaminophen (TYLENOL) oral suspension 650 mg  650 mg Per G Tube Q6H    acetaminophen (TYLENOL) oral suspension 650 mg  650 mg Per G Tube Q4H PRN    artificial tear (LUBRIFRESH P M ) ophthalmic ointment   Both Eyes HS    aspirin chewable tablet 81 mg  81 mg Per G Tube Daily    atovaquone (MEPRON) oral suspension 750 mg  750 mg Per G Tube Daily With Breakfast    cholecalciferol (VITAMIN D3) tablet 1,000 Units  1,000 Units Per G Tube Daily    enoxaparin (LOVENOX) subcutaneous injection 40 mg  40 mg Subcutaneous Q24H [address]    glycerin-hypromellose- (ARTIFICIAL TEARS) ophthalmic solution 2 drop  2 drop Both Eyes BID    levalbuterol (XOPENEX) inhalation solution 0 63 mg  0 63 mg Nebulization TID    levothyroxine tablet 25 mcg  25 mcg Per PEG Tube Early Morning    loratadine (CLARITIN) tablet 10 mg  10 mg Per G Tube Daily    Macitentan (OPSUMIT) tablet 10 mg  10 mg Per G Tube Daily    melatonin tablet 3 mg  3 mg Oral HS PRN    ondansetron (ZOFRAN) injection 4 mg  4 mg Intravenous Q6H PRN    oxyCODONE (ROXICODONE) oral solution 2 5 mg  2 5 mg Per G Tube Q4H PRN    oxyCODONE (ROXICODONE) oral solution 5 mg  5 mg Per G Tube Q4H PRN    pantoprazole (PROTONIX) injection 40 mg  40 mg Intravenous Q24H [facility]    predniSONE tablet 30 mg  30 mg Per G Tube Daily    Riociguat TABS 2 5 mg  2 5 mg Per G Tube TID    saccharomyces boulardii (FLORASTOR) capsule 250 mg  250 mg Per G Tube BID    sodium chloride 3 % inhalation solution 4 mL  4 mL Nebulization TID       VTE Pharmacologic Prophylaxis: Enoxaparin (Lovenox)  VTE Mechanical Prophylaxis: sequential compression device      Disclaimer: Portions of the record may have been created with voice recognition software  Occasional wrong word or "sound a like" substitutions may have occurred due to the inherent limitations of voice recognition software  Careful consideration should be taken to recognize, using context, where substitutions have occurred      Estrella Noon, DO   Pulmonary and Critical Care Fellow, PGY-IV  [facility]

## 2022-06-27 NOTE — ASSESSMENT & PLAN NOTE
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  Underwent surgical debridement on 06/14/2022 with general surgery; transferred to medicine service for chronic illness  Initially placed on vanc and cefepime for antibiotic therapy; however ID evaluated and stopped antibiotics  Repositioning; wound care consult  Supportive care, pain medications  Nutritional support  VAC dressing changed by surgery Friday seen again by surgery and patient is now medically stable from surgery stand point

## 2022-06-27 NOTE — PROGRESS NOTES
Acute Care Surgery  Bedside V A C  Procedure Note    A timeout was performed with the patient's nurse, Rajesh Escobedo, prior to beginning the dressing change  Location of wound: Sacrum    Dressings and Foam removed:  2 Black Foam (1 black foam in wound, 1 as bridge)    Dimensions of wound: 10 5 cm x 7 cm x 2 cm    Description of wound: On inspection of the wound today, wound base appears clean, no areas of significant drainage, no slough noted  Small area of dark black necrotic appearing tissue, directly on sacrum  Approximately 45 % of the wound base is now pink and healthy and there is granulation tissue  The periwound skin appears with mild erythema, small area at the 4 o'clock position appearing macerated, continue to monitor  No palpable fluctuance or induration noted on surrounding skin areas  VAC dressing application:  The periwound skin was cleaned and dried  2 black foam, (1 in wound, 1 as bridge), 0 white foam were cut to size of the wound and placed into the wound  The dressings were then covered with VAC drape  Additional VAC drape and black foam was used to create a bridge to the patient's left hip and a base for the track pad  The track pad was then placed over the base of black foam  The VAC was then set to 125 mmHg low Continuous suction  The patient tolerated the procedure well and there were no complications  The patient did not require any excisional debridement during today's dressing change  VAC settings:  125 mmHg  Continuous    Wound Images: Additional Notes: The [facility] sticker placed over the dressing per protocol  The next [facility] dressing change will be planned for 6/29/2022  Gustavo Burton MD was present for the dressing change  This dressing change took greater than 35 minutes to complete      Femi Vivar PA-C  6/27/2022 1:38 PM

## 2022-06-27 NOTE — QUICK NOTE
Called Bruno - she was busy and asked that we talk later  Will try to call back later in the day  No updates given due to Bruno being busy

## 2022-06-27 NOTE — ASSESSMENT & PLAN NOTE
Patient noted to have low hemoglobin on admission-6 9  Has received 1 unit PRBCs on 06/15, but repeat CBC was 6 5  Iron panel shows low iron and TIBC consistent with anemia of chronic disease; reticulocytes elevated  B12 and folate within normal limits; will consider iron supplementation once infection resolved  In the meantime, hemoglobin stable at 7 9

## 2022-06-27 NOTE — PROGRESS NOTES
[facility]  Progress Note Mary Ellen Shore 1940, 80 y o  female MRN: [id number]  [facility] Encounter: [id number]  Primary Care Provider: No primary care provider on file  Date and time admitted to hospital: 6/14/2022  8:07 PM    Pulmonary hypertension ([address] )  Assessment & Plan  With hypotension   Outpatient cardiology discussed with  regarding adjusting medications here since pressures are low  Seen by Pulmonology and they are not recommending any changes at this time  Consulted HF team   Discussed with them and for now will decrease riociguat to 1 5 mg TID   Of note discussed with HF today and upon DC will need Dr Raad Aguilera - the patients outpatient cardiologist- to prescribe this change  Number for Dr Raad Aguilera can be obtained from family and Dr Raad Aguilera is also okay discussing with SLIM if needed  For now will monitor response to change in medication and adjust  May be room to decrease further to 1 mg TID  Hearing loss  Assessment & Plan  Patient reports 3 months of progressively worsening hearing  As per previous admission, concerning for atovaquone versus Lasix  Discussed with ENT, have declines seeing patient in hospital; consider outpatient referral  Trial different antibiotic prophylaxis    Pneumonia  Assessment & Plan  See accompanying plan under acute respiratory failure  Not on abx currently     CXR shows bilateral infiltrations on 6/19  CXR from yesterday shows improvement in infiltrates   No indication for ABX at this time     Acute respiratory failure with hypoxia [facility])  Assessment & Plan  Still on 2 L   Stable          Urinary retention  Assessment & Plan  Patient with history of urinary retention and spasm; previously on myrbetriq and Gemtesa  Continue lilly catheter at this time; treat symptomatically  Follows with urology in the outpatient setting     Anemia  Assessment & Plan  Patient noted to have low hemoglobin on admission-6 9  Has received 1 unit PRBCs on 06/15, but repeat CBC was 6 5  Iron panel shows low iron and TIBC consistent with anemia of chronic disease; reticulocytes elevated  B12 and folate within normal limits; will consider iron supplementation once infection resolved  In the meantime, hemoglobin stable at 7 9        Acquired hypothyroidism  Assessment & Plan  As per recent PCP note, appears to take 25 mcg daily  Continue home medication, TSH within normal limits    Scleroderma ([facility] )  Assessment & Plan  Appreciate rheumatology recs   No change to current meds    Dysphagia, oropharyngeal phase  Assessment & Plan  Patient with history of dysphagia, peg tube in place  Continue current tube feeds; appreciate nutritional recommendations    Severe protein-calorie malnutrition ([facility] )  Assessment & Plan  Malnutrition Findings:   Adult Malnutrition type: Chronic illness  Adult Degree of Malnutrition: Other severe protein calorie malnutrition  Malnutrition Characteristics: Muscle loss, Fat loss, Weight loss, Inadequate energy           360 Statement: related to disease/condition evidenced by increased kcal/pro needs for wound healing Stage IV sacral decub, BMI 18 4 adjusted per prior facility wt 6/7/22;severe buccal fat pads loss, severe deltoid muscle loss, Patient lost 31# (20 6%) since 3/15/22 past 3 months with illness <75% energy intake versus needs for > 1 month  Treating with EN support and Robert TID via PEG for wound healing    BMI Findings:  Adult BMI Classifications: Underweight < 18 5        Body mass index is 22 53 kg/m²         * Sacral wound  Assessment & Plan  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  Underwent surgical debridement on 06/14/2022 with general surgery; transferred to medicine service for chronic illness  Initially placed on vanc and cefepime for antibiotic therapy; however ID evaluated and stopped antibiotics  Repositioning; wound care consult  Supportive care, pain medications  Nutritional support  VAC dressing changed by surgery Friday seen again by surgery and patient is now medically stable from surgery stand point   VTE Pharmacologic Prophylaxis: VTE Score: 3 Moderate Risk (Score 3-4) - Pharmacological DVT Prophylaxis Ordered: heparin  Patient Centered Rounds: I performed bedside rounds with nursing staff today  Discussions with Specialists or Other Care Team Provider: discussed with HF at length  Discussed with CM  Likely medically stable tomorrow if tolerating well new meds and if logistically change can be prescribed by outpatient cardiologist  Also family have concerns about prior nursing home and wound care available there so CM are looking into additional facilities  Education and Discussions with Family / Patient: called Arpita Guy   Time Spent for Care: 30 minutes  More than 50% of total time spent on counseling and coordination of care as described above  Current Length of Stay: 13 day(s)  Current Patient Status: Inpatient   Certification Statement: The patient will continue to require additional inpatient hospital stay due to changing HF meds  Discussing  Discharge Plan: Anticipate discharge in 24-48 hrs to rehab facility  Code Status: Level 3 - DNAR and DNI    Subjective:   Patient seen and examined   Feeling 'good'  Does not wish to return to prior facility  Objective:     Vitals:   Temp (24hrs), Av 3 °F (36 8 °C), Min:98 °F (36 7 °C), Max:98 5 °F (36 9 °C)    Temp:  [98 °F (36 7 °C)-98 5 °F (36 9 °C)] 98 3 °F (36 8 °C)  HR:  [74-82] 74  Resp:  [18-22] 18  BP: (93-98)/(38-45) 93/45  SpO2:  [92 %-100 %] 100 %  Body mass index is 22 53 kg/m²  Input and Output Summary (last 24 hours):      Intake/Output Summary (Last 24 hours) at 2022 1445  Last data filed at 2022 1828  Gross per 24 hour   Intake 533 ml   Output 250 ml   Net 283 ml       Physical Exam:   Physical Exam  Constitutional:       General: She is not in acute distress  Appearance: She is ill-appearing (chronically ill appearing  Cachectic)  She is not toxic-appearing or diaphoretic  Eyes:      Pupils: Pupils are equal, round, and reactive to light  Cardiovascular:      Rate and Rhythm: Normal rate  Heart sounds: No murmur heard  No friction rub  No gallop  Pulmonary:      Effort: No respiratory distress  Breath sounds: No stridor  No wheezing, rhonchi or rales  Comments: Few crackles    Abdominal:      General: There is no distension  Palpations: There is no mass  Tenderness: There is no abdominal tenderness  There is no right CVA tenderness, left CVA tenderness or rebound  Musculoskeletal:         General: No swelling or signs of injury  Right lower leg: No edema  Skin:     Capillary Refill: Capillary refill takes less than 2 seconds  Coloration: Skin is pale  Skin is not jaundiced  Findings: No bruising, erythema, lesion or rash  Neurological:      General: No focal deficit present  Cranial Nerves: No cranial nerve deficit  Motor: No weakness        Gait: Gait normal    Psychiatric:         Mood and Affect: Mood normal       Comments: Patient is AAOx3          Additional Data:     Labs:  Results from last 7 days   Lab Units 06/27/22  0510   WBC Thousand/uL 10 29*   HEMOGLOBIN g/dL 7 9*   HEMATOCRIT % 26 6*   PLATELETS Thousands/uL 378   NEUTROS PCT % 75   LYMPHS PCT % 13*   MONOS PCT % 10   EOS PCT % 1     Results from last 7 days   Lab Units 06/27/22  0510   SODIUM mmol/L 138   POTASSIUM mmol/L 3 9   CHLORIDE mmol/L 100   CO2 mmol/L 32   BUN mg/dL 37*   CREATININE mg/dL 0 33*   ANION GAP mmol/L 6   CALCIUM mg/dL 8 6   ALBUMIN g/dL 2 0*   TOTAL BILIRUBIN mg/dL 0 24   ALK PHOS U/L 88   ALT U/L 19   AST U/L 17   GLUCOSE RANDOM mg/dL 109         Results from last 7 days   Lab Units 06/27/22  1132 06/27/22  0528   POC GLUCOSE mg/dl 125 115         Results from last 7 days   Lab Units 06/22/22  0850 LACTIC ACID mmol/L 1 1       Lines/Drains:  Invasive Devices  Report    Peripheral Intravenous Line  Duration           Long-Dwell Peripheral IV (Midline) 06/14/22 Right Brachial 12 days          Drain  Duration           Gastrostomy/Enterostomy -- days    Urethral Catheter -- days              Urinary Catheter:  Goal for removal: N/A- Discharging with [place]               Imaging: No pertinent imaging reviewed      Recent Cultures (last 7 days):         Last 24 Hours Medication List:   Current Facility-Administered Medications   Medication Dose Route Frequency Provider Last Rate    acetaminophen  650 mg Per G Tube Q6H Zuleika Fisher MD      acetaminophen  650 mg Per G Tube Q4H PRN Flaquita Emerson MD      artificial tear   Both Eyes HS Flaquita Emerson MD      aspirin  81 mg Per G Tube Daily Flaquita Emerson MD      atovaquone  750 mg Per G Tube Daily With Breakfast Flaquita Emerson MD      cholecalciferol  1,000 Units Per G Tube Daily Flaquita Emerson MD      enoxaparin  40 mg Subcutaneous Q24H [address] Flaquita Emerson MD      glycerin-hypromellose-  2 drop Both Eyes BID Flaquita Emerson MD      levalbuterol  0 63 mg Nebulization TID Fern Tang MD      levothyroxine  25 mcg Per PEG Tube Early Morning Flaquita Emerson MD      loratadine  10 mg Per G Tube Daily Flaquita Emerson MD      Macitentan  10 mg Per G Tube Daily Flaquita Emerson MD      melatonin  3 mg Oral HS PRN Zuleika Fisher MD      ondansetron  4 mg Intravenous Q6H PRN Zuleika Fisher MD      oxyCODONE  2 5 mg Per G Tube Q4H PRN Zuleika Fisher MD      oxyCODONE  5 mg Per G Tube Q4H PRN Zuleika Fisher MD      pantoprazole  40 mg Intravenous Q24H [address] Flaquita Emerson MD      predniSONE  30 mg Per G Tube Daily Flaquita Emerson MD      Riociguat  1 5 mg Per G Tube TID Fern Tang MD      saccharomyces boulardii  250 mg Per G Tube BID Flaquita Emerson MD      sodium chloride  4 mL Nebulization TID Fern Tang MD Today, Patient Was Seen By: Lisa Penny MD    **Please Note: This note may have been constructed using a voice recognition system  **

## 2022-06-27 NOTE — CONSULTS
Consultation - HF  Zainab Mccoy 80 y o  female MRN: [id number]  [facility] Encounter: [id number]      Consults  PCP: No primary care provider on file  Outpatient Cardiologist: Nella Muniz     History of Present Illness   Physician Requesting Consult: Yenny Novoa MD  Reason for Consult / Principal Problem: pHTN and hypotension     HPI: Zainab Mccoy is a 80y o  year old female with a history of scleroderma, bed-bound, interstitial lung disease, dysphagia, pulmonary hypertension on double therapy and HFpEF who presenting with sepsis due to sacral wound now s/p wound vac  HF being consulted for hypotension and help with management of pulmonary hypertension medications  The patient has a longstanding history of pHTN and has been on medical therapies for years  Recently the patient was on triple therapy with adempas 2 5 mg TID, opsumit 10 mg and selexipeg (unkown dosing)  The patient is being managed by her cardiologist Dr Rivas Mom  She was having many side effets including GI intolerance and joint pains with her selexipeg  She was stopped on selexipeg and has been currently on adempas and opsumit still  The patient was given some IV diuresis inpatient but overal feels well from a cardiopulmonary standpoint  Her main complaints are related to overall deconditioning from being in the hospital so long  Review of Systems  Review of system was conducted and was negative except for as stated in the HPI        Historical Information   Past Medical History:   Diagnosis Date    Dysphagia, oropharyngeal phase 06/06/2022     Past Surgical History:   Procedure Laterality Date    WOUND DEBRIDEMENT N/A 6/14/2022    Procedure: DEBRIDEMENT WOUND [facility] OUT); SACRUM AND BUTTOCKS;  Surgeon: Maggie Spatz, MD;  [facility];  Service: General     Social History     Substance and Sexual Activity   Alcohol Use Never     Social History     Substance and Sexual Activity   Drug Use Not on file Social History     Tobacco Use   Smoking Status Never Smoker   Smokeless Tobacco Never Used     Family History: non-contributory    Meds/Allergies   Hospital Medications:   Current Facility-Administered Medications   Medication Dose Route Frequency    acetaminophen (TYLENOL) oral suspension 650 mg  650 mg Per G Tube Q6H    acetaminophen (TYLENOL) oral suspension 650 mg  650 mg Per G Tube Q4H PRN    artificial tear (LUBRIFRESH P M ) ophthalmic ointment   Both Eyes HS    aspirin chewable tablet 81 mg  81 mg Per G Tube Daily    atovaquone (MEPRON) oral suspension 750 mg  750 mg Per G Tube Daily With Breakfast    cholecalciferol (VITAMIN D3) tablet 1,000 Units  1,000 Units Per G Tube Daily    enoxaparin (LOVENOX) subcutaneous injection 40 mg  40 mg Subcutaneous Q24H [facility]    glycerin-hypromellose- (ARTIFICIAL TEARS) ophthalmic solution 2 drop  2 drop Both Eyes BID    levalbuterol (XOPENEX) inhalation solution 0 63 mg  0 63 mg Nebulization TID    levothyroxine tablet 25 mcg  25 mcg Per PEG Tube Early Morning    loratadine (CLARITIN) tablet 10 mg  10 mg Per G Tube Daily    Macitentan (OPSUMIT) tablet 10 mg  10 mg Per G Tube Daily    melatonin tablet 3 mg  3 mg Oral HS PRN    ondansetron (ZOFRAN) injection 4 mg  4 mg Intravenous Q6H PRN    oxyCODONE (ROXICODONE) oral solution 2 5 mg  2 5 mg Per G Tube Q4H PRN    oxyCODONE (ROXICODONE) oral solution 5 mg  5 mg Per G Tube Q4H PRN    pantoprazole (PROTONIX) injection 40 mg  40 mg Intravenous Q24H [facility]    predniSONE tablet 30 mg  30 mg Per G Tube Daily    Riociguat TABS 2 5 mg  2 5 mg Per G Tube TID    saccharomyces boulardii (FLORASTOR) capsule 250 mg  250 mg Per G Tube BID    sodium chloride 3 % inhalation solution 4 mL  4 mL Nebulization TID     Home Medications:   No medications prior to admission         Allergies   Allergen Reactions   Sheba Macias [Selexipag] Anaphylaxis    Sulfa Antibiotics Tachycardia    Penicillins Rash       Objective Vitals: Blood pressure (!) 93/45, pulse 74, temperature 98 3 °F (36 8 °C), temperature source Oral, resp  rate 18, height 5' 7 5" (1 715 m), weight 66 2 kg (146 lb), SpO2 95 %  Orthostatic Blood Pressures    Flowsheet Row Most Recent Value   Blood Pressure 93/45 filed at 06/27/2022 7079   Patient Position - Orthostatic VS Lying filed at 06/26/2022 1602            Invasive Devices  Report    Peripheral Intravenous Line  Duration           Long-Dwell Peripheral IV (Midline) 06/14/22 Right Brachial 12 days          Drain  Duration           Gastrostomy/Enterostomy -- days    Urethral Catheter -- days                Physical Exam    GEN: Celia Shore  alert and oriented x 3, pleasant and cooperative, frail and deconditioned    HEENT:  Normocephalic, atraumatic, anicteric, moist mucous membranes  HEART: regular rhythm, regular rate, normal S1 and S2, no murmurs, clicks, gallops or rubs, JVP 7, no RV heave on exam   LUNGS: Clear to auscultation bilaterally; no wheezes, rales, or rhonchi; respiration nonlabored   ABDOMEN:  Normoactive bowel sounds, soft, no tenderness, no distention  EXTREMITIES: peripheral pulses palpable; no edema  NEURO: no gross focal findings; cranial nerves grossly intact   SKIN:  Dry, intact, warm to touch, scleroderma skin changes     Lab Results: I have personally reviewed pertinent lab results  Results from last 7 days   Lab Units 06/27/22  0510 06/26/22  0535 06/25/22  0435   POTASSIUM mmol/L 3 9 3 8 4 2   CO2 mmol/L 32 30 31   CHLORIDE mmol/L 100 101 100   BUN mg/dL 37* 34* 33*   CREATININE mg/dL 0 33* 0 30* 0 31*     Results from last 7 days   Lab Units 06/27/22  0510 06/26/22  0535 06/25/22  0435   HEMOGLOBIN g/dL 7 9* 7 3* 8 1*   HEMATOCRIT % 26 6* 23 4* 26 9*   PLATELETS Thousands/uL [phone]             Imaging: I have personally reviewed pertinent reports          Telemetry:   NSR with some runs of atrail tachycardia     EKG:   NSR with RBBB and left atrial enlargement Previous STRESS TEST:  No results found for this or any previous visit  No results found for this or any previous visit  No results found for this or any previous visit  Previous Cath/PCI:  No results found for this or any previous visit  No results found for this or any previous visit  No results found for this or any previous visit  ECHO:  No results found for this or any previous visit  Results for orders placed during the hospital encounter of 06/14/22    Echo complete w/ contrast if indicated    Interpretation Summary    Left Ventricle: Left ventricular cavity size is normal  Wall thickness is normal  There is no concentric hypertrophy  The left ventricular ejection fraction is 70%  Systolic function is vigorous  Wall motion is normal  Diastolic function is moderately abnormal, consistent with grade II (pseudonormal) relaxation  Left atrial filling pressure is elevated    IVS: There is systolic flattening of the interventricular septum consistent with right ventricle pressure overload    Right Ventricle: Right ventricular cavity size is mildly dilated  Wall thickness is increased    Left Atrium: The atrium is mildly dilated    Right Atrium: The atrium is mildly dilated    Atrial Septum: The septum bows into the right atrium, suggesting increased left atrial pressure    Aortic Valve: The leaflets are moderately thickened  The leaflets are moderately calcified  There is mildly reduced mobility  There is mild stenosis  The aortic valve peak velocity is 2 18 m/s  The aortic valve mean gradient is 12 mmHg  The DVI is 0 59  The aortic valve area is 1 82 cm2    Mitral Valve: There is mild annular calcification    Tricuspid Valve: There is moderate regurgitation  The right ventricular systolic pressure is moderately elevated  The estimated right ventricular systolic pressure is 66 68 mmHg    Pulmonary Artery: Notching of the RVOT Doppler waveform is noted     Pulmonary Veins: There is systolic blunting in the pulmonary veins  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 with presence of RVOT notching  JELANI:  No results found for this or any previous visit  No results found for this or any previous visit  CMR:  No results found for this or any previous visit  No results found for this or any previous visit  No results found for this or any previous visit  HOLTER  No results found for this or any previous visit  No results found for this or any previous visit  VTE Prophylaxis: Sequential compression device Aiyana Duc)        Assessment/Plan   Chencho Padilla is a 80y o  year old female with a history of scleroderma, bed-bound, interstitial lung disease, dysphagia, pulmonary hypertension on double therapy and HFpEF who presenting with sepsis due to sacral wound now s/p wound vac  Pulmonary Hypertension: 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 and 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  The patient has been on medical therapy for her pulmonary hypertension for years and has even been on triple therapy with uptravi recently    -looking at her TTE here her RV appears more compensated most likely from appropriate longstanding treatment of her pulmonary hypertension  I would continue her adempas and opsumit but it does not appear she needs 2 5 mg of adempas  Considering the visualization of her RV on echo and her systematically low blood pressures you could reduce the dose of adempas to 1 5 mg TID and continue her opsumit at 10 mg daily        Case discussed and reviewed with Dr Yesika Olivares who agrees with my assessment and plan  Thank you for involving us in the care of your patient        Priyank Hardin MD  Cardiology Fellow PGY-4    ==========================================================================================    Counseling / Coordination of Care  Total floor / unit time spent today 1 hour minutes  Greater than 50% of total time was spent with the patient and / or family counseling and / or coordination of care  A description of the counseling / coordination of care:          Epic/ Allscripts/Care Everywhere records reviewed:     ** Please Note: Fluency DirectDictation voice to text software may have been used in the creation of this document   **

## 2022-06-27 NOTE — CASE MANAGEMENT
Case Management Discharge Planning Note    Patient name Zainab Mccoy  Location [facility] MRN [id number]  : 1940 Date 2022       Current Admission Date: 2022  Current Admission Diagnosis:Sacral wound   Patient Active Problem List    Diagnosis Date Noted    Pulmonary hypertension ([facility] [address] ) 2022    Hearing loss 2022    Severe protein-calorie malnutrition ([facility] [address] ) 2022    Pneumonia 2022    Sacral wound 2022    Dysphagia, oropharyngeal phase 2022    Scleroderma ([facility] [address] ) 2022    Acquired hypothyroidism 2022    Anemia 2022    Urinary retention 2022    Acute respiratory failure with hypoxia ([facility] [address] ) 2022      LOS (days): 13  Geometric Mean LOS (GMLOS) (days): 9 60  Days to GMLOS:-3 2     OBJECTIVE:  Risk of Unplanned Readmission Score: 20 21         Current admission status: Inpatient   Preferred Pharmacy:   PATIENT/FAMILY REPORTS NO PREFERRED PHARMACY  No address on file      Primary Care Provider: No primary care provider on file  Primary Insurance: [facility]  Secondary Insurance:     DISCHARGE DETAILS:                 Additional Comments: Met with patient and family, including daughter Han Leblanc, at bedside to discuss discharge planning  Reviewed list of STR referrals, at this point only [facility] is a definite "yes" with some interest from [id number] at [facility] (still reviewing clinical) and [address] (will not know of any available beds until at least Thursday)  Will also need insurance authorization when decision on bed is made by patient/family  CM to follow

## 2022-06-27 NOTE — ASSESSMENT & PLAN NOTE
With hypotension   Outpatient cardiology discussed with  regarding adjusting medications here since pressures are low  Seen by Pulmonology and they are not recommending any changes at this time  Consulted HF team   Discussed with them and for now will decrease riociguat to 1 5 mg TID   Of note discussed with HF today and upon DC will need Dr Phoenix Gil - the patients outpatient cardiologist- to prescribe this change  Number for Dr Phoenix Gil can be obtained from family and Dr Phoenix Gil is also okay discussing with SLIM if needed  For now will monitor response to change in medication and adjust  May be room to decrease further to 1 mg TID

## 2022-06-28 LAB
ALBUMIN SERPL BCP-MCNC: 2.1 G/DL (ref 3.5–5)
ALP SERPL-CCNC: 83 U/L (ref 46–116)
ALT SERPL W P-5'-P-CCNC: 19 U/L (ref 12–78)
ANION GAP SERPL CALCULATED.3IONS-SCNC: 3 MMOL/L (ref 4–13)
AST SERPL W P-5'-P-CCNC: 19 U/L (ref 5–45)
BASOPHILS # BLD AUTO: 0.04 THOUSANDS/ΜL (ref 0–0.1)
BASOPHILS NFR BLD AUTO: 0 % (ref 0–1)
BILIRUB SERPL-MCNC: 0.17 MG/DL (ref 0.2–1)
BUN SERPL-MCNC: 26 MG/DL (ref 5–25)
CALCIUM ALBUM COR SERPL-MCNC: 10 MG/DL (ref 8.3–10.1)
CALCIUM SERPL-MCNC: 8.5 MG/DL (ref 8.3–10.1)
CHLORIDE SERPL-SCNC: 103 MMOL/L (ref 100–108)
CO2 SERPL-SCNC: 34 MMOL/L (ref 21–32)
CREAT SERPL-MCNC: 0.38 MG/DL (ref 0.6–1.3)
EOSINOPHIL # BLD AUTO: 0.15 THOUSAND/ΜL (ref 0–0.61)
EOSINOPHIL NFR BLD AUTO: 2 % (ref 0–6)
ERYTHROCYTE [DISTWIDTH] IN BLOOD BY AUTOMATED COUNT: 20.1 % (ref 11.6–15.1)
GFR SERPL CREATININE-BSD FRML MDRD: 101 ML/MIN/1.73SQ M
GLUCOSE SERPL-MCNC: 134 MG/DL (ref 65–140)
HCT VFR BLD AUTO: 26.2 % (ref 34.8–46.1)
HGB BLD-MCNC: 7.9 G/DL (ref 11.5–15.4)
IMM GRANULOCYTES # BLD AUTO: 0.1 THOUSAND/UL (ref 0–0.2)
IMM GRANULOCYTES NFR BLD AUTO: 1 % (ref 0–2)
LYMPHOCYTES # BLD AUTO: 1.19 THOUSANDS/ΜL (ref 0.6–4.47)
LYMPHOCYTES NFR BLD AUTO: 12 % (ref 14–44)
MCH RBC QN AUTO: 29.8 PG (ref 26.8–34.3)
MCHC RBC AUTO-ENTMCNC: 30.2 G/DL (ref 31.4–37.4)
MCV RBC AUTO: 99 FL (ref 82–98)
MONOCYTES # BLD AUTO: 0.81 THOUSAND/ΜL (ref 0.17–1.22)
MONOCYTES NFR BLD AUTO: 8 % (ref 4–12)
NEUTROPHILS # BLD AUTO: 7.78 THOUSANDS/ΜL (ref 1.85–7.62)
NEUTS SEG NFR BLD AUTO: 77 % (ref 43–75)
NRBC BLD AUTO-RTO: 0 /100 WBCS
PLATELET # BLD AUTO: 405 THOUSANDS/UL (ref 149–390)
PMV BLD AUTO: 9.5 FL (ref 8.9–12.7)
POTASSIUM SERPL-SCNC: 4 MMOL/L (ref 3.5–5.3)
PROT SERPL-MCNC: 6.3 G/DL (ref 6.4–8.2)
RBC # BLD AUTO: 2.65 MILLION/UL (ref 3.81–5.12)
SODIUM SERPL-SCNC: 140 MMOL/L (ref 136–145)
WBC # BLD AUTO: 10.07 THOUSAND/UL (ref 4.31–10.16)

## 2022-06-28 PROCEDURE — 94669 MECHANICAL CHEST WALL OSCILL: CPT

## 2022-06-28 PROCEDURE — 99232 SBSQ HOSP IP/OBS MODERATE 35: CPT | Performed by: INTERNAL MEDICINE

## 2022-06-28 PROCEDURE — 94760 N-INVAS EAR/PLS OXIMETRY 1: CPT

## 2022-06-28 PROCEDURE — 97530 THERAPEUTIC ACTIVITIES: CPT

## 2022-06-28 PROCEDURE — 94640 AIRWAY INHALATION TREATMENT: CPT

## 2022-06-28 PROCEDURE — 99233 SBSQ HOSP IP/OBS HIGH 50: CPT | Performed by: INTERNAL MEDICINE

## 2022-06-28 PROCEDURE — 80053 COMPREHEN METABOLIC PANEL: CPT | Performed by: INTERNAL MEDICINE

## 2022-06-28 PROCEDURE — 97110 THERAPEUTIC EXERCISES: CPT

## 2022-06-28 PROCEDURE — 85025 COMPLETE CBC W/AUTO DIFF WBC: CPT | Performed by: INTERNAL MEDICINE

## 2022-06-28 PROCEDURE — 97535 SELF CARE MNGMENT TRAINING: CPT

## 2022-06-28 PROCEDURE — C9113 INJ PANTOPRAZOLE SODIUM, VIA: HCPCS | Performed by: INTERNAL MEDICINE

## 2022-06-28 RX ORDER — LEVALBUTEROL 1.25 MG/.5ML
1.25 SOLUTION, CONCENTRATE RESPIRATORY (INHALATION)
Status: DISCONTINUED | OUTPATIENT
Start: 2022-06-28 | End: 2022-07-13 | Stop reason: HOSPADM

## 2022-06-28 RX ORDER — SODIUM CHLORIDE FOR INHALATION 0.9 %
3 VIAL, NEBULIZER (ML) INHALATION
Status: DISCONTINUED | OUTPATIENT
Start: 2022-06-28 | End: 2022-07-13 | Stop reason: HOSPADM

## 2022-06-28 RX ORDER — ALBUMIN (HUMAN) 12.5 G/50ML
12.5 SOLUTION INTRAVENOUS ONCE
Status: COMPLETED | OUTPATIENT
Start: 2022-06-28 | End: 2022-06-28

## 2022-06-28 RX ADMIN — ACETAMINOPHEN 650 MG: 650 SUSPENSION ORAL at 23:07

## 2022-06-28 RX ADMIN — RIOCIGUAT 1.25 MG: 2.5 TABLET, FILM COATED ORAL at 17:55

## 2022-06-28 RX ADMIN — OXYCODONE HYDROCHLORIDE 2.5 MG: 5 SOLUTION ORAL at 23:06

## 2022-06-28 RX ADMIN — ATOVAQUONE 750 MG: 750 SUSPENSION ORAL at 12:21

## 2022-06-28 RX ADMIN — Medication 1000 UNITS: at 12:20

## 2022-06-28 RX ADMIN — LEVALBUTEROL HYDROCHLORIDE 0.63 MG: 0.63 SOLUTION RESPIRATORY (INHALATION) at 08:19

## 2022-06-28 RX ADMIN — ACETAMINOPHEN 650 MG: 650 SUSPENSION ORAL at 18:03

## 2022-06-28 RX ADMIN — ALBUMIN (HUMAN) 12.5 G: 0.25 INJECTION, SOLUTION INTRAVENOUS at 20:13

## 2022-06-28 RX ADMIN — LEVALBUTEROL HYDROCHLORIDE 0.63 MG: 0.63 SOLUTION RESPIRATORY (INHALATION) at 13:48

## 2022-06-28 RX ADMIN — SODIUM CHLORIDE SOLN NEBU 3% 4 ML: 3 NEBU SOLN at 08:19

## 2022-06-28 RX ADMIN — LORATADINE 10 MG: 10 TABLET ORAL at 12:20

## 2022-06-28 RX ADMIN — ENOXAPARIN SODIUM 40 MG: 40 INJECTION SUBCUTANEOUS at 12:20

## 2022-06-28 RX ADMIN — GLYCERIN, HYPROMELLOSE, POLYETHYLENE GLYCOL 2 DROP: .2; .2; 1 LIQUID OPHTHALMIC at 12:22

## 2022-06-28 RX ADMIN — PANTOPRAZOLE SODIUM 40 MG: 40 INJECTION, POWDER, FOR SOLUTION INTRAVENOUS at 12:20

## 2022-06-28 RX ADMIN — ACETAMINOPHEN 650 MG: 650 SUSPENSION ORAL at 12:20

## 2022-06-28 RX ADMIN — PREDNISONE 30 MG: 20 TABLET ORAL at 12:20

## 2022-06-28 RX ADMIN — LEVALBUTEROL HYDROCHLORIDE 1.25 MG: 1.25 SOLUTION, CONCENTRATE RESPIRATORY (INHALATION) at 19:37

## 2022-06-28 RX ADMIN — MACITENTAN 10 MG: 10 TABLET, FILM COATED ORAL at 12:21

## 2022-06-28 RX ADMIN — ACETAMINOPHEN 650 MG: 650 SUSPENSION ORAL at 04:49

## 2022-06-28 RX ADMIN — Medication 250 MG: at 18:01

## 2022-06-28 RX ADMIN — LEVOTHYROXINE SODIUM 25 MCG: 25 TABLET ORAL at 05:48

## 2022-06-28 RX ADMIN — GLYCERIN, HYPROMELLOSE, POLYETHYLENE GLYCOL 2 DROP: .2; .2; 1 LIQUID OPHTHALMIC at 18:03

## 2022-06-28 RX ADMIN — MINERAL OIL AND WHITE PETROLATUM: 150; 830 OINTMENT OPHTHALMIC at 21:44

## 2022-06-28 RX ADMIN — ASPIRIN 81 MG CHEWABLE TABLET 81 MG: 81 TABLET CHEWABLE at 12:20

## 2022-06-28 RX ADMIN — ISODIUM CHLORIDE 3 ML: 0.03 SOLUTION RESPIRATORY (INHALATION) at 19:37

## 2022-06-28 NOTE — OCCUPATIONAL THERAPY NOTE
Occupational Therapy Treatment Note      Raghu Bee    6/28/2022    Principal Problem:    Sacral wound  Active Problems:    Severe protein-calorie malnutrition (HCC)    Dysphagia, oropharyngeal phase    Scleroderma (HCC)    Acquired hypothyroidism    Anemia    Urinary retention    Acute respiratory failure with hypoxia (HCC)    Pneumonia    Hearing loss    Pulmonary hypertension (HCC)      Past Medical History:   Diagnosis Date    Dysphagia, oropharyngeal phase 06/06/2022       Past Surgical History:   Procedure Laterality Date    WOUND DEBRIDEMENT N/A 6/14/2022    Procedure: DEBRIDEMENT WOUND [facility] OUT); SACRUM AND BUTTOCKS;  Surgeon: Leny Gastelum MD;  [facility];  Service: General        06/28/22 0934   OT Last Visit   OT Visit Date 06/28/22   Note Type   Note Type Treatment   Restrictions/Precautions   Weight Bearing Precautions Per Order No   Other Precautions Cognitive;Multiple lines;Telemetry; Fall Risk;Pain;O2  (wound vac, abd drain, peg)   Lifestyle   Autonomy Pt reports that in March she was I with ADLS, IADLS, and mobility w/o AD, at [employer] assist with ADL's/IADL's, assist of 1 with RW with mobility/transfers  Reciprocal Relationships Pt has a supportive sister and a    Service to Others Pt is retired   Intrinsic Gratification Pt enjoys reading   Pain Assessment   Pain Assessment Tool 0-10   Pain Score 7   Pain Location/Orientation Orientation: Left; Other (Comment)  (heal and buttocks)   Pain Onset/Description Onset: Ongoing; Descriptor: Aching;Descriptor: Discomfort   Patient's Stated Pain Goal No pain   Hospital Pain Intervention(s) Repositioned; Ambulation/increased activity; Emotional support   ADL   Where Assessed Edge of bed   Grooming Assistance 3  Moderate Assistance   Grooming Deficit Setup;Steadying;Verbal cueing;Supervision/safety; Increased time to complete;Wash/dry face; Wash/dry hands; Teeth care;Brushing hair   Grooming Comments pt completed grooming while seated EOB w/ setup  Pt needed able to grab rag from therapist w/ L hand and wash face w/ increased time; needed assist for thoroughness  Pt attempted to comb hair, able to reach only front side around forehead 2/2 decreased AROM of B/L UEs  pt needed assist to comb backside and  lateral parts of head  pt then completed oral care  Had setup of toothebrush/toothepaste  brushed w/ B/L UE (has better ROM in L UE so prefers to use that side)  Pt unable to gargle/spit w/ mouthwash and requested to be suctioned after oral care completed  UB Bathing Assistance 3  Moderate Assistance   UB Bathing Comments Pt required assist to bathe backside while seated EOB   LB Dressing Assistance 1  Total Assistance   LB Dressing Deficit Don/doff L sock; Don/doff R sock   LB Dressing Comments pt required total A to don socks while supine in bed   Toileting Assistance  1  Total Assistance   Toileting Deficit Perineal hygiene   Toileting Comments pt required total A to manage hygiene s/p toileting; pt incontinent of bowel  Bed Mobility   Rolling R 2  Maximal assistance   Additional items Assist x 1;Bedrails; Increased time required;Verbal cues;LE management   Rolling L 2  Maximal assistance   Additional items Assist x 1;Bedrails; Increased time required;Verbal cues;LE management   Supine to Sit 2  Maximal assistance   Additional items Assist x 2;HOB elevated; Increased time required;Verbal cues;LE management   Sit to Supine 2  Maximal assistance   Additional items Assist x 2; Increased time required;Verbal cues;LE management   Additional Comments Pt sat EOB for approx 15 mins w/ flucuating S-Mod A for sitting balance/trunk control; has occasional posterior lean/loss of balance   Cognition   Overall Cognitive Status Impaired   Arousal/Participation Responsive; Cooperative   Attention Attends with cues to redirect   Orientation Level Oriented X4   Memory Decreased recall of precautions   Following Commands Follows one step commands without difficulty Comments Pt is pleasant and cooperative; needs occasional cues for safety   Activity Tolerance   Activity Tolerance Patient limited by pain; Patient limited by fatigue   Medical Staff Made Aware PT, RN   Assessment   Assessment Patient participated in Skilled OT session 6/28/2022 with interventions consisting of ADL re training with the use of correct body mechnaics, Energy Conservation techniques, deep breathing technique, safety awareness and fall prevention techniques, therapeutic exercise to: increase functional use of BUEs, increase BUE muscle strength ,  therapeutic activities to: increase activity tolerance, increase dynamic sit/ stand balance during functional activity , increase postural control, increase trunk control and increase OOB/ sitting tolerance   Patient agreeable to OT treatment session, upon arrival patient was found supine in bed  In comparison to previous session, patient with improvements in bed mobility, EOB sitting tolerance, and grooming activities  Patient requiring verbal cues for safety, verbal cues for correct technique, verbal cues for pacing thru activity steps and frequent rest periods  Patient continues to be functioning below baseline level, occupational performance remains limited secondary to factors listed above and increased risk for falls and injury  From OT standpoint, recommendation at time of d/c would be return to facility w/ rehab services, when medically stable  Patient to benefit from continued Occupational Therapy treatment while in the hospital to address deficits as defined above and maximize level of functional independence with ADLs and functional mobility  Plan   Treatment Interventions ADL retraining;Functional transfer training;UE strengthening/ROM; Endurance training;Cognitive reorientation;Patient/family training;Equipment evaluation/education; Compensatory technique education; Fine motor coordination activities;Continued evaluation; Energy conservation; Activityengagement   Goal Expiration Date 07/12/22  (extension of previous goals, additional 14 days)   OT Treatment Day 2   OT Frequency 3-5x/wk   Recommendation   OT Discharge Recommendation Post acute rehabilitation services   OT - OK to Discharge Yes  (when medically stable)   Additional Comments  The patient's raw score on the AM-PAC Daily Activity inpatient short form is 9, standardized score is 29 04, less than 39 4  Patients at this level are likely to benefit from discharge to post-acute rehabilitation services   Please refer to the recommendation of the Occupational Therapist for safe discharge planning   Additional Comments 2 Pt seen as a co-evaluation due to the patient's co-morbidities, clinically unstable presentation, and present impairments which are a regression from the patient's baseline   AM-PAC Daily Activity Inpatient   Lower Body Dressing 1   Bathing 1   Toileting 1   Upper Body Dressing 2   Grooming 2   Eating 2   Daily Activity Raw Score 9   Turning Head Towards Sound 3   Follow Simple Instructions 3   Low Function Daily Activity Raw Score 15   Low Function Daily Activity Standardized Score 26 28   AM-PAC Applied Cognition Inpatient   Following a Speech/Presentation 2   Understanding Ordinary Conversation 3   Taking Medications 3   Remembering Where Things Are Placed or Put Away 3   Remembering List of 4-5 Errands 2   Taking Care of Complicated Tasks 2   Applied Cognition Raw Score 15   Applied Cognition Standardized Score 33 54       Bess Mustafa MS, OTR/L

## 2022-06-28 NOTE — PROGRESS NOTES
Spiritual Care Progress Note    2022  Patient: Sonya Weaver : 1940  Admission Date & Time: 2022  MRN: [id number] CSN: [id number]       made follow-up visit to patient and family at bedside  Patient was joined by her brother-in-law  Patient described feeling a little better but also expressed how difficult and long this entire process has been   provided active listening and empathetic presence  Patient and family expressed gratitude for 's visit  Spiritual Care will remain available                  Chaplaincy Interventions Utilized:   Empowerment: Clarified, confirmed, or reviewed information from treatment team , Encouraged self-care, and Normalized experience of patient/family    Exploration: Explored emotional needs & resources and Explored relational needs & resources    Relationship Building: Cultivated a relationship of care and support and Listened empathically     22 1500   Clinical Encounter Type   Visited With Patient and family together   Routine Visit Follow-up

## 2022-06-28 NOTE — PHYSICAL THERAPY NOTE
Physical Therapy treatment note     Patient Name: Dung Murillo    IEXGX'S Date: 6/28/2022     Problem List  Principal Problem:    Sacral wound  Active Problems:    Severe protein-calorie malnutrition ([address] )    Dysphagia, oropharyngeal phase    Scleroderma ([address] )    Acquired hypothyroidism    Anemia    Urinary retention    Acute respiratory failure with hypoxia (HCC)    Pneumonia    Hearing loss    Pulmonary hypertension (HCC)       Past Medical History  Past Medical History:   Diagnosis Date    Dysphagia, oropharyngeal phase 06/06/2022        Past Surgical History  Past Surgical History:   Procedure Laterality Date    WOUND DEBRIDEMENT N/A 6/14/2022    Procedure: DEBRIDEMENT WOUND [facility] OUT); SACRUM AND BUTTOCKS;  Surgeon: Nohelia Hyatt MD;  [facility]: [facility];  Service: General      06/28/22 1029   PT Last Visit   PT Visit Date 06/28/22   Note Type   Note Type Treatment   Pain Assessment   Pain Assessment Tool 0-10   Pain Score 7   Pain Location/Orientation Orientation: Left  (heel)   Restrictions/Precautions   Weight Bearing Precautions Per Order No   Other Precautions Fall Risk;O2;Telemetry;Multiple lines;Cognitive  (wound vac)   General   Chart Reviewed Yes   Family/Caregiver Present No   Cognition   Overall Cognitive Status Impaired   Arousal/Participation Alert; Cooperative   Attention Attends with cues to redirect   Bed Mobility   Rolling R 2  Maximal assistance   Additional items Assist x 1   Rolling L 2  Maximal assistance   Additional items Assist x 1   Supine to Sit 2  Maximal assistance   Additional items Assist x 2   Sit to Supine 2  Maximal assistance   Additional items Assist x 2   Additional Comments sitting EOB S-min A   Balance   Static Sitting Fair -   Dynamic Sitting Poor +   Static Standing Zero   Endurance Deficit   Endurance Deficit Yes   Activity Tolerance   Activity Tolerance Patient limited by fatigue;Patient limited by pain   Medical Staff Made Aware OT and RN assisted hyigene   Nurse Made Aware nurse approved therapy session   Exercises   Knee AROM Long Arc Quad Sitting;Bilateral  (10 reps x 2 sets)   Ankle Pumps Sitting;Bilateral  (30 reps x 2 sets)   Balance training  sitting EOB 5-8 minutes while performing hygiene with OT and performing exercises  multiple rolls in bed for hygiene s/p BM   Assessment   Prognosis Fair   Problem List Decreased strength;Decreased endurance; Impaired balance;Decreased mobility; Decreased cognition;Pain   Assessment Pt presents to therapy today with reduced mobility, poor endurance, high risk of falling, fear of mobility, poor sitting tolerance and balance, confusion, reduced BLE Strength, pain  These impairments limit the patient by requiring increased assistance for mobility and places her at risk of falling  Pt would benefit from continued skilled therapy while in the hospital to improve overall mobility and work towards PLOF  Recommend return to facility with rehab  At end of session patient was left supine with call bell within reach  Wedges placed on opposite side and patient off loaded  RN notified and present about wound vac dressing due to BM hygiene  The patient's AM-PAC Basic Mobility Inpatient Short Form Raw Score is 7  A Raw score of less than or equal to 16 suggests the patient may benefit from discharge to post-acute rehabilitation services  Please also refer to the recommendation of the Physical Therapist for safe discharge planning  Goals   LTG Expiration Date 07/05/22   Plan   Treatment/Interventions LE strengthening/ROM; Therapeutic exercise; Endurance training;Equipment eval/education; Bed mobility;OT   Progress Slow progress, decreased activity tolerance   PT Frequency 2-3x/wk   Recommendation   PT Discharge Recommendation Return to facility with rehabilitation services   [phone]   Turning in Bed Without Bedrails 1   Lying on Back to Sitting on Edge of Flat Bed 1   Moving Bed to Chair 1   Standing Up From Chair 1   Walk in Room 1   Climb 3-5 Stairs 1   Basic Mobility Inpatient Raw Score 6   Turning Head Towards Sound 4   Follow Simple Instructions 4   Low Function Basic Mobility Raw Score 14   Low Function Basic Mobility Standardized Score 22 01   Highest Level Of Mobility   [facility] Goal 2: Bed activities/Dependent transfer   Warner Suazor, PT, DPT

## 2022-06-28 NOTE — PLAN OF CARE
Problem: PHYSICAL THERAPY ADULT  Goal: Performs mobility at highest level of function for planned discharge setting  See evaluation for individualized goals  Description: Treatment/Interventions: LE strengthening/ROM, Therapeutic exercise, Endurance training, Patient/family training, Equipment eval/education, Bed mobility, OT, Spoke to nursing, Continued evaluation          See flowsheet documentation for full assessment, interventions and recommendations  Note: Prognosis: Fair  Problem List: Decreased strength, Decreased endurance, Impaired balance, Decreased mobility, Decreased cognition, Pain  Assessment: Pt presents to therapy today with reduced mobility, poor endurance, high risk of falling, fear of mobility, poor sitting tolerance and balance, confusion, reduced BLE Strength, pain  These impairments limit the patient by requiring increased assistance for mobility and places her at risk of falling  Pt would benefit from continued skilled therapy while in the hospital to improve overall mobility and work towards PLOF  Recommend return to facility with rehab  At end of session patient was left supine with call bell within reach  Wedges placed on opposite side and patient off loaded  RN notified and present about wound vac dressing due to BM hygiene  The patient's AM-PAC Basic Mobility Inpatient Short Form Raw Score is 7  A Raw score of less than or equal to 16 suggests the patient may benefit from discharge to post-acute rehabilitation services  Please also refer to the recommendation of the Physical Therapist for safe discharge planning  PT Discharge Recommendation: Return to facility with rehabilitation services          See flowsheet documentation for full assessment

## 2022-06-28 NOTE — PROGRESS NOTES
Progress note - Palliative and Supportive Care   [place] 80 y o  female [phone]    Patient Active Problem List   Diagnosis    Sacral wound    Severe protein-calorie malnutrition ([facility] [address] )    Dysphagia, oropharyngeal phase    Scleroderma (HCC)    Acquired hypothyroidism    Anemia    Urinary retention    Acute respiratory failure with hypoxia (HCC)    Pneumonia    Hearing loss    Pulmonary hypertension (HCC)     Active issues specifically addressed today include:   Stage IV sacral decubitus  Severe protein calorie malnutrition  S/p PEG  Acute hypoxic respiratory failure  CREST  Dermatomyositis  Goals of care  Palliative care encounter    Plan:  1  Symptom management -    Per primary team    2  Goals -    Ongoing medical optimization with limit of DNR/DNI   Please reference note on 6/23 regarding family meeting     Code Status: DNR - Level 3   Decisional apparatus:  Patient is competent on my exam today  If competence is lost, patient's substitute decision maker would default to spouse by [place]  Advance Directive / Living Will / POLST:  None on file    3  Social support-   Patient is supported by spouse, daughter, sister, [de-identified], brother, son  4  Follow-up   Medicare will sign off at this time is symptoms are well-controlled angles are well defined  If patient has further medical complications requiring goals of care to be readdressed, please re-consult  Interval history:       No events overnight  Patient is disappointed about possibility of rectal tube, but otherwise is in good spirits and denies complaints  She has recently been turned and cleaned up by her nursing team which usually makes her feel cold, but this improves with extra blankets  Her sister and niece are at bedside and providing support      MEDICATIONS / ALLERGIES:     all current active meds have been reviewed    Allergies   Allergen Reactions   Martin Weems [Selexipag] Anaphylaxis    Sulfa Antibiotics Tachycardia  Penicillins Rash       OBJECTIVE:    Physical Exam  Physical Exam  HENT:      Head: Normocephalic and atraumatic  Mouth/Throat:      Mouth: Mucous membranes are dry  Eyes:      Conjunctiva/sclera: Conjunctivae normal    Cardiovascular:      Rate and Rhythm: Normal rate  Pulmonary:      Effort: Pulmonary effort is normal  No respiratory distress  Abdominal:      Comments: Receiving tube feeds through PEG   Musculoskeletal:      Comments: Diffuse muscle wasting   Skin:     Comments: Sacral wound visualized in media tab   Neurological:      Mental Status: She is alert  Comments: Generalized weakness   Psychiatric:         Mood and Affect: Mood normal          Behavior: Behavior normal          Thought Content: Thought content normal          Judgment: Judgment normal          Lab Results:   Results from last 7 days   Lab Units 06/28/22  0559 06/27/22  0510 06/26/22  0535 06/25/22  0435   WBC Thousand/uL 10 07 10 29* 9 58 10 36*   HEMOGLOBIN g/dL 7 9* 7 9* 7 3* 8 1*   HEMATOCRIT % 26 2* 26 6* 23 4* 26 9*   PLATELETS Thousands/uL 405* 378 302 320   NEUTROS PCT %  --  75 76* 78*   MONOS PCT %  --  10 9 7     Results from last 7 days   Lab Units 06/28/22  0500 06/27/22  0510 06/26/22  0535   POTASSIUM mmol/L 4 0 3 9 3 8   CHLORIDE mmol/L 103 100 101   CO2 mmol/L 34* 32 30   BUN mg/dL 26* 37* 34*   CREATININE mg/dL 0 38* 0 33* 0 30*   CALCIUM mg/dL 8 5 8 6 8 0*   ALK PHOS U/L 83 88 80   ALT U/L 19 19 18   AST U/L 19 17 16       Imaging Studies: reviewed pertinent studies   EKG, Pathology, and Other Studies: reviewed pertinent studies    Counseling / Coordination of Care    Total floor / unit time spent today 25+ minutes  Greater than 50% of total time was spent with the patient and / or family counseling and / or coordination of care  A description of the counseling / coordination of care:  Time spent assessing patient, providing support, communicating with family at bedside, coordinating care with RN

## 2022-06-28 NOTE — PLAN OF CARE
Problem: OCCUPATIONAL THERAPY ADULT  Goal: Performs self-care activities at highest level of function for planned discharge setting  See evaluation for individualized goals  Description: Treatment Interventions: ADL retraining, Functional transfer training, UE strengthening/ROM, Endurance training, Cognitive reorientation, Patient/family training, Equipment evaluation/education, Compensatory technique education, Fine motor coordination activities, Continued evaluation, Energy conservation, Activityengagement          See flowsheet documentation for full assessment, interventions and recommendations  Note: Limitation: Decreased ADL status, Decreased UE ROM, Decreased UE strength, Decreased Safe judgement during ADL, Decreased endurance, Decreased fine motor control, Decreased self-care trans, Decreased high-level ADLs  Prognosis: Fair  Assessment: Patient participated in Skilled OT session 6/28/2022 with interventions consisting of ADL re training with the use of correct body mechnaics, Energy Conservation techniques, deep breathing technique, safety awareness and fall prevention techniques, therapeutic exercise to: increase functional use of BUEs, increase BUE muscle strength ,  therapeutic activities to: increase activity tolerance, increase dynamic sit/ stand balance during functional activity , increase postural control, increase trunk control and increase OOB/ sitting tolerance   Patient agreeable to OT treatment session, upon arrival patient was found supine in bed  In comparison to previous session, patient with improvements in bed mobility, EOB sitting tolerance, and grooming activities  Patient requiring verbal cues for safety, verbal cues for correct technique, verbal cues for pacing thru activity steps and frequent rest periods  Patient continues to be functioning below baseline level, occupational performance remains limited secondary to factors listed above and increased risk for falls and injury  From OT standpoint, recommendation at time of d/c would be return to facility w/ rehab services, when medically stable  Patient to benefit from continued Occupational Therapy treatment while in the hospital to address deficits as defined above and maximize level of functional independence with ADLs and functional mobility       OT Discharge Recommendation: Post acute rehabilitation services  OT - OK to Discharge: Yes (when medically stable)     Lelia Ramirez MS, OTR/L

## 2022-06-28 NOTE — PROGRESS NOTES
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 (stopped about 10 years ago)  6  Heart failure with preserved ejection fraction (EF 70%, G2DD)      Plan:  · At this point she has chronically been on these pulmonary hypertension medications and her SBP generally runs in the 100s to 110s  This is not far from her baseline  · Pulmonary vasodilators per advanced heart failure  · She should remain on steroid taper for dermatomyositis flare  Rheumatology has been consulted  May require additional IVIG  Will touch base with rheumatology  · Continue to titrate supplemental O2 to a SpO2 goal of >88%  · Follow up biopsy results from [facility]  · Continue with airway clearance protocol  May need to add back chest vest therapy as the patient cannot use devices on her own and cannot produce breath needed to do so  SUBJECTIVE     Patient seen and examined  No acute events overnight  Denies respiratory complaints  States she is feeling weaker than days prior  Difficulty moving extremities  Not able to use IS, flutter due to weakness       OBJECTIVE     Vitals:    06/27/22 1847 06/27/22 2259 06/28/22 0300 06/28/22 0700   BP: (!) 94/46 (!) 94/45     BP Location: Left arm Left arm     Pulse: 72 82     Resp: 19 (!) 24     Temp: 97 9 °F (36 6 °C) 97 6 °F (36 4 °C) 99 °F (37 2 °C) 97 8 °F (36 6 °C)   TempSrc: Oral Oral Axillary Oral   SpO2: 97% 95%     Weight:       Height:          Temperature:   Temp (24hrs), Av 1 °F (36 7 °C), Min:97 6 °F (36 4 °C), Max:99 °F (37 2 °C)    Temperature: 97 8 °F (36 6 °C)  Intake & Output:  I/O        0701   0700  0701   0700  0701   0700    P  O   0     Blood 330      NG/GT  1032     Total Intake(mL/kg) 330 (5) 1032 (15 6)     Urine (mL/kg/hr) 2225 (1 4) 1050 (0 7)     Emesis/NG output  0     Stool  0     Total Output 2225 1050     Net -1895 -18            Unmeasured Stool Occurrence  1 x         Weights:        Body mass index is 22 53 kg/m²  Weight (last 2 days)     None        Physical Exam  Vitals and nursing note reviewed  Constitutional:       General: She is not in acute distress  Appearance: Normal appearance  She is well-developed and normal weight  She is ill-appearing  She is not toxic-appearing  Interventions: She is not intubated  HENT:      Head: Normocephalic and atraumatic  Right Ear: External ear normal       Left Ear: External ear normal       Nose: Nose normal       Mouth/Throat:      Mouth: Mucous membranes are moist       Pharynx: Oropharynx is clear  Eyes:      General: No scleral icterus  Conjunctiva/sclera: Conjunctivae normal    Cardiovascular:      Rate and Rhythm: Normal rate and regular rhythm  Pulses: Normal pulses  Heart sounds: Normal heart sounds  No murmur heard  No friction rub  No gallop  Pulmonary:      Effort: Pulmonary effort is normal  No tachypnea, bradypnea, accessory muscle usage or respiratory distress  She is not intubated  Breath sounds: Normal breath sounds and air entry  No decreased air movement  No decreased breath sounds, wheezing, rhonchi or rales  Abdominal:      General: Abdomen is flat  Bowel sounds are normal       Palpations: Abdomen is soft  Tenderness: There is no abdominal tenderness  Musculoskeletal:         General: No swelling or tenderness  Cervical back: Neck supple  No tenderness  Right lower leg: No edema        Left lower leg: No edema    Skin:     General: Skin is warm and dry  Neurological:      General: No focal deficit present  Mental Status: She is alert and oriented to person, place, and time  Mental status is at baseline  LABORATORY DATA     Labs: I have personally reviewed pertinent reports  Results from last 7 days   Lab Units 06/28/22  0559 06/27/22  0510 06/26/22  0535 06/25/22  0435   WBC Thousand/uL 10 07 10 29* 9 58 10 36*   HEMOGLOBIN g/dL 7 9* 7 9* 7 3* 8 1*   HEMATOCRIT % 26 2* 26 6* 23 4* 26 9*   PLATELETS Thousands/uL 405* 378 302 320   NEUTROS PCT %  --  75 76* 78*   MONOS PCT %  --  10 9 7      Results from last 7 days   Lab Units 06/28/22  0500 06/27/22  0510 06/26/22  0535   POTASSIUM mmol/L 4 0 3 9 3 8   CHLORIDE mmol/L 103 100 101   CO2 mmol/L 34* 32 30   BUN mg/dL 26* 37* 34*   CREATININE mg/dL 0 38* 0 33* 0 30*   CALCIUM mg/dL 8 5 8 6 8 0*   ALK PHOS U/L 83 88 80   ALT U/L 19 19 18   AST U/L 19 17 16                  Results from last 7 days   Lab Units 06/22/22  0850   LACTIC ACID mmol/L 1 1             ABG:       IMAGING & DIAGNOSTIC TESTING     Radiology Results: I have personally reviewed pertinent reports  CT abdomen pelvis wo contrast    Result Date: 6/14/2022  Impression: Soft tissue air and soft tissue defect identified posterior to the sacrum suggesting infection  I cannot exclude superimposed posterior sacral osteomyelitis although no bony erosion identified  Soft tissue air identified around the posterior aspect of the rectum in addition to the left side of the rectum/perineum, correlate for necrotizing fasciitis soft tissue infection  Surgical consult Bilateral lower lobe pneumonia  Left upper pole renal 1 cm likely hemorrhagic cyst, correlate with nonemergent outpatient renal ultrasound    I personally discussed this study with Mary Ann Phillips on 6/14/2022 at 5:54 PM  Workstation performed: [id number]     XR chest portable    Result Date: 6/19/2022  Impression: Persistent, slightly worsened, bilateral pulmonary opacities in keeping with multilobar pneumonia  Workstation performed: [id number]     XR chest portable    Result Date: 6/16/2022  Impression: Stable vascular congestion  Workstation performed: [id number]     XR chest 1 view portable    Result Date: 6/15/2022  Impression: Mild vascular congestion  Right-sided PICC line terminates at the right axilla Workstation performed: [id number]     CT head without contrast    Result Date: 6/14/2022  Impression: No acute intracranial hemorrhage  If symptoms persistent or worsening focal neurologic deficit, proceed with noncontrast brain MRI and/or CTA  Workstation performed: [id number]     Other Diagnostic Testing: I have personally reviewed pertinent reports      ACTIVE MEDICATIONS     Current Facility-Administered Medications   Medication Dose Route Frequency    acetaminophen (TYLENOL) oral suspension 650 mg  650 mg Per G Tube Q6H    acetaminophen (TYLENOL) oral suspension 650 mg  650 mg Per G Tube Q4H PRN    artificial tear (LUBRIFRESH P M ) ophthalmic ointment   Both Eyes HS    aspirin chewable tablet 81 mg  81 mg Per G Tube Daily    atovaquone (MEPRON) oral suspension 750 mg  750 mg Per G Tube Daily With Breakfast    cholecalciferol (VITAMIN D3) tablet 1,000 Units  1,000 Units Per G Tube Daily    enoxaparin (LOVENOX) subcutaneous injection 40 mg  40 mg Subcutaneous Q24H [facility]    glycerin-hypromellose- (ARTIFICIAL TEARS) ophthalmic solution 2 drop  2 drop Both Eyes BID    levalbuterol (XOPENEX) inhalation solution 0 63 mg  0 63 mg Nebulization TID    levothyroxine tablet 25 mcg  25 mcg Per PEG Tube Early Morning    loratadine (CLARITIN) tablet 10 mg  10 mg Per G Tube Daily    Macitentan (OPSUMIT) tablet 10 mg  10 mg Per G Tube Daily    melatonin tablet 3 mg  3 mg Oral HS PRN    ondansetron (ZOFRAN) injection 4 mg  4 mg Intravenous Q6H PRN    oxyCODONE (ROXICODONE) oral solution 2 5 mg  2 5 mg Per G Tube Q4H PRN    oxyCODONE (ROXICODONE) oral solution 5 mg  5 mg Per G Tube Q4H PRN    pantoprazole (PROTONIX) injection 40 mg  40 mg Intravenous Q24H BELLA    predniSONE tablet 30 mg  30 mg Per G Tube Daily    Riociguat TABS 1 25 mg  1 25 mg Per G Tube TID    saccharomyces boulardii (FLORASTOR) capsule 250 mg  250 mg Per G Tube BID    sodium chloride 3 % inhalation solution 4 mL  4 mL Nebulization TID       VTE Pharmacologic Prophylaxis: Enoxaparin (Lovenox)  VTE Mechanical Prophylaxis: sequential compression device      Disclaimer: Portions of the record may have been created with voice recognition software  Occasional wrong word or "sound a like" substitutions may have occurred due to the inherent limitations of voice recognition software  Careful consideration should be taken to recognize, using context, where substitutions have occurred      Shilpa Mcgee DO   Pulmonary and Critical Care Fellow, PGY-IV  [facility]

## 2022-06-28 NOTE — QUICK NOTE
Vac soiled with stool  Replaced with WTD  Will continue with [facility] therapy MWF tomorrow

## 2022-06-29 ENCOUNTER — APPOINTMENT (INPATIENT)
Dept: RADIOLOGY | Facility: HOSPITAL | Age: 82
DRG: 853 | End: 2022-06-29
Payer: COMMERCIAL

## 2022-06-29 LAB
ALBUMIN SERPL BCP-MCNC: 2.3 G/DL (ref 3.5–5)
ALP SERPL-CCNC: 84 U/L (ref 46–116)
ALT SERPL W P-5'-P-CCNC: 16 U/L (ref 12–78)
ANION GAP SERPL CALCULATED.3IONS-SCNC: 2 MMOL/L (ref 4–13)
AST SERPL W P-5'-P-CCNC: 15 U/L (ref 5–45)
BILIRUB SERPL-MCNC: 0.28 MG/DL (ref 0.2–1)
BUN SERPL-MCNC: 19 MG/DL (ref 5–25)
CA-I BLD-SCNC: 1.11 MMOL/L (ref 1.12–1.32)
CALCIUM ALBUM COR SERPL-MCNC: 9.8 MG/DL (ref 8.3–10.1)
CALCIUM SERPL-MCNC: 8.4 MG/DL (ref 8.3–10.1)
CHLORIDE SERPL-SCNC: 104 MMOL/L (ref 100–108)
CO2 SERPL-SCNC: 35 MMOL/L (ref 21–32)
CREAT SERPL-MCNC: 0.36 MG/DL (ref 0.6–1.3)
ERYTHROCYTE [DISTWIDTH] IN BLOOD BY AUTOMATED COUNT: 20 % (ref 11.6–15.1)
GFR SERPL CREATININE-BSD FRML MDRD: 102 ML/MIN/1.73SQ M
GLUCOSE SERPL-MCNC: 120 MG/DL (ref 65–140)
HCT VFR BLD AUTO: 25.7 % (ref 34.8–46.1)
HGB BLD-MCNC: 7.6 G/DL (ref 11.5–15.4)
LACTATE SERPL-SCNC: 1.4 MMOL/L (ref 0.5–2)
MCH RBC QN AUTO: 29.5 PG (ref 26.8–34.3)
MCHC RBC AUTO-ENTMCNC: 29.6 G/DL (ref 31.4–37.4)
MCV RBC AUTO: 100 FL (ref 82–98)
PLATELET # BLD AUTO: 384 THOUSANDS/UL (ref 149–390)
PMV BLD AUTO: 9.3 FL (ref 8.9–12.7)
POTASSIUM SERPL-SCNC: 4.1 MMOL/L (ref 3.5–5.3)
PROCALCITONIN SERPL-MCNC: 0.11 NG/ML
PROT SERPL-MCNC: 6.4 G/DL (ref 6.4–8.2)
RBC # BLD AUTO: 2.58 MILLION/UL (ref 3.81–5.12)
SODIUM SERPL-SCNC: 141 MMOL/L (ref 136–145)
WBC # BLD AUTO: 9.64 THOUSAND/UL (ref 4.31–10.16)

## 2022-06-29 PROCEDURE — C9113 INJ PANTOPRAZOLE SODIUM, VIA: HCPCS | Performed by: INTERNAL MEDICINE

## 2022-06-29 PROCEDURE — 83605 ASSAY OF LACTIC ACID: CPT | Performed by: PHYSICIAN ASSISTANT

## 2022-06-29 PROCEDURE — 82330 ASSAY OF CALCIUM: CPT | Performed by: STUDENT IN AN ORGANIZED HEALTH CARE EDUCATION/TRAINING PROGRAM

## 2022-06-29 PROCEDURE — 97606 NEG PRS WND THER DME>50 SQCM: CPT | Performed by: PHYSICIAN ASSISTANT

## 2022-06-29 PROCEDURE — 71045 X-RAY EXAM CHEST 1 VIEW: CPT

## 2022-06-29 PROCEDURE — 94640 AIRWAY INHALATION TREATMENT: CPT

## 2022-06-29 PROCEDURE — 99233 SBSQ HOSP IP/OBS HIGH 50: CPT | Performed by: INTERNAL MEDICINE

## 2022-06-29 PROCEDURE — 80053 COMPREHEN METABOLIC PANEL: CPT | Performed by: PHYSICIAN ASSISTANT

## 2022-06-29 PROCEDURE — 94760 N-INVAS EAR/PLS OXIMETRY 1: CPT

## 2022-06-29 PROCEDURE — 99232 SBSQ HOSP IP/OBS MODERATE 35: CPT | Performed by: INTERNAL MEDICINE

## 2022-06-29 PROCEDURE — 84145 PROCALCITONIN (PCT): CPT | Performed by: PHYSICIAN ASSISTANT

## 2022-06-29 PROCEDURE — 85027 COMPLETE CBC AUTOMATED: CPT | Performed by: PHYSICIAN ASSISTANT

## 2022-06-29 RX ORDER — ALBUMIN (HUMAN) 12.5 G/50ML
12.5 SOLUTION INTRAVENOUS ONCE
Status: COMPLETED | OUTPATIENT
Start: 2022-06-29 | End: 2022-06-30

## 2022-06-29 RX ADMIN — GLYCERIN, HYPROMELLOSE, POLYETHYLENE GLYCOL 2 DROP: .2; .2; 1 LIQUID OPHTHALMIC at 17:56

## 2022-06-29 RX ADMIN — LEVOTHYROXINE SODIUM 25 MCG: 25 TABLET ORAL at 05:42

## 2022-06-29 RX ADMIN — PREDNISONE 30 MG: 20 TABLET ORAL at 09:07

## 2022-06-29 RX ADMIN — RIOCIGUAT 1.25 MG: 2.5 TABLET, FILM COATED ORAL at 09:09

## 2022-06-29 RX ADMIN — Medication 250 MG: at 09:09

## 2022-06-29 RX ADMIN — ACETAMINOPHEN 650 MG: 650 SUSPENSION ORAL at 13:10

## 2022-06-29 RX ADMIN — ACETAMINOPHEN 650 MG: 650 SUSPENSION ORAL at 05:42

## 2022-06-29 RX ADMIN — ISODIUM CHLORIDE 3 ML: 0.03 SOLUTION RESPIRATORY (INHALATION) at 19:00

## 2022-06-29 RX ADMIN — ALBUMIN (HUMAN) 12.5 G: 0.25 INJECTION, SOLUTION INTRAVENOUS at 21:10

## 2022-06-29 RX ADMIN — ACETAMINOPHEN 650 MG: 650 SUSPENSION ORAL at 23:05

## 2022-06-29 RX ADMIN — ACETAMINOPHEN 650 MG: 650 SUSPENSION ORAL at 17:54

## 2022-06-29 RX ADMIN — ISODIUM CHLORIDE 3 ML: 0.03 SOLUTION RESPIRATORY (INHALATION) at 07:36

## 2022-06-29 RX ADMIN — RIOCIGUAT 1.25 MG: 2.5 TABLET, FILM COATED ORAL at 17:56

## 2022-06-29 RX ADMIN — Medication 1000 UNITS: at 09:07

## 2022-06-29 RX ADMIN — MELATONIN 3 MG: at 23:06

## 2022-06-29 RX ADMIN — Medication 250 MG: at 17:56

## 2022-06-29 RX ADMIN — GLYCERIN, HYPROMELLOSE, POLYETHYLENE GLYCOL 2 DROP: .2; .2; 1 LIQUID OPHTHALMIC at 09:07

## 2022-06-29 RX ADMIN — LEVALBUTEROL HYDROCHLORIDE 1.25 MG: 1.25 SOLUTION, CONCENTRATE RESPIRATORY (INHALATION) at 19:00

## 2022-06-29 RX ADMIN — MACITENTAN 10 MG: 10 TABLET, FILM COATED ORAL at 09:08

## 2022-06-29 RX ADMIN — LORATADINE 10 MG: 10 TABLET ORAL at 09:07

## 2022-06-29 RX ADMIN — ACETAMINOPHEN 650 MG: 650 SUSPENSION ORAL at 01:28

## 2022-06-29 RX ADMIN — LEVALBUTEROL HYDROCHLORIDE 1.25 MG: 1.25 SOLUTION, CONCENTRATE RESPIRATORY (INHALATION) at 07:36

## 2022-06-29 RX ADMIN — ATOVAQUONE 750 MG: 750 SUSPENSION ORAL at 09:08

## 2022-06-29 RX ADMIN — OXYCODONE HYDROCHLORIDE 2.5 MG: 5 SOLUTION ORAL at 23:06

## 2022-06-29 RX ADMIN — PANTOPRAZOLE SODIUM 40 MG: 40 INJECTION, POWDER, FOR SOLUTION INTRAVENOUS at 09:07

## 2022-06-29 RX ADMIN — ASPIRIN 81 MG CHEWABLE TABLET 81 MG: 81 TABLET CHEWABLE at 09:07

## 2022-06-29 RX ADMIN — ISODIUM CHLORIDE 3 ML: 0.03 SOLUTION RESPIRATORY (INHALATION) at 13:59

## 2022-06-29 RX ADMIN — MELATONIN 3 MG: at 01:29

## 2022-06-29 RX ADMIN — ENOXAPARIN SODIUM 40 MG: 40 INJECTION SUBCUTANEOUS at 09:07

## 2022-06-29 RX ADMIN — LEVALBUTEROL HYDROCHLORIDE 1.25 MG: 1.25 SOLUTION, CONCENTRATE RESPIRATORY (INHALATION) at 13:59

## 2022-06-29 NOTE — UTILIZATION REVIEW
Continued Stay Review    Date:  6-29-22                           Current Patient Class: inpatient  Current Level of Care: med surg    HPI:81 y o  female initially admitted on  6-14-22   Acute respiratory failure with hypoxia  Sacral wound    Assessment/Plan: Iv albumin given on 6-28  Map 56 to 62  Hypotensive 81/45  Temp 99 6  Sacral Vac applied  Wound dimensions 11cm x 8 cm x 3 cm  Rheumatology consulted for possible IVIG  Continue Riocigunat tid  Continue nebulization tid  Continue iv protonix       Vital Signs:     Date/  Time Temp Pulse Resp BP MAP (mmHg) SpO2 Nasal Cannula O2 Flow Rate (L/min)   06/29/22 1359 -- -- -- -- -- 95 % --   06/29/22 1300 -- 74 18 95/51 62   Abnormal  95 % --   06/29/22 1100 -- -- -- 81/45   Abnormal  56   Abnormal  97 % --   06/29/22 0736 -- -- -- -- -- 100 % --   06/29/22 0700 99 6 °F (37 6 °C) 76 21 -- -- 98 % 2 L/min   06/29/22 0600 -- 72 18 97/44   Abnormal  65 95 % --             Pertinent Labs/Diagnostic Results:                 Results from last 7 days   Lab Units 06/28/22  0559 06/27/22  0510 06/26/22  0535 06/25/22  0435 06/24/22  0511   WBC Thousand/uL 10 07 10 29* 9 58 10 36* 10 70*   HEMOGLOBIN g/dL 7 9* 7 9* 7 3* 8 1* 8 2*   HEMATOCRIT % 26 2* 26 6* 23 4* 26 9* 25 6*   PLATELETS Thousands/uL 405* 378 302 320 291   NEUTROS ABS Thousands/µL 7 78* 7 63* 7 31 7 95* 8 26*         Results from last 7 days   Lab Units 06/28/22  0500 06/27/22  0510 06/26/22  0535 06/25/22  0435 06/24/22  0511   SODIUM mmol/L 140 138 137 135* 134*   POTASSIUM mmol/L 4 0 3 9 3 8 4 2 4 0   CHLORIDE mmol/L 103 100 101 100 99*   CO2 mmol/L 34* 32 30 31 30   ANION GAP mmol/L 3* 6 6 4 5   BUN mg/dL 26* 37* 34* 33* 19   CREATININE mg/dL 0 38* 0 33* 0 30* 0 31* 0 27*   EGFR ml/min/1 73sq m 101 105 109 108 113   CALCIUM mg/dL 8 5 8 6 8 0* 8 4 8 5     Results from last 7 days   Lab Units 06/28/22  0500 06/27/22  0510 06/26/22  0535 06/25/22  0435 06/24/22  0511   AST U/L 19 17 16 20 19   ALT U/L 19 19 18 22 21   ALK PHOS U/L 83 88 80 90 88   TOTAL PROTEIN g/dL 6 3* 6 2* 5 7* 6 2* 6 2*   ALBUMIN g/dL 2 1* 2 0* 1 8* 2 0* 2 0*   TOTAL BILIRUBIN mg/dL 0 17* 0 24 0 70 0 31 0 21     Results from last 7 days   Lab Units 06/27/22  1132 06/27/22  0528   POC GLUCOSE mg/dl 125 115     Results from last 7 days   Lab Units 06/28/22  0500 06/27/22  0510 06/26/22  0535 06/25/22  0435 06/24/22  0511 06/23/22  0838   GLUCOSE RANDOM mg/dL 134 109 140 134 134 116       Scheduled Medications:  acetaminophen, 650 mg, Per G Tube, Q6H  artificial tear, , Both Eyes, HS  aspirin, 81 mg, Per G Tube, Daily  atovaquone, 750 mg, Per G Tube, Daily With Breakfast  cholecalciferol, 1,000 Units, Per G Tube, Daily  enoxaparin, 40 mg, Subcutaneous, Q24H [address]  glycerin-hypromellose-, 2 drop, Both Eyes, BID  levalbuterol, 1 25 mg, Nebulization, TID  levothyroxine, 25 mcg, Per PEG Tube, Early Morning  loratadine, 10 mg, Per G Tube, Daily  Macitentan, 10 mg, Per G Tube, Daily  pantoprazole, 40 mg, Intravenous, Q24H BELLA  predniSONE, 30 mg, Per G Tube, Daily  Riociguat, 1 25 mg, Per G Tube, TID  saccharomyces boulardii, 250 mg, Per G Tube, BID  sodium chloride, 3 mL, Nebulization, TID      Continuous IV Infusions:     PRN Meds:  acetaminophen, 650 mg, Per G Tube, Q4H PRN  melatonin, 3 mg, Oral, HS PRN  ondansetron, 4 mg, Intravenous, Q6H PRN  oxyCODONE, 2 5 mg, Per G Tube, Q4H PRN  oxyCODONE, 5 mg, Per G Tube, Q4H PRN        Discharge Plan: to be determined     Network Utilization Review Department  ATTENTION: Please call with any questions or concerns to [phone] and carefully listen to the prompts so that you are directed to the right person  All voicemails are confidential   Jaye Batista all requests for admission clinical reviews, approved or denied determinations and any other requests to dedicated fax number below belonging to the campus where the patient is receiving treatment   List of dedicated fax numbers for the Facilities:  FACILITY NAME UR FAX NUMBER   ADMISSION DENIALS (Administrative/Medical Necessity) [phone]   [address] (Maternity/NICU/Pediatrics) [address] [facility],[address] Floor [facility] [address] [place] [address]  [phone]   Theo Evans [address] [place] [address] [id number] [place] [address] [address] [place] [address] [phone]

## 2022-06-29 NOTE — PROGRESS NOTES
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 (stopped about 10 years ago)  6  Heart failure with preserved ejection fraction (EF 70%, G2DD)      Plan:  · At this point she has chronically been on these pulmonary hypertension medications and her SBP generally runs in the 100s to 110s  This is not far from her baseline  · Pulmonary vasodilators per advanced heart failure  · She should remain on steroid taper for dermatomyositis flare  Rheumatology has been consulted  May require additional IVIG  Messaged rheumatology awaiting response  Could wait until biopsy results have returned from [facility]  Further rheumatologic work up per rheumatology  · Continue to titrate supplemental O2 to a SpO2 goal of >88%  · Follow up biopsy results from [facility]  · Continue with airway clearance protocol  Flutter valve/IS      SUBJECTIVE     Patient seen and examined  Pain overnight  No shortness of breath  OBJECTIVE     Vitals:    22 2245 22 0600 22 0700 22 0736   BP: (!) 97/45 (!) 97/44     Pulse: 78 72 76    Resp:     Temp:   99 6 °F (37 6 °C)    TempSrc:   Rectal    SpO2: 98% 95% 98% 100%   Weight:       Height:          Temperature:   Temp (24hrs), Av 9 °F (37 2 °C), Min:98 1 °F (36 7 °C), Max:99 6 °F (37 6 °C)    Temperature: 99 6 °F (37 6 °C)  Intake & Output:  I/O        0701   0700  0701   0700  0701   07    P  O   0     Blood 330 NG/GT  1032     Total Intake(mL/kg) 330 (5) 1032 (15 6)     Urine (mL/kg/hr) 2225 (1 4) 1050 (0 7)     Emesis/NG output  0     Stool  0     Total Output 2225 1050     Net -1895 -18            Unmeasured Stool Occurrence  1 x         Weights:        Body mass index is 22 53 kg/m²  Weight (last 2 days)     None        Physical Exam  Vitals and nursing note reviewed  Constitutional:       General: She is not in acute distress  Appearance: Normal appearance  She is normal weight  She is ill-appearing  She is not toxic-appearing  Interventions: She is not intubated  HENT:      Head: Normocephalic and atraumatic  Right Ear: External ear normal       Left Ear: External ear normal       Nose: Nose normal       Mouth/Throat:      Mouth: Mucous membranes are moist       Pharynx: Oropharynx is clear  Eyes:      General: No scleral icterus  Conjunctiva/sclera: Conjunctivae normal    Cardiovascular:      Rate and Rhythm: Normal rate and regular rhythm  Pulses: Normal pulses  Heart sounds: Normal heart sounds  No murmur heard  No friction rub  No gallop  Pulmonary:      Effort: Pulmonary effort is normal  No tachypnea, bradypnea, accessory muscle usage or respiratory distress  She is not intubated  Breath sounds: Normal breath sounds and air entry  No decreased air movement  No decreased breath sounds, wheezing, rhonchi or rales  Abdominal:      General: Abdomen is flat  Bowel sounds are normal       Palpations: Abdomen is soft  Musculoskeletal:         General: No swelling or tenderness  Cervical back: Neck supple  No tenderness  Skin:     General: Skin is warm and dry  Neurological:      Mental Status: She is alert and oriented to person, place, and time  Mental status is at baseline  Motor: Weakness present  LABORATORY DATA     Labs: I have personally reviewed pertinent reports    Results from last 7 days   Lab Units 06/28/22  0559 06/27/22  0510 06/26/22  5302 WBC Thousand/uL 10 07 10 29* 9 58   HEMOGLOBIN g/dL 7 9* 7 9* 7 3*   HEMATOCRIT % 26 2* 26 6* 23 4*   PLATELETS Thousands/uL 405* 378 302   NEUTROS PCT % 77* 75 76*   MONOS PCT % 8 10 9      Results from last 7 days   Lab Units 06/28/22  0500 06/27/22  0510 06/26/22  0535   POTASSIUM mmol/L 4 0 3 9 3 8   CHLORIDE mmol/L 103 100 101   CO2 mmol/L 34* 32 30   BUN mg/dL 26* 37* 34*   CREATININE mg/dL 0 38* 0 33* 0 30*   CALCIUM mg/dL 8 5 8 6 8 0*   ALK PHOS U/L 83 88 80   ALT U/L 19 19 18   AST U/L 19 17 16                  Results from last 7 days   Lab Units 06/22/22  0850   LACTIC ACID mmol/L 1 1             ABG:       IMAGING & DIAGNOSTIC TESTING     Radiology Results: I have personally reviewed pertinent reports  CT abdomen pelvis wo contrast    Result Date: 6/14/2022  Impression: Soft tissue air and soft tissue defect identified posterior to the sacrum suggesting infection  I cannot exclude superimposed posterior sacral osteomyelitis although no bony erosion identified  Soft tissue air identified around the posterior aspect of the rectum in addition to the left side of the rectum/perineum, correlate for necrotizing fasciitis soft tissue infection  Surgical consult Bilateral lower lobe pneumonia  Left upper pole renal 1 cm likely hemorrhagic cyst, correlate with nonemergent outpatient renal ultrasound  I personally discussed this study with Mary Ann Phillips on 6/14/2022 at 5:54 PM  Workstation performed: [id number]     XR chest portable    Result Date: 6/19/2022  Impression: Persistent, slightly worsened, bilateral pulmonary opacities in keeping with multilobar pneumonia  Workstation performed: [id number]     XR chest portable    Result Date: 6/16/2022  Impression: Stable vascular congestion  Workstation performed: [id number]     XR chest 1 view portable    Result Date: 6/15/2022  Impression: Mild vascular congestion   Right-sided PICC line terminates at the right axilla Workstation performed: [id number] CT head without contrast    Result Date: 6/14/2022  Impression: No acute intracranial hemorrhage  If symptoms persistent or worsening focal neurologic deficit, proceed with noncontrast brain MRI and/or CTA  Workstation performed: [id number]     Other Diagnostic Testing: I have personally reviewed pertinent reports      ACTIVE MEDICATIONS     Current Facility-Administered Medications   Medication Dose Route Frequency    acetaminophen (TYLENOL) oral suspension 650 mg  650 mg Per G Tube Q6H    acetaminophen (TYLENOL) oral suspension 650 mg  650 mg Per G Tube Q4H PRN    artificial tear (LUBRIFRESH P M ) ophthalmic ointment   Both Eyes HS    aspirin chewable tablet 81 mg  81 mg Per G Tube Daily    atovaquone (MEPRON) oral suspension 750 mg  750 mg Per G Tube Daily With Breakfast    cholecalciferol (VITAMIN D3) tablet 1,000 Units  1,000 Units Per G Tube Daily    enoxaparin (LOVENOX) subcutaneous injection 40 mg  40 mg Subcutaneous Q24H [address]    glycerin-hypromellose- (ARTIFICIAL TEARS) ophthalmic solution 2 drop  2 drop Both Eyes BID    levalbuterol (XOPENEX) inhalation solution 1 25 mg  1 25 mg Nebulization TID    levothyroxine tablet 25 mcg  25 mcg Per PEG Tube Early Morning    loratadine (CLARITIN) tablet 10 mg  10 mg Per G Tube Daily    Macitentan (OPSUMIT) tablet 10 mg  10 mg Per G Tube Daily    melatonin tablet 3 mg  3 mg Oral HS PRN    ondansetron (ZOFRAN) injection 4 mg  4 mg Intravenous Q6H PRN    oxyCODONE (ROXICODONE) oral solution 2 5 mg  2 5 mg Per G Tube Q4H PRN    oxyCODONE (ROXICODONE) oral solution 5 mg  5 mg Per G Tube Q4H PRN    pantoprazole (PROTONIX) injection 40 mg  40 mg Intravenous Q24H BELLA    predniSONE tablet 30 mg  30 mg Per G Tube Daily    Riociguat TABS 1 25 mg  1 25 mg Per G Tube TID    saccharomyces boulardii (FLORASTOR) capsule 250 mg  250 mg Per G Tube BID    sodium chloride 0 9 % inhalation solution 3 mL  3 mL Nebulization TID       VTE Pharmacologic Prophylaxis: Enoxaparin (Lovenox)  VTE Mechanical Prophylaxis: sequential compression device      Disclaimer: Portions of the record may have been created with voice recognition software  Occasional wrong word or "sound a like" substitutions may have occurred due to the inherent limitations of voice recognition software  Careful consideration should be taken to recognize, using context, where substitutions have occurred      Marvene Litten, DO   Pulmonary and Critical Care Fellow, PGY-IV  [facility]

## 2022-06-29 NOTE — ASSESSMENT & PLAN NOTE
Stage IV sacral ulcer  Status post I&D with certain surgery  Wound care per General surgery  Received IV antibiotics, presently being monitored off of antibiotics, Infectious Disease input noted  Frequent repositioning  Optimize nutritional status  General surgery following

## 2022-06-29 NOTE — ASSESSMENT & PLAN NOTE
Crest syndrome with pulmonary hypertension  Cardiology plans for initiating sildenafil noted  Continue supplemental oxygen  Cardiology following

## 2022-06-29 NOTE — ASSESSMENT & PLAN NOTE
Patient with history of dysphagia  PEG tube in place  Continue current tube feeds  Aspiration precautions

## 2022-06-29 NOTE — ASSESSMENT & PLAN NOTE
Scleroderma with crest syndrome  Rheumatology input noted  Communicated with Rheumatology via [place] connect  Awaiting Rheumatology inputs regarding inpatient IVIG treatments  Continue prednisone

## 2022-06-29 NOTE — ASSESSMENT & PLAN NOTE
With hypotension   Outpatient cardiology discussed with  regarding adjusting medications here since pressures are low  Seen by Pulmonology and they are not recommending any changes at this time  HF team consulted; continue Riociguat 1 5 mg TID      "Discussed with them and for now will decrease riociguat to 1 5 mg TID   Of note discussed with HF today and upon DC will need Dr Marcella Newton - the patients outpatient cardiologist- to prescribe this change  Number for Dr Marcella Newton can be obtained from family and Dr Marcella Newton is also okay discussing with SLIM if needed     For now will monitor response to change in medication and adjust  May be room to decrease further to 1 mg TID "

## 2022-06-29 NOTE — ASSESSMENT & PLAN NOTE
Patient noted to have low hemoglobin on admission-6 9  Has received 1 unit PRBCs on 06/15, but repeat CBC was 6 5  Iron panel shows low iron and TIBC consistent with anemia of chronic disease; reticulocytes elevated  B12 and folate within normal limits; will consider iron supplementation once infection resolved  In the meantime, hemoglobin stable while on Opsumit

## 2022-06-29 NOTE — PROGRESS NOTES
[address] [facility]  Progress Note Miko Shore 1940, 80 y o  female MRN: [id number]  Unit/Bed#: [facility] 204-01 Encounter: [id number]  Primary Care Provider: No primary care provider on file  Date and time admitted to hospital: 6/14/2022  8:07 PM    Pulmonary hypertension ([address] )  Assessment & Plan  With hypotension   Outpatient cardiology discussed with  regarding adjusting medications here since pressures are low  Seen by Pulmonology and they are not recommending any changes at this time  HF team consulted; continue Riociguat 1 5 mg TID      "Discussed with them and for now will decrease riociguat to 1 5 mg TID   Of note discussed with HF today and upon DC will need Dr Saloni Barros - the patients outpatient cardiologist- to prescribe this change  Number for Dr Saloni Barros can be obtained from family and Dr Saloni Barros is also okay discussing with SLIM if needed     For now will monitor response to change in medication and adjust  May be room to decrease further to 1 mg TID "      Hearing loss  Assessment & Plan  Patient reports 3 months of progressively worsening hearing  As per previous admission, concerning for atovaquone versus Lasix  Discussed with ENT, have declines seeing patient in hospital; consider outpatient referral  Trial different antibiotic prophylaxis    Pneumonia  Assessment & Plan  See accompanying plan under acute respiratory failure  CXR shows bilateral infiltrations on 6/19  CXR from yesterday shows improvement in infiltrates   No indication for ABX at this time     Acute respiratory failure with hypoxia [facility])  Assessment & Plan  Stable  Patient remains on 2 L  Pulmonology consulted; appreciate assistance and continue recs          Urinary retention  Assessment & Plan  Patient with history of urinary retention and spasm; previously on myrbetriq and Gemtesa  Continue Tejada catheter at this time and outpatient management by Urology    Anemia  Assessment & Plan  Stable  "Patient noted to have low hemoglobin on admission-6 9  Has received 1 unit PRBCs on 06/15, but repeat CBC was 6 5  Iron panel shows low iron and TIBC consistent with anemia of chronic disease; reticulocytes elevated  B12 and folate within normal limits; will consider iron supplementation once infection resolved  In the meantime, hemoglobin stable while on Opsumit" per prior provider      Acquired hypothyroidism  Assessment & Plan  TSH within reference range  Continue home Synthroid    Scleroderma [facility])  Assessment & Plan  Rheumatology consulted; appreciate assistance  Pulmonology consulted; pending discussions with rheumatology about IVIG    Dysphagia, oropharyngeal phase  Assessment & Plan  Patient with history of dysphagia  PEG tube in place  Continue current tube feeds; appreciate nutritional recommendations    Severe protein-calorie malnutrition ([address] )  Assessment & Plan  Malnutrition Findings:   Adult Malnutrition type: Chronic illness  Adult Degree of Malnutrition: Other severe protein calorie malnutrition  Malnutrition Characteristics: Muscle loss, Fat loss, Weight loss, Inadequate energy           360 Statement: related to disease/condition evidenced by increased kcal/pro needs for wound healing Stage IV sacral decub, BMI 18 4 adjusted per prior facility wt 6/7/22;severe buccal fat pads loss, severe deltoid muscle loss, Patient lost 31# (20 6%) since 3/15/22 past 3 months with illness <75% energy intake versus needs for > 1 month  Treating with EN support and Robert TID via PEG for wound healing    BMI Findings:  Adult BMI Classifications: Underweight < 18 5        Body mass index is 22 53 kg/m²         * Sacral wound  Assessment & Plan  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  Underwent surgical debridement on 06/14/2022 with general surgery; transferred to medicine service for chronic illness  Initially placed on vanc and cefepime for antibiotic therapy; however ID evaluated and stopped antibiotics  Repositioning; wound care consult  Supportive care, pain medications  Nutritional support  VAC dressing changed by surgery this morning with pending dressing change on   Pending medical clearance from General Surgery          VTE Pharmacologic Prophylaxis: VTE Score: 3 Moderate Risk (Score 3-4) - Pharmacological DVT Prophylaxis Ordered: enoxaparin (Lovenox)  Patient Centered Rounds: I performed bedside rounds with nursing staff today  Discussions with Specialists or Other Care Team Provider: Nurse and      Education and Discussions with Family / Patient: Updated  (sister) at bedside  Time Spent for Care: 30 minutes  More than 50% of total time spent on counseling and coordination of care as described above  Current Length of Stay: 15 day(s)  Current Patient Status: Inpatient   Certification Statement: The patient will continue to require additional inpatient hospital stay due to Pending pulmonology and cardiology clearance  Discharge Plan: Anticipate discharge in 48-72 hrs to discharge location to be determined pending rehab evaluations  Code Status: Level 3 - DNAR and DNI    Subjective:   Patient had her wound Vac changed earlier this morning without difficulty and admits to stinging with mild pain  She denies fevers or chills  Objective:     Vitals:   Temp (24hrs), Av 3 °F (37 4 °C), Min:99 °F (37 2 °C), Max:99 6 °F (37 6 °C)    Temp:  [99 °F (37 2 °C)-99 6 °F (37 6 °C)] 99 6 °F (37 6 °C)  HR:  [72-91] 91  Resp:  [17-22] 18  BP: (81-97)/(42-51) 94/42  SpO2:  [95 %-100 %] 95 %  Body mass index is 22 53 kg/m²  Input and Output Summary (last 24 hours): Intake/Output Summary (Last 24 hours) at 2022 1857  Last data filed at 2022 1100  Gross per 24 hour   Intake 1345 ml   Output 850 ml   Net 495 ml       Physical Exam:   Physical Exam  Vitals and nursing note reviewed     Constitutional:       Appearance: She is normal weight  She is not diaphoretic  Comments: Frail and chronically ill in appearance   HENT:      Head: Normocephalic and atraumatic  Right Ear: External ear normal       Left Ear: External ear normal       Nose: Nose normal       Comments: NC in place     Mouth/Throat:      Mouth: Mucous membranes are dry  Eyes:      General: No scleral icterus  Right eye: No discharge  Left eye: No discharge  Cardiovascular:      Rate and Rhythm: Normal rate and regular rhythm  Heart sounds: No friction rub  No gallop  Pulmonary:      Effort: Pulmonary effort is normal       Breath sounds: No stridor  No rhonchi  Abdominal:      General: Bowel sounds are normal       Palpations: Abdomen is soft  Tenderness: There is no guarding or rebound  Comments: Feeding tube present   Genitourinary:     Comments: Tejada draining clear yellow urine  Musculoskeletal:         General: No tenderness  Cervical back: Normal range of motion  Right lower leg: No edema  Left lower leg: No edema  Skin:     General: Skin is warm  Coloration: Skin is not jaundiced  Findings: No bruising or lesion  Neurological:      General: No focal deficit present  Mental Status: She is alert and oriented to person, place, and time  Mental status is at baseline  Psychiatric:         Mood and Affect: Mood normal          Behavior: Behavior normal          Thought Content:  Thought content normal           Additional Data:     Labs:  Results from last 7 days   Lab Units 06/28/22  0559   WBC Thousand/uL 10 07   HEMOGLOBIN g/dL 7 9*   HEMATOCRIT % 26 2*   PLATELETS Thousands/uL 405*   NEUTROS PCT % 77*   LYMPHS PCT % 12*   MONOS PCT % 8   EOS PCT % 2     Results from last 7 days   Lab Units 06/28/22  0500   SODIUM mmol/L 140   POTASSIUM mmol/L 4 0   CHLORIDE mmol/L 103   CO2 mmol/L 34*   BUN mg/dL 26*   CREATININE mg/dL 0 38*   ANION GAP mmol/L 3*   CALCIUM mg/dL 8 5   ALBUMIN g/dL 2 1* TOTAL BILIRUBIN mg/dL 0 17*   ALK PHOS U/L 83   ALT U/L 19   AST U/L 19   GLUCOSE RANDOM mg/dL 134         Results from last 7 days   Lab Units 06/27/22  1132 06/27/22  0528   POC GLUCOSE mg/dl 125 115               Lines/Drains:  Invasive Devices  Report    Peripheral Intravenous Line  Duration           Long-Dwell Peripheral IV (Midline) 06/14/22 Right Brachial 15 days          Drain  Duration           Gastrostomy/Enterostomy -- days    Urethral Catheter -- days              Urinary Catheter:  Goal for removal: N/A- Discharging with Tejada           Imaging: No pertinent imaging reviewed      Recent Cultures (last 7 days):         Last 24 Hours Medication List:   Current Facility-Administered Medications   Medication Dose Route Frequency Provider Last Rate    acetaminophen  650 mg Per G Tube Q6H Ale Blanco MD      acetaminophen  650 mg Per G Tube Q4H PRN Roni Wiggins MD      artificial tear   Both Eyes HS Roni Wiggins MD      aspirin  81 mg Per G Tube Daily Roni Wiggins MD      atovaquone  750 mg Per G Tube Daily With Breakfast Roni Wiggins MD      cholecalciferol  1,000 Units Per G Tube Daily Roni Wiggins MD      enoxaparin  40 mg Subcutaneous Q24H [address] Roni Wiggins MD      glycerin-hypromellose-  2 drop Both Eyes BID Roni Wiggins MD      levalbuterol  1 25 mg Nebulization TID Estrella Noon, DO      levothyroxine  25 mcg Per PEG Tube Early Morning Roni Wiggins MD      loratadine  10 mg Per G Tube Daily Roni Wiggins MD      Macitentan  10 mg Per G Tube Daily Roni Wiggins MD      melatonin  3 mg Oral HS PRN Ale Blanco MD      ondansetron  4 mg Intravenous Q6H PRN Ale Blanco MD      oxyCODONE  2 5 mg Per G Tube Q4H PRN Ale Blanco MD      oxyCODONE  5 mg Per G Tube Q4H PRN Ale Blanco MD      pantoprazole  40 mg Intravenous Q24H [address] Roni Wiggins MD      predniSONE  30 mg Per G Tube Daily Roni Wiggins MD      Riociguat 1 25 mg Per G Tube TID Nicolasa Erzao MD      saccharomyces boulardii  250 mg Per G Tube BID Onesimo Hernandez MD      sodium chloride  3 mL Nebulization TID Kennedy Jara DO          Today, Patient Was Seen By: Josr Torres MD    **Please Note: This note may have been constructed using a voice recognition system  **

## 2022-06-29 NOTE — ASSESSMENT & PLAN NOTE
See accompanying plan under acute respiratory failure  CXR shows bilateral infiltrations on 6/19  CXR from yesterday shows improvement in infiltrates   No indication for ABX at this time

## 2022-06-29 NOTE — PROGRESS NOTES
Acute Care Surgery  Bedside V A C  Procedure Note    A timeout was performed with the patient's nurse, prior to beginning the dressing change  The nurse remained present to confirm the correct dressing counts on removal of the dressing and was debriefed at the completion of the dressing change  Location of wound: Sacrum    Dressings and Foam removed:  1 Kerlix packing, 1 Allevyn Dressings    Dimensions of wound:  11 cm x 8 cm x 3 cm    Description of wound: On inspection of the wound today, wound bed has minimal granulation tissue forming  There was no necrotic or nonviable tissue requiring debridement  The periwound skin remains clean and intact and unremarkable  VAC dressing application:  The periwound skin was cleaned and dried  1 black foam,  were cut to size of the wound and placed into the wound  The dressings were then covered with VAC drape  Additional VAC drape and black foam was used to create a bridge to the patient's right hip and a base for the track pad  The track pad was then placed over the base of black foam  The VAC was then set to 125 mmHg low Continuous suction  The patient tolerated the procedure well and there were no complications  The patient did not require any excisional debridement during today's dressing change  VAC settings:  125 mmHg  Continuous    Wound Images: Total VAC count:  Two black total; 1 in wound 1 for bridge    Additional Notes: The [facility] sticker placed over the dressing per protocol  The next [facility] dressing change will be planned for 07/01/2022  The [facility] paperwork was completed and give to the case management staff to arrange home VAC therapy  This dressing change took greater than 24 minutes to complete      Kimani Ugalde PA-C  6/29/2022 1:09 PM

## 2022-06-29 NOTE — ASSESSMENT & PLAN NOTE
Multifactorial  Iron studies noted  Iron supplementation  Folic acid supplementation  Monitor hemoglobin

## 2022-06-29 NOTE — CASE MANAGEMENT
Case Management Discharge Planning Note    Patient name Peg Pinon  Location [facility] 204/[facility]- MRN [id number]  : 1940 Date 2022       Current Admission Date: 2022  Current Admission Diagnosis:Sacral wound   Patient Active Problem List    Diagnosis Date Noted    Pulmonary hypertension ([facility] ) 2022    Hearing loss 2022    Severe protein-calorie malnutrition ([facility] ) 2022    Pneumonia 2022    Sacral wound 2022    Dysphagia, oropharyngeal phase 2022    Scleroderma ([facility] ) 2022    Acquired hypothyroidism 2022    Anemia 2022    Urinary retention 2022    Acute respiratory failure with hypoxia ([facility] ) 2022      LOS (days): 15  Geometric Mean LOS (GMLOS) (days): 9 60  Days to GMLOS:-5 2     OBJECTIVE:  Risk of Unplanned Readmission Score: 20 78         Current admission status: Inpatient   Preferred Pharmacy:   PATIENT/FAMILY REPORTS NO PREFERRED PHARMACY  No address on file      Primary Care Provider: No primary care provider on file  Primary Insurance: [facility]  Secondary Insurance:     DISCHARGE DETAILS:            Additional Comments: Spoke to daughter Naty Merchant, reviewed current facility responses, at this point [facility] only accepting/following facility  Naty Merchant asked CM to send out additional referrals in the [facility] area, preferably closer to [place]  Additional blanket referrals made, CM to follow

## 2022-06-29 NOTE — PROGRESS NOTES
[address] [facility]  Progress Note Padilla Shore 1940, 80 y o  female MRN: [id number]  Unit/Bed#: [facility] 204-01 Encounter: [id number]  Primary Care Provider: No primary care provider on file  Date and time admitted to hospital: 6/14/2022  8:07 PM    Pulmonary hypertension ([address] )  Assessment & Plan  With hypotension   Outpatient cardiology discussed with  regarding adjusting medications here since pressures are low  Seen by Pulmonology and they are not recommending any changes at this time  HF team consulted; continue Riociguat 1 5 mg TID      "Discussed with them and for now will decrease riociguat to 1 5 mg TID   Of note discussed with HF today and upon DC will need Dr Shira Llanos - the patients outpatient cardiologist- to prescribe this change  Number for Dr Shira Llanos can be obtained from family and Dr Shira Llanos is also okay discussing with SLIM if needed     For now will monitor response to change in medication and adjust  May be room to decrease further to 1 mg TID "      Hearing loss  Assessment & Plan  Patient reports 3 months of progressively worsening hearing  As per previous admission, concerning for atovaquone versus Lasix  Discussed with ENT, have declines seeing patient in hospital; consider outpatient referral  Trial different antibiotic prophylaxis    Pneumonia  Assessment & Plan  See accompanying plan under acute respiratory failure  CXR shows bilateral infiltrations on 6/19  CXR from yesterday shows improvement in infiltrates   No indication for ABX at this time     Urinary retention  Assessment & Plan  Patient with history of urinary retention and spasm; previously on myrbetriq and Gemtesa  Continue lilly catheter at this time; treat symptomatically  Follows with urology in the outpatient setting     Anemia  Assessment & Plan  Patient noted to have low hemoglobin on admission-6 9  Has received 1 unit PRBCs on 06/15, but repeat CBC was 6 5  Iron panel shows low iron and TIBC consistent with anemia of chronic disease; reticulocytes elevated  B12 and folate within normal limits; will consider iron supplementation once infection resolved  In the meantime, hemoglobin stable while on Opsumit        Acquired hypothyroidism  Assessment & Plan  As per recent PCP note, appears to take 25 mcg daily  Continue home medication, TSH within reference range    Scleroderma [facility])  Assessment & Plan  Appreciate rheumatology recs   No change to current meds    Dysphagia, oropharyngeal phase  Assessment & Plan  Patient with history of dysphagia, peg tube in place  Continue current tube feeds; appreciate nutritional recommendations    Severe protein-calorie malnutrition ([address] )  Assessment & Plan  Malnutrition Findings:   Adult Malnutrition type: Chronic illness  Adult Degree of Malnutrition: Other severe protein calorie malnutrition  Malnutrition Characteristics: Muscle loss, Fat loss, Weight loss, Inadequate energy           360 Statement: related to disease/condition evidenced by increased kcal/pro needs for wound healing Stage IV sacral decub, BMI 18 4 adjusted per prior facility wt 6/7/22;severe buccal fat pads loss, severe deltoid muscle loss, Patient lost 31# (20 6%) since 3/15/22 past 3 months with illness <75% energy intake versus needs for > 1 month  Treating with EN support and Robert TID via PEG for wound healing    BMI Findings:  Adult BMI Classifications: Underweight < 18 5        Body mass index is 22 53 kg/m²         * Sacral wound  Assessment & Plan  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  Underwent surgical debridement on 06/14/2022 with general surgery; transferred to medicine service for chronic illness  Initially placed on vanc and cefepime for antibiotic therapy; however ID evaluated and stopped antibiotics  Repositioning; wound care consult  Supportive care, pain medications  Nutritional support  VAC dressing changed by surgery Friday seen again by surgery and patient is now medically stable from surgery stand point   VTE Pharmacologic Prophylaxis: VTE Score: 3 Moderate Risk (Score 3-4) - Pharmacological DVT Prophylaxis Ordered: enoxaparin (Lovenox)  Patient Centered Rounds: I performed bedside rounds with nursing staff today  Discussions with Specialists or Other Care Team Provider: Nurse and      Education and Discussions with Family / Patient: Updated  (sister and niece) at bedside  Time Spent for Care: 30 minutes  More than 50% of total time spent on counseling and coordination of care as described above  Current Length of Stay: 14 day(s)  Current Patient Status: Inpatient   Certification Statement: The patient will continue to require additional inpatient hospital stay due to Pending pulmonology and cardiology clearance  Discharge Plan: Anticipate discharge in 48-72 hrs to discharge location to be determined pending rehab evaluations  Code Status: Level 3 - DNAR and DNI    Subjective:   Patient denies any changes or symptoms at this time  Per nursing staff, no acute events overnight  Objective:     Vitals:   Temp (24hrs), Av 3 °F (36 8 °C), Min:97 6 °F (36 4 °C), Max:99 °F (37 2 °C)    Temp:  [97 6 °F (36 4 °C)-99 °F (37 2 °C)] 99 °F (37 2 °C)  HR:  [68-82] 82  Resp:  [14-24] 22  BP: (87-94)/(45-48) 87/47  SpO2:  [95 %-100 %] 97 %  Body mass index is 22 53 kg/m²  Input and Output Summary (last 24 hours): Intake/Output Summary (Last 24 hours) at 2022  Last data filed at 2022 1430  Gross per 24 hour   Intake 1120 ml   Output 975 ml   Net 145 ml       Physical Exam:   Physical Exam  Vitals and nursing note reviewed  Constitutional:       General: She is not in acute distress  Appearance: She is not toxic-appearing  Comments: Frail and chronically ill in appearance   HENT:      Head: Normocephalic and atraumatic        Right Ear: External ear normal       Left Ear: External ear normal       Nose: Nose normal       Comments: NC in place     Mouth/Throat:      Mouth: Mucous membranes are dry  Eyes:      Extraocular Movements: Extraocular movements intact  Conjunctiva/sclera: Conjunctivae normal    Cardiovascular:      Rate and Rhythm: Normal rate and regular rhythm  Heart sounds: No murmur heard  No gallop  Pulmonary:      Effort: Pulmonary effort is normal  No respiratory distress  Breath sounds: No wheezing  Abdominal:      General: Bowel sounds are normal  There is no distension  Palpations: Abdomen is soft  Tenderness: There is no abdominal tenderness  Comments: Feeding tube present   Genitourinary:     Comments: Tejada draining clear yellow urine  Musculoskeletal:         General: No tenderness  Cervical back: Normal range of motion  Right lower leg: No edema  Left lower leg: No edema  Skin:     General: Skin is warm  Coloration: Skin is not jaundiced  Findings: No bruising or lesion  Neurological:      General: No focal deficit present  Mental Status: She is alert and oriented to person, place, and time  Mental status is at baseline  Psychiatric:         Mood and Affect: Mood normal          Behavior: Behavior normal          Thought Content:  Thought content normal           Additional Data:     Labs:  Results from last 7 days   Lab Units 06/28/22  0559 06/27/22  0510   WBC Thousand/uL 10 07 10 29*   HEMOGLOBIN g/dL 7 9* 7 9*   HEMATOCRIT % 26 2* 26 6*   PLATELETS Thousands/uL 405* 378   NEUTROS PCT %  --  75   LYMPHS PCT %  --  13*   MONOS PCT %  --  10   EOS PCT %  --  1     Results from last 7 days   Lab Units 06/28/22  0500   SODIUM mmol/L 140   POTASSIUM mmol/L 4 0   CHLORIDE mmol/L 103   CO2 mmol/L 34*   BUN mg/dL 26*   CREATININE mg/dL 0 38*   ANION GAP mmol/L 3*   CALCIUM mg/dL 8 5   ALBUMIN g/dL 2 1*   TOTAL BILIRUBIN mg/dL 0 17*   ALK PHOS U/L 83   ALT U/L 19   AST U/L 19   GLUCOSE RANDOM mg/dL 134         Results from last 7 days   Lab Units 06/27/22  1132 06/27/22  0528   POC GLUCOSE mg/dl 125 115         Results from last 7 days   Lab Units 06/22/22  0850   LACTIC ACID mmol/L 1 1       Lines/Drains:  Invasive Devices  Report    Peripheral Intravenous Line  Duration           Long-Dwell Peripheral IV (Midline) 06/14/22 Right Brachial 14 days          Drain  Duration           Gastrostomy/Enterostomy -- days    Urethral Catheter -- days              Urinary Catheter:  Goal for removal: N/A- Discharging with Tejada               Imaging: No pertinent imaging reviewed      Recent Cultures (last 7 days):         Last 24 Hours Medication List:   Current Facility-Administered Medications   Medication Dose Route Frequency Provider Last Rate    acetaminophen  650 mg Per G Tube Q6H Nahid Parikh MD      acetaminophen  650 mg Per G Tube Q4H PRN Nathaly Walker MD      albumin human  12 5 g Intravenous Once Oralpatricia Mendez MD      artificial tear   Both Eyes HS Nathaly Walker MD      aspirin  81 mg Per G Tube Daily Nathaly Walker MD      atovaquone  750 mg Per G Tube Daily With Breakfast Nathaly Walker MD      cholecalciferol  1,000 Units Per G Tube Daily Nathaly Walker MD      enoxaparin  40 mg Subcutaneous Q24H [address] Nathaly Walker MD      glycerin-hypromellose-  2 drop Both Eyes BID Nathaly Walker MD      levalbuterol  1 25 mg Nebulization TID Michel Haq DO      levothyroxine  25 mcg Per PEG Tube Early Morning Nathaly Walker MD      loratadine  10 mg Per G Tube Daily Nathaly Walker MD      Macitentan  10 mg Per G Tube Daily Nathaly Walker MD      melatonin  3 mg Oral HS PRN Nahid Parikh MD      ondansetron  4 mg Intravenous Q6H PRN Nahid Parikh MD      oxyCODONE  2 5 mg Per G Tube Q4H PRN Nahid Parikh MD      oxyCODONE  5 mg Per G Tube Q4H PRN Nahid Parikh MD      pantoprazole  40 mg Intravenous Q24H [address] Nathaly Walker MD      predniSONE 30 mg Per G Tube Daily Jessica Sotomayor MD      Riociguat  1 25 mg Per G Tube TID Reece Dial MD      saccharomyces boulardii  250 mg Per G Tube BID Jessica Sotomayor MD      sodium chloride  3 mL Nebulization TID Daya Joseph DO          Today, Patient Was Seen By: Yonathan Collazo MD    **Please Note: This note may have been constructed using a voice recognition system  **

## 2022-06-30 PROCEDURE — 99232 SBSQ HOSP IP/OBS MODERATE 35: CPT | Performed by: INTERNAL MEDICINE

## 2022-06-30 PROCEDURE — 94640 AIRWAY INHALATION TREATMENT: CPT

## 2022-06-30 PROCEDURE — 99233 SBSQ HOSP IP/OBS HIGH 50: CPT | Performed by: INTERNAL MEDICINE

## 2022-06-30 PROCEDURE — C9113 INJ PANTOPRAZOLE SODIUM, VIA: HCPCS | Performed by: INTERNAL MEDICINE

## 2022-06-30 PROCEDURE — 94760 N-INVAS EAR/PLS OXIMETRY 1: CPT

## 2022-06-30 PROCEDURE — NC001 PR NO CHARGE: Performed by: INTERNAL MEDICINE

## 2022-06-30 RX ORDER — ALBUMIN (HUMAN) 12.5 G/50ML
25 SOLUTION INTRAVENOUS ONCE
Status: COMPLETED | OUTPATIENT
Start: 2022-06-30 | End: 2022-06-30

## 2022-06-30 RX ORDER — ALBUMIN, HUMAN INJ 5% 5 %
12.5 SOLUTION INTRAVENOUS ONCE
Status: COMPLETED | OUTPATIENT
Start: 2022-06-30 | End: 2022-06-30

## 2022-06-30 RX ORDER — CALCIUM GLUCONATE 20 MG/ML
1 INJECTION, SOLUTION INTRAVENOUS ONCE
Status: COMPLETED | OUTPATIENT
Start: 2022-06-30 | End: 2022-06-30

## 2022-06-30 RX ORDER — ECHINACEA PURPUREA EXTRACT 125 MG
1 TABLET ORAL
Status: DISCONTINUED | OUTPATIENT
Start: 2022-06-30 | End: 2022-07-13 | Stop reason: HOSPADM

## 2022-06-30 RX ADMIN — ALBUMIN (HUMAN) 25 G: 0.25 INJECTION, SOLUTION INTRAVENOUS at 10:10

## 2022-06-30 RX ADMIN — ENOXAPARIN SODIUM 40 MG: 40 INJECTION SUBCUTANEOUS at 10:14

## 2022-06-30 RX ADMIN — GLYCERIN, HYPROMELLOSE, POLYETHYLENE GLYCOL 2 DROP: .2; .2; 1 LIQUID OPHTHALMIC at 10:38

## 2022-06-30 RX ADMIN — ISODIUM CHLORIDE 3 ML: 0.03 SOLUTION RESPIRATORY (INHALATION) at 13:30

## 2022-06-30 RX ADMIN — OXYCODONE HYDROCHLORIDE 2.5 MG: 5 SOLUTION ORAL at 06:33

## 2022-06-30 RX ADMIN — GLYCERIN, HYPROMELLOSE, POLYETHYLENE GLYCOL 2 DROP: .2; .2; 1 LIQUID OPHTHALMIC at 16:49

## 2022-06-30 RX ADMIN — ACETAMINOPHEN 650 MG: 650 SUSPENSION ORAL at 16:45

## 2022-06-30 RX ADMIN — Medication 250 MG: at 10:44

## 2022-06-30 RX ADMIN — LEVALBUTEROL HYDROCHLORIDE 1.25 MG: 1.25 SOLUTION, CONCENTRATE RESPIRATORY (INHALATION) at 19:26

## 2022-06-30 RX ADMIN — ALBUMIN (HUMAN) 12.5 G: 12.5 INJECTION, SOLUTION INTRAVENOUS at 02:20

## 2022-06-30 RX ADMIN — PREDNISONE 30 MG: 20 TABLET ORAL at 10:14

## 2022-06-30 RX ADMIN — ACETAMINOPHEN 650 MG: 650 SUSPENSION ORAL at 22:57

## 2022-06-30 RX ADMIN — ISODIUM CHLORIDE 3 ML: 0.03 SOLUTION RESPIRATORY (INHALATION) at 19:26

## 2022-06-30 RX ADMIN — LEVALBUTEROL HYDROCHLORIDE 1.25 MG: 1.25 SOLUTION, CONCENTRATE RESPIRATORY (INHALATION) at 13:30

## 2022-06-30 RX ADMIN — Medication 250 MG: at 16:49

## 2022-06-30 RX ADMIN — ACETAMINOPHEN 650 MG: 650 SUSPENSION ORAL at 06:08

## 2022-06-30 RX ADMIN — CALCIUM GLUCONATE 1 G: 20 INJECTION, SOLUTION INTRAVENOUS at 00:51

## 2022-06-30 RX ADMIN — OXYCODONE HYDROCHLORIDE 5 MG: 5 SOLUTION ORAL at 23:00

## 2022-06-30 RX ADMIN — PANTOPRAZOLE SODIUM 40 MG: 40 INJECTION, POWDER, FOR SOLUTION INTRAVENOUS at 10:14

## 2022-06-30 RX ADMIN — LEVOTHYROXINE SODIUM 25 MCG: 25 TABLET ORAL at 06:08

## 2022-06-30 RX ADMIN — ACETAMINOPHEN 650 MG: 650 SUSPENSION ORAL at 10:14

## 2022-06-30 RX ADMIN — Medication 1000 UNITS: at 10:14

## 2022-06-30 RX ADMIN — LEVALBUTEROL HYDROCHLORIDE 1.25 MG: 1.25 SOLUTION, CONCENTRATE RESPIRATORY (INHALATION) at 07:32

## 2022-06-30 RX ADMIN — ATOVAQUONE 750 MG: 750 SUSPENSION ORAL at 10:43

## 2022-06-30 RX ADMIN — RIOCIGUAT 1.25 MG: 2.5 TABLET, FILM COATED ORAL at 10:44

## 2022-06-30 RX ADMIN — ISODIUM CHLORIDE 3 ML: 0.03 SOLUTION RESPIRATORY (INHALATION) at 07:32

## 2022-06-30 RX ADMIN — LORATADINE 10 MG: 10 TABLET ORAL at 10:14

## 2022-06-30 RX ADMIN — OXYCODONE HYDROCHLORIDE 5 MG: 5 SOLUTION ORAL at 16:48

## 2022-06-30 RX ADMIN — ALBUMIN (HUMAN) 25 G: 12.5 SOLUTION INTRAVENOUS at 13:15

## 2022-06-30 RX ADMIN — ASPIRIN 81 MG CHEWABLE TABLET 81 MG: 81 TABLET CHEWABLE at 10:15

## 2022-06-30 NOTE — CASE MANAGEMENT
Case Management Progress Note    Patient name [facility]  Location [facility]- MRN [id number]  : 1940 Date 2022       LOS (days): 16  Geometric Mean LOS (GMLOS) (days): 9 60  Days to GMLOS:-6 2        OBJECTIVE:        Current admission status: Inpatient  Preferred Pharmacy:   PATIENT/FAMILY REPORTS NO PREFERRED PHARMACY  No address on file      Primary Care Provider: No primary care provider on file  Primary Insurance: [address] [place] [address],[address] [facility]  Secondary Insurance:     PROGRESS NOTE:      Reviewed current SNF referrals, the only accepting facility at this time is [facility] and [address] [place] [address] are still reviewing  CM to follow      Arielle GARCIA  2022  4:45 PM

## 2022-06-30 NOTE — PROGRESS NOTES
Asymptomatic at bedside with soft blood pressures  Dr Gomez Luna and I discussed with the patient and family to stop adempas with a 24 hour washout and to start low dose sildenafil 10 mg TID tomorrow afternoon  The patient may not be getting as much benefit from the lower dose adempas and just more side effects  The patient has nice mentation, making nice urine with no other signs of worsening RV failure       Angi Vargas MD

## 2022-06-30 NOTE — WOUND OSTOMY CARE
Progress Note - Wound   Tate Speed 80 y o  female MRN: [id number]  Unit/Bed#: [facility] 204-01 Encounter: [id number]        Assessment:   Patient is seen for wound care follow-up  80year old female with scleroderma , bed-bound, interstitial lung disease,dysphagia ,pulmonary hypertension and the stage 4 on admission   The patient is in bed for the assessment of the and is on the P - 500 low air loss mattress   She is a Mod A of 2 for rolling in the bed  Patient has b/l feet elevated  Surgery is providing sacral wound vac dressings/ management  B/L heels intact with intact b/l heel Allevyn's  Findings:  1  HA Unstageable Pressure Injury to left elbow- covered with slough and meseret-wound is intact and pink  Scant serosanguineous drainage noted  To be covered with an Allevyn dressing and dermagran  2  HA Unstageable on right elbow- wound bed 100% slough  Meseret-wound is pink  No drainage noted  To be cleansed and covered with Valma Porteous and covered with an Allevyn foam  To be changed every other day        Orders listed below and wound care will continue to follow, call or tiger text with questions  Bedside nurse updated of findings and orders  Flowsheets below with pictures and measurements  Skin care plans:  1  Sacral -VAC placed by surgery 6/29  2-Bilateral heels apply allevyn foam gianna with a P and date peel and check skin integrity every shift change every 3 days   3-Elevate heels to offload pressure  4-Ehob cushion when out of bed  5-Turn/repoisiton q2h or when medically stable for pressure re-distribution on skin  6-Moisturize skin daily with skin nourishing cream  7  Left and Right elbow - cleanse with Normal saline then apply Dermagran and allevyn foam gianna with a T and date change every other day    8  P 500 low air loss         Wound 06/21/22 Pressure Injury Arm Left;Posterior;Proximal;Lower (Active)   Wound Image   06/30/22 0905   Wound Description [facility] 06/30/22 0905   Pressure Injury Stage Unstageable 06/30/22 1000   Romy-wound Assessment Pink 06/30/22 0905   Wound Length (cm) 0 2 cm 06/30/22 0905   Wound Width (cm) 0 2 cm 06/30/22 0905   Wound Depth (cm) 0 1 cm 06/30/22 0905   Wound Surface Area (cm^2) 0 04 cm^2 06/30/22 0905   Wound Volume (cm^3) 0 004 cm^3 06/30/22 0905   Calculated Wound Volume (cm^3) 0 cm^3 06/30/22 0905   Tunneling 0 cm 06/30/22 0905   Tunneling in depth located at 0 06/30/22 0905   Undermining 0 06/30/22 0905   Undermining is depth extending from 0 06/30/22 0905   Drainage Amount Scant 06/30/22 0905   Drainage Description Serosanguineous 06/30/22 0905   Non-staged Wound Description Partial thickness 06/30/22 0905   Treatments Cleansed 06/30/22 0905   Dressing Foam, Silicon (eg  Allevyn, etc) 06/30/22 0905   Wound packed? No 06/30/22 0905   Packing- # removed 0 06/30/22 0905   Packing- # inserted 0 06/30/22 9958   Dressing Changed New 06/30/22 0905   Patient Tolerance Tolerated well 06/30/22 0905   Dressing Status Clean;Dry; Intact 06/30/22 0905       Wound 06/21/22 Pressure Injury Other (Comment) Elbow Posterior;Right (Active)   Wound Image   06/30/22 0904   Wound Description [facility] 06/30/22 0904   Pressure Injury Stage Unstageable 06/30/22 1000   Romy-wound Assessment Pink 06/30/22 0904   Wound Length (cm) 0 3 cm 06/30/22 0904   Wound Width (cm) 0 3 cm 06/30/22 0904   Wound Depth (cm) 0 1 cm 06/30/22 0904   Wound Surface Area (cm^2) 0 09 cm^2 06/30/22 0904   Wound Volume (cm^3) 0 009 cm^3 06/30/22 0904   Calculated Wound Volume (cm^3) 0 01 cm^3 06/30/22 0904   Change in Wound Size % 80 06/30/22 0904   Tunneling 0 cm 06/30/22 0904   Tunneling in depth located at 0 06/30/22 0904   Undermining 0 06/30/22 0904   Undermining is depth extending from 0 06/30/22 0904   Drainage Amount Scant 06/30/22 0904   Drainage Description Serosanguineous 06/30/22 0904   Non-staged Wound Description Partial thickness 06/30/22 0904   Treatments Cleansed 06/30/22 0904   Dressing Foam, Silicon (eg  Allevyn, etc) 06/30/22 0904   Wound packed? No 06/30/22 0904   Packing- # removed 0 06/30/22 0904   Packing- # inserted 0 06/30/22 7893   Dressing Changed New 06/30/22 0904   Patient Tolerance Tolerated well 06/30/22 0904   Dressing Status Clean;Dry; Intact 06/30/22 0904            Patient assessed and seen with Nery Lu RN, BSN, [address] Ella Parra RN, BSN

## 2022-06-30 NOTE — PROGRESS NOTES
PULMONOLOGY PROGRESS NOTE     Name: Alejandra Perez   Age & Sex: 80 y o  female   MRN: [id number]  Unit/Bed#: [facility] 204-01   Encounter: [id number]    PATIENT INFORMATION     Name: Alejanrda Perez   Age & Sex: 80 y o  female   MRN: [id number]  Hospital Stay Days: 16    ASSESSMENT/PLAN     Assessment:   1  Acute respiratory failure with hypoxia  2  Severe pulmonary hypertension (chronically on Opsumit and Adampas, RVSP 56 mm Hg on these meds)  3  Dermatomyositis (s/p IVIG and steroid taper, 5/2022)  4  CREST Scleroderma  5  Hx of KOREY infection previously on chronic ABX (stopped about 10 years ago)  6  Heart failure with preserved ejection fraction (EF 70%, G2DD)      Plan:  · At this point she has chronically been on these pulmonary hypertension medications and her SBP generally runs in the 100s to 110s  This is not far from her baseline  · Pulmonary vasodilators per advanced heart failure  · She should remain on steroid taper for dermatomyositis flare  Rheumatology has been consulted  May require additional IVIG  Messaged rheumatology awaiting response  Could wait until biopsy results have returned from [facility]  Further rheumatologic work up per rheumatology  · CXR for 6/30 noted  Mildly increased bilateral opacities  Suspect may be pulmonary edema  No clinical pneumonia  · Continue to titrate supplemental O2 to a SpO2 goal of >88%  · Follow up biopsy results from [facility]  Called placed to [facility] rheum  VM left  Awaiting call back  · Continue with airway clearance protocol  Flutter valve/IS      SUBJECTIVE     Patient seen and examined  Hypotensive overnight  CXR ordered   Given albumin    OBJECTIVE     Vitals:    06/30/22 0253 06/30/22 0300 06/30/22 0400 06/30/22 0732   BP:  (!) 97/44 (!) 93/48 (!) 93/47   BP Location:    Left arm   Pulse:  78 78 82   Resp:  16 17    Temp: 97 7 °F (36 5 °C)   98 °F (36 7 °C)   TempSrc: Oral   Oral   SpO2:  95% 94% 95%   Weight:       Height:          Temperature:   Temp (24hrs), Av °F (36 7 °C), Min:97 7 °F (36 5 °C), Max:98 7 °F (37 1 °C)    Temperature: 98 °F (36 7 °C)  Intake & Output:  I/O        0701   0700  0701   0700  0701   0700    P  O   0     Blood 330      NG/GT  1032     Total Intake(mL/kg) 330 (5) 1032 (15 6)     Urine (mL/kg/hr) 2225 (1 4) 1050 (0 7)     Emesis/NG output  0     Stool  0     Total Output 2225 1050     Net -1895 -18            Unmeasured Stool Occurrence  1 x         Weights:        Body mass index is 22 53 kg/m²  Weight (last 2 days)     None        Physical Exam  Vitals and nursing note reviewed  Constitutional:       General: She is not in acute distress  Appearance: Normal appearance  She is well-developed and normal weight  She is ill-appearing  She is not toxic-appearing  Interventions: She is not intubated  HENT:      Head: Normocephalic and atraumatic  Right Ear: External ear normal       Left Ear: External ear normal       Nose: Nose normal       Mouth/Throat:      Mouth: Mucous membranes are moist       Pharynx: Oropharynx is clear  Eyes:      General: No scleral icterus  Conjunctiva/sclera: Conjunctivae normal    Cardiovascular:      Rate and Rhythm: Normal rate and regular rhythm  Pulses: Normal pulses  Heart sounds: Normal heart sounds  No murmur heard  No friction rub  No gallop  Pulmonary:      Effort: Pulmonary effort is normal  No tachypnea, bradypnea, accessory muscle usage or respiratory distress  She is not intubated  Breath sounds: Normal breath sounds and air entry  No decreased air movement  No decreased breath sounds, wheezing, rhonchi or rales  Abdominal:      General: Abdomen is flat  Bowel sounds are normal       Palpations: Abdomen is soft  Musculoskeletal:         General: No swelling or tenderness  Cervical back: Neck supple  No tenderness  Skin:     General: Skin is warm and dry  Neurological:      General: No focal deficit present  Mental Status: She is alert and oriented to person, place, and time  Mental status is at baseline  LABORATORY DATA     Labs: I have personally reviewed pertinent reports  Results from last 7 days   Lab Units 06/29/22  2242 06/28/22  0559 06/27/22  0510 06/26/22  0535   WBC Thousand/uL 9 64 10 07 10 29* 9 58   HEMOGLOBIN g/dL 7 6* 7 9* 7 9* 7 3*   HEMATOCRIT % 25 7* 26 2* 26 6* 23 4*   PLATELETS Thousands/uL 384 405* 378 302   NEUTROS PCT %  --  77* 75 76*   MONOS PCT %  --  8 10 9      Results from last 7 days   Lab Units 06/29/22  2242 06/28/22  0500 06/27/22  0510   POTASSIUM mmol/L 4 1 4 0 3 9   CHLORIDE mmol/L 104 103 100   CO2 mmol/L 35* 34* 32   BUN mg/dL 19 26* 37*   CREATININE mg/dL 0 36* 0 38* 0 33*   CALCIUM mg/dL 8 4 8 5 8 6   ALK PHOS U/L 84 83 88   ALT U/L 16 19 19   AST U/L 15 19 17                  Results from last 7 days   Lab Units 06/29/22  2242   LACTIC ACID mmol/L 1 4             ABG:       IMAGING & DIAGNOSTIC TESTING     Radiology Results: I have personally reviewed pertinent reports  CT abdomen pelvis wo contrast    Result Date: 6/14/2022  Impression: Soft tissue air and soft tissue defect identified posterior to the sacrum suggesting infection  I cannot exclude superimposed posterior sacral osteomyelitis although no bony erosion identified  Soft tissue air identified around the posterior aspect of the rectum in addition to the left side of the rectum/perineum, correlate for necrotizing fasciitis soft tissue infection  Surgical consult Bilateral lower lobe pneumonia  Left upper pole renal 1 cm likely hemorrhagic cyst, correlate with nonemergent outpatient renal ultrasound  I personally discussed this study with Andry Yang on 6/14/2022 at 5:54 PM  Workstation performed: [id number]     XR chest portable    Result Date: 6/19/2022  Impression: Persistent, slightly worsened, bilateral pulmonary opacities in keeping with multilobar pneumonia   Workstation performed: [id number] XR chest portable    Result Date: 6/16/2022  Impression: Stable vascular congestion  Workstation performed: [id number]     XR chest 1 view portable    Result Date: 6/15/2022  Impression: Mild vascular congestion  Right-sided PICC line terminates at the right axilla Workstation performed: [id number]     CT head without contrast    Result Date: 6/14/2022  Impression: No acute intracranial hemorrhage  If symptoms persistent or worsening focal neurologic deficit, proceed with noncontrast brain MRI and/or CTA  Workstation performed: [id number]     Other Diagnostic Testing: I have personally reviewed pertinent reports      ACTIVE MEDICATIONS     Current Facility-Administered Medications   Medication Dose Route Frequency    acetaminophen (TYLENOL) oral suspension 650 mg  650 mg Per G Tube Q6H    acetaminophen (TYLENOL) oral suspension 650 mg  650 mg Per G Tube Q4H PRN    artificial tear (LUBRIFRESH P M ) ophthalmic ointment   Both Eyes HS    aspirin chewable tablet 81 mg  81 mg Per G Tube Daily    atovaquone (MEPRON) oral suspension 750 mg  750 mg Per G Tube Daily With Breakfast    cholecalciferol (VITAMIN D3) tablet 1,000 Units  1,000 Units Per G Tube Daily    enoxaparin (LOVENOX) subcutaneous injection 40 mg  40 mg Subcutaneous Q24H [facility]    glycerin-hypromellose- (ARTIFICIAL TEARS) ophthalmic solution 2 drop  2 drop Both Eyes BID    levalbuterol (XOPENEX) inhalation solution 1 25 mg  1 25 mg Nebulization TID    levothyroxine tablet 25 mcg  25 mcg Per PEG Tube Early Morning    loratadine (CLARITIN) tablet 10 mg  10 mg Per G Tube Daily    Macitentan (OPSUMIT) tablet 10 mg  10 mg Per G Tube Daily    melatonin tablet 3 mg  3 mg Oral HS PRN    ondansetron (ZOFRAN) injection 4 mg  4 mg Intravenous Q6H PRN    oxyCODONE (ROXICODONE) oral solution 2 5 mg  2 5 mg Per G Tube Q4H PRN    oxyCODONE (ROXICODONE) oral solution 5 mg  5 mg Per G Tube Q4H PRN    pantoprazole (PROTONIX) injection 40 mg  40 mg Intravenous Q24H [address]    predniSONE tablet 30 mg  30 mg Per G Tube Daily    Riociguat TABS 1 25 mg  1 25 mg Per G Tube TID    saccharomyces boulardii (FLORASTOR) capsule 250 mg  250 mg Per G Tube BID    sodium chloride 0 9 % inhalation solution 3 mL  3 mL Nebulization TID       VTE Pharmacologic Prophylaxis: Enoxaparin (Lovenox)  VTE Mechanical Prophylaxis: sequential compression device      Disclaimer: Portions of the record may have been created with voice recognition software  Occasional wrong word or "sound a like" substitutions may have occurred due to the inherent limitations of voice recognition software  Careful consideration should be taken to recognize, using context, where substitutions have occurred      Dominique Strange DO   Pulmonary and Critical Care Fellow, PGY-IV  [facility]

## 2022-06-30 NOTE — PROGRESS NOTES
WEEKS [facility] bedside for evaluation  Pt responsive to albumin  No Levo needed @ time

## 2022-07-01 PROBLEM — M62.81 PROXIMAL MUSCLE WEAKNESS: Status: ACTIVE | Noted: 2022-01-01

## 2022-07-01 LAB
ANION GAP SERPL CALCULATED.3IONS-SCNC: 5 MMOL/L (ref 4–13)
BASOPHILS # BLD AUTO: 0.03 THOUSANDS/ΜL (ref 0–0.1)
BASOPHILS NFR BLD AUTO: 0 % (ref 0–1)
BUN SERPL-MCNC: 27 MG/DL (ref 5–25)
CALCIUM SERPL-MCNC: 8.5 MG/DL (ref 8.3–10.1)
CHLORIDE SERPL-SCNC: 103 MMOL/L (ref 100–108)
CK SERPL-CCNC: 16 U/L (ref 26–192)
CO2 SERPL-SCNC: 32 MMOL/L (ref 21–32)
CREAT SERPL-MCNC: 0.3 MG/DL (ref 0.6–1.3)
CRP SERPL QL: 39.5 MG/L
EOSINOPHIL # BLD AUTO: 0.16 THOUSAND/ΜL (ref 0–0.61)
EOSINOPHIL NFR BLD AUTO: 2 % (ref 0–6)
ERYTHROCYTE [DISTWIDTH] IN BLOOD BY AUTOMATED COUNT: 19.8 % (ref 11.6–15.1)
GFR SERPL CREATININE-BSD FRML MDRD: 109 ML/MIN/1.73SQ M
GLUCOSE SERPL-MCNC: 128 MG/DL (ref 65–140)
HCT VFR BLD AUTO: 26.6 % (ref 34.8–46.1)
HGB BLD-MCNC: 7.9 G/DL (ref 11.5–15.4)
IGA SERPL-MCNC: 262 MG/DL (ref 70–400)
IGG SERPL-MCNC: 1250 MG/DL (ref 700–1600)
IGM SERPL-MCNC: 133 MG/DL (ref 40–230)
IMM GRANULOCYTES # BLD AUTO: 0.18 THOUSAND/UL (ref 0–0.2)
IMM GRANULOCYTES NFR BLD AUTO: 2 % (ref 0–2)
LYMPHOCYTES # BLD AUTO: 1.24 THOUSANDS/ΜL (ref 0.6–4.47)
LYMPHOCYTES NFR BLD AUTO: 12 % (ref 14–44)
MAGNESIUM SERPL-MCNC: 2 MG/DL (ref 1.6–2.6)
MCH RBC QN AUTO: 30.3 PG (ref 26.8–34.3)
MCHC RBC AUTO-ENTMCNC: 29.7 G/DL (ref 31.4–37.4)
MCV RBC AUTO: 102 FL (ref 82–98)
MONOCYTES # BLD AUTO: 0.76 THOUSAND/ΜL (ref 0.17–1.22)
MONOCYTES NFR BLD AUTO: 7 % (ref 4–12)
NEUTROPHILS # BLD AUTO: 8.03 THOUSANDS/ΜL (ref 1.85–7.62)
NEUTS SEG NFR BLD AUTO: 77 % (ref 43–75)
NRBC BLD AUTO-RTO: 1 /100 WBCS
PHOSPHATE SERPL-MCNC: 2.5 MG/DL (ref 2.3–4.1)
PLATELET # BLD AUTO: 405 THOUSANDS/UL (ref 149–390)
PMV BLD AUTO: 9.1 FL (ref 8.9–12.7)
POTASSIUM SERPL-SCNC: 4.1 MMOL/L (ref 3.5–5.3)
RBC # BLD AUTO: 2.61 MILLION/UL (ref 3.81–5.12)
SODIUM SERPL-SCNC: 140 MMOL/L (ref 136–145)
WBC # BLD AUTO: 10.4 THOUSAND/UL (ref 4.31–10.16)

## 2022-07-01 PROCEDURE — 82550 ASSAY OF CK (CPK): CPT | Performed by: INTERNAL MEDICINE

## 2022-07-01 PROCEDURE — 85025 COMPLETE CBC W/AUTO DIFF WBC: CPT | Performed by: INTERNAL MEDICINE

## 2022-07-01 PROCEDURE — 94640 AIRWAY INHALATION TREATMENT: CPT

## 2022-07-01 PROCEDURE — 80048 BASIC METABOLIC PNL TOTAL CA: CPT | Performed by: INTERNAL MEDICINE

## 2022-07-01 PROCEDURE — 94760 N-INVAS EAR/PLS OXIMETRY 1: CPT

## 2022-07-01 PROCEDURE — C9113 INJ PANTOPRAZOLE SODIUM, VIA: HCPCS | Performed by: INTERNAL MEDICINE

## 2022-07-01 PROCEDURE — 84100 ASSAY OF PHOSPHORUS: CPT | Performed by: INTERNAL MEDICINE

## 2022-07-01 PROCEDURE — 99232 SBSQ HOSP IP/OBS MODERATE 35: CPT | Performed by: INTERNAL MEDICINE

## 2022-07-01 PROCEDURE — 86140 C-REACTIVE PROTEIN: CPT | Performed by: INTERNAL MEDICINE

## 2022-07-01 PROCEDURE — 82784 ASSAY IGA/IGD/IGG/IGM EACH: CPT | Performed by: INTERNAL MEDICINE

## 2022-07-01 PROCEDURE — 83735 ASSAY OF MAGNESIUM: CPT | Performed by: INTERNAL MEDICINE

## 2022-07-01 PROCEDURE — 97110 THERAPEUTIC EXERCISES: CPT

## 2022-07-01 RX ORDER — FLUTICASONE PROPIONATE 50 MCG
1 SPRAY, SUSPENSION (ML) NASAL 2 TIMES DAILY
Status: DISCONTINUED | OUTPATIENT
Start: 2022-07-01 | End: 2022-07-13 | Stop reason: HOSPADM

## 2022-07-01 RX ORDER — FOLIC ACID 1 MG/1
1 TABLET ORAL DAILY
Status: DISCONTINUED | OUTPATIENT
Start: 2022-07-01 | End: 2022-07-13 | Stop reason: HOSPADM

## 2022-07-01 RX ORDER — SILDENAFIL CITRATE 20 MG/1
10 TABLET ORAL 3 TIMES DAILY
Status: DISCONTINUED | OUTPATIENT
Start: 2022-07-01 | End: 2022-07-01

## 2022-07-01 RX ORDER — FLUTICASONE PROPIONATE 50 MCG
1 SPRAY, SUSPENSION (ML) NASAL DAILY
Status: DISCONTINUED | OUTPATIENT
Start: 2022-07-01 | End: 2022-07-01

## 2022-07-01 RX ORDER — SILDENAFIL CITRATE 20 MG/1
10 TABLET ORAL 3 TIMES DAILY
Status: DISCONTINUED | OUTPATIENT
Start: 2022-07-01 | End: 2022-07-13 | Stop reason: HOSPADM

## 2022-07-01 RX ADMIN — FLUTICASONE PROPIONATE 1 SPRAY: 50 SPRAY, METERED NASAL at 20:05

## 2022-07-01 RX ADMIN — LEVOTHYROXINE SODIUM 25 MCG: 25 TABLET ORAL at 05:05

## 2022-07-01 RX ADMIN — MELATONIN 3 MG: at 00:35

## 2022-07-01 RX ADMIN — ACETAMINOPHEN 650 MG: 650 SUSPENSION ORAL at 00:20

## 2022-07-01 RX ADMIN — MELATONIN 3 MG: at 21:54

## 2022-07-01 RX ADMIN — ACETAMINOPHEN 650 MG: 650 SUSPENSION ORAL at 11:21

## 2022-07-01 RX ADMIN — FLUTICASONE PROPIONATE 1 SPRAY: 50 SPRAY, METERED NASAL at 15:16

## 2022-07-01 RX ADMIN — LEVALBUTEROL HYDROCHLORIDE 1.25 MG: 1.25 SOLUTION, CONCENTRATE RESPIRATORY (INHALATION) at 09:00

## 2022-07-01 RX ADMIN — ACETAMINOPHEN 650 MG: 650 SUSPENSION ORAL at 05:05

## 2022-07-01 RX ADMIN — ACETAMINOPHEN 650 MG: 650 SUSPENSION ORAL at 16:44

## 2022-07-01 RX ADMIN — ASPIRIN 81 MG CHEWABLE TABLET 81 MG: 81 TABLET CHEWABLE at 09:05

## 2022-07-01 RX ADMIN — PREDNISONE 30 MG: 20 TABLET ORAL at 09:05

## 2022-07-01 RX ADMIN — Medication 250 MG: at 16:53

## 2022-07-01 RX ADMIN — ISODIUM CHLORIDE 3 ML: 0.03 SOLUTION RESPIRATORY (INHALATION) at 09:00

## 2022-07-01 RX ADMIN — Medication 1000 UNITS: at 09:05

## 2022-07-01 RX ADMIN — LORATADINE 10 MG: 10 TABLET ORAL at 09:05

## 2022-07-01 RX ADMIN — Medication 65 G: at 16:39

## 2022-07-01 RX ADMIN — ISODIUM CHLORIDE 3 ML: 0.03 SOLUTION RESPIRATORY (INHALATION) at 19:10

## 2022-07-01 RX ADMIN — OXYCODONE HYDROCHLORIDE 2.5 MG: 5 SOLUTION ORAL at 21:52

## 2022-07-01 RX ADMIN — Medication 250 MG: at 09:03

## 2022-07-01 RX ADMIN — ATOVAQUONE 750 MG: 750 SUSPENSION ORAL at 09:04

## 2022-07-01 RX ADMIN — SILDENAFIL CITRATE 10 MG: 20 TABLET ORAL at 15:16

## 2022-07-01 RX ADMIN — GLYCERIN, HYPROMELLOSE, POLYETHYLENE GLYCOL 2 DROP: .2; .2; 1 LIQUID OPHTHALMIC at 16:53

## 2022-07-01 RX ADMIN — ENOXAPARIN SODIUM 40 MG: 40 INJECTION SUBCUTANEOUS at 09:05

## 2022-07-01 RX ADMIN — MACITENTAN 10 MG: 10 TABLET, FILM COATED ORAL at 09:06

## 2022-07-01 RX ADMIN — ISODIUM CHLORIDE 3 ML: 0.03 SOLUTION RESPIRATORY (INHALATION) at 15:06

## 2022-07-01 RX ADMIN — LEVALBUTEROL HYDROCHLORIDE 1.25 MG: 1.25 SOLUTION, CONCENTRATE RESPIRATORY (INHALATION) at 15:06

## 2022-07-01 RX ADMIN — ACETAMINOPHEN 650 MG: 650 SUSPENSION ORAL at 21:53

## 2022-07-01 RX ADMIN — GLYCERIN, HYPROMELLOSE, POLYETHYLENE GLYCOL 2 DROP: .2; .2; 1 LIQUID OPHTHALMIC at 09:06

## 2022-07-01 RX ADMIN — PANTOPRAZOLE SODIUM 40 MG: 40 INJECTION, POWDER, FOR SOLUTION INTRAVENOUS at 09:05

## 2022-07-01 RX ADMIN — FOLIC ACID 1 MG: 1 TABLET ORAL at 15:16

## 2022-07-01 RX ADMIN — IRON SUCROSE 200 MG: 20 INJECTION, SOLUTION INTRAVENOUS at 21:41

## 2022-07-01 RX ADMIN — LEVALBUTEROL HYDROCHLORIDE 1.25 MG: 1.25 SOLUTION, CONCENTRATE RESPIRATORY (INHALATION) at 19:10

## 2022-07-01 RX ADMIN — SILDENAFIL CITRATE 10 MG: 20 TABLET ORAL at 20:07

## 2022-07-01 NOTE — PROGRESS NOTES
[facility]  Progress Note [place] 1940, 80 y o  female MRN: [id number]  Unit/Bed#: [facility] 204-01 Encounter: [id number]  Primary Care Provider: No primary care provider on file     Date and time admitted to hospital: 6/14/2022  8:07 PM    * Sacral wound  Assessment & Plan  Stage IV sacral ulcer  Status post I&D with certain surgery  Wound care per General surgery  Received IV antibiotics, presently being monitored off of antibiotics, Infectious Disease input noted  Frequent repositioning  Optimize nutritional status  General surgery following      Pulmonary hypertension ([address] )  Assessment & Plan  Crest syndrome with pulmonary hypertension  Cardiology plans for initiating sildenafil noted  Continue supplemental oxygen  Cardiology following      Acute respiratory failure with hypoxia ([facility] [address] )  Assessment & Plan  Multifactorial  Continue supplemental oxygen  Pulmonary input noted        Proximal muscle weakness  Assessment & Plan  Performed upper and lower extremity proximal muscle weakness concerning for dermatomyositis  Discussed with Rheumatology  Rheumatology plans for IVIG treatment noted  Supportive care    Hearing loss  Assessment & Plan  History of progressive hearing loss  Outpatient ENT follow-up    Pneumonia  Assessment & Plan  Resolved  Completed antibiotics  Supportive care  Aspiration precautions    Urinary retention  Assessment & Plan  Urinary retention with Tejada catheter in place  Outpatient Urology follow-up    Anemia  Assessment & Plan  Multifactorial  Iron studies noted  Iron supplementation  Folic acid supplementation  Monitor hemoglobin      Acquired hypothyroidism  Assessment & Plan  Continue levothyroxine    Scleroderma (HCC)  Assessment & Plan  Scleroderma with crest syndrome  Rheumatology input noted  Communicated with Rheumatology via [employer] connect  Awaiting Rheumatology inputs regarding inpatient IVIG treatments  Continue prednisone    Dysphagia, oropharyngeal phase  Assessment & Plan  Patient with history of dysphagia  PEG tube in place  Continue current tube feeds  Aspiration precautions    Severe protein-calorie malnutrition ([address] )  Assessment & Plan  Malnutrition Findings:   Adult Malnutrition type: Chronic illness  Adult Degree of Malnutrition: Other severe protein calorie malnutrition  Malnutrition Characteristics: Muscle loss, Fat loss, Weight loss, Inadequate energy           360 Statement: related to disease/condition evidenced by increased kcal/pro needs for wound healing Stage IV sacral decub, BMI 18 4 adjusted per prior facility wt 6/7/22;severe buccal fat pads loss, severe deltoid muscle loss, Patient lost 31# (20 6%) since 3/15/22 past 3 months with illness <75% energy intake versus needs for > 1 month  Treating with EN support and Robert TID via PEG for wound healing    BMI Findings:  Adult BMI Classifications: Underweight < 18 5        Body mass index is 22 53 kg/m²  VTE Pharmacologic Prophylaxis: VTE Score: 7 High Risk (Score >/= 5) - Pharmacological DVT Prophylaxis Ordered: enoxaparin (Lovenox)  Sequential Compression Devices Ordered  Patient Centered Rounds: I performed bedside rounds with nursing staff today  Discussions with Specialists or Other Care Team Provider:  Rheumatology    Education and Discussions with Family / Patient: Patient, updated daughter Kady Laboy in detail questions answered  Time Spent for Care: 30 minutes  More than 50% of total time spent on counseling and coordination of care as described above      Current Length of Stay: 17 day(s)  Current Patient Status: Inpatient   Certification Statement: The patient will continue to require additional inpatient hospital stay due to As outlined  Discharge Plan: Awaiting clinical and symptomatic improvement    Code Status: Level 3 - DNAR and DNI    Subjective:     Comfortably in bed  Reports feeling tired  Reports bilateral shoulder weakness  "My nose is feeling stuffy"  She is agreeable to NS spray, flonase  Encouraged incentive spirometry as able  Encouraged frequent changing positions - discussed with RN    History chart labs medications reviewed in detail      Objective:     Vitals:   Temp (24hrs), Av °F (36 7 °C), Min:97 4 °F (36 3 °C), Max:99 1 °F (37 3 °C)    Temp:  [97 4 °F (36 3 °C)-99 1 °F (37 3 °C)] 99 1 °F (37 3 °C)  HR:  [72-86] 80  Resp:  [14-23] 20  BP: ()/(45-59) 99/45  SpO2:  [91 %-100 %] 91 %  Body mass index is 22 53 kg/m²  Input and Output Summary (last 24 hours):      Intake/Output Summary (Last 24 hours) at 2022 1343  Last data filed at 2022 1101  Gross per 24 hour   Intake 810 ml   Output 1700 ml   Net -890 ml       Physical Exam:   Physical Exam     Comfortably in bed  Features of in protein calorie malnutrition noted  Neck supple  Lungs diminished breath sounds bilateral  Heart sounds S1 and S2 noted  Abdomen soft, nontender  Awake obeys simple commands  Proximal muscle weakness noted  No rash      Additional Data:     Labs:  Results from last 7 days   Lab Units 22  0514   WBC Thousand/uL 10 40*   HEMOGLOBIN g/dL 7 9*   HEMATOCRIT % 26 6*   PLATELETS Thousands/uL 405*   NEUTROS PCT % 77*   LYMPHS PCT % 12*   MONOS PCT % 7   EOS PCT % 2     Results from last 7 days   Lab Units 22  0514 22  2242   SODIUM mmol/L 140 141   POTASSIUM mmol/L 4 1 4 1   CHLORIDE mmol/L 103 104   CO2 mmol/L 32 35*   BUN mg/dL 27* 19   CREATININE mg/dL 0 30* 0 36*   ANION GAP mmol/L 5 2*   CALCIUM mg/dL 8 5 8 4   ALBUMIN g/dL  --  2 3*   TOTAL BILIRUBIN mg/dL  --  0 28   ALK PHOS U/L  --  84   ALT U/L  --  16   AST U/L  --  15   GLUCOSE RANDOM mg/dL 128 120         Results from last 7 days   Lab Units 22  1132 22  0528   POC GLUCOSE mg/dl 125 115         Results from last 7 days   Lab Units 22  2242   LACTIC ACID mmol/L 1 4   PROCALCITONIN ng/ml 0 11       Lines/Drains:  Invasive Devices  Report    Peripheral Intravenous Line  Duration           Long-Dwell Peripheral IV (Midline) 06/14/22 Right Brachial 16 days          Drain  Duration           Gastrostomy/Enterostomy -- days    Urethral Catheter -- days              Urinary Catheter:  Goal for removal: Voiding trial when ambulation improves               Imaging: Reviewed radiology reports from this admission including: chest xray and ECHO    Recent Cultures (last 7 days):         Last 24 Hours Medication List:   Current Facility-Administered Medications   Medication Dose Route Frequency Provider Last Rate    acetaminophen  650 mg Per G Tube Q6H Alexey Bender MD      acetaminophen  650 mg Per G Tube Q4H PRN Deanna Vyas MD      artificial tear   Both Eyes HS Deanna Vyas MD      aspirin  81 mg Per G Tube Daily Deanna Vyas MD      atovaquone  750 mg Per G Tube Daily With Breakfast Deanna Vyas MD      cholecalciferol  1,000 Units Per G Tube Daily Deanna Vyas MD      enoxaparin  40 mg Subcutaneous Q24H [address] Deanna Vyas MD      fluticasone  1 spray Each Nare BID Amita Ordaz MD      folic acid  1 mg Per PEG Tube Daily Amita Ordaz MD      glycerin-hypromellose-  2 drop Both Eyes BID Deanna Vyas MD      immune globulin, human  1,000 mg/kg (Adjusted) Intravenous Q24H Blanca Redmond MD      iron sucrose  200 mg Intravenous Daily Amita Ordaz MD      levalbuterol  1 25 mg Nebulization TID Makeda Hopper DO      levothyroxine  25 mcg Per PEG Tube Early Morning Deanna Vyas MD      loratadine  10 mg Per G Tube Daily Deanna Vyas MD      Macitentan  10 mg Per G Tube Daily Marika Wei MD      melatonin  3 mg Oral HS PRN Alexey Bender MD      ondansetron  4 mg Intravenous Q6H PRN Alexey Bender MD      oxyCODONE  2 5 mg Per G Tube Q4H PRN Alexey Bender MD      oxyCODONE  5 mg Per G Tube Q4H PRN Alexey Bender MD      pantoprazole  40 mg Intravenous Q24H [address] Celi Santacruz MD      predniSONE  30 mg Per G Tube Daily Celi Santacruz MD      saccharomyces boulardii  250 mg Per G Tube BID Celi Santacruz MD      sodium chloride  1 spray Each Nare Q1H PRN Madeleine Helton MD      sodium chloride  3 mL Nebulization TID Javy Song DO          Today, Patient Was Seen By: Maile Decker MD    **Please Note: This note may have been constructed using a voice recognition system  **

## 2022-07-01 NOTE — PLAN OF CARE
Problem: PHYSICAL THERAPY ADULT  Goal: Performs mobility at highest level of function for planned discharge setting  See evaluation for individualized goals  Description: Treatment/Interventions: LE strengthening/ROM, Therapeutic exercise, Endurance training, Patient/family training, Equipment eval/education, Bed mobility, OT, Spoke to nursing, Continued evaluation          See flowsheet documentation for full assessment, interventions and recommendations  Note: Prognosis: Fair  Problem List: Decreased strength, Decreased range of motion, Decreased endurance, Impaired balance, Decreased mobility, Impaired hearing, Pain, Decreased cognition  Assessment: Pt is seen today for therapy session  Pt tolerated therapeutic exercises well with no adverse reactions  During the session, pt demonstrated decrease strength of BLE, decreased hip ROM (L>R) and ankle ROM (L>R), pain, impaired endurance, decrease activity tolerance, decrease mobility due to fear of pain while having no pain at rest, and generalized weakness of UE which limits pt to return to PLOF and increase pts risk for fall  Pt will continue to benefit with skilled PT interventions while in the hospital to address the impairments stated above  Pt is recommended to rehab upon D/C  At the end of the session, pt was left supine in bed with call bell within reach  Educated pt on importance of HEP and to perform them throughout the day  HEP was written on whiteboard  Barriers to Discharge: Other (Comment) (underlying conditions)        PT Discharge Recommendation: Post acute rehabilitation services          See flowsheet documentation for full assessment

## 2022-07-01 NOTE — OCCUPATIONAL THERAPY NOTE
Occupational Therapy Progress Note     Patient Name: Jazmine Mario  ZJPJN'P Date: 7/1/2022  Problem List  Principal Problem:    Sacral wound  Active Problems:    Severe protein-calorie malnutrition (HCC)    Dysphagia, oropharyngeal phase    Scleroderma ([facility] [place] [address] )    Acquired hypothyroidism    Anemia    Urinary retention    Acute respiratory failure with hypoxia (HCC)    Pneumonia    Hearing loss    Pulmonary hypertension ([facility] [place] [address] )          07/01/22 1039   OT Last Visit   OT Visit Date 07/01/22   Note Type   Note Type Treatment   Restrictions/Precautions   Weight Bearing Precautions Per Order No   Other Precautions Fall Risk;Telemetry;Multiple lines;Pain   General   Response to Previous Treatment Patient with no complaints from previous session   Lifestyle   Autonomy Pt reports that in March she was I with ADLS, IADLS, and mobility w/o AD, at [facility] assist with ADL's/IADL's, assist of 1 with RW with mobility/transfers  Service to Others Pt is retired   Intrinsic Gratification Pt enjoys reading   Pain Assessment   Pain Assessment Tool 0-10   Pain Score No Pain   Bed Mobility   Additional Comments refused bed mobility 2* recent dressing change   Therapeutic Exercise - ROM   UE-ROM Yes   ROM- Right Upper Extremities   R Shoulder PROM   R Elbow AROM;Elbow extension;Elbow flexion   R Hand AROM; Thumb; Index finger; Long finger;Ring finger;Little finger   R Position Supine   ROM - Left Upper Extremities    L Shoulder PROM  (very restricted >90 degrees)   L Elbow AROM;Elbow flexion;Elbow extension   L Hand Thumb;Ring finger; Index finger; Long finger;Little finger;AROM   L Position Supine   Cognition   Overall Cognitive Status Impaired   Arousal/Participation Cooperative   Attention Attends with cues to redirect   Orientation Level Oriented X4   Memory Decreased recall of precautions   Following Commands Follows one step commands without difficulty   Comments pleasant, reporting she wanted to do limited movement 2* sacral pain Activity Tolerance   Activity Tolerance Patient limited by pain   Medical Staff Made Aware PT   Assessment   Assessment Patient participated in Skilled OT session this date with interventions consisting of therapeutic exercise to: increase functional use of BUEs, increase BUE muscle strength  and  therapeutic activities to: increase activity tolerance   Patient agreeable to OT treatment session, upon arrival patient was found supine in bed  In comparison to previous session, patient with improvements in TA*   Patient requiring frequent rest periods  Patient continues to be functioning below baseline level, occupational performance remains limited secondary to factors listed above and increased risk for falls and injury  From OT standpoint, recommendation at time of d/c would be Short Term Rehab  Patient to benefit from continued Occupational Therapy treatment while in the hospital to address deficits as defined above and maximize level of functional independence with ADLs and functional mobility  The patient's raw score on the AM-PAC Daily Activity inpatient short form low function score is 15, standardized score is Low Function Daily Activity Standardized Score: 26 28  Patients with a standardized score less than 39 4 are likely to benefit from discharge to post-acute rehab services  Please refer to the recommendation of the Occupational Therapist for safe discharge planning  Plan   Treatment Interventions ADL retraining;Functional transfer training; Endurance training   Goal Expiration Date 07/12/22   OT Treatment Day 3   OT Frequency 3-5x/wk   Recommendation   OT Discharge Recommendation Post acute rehabilitation services   [facility] Daily Activity Inpatient   Lower Body Dressing 1   Bathing 1   Toileting 1   Upper Body Dressing 2   Grooming 2   Eating 2   Daily Activity Raw Score 9   Turning Head Towards Sound 3   Follow Simple Instructions 3   Low Function Daily Activity Raw Score 15   Low Function Daily Activity Standardized Score 26 28   Modified Irwin Scale   Modified Irwin Scale 4     Gretchen Lozano MS, OTR/L

## 2022-07-01 NOTE — PHYSICAL THERAPY NOTE
Physical Therapy Treatment    Patient Name: Jeannie Vargas    [id number]'K Date: 7/1/2022     Problem List  Principal Problem:    Sacral wound  Active Problems:    Severe protein-calorie malnutrition ([address] )    Dysphagia, oropharyngeal phase    Scleroderma ([facility] [address] )    Acquired hypothyroidism    Anemia    Urinary retention    Acute respiratory failure with hypoxia (HCC)    Pneumonia    Hearing loss    Pulmonary hypertension (HCC)    Proximal muscle weakness       Past Medical History  Past Medical History:   Diagnosis Date    Dysphagia, oropharyngeal phase 06/06/2022        Past Surgical History  Past Surgical History:   Procedure Laterality Date    WOUND DEBRIDEMENT N/A 6/14/2022    Procedure: DEBRIDEMENT WOUND [facility] OUT); SACRUM AND BUTTOCKS;  Surgeon: Tessie Bonds MD;  Location: [facility];  Service: General           07/01/22 1040   PT Last Visit   PT Visit Date 07/01/22   Pain Assessment   Pain Assessment Tool 0-10   Pain Score No Pain   Restrictions/Precautions   Weight Bearing Precautions Per Order No   Other Precautions Multiple lines;Telemetry; Fall Risk;Pain;Hard of hearing   General   Family/Caregiver Present No   Cognition   Overall Cognitive Status Impaired   Attention Attends with cues to redirect   Orientation Level Oriented X4   Memory Decreased recall of precautions   Following Commands Follows one step commands without difficulty   Subjective   Subjective Pt was supine in bed upon PT arrival   Pt states she just had dressing changed and was in a lot of pain but denied of any pain in supine  Pt did not want to sit EOB due to fear of pain but in agreement to perform exercises in bed     Bed Mobility   Supine to Sit Unable to assess   Sit to Supine Unable to assess   Additional Comments Pt refused due to recent dressing change   Transfers   Sit to Stand Unable to assess   Stand to Sit Unable to assess   Endurance Deficit   Endurance Deficit Yes   Endurance Deficit Description weakness, pain   Activity Tolerance   Activity Tolerance Patient limited by pain   Medical Staff Made Aware OT   Nurse Made Aware RN cleared pt for therapy   Assessment   Prognosis Fair   Problem List Decreased strength;Decreased range of motion;Decreased endurance; Impaired balance;Decreased mobility; Impaired hearing;Pain;Decreased cognition   Assessment Pt is seen today for therapy session  Pt tolerated therapeutic exercises well with no adverse reactions  During the session, pt demonstrated decrease strength of BLE, decreased hip ROM (L>R) and ankle ROM (L>R), pain, impaired endurance, decrease activity tolerance, decrease mobility due to fear of pain while having no pain at rest, and generalized weakness of UE which limits pt to return to PLOF and increase pts risk for fall  Pt will continue to benefit with skilled PT interventions while in the hospital to address the impairments stated above  Pt is recommended to rehab upon D/C  At the end of the session, pt was left supine in bed with call bell within reach  Educated pt on importance of HEP and to perform them throughout the day  HEP was written on whiteboard  2 weeks added to goals due to slow progression   Barriers to Discharge Other (Comment)  (underlying conditions)   Goals   LTG Expiration Date 07/13/22   PT Treatment Day 2   Plan   Treatment/Interventions LE strengthening/ROM; Therapeutic exercise; Endurance training;Patient/family training;Bed mobility;OT   PT Frequency 2-3x/wk   Recommendation   PT Discharge Recommendation Post acute rehabilitation services   AM-PAC Basic Mobility Inpatient   Turning in Bed Without Bedrails 1   Lying on Back to Sitting on Edge of Flat Bed 1   Moving Bed to Chair 1   Standing Up From Chair 1   Walk in Room 1   Climb 3-5 Stairs 1   Basic Mobility Inpatient Raw Score 6   Turning Head Towards Sound 4   Follow Simple Instructions 4   Low Function Basic Mobility Raw Score 14   Low Function Basic Mobility Standardized Score 22 01   Highest Level Of Mobility   -HLM Goal 2: Bed activities/Dependent transfer   -HLM Achieved 2: Bed activities/Dependent transfer   Exercises   Quad Sets Supine;AROM; Bilateral  (2x 5 reps w/ 5 sec hold)   Hip Flexion Supine;AAROM; Bilateral  (1 x 10 reps)   Ankle Pumps Supine;20 reps;Bilateral     Parag Mulligan, SPT

## 2022-07-01 NOTE — PROGRESS NOTES
Heart Failure/ Pulmonary Hypertension Progress Note - Heriberto Fermin 80 y o  female MRN: [id number]    Unit/Bed#: [facility] 204-01 Encounter: [id number]      Assessment:    Principal Problem:    Sacral wound  Active Problems:    Severe protein-calorie malnutrition (HCC)    Dysphagia, oropharyngeal phase    Scleroderma (HCC)    Acquired hypothyroidism    Anemia    Urinary retention    Acute respiratory failure with hypoxia (HCC)    Pneumonia    Hearing loss    Pulmonary hypertension (HCC)    Proximal muscle weakness    Review of Systems - Cardiovascular ROS: no chest pain but chronic mild dyspnea on exertion, deconditioned, muscle wasting           # Chronic HFpEF, Stage C   Diuretic: on hold   Weight:   146 lbs   NT proBNP       # Pulmonary arterial hypertension   PAH Rx: Adempas 2 5 mg TID- stopped due to hypotension, Opsumit 10 mg daily   Starting sildanefil 10 mg TID      Studies- personally reviewed by me   EKG, SR, increased LA size, IRBBB       Echocardiogram 6/20/22   LVEF: 70%, grade 2 DD   RV: mildly dilated, wall thickness increased  LV is apex forming   Interatrial septum bows in RA c/w increased LA pressures   MR:   PASP: 56 mmHg   RVOT: no notching   AV: moderately calcified, mild AS   TV: moderate TR       # Scleroderma, seen by Rheumatology   # Dysphagia, Peg tube in place, tube feeds   # severe protein calorie malnutrition, BMI 18   # Sacral wound   # Pneumonia   # urinary retention   # anemia, HgB 7 9, s/p 1 unit pRBCs   # acquired hypothyroid           Plan:   Given her hypotension and inability to properly decrease Adempas in the hospital (need 1 mg pills), changing to sildanefil 10 mg TID in hospital (after 24 hour washout)  Continue with Opsumit   If anemia continues without clear source could consider stopping Opsumit as can cause anemia           Review of Systems   Constitutional: Negative for activity change, appetite change, fatigue and unexpected weight change     HENT: Negative for congestion and nosebleeds  Eyes: Negative  Respiratory: Negative for cough, chest tightness and shortness of breath  Cardiovascular: Negative for chest pain, palpitations and leg swelling  Gastrointestinal: Negative for abdominal distention  Endocrine: Negative  Genitourinary: Negative  Musculoskeletal: Negative  Skin: Negative  Neurological: Negative for dizziness, syncope and weakness  Hematological: Negative  Psychiatric/Behavioral: Negative  [facility] (day, reason): Tejada catheter (day, reason):    Vitals: Blood pressure (!) 99/45, pulse 80, temperature 99 1 °F (37 3 °C), temperature source Oral, resp  rate 20, height 5' 7 5" (1 715 m), weight 66 2 kg (146 lb), SpO2 91 % , Body mass index is 22 53 kg/m² , I/O last 3 completed shifts: In: 2341 [I V :10; NG/GT:750; IV Piggyback:625; Feedings:956]  Out: 1850 [HNAWK:2751; Drains:200]  I/O this shift: In: 400 [NG/GT:400]  Out: 350 [Urine:350]  Wt Readings from Last 3 Encounters:   06/20/22 66 2 kg (146 lb)   06/14/22 57 9 kg (127 lb 10 3 oz)       Intake/Output Summary (Last 24 hours) at 7/1/2022 1432  Last data filed at 7/1/2022 1101  Gross per 24 hour   Intake 560 ml   Output 1300 ml   Net -740 ml     I/O last 3 completed shifts: In: 2341 [I V :10; NG/GT:750; IV Piggyback:625; Feedings:956]  Out: 7230 [Urine:1650; Drains:200]    No significant arrhythmias seen on telemetry review         Physical Exam:  Vitals:    07/01/22 0300 07/01/22 0700 07/01/22 0900 07/01/22 1100   BP: 105/50 103/51  (!) 99/45   Pulse: 76 78 83 80   Resp: 17 19  20   Temp:  (!) 97 4 °F (36 3 °C)  99 1 °F (37 3 °C)   TempSrc:  Oral  Oral   SpO2: 92% 92% 93% 91%   Weight:       Height:           GEN: Celia Shore appears well, alert and oriented x 3, pleasant and cooperative   HEENT: pupils equal, round, and reactive to light; extraocular muscles intact  NECK: supple, no carotid bruits   HEART: regular rhythm, normal S1 and S2, no murmurs, clicks, gallops or rubs, JVP is    LUNGS: clear to auscultation bilaterally; no wheezes, rales, or rhonchi   ABDOMEN: normal bowel sounds, soft, no tenderness, no distention  EXTREMITIES: peripheral pulses normal; no clubbing, cyanosis, or edema  NEURO: no focal findings   SKIN: normal without suspicious lesions on exposed skin      Current Facility-Administered Medications:     acetaminophen (TYLENOL) oral suspension 650 mg, 650 mg, Per G Tube, Q6H, Kane Hernandez MD, 650 mg at 07/01/22 1121    acetaminophen (TYLENOL) oral suspension 650 mg, 650 mg, Per G Tube, Q4H PRN, Vernon Fletcher MD, 650 mg at 07/01/22 0020    artificial tear (LUBRIFRESH P M ) ophthalmic ointment, , Both Eyes, HS, Vernon Fletcher MD, Given at 06/28/22 2144    aspirin chewable tablet 81 mg, 81 mg, Per G Tube, Daily, Vernon Fletcher MD, 81 mg at 07/01/22 0905    atovaquone (MEPRON) oral suspension 750 mg, 750 mg, Per G Tube, Daily With Breakfast, Vernon Fletcher MD, 750 mg at 07/01/22 8282    cholecalciferol (VITAMIN D3) tablet 1,000 Units, 1,000 Units, Per Arvis Godoy, Daily, Vernon Fletcher MD, 1,000 Units at 07/01/22 0905    enoxaparin (LOVENOX) subcutaneous injection 40 mg, 40 mg, Subcutaneous, Q24H [facility], Vernon Fletcher MD, 40 mg at 07/01/22 0905    fluticasone (FLONASE) 50 mcg/act nasal spray 1 spray, 1 spray, Each Nare, BID, Sumit Grover MD    folic acid (FOLVITE) tablet 1 mg, 1 mg, Per PEG Tube, Daily, Sumit Grover MD    glycerin-hypromellose- (ARTIFICIAL TEARS) ophthalmic solution 2 drop, 2 drop, Both Eyes, BID, Vernon Fletcher MD, 2 drop at 07/01/22 0906    immune globulin, human (GAMUNEX-C) 65 g 650 mL IV soln, 1,000 mg/kg (Adjusted), Intravenous, Q24H, rAabella Nguyen MD    iron sucrose (VENOFER) 200 mg in sodium chloride 0 9 % 100 mL IVPB, 200 mg, Intravenous, Daily, Sumit Grover MD    [facility]) inhalation solution 1 25 mg, 1 25 mg, Nebulization, TID, Jeff Feliz DO, 1 25 mg at 07/01/22 0900    levothyroxine tablet 25 mcg, 25 mcg, Per PEG Tube, Early Morning, Felicity Ness MD, 25 mcg at 07/01/22 0505    loratadine (CLARITIN) tablet 10 mg, 10 mg, Per Brooklyn Caulk, Daily, Felicity Ness MD, 10 mg at 07/01/22 0905    Macitentan (OPSUMIT) tablet 10 mg, 10 mg, Per G Tube, Daily, Iris Weldon MD, 10 mg at 07/01/22 0447    melatonin tablet 3 mg, 3 mg, Oral, HS PRN, Evan Raya MD, 3 mg at 07/01/22 0035    ondansetron (ZOFRAN) injection 4 mg, 4 mg, Intravenous, Q6H PRN, Evan Raya MD    oxyCODONE (ROXICODONE) oral solution 2 5 mg, 2 5 mg, Per G Tube, Q4H PRN, Evan Raya MD, 2 5 mg at 06/30/22 0016    oxyCODONE (ROXICODONE) oral solution 5 mg, 5 mg, Per G Tube, Q4H PRN, Evan Raya MD, 5 mg at 06/30/22 2300    pantoprazole (PROTONIX) injection 40 mg, 40 mg, Intravenous, Q24H [address], Felicity Ness MD, 40 mg at 07/01/22 2678    predniSONE tablet 30 mg, 30 mg, Per Brooklyn Caulk, Daily, Felicity Ness MD, 30 mg at 07/01/22 1292    saccharomyces boulardii (FLORASTOR) capsule 250 mg, 250 mg, Per G Tube, BID, Felicity Ness MD, 250 mg at 07/01/22 1963    sildenafil (REVATIO) tablet 10 mg, 10 mg, Oral, TID, Last Mcknight MD    sodium chloride (OCEAN) 0 65 % nasal spray 1 spray, 1 spray, Each Nare, Q1H PRN, Iris Weldon MD    sodium chloride 0 9 % inhalation solution 3 mL, 3 mL, Nebulization, TID, Dominique Strange DO, 3 mL at 07/01/22 0900      Labs & Results:    Results from last 7 days   Lab Units 07/01/22  0514   CK TOTAL U/L 16*     Results from last 7 days   Lab Units 07/01/22  0514 06/29/22  2242 06/28/22  0559   WBC Thousand/uL 10 40* 9 64 10 07   HEMOGLOBIN g/dL 7 9* 7 6* 7 9*   HEMATOCRIT % 26 6* 25 7* 26 2*   PLATELETS Thousands/uL 405* 384 405*         Results from last 7 days   Lab Units 07/01/22  0514 06/29/22  2242 06/28/22  0500 06/27/22  0510   POTASSIUM mmol/L 4 1 4 1 4 0 3 9   CHLORIDE mmol/L 103 104 103 100   CO2 mmol/L 32 35* 34* 32   BUN mg/dL 27* 19 26* 37*   CREATININE mg/dL 0 30* 0 36* 0 38* 0 33*   CALCIUM mg/dL 8 5 8 4 8 5 8 6   ALK PHOS U/L  --  84 83 88   ALT U/L  --  16 19 19   AST U/L  --  15 19 17         Counseling / Coordination of Care  Total floor / unit time spent today 25 minutes  Greater than 50% of total time was spent with the patient and / or family counseling and / or coordination of care  A description of the counseling / coordination of care: 15  Thank you for the opportunity to participate in the care of this patient    [address] [facility] PULMONARY HYPERTENSION  MEDICAL DIRECTOR OF [place]

## 2022-07-01 NOTE — CASE MANAGEMENT
Case Management Discharge Planning Note    Patient name Jayna Velez  Location [facility]- MRN [id number]  : 1940 Date 2022       Current Admission Date: 2022  Current Admission Diagnosis:Sacral wound   Patient Active Problem List    Diagnosis Date Noted    Proximal muscle weakness 2022    Pulmonary hypertension ([facility] [address] ) 2022    Hearing loss 2022    Severe protein-calorie malnutrition ([facility] [address] ) 2022    Pneumonia 2022    Sacral wound 2022    Dysphagia, oropharyngeal phase 2022    Scleroderma ([facility] [address] ) 2022    Acquired hypothyroidism 2022    Anemia 2022    Urinary retention 2022    Acute respiratory failure with hypoxia ([facility] [address] ) 2022      LOS (days): 17  Geometric Mean LOS (GMLOS) (days): 9 60  Days to GMLOS:-7 2     OBJECTIVE:  Risk of Unplanned Readmission Score: 23 41         Current admission status: Inpatient   Preferred Pharmacy:   PATIENT/FAMILY REPORTS NO PREFERRED PHARMACY  No address on file      Primary Care Provider: No primary care provider on file  Primary Insurance: [facility]  Secondary Insurance:     DISCHARGE DETAILS:                             Additional Comments: Met with patient, daughter Arpita Guy and sister at bedside  Reviewed current facility responses and provided Arpita Guy with a printed list of all referrals/responses  At this time [facility] only accepting facility; [facility] reviewing  CM to follow

## 2022-07-01 NOTE — PLAN OF CARE
Problem: MOBILITY - ADULT  Goal: Maintain or return to baseline ADL function  Description: INTERVENTIONS:  -  Assess patient's ability to carry out ADLs; assess patient's baseline for ADL function and identify physical deficits which impact ability to perform ADLs (bathing, care of mouth/teeth, toileting, grooming, dressing, etc )  - Assess/evaluate cause of self-care deficits   - Assess range of motion  - Assess patient's mobility; develop plan if impaired  - Assess patient's need for assistive devices and provide as appropriate  - Encourage maximum independence but intervene and supervise when necessary  - Involve family in performance of ADLs  - Assess for home care needs following discharge   - Consider OT consult to assist with ADL evaluation and planning for discharge  - Provide patient education as appropriate  Outcome: Not Progressing     Problem: Potential for Falls  Goal: Patient will remain free of falls  Description: INTERVENTIONS:  - Educate patient/family on patient safety including physical limitations  - Instruct patient to call for assistance with activity   - Consult OT/PT to assist with strengthening/mobility   - Keep Call bell within reach  - Keep bed low and locked with side rails adjusted as appropriate  - Keep care items and personal belongings within reach  - Initiate and maintain comfort rounds  - Make Fall Risk Sign visible to staff  - Consider moving patient to room near nurses station  Outcome: Progressing

## 2022-07-01 NOTE — PLAN OF CARE
Problem: OCCUPATIONAL THERAPY ADULT  Goal: Performs self-care activities at highest level of function for planned discharge setting  See evaluation for individualized goals  Description: Treatment Interventions: ADL retraining, Functional transfer training, UE strengthening/ROM, Endurance training, Cognitive reorientation, Patient/family training, Equipment evaluation/education, Compensatory technique education, Fine motor coordination activities, Continued evaluation, Energy conservation, Activityengagement          See flowsheet documentation for full assessment, interventions and recommendations  Outcome: Progressing  Note: Limitation: Decreased ADL status, Decreased UE ROM, Decreased UE strength, Decreased Safe judgement during ADL, Decreased endurance, Decreased fine motor control, Decreased self-care trans, Decreased high-level ADLs  Prognosis: Fair  Assessment: Patient participated in Skilled OT session this date with interventions consisting of therapeutic exercise to: increase functional use of BUEs, increase BUE muscle strength  and  therapeutic activities to: increase activity tolerance   Patient agreeable to OT treatment session, upon arrival patient was found supine in bed  In comparison to previous session, patient with improvements in TA*   Patient requiring frequent rest periods  Patient continues to be functioning below baseline level, occupational performance remains limited secondary to factors listed above and increased risk for falls and injury  From OT standpoint, recommendation at time of d/c would be Short Term Rehab  Patient to benefit from continued Occupational Therapy treatment while in the hospital to address deficits as defined above and maximize level of functional independence with ADLs and functional mobility    The patient's raw score on the AM-PAC Daily Activity inpatient short form low function score is 15, standardized score is Low Function Daily Activity Standardized Score: 26 28  Patients with a standardized score less than 39 4 are likely to benefit from discharge to post-acute rehab services  Please refer to the recommendation of the Occupational Therapist for safe discharge planning       OT Discharge Recommendation: Post acute rehabilitation services  OT - OK to Discharge: Yes (when medically stable)

## 2022-07-01 NOTE — PLAN OF CARE
Problem: MOBILITY - ADULT  Goal: Maintain or return to baseline ADL function  Description: INTERVENTIONS:  -  Assess patient's ability to carry out ADLs; assess patient's baseline for ADL function and identify physical deficits which impact ability to perform ADLs (bathing, care of mouth/teeth, toileting, grooming, dressing, etc )  - Assess/evaluate cause of self-care deficits   - Assess range of motion  - Assess patient's mobility; develop plan if impaired  - Assess patient's need for assistive devices and provide as appropriate  - Encourage maximum independence but intervene and supervise when necessary  - Involve family in performance of ADLs  - Assess for home care needs following discharge   - Consider OT consult to assist with ADL evaluation and planning for discharge  - Provide patient education as appropriate  Outcome: Progressing     Problem: Prexisting or High Potential for Compromised Skin Integrity  Goal: Skin integrity is maintained or improved  Description: INTERVENTIONS:  - Identify patients at risk for skin breakdown  - Assess and monitor skin integrity  - Assess and monitor nutrition and hydration status  - Monitor labs   - Assess for incontinence   - Turn and reposition patient  - Assist with mobility/ambulation  - Relieve pressure over bony prominences  - Avoid friction and shearing  - Provide appropriate hygiene as needed including keeping skin clean and dry  - Evaluate need for skin moisturizer/barrier cream  - Collaborate with interdisciplinary team   - Patient/family teaching  - Consider wound care consult   Outcome: Progressing     Problem: SAFETY ADULT  Goal: Maintain or return to baseline ADL function  Description: INTERVENTIONS:  -  Assess patient's ability to carry out ADLs; assess patient's baseline for ADL function and identify physical deficits which impact ability to perform ADLs (bathing, care of mouth/teeth, toileting, grooming, dressing, etc )  - Assess/evaluate cause of self-care deficits   - Assess range of motion  - Assess patient's mobility; develop plan if impaired  - Assess patient's need for assistive devices and provide as appropriate  - Encourage maximum independence but intervene and supervise when necessary  - Involve family in performance of ADLs  - Assess for home care needs following discharge   - Consider OT consult to assist with ADL evaluation and planning for discharge  - Provide patient education as appropriate  Outcome: Progressing

## 2022-07-01 NOTE — PROGRESS NOTES
[facility]  Progress Note Tatiana Hensonyker 1940, 80 y o  female MRN: [id number]  [facility] Encounter: [id number]  Primary Care Provider: No primary care provider on file     Date and time admitted to hospital: 6/14/2022  8:07 PM    Pulmonary hypertension ([address] )  Assessment & Plan  "With hypotension   Outpatient cardiology discussed with  regarding adjusting medications here since pressures are low "  HF team consulted; discontinued Riociguat 1 25 mg TID due to hypotension  Pending low-dose Sildenafil 10 mg TID tomorrow afternoon following Riociguat washout period    Hearing loss  Assessment & Plan  Patient reports 3 months of progressively worsening hearing  As per previous admission, concerning for atovaquone versus Lasix  Discussed with ENT, have declines seeing patient in hospital; consider outpatient referral  Trial different antibiotic prophylaxis    Pneumonia  Assessment & Plan  "See accompanying plan under acute respiratory failure  CXR shows bilateral infiltrations on 6/19  CXR from yesterday shows improvement in infiltrates   No indication for ABX at this time"    Acute respiratory failure with hypoxia [facility])  Assessment & Plan  Stable  Patient remains on 2 L  Pulmonology consulted; appreciate assistance and continue recs          Urinary retention  Assessment & Plan  Patient with history of urinary retention and spasm; previously on myrbetriq and Gemtesa  Continue Tejada catheter at this time and outpatient management by Urology    Anemia  Assessment & Plan  Stable  "Patient noted to have low hemoglobin on admission-6 9  Has received 1 unit PRBCs on 06/15, but repeat CBC was 6 5  Iron panel shows low iron and TIBC consistent with anemia of chronic disease; reticulocytes elevated  B12 and folate within normal limits; will consider iron supplementation once infection resolved  In the meantime, hemoglobin stable while on Opsumit" per prior provider      Acquired hypothyroidism  Assessment & Plan  TSH within reference range  Continue home Synthroid    Scleroderma [facility])  Assessment & Plan  Rheumatology consulted; appreciate assistance  Pulmonology consulted; pending discussions with rheumatology about IVIG  Rheumatology contacted via TT; pending follow-up on IVIG    Dysphagia, oropharyngeal phase  Assessment & Plan  Patient with history of dysphagia  PEG tube in place  Continue current tube feeds; appreciate nutritional recommendations    Severe protein-calorie malnutrition ([address] )  Assessment & Plan  Malnutrition Findings:   Adult Malnutrition type: Chronic illness  Adult Degree of Malnutrition: Other severe protein calorie malnutrition  Malnutrition Characteristics: Muscle loss, Fat loss, Weight loss, Inadequate energy           360 Statement: related to disease/condition evidenced by increased kcal/pro needs for wound healing Stage IV sacral decub, BMI 18 4 adjusted per prior facility wt 6/7/22;severe buccal fat pads loss, severe deltoid muscle loss, Patient lost 31# (20 6%) since 3/15/22 past 3 months with illness <75% energy intake versus needs for > 1 month  Treating with EN support and Robert TID via PEG for wound healing    BMI Findings:  Adult BMI Classifications: Underweight < 18 5        Body mass index is 22 53 kg/m²         * Sacral wound  Assessment & Plan  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  Underwent surgical debridement on 06/14/2022 with general surgery; transferred to medicine service for chronic illness  Initially placed on vanc and cefepime for antibiotic therapy; however ID evaluated and stopped antibiotics  Repositioning; wound care consult  Supportive care, pain medications  Nutritional support  VAC dressing changed by surgery this morning with pending dressing change tomorrow  Pending medical clearance from General Surgery          VTE Pharmacologic Prophylaxis: VTE Score: 3 Moderate Risk (Score 3-4) - Pharmacological DVT Prophylaxis Ordered: enoxaparin (Lovenox)  Patient Centered Rounds: I performed bedside rounds with nursing staff today  Discussions with Specialists or Other Care Team Provider: Nurse and      Education and Discussions with Family / Patient: Updated  () via phone  Time Spent for Care: 30 minutes  More than 50% of total time spent on counseling and coordination of care as described above  Current Length of Stay: 16 day(s)  Current Patient Status: Inpatient   Certification Statement: The patient will continue to require additional inpatient hospital stay due to Pending pulmonology and cardiology clearance  Discharge Plan: Anticipate discharge in 48-72 hrs to discharge location to be determined pending rehab evaluations  Code Status: Level 3 - DNAR and DNI    Subjective:   Patient states that she is doing well despite having hypotensive readings overnight  She denies chest pain or shortness of breath  Objective:     Vitals:   Temp (24hrs), Av 1 °F (36 7 °C), Min:97 7 °F (36 5 °C), Max:98 7 °F (37 1 °C)    Temp:  [97 7 °F (36 5 °C)-98 7 °F (37 1 °C)] 98 °F (36 7 °C)  HR:  [72-86] 74  Resp:  [14-27] 14  BP: ()/(44-56) 95/46  SpO2:  [91 %-100 %] 94 %  Body mass index is 22 53 kg/m²  Input and Output Summary (last 24 hours): Intake/Output Summary (Last 24 hours) at 2022  Last data filed at 2022 1401  Gross per 24 hour   Intake 2181 ml   Output 900 ml   Net 1281 ml       Physical Exam:   Physical Exam  Vitals and nursing note reviewed  Constitutional:       General: She is not in acute distress  Appearance: She is normal weight  She is not toxic-appearing or diaphoretic  Comments: Frail and chronically ill in appearance   HENT:      Head: Normocephalic and atraumatic        Right Ear: External ear normal       Left Ear: External ear normal       Nose: Nose normal       Mouth/Throat:      Mouth: Mucous membranes are dry  Eyes:      Extraocular Movements: Extraocular movements intact  Conjunctiva/sclera: Conjunctivae normal    Cardiovascular:      Rate and Rhythm: Normal rate and regular rhythm  Heart sounds: Murmur heard  No gallop  Pulmonary:      Effort: Pulmonary effort is normal  No respiratory distress  Breath sounds: No stridor  No wheezing or rhonchi  Abdominal:      General: Bowel sounds are normal  There is no distension  Palpations: Abdomen is soft  Tenderness: There is no abdominal tenderness  There is no guarding or rebound  Comments: Feeding tube present   Genitourinary:     Comments: Tejada draining clear yellow urine  Musculoskeletal:         General: No tenderness  Cervical back: Normal range of motion  Right lower leg: No edema  Left lower leg: No edema  Skin:     General: Skin is warm  Coloration: Skin is not pale  Findings: No erythema or rash  Neurological:      General: No focal deficit present  Mental Status: She is alert and oriented to person, place, and time  Mental status is at baseline  Motor: No weakness  Psychiatric:         Mood and Affect: Mood normal          Behavior: Behavior normal          Thought Content:  Thought content normal          Judgment: Judgment normal           Additional Data:     Labs:  Results from last 7 days   Lab Units 06/29/22 2242 06/28/22  0559   WBC Thousand/uL 9 64 10 07   HEMOGLOBIN g/dL 7 6* 7 9*   HEMATOCRIT % 25 7* 26 2*   PLATELETS Thousands/uL 384 405*   NEUTROS PCT %  --  77*   LYMPHS PCT %  --  12*   MONOS PCT %  --  8   EOS PCT %  --  2     Results from last 7 days   Lab Units 06/29/22 2242   SODIUM mmol/L 141   POTASSIUM mmol/L 4 1   CHLORIDE mmol/L 104   CO2 mmol/L 35*   BUN mg/dL 19   CREATININE mg/dL 0 36*   ANION GAP mmol/L 2*   CALCIUM mg/dL 8 4   ALBUMIN g/dL 2 3*   TOTAL BILIRUBIN mg/dL 0 28   ALK PHOS U/L 84   ALT U/L 16   AST U/L 15   GLUCOSE RANDOM mg/dL 120         Results from last 7 days   Lab Units 06/27/22  1132 06/27/22  0528   POC GLUCOSE mg/dl 125 115         Results from last 7 days   Lab Units 06/29/22  2242   LACTIC ACID mmol/L 1 4   PROCALCITONIN ng/ml 0 11       Lines/Drains:  Invasive Devices  Report    Peripheral Intravenous Line  Duration           Long-Dwell Peripheral IV (Midline) 06/14/22 Right Brachial 16 days          Drain  Duration           Gastrostomy/Enterostomy -- days    Urethral Catheter -- days              Urinary Catheter:  Goal for removal: N/A- Discharging with Tejada           Imaging: No pertinent imaging reviewed      Recent Cultures (last 7 days):         Last 24 Hours Medication List:   Current Facility-Administered Medications   Medication Dose Route Frequency Provider Last Rate    acetaminophen  650 mg Per G Tube Q6H Olinda Ansari MD      acetaminophen  650 mg Per G Tube Q4H PRN Kalyani Giang MD      artificial tear   Both Eyes HS Kalyani Giang MD      aspirin  81 mg Per G Tube Daily Kalyani Giang MD      atovaquone  750 mg Per G Tube Daily With Breakfast Kalyani Giang MD      cholecalciferol  1,000 Units Per G Tube Daily Kalyani Giang MD      enoxaparin  40 mg Subcutaneous Q24H [address] Kalyani Giang MD      glycerin-hypromellose-  2 drop Both Eyes BID Kalyani Giang MD      levalbuterol  1 25 mg Nebulization TID Matilde Herbert,       levothyroxine  25 mcg Per PEG Tube Early Morning Kalyani Giang MD      loratadine  10 mg Per G Tube Daily Kalyani Giang MD      [START ON 7/1/2022] Macitentan  10 mg Per G Tube Daily Ja Junior MD      melatonin  3 mg Oral HS PRN Olinda Ansari MD      ondansetron  4 mg Intravenous Q6H PRN Olinda Ansari MD      oxyCODONE  2 5 mg Per G Tube Q4H PRN Olinda Ansari MD      oxyCODONE  5 mg Per G Tube Q4H PRN Olinda Ansari MD      pantoprazole  40 mg Intravenous Q24H [address] Kalyani Giang MD      predniSONE  30 mg Per G Tube Daily Nathaly Walker MD      saccharomyces boulardii  250 mg Per G Tube BID Nathaly Walker MD      sodium chloride  1 spray Each Nare Q1H PRN Shanice Mendez MD      sodium chloride  3 mL Nebulization TID Michel Haq DO          Today, Patient Was Seen By: Shanice Mendez MD    **Please Note: This note may have been constructed using a voice recognition system  **

## 2022-07-01 NOTE — ASSESSMENT & PLAN NOTE
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

## 2022-07-02 LAB
25(OH)D3 SERPL-MCNC: 45.3 NG/ML (ref 30–100)
CK SERPL-CCNC: 14 U/L (ref 26–192)
CRP SERPL QL: 55.1 MG/L
ERYTHROCYTE [SEDIMENTATION RATE] IN BLOOD: 50 MM/HOUR (ref 0–29)

## 2022-07-02 PROCEDURE — 86235 NUCLEAR ANTIGEN ANTIBODY: CPT | Performed by: INTERNAL MEDICINE

## 2022-07-02 PROCEDURE — 82306 VITAMIN D 25 HYDROXY: CPT | Performed by: INTERNAL MEDICINE

## 2022-07-02 PROCEDURE — 94640 AIRWAY INHALATION TREATMENT: CPT

## 2022-07-02 PROCEDURE — 82550 ASSAY OF CK (CPK): CPT | Performed by: INTERNAL MEDICINE

## 2022-07-02 PROCEDURE — 82085 ASSAY OF ALDOLASE: CPT | Performed by: INTERNAL MEDICINE

## 2022-07-02 PROCEDURE — 94760 N-INVAS EAR/PLS OXIMETRY 1: CPT

## 2022-07-02 PROCEDURE — 85652 RBC SED RATE AUTOMATED: CPT | Performed by: INTERNAL MEDICINE

## 2022-07-02 PROCEDURE — 86140 C-REACTIVE PROTEIN: CPT | Performed by: INTERNAL MEDICINE

## 2022-07-02 PROCEDURE — 99232 SBSQ HOSP IP/OBS MODERATE 35: CPT | Performed by: INTERNAL MEDICINE

## 2022-07-02 PROCEDURE — C9113 INJ PANTOPRAZOLE SODIUM, VIA: HCPCS | Performed by: INTERNAL MEDICINE

## 2022-07-02 RX ADMIN — SILDENAFIL CITRATE 10 MG: 20 TABLET ORAL at 09:01

## 2022-07-02 RX ADMIN — ISODIUM CHLORIDE 3 ML: 0.03 SOLUTION RESPIRATORY (INHALATION) at 21:08

## 2022-07-02 RX ADMIN — Medication 1000 UNITS: at 09:01

## 2022-07-02 RX ADMIN — ISODIUM CHLORIDE 3 ML: 0.03 SOLUTION RESPIRATORY (INHALATION) at 14:18

## 2022-07-02 RX ADMIN — ACETAMINOPHEN 650 MG: 650 SUSPENSION ORAL at 22:13

## 2022-07-02 RX ADMIN — ISODIUM CHLORIDE 3 ML: 0.03 SOLUTION RESPIRATORY (INHALATION) at 08:31

## 2022-07-02 RX ADMIN — ACETAMINOPHEN 650 MG: 650 SUSPENSION ORAL at 16:15

## 2022-07-02 RX ADMIN — OXYCODONE HYDROCHLORIDE 2.5 MG: 5 SOLUTION ORAL at 10:28

## 2022-07-02 RX ADMIN — ATOVAQUONE 750 MG: 750 SUSPENSION ORAL at 06:42

## 2022-07-02 RX ADMIN — GLYCERIN, HYPROMELLOSE, POLYETHYLENE GLYCOL 2 DROP: .2; .2; 1 LIQUID OPHTHALMIC at 09:03

## 2022-07-02 RX ADMIN — ACETAMINOPHEN 650 MG: 650 SUSPENSION ORAL at 04:52

## 2022-07-02 RX ADMIN — LEVALBUTEROL HYDROCHLORIDE 1.25 MG: 1.25 SOLUTION, CONCENTRATE RESPIRATORY (INHALATION) at 08:31

## 2022-07-02 RX ADMIN — PANTOPRAZOLE SODIUM 40 MG: 40 INJECTION, POWDER, FOR SOLUTION INTRAVENOUS at 08:58

## 2022-07-02 RX ADMIN — FLUTICASONE PROPIONATE 1 SPRAY: 50 SPRAY, METERED NASAL at 17:46

## 2022-07-02 RX ADMIN — Medication 65 G: at 16:36

## 2022-07-02 RX ADMIN — LEVALBUTEROL HYDROCHLORIDE 1.25 MG: 1.25 SOLUTION, CONCENTRATE RESPIRATORY (INHALATION) at 14:18

## 2022-07-02 RX ADMIN — OXYCODONE HYDROCHLORIDE 5 MG: 5 SOLUTION ORAL at 05:12

## 2022-07-02 RX ADMIN — PREDNISONE 30 MG: 20 TABLET ORAL at 09:01

## 2022-07-02 RX ADMIN — ACETAMINOPHEN 650 MG: 650 SUSPENSION ORAL at 10:27

## 2022-07-02 RX ADMIN — LORATADINE 10 MG: 10 TABLET ORAL at 09:01

## 2022-07-02 RX ADMIN — OXYCODONE HYDROCHLORIDE 2.5 MG: 5 SOLUTION ORAL at 22:25

## 2022-07-02 RX ADMIN — MELATONIN 3 MG: at 22:34

## 2022-07-02 RX ADMIN — Medication 250 MG: at 17:46

## 2022-07-02 RX ADMIN — MACITENTAN 10 MG: 10 TABLET, FILM COATED ORAL at 09:03

## 2022-07-02 RX ADMIN — LEVALBUTEROL HYDROCHLORIDE 1.25 MG: 1.25 SOLUTION, CONCENTRATE RESPIRATORY (INHALATION) at 21:08

## 2022-07-02 RX ADMIN — FOLIC ACID 1 MG: 1 TABLET ORAL at 09:01

## 2022-07-02 RX ADMIN — SILDENAFIL CITRATE 10 MG: 20 TABLET ORAL at 21:59

## 2022-07-02 RX ADMIN — Medication 250 MG: at 09:03

## 2022-07-02 RX ADMIN — IRON SUCROSE 200 MG: 20 INJECTION, SOLUTION INTRAVENOUS at 09:24

## 2022-07-02 RX ADMIN — FLUTICASONE PROPIONATE 1 SPRAY: 50 SPRAY, METERED NASAL at 09:03

## 2022-07-02 RX ADMIN — ASPIRIN 81 MG CHEWABLE TABLET 81 MG: 81 TABLET CHEWABLE at 09:01

## 2022-07-02 RX ADMIN — SILDENAFIL CITRATE 10 MG: 20 TABLET ORAL at 16:15

## 2022-07-02 RX ADMIN — LEVOTHYROXINE SODIUM 25 MCG: 25 TABLET ORAL at 06:40

## 2022-07-02 RX ADMIN — ENOXAPARIN SODIUM 40 MG: 40 INJECTION SUBCUTANEOUS at 08:58

## 2022-07-02 NOTE — PLAN OF CARE
Problem: MOBILITY - ADULT  Goal: Maintain or return to baseline ADL function  Description: INTERVENTIONS:  -  Assess patient's ability to carry out ADLs; assess patient's baseline for ADL function and identify physical deficits which impact ability to perform ADLs (bathing, care of mouth/teeth, toileting, grooming, dressing, etc )  - Assess/evaluate cause of self-care deficits   - Assess range of motion  - Assess patient's mobility; develop plan if impaired  - Assess patient's need for assistive devices and provide as appropriate  - Encourage maximum independence but intervene and supervise when necessary  - Involve family in performance of ADLs  - Assess for home care needs following discharge   - Consider OT consult to assist with ADL evaluation and planning for discharge  - Provide patient education as appropriate  Outcome: Progressing  Goal: Maintains/Returns to pre admission functional level  Description: INTERVENTIONS:  - Perform BMAT or MOVE assessment daily    - Set and communicate daily mobility goal to care team and patient/family/caregiver  - Collaborate with rehabilitation services on mobility goals if consulted  - Perform Range of Motion 3 times a day  - Reposition patient every 2 hours    - Dangle patient 3 times a day  - Stand patient 3 times a day  - Ambulate patient 3 times a day  - Out of bed to chair 3 times a day   - Out of bed for meals 3 times a day  - Out of bed for toileting  - Record patient progress and toleration of activity level   Outcome: Progressing     Problem: Potential for Falls  Goal: Patient will remain free of falls  Description: INTERVENTIONS:  - Educate patient/family on patient safety including physical limitations  - Instruct patient to call for assistance with activity   - Consult OT/PT to assist with strengthening/mobility   - Keep Call bell within reach  - Keep bed low and locked with side rails adjusted as appropriate  - Keep care items and personal belongings within reach  - Initiate and maintain comfort rounds  - Make Fall Risk Sign visible to staff  - Offer Toileting every  Hours, in advance of need  - Initiate/Maintain alarm  - Obtain necessary fall risk management equipment:   - Apply yellow socks and bracelet for high fall risk patients  - Consider moving patient to room near nurses station  Outcome: Progressing     Problem: Prexisting or High Potential for Compromised Skin Integrity  Goal: Skin integrity is maintained or improved  Description: INTERVENTIONS:  - Identify patients at risk for skin breakdown  - Assess and monitor skin integrity  - Assess and monitor nutrition and hydration status  - Monitor labs   - Assess for incontinence   - Turn and reposition patient  - Assist with mobility/ambulation  - Relieve pressure over bony prominences  - Avoid friction and shearing  - Provide appropriate hygiene as needed including keeping skin clean and dry  - Evaluate need for skin moisturizer/barrier cream  - Collaborate with interdisciplinary team   - Patient/family teaching  - Consider wound care consult   Outcome: Progressing     Problem: PAIN - ADULT  Goal: Verbalizes/displays adequate comfort level or baseline comfort level  Description: Interventions:  - Encourage patient to monitor pain and request assistance  - Assess pain using appropriate pain scale  - Administer analgesics based on type and severity of pain and evaluate response  - Implement non-pharmacological measures as appropriate and evaluate response  - Consider cultural and social influences on pain and pain management  - Notify physician/advanced practitioner if interventions unsuccessful or patient reports new pain  Outcome: Progressing     Problem: INFECTION - ADULT  Goal: Absence or prevention of progression during hospitalization  Description: INTERVENTIONS:  - Assess and monitor for signs and symptoms of infection  - Monitor lab/diagnostic results  - Monitor all insertion sites, i e  indwelling lines, tubes, and drains  - Monitor endotracheal if appropriate and nasal secretions for changes in amount and color  - [place] appropriate cooling/warming therapies per order  - Administer medications as ordered  - Instruct and encourage patient and family to use good hand hygiene technique  - Identify and instruct in appropriate isolation precautions for identified infection/condition  Outcome: Progressing  Goal: Absence of fever/infection during neutropenic period  Description: INTERVENTIONS:  - Monitor WBC    Outcome: Progressing     Problem: SAFETY ADULT  Goal: Maintain or return to baseline ADL function  Description: INTERVENTIONS:  -  Assess patient's ability to carry out ADLs; assess patient's baseline for ADL function and identify physical deficits which impact ability to perform ADLs (bathing, care of mouth/teeth, toileting, grooming, dressing, etc )  - Assess/evaluate cause of self-care deficits   - Assess range of motion  - Assess patient's mobility; develop plan if impaired  - Assess patient's need for assistive devices and provide as appropriate  - Encourage maximum independence but intervene and supervise when necessary  - Involve family in performance of ADLs  - Assess for home care needs following discharge   - Consider OT consult to assist with ADL evaluation and planning for discharge  - Provide patient education as appropriate  Outcome: Progressing  Goal: Maintains/Returns to pre admission functional level  Description: INTERVENTIONS:  - Perform BMAT or MOVE assessment daily    - Set and communicate daily mobility goal to care team and patient/family/caregiver  - Collaborate with rehabilitation services on mobility goals if consulted  - Perform Range of Motion 3 times a day  - Reposition patient every 2 hours    - Dangle patient 3 times a day  - Stand patient 3 times a day  - Ambulate patient 3 times a day  - Out of bed to chair 3 times a day   - Out of bed for meals 3 times a day  - Out of bed for toileting  - Record patient progress and toleration of activity level   Outcome: Progressing  Goal: Patient will remain free of falls  Description: INTERVENTIONS:  - Educate patient/family on patient safety including physical limitations  - Instruct patient to call for assistance with activity   - Consult OT/PT to assist with strengthening/mobility   - Keep Call bell within reach  - Keep bed low and locked with side rails adjusted as appropriate  - Keep care items and personal belongings within reach  - Initiate and maintain comfort rounds  - Make Fall Risk Sign visible to staff  - Offer Toileting every  Hours, in advance of need  - Initiate/Maintain alarm  - Obtain necessary fall risk management equipment:   - Apply yellow socks and bracelet for high fall risk patients  - Consider moving patient to room near nurses station  Outcome: Progressing     Problem: DISCHARGE PLANNING  Goal: Discharge to home or other facility with appropriate resources  Description: INTERVENTIONS:  - Identify barriers to discharge w/patient and caregiver  - Arrange for needed discharge resources and transportation as appropriate  - Identify discharge learning needs (meds, wound care, etc )  - Arrange for interpretive services to assist at discharge as needed  - Refer to Case Management Department for coordinating discharge planning if the patient needs post-hospital services based on physician/advanced practitioner order or complex needs related to functional status, cognitive ability, or social support system  Outcome: Progressing     Problem: Knowledge Deficit  Goal: Patient/family/caregiver demonstrates understanding of disease process, treatment plan, medications, and discharge instructions  Description: Complete learning assessment and assess knowledge base    Interventions:  - Provide teaching at level of understanding  - Provide teaching via preferred learning methods  Outcome: Progressing     Problem: Nutrition/Hydration-ADULT  Goal: Nutrient/Hydration intake appropriate for improving, restoring or maintaining nutritional needs  Description: Monitor and assess patient's nutrition/hydration status for malnutrition  Collaborate with interdisciplinary team and initiate plan and interventions as ordered  Monitor patient's weight and dietary intake as ordered or per policy  Utilize nutrition screening tool and intervene as necessary  Determine patient's food preferences and provide high-protein, high-caloric foods as appropriate       INTERVENTIONS:  - Monitor oral intake, urinary output, labs, and treatment plans  - Assess nutrition and hydration status and recommend course of action  - Evaluate amount of meals eaten  - Assist patient with eating if necessary   - Allow adequate time for meals  - Recommend/ encourage appropriate diets, oral nutritional supplements, and vitamin/mineral supplements  - Order, calculate, and assess calorie counts as needed  - Recommend, monitor, and adjust tube feedings and TPN/PPN based on assessed needs  - Assess need for intravenous fluids  - Provide specific nutrition/hydration education as appropriate  - Include patient/family/caregiver in decisions related to nutrition  Outcome: Progressing

## 2022-07-02 NOTE — PROGRESS NOTES
PULMONOLOGY PROGRESS NOTE     Name: Nikky Weldon   Age & Sex: 80 y o  female   MRN: [id number]  Unit/Bed#: [facility] 204-01   Encounter: [id number]    PATIENT INFORMATION     Name: Nikky Weldon   Age & Sex: 80 y o  female   MRN: [id number]  Hospital Stay Days: 25    ASSESSMENT/PLAN     Principal Problem:    Sacral wound  Active Problems:    Severe protein-calorie malnutrition ([address] )    Dysphagia, oropharyngeal phase    Scleroderma ([facility] [address] )    Acquired hypothyroidism    Anemia    Urinary retention    Acute respiratory failure with hypoxia (HCC)    Pneumonia    Hearing loss    Pulmonary hypertension (HCC)    Proximal muscle weakness      Assessment    1  Chronic hypoxic respiratory failure - stable  -wears 2-3 L nasal cannula at night; room air during the day  -currently saturating over 95% on room air    2  Severe pulmonary hypertension  -WHO group 1  -RVSP 56 mmHg    3  Hypotension  -suspect secondary to pulmonary hypertension medications    4  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    5  Crest/scleroderma    6  History of KOREY infection  -previously and chronic antibiotics and stopped approximately 10 years ago    7  Heart failure with preserved ejection fraction  -ejection fraction 70% with grade 2 diastolic dysfunction      Plan:    -resume/start supplemental oxygen if needed; goal SpO2 > 90%  -pulmonary hypertension management per Heart failure Service; edematous discontinue  Patient continue on macitentan 10 mg daily and sildenafil 10 mg t i d    -blood pressures improved with change in pulmonary hypertension medications  -rheumatology consulted; recommend 2 infusions of IVIG for dermatomyositis flare and muscular weakness  Continuing prednisone   -anti KS, anti Ha, anti Zo antibodies, aldolase pending  -continue to monitor off antibiotics  -continue Xopenex nebulizations      SUBJECTIVE     Patient seen and examined  No acute events overnight  Resting comfortably no acute distress    Reports slowly improving  Continues to report muscle weakness  OBJECTIVE     Vitals:    22 2300 22 0439 22 0831 22 1418   BP: 116/60 (!) 97/47     BP Location: Left arm      Pulse: 86 74     Resp: (!)      Temp: (!) 97 4 °F (36 3 °C) 97 5 °F (36 4 °C)     TempSrc: Oral Oral     SpO2: 96% 97% 100% 99%   Weight:       Height:          Temperature:   Temp (24hrs), Av 5 °F (36 4 °C), Min:97 4 °F (36 3 °C), Max:97 7 °F (36 5 °C)    Temperature: 97 5 °F (36 4 °C)  Intake & Output:  I/O        0701   0700  0701   07 07 0700    P  O  0 0     I V  (mL/kg) 10 (0 2) 720 (10 4)     NG/ 1205     IV Piggyback 500 100     Feedings  2548     Total Intake(mL/kg) 860 (13) 4573 (66 3)     Urine (mL/kg/hr) 1500 (0 9) 1800 (1 1)     Drains       Stool 0 0     Total Output 1500 1800     Net -640 +2773            Unmeasured Stool Occurrence 0 x 5 x         Weights:        Body mass index is 23 49 kg/m²  Weight (last 2 days)     Date/Time Weight    22 0800 69 (152 2)        Physical Exam  Vitals reviewed  Constitutional:       General: She is not in acute distress  Appearance: She is ill-appearing  She is not toxic-appearing or diaphoretic  Comments: Thin frail cachectic appearance   HENT:      Head: Normocephalic and atraumatic  Right Ear: External ear normal       Left Ear: External ear normal       Nose: Nose normal    Eyes:      General: No scleral icterus  Right eye: No discharge  Left eye: No discharge  Extraocular Movements: Extraocular movements intact  Conjunctiva/sclera: Conjunctivae normal    Cardiovascular:      Rate and Rhythm: Normal rate and regular rhythm  Pulses: Normal pulses  Heart sounds: Normal heart sounds  No murmur heard  No friction rub  No gallop  Pulmonary:      Effort: Pulmonary effort is normal  No respiratory distress  Breath sounds: No stridor   No wheezing, rhonchi or rales  Comments: Diminished breath sounds at lung bases  Chest:      Chest wall: No tenderness  Abdominal:      General: Bowel sounds are normal  There is no distension  Palpations: Abdomen is soft  Tenderness: There is no abdominal tenderness  There is no guarding or rebound  Musculoskeletal:      Right lower leg: No edema  Left lower leg: No edema  Skin:     General: Skin is warm and dry  Neurological:      Mental Status: She is alert and oriented to person, place, and time  LABORATORY DATA     Labs: I have personally reviewed pertinent reports  Results from last 7 days   Lab Units 07/01/22  0514 06/29/22 2242 06/28/22  0559 06/27/22  0510   WBC Thousand/uL 10 40* 9 64 10 07 10 29*   HEMOGLOBIN g/dL 7 9* 7 6* 7 9* 7 9*   HEMATOCRIT % 26 6* 25 7* 26 2* 26 6*   PLATELETS Thousands/uL 405* 384 405* 378   NEUTROS PCT % 77*  --  77* 75   MONOS PCT % 7  --  8 10      Results from last 7 days   Lab Units 07/01/22  0514 06/29/22 2242 06/28/22  0500 06/27/22  0510   POTASSIUM mmol/L 4 1 4 1 4 0 3 9   CHLORIDE mmol/L 103 104 103 100   CO2 mmol/L 32 35* 34* 32   BUN mg/dL 27* 19 26* 37*   CREATININE mg/dL 0 30* 0 36* 0 38* 0 33*   CALCIUM mg/dL 8 5 8 4 8 5 8 6   ALK PHOS U/L  --  84 83 88   ALT U/L  --  16 19 19   AST U/L  --  15 19 17     Results from last 7 days   Lab Units 07/01/22  0514   MAGNESIUM mg/dL 2 0     Results from last 7 days   Lab Units 07/01/22  0514   PHOSPHORUS mg/dL 2 5          Results from last 7 days   Lab Units 06/29/22  2242   LACTIC ACID mmol/L 1 4             ABG:       IMAGING & DIAGNOSTIC TESTING     Radiology Results: I have personally reviewed pertinent reports  CT abdomen pelvis wo contrast    Result Date: 6/14/2022  Impression: Soft tissue air and soft tissue defect identified posterior to the sacrum suggesting infection  I cannot exclude superimposed posterior sacral osteomyelitis although no bony erosion identified   Soft tissue air identified around the posterior aspect of the rectum in addition to the left side of the rectum/perineum, correlate for necrotizing fasciitis soft tissue infection  Surgical consult Bilateral lower lobe pneumonia  Left upper pole renal 1 cm likely hemorrhagic cyst, correlate with nonemergent outpatient renal ultrasound  I personally discussed this study with Gasper Lo on 6/14/2022 at 5:54 PM  Workstation performed: [id number]     XR chest portable    Result Date: 6/19/2022  Impression: Persistent, slightly worsened, bilateral pulmonary opacities in keeping with multilobar pneumonia  Workstation performed: [id number]     XR chest portable    Result Date: 6/16/2022  Impression: Stable vascular congestion  Workstation performed: [id number]     XR chest 1 view portable    Result Date: 6/15/2022  Impression: Mild vascular congestion  Right-sided PICC line terminates at the right axilla Workstation performed: [id number]     CT head without contrast    Result Date: 6/14/2022  Impression: No acute intracranial hemorrhage  If symptoms persistent or worsening focal neurologic deficit, proceed with noncontrast brain MRI and/or CTA  Workstation performed: [id number]     Other Diagnostic Testing: I have personally reviewed pertinent reports      ACTIVE MEDICATIONS     Current Facility-Administered Medications   Medication Dose Route Frequency    acetaminophen (TYLENOL) oral suspension 650 mg  650 mg Per G Tube Q6H    acetaminophen (TYLENOL) oral suspension 650 mg  650 mg Per G Tube Q4H PRN    artificial tear (LUBRIFRESH P M ) ophthalmic ointment   Both Eyes HS    aspirin chewable tablet 81 mg  81 mg Per G Tube Daily    atovaquone (MEPRON) oral suspension 750 mg  750 mg Per G Tube Daily With Breakfast    cholecalciferol (VITAMIN D3) tablet 1,000 Units  1,000 Units Per G Tube Daily    enoxaparin (LOVENOX) subcutaneous injection 40 mg  40 mg Subcutaneous Q24H BELLA    fluticasone (FLONASE) 50 mcg/act nasal spray 1 spray  1 spray Each Nare BID    folic acid (FOLVITE) tablet 1 mg  1 mg Per PEG Tube Daily    glycerin-hypromellose- (ARTIFICIAL TEARS) ophthalmic solution 2 drop  2 drop Both Eyes BID    immune globulin, human (GAMUNEX-C) 65 g 650 mL IV soln  1,000 mg/kg (Adjusted) Intravenous Q24H    iron sucrose (VENOFER) 200 mg in sodium chloride 0 9 % 100 mL IVPB  200 mg Intravenous Daily    levalbuterol (XOPENEX) inhalation solution 1 25 mg  1 25 mg Nebulization TID    levothyroxine tablet 25 mcg  25 mcg Per PEG Tube Early Morning    loratadine (CLARITIN) tablet 10 mg  10 mg Per G Tube Daily    Macitentan (OPSUMIT) tablet 10 mg  10 mg Per G Tube Daily    melatonin tablet 3 mg  3 mg Oral HS PRN    ondansetron (ZOFRAN) injection 4 mg  4 mg Intravenous Q6H PRN    oxyCODONE (ROXICODONE) oral solution 2 5 mg  2 5 mg Per G Tube Q4H PRN    oxyCODONE (ROXICODONE) oral solution 5 mg  5 mg Per G Tube Q4H PRN    pantoprazole (PROTONIX) injection 40 mg  40 mg Intravenous Q24H BELLA    predniSONE tablet 30 mg  30 mg Per G Tube Daily    saccharomyces boulardii (FLORASTOR) capsule 250 mg  250 mg Per G Tube BID    sildenafil (REVATIO) tablet 10 mg  10 mg Oral TID    sodium chloride (OCEAN) 0 65 % nasal spray 1 spray  1 spray Each Nare Q1H PRN    sodium chloride 0 9 % inhalation solution 3 mL  3 mL Nebulization TID         Portions of the record may have been created with voice recognition software  Occasional wrong word or "sound a like" substitutions may have occurred due to the inherent limitations of voice recognition software    Read the chart carefully and recognize, using context, where substitutions have occurred   ==  Ellie Mantilla, [address]  Pulmonary/Critical Care Fellowship PGY-6

## 2022-07-02 NOTE — ASSESSMENT & PLAN NOTE
Malnutrition Findings:   Adult Malnutrition type: Chronic illness  Adult Degree of Malnutrition: Other severe protein calorie malnutrition  Malnutrition Characteristics: Muscle loss, Fat loss, Weight loss, Inadequate energy           360 Statement: related to disease/condition evidenced by increased kcal/pro needs for wound healing Stage IV sacral decub, BMI 18 4 adjusted per prior facility wt 6/7/22;severe buccal fat pads loss, severe deltoid muscle loss, Patient lost 31# (20 6%) since 3/15/22 past 3 months with illness <75% energy intake versus needs for > 1 month  Treating with EN support and Robert TID via PEG for wound healing    BMI Findings:  Adult BMI Classifications: Underweight < 18 5        Body mass index is 23 49 kg/m²

## 2022-07-02 NOTE — PROGRESS NOTES
[address] [facility]  Progress Note Makenzie Shore 1940, 80 y o  female MRN: [id number]  [facility]: [id number]  Primary Care Provider: No primary care provider on file     Date and time admitted to hospital: 6/14/2022  8:07 PM    * Sacral wound  Assessment & Plan  Stage IV sacral ulcer  Status post I&D with General surgery  Wound care per General surgery  Received IV antibiotics, presently being monitored off of antibiotics, Infectious Disease input noted  Frequent repositioning  Optimize nutritional status  General surgery following      Pulmonary hypertension ([address] )  Assessment & Plan  Crest syndrome with pulmonary hypertension  Cardiology plans for initiating sildenafil noted  Continue supplemental oxygen  Cardiology following      Acute respiratory failure with hypoxia ([facility] [address] )  Assessment & Plan  Multifactorial  Continue supplemental oxygen  Pulmonary input noted        Proximal muscle weakness  Assessment & Plan  Extensive upper and lower extremity proximal muscle weakness   Follow-up on serologies  Discussed with Rheumatology  Rheumatology plans for IVIG treatment noted  Supportive care    Hearing loss  Assessment & Plan  History of progressive hearing loss  Outpatient ENT follow-up    Pneumonia  Assessment & Plan  Resolved  Completed antibiotics  Supportive care  Aspiration precautions    Urinary retention  Assessment & Plan  Urinary retention with Tejada catheter in place  Outpatient Urology follow-up    Anemia  Assessment & Plan  Multifactorial  Iron studies noted  Iron supplementation  Folic acid supplementation  Monitor hemoglobin      Acquired hypothyroidism  Assessment & Plan  Continue levothyroxine    Scleroderma (HCC)  Assessment & Plan  Scleroderma with crest syndrome  Continue prednisone  Outpatient Rheumatology follow-up    Dysphagia, oropharyngeal phase  Assessment & Plan  Patient with history of dysphagia  PEG tube in place  Continue current tube feeds  Aspiration precautions    Severe protein-calorie malnutrition ([facility] [address] )  Assessment & Plan  Malnutrition Findings:   Adult Malnutrition type: Chronic illness  Adult Degree of Malnutrition: Other severe protein calorie malnutrition  Malnutrition Characteristics: Muscle loss, Fat loss, Weight loss, Inadequate energy           360 Statement: related to disease/condition evidenced by increased kcal/pro needs for wound healing Stage IV sacral decub, BMI 18 4 adjusted per prior facility wt 22;severe buccal fat pads loss, severe deltoid muscle loss, Patient lost 31# (20 6%) since 3/15/22 past 3 months with illness <75% energy intake versus needs for > 1 month  Treating with EN support and Robert TID via PEG for wound healing    BMI Findings:  Adult BMI Classifications: Underweight < 18 5        Body mass index is 23 49 kg/m²  VTE Pharmacologic Prophylaxis: VTE Score: 7 High Risk (Score >/= 5) - Pharmacological DVT Prophylaxis Ordered: enoxaparin (Lovenox)  Sequential Compression Devices Ordered  Patient Centered Rounds: I performed bedside rounds with nursing staff today  Discussions with Specialists or Other Care Team Provider:     Education and Discussions with Family / Patient: Discussed with the patient, updated daughter Salina Rueda in detail questions answered  Time Spent for Care: 30 minutes  More than 50% of total time spent on counseling and coordination of care as described above      Current Length of Stay: 18 day(s)  Current Patient Status: Inpatient   Certification Statement: The patient will continue to require additional inpatient hospital stay due to As outlined  Discharge Plan: Receiving IVIG with Rheumatology, awaiting clinical and symptomatic improvement    Code Status: Level 3 - DNAR and DNI    Subjective:     Comfortably in bed  Reports feeling tired  RN at bedside   Encouraged incentive spirometry    Objective:     Vitals:   Temp (24hrs), Av 7 °F (36 5 °C), Min:97 4 °F (36 3 °C), Max:98 3 °F (36 8 °C)    Temp:  [97 4 °F (36 3 °C)-98 3 °F (36 8 °C)] 97 5 °F (36 4 °C)  HR:  [71-88] 74  Resp:  [18-28] 22  BP: ()/(46-60) 97/47  SpO2:  [93 %-100 %] 100 %  Body mass index is 23 49 kg/m²  Input and Output Summary (last 24 hours):      Intake/Output Summary (Last 24 hours) at 7/2/2022 1124  Last data filed at 7/2/2022 0645  Gross per 24 hour   Intake 2724 ml   Output 1450 ml   Net 1274 ml       Physical Exam:   Physical Exam     Comfortably in bed  Features of protein calorie malnutrition noted  Neck supple  Lungs diminished breath sounds bilaterally  Heart sounds S1 and S2 noted  Abdomen soft nontender  Awake obeys simple commands  Proximal muscle weakness noted  No rash    Additional Data:     Labs:  Results from last 7 days   Lab Units 07/01/22  0514   WBC Thousand/uL 10 40*   HEMOGLOBIN g/dL 7 9*   HEMATOCRIT % 26 6*   PLATELETS Thousands/uL 405*   NEUTROS PCT % 77*   LYMPHS PCT % 12*   MONOS PCT % 7   EOS PCT % 2     Results from last 7 days   Lab Units 07/01/22  0514 06/29/22  2242   SODIUM mmol/L 140 141   POTASSIUM mmol/L 4 1 4 1   CHLORIDE mmol/L 103 104   CO2 mmol/L 32 35*   BUN mg/dL 27* 19   CREATININE mg/dL 0 30* 0 36*   ANION GAP mmol/L 5 2*   CALCIUM mg/dL 8 5 8 4   ALBUMIN g/dL  --  2 3*   TOTAL BILIRUBIN mg/dL  --  0 28   ALK PHOS U/L  --  84   ALT U/L  --  16   AST U/L  --  15   GLUCOSE RANDOM mg/dL 128 120         Results from last 7 days   Lab Units 06/27/22  1132 06/27/22  0528   POC GLUCOSE mg/dl 125 115         Results from last 7 days   Lab Units 06/29/22  2242   LACTIC ACID mmol/L 1 4   PROCALCITONIN ng/ml 0 11       Lines/Drains:  Invasive Devices  Report    Peripheral Intravenous Line  Duration           Long-Dwell Peripheral IV (Midline) 06/14/22 Right Brachial 17 days          Drain  Duration           Gastrostomy/Enterostomy -- days    Urethral Catheter -- days              Urinary Catheter:  Goal for removal: Voiding trial when ambulation improves      Imaging: No pertinent imaging reviewed      Recent Cultures (last 7 days):         Last 24 Hours Medication List:   Current Facility-Administered Medications   Medication Dose Route Frequency Provider Last Rate    acetaminophen  650 mg Per G Tube Q6H Maile Girard MD      acetaminophen  650 mg Per G Tube Q4H PRN Karly Connor MD      artificial tear   Both Eyes HS Karly Connor MD      aspirin  81 mg Per G Tube Daily Karly Connor MD      atovaquone  750 mg Per G Tube Daily With Breakfast Karly Connor MD      cholecalciferol  1,000 Units Per G Tube Daily Karly Connor MD      enoxaparin  40 mg Subcutaneous Q24H [facility] Karly Connor MD      fluticasone  1 spray Each Nare BID Vikram Gan MD      folic acid  1 mg Per PEG Tube Daily Vikram Gan MD      glycerin-hypromellose-  2 drop Both Eyes BID Karly Connor MD      immune globulin, human  1,000 mg/kg (Adjusted) Intravenous Q24H Bindu Baltazar MD Stopped (07/01/22 2052)    iron sucrose  200 mg Intravenous Daily Vikram Gan MD Stopped (07/02/22 1110)    levalbuterol  1 25 mg Nebulization TID Eveline Goldstein DO      levothyroxine  25 mcg Per PEG Tube Early Morning Karly Connor MD      loratadine  10 mg Per G Tube Daily Karly Connor MD      Macitentan  10 mg Per G Tube Daily Raven Thorne MD      melatonin  3 mg Oral HS PRN Maile Girard MD      ondansetron  4 mg Intravenous Q6H PRN Maile Girard MD      oxyCODONE  2 5 mg Per G Tube Q4H PRN Maile Girard MD      oxyCODONE  5 mg Per G Tube Q4H PRN Maile Girard MD      pantoprazole  40 mg Intravenous Q24H [facility] Karly Connor MD      predniSONE  30 mg Per G Tube Daily Kalry Connor MD      saccharomyces boulardii  250 mg Per G Tube BID Karly Connor MD      sildenafil  10 mg Oral TID Aleyda Gautam DO      sodium chloride  1 spray Each Nare Q1H PRN Raven Thorne MD      sodium chloride  3 mL Nebulization TID Darrian Vogt DO          Today, Patient Was Seen By: Mode Amin MD    **Please Note: This note may have been constructed using a voice recognition system  ** Patient presents with dog scratch to finger, patient went to urgent care and was told to come to ER to meet Dr Ornelas. Patient is on Eliquis

## 2022-07-02 NOTE — PLAN OF CARE
Problem: MOBILITY - ADULT  Goal: Maintain or return to baseline ADL function  Description: INTERVENTIONS:  -  Assess patient's ability to carry out ADLs; assess patient's baseline for ADL function and identify physical deficits which impact ability to perform ADLs (bathing, care of mouth/teeth, toileting, grooming, dressing, etc )  - Assess/evaluate cause of self-care deficits   - Assess range of motion  - Assess patient's mobility; develop plan if impaired  - Assess patient's need for assistive devices and provide as appropriate  - Encourage maximum independence but intervene and supervise when necessary  - Involve family in performance of ADLs  - Assess for home care needs following discharge   - Consider OT consult to assist with ADL evaluation and planning for discharge  - Provide patient education as appropriate  Outcome: Progressing  Goal: Maintains/Returns to pre admission functional level  Description: INTERVENTIONS:  - Perform BMAT or MOVE assessment daily    - Set and communicate daily mobility goal to care team and patient/family/caregiver  - Collaborate with rehabilitation services on mobility goals if consulted  - Perform Range of Motion  times a day  - Reposition patient every  hours    - Dangle patient  times a day  - Stand patient  times a day  - Ambulate patient  times a day  - Out of bed to chair  times a day   - Out of bed for meals  times a day  - Out of bed for toileting  - Record patient progress and toleration of activity level   Outcome: Progressing     Problem: Potential for Falls  Goal: Patient will remain free of falls  Description: INTERVENTIONS:  - Educate patient/family on patient safety including physical limitations  - Instruct patient to call for assistance with activity   - Consult OT/PT to assist with strengthening/mobility   - Keep Call bell within reach  - Keep bed low and locked with side rails adjusted as appropriate  - Keep care items and personal belongings within reach  - Initiate and maintain comfort rounds  - Make Fall Risk Sign visible to staff  - Offer Toileting every  Hours, in advance of need  - Initiate/Maintain alarm  - Obtain necessary fall risk management equipment:   - Apply yellow socks and bracelet for high fall risk patients  - Consider moving patient to room near nurses station  Outcome: Progressing     Problem: Prexisting or High Potential for Compromised Skin Integrity  Goal: Skin integrity is maintained or improved  Description: INTERVENTIONS:  - Identify patients at risk for skin breakdown  - Assess and monitor skin integrity  - Assess and monitor nutrition and hydration status  - Monitor labs   - Assess for incontinence   - Turn and reposition patient  - Assist with mobility/ambulation  - Relieve pressure over bony prominences  - Avoid friction and shearing  - Provide appropriate hygiene as needed including keeping skin clean and dry  - Evaluate need for skin moisturizer/barrier cream  - Collaborate with interdisciplinary team   - Patient/family teaching  - Consider wound care consult   Outcome: Progressing     Problem: PAIN - ADULT  Goal: Verbalizes/displays adequate comfort level or baseline comfort level  Description: Interventions:  - Encourage patient to monitor pain and request assistance  - Assess pain using appropriate pain scale  - Administer analgesics based on type and severity of pain and evaluate response  - Implement non-pharmacological measures as appropriate and evaluate response  - Consider cultural and social influences on pain and pain management  - Notify physician/advanced practitioner if interventions unsuccessful or patient reports new pain  Outcome: Progressing     Problem: INFECTION - ADULT  Goal: Absence or prevention of progression during hospitalization  Description: INTERVENTIONS:  - Assess and monitor for signs and symptoms of infection  - Monitor lab/diagnostic results  - Monitor all insertion sites, i e  indwelling lines, tubes, and drains  - Monitor endotracheal if appropriate and nasal secretions for changes in amount and color  - [place] appropriate cooling/warming therapies per order  - Administer medications as ordered  - Instruct and encourage patient and family to use good hand hygiene technique  - Identify and instruct in appropriate isolation precautions for identified infection/condition  Outcome: Progressing  Goal: Absence of fever/infection during neutropenic period  Description: INTERVENTIONS:  - Monitor WBC    Outcome: Progressing     Problem: SAFETY ADULT  Goal: Maintain or return to baseline ADL function  Description: INTERVENTIONS:  -  Assess patient's ability to carry out ADLs; assess patient's baseline for ADL function and identify physical deficits which impact ability to perform ADLs (bathing, care of mouth/teeth, toileting, grooming, dressing, etc )  - Assess/evaluate cause of self-care deficits   - Assess range of motion  - Assess patient's mobility; develop plan if impaired  - Assess patient's need for assistive devices and provide as appropriate  - Encourage maximum independence but intervene and supervise when necessary  - Involve family in performance of ADLs  - Assess for home care needs following discharge   - Consider OT consult to assist with ADL evaluation and planning for discharge  - Provide patient education as appropriate  Outcome: Progressing  Goal: Maintains/Returns to pre admission functional level  Description: INTERVENTIONS:  - Perform BMAT or MOVE assessment daily    - Set and communicate daily mobility goal to care team and patient/family/caregiver  - Collaborate with rehabilitation services on mobility goals if consulted  - Perform Range of Motion  times a day  - Reposition patient every  hours    - Dangle patient  times a day  - Stand patient  times a day  - Ambulate patient  times a day  - Out of bed to chair  times a day   - Out of bed for meals  times a day  - Out of bed for toileting  - Record patient progress and toleration of activity level   Outcome: Progressing  Goal: Patient will remain free of falls  Description: INTERVENTIONS:  - Educate patient/family on patient safety including physical limitations  - Instruct patient to call for assistance with activity   - Consult OT/PT to assist with strengthening/mobility   - Keep Call bell within reach  - Keep bed low and locked with side rails adjusted as appropriate  - Keep care items and personal belongings within reach  - Initiate and maintain comfort rounds  - Make Fall Risk Sign visible to staff  - Offer Toileting every  Hours, in advance of need  - Initiate/Maintain alarm  - Obtain necessary fall risk management equipment  - Apply yellow socks and bracelet for high fall risk patients  - Consider moving patient to room near nurses station  Outcome: Progressing     Problem: DISCHARGE PLANNING  Goal: Discharge to home or other facility with appropriate resources  Description: INTERVENTIONS:  - Identify barriers to discharge w/patient and caregiver  - Arrange for needed discharge resources and transportation as appropriate  - Identify discharge learning needs (meds, wound care, etc )  - Arrange for interpretive services to assist at discharge as needed  - Refer to Case Management Department for coordinating discharge planning if the patient needs post-hospital services based on physician/advanced practitioner order or complex needs related to functional status, cognitive ability, or social support system  Outcome: Progressing     Problem: Knowledge Deficit  Goal: Patient/family/caregiver demonstrates understanding of disease process, treatment plan, medications, and discharge instructions  Description: Complete learning assessment and assess knowledge base    Interventions:  - Provide teaching at level of understanding  - Provide teaching via preferred learning methods  Outcome: Progressing

## 2022-07-02 NOTE — ASSESSMENT & PLAN NOTE
Extensive upper and lower extremity proximal muscle weakness   Follow-up on serologies  Discussed with Rheumatology  Rheumatology plans for IVIG treatment noted  Supportive care

## 2022-07-02 NOTE — ASSESSMENT & PLAN NOTE
Stage IV sacral ulcer  Status post I&D with General surgery  Wound care per General surgery  Received IV antibiotics, presently being monitored off of antibiotics, Infectious Disease input noted  Frequent repositioning  Optimize nutritional status  General surgery following

## 2022-07-03 LAB
ENA RNP AB SER-ACNC: 0.4 AI (ref 0–0.9)
ENA SM AB SER-ACNC: <0.2 AI (ref 0–0.9)

## 2022-07-03 PROCEDURE — 99232 SBSQ HOSP IP/OBS MODERATE 35: CPT | Performed by: INTERNAL MEDICINE

## 2022-07-03 PROCEDURE — 94760 N-INVAS EAR/PLS OXIMETRY 1: CPT

## 2022-07-03 PROCEDURE — C9113 INJ PANTOPRAZOLE SODIUM, VIA: HCPCS | Performed by: INTERNAL MEDICINE

## 2022-07-03 PROCEDURE — 94640 AIRWAY INHALATION TREATMENT: CPT

## 2022-07-03 RX ORDER — ACETAMINOPHEN 160 MG/5ML
650 SUSPENSION, ORAL (FINAL DOSE FORM) ORAL EVERY 4 HOURS PRN
Status: DISCONTINUED | OUTPATIENT
Start: 2022-07-03 | End: 2022-07-13 | Stop reason: HOSPADM

## 2022-07-03 RX ORDER — NYSTATIN 100000 [USP'U]/G
POWDER TOPICAL 2 TIMES DAILY
Status: DISCONTINUED | OUTPATIENT
Start: 2022-07-03 | End: 2022-07-13 | Stop reason: HOSPADM

## 2022-07-03 RX ADMIN — OXYCODONE HYDROCHLORIDE 5 MG: 5 SOLUTION ORAL at 21:07

## 2022-07-03 RX ADMIN — OXYCODONE HYDROCHLORIDE 5 MG: 5 SOLUTION ORAL at 06:24

## 2022-07-03 RX ADMIN — Medication 1000 UNITS: at 08:45

## 2022-07-03 RX ADMIN — FOLIC ACID 1 MG: 1 TABLET ORAL at 08:46

## 2022-07-03 RX ADMIN — LEVALBUTEROL HYDROCHLORIDE 1.25 MG: 1.25 SOLUTION, CONCENTRATE RESPIRATORY (INHALATION) at 21:00

## 2022-07-03 RX ADMIN — PREDNISONE 30 MG: 20 TABLET ORAL at 08:45

## 2022-07-03 RX ADMIN — LEVOTHYROXINE SODIUM 25 MCG: 25 TABLET ORAL at 05:52

## 2022-07-03 RX ADMIN — SILDENAFIL CITRATE 10 MG: 20 TABLET ORAL at 22:44

## 2022-07-03 RX ADMIN — ISODIUM CHLORIDE 3 ML: 0.03 SOLUTION RESPIRATORY (INHALATION) at 21:00

## 2022-07-03 RX ADMIN — PANTOPRAZOLE SODIUM 40 MG: 40 INJECTION, POWDER, FOR SOLUTION INTRAVENOUS at 08:45

## 2022-07-03 RX ADMIN — ACETAMINOPHEN 650 MG: 650 SUSPENSION ORAL at 04:15

## 2022-07-03 RX ADMIN — ACETAMINOPHEN 650 MG: 650 SUSPENSION ORAL at 09:15

## 2022-07-03 RX ADMIN — GLYCERIN, HYPROMELLOSE, POLYETHYLENE GLYCOL 2 DROP: .2; .2; 1 LIQUID OPHTHALMIC at 08:47

## 2022-07-03 RX ADMIN — ACETAMINOPHEN 650 MG: 650 SUSPENSION ORAL at 21:07

## 2022-07-03 RX ADMIN — ISODIUM CHLORIDE 3 ML: 0.03 SOLUTION RESPIRATORY (INHALATION) at 13:53

## 2022-07-03 RX ADMIN — ISODIUM CHLORIDE 3 ML: 0.03 SOLUTION RESPIRATORY (INHALATION) at 07:15

## 2022-07-03 RX ADMIN — NYSTATIN: 100000 POWDER TOPICAL at 18:44

## 2022-07-03 RX ADMIN — OXYCODONE HYDROCHLORIDE 5 MG: 5 SOLUTION ORAL at 10:48

## 2022-07-03 RX ADMIN — LORATADINE 10 MG: 10 TABLET ORAL at 08:46

## 2022-07-03 RX ADMIN — MACITENTAN 10 MG: 10 TABLET, FILM COATED ORAL at 08:47

## 2022-07-03 RX ADMIN — ASPIRIN 81 MG CHEWABLE TABLET 81 MG: 81 TABLET CHEWABLE at 08:45

## 2022-07-03 RX ADMIN — ACETAMINOPHEN 650 MG: 650 SUSPENSION ORAL at 14:32

## 2022-07-03 RX ADMIN — LEVALBUTEROL HYDROCHLORIDE 1.25 MG: 1.25 SOLUTION, CONCENTRATE RESPIRATORY (INHALATION) at 13:53

## 2022-07-03 RX ADMIN — MELATONIN 3 MG: at 23:02

## 2022-07-03 RX ADMIN — FLUTICASONE PROPIONATE 1 SPRAY: 50 SPRAY, METERED NASAL at 08:47

## 2022-07-03 RX ADMIN — ENOXAPARIN SODIUM 40 MG: 40 INJECTION SUBCUTANEOUS at 08:45

## 2022-07-03 RX ADMIN — SILDENAFIL CITRATE 10 MG: 20 TABLET ORAL at 08:46

## 2022-07-03 RX ADMIN — OXYCODONE HYDROCHLORIDE 5 MG: 5 SOLUTION ORAL at 16:58

## 2022-07-03 RX ADMIN — IRON SUCROSE 200 MG: 20 INJECTION, SOLUTION INTRAVENOUS at 08:46

## 2022-07-03 RX ADMIN — ACETAMINOPHEN 650 MG: 650 SUSPENSION ORAL at 00:34

## 2022-07-03 RX ADMIN — SILDENAFIL CITRATE 10 MG: 20 TABLET ORAL at 16:58

## 2022-07-03 RX ADMIN — ATOVAQUONE 750 MG: 750 SUSPENSION ORAL at 08:46

## 2022-07-03 RX ADMIN — LEVALBUTEROL HYDROCHLORIDE 1.25 MG: 1.25 SOLUTION, CONCENTRATE RESPIRATORY (INHALATION) at 07:15

## 2022-07-03 RX ADMIN — Medication 250 MG: at 17:00

## 2022-07-03 RX ADMIN — GLYCERIN, HYPROMELLOSE, POLYETHYLENE GLYCOL 2 DROP: .2; .2; 1 LIQUID OPHTHALMIC at 17:00

## 2022-07-03 RX ADMIN — FLUTICASONE PROPIONATE 1 SPRAY: 50 SPRAY, METERED NASAL at 17:00

## 2022-07-03 RX ADMIN — Medication 250 MG: at 08:46

## 2022-07-03 NOTE — ASSESSMENT & PLAN NOTE
Extensive upper and lower extremity proximal muscle weakness   Reports some improvement today  Received IVIG with Rheumatology  Follow-up on serologies  Supportive care

## 2022-07-03 NOTE — ASSESSMENT & PLAN NOTE
Crest syndrome with pulmonary hypertension  Continue supplemental oxygen  Sildenafil  Cardiology following

## 2022-07-03 NOTE — RESPIRATORY THERAPY NOTE
07/03/22 0718   Respiratory Assessment   Assessment Type Pre-treatment   General Appearance Alert; Awake   Respiratory Pattern Normal   Chest Assessment Chest expansion symmetrical   Bilateral Breath Sounds Clear;Diminished   Resp Comments Recieved patient on ra where she remains  BIlateral BS clear/diminshed  Treatments given as sceduled   No other needs at this time   O2 Device nc   Additional Assessments   Pulse 69   Respirations 19   SpO2 100 %

## 2022-07-03 NOTE — PLAN OF CARE
Problem: MOBILITY - ADULT  Goal: Maintain or return to baseline ADL function  Description: INTERVENTIONS:  -  Assess patient's ability to carry out ADLs; assess patient's baseline for ADL function and identify physical deficits which impact ability to perform ADLs (bathing, care of mouth/teeth, toileting, grooming, dressing, etc )  - Assess/evaluate cause of self-care deficits   - Assess range of motion  - Assess patient's mobility; develop plan if impaired  - Assess patient's need for assistive devices and provide as appropriate  - Encourage maximum independence but intervene and supervise when necessary  - Involve family in performance of ADLs  - Assess for home care needs following discharge   - Consider OT consult to assist with ADL evaluation and planning for discharge  - Provide patient education as appropriate  Outcome: Progressing  Goal: Maintains/Returns to pre admission functional level  Description: INTERVENTIONS:  - Perform BMAT or MOVE assessment daily    - Set and communicate daily mobility goal to care team and patient/family/caregiver  - Collaborate with rehabilitation services on mobility goals if consulted  - Perform Range of Motion 3 times a day  - Reposition patient every 2 hours    - Dangle patient 3 times a day  - Stand patient 3 times a day  - Ambulate patient 3 times a day  - Out of bed to chair 3 times a day   - Out of bed for meals 3 times a day  - Out of bed for toileting  - Record patient progress and toleration of activity level   Outcome: Progressing     Problem: Potential for Falls  Goal: Patient will remain free of falls  Description: INTERVENTIONS:  - Educate patient/family on patient safety including physical limitations  - Instruct patient to call for assistance with activity   - Consult OT/PT to assist with strengthening/mobility   - Keep Call bell within reach  - Keep bed low and locked with side rails adjusted as appropriate  - Keep care items and personal belongings within reach  - Initiate and maintain comfort rounds  - Make Fall Risk Sign visible to staff  - Offer Toileting every  Hours, in advance of need  - Initiate/Maintain alarm  - Obtain necessary fall risk management equipment:   - Apply yellow socks and bracelet for high fall risk patients  - Consider moving patient to room near nurses station  Outcome: Progressing     Problem: Prexisting or High Potential for Compromised Skin Integrity  Goal: Skin integrity is maintained or improved  Description: INTERVENTIONS:  - Identify patients at risk for skin breakdown  - Assess and monitor skin integrity  - Assess and monitor nutrition and hydration status  - Monitor labs   - Assess for incontinence   - Turn and reposition patient  - Assist with mobility/ambulation  - Relieve pressure over bony prominences  - Avoid friction and shearing  - Provide appropriate hygiene as needed including keeping skin clean and dry  - Evaluate need for skin moisturizer/barrier cream  - Collaborate with interdisciplinary team   - Patient/family teaching  - Consider wound care consult   Outcome: Progressing     Problem: PAIN - ADULT  Goal: Verbalizes/displays adequate comfort level or baseline comfort level  Description: Interventions:  - Encourage patient to monitor pain and request assistance  - Assess pain using appropriate pain scale  - Administer analgesics based on type and severity of pain and evaluate response  - Implement non-pharmacological measures as appropriate and evaluate response  - Consider cultural and social influences on pain and pain management  - Notify physician/advanced practitioner if interventions unsuccessful or patient reports new pain  Outcome: Progressing     Problem: INFECTION - ADULT  Goal: Absence or prevention of progression during hospitalization  Description: INTERVENTIONS:  - Assess and monitor for signs and symptoms of infection  - Monitor lab/diagnostic results  - Monitor all insertion sites, i e  indwelling lines, tubes, and drains  - Monitor endotracheal if appropriate and nasal secretions for changes in amount and color  - [place] appropriate cooling/warming therapies per order  - Administer medications as ordered  - Instruct and encourage patient and family to use good hand hygiene technique  - Identify and instruct in appropriate isolation precautions for identified infection/condition  Outcome: Progressing  Goal: Absence of fever/infection during neutropenic period  Description: INTERVENTIONS:  - Monitor WBC    Outcome: Progressing     Problem: SAFETY ADULT  Goal: Maintain or return to baseline ADL function  Description: INTERVENTIONS:  -  Assess patient's ability to carry out ADLs; assess patient's baseline for ADL function and identify physical deficits which impact ability to perform ADLs (bathing, care of mouth/teeth, toileting, grooming, dressing, etc )  - Assess/evaluate cause of self-care deficits   - Assess range of motion  - Assess patient's mobility; develop plan if impaired  - Assess patient's need for assistive devices and provide as appropriate  - Encourage maximum independence but intervene and supervise when necessary  - Involve family in performance of ADLs  - Assess for home care needs following discharge   - Consider OT consult to assist with ADL evaluation and planning for discharge  - Provide patient education as appropriate  Outcome: Progressing  Goal: Maintains/Returns to pre admission functional level  Description: INTERVENTIONS:  - Perform BMAT or MOVE assessment daily    - Set and communicate daily mobility goal to care team and patient/family/caregiver  - Collaborate with rehabilitation services on mobility goals if consulted  - Perform Range of Motion 3 times a day  - Reposition patient every 2 hours    - Dangle patient 3 times a day  - Stand patient 3 times a day  - Ambulate patient 3 times a day  - Out of bed to chair 3 times a day   - Out of bed for meals 3 times a day  - Out of bed for toileting  - Record patient progress and toleration of activity level   Outcome: Progressing  Goal: Patient will remain free of falls  Description: INTERVENTIONS:  - Educate patient/family on patient safety including physical limitations  - Instruct patient to call for assistance with activity   - Consult OT/PT to assist with strengthening/mobility   - Keep Call bell within reach  - Keep bed low and locked with side rails adjusted as appropriate  - Keep care items and personal belongings within reach  - Initiate and maintain comfort rounds  - Make Fall Risk Sign visible to staff  - Offer Toileting every  Hours, in advance of need  - Initiate/Maintain alarm  - Obtain necessary fall risk management equipment:   - Apply yellow socks and bracelet for high fall risk patients  - Consider moving patient to room near nurses station  Outcome: Progressing     Problem: DISCHARGE PLANNING  Goal: Discharge to home or other facility with appropriate resources  Description: INTERVENTIONS:  - Identify barriers to discharge w/patient and caregiver  - Arrange for needed discharge resources and transportation as appropriate  - Identify discharge learning needs (meds, wound care, etc )  - Arrange for interpretive services to assist at discharge as needed  - Refer to Case Management Department for coordinating discharge planning if the patient needs post-hospital services based on physician/advanced practitioner order or complex needs related to functional status, cognitive ability, or social support system  Outcome: Progressing     Problem: Knowledge Deficit  Goal: Patient/family/caregiver demonstrates understanding of disease process, treatment plan, medications, and discharge instructions  Description: Complete learning assessment and assess knowledge base    Interventions:  - Provide teaching at level of understanding  - Provide teaching via preferred learning methods  Outcome: Progressing     Problem: Nutrition/Hydration-ADULT  Goal: Nutrient/Hydration intake appropriate for improving, restoring or maintaining nutritional needs  Description: Monitor and assess patient's nutrition/hydration status for malnutrition  Collaborate with interdisciplinary team and initiate plan and interventions as ordered  Monitor patient's weight and dietary intake as ordered or per policy  Utilize nutrition screening tool and intervene as necessary  Determine patient's food preferences and provide high-protein, high-caloric foods as appropriate       INTERVENTIONS:  - Monitor oral intake, urinary output, labs, and treatment plans  - Assess nutrition and hydration status and recommend course of action  - Evaluate amount of meals eaten  - Assist patient with eating if necessary   - Allow adequate time for meals  - Recommend/ encourage appropriate diets, oral nutritional supplements, and vitamin/mineral supplements  - Order, calculate, and assess calorie counts as needed  - Recommend, monitor, and adjust tube feedings and TPN/PPN based on assessed needs  - Assess need for intravenous fluids  - Provide specific nutrition/hydration education as appropriate  - Include patient/family/caregiver in decisions related to nutrition  Outcome: Progressing

## 2022-07-03 NOTE — PROGRESS NOTES
[facility]  Progress Note Rena Shore 1940, 80 y o  female MRN: [id number]  Unit/Bed#: [facility] 204-01 Encounter: [id number]  Primary Care Provider: No primary care provider on file     Date and time admitted to hospital: 6/14/2022  8:07 PM    * Sacral wound  Assessment & Plan  Stage IV sacral ulcer  Status post I&D with General surgery  Wound care per General surgery  Received IV antibiotics, presently being monitored off of antibiotics, Infectious Disease input noted  Frequent repositioning  Optimize nutritional status  General surgery following      Pulmonary hypertension ([address] )  Assessment & Plan  Crest syndrome with pulmonary hypertension  Continue supplemental oxygen  Sildenafil  Cardiology following    Acute respiratory failure with hypoxia (HCC)  Assessment & Plan  Multifactorial  Continue supplemental oxygen  Encourage incentive spirometry  Pulmonary input noted        Proximal muscle weakness  Assessment & Plan  Extensive upper and lower extremity proximal muscle weakness   Reports some improvement today  Received IVIG with Rheumatology  Follow-up on serologies  Supportive care    Hearing loss  Assessment & Plan  History of progressive hearing loss  Outpatient ENT follow-up    Pneumonia  Assessment & Plan  Resolved  Completed antibiotics  Supportive care  Aspiration precautions    Urinary retention  Assessment & Plan  Urinary retention with Tejada catheter in place  Outpatient Urology follow-up    Anemia  Assessment & Plan  Multifactorial  Iron studies noted  Iron supplementation  Folic acid supplementation  Monitor hemoglobin      Acquired hypothyroidism  Assessment & Plan  Continue levothyroxine    Scleroderma (HCC)  Assessment & Plan  Scleroderma with crest syndrome  Continue prednisone  Outpatient Rheumatology follow-up    Dysphagia, oropharyngeal phase  Assessment & Plan  Patient with history of dysphagia  PEG tube in place  Continue current tube feeds  Aspiration precautions    Severe protein-calorie malnutrition (HCC)  Assessment & Plan  Malnutrition Findings:   Adult Malnutrition type: Chronic illness  Adult Degree of Malnutrition: Other severe protein calorie malnutrition  Malnutrition Characteristics: Muscle loss, Fat loss, Weight loss, Inadequate energy           360 Statement: related to disease/condition evidenced by increased kcal/pro needs for wound healing Stage IV sacral decub, BMI 18 4 adjusted per prior facility wt 22;severe buccal fat pads loss, severe deltoid muscle loss, Patient lost 31# (20 6%) since 3/15/22 past 3 months with illness <75% energy intake versus needs for > 1 month  Treating with EN support and Robert TID via PEG for wound healing    BMI Findings:  Adult BMI Classifications: Underweight < 18 5        Body mass index is 23 49 kg/m²  VTE Pharmacologic Prophylaxis: VTE Score: 7 High Risk (Score >/= 5) - Pharmacological DVT Prophylaxis Ordered: enoxaparin (Lovenox)  Sequential Compression Devices Ordered  Patient Centered Rounds: I performed bedside rounds with nursing staff today  Discussions with Specialists or Other Care Team Provider:     Education and Discussions with Family / Patient: Discussed with the patient, called updated daughter Shira Dickinson  Time Spent for Care: 30 minutes  More than 50% of total time spent on counseling and coordination of care as described above      Current Length of Stay: 19 day(s)  Current Patient Status: Inpatient   Certification Statement: The patient will continue to require additional inpatient hospital stay due to as outlined  Discharge Plan: Awaiting clinical and symptomatic improvement, disposition planning case management following    Code Status: Level 3 - DNAR and DNI    Subjective:     Reports improving strength in her upper and lower extremities  Now able to do incentive spirometry      Objective:     Vitals:   Temp (24hrs), Av 4 °F (36 9 °C), Min:97 5 °F (36 4 °C), Max:98 8 °F (37 1 °C)    Temp:  [97 5 °F (36 4 °C)-98 8 °F (37 1 °C)] 98 8 °F (37 1 °C)  HR:  [69-84] 72  Resp:  [15-29] 16  BP: (95-98)/(46-51) 98/51  SpO2:  [91 %-100 %] 98 %  Body mass index is 23 49 kg/m²  Input and Output Summary (last 24 hours):      Intake/Output Summary (Last 24 hours) at 7/3/2022 1111  Last data filed at 7/3/2022 0800  Gross per 24 hour   Intake 500 ml   Output --   Net 500 ml       Physical Exam:   Physical Exam     Comfortably in bed  Mucous members are moist  Features of protein calorie malnutrition noted  Neck supple  Lungs diminished breath sounds bilaterally  Heart sounds S1 and S2 noted  Abdomen soft nontender  Awake obeys simple commands  No rash    Additional Data:     Labs:  Results from last 7 days   Lab Units 07/01/22  0514   WBC Thousand/uL 10 40*   HEMOGLOBIN g/dL 7 9*   HEMATOCRIT % 26 6*   PLATELETS Thousands/uL 405*   NEUTROS PCT % 77*   LYMPHS PCT % 12*   MONOS PCT % 7   EOS PCT % 2     Results from last 7 days   Lab Units 07/01/22  0514 06/29/22  2242   SODIUM mmol/L 140 141   POTASSIUM mmol/L 4 1 4 1   CHLORIDE mmol/L 103 104   CO2 mmol/L 32 35*   BUN mg/dL 27* 19   CREATININE mg/dL 0 30* 0 36*   ANION GAP mmol/L 5 2*   CALCIUM mg/dL 8 5 8 4   ALBUMIN g/dL  --  2 3*   TOTAL BILIRUBIN mg/dL  --  0 28   ALK PHOS U/L  --  84   ALT U/L  --  16   AST U/L  --  15   GLUCOSE RANDOM mg/dL 128 120         Results from last 7 days   Lab Units 06/27/22  1132 06/27/22  0528   POC GLUCOSE mg/dl 125 115         Results from last 7 days   Lab Units 06/29/22  2242   LACTIC ACID mmol/L 1 4   PROCALCITONIN ng/ml 0 11       Lines/Drains:  Invasive Devices  Report    Peripheral Intravenous Line  Duration           Long-Dwell Peripheral IV (Midline) 06/14/22 Right Brachial 18 days          Drain  Duration           Gastrostomy/Enterostomy -- days    Urethral Catheter -- days              Urinary Catheter:  Goal for removal: Voiding trial when ambulation improves               Imaging: No pertinent imaging reviewed      Recent Cultures (last 7 days):         Last 24 Hours Medication List:   Current Facility-Administered Medications   Medication Dose Route Frequency Provider Last Rate    acetaminophen  650 mg Per G Tube Q6H Silvina Snyder MD      acetaminophen  650 mg Per G Tube Q4H PRN Jerel Erazo MD      aspirin  81 mg Per G Tube Daily Jerel Erazo MD      atovaquone  750 mg Per G Tube Daily With Breakfast Jerel Erazo MD      cholecalciferol  1,000 Units Per G Tube Daily Jerel Erazo MD      enoxaparin  40 mg Subcutaneous Q24H [address] Jerel Erazo MD      fluticasone  1 spray Each Nare BID Torey Paredes MD      folic acid  1 mg Per PEG Tube Daily Torey Paredes MD      glycerin-hypromellose-  2 drop Both Eyes BID Jerel Erazo MD      iron sucrose  200 mg Intravenous Daily Torey Paredes MD Stopped (07/03/22 1042)    levalbuterol  1 25 mg Nebulization TID Florinda Hartman DO      levothyroxine  25 mcg Per PEG Tube Early Morning Jerel Erazo MD      loratadine  10 mg Per G Tube Daily Jerel Erazo MD      Macitentan  10 mg Per G Tube Daily Genet Stinson MD      melatonin  3 mg Oral HS PRN Silvina Snyder MD      ondansetron  4 mg Intravenous Q6H PRN Silvina Snyder MD      oxyCODONE  2 5 mg Per G Tube Q4H PRN Silvina Snyder MD      oxyCODONE  5 mg Per G Tube Q4H PRN Silvina Snyder MD      pantoprazole  40 mg Intravenous Q24H [address] Jerel Erazo MD      predniSONE  30 mg Per G Tube Daily Jerel Erazo MD      saccharomyces boulardii  250 mg Per G Tube BID Jerel Erazo MD      sildenafil  10 mg Oral TID Lupe Tan DO      sodium chloride  1 spray Each Nare Q1H PRN Genet Stinson MD      sodium chloride  3 mL Nebulization TID Florinda Hartman DO          Today, Patient Was Seen By: Torey Paredes MD    **Please Note: This note may have been constructed using a voice recognition system  **

## 2022-07-04 LAB
ALBUMIN SERPL BCP-MCNC: 1.9 G/DL (ref 3.5–5)
ALDOLASE SERPL-CCNC: 2.4 U/L (ref 3.3–10.3)
ALP SERPL-CCNC: 79 U/L (ref 46–116)
ALT SERPL W P-5'-P-CCNC: 14 U/L (ref 12–78)
ANION GAP SERPL CALCULATED.3IONS-SCNC: 4 MMOL/L (ref 4–13)
ANISOCYTOSIS BLD QL SMEAR: PRESENT
AST SERPL W P-5'-P-CCNC: 17 U/L (ref 5–45)
BASOPHILS # BLD MANUAL: 0 THOUSAND/UL (ref 0–0.1)
BASOPHILS NFR MAR MANUAL: 0 % (ref 0–1)
BILIRUB SERPL-MCNC: 0.19 MG/DL (ref 0.2–1)
BUN SERPL-MCNC: 32 MG/DL (ref 5–25)
CALCIUM ALBUM COR SERPL-MCNC: 9.8 MG/DL (ref 8.3–10.1)
CALCIUM SERPL-MCNC: 8.1 MG/DL (ref 8.3–10.1)
CHLORIDE SERPL-SCNC: 100 MMOL/L (ref 100–108)
CO2 SERPL-SCNC: 31 MMOL/L (ref 21–32)
CREAT SERPL-MCNC: 0.31 MG/DL (ref 0.6–1.3)
EOSINOPHIL # BLD MANUAL: 0.11 THOUSAND/UL (ref 0–0.4)
EOSINOPHIL NFR BLD MANUAL: 1 % (ref 0–6)
ERYTHROCYTE [DISTWIDTH] IN BLOOD BY AUTOMATED COUNT: 19.8 % (ref 11.6–15.1)
GFR SERPL CREATININE-BSD FRML MDRD: 108 ML/MIN/1.73SQ M
GLUCOSE SERPL-MCNC: 119 MG/DL (ref 65–140)
HCT VFR BLD AUTO: 26.4 % (ref 34.8–46.1)
HGB BLD-MCNC: 7.8 G/DL (ref 11.5–15.4)
HYPERCHROMIA BLD QL SMEAR: PRESENT
LYMPHOCYTES # BLD AUTO: 0.11 THOUSAND/UL (ref 0.6–4.47)
LYMPHOCYTES # BLD AUTO: 1 % (ref 14–44)
MACROCYTES BLD QL AUTO: PRESENT
MCH RBC QN AUTO: 29.7 PG (ref 26.8–34.3)
MCHC RBC AUTO-ENTMCNC: 29.5 G/DL (ref 31.4–37.4)
MCV RBC AUTO: 100 FL (ref 82–98)
METAMYELOCYTES NFR BLD MANUAL: 1 % (ref 0–1)
MONOCYTES # BLD AUTO: 0.43 THOUSAND/UL (ref 0–1.22)
MONOCYTES NFR BLD: 4 % (ref 4–12)
NEUTROPHILS # BLD MANUAL: 9.57 THOUSAND/UL (ref 1.85–7.62)
NEUTS BAND NFR BLD MANUAL: 14 % (ref 0–8)
NEUTS SEG NFR BLD AUTO: 74 % (ref 43–75)
NRBC BLD AUTO-RTO: 6 /100 WBC (ref 0–2)
PLATELET # BLD AUTO: 375 THOUSANDS/UL (ref 149–390)
PLATELET BLD QL SMEAR: ADEQUATE
PMV BLD AUTO: 9.3 FL (ref 8.9–12.7)
POLYCHROMASIA BLD QL SMEAR: PRESENT
POTASSIUM SERPL-SCNC: 3.9 MMOL/L (ref 3.5–5.3)
PROT SERPL-MCNC: 7.5 G/DL (ref 6.4–8.2)
RBC # BLD AUTO: 2.63 MILLION/UL (ref 3.81–5.12)
RBC MORPH BLD: PRESENT
SODIUM SERPL-SCNC: 135 MMOL/L (ref 136–145)
VARIANT LYMPHS # BLD AUTO: 5 %
WBC # BLD AUTO: 10.87 THOUSAND/UL (ref 4.31–10.16)

## 2022-07-04 PROCEDURE — C9113 INJ PANTOPRAZOLE SODIUM, VIA: HCPCS | Performed by: INTERNAL MEDICINE

## 2022-07-04 PROCEDURE — 85007 BL SMEAR W/DIFF WBC COUNT: CPT | Performed by: INTERNAL MEDICINE

## 2022-07-04 PROCEDURE — 99232 SBSQ HOSP IP/OBS MODERATE 35: CPT | Performed by: NURSE PRACTITIONER

## 2022-07-04 PROCEDURE — 94760 N-INVAS EAR/PLS OXIMETRY 1: CPT

## 2022-07-04 PROCEDURE — 85027 COMPLETE CBC AUTOMATED: CPT | Performed by: INTERNAL MEDICINE

## 2022-07-04 PROCEDURE — 94640 AIRWAY INHALATION TREATMENT: CPT

## 2022-07-04 PROCEDURE — 99232 SBSQ HOSP IP/OBS MODERATE 35: CPT | Performed by: INTERNAL MEDICINE

## 2022-07-04 PROCEDURE — 80053 COMPREHEN METABOLIC PANEL: CPT | Performed by: INTERNAL MEDICINE

## 2022-07-04 RX ADMIN — NYSTATIN: 100000 POWDER TOPICAL at 17:20

## 2022-07-04 RX ADMIN — Medication 250 MG: at 17:20

## 2022-07-04 RX ADMIN — Medication 1000 UNITS: at 09:45

## 2022-07-04 RX ADMIN — ENOXAPARIN SODIUM 40 MG: 40 INJECTION SUBCUTANEOUS at 09:46

## 2022-07-04 RX ADMIN — SILDENAFIL CITRATE 10 MG: 20 TABLET ORAL at 09:45

## 2022-07-04 RX ADMIN — OXYCODONE HYDROCHLORIDE 5 MG: 5 SOLUTION ORAL at 06:04

## 2022-07-04 RX ADMIN — ACETAMINOPHEN 650 MG: 650 SUSPENSION ORAL at 04:24

## 2022-07-04 RX ADMIN — SILDENAFIL CITRATE 10 MG: 20 TABLET ORAL at 21:20

## 2022-07-04 RX ADMIN — OXYCODONE HYDROCHLORIDE 5 MG: 5 SOLUTION ORAL at 11:09

## 2022-07-04 RX ADMIN — MACITENTAN 10 MG: 10 TABLET, FILM COATED ORAL at 09:47

## 2022-07-04 RX ADMIN — SILDENAFIL CITRATE 10 MG: 20 TABLET ORAL at 17:23

## 2022-07-04 RX ADMIN — LEVALBUTEROL HYDROCHLORIDE 1.25 MG: 1.25 SOLUTION, CONCENTRATE RESPIRATORY (INHALATION) at 20:31

## 2022-07-04 RX ADMIN — ACETAMINOPHEN 650 MG: 650 SUSPENSION ORAL at 17:21

## 2022-07-04 RX ADMIN — LEVALBUTEROL HYDROCHLORIDE 1.25 MG: 1.25 SOLUTION, CONCENTRATE RESPIRATORY (INHALATION) at 13:50

## 2022-07-04 RX ADMIN — FLUTICASONE PROPIONATE 1 SPRAY: 50 SPRAY, METERED NASAL at 09:46

## 2022-07-04 RX ADMIN — LEVALBUTEROL HYDROCHLORIDE 1.25 MG: 1.25 SOLUTION, CONCENTRATE RESPIRATORY (INHALATION) at 07:17

## 2022-07-04 RX ADMIN — GLYCERIN, HYPROMELLOSE, POLYETHYLENE GLYCOL 2 DROP: .2; .2; 1 LIQUID OPHTHALMIC at 17:20

## 2022-07-04 RX ADMIN — ISODIUM CHLORIDE 3 ML: 0.03 SOLUTION RESPIRATORY (INHALATION) at 07:17

## 2022-07-04 RX ADMIN — OXYCODONE HYDROCHLORIDE 5 MG: 5 SOLUTION ORAL at 01:40

## 2022-07-04 RX ADMIN — FOLIC ACID 1 MG: 1 TABLET ORAL at 09:45

## 2022-07-04 RX ADMIN — FLUTICASONE PROPIONATE 1 SPRAY: 50 SPRAY, METERED NASAL at 17:20

## 2022-07-04 RX ADMIN — ATOVAQUONE 750 MG: 750 SUSPENSION ORAL at 09:47

## 2022-07-04 RX ADMIN — SALINE NASAL SPRAY 1 SPRAY: 1.5 SOLUTION NASAL at 17:20

## 2022-07-04 RX ADMIN — PREDNISONE 30 MG: 20 TABLET ORAL at 09:45

## 2022-07-04 RX ADMIN — LEVOTHYROXINE SODIUM 25 MCG: 25 TABLET ORAL at 09:45

## 2022-07-04 RX ADMIN — ACETAMINOPHEN 650 MG: 650 SUSPENSION ORAL at 09:45

## 2022-07-04 RX ADMIN — ACETAMINOPHEN 650 MG: 650 SUSPENSION ORAL at 21:19

## 2022-07-04 RX ADMIN — Medication 250 MG: at 09:47

## 2022-07-04 RX ADMIN — LORATADINE 10 MG: 10 TABLET ORAL at 09:45

## 2022-07-04 RX ADMIN — PANTOPRAZOLE SODIUM 40 MG: 40 INJECTION, POWDER, FOR SOLUTION INTRAVENOUS at 09:45

## 2022-07-04 RX ADMIN — OXYCODONE HYDROCHLORIDE 5 MG: 5 SOLUTION ORAL at 21:55

## 2022-07-04 RX ADMIN — GLYCERIN, HYPROMELLOSE, POLYETHYLENE GLYCOL 2 DROP: .2; .2; 1 LIQUID OPHTHALMIC at 09:46

## 2022-07-04 RX ADMIN — NYSTATIN: 100000 POWDER TOPICAL at 09:48

## 2022-07-04 RX ADMIN — ISODIUM CHLORIDE 3 ML: 0.03 SOLUTION RESPIRATORY (INHALATION) at 13:50

## 2022-07-04 RX ADMIN — IRON SUCROSE 200 MG: 20 INJECTION, SOLUTION INTRAVENOUS at 09:43

## 2022-07-04 RX ADMIN — ISODIUM CHLORIDE 3 ML: 0.03 SOLUTION RESPIRATORY (INHALATION) at 20:31

## 2022-07-04 RX ADMIN — ASPIRIN 81 MG CHEWABLE TABLET 81 MG: 81 TABLET CHEWABLE at 09:45

## 2022-07-04 NOTE — ASSESSMENT & PLAN NOTE
· Scleroderma with crest syndrome  · Continue prednisone  · Rheumatology advised IVIG which patient completed yesterday with marked improvements to her ROM

## 2022-07-04 NOTE — PLAN OF CARE
Problem: MOBILITY - ADULT  Goal: Maintain or return to baseline ADL function  Description: INTERVENTIONS:  -  Assess patient's ability to carry out ADLs; assess patient's baseline for ADL function and identify physical deficits which impact ability to perform ADLs (bathing, care of mouth/teeth, toileting, grooming, dressing, etc )  - Assess/evaluate cause of self-care deficits   - Assess range of motion  - Assess patient's mobility; develop plan if impaired  - Assess patient's need for assistive devices and provide as appropriate  - Encourage maximum independence but intervene and supervise when necessary  - Involve family in performance of ADLs  - Assess for home care needs following discharge   - Consider OT consult to assist with ADL evaluation and planning for discharge  - Provide patient education as appropriate  Outcome: Progressing  Goal: Maintains/Returns to pre admission functional level  Description: INTERVENTIONS:  - Perform BMAT or MOVE assessment daily    - Set and communicate daily mobility goal to care team and patient/family/caregiver  - Collaborate with rehabilitation services on mobility goals if consulted  - Perform Range of Motion  times a day  - Reposition patient every  hours    - Dangle patient  times a day  - Stand patient  times a day  - Ambulate patient  times a day  - Out of bed to chair  times a day   - Out of bed for meals  times a day  - Out of bed for toileting  - Record patient progress and toleration of activity level   Outcome: Progressing     Problem: Potential for Falls  Goal: Patient will remain free of falls  Description: INTERVENTIONS:  - Educate patient/family on patient safety including physical limitations  - Instruct patient to call for assistance with activity   - Consult OT/PT to assist with strengthening/mobility   - Keep Call bell within reach  - Keep bed low and locked with side rails adjusted as appropriate  - Keep care items and personal belongings within reach  - Initiate and maintain comfort rounds  - Make Fall Risk Sign visible to staff  - Offer Toileting every  Hours, in advance of need  - Initiate/Maintain alarm  - Obtain necessary fall risk management equipment:   - Apply yellow socks and bracelet for high fall risk patients  - Consider moving patient to room near nurses station  Outcome: Progressing     Problem: Prexisting or High Potential for Compromised Skin Integrity  Goal: Skin integrity is maintained or improved  Description: INTERVENTIONS:  - Identify patients at risk for skin breakdown  - Assess and monitor skin integrity  - Assess and monitor nutrition and hydration status  - Monitor labs   - Assess for incontinence   - Turn and reposition patient  - Assist with mobility/ambulation  - Relieve pressure over bony prominences  - Avoid friction and shearing  - Provide appropriate hygiene as needed including keeping skin clean and dry  - Evaluate need for skin moisturizer/barrier cream  - Collaborate with interdisciplinary team   - Patient/family teaching  - Consider wound care consult   Outcome: Progressing     Problem: PAIN - ADULT  Goal: Verbalizes/displays adequate comfort level or baseline comfort level  Description: Interventions:  - Encourage patient to monitor pain and request assistance  - Assess pain using appropriate pain scale  - Administer analgesics based on type and severity of pain and evaluate response  - Implement non-pharmacological measures as appropriate and evaluate response  - Consider cultural and social influences on pain and pain management  - Notify physician/advanced practitioner if interventions unsuccessful or patient reports new pain  Outcome: Progressing     Problem: INFECTION - ADULT  Goal: Absence or prevention of progression during hospitalization  Description: INTERVENTIONS:  - Assess and monitor for signs and symptoms of infection  - Monitor lab/diagnostic results  - Monitor all insertion sites, i e  indwelling lines, tubes, and drains  - Monitor endotracheal if appropriate and nasal secretions for changes in amount and color  - [place] appropriate cooling/warming therapies per order  - Administer medications as ordered  - Instruct and encourage patient and family to use good hand hygiene technique  - Identify and instruct in appropriate isolation precautions for identified infection/condition  Outcome: Progressing  Goal: Absence of fever/infection during neutropenic period  Description: INTERVENTIONS:  - Monitor WBC    Outcome: Progressing     Problem: SAFETY ADULT  Goal: Maintain or return to baseline ADL function  Description: INTERVENTIONS:  -  Assess patient's ability to carry out ADLs; assess patient's baseline for ADL function and identify physical deficits which impact ability to perform ADLs (bathing, care of mouth/teeth, toileting, grooming, dressing, etc )  - Assess/evaluate cause of self-care deficits   - Assess range of motion  - Assess patient's mobility; develop plan if impaired  - Assess patient's need for assistive devices and provide as appropriate  - Encourage maximum independence but intervene and supervise when necessary  - Involve family in performance of ADLs  - Assess for home care needs following discharge   - Consider OT consult to assist with ADL evaluation and planning for discharge  - Provide patient education as appropriate  Outcome: Progressing  Goal: Maintains/Returns to pre admission functional level  Description: INTERVENTIONS:  - Perform BMAT or MOVE assessment daily    - Set and communicate daily mobility goal to care team and patient/family/caregiver  - Collaborate with rehabilitation services on mobility goals if consulted  - Perform Range of Motion  times a day  - Reposition patient every  hours    - Dangle patient  times a day  - Stand patient  times a day  - Ambulate patient  times a day  - Out of bed to chair  times a day   - Out of bed for meals  times a day  - Out of bed for toileting  - Record patient progress and toleration of activity level   Outcome: Progressing  Goal: Patient will remain free of falls  Description: INTERVENTIONS:  - Educate patient/family on patient safety including physical limitations  - Instruct patient to call for assistance with activity   - Consult OT/PT to assist with strengthening/mobility   - Keep Call bell within reach  - Keep bed low and locked with side rails adjusted as appropriate  - Keep care items and personal belongings within reach  - Initiate and maintain comfort rounds  - Make Fall Risk Sign visible to staff  - Offer Toileting every  Hours, in advance of need  - Initiate/Maintain alarm  - Obtain necessary fall risk management equipment:   - Apply yellow socks and bracelet for high fall risk patients  - Consider moving patient to room near nurses station  Outcome: Progressing

## 2022-07-04 NOTE — ASSESSMENT & PLAN NOTE
· Extensive upper and lower extremity proximal muscle weakness with marked improvement today  · Received IVIG with Rheumatology  · Follow-up on serologies  · Supportive care

## 2022-07-04 NOTE — ASSESSMENT & PLAN NOTE
PT cardiac rehab phase I eval order received and held; pt awaiting further cardiac workup to establish medical/surgical POC.    · Crest syndrome with pulmonary hypertension  · Continue supplemental oxygen as needed  · Sildenafil  · Cardiology following

## 2022-07-04 NOTE — ASSESSMENT & PLAN NOTE
· Patient with history of dysphagia  · PEG tube in place  · Continue current tube feeds  · Aspiration precautions

## 2022-07-04 NOTE — PROGRESS NOTES
Progress Note - Pulmonary   Ifeoma Quiros 80 y o  female MRN: [id number]  Unit/Bed#: [facility] 204-01 Encounter: [id number]      Assessment & Recommendations:  1  Chronic hypoxic respiratory failure  · Titrate O2 to keep SpO2 >88%, cont IS    2  Severe WHO Group I pulmonary hypertension  · Further management per CHF cardiology, monitoring BP    3  Hypotension - some improvements, monitor closely    4  Dermatomyositis with acute flair, CREST/Scleroderma  · Post IVIG, on prednisone  · Further serology panels pending, follow up    5  History of prior KOREY infection - do not suspect this is active at this time    6  HFpEF EF 70%, grade II diastolic dysfunction    7  Sacral wound - per wound care    Subjective:   Feels some improvement, no chest pain, no pleurisy, no sputum production, feels edema has improved  Objective:       Vitals: Blood pressure (!) 91/43, pulse 70, temperature 98 5 °F (36 9 °C), temperature source Axillary, resp  rate 22, height 5' 7 5" (1 715 m), weight 69 kg (152 lb 3 2 oz), SpO2 (!) 85 %  , 96% 2LNC during exam, Body mass index is 23 49 kg/m²  Intake/Output Summary (Last 24 hours) at 7/4/2022 1204  Last data filed at 7/4/2022 0601  Gross per 24 hour   Intake 1667 ml   Output 1100 ml   Net 567 ml         Physical Exam  Gen: Frail older woman in bed, awake, alert, oriented x 3, no acute distress  HEENT: Mucous membranes moist, no oral lesions, no thrush  NECK: No accessory muscle use, JVP not elevated  Cardiac: Regular, single S1, single S2, no murmurs, no rubs, no gallops  Lungs: diminished BS diffusely but no wheeze or rales appreciated  Abdomen: PEG in place, normoactive bowel sounds, soft nontender, nondistended, no rebound or rigidity, no guarding  Extremities: no cyanosis, no clubbing, no edema, poor muscle mass    Labs: I have personally reviewed pertinent lab results    Laboratory and Diagnostics  Results from last 7 days   Lab Units 07/04/22  0431 07/01/22  0514 06/29/22  5762 06/28/22  0559   WBC Thousand/uL 10 87* 10 40* 9 64 10 07   HEMOGLOBIN g/dL 7 8* 7 9* 7 6* 7 9*   HEMATOCRIT % 26 4* 26 6* 25 7* 26 2*   PLATELETS Thousands/uL 375 405* 384 405*   NEUTROS PCT %  --  77*  --  77*   BANDS PCT % 14*  --   --   --    MONOS PCT %  --  7  --  8   MONO PCT % 4  --   --   --      Results from last 7 days   Lab Units 07/04/22  0431 07/01/22  0514 06/29/22  2242 06/28/22  0500   SODIUM mmol/L 135* 140 141 140   POTASSIUM mmol/L 3 9 4 1 4 1 4 0   CHLORIDE mmol/L 100 103 104 103   CO2 mmol/L 31 32 35* 34*   ANION GAP mmol/L 4 5 2* 3*   BUN mg/dL 32* 27* 19 26*   CREATININE mg/dL 0 31* 0 30* 0 36* 0 38*   CALCIUM mg/dL 8 1* 8 5 8 4 8 5   GLUCOSE RANDOM mg/dL 119 128 120 134   ALT U/L 14  --  16 19   AST U/L 17  --  15 19   ALK PHOS U/L 79  --  84 83   ALBUMIN g/dL 1 9*  --  2 3* 2 1*   TOTAL BILIRUBIN mg/dL 0 19*  --  0 28 0 17*     Results from last 7 days   Lab Units 07/01/22  0514   MAGNESIUM mg/dL 2 0   PHOSPHORUS mg/dL 2 5               Results from last 7 days   Lab Units 06/29/22  2242   LACTIC ACID mmol/L 1 4     Results from last 7 days   Lab Units 07/02/22  0452 07/01/22  0514   CRP mg/L 55 1* 39 5*   SED RATE mm/hour 50*  --                  Results from last 7 days   Lab Units 06/29/22  2242   PROCALCITONIN ng/ml 0 11         Microbiology:  FLU/RSV/COVID-19 neg    Imaging and other studies: I have personally reviewed pertinent reports  and I have personally reviewed pertinent films in PACS  CXR 6/29 - mildly increased alveolar infiltrates, no PTX    TTE 6/2022 - EF 70%, grade II diastolic dysfunction, mildly dilated RV, RVSP 56mmHg      Byron Pritchett DO, [facility]

## 2022-07-04 NOTE — QUICK NOTE
General Surgery Quick Note    Wound Check Performed  Patient was rolled to the left side to access sacral wound  Two sacral wound pads and the wet to dry dressing were removed from the wound and subcutaneous tracks  The area was then thoroughly cleaned  A new wet to dry using 1 roll of Kerlex was applied to the sacral wound and inserted into wound tracks  Wet to dry dressing was then covered with two new sacral wound pads  A fresh teodoro was placed  The patient was then rolled onto her back and positioned as to her specifications  All of the patients questions were answered      Aga Palomo MD  7/4/22 11:30 AM

## 2022-07-04 NOTE — ASSESSMENT & PLAN NOTE
Malnutrition Findings:   Adult Malnutrition type: Chronic illness  Adult Degree of Malnutrition: Other severe protein calorie malnutrition  Malnutrition Characteristics: Muscle loss, Fat loss, Weight loss, Inadequate energy           360 Statement: related to disease/condition evidenced by increased kcal/pro needs for wound healing Stage IV sacral decub, BMI 18 4 adjusted per prior facility wt 6/7/22;severe buccal fat pads loss, severe deltoid muscle loss, Patient lost 31# (20 6%) since 3/15/22 past 3 months with illness <75% energy intake versus needs for > 1 month  Treating with EN support and Robert TID via PEG for wound healing    BMI Findings:  Adult BMI Classifications: Underweight < 18 5        Body mass index is 23 49 kg/m²     · Continue tube feeding as ordered

## 2022-07-04 NOTE — PROGRESS NOTES
[facility]  Progress Note Padilla Shore 1940, 80 y o  female MRN: [id number]  Unit/Bed#: [facility] 204-01 Encounter: [id number]  Primary Care Provider: No primary care provider on file  Date and time admitted to hospital: 6/14/2022  8:07 PM    * Sacral wound  Assessment & Plan  · Stage IV sacral ulcer  · Status post I&D with General surgery  · Wound care per General surgery  · Received IV antibiotics, presently being monitored off of antibiotics, Infectious Disease input noted  · Frequent repositioning  · Optimize nutritional status  · General surgery following      Acute respiratory failure with hypoxia (HCC)  Assessment & Plan  · Multifactorial  · Encourage incentive spirometry  · Remains on RA  · Pulmonary input noted      Scleroderma ([address] )  Assessment & Plan  · Scleroderma with crest syndrome  · Continue prednisone  · Rheumatology advised IVIG which patient completed yesterday with marked improvements to her ROM    Severe protein-calorie malnutrition ([address] )  Assessment & Plan  Malnutrition Findings:   Adult Malnutrition type: Chronic illness  Adult Degree of Malnutrition: Other severe protein calorie malnutrition  Malnutrition Characteristics: Muscle loss, Fat loss, Weight loss, Inadequate energy           360 Statement: related to disease/condition evidenced by increased kcal/pro needs for wound healing Stage IV sacral decub, BMI 18 4 adjusted per prior facility wt 6/7/22;severe buccal fat pads loss, severe deltoid muscle loss, Patient lost 31# (20 6%) since 3/15/22 past 3 months with illness <75% energy intake versus needs for > 1 month  Treating with EN support and Robert TID via PEG for wound healing    BMI Findings:  Adult BMI Classifications: Underweight < 18 5        Body mass index is 23 49 kg/m²     · Continue tube feeding as ordered     Urinary retention  Assessment & Plan  · Urinary retention with Tejada catheter in place  · Outpatient Urology follow-up    Pulmonary hypertension (HCC)  Assessment & Plan  · Crest syndrome with pulmonary hypertension  · Continue supplemental oxygen as needed  · Sildenafil  · Cardiology following    Proximal muscle weakness  Assessment & Plan  · Extensive upper and lower extremity proximal muscle weakness with marked improvement today  · Received IVIG with Rheumatology  · Follow-up on serologies  · Supportive care    Dysphagia, oropharyngeal phase  Assessment & Plan  · Patient with history of dysphagia  · PEG tube in place  · Continue current tube feeds  · Aspiration precautions    Pneumonia  Assessment & Plan  · Resolved  · Completed antibiotics  · Supportive care  · Aspiration precautions        VTE Pharmacologic Prophylaxis: VTE Score: 7 High Risk (Score >/= 5) - Pharmacological DVT Prophylaxis Ordered: enoxaparin (Lovenox)  Sequential Compression Devices Ordered  Patient Centered Rounds: I performed bedside rounds with nursing staff today  Discussions with Specialists or Other Care Team Provider: Primary RN    Education and Discussions with Family / Patient: Updated  (sister) at bedside  Time Spent for Care: 20 minutes  More than 50% of total time spent on counseling and coordination of care as described above  Current Length of Stay: 20 day(s)  Current Patient Status: Inpatient   Certification Statement: The patient will continue to require additional inpatient hospital stay due to sacral wound and scleroderma requiring IVIG  Discharge Plan: Anticipate discharge in 48 hrs to rehab facility  Code Status: Level 3 - DNAR and DNI    Subjective:   Feels that she can move her arms easier today  Now also has more mobility of her legs  No difficulty breathing  No chest pain or shortness of breath    Is also able to do her incentive spirometer higher than yesterday    Objective:     Vitals:   Temp (24hrs), Av 2 °F (36 8 °C), Min:97 7 °F (36 5 °C), Max:98 5 °F (36 9 °C)    Temp:  [97 7 °F (36 5 °C)-98 5 °F (36 9 °C)] 98 5 °F (36 9 °C)  HR:  [66-96] 70  Resp:  [15-22] 22  BP: ()/(40-59) 91/43  SpO2:  [85 %-100 %] 97 %  Body mass index is 23 49 kg/m²  Input and Output Summary (last 24 hours): Intake/Output Summary (Last 24 hours) at 7/4/2022 1515  Last data filed at 7/4/2022 1101  Gross per 24 hour   Intake 1407 ml   Output 400 ml   Net 1007 ml       Physical Exam:   Physical Exam  Constitutional:       Appearance: She is not ill-appearing  HENT:      Head: Normocephalic  Nose: Nose normal       Mouth/Throat:      Mouth: Mucous membranes are moist    Cardiovascular:      Rate and Rhythm: Normal rate and regular rhythm  Pulses: Normal pulses  Heart sounds: Normal heart sounds  Pulmonary:      Effort: Pulmonary effort is normal       Breath sounds: Normal breath sounds  Abdominal:      General: Abdomen is flat  Palpations: Abdomen is soft  Musculoskeletal:         General: Normal range of motion  Cervical back: Normal range of motion and neck supple  Skin:     General: Skin is warm  Neurological:      Mental Status: She is alert and oriented to person, place, and time     Psychiatric:         Mood and Affect: Mood normal             Additional Data:     Labs:  Results from last 7 days   Lab Units 07/04/22  0431 07/01/22  0514   WBC Thousand/uL 10 87* 10 40*   HEMOGLOBIN g/dL 7 8* 7 9*   HEMATOCRIT % 26 4* 26 6*   PLATELETS Thousands/uL 375 405*   BANDS PCT % 14*  --    NEUTROS PCT %  --  77*   LYMPHS PCT %  --  12*   LYMPHO PCT % 1*  --    MONOS PCT %  --  7   MONO PCT % 4  --    EOS PCT % 1 2     Results from last 7 days   Lab Units 07/04/22  0431   SODIUM mmol/L 135*   POTASSIUM mmol/L 3 9   CHLORIDE mmol/L 100   CO2 mmol/L 31   BUN mg/dL 32*   CREATININE mg/dL 0 31*   ANION GAP mmol/L 4   CALCIUM mg/dL 8 1*   ALBUMIN g/dL 1 9*   TOTAL BILIRUBIN mg/dL 0 19*   ALK PHOS U/L 79   ALT U/L 14   AST U/L 17   GLUCOSE RANDOM mg/dL 119                 Results from last 7 days   Lab Units 06/29/22  2242   LACTIC ACID mmol/L 1 4   PROCALCITONIN ng/ml 0 11       Lines/Drains:  Invasive Devices  Report    Peripheral Intravenous Line  Duration           Long-Dwell Peripheral IV (Midline) 06/14/22 Right Brachial 19 days          Drain  Duration           Gastrostomy/Enterostomy -- days    Urethral Catheter -- days              Urinary Catheter:  Goal for removal: N/A - Chronic Tejada               Imaging: Personally reviewed the following imaging: chest xray    Recent Cultures (last 7 days):         Last 24 Hours Medication List:   Current Facility-Administered Medications   Medication Dose Route Frequency Provider Last Rate    acetaminophen  650 mg Per G Tube Q6H Jena Chávez MD      acetaminophen  650 mg Per G Tube Q4H PRN Jena Chávez MD      aspirin  81 mg Per G Tube Daily Esli Ribera MD      atovaquone  750 mg Per G Tube Daily With Breakfast Elsi Ribera MD      cholecalciferol  1,000 Units Per G Tube Daily Elsi Ribera MD      enoxaparin  40 mg Subcutaneous Q24H [address] Elsi Ribera MD      fluticasone  1 spray Each Nare BID Arlyn Koyanagi, MD      folic acid  1 mg Per PEG Tube Daily Arlyn Koyanagi, MD      glycerin-hypromellose-  2 drop Both Eyes BID Elsi Ribera MD      iron sucrose  200 mg Intravenous Daily Arlyn Koyanagi, MD Stopped (07/04/22 1101)    levalbuterol  1 25 mg Nebulization TID Shamika Lambert DO      levothyroxine  25 mcg Per PEG Tube Early Morning Elsi Ribera MD      loratadine  10 mg Per G Tube Daily Elsi Ribera MD      Macitentan  10 mg Per G Tube Daily Elena Miranda MD      melatonin  3 mg Oral HS PRN Jena Chávez MD      nystatin   Topical BID Arlyn Koyanagi, MD      ondansetron  4 mg Intravenous Q6H PRN Jena Chávez MD      oxyCODONE  2 5 mg Per G Tube Q4H PRN Jena Chávez MD      oxyCODONE  5 mg Per G Tube Q4H PRN Jena Chávez MD  pantoprazole  40 mg Intravenous Q24H [address] Argenis Brooks MD      predniSONE  30 mg Per G Tube Daily Argenis Brooks MD      saccharomyces boulardii  250 mg Per G Tube BID Argenis Brooks MD      sildenafil  10 mg Oral TID Jim Mckenna DO      sodium chloride  1 spray Each Nare Q1H PRN Brooke Dang MD      sodium chloride  3 mL Nebulization TID Anita Vela DO          Today, Patient Was Seen By: BETTIE Weller    **Please Note: This note may have been constructed using a voice recognition system  **

## 2022-07-05 PROCEDURE — 93005 ELECTROCARDIOGRAM TRACING: CPT

## 2022-07-05 PROCEDURE — 99232 SBSQ HOSP IP/OBS MODERATE 35: CPT | Performed by: NURSE PRACTITIONER

## 2022-07-05 PROCEDURE — 94760 N-INVAS EAR/PLS OXIMETRY 1: CPT

## 2022-07-05 PROCEDURE — 94640 AIRWAY INHALATION TREATMENT: CPT

## 2022-07-05 PROCEDURE — 99232 SBSQ HOSP IP/OBS MODERATE 35: CPT | Performed by: INTERNAL MEDICINE

## 2022-07-05 PROCEDURE — 97110 THERAPEUTIC EXERCISES: CPT

## 2022-07-05 PROCEDURE — 97535 SELF CARE MNGMENT TRAINING: CPT

## 2022-07-05 PROCEDURE — C9113 INJ PANTOPRAZOLE SODIUM, VIA: HCPCS | Performed by: INTERNAL MEDICINE

## 2022-07-05 RX ADMIN — ACETAMINOPHEN 650 MG: 650 SUSPENSION ORAL at 22:28

## 2022-07-05 RX ADMIN — SILDENAFIL CITRATE 10 MG: 20 TABLET ORAL at 09:10

## 2022-07-05 RX ADMIN — ATOVAQUONE 750 MG: 750 SUSPENSION ORAL at 09:08

## 2022-07-05 RX ADMIN — Medication 1000 UNITS: at 09:10

## 2022-07-05 RX ADMIN — SILDENAFIL CITRATE 10 MG: 20 TABLET ORAL at 21:36

## 2022-07-05 RX ADMIN — LORATADINE 10 MG: 10 TABLET ORAL at 09:10

## 2022-07-05 RX ADMIN — ACETAMINOPHEN 650 MG: 650 SUSPENSION ORAL at 00:07

## 2022-07-05 RX ADMIN — ASPIRIN 81 MG CHEWABLE TABLET 81 MG: 81 TABLET CHEWABLE at 09:09

## 2022-07-05 RX ADMIN — ACETAMINOPHEN 650 MG: 650 SUSPENSION ORAL at 21:36

## 2022-07-05 RX ADMIN — MELATONIN 3 MG: at 00:07

## 2022-07-05 RX ADMIN — PREDNISONE 30 MG: 20 TABLET ORAL at 09:10

## 2022-07-05 RX ADMIN — LEVALBUTEROL HYDROCHLORIDE 1.25 MG: 1.25 SOLUTION, CONCENTRATE RESPIRATORY (INHALATION) at 19:57

## 2022-07-05 RX ADMIN — MACITENTAN 10 MG: 10 TABLET, FILM COATED ORAL at 09:10

## 2022-07-05 RX ADMIN — ISODIUM CHLORIDE 3 ML: 0.03 SOLUTION RESPIRATORY (INHALATION) at 07:11

## 2022-07-05 RX ADMIN — LEVALBUTEROL HYDROCHLORIDE 1.25 MG: 1.25 SOLUTION, CONCENTRATE RESPIRATORY (INHALATION) at 07:11

## 2022-07-05 RX ADMIN — MELATONIN 3 MG: at 22:29

## 2022-07-05 RX ADMIN — IRON SUCROSE 200 MG: 20 INJECTION, SOLUTION INTRAVENOUS at 09:07

## 2022-07-05 RX ADMIN — OXYCODONE HYDROCHLORIDE 5 MG: 5 SOLUTION ORAL at 06:24

## 2022-07-05 RX ADMIN — ACETAMINOPHEN 650 MG: 650 SUSPENSION ORAL at 03:45

## 2022-07-05 RX ADMIN — NYSTATIN: 100000 POWDER TOPICAL at 17:04

## 2022-07-05 RX ADMIN — FOLIC ACID 1 MG: 1 TABLET ORAL at 09:19

## 2022-07-05 RX ADMIN — ISODIUM CHLORIDE 3 ML: 0.03 SOLUTION RESPIRATORY (INHALATION) at 19:57

## 2022-07-05 RX ADMIN — FLUTICASONE PROPIONATE 1 SPRAY: 50 SPRAY, METERED NASAL at 17:04

## 2022-07-05 RX ADMIN — LEVALBUTEROL HYDROCHLORIDE 1.25 MG: 1.25 SOLUTION, CONCENTRATE RESPIRATORY (INHALATION) at 13:25

## 2022-07-05 RX ADMIN — ACETAMINOPHEN 650 MG: 650 SUSPENSION ORAL at 12:09

## 2022-07-05 RX ADMIN — ISODIUM CHLORIDE 3 ML: 0.03 SOLUTION RESPIRATORY (INHALATION) at 13:25

## 2022-07-05 RX ADMIN — Medication 250 MG: at 17:06

## 2022-07-05 RX ADMIN — SILDENAFIL CITRATE 10 MG: 20 TABLET ORAL at 16:46

## 2022-07-05 RX ADMIN — GLYCERIN, HYPROMELLOSE, POLYETHYLENE GLYCOL 2 DROP: .2; .2; 1 LIQUID OPHTHALMIC at 17:04

## 2022-07-05 RX ADMIN — NYSTATIN: 100000 POWDER TOPICAL at 09:11

## 2022-07-05 RX ADMIN — OXYCODONE HYDROCHLORIDE 5 MG: 5 SOLUTION ORAL at 12:50

## 2022-07-05 RX ADMIN — GLYCERIN, HYPROMELLOSE, POLYETHYLENE GLYCOL 2 DROP: .2; .2; 1 LIQUID OPHTHALMIC at 09:10

## 2022-07-05 RX ADMIN — ACETAMINOPHEN 650 MG: 650 SUSPENSION ORAL at 16:45

## 2022-07-05 RX ADMIN — FLUTICASONE PROPIONATE 1 SPRAY: 50 SPRAY, METERED NASAL at 09:08

## 2022-07-05 RX ADMIN — PANTOPRAZOLE SODIUM 40 MG: 40 INJECTION, POWDER, FOR SOLUTION INTRAVENOUS at 09:08

## 2022-07-05 RX ADMIN — Medication 250 MG: at 09:09

## 2022-07-05 RX ADMIN — LEVOTHYROXINE SODIUM 25 MCG: 25 TABLET ORAL at 05:45

## 2022-07-05 RX ADMIN — ENOXAPARIN SODIUM 40 MG: 40 INJECTION SUBCUTANEOUS at 09:10

## 2022-07-05 RX ADMIN — OXYCODONE HYDROCHLORIDE 5 MG: 5 SOLUTION ORAL at 22:28

## 2022-07-05 NOTE — OCCUPATIONAL THERAPY NOTE
Occupational Therapy Progress Note     Patient Name: Nikky Weldon  LQQRK'S Date: 7/5/2022  Problem List  Principal Problem:    Sacral wound  Active Problems:    Severe protein-calorie malnutrition (HCC)    Dysphagia, oropharyngeal phase    Scleroderma (HCC)    Acquired hypothyroidism    Anemia    Urinary retention    Acute respiratory failure with hypoxia (HCC)    Pneumonia    Hearing loss    Pulmonary hypertension (HCC)    Proximal muscle weakness          07/05/22 0906   OT Last Visit   OT Visit Date 07/05/22   Note Type   Note Type Treatment   Restrictions/Precautions   Weight Bearing Precautions Per Order No   Other Precautions Multiple lines;Telemetry; Fall Risk;Pain;Hard of hearing   General   Response to Previous Treatment Patient with no complaints from previous session   Lifestyle   Autonomy Pt reports that in March she was I with ADLS, IADLS, and mobility w/o AD, at [facility] assist with ADL's/IADL's, assist of 1 with RW with mobility/transfers  Reciprocal Relationships Pt has a supportive sister and a    Service to Others Pt is retired   Intrinsic Gratification Pt enjoys reading   Pain Assessment   Pain Assessment Tool 0-10   Pain Score No Pain   ADL   Where Assessed Supine, bed   Grooming Assistance 3  Moderate Assistance   Grooming Deficit Brushing hair   Grooming Comments able to comb front section of hair w/o assistance, assist needed for sides and back   Bed Mobility   Additional Comments refusing bed mobility 2* dressing change at 1000   Therapeutic Exercise - ROM   UE-ROM No   ROM- Right Upper Extremities   R Shoulder PROM; Prolonged stretch; Flexion;ABduction; Extension;Horizontal ABduction   R Elbow AROM;Elbow flexion;Elbow extension  (use of yellow theraband)   Equipment Therabands  (yellow)   R Weight/Reps/Sets 1 set of 10   RUE ROM Comment tight end feeling in shoulders   ROM - Left Upper Extremities    L Shoulder PROM; Prolonged stretch; Flexion;ABduction; Extension;Horizontal ABduction   L Elbow Elbow flexion;AROM;Elbow extension  (use of yellow theraband)   L Weight/Reps/Sets 1 set of 10   LUE ROM Comment tight end feeling in shoulders   Cognition   Overall Cognitive Status WFL   Arousal/Participation Cooperative   Attention Attends with cues to redirect   Orientation Level Oriented X4   Memory Decreased recall of precautions;Decreased recall of recent events   Following Commands Follows one step commands without difficulty   Comments pleasant and cooperative, able to recall granddtr planning on visiting today   Activity Tolerance   Activity Tolerance Patient limited by pain   Medical Staff Made Aware RN aware   Assessment   Assessment Patient participated in Skilled OT session this date with interventions consisting of ADL re training with the use of correct body mechnaics, therapeutic exercise to: increase functional use of BUEs, increase BUE muscle strength  and  therapeutic activities to: increase activity tolerance   Patient agreeable to OT treatment session, upon arrival patient was found supine in bed  In comparison to previous session, patient with improvements in grooming*   Patient requiring frequent rest periods and ocassional safety reminders  Patient continues to be functioning below baseline level, occupational performance remains limited secondary to factors listed above and increased risk for falls and injury  From OT standpoint, recommendation at time of d/c would be Short Term Rehab  Patient to benefit from continued Occupational Therapy treatment while in the hospital to address deficits as defined above and maximize level of functional independence with ADLs and functional mobility  The patient's raw score on the AM-PAC Daily Activity inpatient short form low function score is 18, standardized score is Low Function Daily Activity Standardized Score: 30 17  Patients with a standardized score less than 39 4 are likely to benefit from discharge to post-acute rehab services  Please refer to the recommendation of the Occupational Therapist for safe discharge planning     Plan   Treatment Interventions UE strengthening/ROM;ADL retraining   Goal Expiration Date 07/12/22   OT Treatment Day 4   OT Frequency 3-5x/wk   Recommendation   OT Discharge Recommendation Post acute rehabilitation services   OT - OK to Discharge Yes   AM-PAC Daily Activity Inpatient   Lower Body Dressing 1   Bathing 1   Toileting 1   Upper Body Dressing 2   Grooming 3   Eating 2   Daily Activity Raw Score 10   Turning Head Towards Sound 4   Follow Simple Instructions 4   Low Function Daily Activity Raw Score 18   Low Function Daily Activity Standardized Score 30 17   AM-PAC Applied Cognition Inpatient   Following a Speech/Presentation 3   Understanding Ordinary Conversation 4   Taking Medications 4   Remembering Where Things Are Placed or Put Away 3   Remembering List of 4-5 Errands 3   Taking Care of Complicated Tasks 3   Applied Cognition Raw Score 20   Applied Cognition Standardized Score 41 76   Modified Henefer Scale   Modified Henefer Scale 4     Gretchen Lozano MS, OTR/L

## 2022-07-05 NOTE — PROGRESS NOTES
Progress Note - Pulmonary   Marely Hahn 80 y o  female MRN: [id number]  Unit/Bed#: [facility] 204-01 Encounter: [id number]      Assessment:  · Acute hypoxemic respiratory failure  Resolved  The patient is currently on room air  · Pulmonary hypertension  · Dermatomyositis  · CREST/scleroderma  · Sacral wound  Plan:  · Continue pulmonary hypertension medications  · Wound Care  · Will sign off  Please call with questions  Subjective: The patient feels better  She is currently on room air  No significant cough  Vitals: Blood pressure 110/58, pulse 64, temperature 97 8 °F (36 6 °C), temperature source Oral, resp  rate 17, height 5' 7 5" (1 715 m), weight 69 kg (152 lb 3 2 oz), SpO2 98 %  , Body mass index is 23 49 kg/m²  Intake/Output Summary (Last 24 hours) at 7/5/2022 1332  Last data filed at 7/5/2022 1002  Gross per 24 hour   Intake 1610 ml   Output 1375 ml   Net 235 ml       Physical Exam  Gen: Awake, alert, oriented x 3, no acute distress  HEENT: Mucous membranes moist, no oral lesions, no thrush  NECK: No accessory muscle use, JVP not elevated  Cardiac: Regular, single S1, single S2, no murmurs, no rubs, no gallops  Lungs:  Diminished breath sounds  No wheezing    Abdomen: normoactive bowel sounds, soft nontender, nondistended, no rebound or rigidity, no guarding  Extremities: no cyanosis, no clubbing, no edema      Danny Alvarez MD

## 2022-07-05 NOTE — PLAN OF CARE
Problem: MOBILITY - ADULT  Goal: Maintain or return to baseline ADL function  Description: INTERVENTIONS:  -  Assess patient's ability to carry out ADLs; assess patient's baseline for ADL function and identify physical deficits which impact ability to perform ADLs (bathing, care of mouth/teeth, toileting, grooming, dressing, etc )  - Assess/evaluate cause of self-care deficits   - Assess range of motion  - Assess patient's mobility; develop plan if impaired  - Assess patient's need for assistive devices and provide as appropriate  - Encourage maximum independence but intervene and supervise when necessary  - Involve family in performance of ADLs  - Assess for home care needs following discharge   - Consider OT consult to assist with ADL evaluation and planning for discharge  - Provide patient education as appropriate  Outcome: Progressing  Goal: Maintains/Returns to pre admission functional level  Description: INTERVENTIONS:  - Perform BMAT or MOVE assessment daily    - Set and communicate daily mobility goal to care team and patient/family/caregiver  - Collaborate with rehabilitation services on mobility goals if consulted  - Perform Range of Motion 3 times a day  - Reposition patient every 2 hours    - Dangle patient 3 times a day  - Stand patient 3 times a day  - Ambulate patient 3 times a day  - Out of bed to chair 3 times a day   - Out of bed for meals 3 times a day  - Out of bed for toileting  - Record patient progress and toleration of activity level   Outcome: Progressing     Problem: Potential for Falls  Goal: Patient will remain free of falls  Description: INTERVENTIONS:  - Educate patient/family on patient safety including physical limitations  - Instruct patient to call for assistance with activity   - Consult OT/PT to assist with strengthening/mobility   - Keep Call bell within reach  - Keep bed low and locked with side rails adjusted as appropriate  - Keep care items and personal belongings within reach  - Initiate and maintain comfort rounds  - Make Fall Risk Sign visible to staff  - Offer Toileting every 2 Hours, in advance of need  - Initiate/Maintain alarm  - Obtain necessary fall risk management equipment  - Apply yellow socks and bracelet for high fall risk patients  - Consider moving patient to room near nurses station  Outcome: Progressing     Problem: Prexisting or High Potential for Compromised Skin Integrity  Goal: Skin integrity is maintained or improved  Description: INTERVENTIONS:  - Identify patients at risk for skin breakdown  - Assess and monitor skin integrity  - Assess and monitor nutrition and hydration status  - Monitor labs   - Assess for incontinence   - Turn and reposition patient  - Assist with mobility/ambulation  - Relieve pressure over bony prominences  - Avoid friction and shearing  - Provide appropriate hygiene as needed including keeping skin clean and dry  - Evaluate need for skin moisturizer/barrier cream  - Collaborate with interdisciplinary team   - Patient/family teaching  - Consider wound care consult   Outcome: Progressing     Problem: PAIN - ADULT  Goal: Verbalizes/displays adequate comfort level or baseline comfort level  Description: Interventions:  - Encourage patient to monitor pain and request assistance  - Assess pain using appropriate pain scale  - Administer analgesics based on type and severity of pain and evaluate response  - Implement non-pharmacological measures as appropriate and evaluate response  - Consider cultural and social influences on pain and pain management  - Notify physician/advanced practitioner if interventions unsuccessful or patient reports new pain  Outcome: Progressing     Problem: INFECTION - ADULT  Goal: Absence or prevention of progression during hospitalization  Description: INTERVENTIONS:  - Assess and monitor for signs and symptoms of infection  - Monitor lab/diagnostic results  - Monitor all insertion sites, i e  indwelling lines, tubes, and drains  - Monitor endotracheal if appropriate and nasal secretions for changes in amount and color  - [place] appropriate cooling/warming therapies per order  - Administer medications as ordered  - Instruct and encourage patient and family to use good hand hygiene technique  - Identify and instruct in appropriate isolation precautions for identified infection/condition  Outcome: Progressing  Goal: Absence of fever/infection during neutropenic period  Description: INTERVENTIONS:  - Monitor WBC    Outcome: Progressing     Problem: SAFETY ADULT  Goal: Maintain or return to baseline ADL function  Description: INTERVENTIONS:  -  Assess patient's ability to carry out ADLs; assess patient's baseline for ADL function and identify physical deficits which impact ability to perform ADLs (bathing, care of mouth/teeth, toileting, grooming, dressing, etc )  - Assess/evaluate cause of self-care deficits   - Assess range of motion  - Assess patient's mobility; develop plan if impaired  - Assess patient's need for assistive devices and provide as appropriate  - Encourage maximum independence but intervene and supervise when necessary  - Involve family in performance of ADLs  - Assess for home care needs following discharge   - Consider OT consult to assist with ADL evaluation and planning for discharge  - Provide patient education as appropriate  Outcome: Progressing  Goal: Maintains/Returns to pre admission functional level  Description: INTERVENTIONS:  - Perform BMAT or MOVE assessment daily    - Set and communicate daily mobility goal to care team and patient/family/caregiver  - Collaborate with rehabilitation services on mobility goals if consulted  - Perform Range of Motion 3 times a day  - Reposition patient every 2 hours    - Dangle patient 3 times a day  - Stand patient 3 times a day  - Ambulate patient 3 times a day  - Out of bed to chair 3 times a day   - Out of bed for meals 3 times a day  - Out of bed for toileting  - Record patient progress and toleration of activity level   Outcome: Progressing  Goal: Patient will remain free of falls  Description: INTERVENTIONS:  - Educate patient/family on patient safety including physical limitations  - Instruct patient to call for assistance with activity   - Consult OT/PT to assist with strengthening/mobility   - Keep Call bell within reach  - Keep bed low and locked with side rails adjusted as appropriate  - Keep care items and personal belongings within reach  - Initiate and maintain comfort rounds  - Make Fall Risk Sign visible to staff  - Offer Toileting every 2 Hours, in advance of need  - Initiate/Maintain alarm  - Obtain necessary fall risk management equipment  - Apply yellow socks and bracelet for high fall risk patients  - Consider moving patient to room near nurses station  Outcome: Progressing     Problem: DISCHARGE PLANNING  Goal: Discharge to home or other facility with appropriate resources  Description: INTERVENTIONS:  - Identify barriers to discharge w/patient and caregiver  - Arrange for needed discharge resources and transportation as appropriate  - Identify discharge learning needs (meds, wound care, etc )  - Arrange for interpretive services to assist at discharge as needed  - Refer to Case Management Department for coordinating discharge planning if the patient needs post-hospital services based on physician/advanced practitioner order or complex needs related to functional status, cognitive ability, or social support system  Outcome: Progressing     Problem: Knowledge Deficit  Goal: Patient/family/caregiver demonstrates understanding of disease process, treatment plan, medications, and discharge instructions  Description: Complete learning assessment and assess knowledge base    Interventions:  - Provide teaching at level of understanding  - Provide teaching via preferred learning methods  Outcome: Progressing     Problem: Nutrition/Hydration-ADULT  Goal: Nutrient/Hydration intake appropriate for improving, restoring or maintaining nutritional needs  Description: Monitor and assess patient's nutrition/hydration status for malnutrition  Collaborate with interdisciplinary team and initiate plan and interventions as ordered  Monitor patient's weight and dietary intake as ordered or per policy  Utilize nutrition screening tool and intervene as necessary  Determine patient's food preferences and provide high-protein, high-caloric foods as appropriate       INTERVENTIONS:  - Monitor oral intake, urinary output, labs, and treatment plans  - Assess nutrition and hydration status and recommend course of action  - Evaluate amount of meals eaten  - Assist patient with eating if necessary   - Allow adequate time for meals  - Recommend/ encourage appropriate diets, oral nutritional supplements, and vitamin/mineral supplements  - Order, calculate, and assess calorie counts as needed  - Recommend, monitor, and adjust tube feedings and TPN/PPN based on assessed needs  - Assess need for intravenous fluids  - Provide specific nutrition/hydration education as appropriate  - Include patient/family/caregiver in decisions related to nutrition  Outcome: Progressing

## 2022-07-05 NOTE — CASE MANAGEMENT
Case Management Progress Note    Patient name Cyndi Garcia  Location [facility]-01 MRN [id number]  : 1940 Date 2022       LOS (days): 21  Geometric Mean LOS (GMLOS) (days): 9 60  Days to GMLOS:-11 1        OBJECTIVE:        Current admission status: Inpatient  Preferred Pharmacy:   PATIENT/FAMILY REPORTS NO PREFERRED PHARMACY  No address on file      Primary Care Provider: No primary care provider on file  Primary Insurance: [facility]  Secondary Insurance:     PROGRESS NOTE:    Met with family at bedside to review rehab referrals and responses, as follows:    (1) [facility] still following and able to offer a bed  (2) [facility] may [facility] available bed, asked for family to fill out generic application (provided to daughter Kylah Lockhart) before consideration -- Kylah Lockhart will complete and e-mail back to CM  (3)  [facility] -- still reviewing, possible bed  Family also inquiring about possibility of [facility] or [facility]  Explained these are acute rehab facilities, but discussed we could make the referrals and see if they can offer a bed  Discussed that we would like to identify a bed within the next 24 hours, so insurance authorization can be pursued to enable a discharge later this week  CM to follow      Vasile GARCIA  2022  3:24 PM

## 2022-07-05 NOTE — PROGRESS NOTES
[facility]  Progress Note Neto Shore 1940, 80 y o  female MRN: [id number]  Unit/Bed#: [facility] 204-01 Encounter: [id number]  Primary Care Provider: No primary care provider on file  Date and time admitted to hospital: 6/14/2022  8:07 PM    * Sacral wound  Assessment & Plan  · Stage IV sacral ulcer  · Status post I&D with General surgery  · Wound care per General surgery, had a VAC which is now off and patient is getting wet-to-dry dressings  · Received IV antibiotics, presently being monitored off of antibiotics, Infectious Disease input noted  · Frequent repositioning  · Optimize nutritional status  · General surgery following  · Working on placement with family    Acute respiratory failure with hypoxia ([facility] [address] )  Assessment & Plan  · Multifactorial  · Encourage incentive spirometry  · Remains on RA  · Pulmonary input noted  · Is stable from this standpoint but have concerns have her prognosis       Scleroderma ([facility] [address] )  Assessment & Plan  · Scleroderma with crest syndrome  · Continue prednisone  · Rheumatology advised IVIG which patient completed with marked improvements to her ROM    Severe protein-calorie malnutrition ([address] )  Assessment & Plan  Malnutrition Findings:   Adult Malnutrition type: Chronic illness  Adult Degree of Malnutrition: Other severe protein calorie malnutrition  Malnutrition Characteristics: Muscle loss, Fat loss, Weight loss, Inadequate energy           360 Statement: related to disease/condition evidenced by increased kcal/pro needs for wound healing Stage IV sacral decub, BMI 18 4 adjusted per prior facility wt 6/7/22;severe buccal fat pads loss, severe deltoid muscle loss, Patient lost 31# (20 6%) since 3/15/22 past 3 months with illness <75% energy intake versus needs for > 1 month  Treating with EN support and Robert TID via PEG for wound healing    BMI Findings:  Adult BMI Classifications: Underweight < 18 5        Body mass index is 23 49 kg/m²  · Continue tube feeding as ordered     Urinary retention  Assessment & Plan  · Urinary retention with Tejada catheter in place  · Outpatient Urology follow-up    Pulmonary hypertension ([address] )  Assessment & Plan  · Crest syndrome with pulmonary hypertension  · Continue supplemental oxygen as needed  · Sildenafil  · Cardiology following  · Would be appropriate for palliative care  · Family meeting on June 23--> continue medical directed care but DNR level established   · Will need ongoing conversations about realistic expectations and prognosis     Proximal muscle weakness  Assessment & Plan  · Extensive upper and lower extremity proximal muscle weakness with marked improvement today  · Received IVIG with Rheumatology  · Follow-up on serologies  · Supportive care    Dysphagia, oropharyngeal phase  Assessment & Plan  · Patient with history of dysphagia  · PEG tube in place  · Continue current tube feeds  · Aspiration precautions    Pneumonia  Assessment & Plan  · Resolved  · Completed antibiotics  · Supportive care  · Aspiration precautions        VTE Pharmacologic Prophylaxis: VTE Score: 7 High Risk (Score >/= 5) - Pharmacological DVT Prophylaxis Ordered: enoxaparin (Lovenox)  Sequential Compression Devices Ordered  Patient Centered Rounds: I performed bedside rounds with nursing staff today  Discussions with Specialists or Other Care Team Provider: Primary RN and CM    Education and Discussions with Family / Patient: Updated  (daughter) via phone  Time Spent for Care: 30 minutes  More than 50% of total time spent on counseling and coordination of care as described above  Current Length of Stay: 21 day(s)  Current Patient Status: Inpatient   Certification Statement: The patient will continue to require additional inpatient hospital stay due to discharge planning   Discharge Plan: Anticipate discharge in 24-48 hrs to rehab facility      Code Status: Level 3 - DNAR and DNI    Subjective:   Patient states that she is overall feeling better today  She states that she gets significant pain when she is turned over for her dressing changes and the oxycodone does help with the pain  No overnight events  No difficulty breathing  No cough, fever or chills  Objective:     Vitals:   Temp (24hrs), Av 2 °F (36 8 °C), Min:97 5 °F (36 4 °C), Max:99 1 °F (37 3 °C)    Temp:  [97 5 °F (36 4 °C)-99 1 °F (37 3 °C)] 97 8 °F (36 6 °C)  HR:  [64-82] 64  Resp:  [17-36] 17  BP: ()/(47-64) 110/58  SpO2:  [91 %-98 %] 95 %  Body mass index is 23 49 kg/m²  Input and Output Summary (last 24 hours): Intake/Output Summary (Last 24 hours) at 2022 1326  Last data filed at 2022 1002  Gross per 24 hour   Intake 1610 ml   Output 1375 ml   Net 235 ml       Physical Exam:   Physical Exam  Constitutional:       Appearance: She is ill-appearing  HENT:      Head: Normocephalic  Nose: Nose normal       Mouth/Throat:      Mouth: Mucous membranes are moist    Cardiovascular:      Rate and Rhythm: Normal rate and regular rhythm  Pulses: Normal pulses  Heart sounds: No murmur heard  Pulmonary:      Effort: Pulmonary effort is normal       Breath sounds: Normal breath sounds  Abdominal:      General: Abdomen is flat  Palpations: Abdomen is soft  Skin:     General: Skin is warm  Capillary Refill: Capillary refill takes less than 2 seconds  Neurological:      Mental Status: She is alert and oriented to person, place, and time     Psychiatric:         Mood and Affect: Mood normal          Behavior: Behavior normal             Additional Data:     Labs:  Results from last 7 days   Lab Units 22  0431 22  0514   WBC Thousand/uL 10 87* 10 40*   HEMOGLOBIN g/dL 7 8* 7 9*   HEMATOCRIT % 26 4* 26 6*   PLATELETS Thousands/uL 375 405*   BANDS PCT % 14*  --    NEUTROS PCT %  --  77*   LYMPHS PCT %  --  12*   LYMPHO PCT % 1*  --    MONOS PCT %  --  7   MONO PCT % 4  --    EOS PCT % 1 2 Results from last 7 days   Lab Units 07/04/22  0431   SODIUM mmol/L 135*   POTASSIUM mmol/L 3 9   CHLORIDE mmol/L 100   CO2 mmol/L 31   BUN mg/dL 32*   CREATININE mg/dL 0 31*   ANION GAP mmol/L 4   CALCIUM mg/dL 8 1*   ALBUMIN g/dL 1 9*   TOTAL BILIRUBIN mg/dL 0 19*   ALK PHOS U/L 79   ALT U/L 14   AST U/L 17   GLUCOSE RANDOM mg/dL 119                 Results from last 7 days   Lab Units 06/29/22  2242   LACTIC ACID mmol/L 1 4   PROCALCITONIN ng/ml 0 11       Lines/Drains:  Invasive Devices  Report    Peripheral Intravenous Line  Duration           Long-Dwell Peripheral IV (Midline) 06/14/22 Right Brachial 20 days          Drain  Duration           Gastrostomy/Enterostomy -- days    Urethral Catheter -- days              Urinary Catheter:  Goal for removal: N/A - Chronic Tejada               Imaging: Reviewed radiology reports from this admission including: chest xray    Recent Cultures (last 7 days):         Last 24 Hours Medication List:   Current Facility-Administered Medications   Medication Dose Route Frequency Provider Last Rate    acetaminophen  650 mg Per G Tube Q6H Jena Chávez MD      acetaminophen  650 mg Per G Tube Q4H PRN Jena Chávez MD      aspirin  81 mg Per G Tube Daily Elsi Ribera MD      atovaquone  750 mg Per G Tube Daily With Breakfast Elsi Ribera MD      cholecalciferol  1,000 Units Per G Tube Daily Elsi Ribera MD      enoxaparin  40 mg Subcutaneous Q24H [address] Elsi Ribera MD      fluticasone  1 spray Each Nare BID Arlyn Koyanagi, MD      folic acid  1 mg Per PEG Tube Daily Arlyn Koyanagi, MD      glycerin-hypromellose-  2 drop Both Eyes BID Elsi Ribera MD      levalbuterol  1 25 mg Nebulization TID Shamika Lambert DO      levothyroxine  25 mcg Per PEG Tube Early Morning Elsi Ribera MD      loratadine  10 mg Per G Tube Daily Elsi Ribera MD      Macitentan  10 mg Per G Tube Daily Elena Miranda MD      melatonin  3 mg Oral HS PRN Leanna Coleman MD      nystatin   Topical BID Nabila Andre MD      ondansetron  4 mg Intravenous Q6H PRN Leanna Coleman MD      oxyCODONE  2 5 mg Per G Tube Q4H PRN Leanna Coleman MD      oxyCODONE  5 mg Per G Tube Q4H PRN Leanna Coleman MD      pantoprazole  40 mg Intravenous Q24H [address] Ara Birmingham MD      predniSONE  30 mg Per G Tube Daily Ara Birmingham MD      saccharomyces boulardii  250 mg Per G Tube BID Ara Birmingham MD      sildenafil  10 mg Oral TID Cherylene Cleaver, DO      sodium chloride  1 spray Each Nare Q1H PRN Brionna Martinez MD      sodium chloride  3 mL Nebulization TID Michelle Silverman DO          Today, Patient Was Seen By: BETTIE Gavin    **Please Note: This note may have been constructed using a voice recognition system  **

## 2022-07-05 NOTE — PLAN OF CARE
Problem: OCCUPATIONAL THERAPY ADULT  Goal: Performs self-care activities at highest level of function for planned discharge setting  See evaluation for individualized goals  Description: Treatment Interventions: ADL retraining, Functional transfer training, UE strengthening/ROM, Endurance training, Cognitive reorientation, Patient/family training, Equipment evaluation/education, Compensatory technique education, Fine motor coordination activities, Continued evaluation, Energy conservation, Activityengagement          See flowsheet documentation for full assessment, interventions and recommendations  Outcome: Progressing  Note: Limitation: Decreased ADL status, Decreased UE ROM, Decreased UE strength, Decreased Safe judgement during ADL, Decreased endurance, Decreased fine motor control, Decreased self-care trans, Decreased high-level ADLs  Prognosis: Fair  Assessment: Patient participated in Skilled OT session this date with interventions consisting of ADL re training with the use of correct body mechnaics, therapeutic exercise to: increase functional use of BUEs, increase BUE muscle strength  and  therapeutic activities to: increase activity tolerance   Patient agreeable to OT treatment session, upon arrival patient was found supine in bed  In comparison to previous session, patient with improvements in grooming*   Patient requiring frequent rest periods and ocassional safety reminders  Patient continues to be functioning below baseline level, occupational performance remains limited secondary to factors listed above and increased risk for falls and injury  From OT standpoint, recommendation at time of d/c would be Short Term Rehab  Patient to benefit from continued Occupational Therapy treatment while in the hospital to address deficits as defined above and maximize level of functional independence with ADLs and functional mobility   The patient's raw score on the AM-PAC Daily Activity inpatient short form low function score is 18, standardized score is Low Function Daily Activity Standardized Score: 30 17  Patients with a standardized score less than 39 4 are likely to benefit from discharge to post-acute rehab services  Please refer to the recommendation of the Occupational Therapist for safe discharge planning  OT Discharge Recommendation: Post acute rehabilitation services  OT - OK to Discharge:  Yes

## 2022-07-05 NOTE — UTILIZATION REVIEW
Continued Stay Review    Date:  07/05/2022                         Current Patient Class: Inpatient  Current Level of Care: Level 2 Stepdown    HPI:81 y o  female initially admitted on *06/14/2022    Assessment/Plan:  Sacral Wound  Stage IV  Continue wet to dry dressings  Monitor off Abx  Frequent repositioning  Optimize nutritional status  Continue prednisone  Continue tube feedings  Aspiration precautions  VTE Pharmacologic Prophylaxis: VTE Score: 7 High Risk (Score >/= 5) - Pharmacological DVT Prophylaxis Ordered: enoxaparin (Lovenox)  Sequential Compression Devices Ordered      Vital Signs: /58 (BP Location: Left arm)   Pulse 64   Temp 97 8 °F (36 6 °C) (Oral)   Resp 17   Ht 5' 7 5" (1 715 m)   Wt 69 kg (152 lb 3 2 oz)   SpO2 98%   BMI 23 49 kg/m²     Pertinent Labs/Diagnostic Results:     Results from last 7 days   Lab Units 07/04/22 0431 07/01/22 0514 06/29/22  2242   WBC Thousand/uL 10 87* 10 40* 9 64   HEMOGLOBIN g/dL 7 8* 7 9* 7 6*   HEMATOCRIT % 26 4* 26 6* 25 7*   PLATELETS Thousands/uL 375 405* 384   NEUTROS ABS Thousands/µL  --  8 03*  --    BANDS PCT % 14*  --   --      Results from last 7 days   Lab Units 07/04/22 0431 07/01/22 0514 06/29/22 2248 06/29/22  2242   SODIUM mmol/L 135* 140  --  141   POTASSIUM mmol/L 3 9 4 1  --  4 1   CHLORIDE mmol/L 100 103  --  104   CO2 mmol/L 31 32  --  35*   ANION GAP mmol/L 4 5  --  2*   BUN mg/dL 32* 27*  --  19   CREATININE mg/dL 0 31* 0 30*  --  0 36*   EGFR ml/min/1 73sq m 108 109  --  102   CALCIUM mg/dL 8 1* 8 5  --  8 4   CALCIUM, IONIZED mmol/L  --   --  1 11*  --    MAGNESIUM mg/dL  --  2 0  --   --    PHOSPHORUS mg/dL  --  2 5  --   --      Results from last 7 days   Lab Units 07/04/22 0431 06/29/22  2242   AST U/L 17 15   ALT U/L 14 16   ALK PHOS U/L 79 84   TOTAL PROTEIN g/dL 7 5 6 4   ALBUMIN g/dL 1 9* 2 3*   TOTAL BILIRUBIN mg/dL 0 19* 0 28     Results from last 7 days   Lab Units 07/04/22  0431 07/01/22  5194 06/29/22  2242   GLUCOSE RANDOM mg/dL 119 128 120     Results from last 7 days   Lab Units 07/02/22  0452 07/01/22  0514   CK TOTAL U/L 14* 16*     Results from last 7 days   Lab Units 06/29/22  2242   PROCALCITONIN ng/ml 0 11     Results from last 7 days   Lab Units 06/29/22  2242   LACTIC ACID mmol/L 1 4     Results from last 7 days   Lab Units 07/02/22  0452 07/01/22  0514   CRP mg/L 55 1* 39 5*   SED RATE mm/hour 50*  --      Medications:   Scheduled Medications:  acetaminophen, 650 mg, Per G Tube, Q6H  aspirin, 81 mg, Per G Tube, Daily  atovaquone, 750 mg, Per G Tube, Daily With Breakfast  cholecalciferol, 1,000 Units, Per G Tube, Daily  enoxaparin, 40 mg, Subcutaneous, Q24H BELLA  fluticasone, 1 spray, Each Nare, BID  folic acid, 1 mg, Per PEG Tube, Daily  glycerin-hypromellose-, 2 drop, Both Eyes, BID  levalbuterol, 1 25 mg, Nebulization, TID  levothyroxine, 25 mcg, Per PEG Tube, Early Morning  loratadine, 10 mg, Per G Tube, Daily  Macitentan, 10 mg, Per G Tube, Daily  nystatin, , Topical, BID  pantoprazole, 40 mg, Intravenous, Q24H BELLA  predniSONE, 30 mg, Per G Tube, Daily  saccharomyces boulardii, 250 mg, Per G Tube, BID  sildenafil, 10 mg, Oral, TID  sodium chloride, 3 mL, Nebulization, TID      Continuous IV Infusions:     PRN Meds:  acetaminophen, 650 mg, Per G Tube, Q4H PRN  melatonin, 3 mg, Oral, HS PRN  ondansetron, 4 mg, Intravenous, Q6H PRN  oxyCODONE, 2 5 mg, Per G Tube, Q4H PRN  oxyCODONE, 5 mg, Per G Tube, Q4H PRN  sodium chloride, 1 spray, Each Nare, Q1H PRN        Discharge Plan: D - [facility] Review Department  ATTENTION: Please call with any questions or concerns to [phone] and carefully listen to the prompts so that you are directed to the right person   All voicemails are confidential   [facility] all requests for admission clinical reviews, approved or denied determinations and any other requests to dedicated fax number below belonging to the campus where the patient is receiving treatment   List of dedicated fax numbers for the Facilities:  [address] [place] [address] DENIALS (Administrative/Medical Necessity) [phone]   [address]  [address] (Maternity/NICU/Pediatrics) [phone] [address] [place] [address]  [address] [place] [address] [address] [address] [place] [address] Kate Lisa Coronado 1481 [address] [place] [address] [phone]

## 2022-07-05 NOTE — ASSESSMENT & PLAN NOTE
· Crest syndrome with pulmonary hypertension  · Continue supplemental oxygen as needed  · Sildenafil  · Cardiology following  · Would be appropriate for palliative care  · Family meeting on June 23--> continue medical directed care but DNR level established   · Will need ongoing conversations about realistic expectations and prognosis

## 2022-07-05 NOTE — ASSESSMENT & PLAN NOTE
· Multifactorial  · Encourage incentive spirometry  · Remains on RA  · Pulmonary input noted  · Is stable from this standpoint but have concerns have her prognosis

## 2022-07-05 NOTE — ASSESSMENT & PLAN NOTE
· Stage IV sacral ulcer  · Status post I&D with General surgery  · Wound care per General surgery, had a VAC which is now off and patient is getting wet-to-dry dressings  · Received IV antibiotics, presently being monitored off of antibiotics, Infectious Disease input noted  · Frequent repositioning  · Optimize nutritional status  · General surgery following  · Working on placement with family

## 2022-07-05 NOTE — ASSESSMENT & PLAN NOTE
· Scleroderma with crest syndrome  · Continue prednisone  · Rheumatology advised IVIG which patient completed with marked improvements to her ROM

## 2022-07-06 LAB
ATRIAL RATE: 89 BPM
P AXIS: 73 DEGREES
PR INTERVAL: 142 MS
QRS AXIS: -56 DEGREES
QRSD INTERVAL: 96 MS
QT INTERVAL: 354 MS
QTC INTERVAL: 431 MS
T WAVE AXIS: 69 DEGREES
VENTRICULAR RATE: 89 BPM

## 2022-07-06 PROCEDURE — 94640 AIRWAY INHALATION TREATMENT: CPT

## 2022-07-06 PROCEDURE — 93010 ELECTROCARDIOGRAM REPORT: CPT | Performed by: INTERNAL MEDICINE

## 2022-07-06 PROCEDURE — 94760 N-INVAS EAR/PLS OXIMETRY 1: CPT

## 2022-07-06 PROCEDURE — C9113 INJ PANTOPRAZOLE SODIUM, VIA: HCPCS | Performed by: INTERNAL MEDICINE

## 2022-07-06 PROCEDURE — 99232 SBSQ HOSP IP/OBS MODERATE 35: CPT | Performed by: NURSE PRACTITIONER

## 2022-07-06 RX ADMIN — OXYCODONE HYDROCHLORIDE 5 MG: 5 SOLUTION ORAL at 11:11

## 2022-07-06 RX ADMIN — ISODIUM CHLORIDE 3 ML: 0.03 SOLUTION RESPIRATORY (INHALATION) at 13:27

## 2022-07-06 RX ADMIN — PREDNISONE 30 MG: 20 TABLET ORAL at 09:00

## 2022-07-06 RX ADMIN — Medication 250 MG: at 08:59

## 2022-07-06 RX ADMIN — ACETAMINOPHEN 650 MG: 650 SUSPENSION ORAL at 04:02

## 2022-07-06 RX ADMIN — ACETAMINOPHEN 650 MG: 650 SUSPENSION ORAL at 16:33

## 2022-07-06 RX ADMIN — FLUTICASONE PROPIONATE 1 SPRAY: 50 SPRAY, METERED NASAL at 08:57

## 2022-07-06 RX ADMIN — LEVALBUTEROL HYDROCHLORIDE 1.25 MG: 1.25 SOLUTION, CONCENTRATE RESPIRATORY (INHALATION) at 07:13

## 2022-07-06 RX ADMIN — ISODIUM CHLORIDE 3 ML: 0.03 SOLUTION RESPIRATORY (INHALATION) at 19:12

## 2022-07-06 RX ADMIN — NYSTATIN: 100000 POWDER TOPICAL at 09:00

## 2022-07-06 RX ADMIN — LEVALBUTEROL HYDROCHLORIDE 1.25 MG: 1.25 SOLUTION, CONCENTRATE RESPIRATORY (INHALATION) at 19:12

## 2022-07-06 RX ADMIN — MACITENTAN 10 MG: 10 TABLET, FILM COATED ORAL at 08:59

## 2022-07-06 RX ADMIN — ATOVAQUONE 750 MG: 750 SUSPENSION ORAL at 09:00

## 2022-07-06 RX ADMIN — SALINE NASAL SPRAY 1 SPRAY: 1.5 SOLUTION NASAL at 08:57

## 2022-07-06 RX ADMIN — SILDENAFIL CITRATE 10 MG: 20 TABLET ORAL at 16:34

## 2022-07-06 RX ADMIN — ACETAMINOPHEN 650 MG: 650 SUSPENSION ORAL at 10:32

## 2022-07-06 RX ADMIN — SILDENAFIL CITRATE 10 MG: 20 TABLET ORAL at 09:00

## 2022-07-06 RX ADMIN — Medication 250 MG: at 17:54

## 2022-07-06 RX ADMIN — NYSTATIN: 100000 POWDER TOPICAL at 18:13

## 2022-07-06 RX ADMIN — SALINE NASAL SPRAY 1 SPRAY: 1.5 SOLUTION NASAL at 21:45

## 2022-07-06 RX ADMIN — SILDENAFIL CITRATE 10 MG: 20 TABLET ORAL at 21:45

## 2022-07-06 RX ADMIN — ACETAMINOPHEN 650 MG: 650 SUSPENSION ORAL at 21:45

## 2022-07-06 RX ADMIN — GLYCERIN, HYPROMELLOSE, POLYETHYLENE GLYCOL 2 DROP: .2; .2; 1 LIQUID OPHTHALMIC at 18:11

## 2022-07-06 RX ADMIN — OXYCODONE HYDROCHLORIDE 5 MG: 5 SOLUTION ORAL at 22:07

## 2022-07-06 RX ADMIN — LEVALBUTEROL HYDROCHLORIDE 1.25 MG: 1.25 SOLUTION, CONCENTRATE RESPIRATORY (INHALATION) at 13:27

## 2022-07-06 RX ADMIN — ENOXAPARIN SODIUM 40 MG: 40 INJECTION SUBCUTANEOUS at 08:58

## 2022-07-06 RX ADMIN — LEVOTHYROXINE SODIUM 25 MCG: 25 TABLET ORAL at 05:05

## 2022-07-06 RX ADMIN — FLUTICASONE PROPIONATE 1 SPRAY: 50 SPRAY, METERED NASAL at 18:11

## 2022-07-06 RX ADMIN — LORATADINE 10 MG: 10 TABLET ORAL at 09:00

## 2022-07-06 RX ADMIN — FOLIC ACID 1 MG: 1 TABLET ORAL at 08:59

## 2022-07-06 RX ADMIN — OXYCODONE HYDROCHLORIDE 2.5 MG: 5 SOLUTION ORAL at 05:27

## 2022-07-06 RX ADMIN — GLYCERIN, HYPROMELLOSE, POLYETHYLENE GLYCOL 2 DROP: .2; .2; 1 LIQUID OPHTHALMIC at 08:57

## 2022-07-06 RX ADMIN — ASPIRIN 81 MG CHEWABLE TABLET 81 MG: 81 TABLET CHEWABLE at 08:59

## 2022-07-06 RX ADMIN — ISODIUM CHLORIDE 3 ML: 0.03 SOLUTION RESPIRATORY (INHALATION) at 07:12

## 2022-07-06 RX ADMIN — Medication 1000 UNITS: at 08:59

## 2022-07-06 RX ADMIN — PANTOPRAZOLE SODIUM 40 MG: 40 INJECTION, POWDER, FOR SOLUTION INTRAVENOUS at 08:59

## 2022-07-06 NOTE — CASE MANAGEMENT
Case Management Progress Note    Patient name Bridgett Beltrán  Location [facility]- MRN [id number]  : 1940 Date 2022       LOS (days): 22  Geometric Mean LOS (GMLOS) (days): 9 60  Days to GMLOS:-12 2        OBJECTIVE:        Current admission status: Inpatient  Preferred Pharmacy:   PATIENT/FAMILY REPORTS NO PREFERRED PHARMACY  No address on file      Primary Care Provider: No primary care provider on file  Primary Insurance: Buster Moreland W [facility] REP  Secondary Insurance:     PROGRESS NOTE:      Left message for daughter Anayeli Griffin to review current STR referrals and responses  At this time, the choices for accepting facilities are:  Return to [facility] in [address]  [address] [place] [address] will be calling Chepe Moura to discuss  Asked Chepe Moura to consider and call back with choice of facility so insurance authorization can be started  CM to follow      Berenice Shaver [facility]  2022  3:49 PM

## 2022-07-06 NOTE — ASSESSMENT & PLAN NOTE
· Multifactorial  · Encourage incentive spirometry  · Remains on RA  · Pulmonary input noted   · Is stable from this standpoint but have concerns with her prognosis

## 2022-07-06 NOTE — PLAN OF CARE
Problem: MOBILITY - ADULT  Goal: Maintain or return to baseline ADL function  Description: INTERVENTIONS:  -  Assess patient's ability to carry out ADLs; assess patient's baseline for ADL function and identify physical deficits which impact ability to perform ADLs (bathing, care of mouth/teeth, toileting, grooming, dressing, etc )  - Assess/evaluate cause of self-care deficits   - Assess range of motion  - Assess patient's mobility; develop plan if impaired  - Assess patient's need for assistive devices and provide as appropriate  - Encourage maximum independence but intervene and supervise when necessary  - Involve family in performance of ADLs  - Assess for home care needs following discharge   - Consider OT consult to assist with ADL evaluation and planning for discharge  - Provide patient education as appropriate  Outcome: Progressing  Goal: Maintains/Returns to pre admission functional level  Description: INTERVENTIONS:  - Perform BMAT or MOVE assessment daily    - Set and communicate daily mobility goal to care team and patient/family/caregiver  - Collaborate with rehabilitation services on mobility goals if consulted  - Perform Range of Motion 3 times a day  - Reposition patient every 2 hours    - Dangle patient 3 times a day  - Stand patient 3 times a day  - Ambulate patient 3 times a day  - Out of bed to chair 3 times a day   - Out of bed for meals 3 times a day  - Out of bed for toileting  - Record patient progress and toleration of activity level   Outcome: Progressing     Problem: Potential for Falls  Goal: Patient will remain free of falls  Description: INTERVENTIONS:  - Educate patient/family on patient safety including physical limitations  - Instruct patient to call for assistance with activity   - Consult OT/PT to assist with strengthening/mobility   - Keep Call bell within reach  - Keep bed low and locked with side rails adjusted as appropriate  - Keep care items and personal belongings within reach  - Initiate and maintain comfort rounds  - Make Fall Risk Sign visible to staff  - Offer Toileting every 2 Hours, in advance of need  - Initiate/Maintain alarm  - Obtain necessary fall risk management equipment  - Apply yellow socks and bracelet for high fall risk patients  - Consider moving patient to room near nurses station  Outcome: Progressing     Problem: Prexisting or High Potential for Compromised Skin Integrity  Goal: Skin integrity is maintained or improved  Description: INTERVENTIONS:  - Identify patients at risk for skin breakdown  - Assess and monitor skin integrity  - Assess and monitor nutrition and hydration status  - Monitor labs   - Assess for incontinence   - Turn and reposition patient  - Assist with mobility/ambulation  - Relieve pressure over bony prominences  - Avoid friction and shearing  - Provide appropriate hygiene as needed including keeping skin clean and dry  - Evaluate need for skin moisturizer/barrier cream  - Collaborate with interdisciplinary team   - Patient/family teaching  - Consider wound care consult   Outcome: Progressing     Problem: PAIN - ADULT  Goal: Verbalizes/displays adequate comfort level or baseline comfort level  Description: Interventions:  - Encourage patient to monitor pain and request assistance  - Assess pain using appropriate pain scale  - Administer analgesics based on type and severity of pain and evaluate response  - Implement non-pharmacological measures as appropriate and evaluate response  - Consider cultural and social influences on pain and pain management  - Notify physician/advanced practitioner if interventions unsuccessful or patient reports new pain  Outcome: Progressing     Problem: INFECTION - ADULT  Goal: Absence or prevention of progression during hospitalization  Description: INTERVENTIONS:  - Assess and monitor for signs and symptoms of infection  - Monitor lab/diagnostic results  - Monitor all insertion sites, i e  indwelling lines, tubes, and drains  - Monitor endotracheal if appropriate and nasal secretions for changes in amount and color  - [place] appropriate cooling/warming therapies per order  - Administer medications as ordered  - Instruct and encourage patient and family to use good hand hygiene technique  - Identify and instruct in appropriate isolation precautions for identified infection/condition  Outcome: Progressing  Goal: Absence of fever/infection during neutropenic period  Description: INTERVENTIONS:  - Monitor WBC    Outcome: Progressing     Problem: SAFETY ADULT  Goal: Maintain or return to baseline ADL function  Description: INTERVENTIONS:  -  Assess patient's ability to carry out ADLs; assess patient's baseline for ADL function and identify physical deficits which impact ability to perform ADLs (bathing, care of mouth/teeth, toileting, grooming, dressing, etc )  - Assess/evaluate cause of self-care deficits   - Assess range of motion  - Assess patient's mobility; develop plan if impaired  - Assess patient's need for assistive devices and provide as appropriate  - Encourage maximum independence but intervene and supervise when necessary  - Involve family in performance of ADLs  - Assess for home care needs following discharge   - Consider OT consult to assist with ADL evaluation and planning for discharge  - Provide patient education as appropriate  Outcome: Progressing  Goal: Maintains/Returns to pre admission functional level  Description: INTERVENTIONS:  - Perform BMAT or MOVE assessment daily    - Set and communicate daily mobility goal to care team and patient/family/caregiver  - Collaborate with rehabilitation services on mobility goals if consulted  - Perform Range of Motion 3 times a day  - Reposition patient every 2 hours    - Dangle patient 3 times a day  - Stand patient 3 times a day  - Ambulate patient 3 times a day  - Out of bed to chair 3 times a day   - Out of bed for meals 3 times a day  - Out of bed for toileting  - Record patient progress and toleration of activity level   Outcome: Progressing  Goal: Patient will remain free of falls  Description: INTERVENTIONS:  - Educate patient/family on patient safety including physical limitations  - Instruct patient to call for assistance with activity   - Consult OT/PT to assist with strengthening/mobility   - Keep Call bell within reach  - Keep bed low and locked with side rails adjusted as appropriate  - Keep care items and personal belongings within reach  - Initiate and maintain comfort rounds  - Make Fall Risk Sign visible to staff  - Offer Toileting every 2 Hours, in advance of need  - Initiate/Maintain alarm  - Obtain necessary fall risk management equipment  - Apply yellow socks and bracelet for high fall risk patients  - Consider moving patient to room near nurses station  Outcome: Progressing     Problem: DISCHARGE PLANNING  Goal: Discharge to home or other facility with appropriate resources  Description: INTERVENTIONS:  - Identify barriers to discharge w/patient and caregiver  - Arrange for needed discharge resources and transportation as appropriate  - Identify discharge learning needs (meds, wound care, etc )  - Arrange for interpretive services to assist at discharge as needed  - Refer to Case Management Department for coordinating discharge planning if the patient needs post-hospital services based on physician/advanced practitioner order or complex needs related to functional status, cognitive ability, or social support system  Outcome: Progressing     Problem: Knowledge Deficit  Goal: Patient/family/caregiver demonstrates understanding of disease process, treatment plan, medications, and discharge instructions  Description: Complete learning assessment and assess knowledge base    Interventions:  - Provide teaching at level of understanding  - Provide teaching via preferred learning methods  Outcome: Progressing     Problem: Nutrition/Hydration-ADULT  Goal: Nutrient/Hydration intake appropriate for improving, restoring or maintaining nutritional needs  Description: Monitor and assess patient's nutrition/hydration status for malnutrition  Collaborate with interdisciplinary team and initiate plan and interventions as ordered  Monitor patient's weight and dietary intake as ordered or per policy  Utilize nutrition creening tool and intervene as necessary  Determine patient's food preferences and provide high-protein, high-caloric foods as appropriate       INTERVENTIONS:  - Monitor oral intake, urinary output, labs, and treatment plans  - Assess nutrition and hydration status and recommend course of action  - Evaluate amount of meals eaten  - Assist patient with eating if necessary   - Allow adequate time for meals  - Recommend/ encourage appropriate diets, oral nutritional supplements, and vitamin/mineral supplements  - Order, calculate, and assess calorie counts as needed  - Recommend, monitor, and adjust tube feedings and TPN/PPN based on assessed needs  - Assess need for intravenous fluids  - Provide specific nutrition/hydration education as appropriate  - Include patient/family/caregiver in decisions related to nutrition  Outcome: Progressing

## 2022-07-06 NOTE — PLAN OF CARE
Problem: Prexisting or High Potential for Compromised Skin Integrity  Goal: Skin integrity is maintained or improved  Description: INTERVENTIONS:  - Identify patients at risk for skin breakdown  - Assess and monitor skin integrity  - Assess and monitor nutrition and hydration status  - Monitor labs   - Assess for incontinence   - Turn and reposition patient  - Assist with mobility/ambulation  - Relieve pressure over bony prominences  - Avoid friction and shearing  - Provide appropriate hygiene as needed including keeping skin clean and dry  - Evaluate need for skin moisturizer/barrier cream  - Collaborate with interdisciplinary team   - Patient/family teaching  - Consider wound care consult   Outcome: Progressing     Problem: PAIN - ADULT  Goal: Verbalizes/displays adequate comfort level or baseline comfort level  Description: Interventions:  - Encourage patient to monitor pain and request assistance  - Assess pain using appropriate pain scale  - Administer analgesics based on type and severity of pain and evaluate response  - Implement non-pharmacological measures as appropriate and evaluate response  - Consider cultural and social influences on pain and pain management  - Notify physician/advanced practitioner if interventions unsuccessful or patient reports new pain  Outcome: Progressing     Problem: INFECTION - ADULT  Goal: Absence or prevention of progression during hospitalization  Description: INTERVENTIONS:  - Assess and monitor for signs and symptoms of infection  - Monitor lab/diagnostic results  - Monitor all insertion sites, i e  indwelling lines, tubes, and drains  - Monitor endotracheal if appropriate and nasal secretions for changes in amount and color  - [place] appropriate cooling/warming therapies per order  - Administer medications as ordered  - Instruct and encourage patient and family to use good hand hygiene technique  - Identify and instruct in appropriate isolation precautions for identified infection/condition  Outcome: Progressing     Problem: DISCHARGE PLANNING  Goal: Discharge to home or other facility with appropriate resources  Description: INTERVENTIONS:  - Identify barriers to discharge w/patient and caregiver  - Arrange for needed discharge resources and transportation as appropriate  - Identify discharge learning needs (meds, wound care, etc )  - Arrange for interpretive services to assist at discharge as needed  - Refer to Case Management Department for coordinating discharge planning if the patient needs post-hospital services based on physician/advanced practitioner order or complex needs related to functional status, cognitive ability, or social support system  Outcome: Progressing

## 2022-07-06 NOTE — WOUND OSTOMY CARE
Progress Note - Wound   Luciano Goltz 80 y o  female MRN: [id number]  Unit/Bed#: [facility] 204-01 Encounter: [id number]      Assessment:  Wound care to see patient for weekly follow-up visit  Patient admitted with sacral wound  History of - scleroderma, bed-bound for 3 months prior to admission, interstitial lung disease, dysphagia, and pulmonary HTN  Patient seen in bed with family at the bedside, alert and oriented x 3 with forgetfulness, cooperative and agreeable for the assessment  Dependent for care  Max assist of two with turning for the assessment  Foam wedges are in use for repositioning  Tejada catheter present  Incontinent of bowel - watery/loose BMs, meseret-care rendered and primary RN is aware  On P-500 low air loss mattress  Nutrition is following along  Surgery is also following along for the sacral wound, care as per their recommendations  R heel is intact with no redness or wounds  Continue with foam dressing  Fungal rash to b/l groin present - irregular borders and satellite areas noted within the mild erythema  No open aspects  Nystatin powder in place per primary service  1  R elbow wound is closed on assessment, and is intact with dry yellow/white scabbing and calloused skin which is blanchable  No open aspect or drainage  Carly Duhamel is intact with blanchable pink/hyperpigmented skin  - foam dressing for prevention/protection  2  L elbow - unstageable pressure injury - HA  Located over the bony prominence of the elbow  Round/oval shaped partial thickness wound  Wound bed is 100% moist adhered yellow slough  Edges fragile and attached without maceration  Meseret-wound is intact with blanchable pink colored skin  -foam dressing for treatment  3  L heel - unstageable pressure injury - HA  Located over the bony prominence of the heel  Round/oval shaped partial thickness wound  Wound bed is 100% moist adhered yellow slough  Edges fragile and attached without maceration  Meseret-wound is intact with dry calloused skin  4  Meseret-anal area - MASD  Present as scattered irregular shaped partial thickness wounds in the setting of fecal incontinence, watery stools  All wounds measured together, 100% moist red tissue  Edges fragile and attached without maceration  Meseret-wound is intact with denuded blanchable pink colored skin  5  Mid sacral - stage 4 pressure injury - POA  Dressing change done with primary RN today  Wound bed round/oval shaped, full thickness approx: 60% moist red tissue and 40% scattered adhered yellow slough  Decreased depth from last wound care visit notes  Undermining present from 12-12 o'clock, deepest is at 7-8o'clock @4 0cm, remainder is approx 2-3 0cm  no tunneling appreciated  Mild maceration to the edges  Meseret-wound is intact with blanchable pink/hyperpigmented skin  Wound is tender    -continue with wet to dry dressings as per surgery  Educated patient on the importance of frequent offloading of pressure via turning, repositioning and weight-shifting  Verbalized understanding of plan of care  No induration, fluctuance, odor, warmth/temperature differences, redness, or purulence noted to the above noted wounds and skin areas assessed  New dressings applied  Patient tolerated assessment well- denies pain and no s/s of non-verbal pain or discomfort observed during the encounter -- sacral wound is tender with cleansing and packing, patient was pre-medicated by nursing prior to the assessment  Primary nurse aware of plan of care  See flow sheets for more detailed assessment findings  Will follow along  Plan:   1  Wet to dry to sacral wound as per surgery  2  Apply skin nourishing cream the entire skin daily for moisture  3  Turn and reposition patient every  2 hours   4  Elevate heels off of bed with pillows to offload pressure   5  Apply EHOB waffle cushion to chair when OOB, if able  6   Cleanse meseret-anal with soap and water, pat dry, and apply calazime cream TID and PRN  7  Cleanse L heel wound with NSS, pat dry, and apply bordered Allevyn foam dressing  Gianna dressing with T  Change every other day and as needed for soilage/dislodgement  8  Cleanse L elbow wound with NSS, pat dry, and apply bordered Allevyn foam dressing  Gianna dressing with T  Change every other day and as needed for soilage/dislodgement  9  Apply Allevyn foam to R elbow and R heel, gianna w/P, peel foam check skin integrity q-shift  Change q3d and PRN for soilage/dislodgement   10  Continue on P-500 mattress  11  Cleanse b/l groin with soap and water, pat dry, and dust the skin lightly with nystatin powder BID       Objective:    Vitals: Blood pressure 103/55, pulse 74, temperature (!) 97 4 °F (36 3 °C), temperature source Oral, resp  rate 21, height 5' 7 5" (1 715 m), weight 69 kg (152 lb 3 2 oz), SpO2 93 %  ,Body mass index is 23 49 kg/m²  Wound 06/14/22 Pressure Injury Buttocks N/A (Active)   Wound Image   07/06/22 1140   Wound Description Beefy red;Yellow; Exposed bone;Slough 07/06/22 1140   Pressure Injury Stage 4 07/06/22 1140   Romy-wound Assessment Intact;Fragile 07/06/22 1140   Wound Length (cm) 12 3 cm 07/06/22 1140   Wound Width (cm) 12 5 cm 07/06/22 1140   Wound Depth (cm) 4 5 cm 07/06/22 1140   Wound Surface Area (cm^2) 153 75 cm^2 07/06/22 1140   Wound Volume (cm^3) 691 875 cm^3 07/06/22 1140   Calculated Wound Volume (cm^3) 691 88 cm^3 07/06/22 1140   Change in Wound Size % -4 83 07/06/22 1140   Undermining 4 07/06/22 1140   Undermining is depth extending from 12-12 07/06/22 1140   Drainage Amount Moderate 07/06/22 1140   Drainage Description Serosanguineous; Bloody 07/06/22 1140   Non-staged Wound Description Full thickness 07/06/22 1140   Treatments Cleansed;Site care;Irrigation with NSS 07/06/22 1140   Dressing Foam, Silicon (eg  Allevyn, etc) 07/06/22 1140   Wound packed?  Yes 07/06/22 1140   Packing- # removed 1 07/06/22 1140   Packing- # inserted 1 07/06/22 1140 Dressing Changed New 07/06/22 1140   Patient Tolerance Tolerated well 07/06/22 1140   Dressing Status Clean;Dry; Intact 07/06/22 1140       Wound 06/21/22 Pressure Injury Elbow Posterior; Left (Active)   Wound Image   07/06/22 1136   Wound Description Yellow;Slough 07/06/22 1136   Pressure Injury Stage U 07/06/22 1136   Romy-wound Assessment Dry; Intact; Pink 07/06/22 1136   Wound Length (cm) 0 5 cm 07/06/22 1136   Wound Width (cm) 0 6 cm 07/06/22 1136   Wound Depth (cm) 0 1 cm 07/06/22 1136   Wound Surface Area (cm^2) 0 3 cm^2 07/06/22 1136   Wound Volume (cm^3) 0 03 cm^3 07/06/22 1136   Calculated Wound Volume (cm^3) 0 03 cm^3 07/06/22 1136   Tunneling 0 cm 07/06/22 1136   Tunneling in depth located at 0 07/06/22 1136   Undermining 0 07/06/22 1136   Undermining is depth extending from 0 07/06/22 1136   Drainage Amount Small 07/06/22 1136   Drainage Description Serous 07/06/22 1136   Non-staged Wound Description Partial thickness 07/06/22 1136   Treatments Cleansed;Site care 07/06/22 1136   Dressing Foam, Silicon (eg  Allevyn, etc) 07/06/22 1136   Wound packed? No 07/06/22 1136   Packing- # removed 0 07/06/22 1136   Packing- # inserted 0 07/06/22 1136   Dressing Changed New 07/06/22 1136   Patient Tolerance Tolerated well 07/06/22 1136   Dressing Status Clean;Dry; Intact 07/06/22 1136       Wound 06/25/22 Pressure Injury Heel Left (Active)   Wound Image   07/06/22 1138   Wound Description Slough; Yellow 07/06/22 1138   Pressure Injury Stage U 07/06/22 1138   Romy-wound Assessment Dry; Intact;Callus;Fragile 07/06/22 1138   Wound Length (cm) 0 5 cm 07/06/22 1138   Wound Width (cm) 0 6 cm 07/06/22 1138   Wound Depth (cm) 0 2 cm 07/06/22 1138   Wound Surface Area (cm^2) 0 3 cm^2 07/06/22 1138   Wound Volume (cm^3) 0 06 cm^3 07/06/22 1138   Calculated Wound Volume (cm^3) 0 06 cm^3 07/06/22 1138   Tunneling 0 cm 07/06/22 1138   Tunneling in depth located at 0 07/06/22 1138   Undermining 0 07/06/22 1138   Undermining is depth extending from 0 07/06/22 1138   Drainage Amount Small 07/06/22 1138   Drainage Description Serosanguineous 07/06/22 1138   Non-staged Wound Description Full thickness 07/06/22 1138   Treatments Cleansed;Site care;Elevated 07/06/22 1138   Dressing Foam, Silicon (eg  Allevyn, etc) 07/06/22 1138   Wound packed? No 07/06/22 1138   Packing- # removed 0 07/06/22 1138   Packing- # inserted 0 07/06/22 1138   Dressing Changed New 07/06/22 1138   Patient Tolerance Tolerated well 07/06/22 1138   Dressing Status Clean;Dry; Intact 07/06/22 1138       Wound 07/06/22 MASD Perineum (Active)   Wound Image   07/06/22 1138   Wound Description Beefy red 07/06/22 1138   Romy-wound Assessment Intact;Fragile 07/06/22 1138   Wound Length (cm) 3 cm 07/06/22 1138   Wound Width (cm) 3 cm 07/06/22 1138   Wound Depth (cm) 0 1 cm 07/06/22 1138   Wound Surface Area (cm^2) 9 cm^2 07/06/22 1138   Wound Volume (cm^3) 0 9 cm^3 07/06/22 1138   Calculated Wound Volume (cm^3) 0 9 cm^3 07/06/22 1138   Tunneling 0 cm 07/06/22 1138   Tunneling in depth located at 0 07/06/22 1138   Undermining 0 07/06/22 1138   Undermining is depth extending from 0 07/06/22 1138   Drainage Amount Scant 07/06/22 1138   Drainage Description Serosanguineous 07/06/22 1138   Non-staged Wound Description Partial thickness 07/06/22 1138   Treatments Cleansed 07/06/22 1138   Dressing Protective barrier 07/06/22 1138   Wound packed? No 07/06/22 1138   Packing- # removed 0 07/06/22 1138   Packing- # inserted 0 07/06/22 1138   Patient Tolerance Tolerated well 07/06/22 1138             Tiger text with questions or concerns  Wound care will follow along weekly  AVS updated    Jayna Gallardo RN BSN CWON

## 2022-07-06 NOTE — PROGRESS NOTES
[address] [facility]  Progress Note Tatiana Shore 1940, 80 y o  female MRN: [id number]  Unit/Bed#: [facility] 204-01 Encounter: [id number]  Primary Care Provider: No primary care provider on file  Date and time admitted to hospital: 6/14/2022  8:07 PM    * Sacral wound  Assessment & Plan  · Stage IV sacral ulcer  · Status post I&D with General surgery  · Wound care per General surgery, had a VAC which is now off and patient is getting wet-to-dry dressings  · Received IV antibiotics, presently being monitored off of antibiotics, Infectious Disease input noted  · Frequent repositioning  · Optimize nutritional status  · General surgery following  · Working on placement with family, anticipate discharge Friday to a facility    Acute respiratory failure with hypoxia ([facility] [address] )  Assessment & Plan  · Multifactorial  · Encourage incentive spirometry  · Remains on RA  · Pulmonary input noted   · Is stable from this standpoint but have concerns with her prognosis       Scleroderma ([facility] [address] )  Assessment & Plan  · Scleroderma with crest syndrome  · Continue prednisone  · Rheumatology advised IVIG which patient completed with marked improvements to her ROM    Severe protein-calorie malnutrition ([address] )  Assessment & Plan  Malnutrition Findings:   Adult Malnutrition type: Chronic illness  Adult Degree of Malnutrition: Other severe protein calorie malnutrition  Malnutrition Characteristics: Muscle loss, Fat loss, Weight loss, Inadequate energy           360 Statement: related to disease/condition evidenced by increased kcal/pro needs for wound healing Stage IV sacral decub, BMI 18 4 adjusted per prior facility wt 6/7/22;severe buccal fat pads loss, severe deltoid muscle loss, Patient lost 31# (20 6%) since 3/15/22 past 3 months with illness <75% energy intake versus needs for > 1 month  Treating with EN support and Robert TID via PEG for wound healing    BMI Findings:  Adult BMI Classifications: Underweight < 18 5        Body mass index is 23 49 kg/m²  · Continue tube feeding as ordered     Urinary retention  Assessment & Plan  · Urinary retention with Tejada catheter in place  · Outpatient Urology follow-up    Pulmonary hypertension ([address] )  Assessment & Plan  · Crest syndrome with pulmonary hypertension  · Continue supplemental oxygen as needed  · Sildenafil  · Cardiology following  · Would be appropriate for palliative care  · Family meeting on June 23--> continue medical directed care but DNR level established   · Will need ongoing conversations about realistic expectations and prognosis     Proximal muscle weakness  Assessment & Plan  · Extensive upper and lower extremity proximal muscle weakness with marked improvement today  · Received IVIG with Rheumatology  · Follow-up on serologies  · Supportive care    Dysphagia, oropharyngeal phase  Assessment & Plan  · Patient with history of dysphagia  · PEG tube in place  · Continue current tube feeds  · Aspiration precautions    Pneumonia  Assessment & Plan  · Resolved  · Completed antibiotics  · Supportive care  · Aspiration precautions        VTE Pharmacologic Prophylaxis: VTE Score: 7 High Risk (Score >/= 5) - Pharmacological DVT Prophylaxis Ordered: enoxaparin (Lovenox)  Sequential Compression Devices Ordered  Patient Centered Rounds: I performed bedside rounds with nursing staff today  Discussions with Specialists or Other Care Team Provider: Primary RN and CM    Education and Discussions with Family / Patient: Updated  (sister) at bedside  Time Spent for Care: 20 minutes  More than 50% of total time spent on counseling and coordination of care as described above      Current Length of Stay: 22 day(s)  Current Patient Status: Inpatient   Certification Statement: The patient will continue to require additional inpatient hospital stay due to sacral wound and discharge planning  Discharge Plan: Anticipate discharge in 24-48 hrs to rehab facility  Code Status: Level 3 - DNAR and DNI    Subjective:   Patient states she continues to make a little progress each day  Offers no complaints of CP or SOB  Complains of pain when she is turned and moved  Oxycodone helps    Objective:     Vitals:   Temp (24hrs), Av 4 °F (36 9 °C), Min:97 4 °F (36 3 °C), Max:99 2 °F (37 3 °C)    Temp:  [97 4 °F (36 3 °C)-99 2 °F (37 3 °C)] 97 4 °F (36 3 °C)  HR:  [70-90] 74  Resp:  [18-28] 21  BP: ()/(46-63) 103/55  SpO2:  [88 %-95 %] 93 %  Body mass index is 23 49 kg/m²  Input and Output Summary (last 24 hours): Intake/Output Summary (Last 24 hours) at 2022 1419  Last data filed at 2022 1041  Gross per 24 hour   Intake 2684 ml   Output 1400 ml   Net 1284 ml       Physical Exam:   Physical Exam  Constitutional:       Appearance: She is ill-appearing  HENT:      Head: Normocephalic  Nose: Nose normal       Mouth/Throat:      Mouth: Mucous membranes are moist    Cardiovascular:      Rate and Rhythm: Normal rate and regular rhythm  Pulses: Normal pulses  Heart sounds: Normal heart sounds  Pulmonary:      Effort: Pulmonary effort is normal  No respiratory distress  Breath sounds: Normal breath sounds  Abdominal:      General: Abdomen is flat  Musculoskeletal:         General: No swelling  Skin:     General: Skin is warm and dry  Capillary Refill: Capillary refill takes less than 2 seconds  Neurological:      General: No focal deficit present  Mental Status: She is alert and oriented to person, place, and time     Psychiatric:         Mood and Affect: Mood normal             Additional Data:     Labs:  Results from last 7 days   Lab Units 22  0431 22  0514   WBC Thousand/uL 10 87* 10 40*   HEMOGLOBIN g/dL 7 8* 7 9*   HEMATOCRIT % 26 4* 26 6*   PLATELETS Thousands/uL 375 405*   BANDS PCT % 14*  --    NEUTROS PCT %  --  77*   LYMPHS PCT %  --  12*   LYMPHO PCT % 1*  --    MONOS PCT %  --  7   MONO PCT % 4  --    EOS PCT % 1 2     Results from last 7 days   Lab Units 07/04/22  0431   SODIUM mmol/L 135*   POTASSIUM mmol/L 3 9   CHLORIDE mmol/L 100   CO2 mmol/L 31   BUN mg/dL 32*   CREATININE mg/dL 0 31*   ANION GAP mmol/L 4   CALCIUM mg/dL 8 1*   ALBUMIN g/dL 1 9*   TOTAL BILIRUBIN mg/dL 0 19*   ALK PHOS U/L 79   ALT U/L 14   AST U/L 17   GLUCOSE RANDOM mg/dL 119                 Results from last 7 days   Lab Units 06/29/22  2242   LACTIC ACID mmol/L 1 4   PROCALCITONIN ng/ml 0 11       Lines/Drains:  Invasive Devices  Report    Peripheral Intravenous Line  Duration           Long-Dwell Peripheral IV (Midline) 06/14/22 Right Brachial 21 days          Drain  Duration           Gastrostomy/Enterostomy -- days    Urethral Catheter -- days              Urinary Catheter:  Goal for removal: N/A - Chronic Tejada               Imaging: Reviewed radiology reports from this admission including: chest xray    Recent Cultures (last 7 days):         Last 24 Hours Medication List:   Current Facility-Administered Medications   Medication Dose Route Frequency Provider Last Rate    acetaminophen  650 mg Per G Tube Q6H Navin Humphrey MD      acetaminophen  650 mg Per G Tube Q4H PRN Navin Humphrey MD      aspirin  81 mg Per G Tube Daily Edu Olson MD      atovaquone  750 mg Per G Tube Daily With Breakfast Edu Olson MD      cholecalciferol  1,000 Units Per G Tube Daily Edu Olson MD      enoxaparin  40 mg Subcutaneous Q24H [address] Edu Olson MD      fluticasone  1 spray Each Nare BID Lolly Paredes MD      folic acid  1 mg Per PEG Tube Daily Lolly Paredes MD      glycerin-hypromellose-  2 drop Both Eyes BID Edu Olson MD      levalbuterol  1 25 mg Nebulization TID Kim Jimenez DO      levothyroxine  25 mcg Per PEG Tube Early Morning Edu Olson MD      loratadine  10 mg Per G Tube Daily Edu Olson MD      Macitentan  10 mg Per G Tube Daily Cato Aase, MD  melatonin  3 mg Oral HS PRN Rachel Blood, MD      nystatin   Topical BID Berta Greenwood MD      ondansetron  4 mg Intravenous Q6H PRN Rachel Blood, MD      oxyCODONE  2 5 mg Per G Tube Q4H PRN Rachel Blood, MD      oxyCODONE  5 mg Per G Tube Q4H PRN Rachel Blood, MD      pantoprazole  40 mg Intravenous Q24H [address] Mary Adame MD      predniSONE  30 mg Per G Tube Daily Mary Adame MD      saccharomyces boulardii  250 mg Per G Tube BID Mary Adame MD      sildenafil  10 mg Oral TID Robert Paul, DO      sodium chloride  1 spray Each Nare Q1H PRN Josephine Garcia MD      sodium chloride  3 mL Nebulization TID Pamalebull Drain, DO          Today, Patient Was Seen By: BETTIE Reyes    **Please Note: This note may have been constructed using a voice recognition system  **

## 2022-07-06 NOTE — ASSESSMENT & PLAN NOTE
· Stage IV sacral ulcer  · Status post I&D with General surgery  · Wound care per General surgery, had a VAC which is now off and patient is getting wet-to-dry dressings  · Received IV antibiotics, presently being monitored off of antibiotics, Infectious Disease input noted  · Frequent repositioning  · Optimize nutritional status  · General surgery following  · Working on placement with family, anticipate discharge Friday to a facility

## 2022-07-07 LAB
ANISOCYTOSIS BLD QL SMEAR: PRESENT
BASOPHILS # BLD MANUAL: 0.24 THOUSAND/UL (ref 0–0.1)
BASOPHILS NFR MAR MANUAL: 2 % (ref 0–1)
EOSINOPHIL # BLD MANUAL: 0.12 THOUSAND/UL (ref 0–0.4)
EOSINOPHIL NFR BLD MANUAL: 1 % (ref 0–6)
ERYTHROCYTE [DISTWIDTH] IN BLOOD BY AUTOMATED COUNT: 21.2 % (ref 11.6–15.1)
HCT VFR BLD AUTO: 27.8 % (ref 34.8–46.1)
HGB BLD-MCNC: 8.1 G/DL (ref 11.5–15.4)
LYMPHOCYTES # BLD AUTO: 0.95 THOUSAND/UL (ref 0.6–4.47)
LYMPHOCYTES # BLD AUTO: 8 % (ref 14–44)
MACROCYTES BLD QL AUTO: PRESENT
MCH RBC QN AUTO: 30.3 PG (ref 26.8–34.3)
MCHC RBC AUTO-ENTMCNC: 29.1 G/DL (ref 31.4–37.4)
MCV RBC AUTO: 104 FL (ref 82–98)
METAMYELOCYTES NFR BLD MANUAL: 1 % (ref 0–1)
MONOCYTES # BLD AUTO: 0.48 THOUSAND/UL (ref 0–1.22)
MONOCYTES NFR BLD: 4 % (ref 4–12)
MYELOCYTES NFR BLD MANUAL: 1 % (ref 0–1)
NEUTROPHILS # BLD MANUAL: 9.87 THOUSAND/UL (ref 1.85–7.62)
NEUTS BAND NFR BLD MANUAL: 6 % (ref 0–8)
NEUTS SEG NFR BLD AUTO: 77 % (ref 43–75)
PLATELET # BLD AUTO: 370 THOUSANDS/UL (ref 149–390)
PLATELET BLD QL SMEAR: ADEQUATE
PMV BLD AUTO: 9.5 FL (ref 8.9–12.7)
POLYCHROMASIA BLD QL SMEAR: PRESENT
RBC # BLD AUTO: 2.67 MILLION/UL (ref 3.81–5.12)
RBC MORPH BLD: PRESENT
WBC # BLD AUTO: 11.89 THOUSAND/UL (ref 4.31–10.16)

## 2022-07-07 PROCEDURE — 99232 SBSQ HOSP IP/OBS MODERATE 35: CPT | Performed by: NURSE PRACTITIONER

## 2022-07-07 PROCEDURE — 94760 N-INVAS EAR/PLS OXIMETRY 1: CPT

## 2022-07-07 PROCEDURE — 97530 THERAPEUTIC ACTIVITIES: CPT

## 2022-07-07 PROCEDURE — 85027 COMPLETE CBC AUTOMATED: CPT | Performed by: PHYSICIAN ASSISTANT

## 2022-07-07 PROCEDURE — 85007 BL SMEAR W/DIFF WBC COUNT: CPT | Performed by: PHYSICIAN ASSISTANT

## 2022-07-07 PROCEDURE — C9113 INJ PANTOPRAZOLE SODIUM, VIA: HCPCS | Performed by: INTERNAL MEDICINE

## 2022-07-07 PROCEDURE — 99232 SBSQ HOSP IP/OBS MODERATE 35: CPT | Performed by: INTERNAL MEDICINE

## 2022-07-07 PROCEDURE — 97110 THERAPEUTIC EXERCISES: CPT

## 2022-07-07 PROCEDURE — 94640 AIRWAY INHALATION TREATMENT: CPT

## 2022-07-07 RX ADMIN — ASPIRIN 81 MG CHEWABLE TABLET 81 MG: 81 TABLET CHEWABLE at 09:56

## 2022-07-07 RX ADMIN — OXYCODONE HYDROCHLORIDE 5 MG: 5 SOLUTION ORAL at 21:38

## 2022-07-07 RX ADMIN — FOLIC ACID 1 MG: 1 TABLET ORAL at 09:56

## 2022-07-07 RX ADMIN — MACITENTAN 10 MG: 10 TABLET, FILM COATED ORAL at 10:01

## 2022-07-07 RX ADMIN — GLYCERIN, HYPROMELLOSE, POLYETHYLENE GLYCOL 2 DROP: .2; .2; 1 LIQUID OPHTHALMIC at 17:35

## 2022-07-07 RX ADMIN — LEVALBUTEROL HYDROCHLORIDE 1.25 MG: 1.25 SOLUTION, CONCENTRATE RESPIRATORY (INHALATION) at 07:08

## 2022-07-07 RX ADMIN — NYSTATIN: 100000 POWDER TOPICAL at 10:01

## 2022-07-07 RX ADMIN — ISODIUM CHLORIDE 3 ML: 0.03 SOLUTION RESPIRATORY (INHALATION) at 20:34

## 2022-07-07 RX ADMIN — SALINE NASAL SPRAY 1 SPRAY: 1.5 SOLUTION NASAL at 10:00

## 2022-07-07 RX ADMIN — Medication 250 MG: at 17:36

## 2022-07-07 RX ADMIN — LEVALBUTEROL HYDROCHLORIDE 1.25 MG: 1.25 SOLUTION, CONCENTRATE RESPIRATORY (INHALATION) at 20:34

## 2022-07-07 RX ADMIN — ACETAMINOPHEN 650 MG: 650 SUSPENSION ORAL at 03:46

## 2022-07-07 RX ADMIN — LEVOTHYROXINE SODIUM 25 MCG: 25 TABLET ORAL at 06:10

## 2022-07-07 RX ADMIN — PREDNISONE 30 MG: 20 TABLET ORAL at 09:56

## 2022-07-07 RX ADMIN — Medication 250 MG: at 10:02

## 2022-07-07 RX ADMIN — FLUTICASONE PROPIONATE 1 SPRAY: 50 SPRAY, METERED NASAL at 10:01

## 2022-07-07 RX ADMIN — SILDENAFIL CITRATE 10 MG: 20 TABLET ORAL at 09:55

## 2022-07-07 RX ADMIN — ATOVAQUONE 750 MG: 750 SUSPENSION ORAL at 10:01

## 2022-07-07 RX ADMIN — FLUTICASONE PROPIONATE 1 SPRAY: 50 SPRAY, METERED NASAL at 17:35

## 2022-07-07 RX ADMIN — ISODIUM CHLORIDE 3 ML: 0.03 SOLUTION RESPIRATORY (INHALATION) at 14:13

## 2022-07-07 RX ADMIN — LEVALBUTEROL HYDROCHLORIDE 1.25 MG: 1.25 SOLUTION, CONCENTRATE RESPIRATORY (INHALATION) at 14:13

## 2022-07-07 RX ADMIN — ACETAMINOPHEN 650 MG: 650 SUSPENSION ORAL at 09:58

## 2022-07-07 RX ADMIN — ENOXAPARIN SODIUM 40 MG: 40 INJECTION SUBCUTANEOUS at 09:57

## 2022-07-07 RX ADMIN — MELATONIN 3 MG: at 00:14

## 2022-07-07 RX ADMIN — SILDENAFIL CITRATE 10 MG: 20 TABLET ORAL at 17:37

## 2022-07-07 RX ADMIN — SILDENAFIL CITRATE 10 MG: 20 TABLET ORAL at 21:39

## 2022-07-07 RX ADMIN — GLYCERIN, HYPROMELLOSE, POLYETHYLENE GLYCOL 2 DROP: .2; .2; 1 LIQUID OPHTHALMIC at 10:00

## 2022-07-07 RX ADMIN — OXYCODONE HYDROCHLORIDE 5 MG: 5 SOLUTION ORAL at 06:32

## 2022-07-07 RX ADMIN — ACETAMINOPHEN 650 MG: 650 SUSPENSION ORAL at 17:36

## 2022-07-07 RX ADMIN — ISODIUM CHLORIDE 3 ML: 0.03 SOLUTION RESPIRATORY (INHALATION) at 07:08

## 2022-07-07 RX ADMIN — ACETAMINOPHEN 650 MG: 650 SUSPENSION ORAL at 00:14

## 2022-07-07 RX ADMIN — PANTOPRAZOLE SODIUM 40 MG: 40 INJECTION, POWDER, FOR SOLUTION INTRAVENOUS at 09:57

## 2022-07-07 RX ADMIN — Medication 1000 UNITS: at 09:55

## 2022-07-07 RX ADMIN — NYSTATIN: 100000 POWDER TOPICAL at 17:48

## 2022-07-07 RX ADMIN — LORATADINE 10 MG: 10 TABLET ORAL at 09:56

## 2022-07-07 RX ADMIN — OXYCODONE HYDROCHLORIDE 2.5 MG: 5 SOLUTION ORAL at 11:41

## 2022-07-07 RX ADMIN — ACETAMINOPHEN 650 MG: 650 SUSPENSION ORAL at 22:10

## 2022-07-07 NOTE — OCCUPATIONAL THERAPY NOTE
Occupational Therapy Treatment Note      Jorge Robert    7/7/2022    Principal Problem:    Sacral wound  Active Problems:    Severe protein-calorie malnutrition (HCC)    Dysphagia, oropharyngeal phase    Scleroderma (HCC)    Acquired hypothyroidism    Anemia    Urinary retention    Acute respiratory failure with hypoxia (HCC)    Pneumonia    Hearing loss    Pulmonary hypertension (HCC)    Proximal muscle weakness      Past Medical History:   Diagnosis Date    Dysphagia, oropharyngeal phase 06/06/2022       Past Surgical History:   Procedure Laterality Date    WOUND DEBRIDEMENT N/A 6/14/2022    Procedure: DEBRIDEMENT WOUND [facility] OUT); SACRUM AND BUTTOCKS;  Surgeon: Darlene Arteaga MD;  Location: [facility];  Service: General        07/07/22 1152   OT Last Visit   OT Visit Date 07/07/22   Note Type   Note Type Treatment   Restrictions/Precautions   Weight Bearing Precautions Per Order No   Other Precautions Cognitive;Multiple lines;Telemetry; Fall Risk;Pain;O2  (2L O2)   Lifestyle   Autonomy Pt reports that in March she was I with ADLS, IADLS, and mobility w/o AD, at [employer] assist with ADL's/IADL's, assist of 1 with RW with mobility/transfers  Reciprocal Relationships Pt has a supportive sister and a    Service to Others Pt is retired   Intrinsic Gratification Pt enjoys reading   Pain Assessment   Pain Assessment Tool 0-10   Pain Score 10 - Worst Possible Pain   Pain Location/Orientation Location: Buttocks   Pain Onset/Description Onset: Ongoing; Descriptor: Aching;Descriptor: Discomfort   Patient's Stated Pain Goal No pain   Hospital Pain Intervention(s) Repositioned; Ambulation/increased activity; Emotional support   ADL   Where Assessed Supine, bed   Bed Mobility   Rolling R 2  Maximal assistance   Additional items Assist x 1; Increased time required;Verbal cues;LE management   Rolling L 2  Maximal assistance   Additional items Assist x 1; Increased time required;Verbal cues;LE management   Additional Comments pt reports being unable to sit EOB at this time 2/2 10/10 pain in buttocks   Therapeutic Excerise-Strength   UE Strength Yes   Right Upper Extremity- Strength   R Shoulder Flexion; Horizontal ABduction; Extension;ABduction   R Elbow Elbow flexion;Elbow extension   R Position Supine   Equipment Theraband   R Weight/Reps/Sets 1 set of 10 bicep curls, elbow extension, horizontal abduction   Left Upper Extremity-Strength   L Shoulder Flexion;ABduction; Extension;Horizontal ABduction   L Elbow Elbow flexion;Elbow extension   L Position Supine   Equipment Theraband   L Weights/Reps/Sets 1 set of 10 bicep curls, elbow extension, horizontal abduction   Exercise Tools   Exercise Tools Yes   Theraband yellow   Cognition   Overall Cognitive Status WFL   Arousal/Participation Responsive; Cooperative   Attention Attends with cues to redirect   Orientation Level Oriented X4   Memory Decreased recall of precautions   Following Commands Follows one step commands without difficulty   Comments Pt is cooperative; limited by pain/fatigue   Activity Tolerance   Activity Tolerance Patient limited by fatigue;Patient limited by pain   Medical Staff Made Aware PT, RN   Assessment   Assessment Patient participated in Skilled OT session 7/7/2022 with interventions consisting of Energy Conservation techniques, deep breathing technique, therapeutic exercise to: increase functional use of BUEs, increase BUE muscle strength  and  therapeutic activities to: increase activity tolerance   Patient agreeable to OT treatment session, upon arrival patient was found supine in bed  In comparison to previous session, patient with improvements in UE strengthening   Patient requiring verbal cues for correct technique, verbal cues for pacing thru activity steps and frequent rest periods  Patient continues to be functioning below baseline level, occupational performance remains limited secondary to factors listed above and increased risk for falls and injury  From OT standpoint, recommendation at time of d/c would be Short Term Rehab when medically stable  Patient to benefit from continued Occupational Therapy treatment while in the hospital to address deficits as defined above and maximize level of functional independence with ADLs and functional mobility  Plan   Treatment Interventions ADL retraining;Functional transfer training;UE strengthening/ROM; Endurance training;Cognitive reorientation;Patient/family training;Equipment evaluation/education; Compensatory technique education;Continued evaluation; Energy conservation; Activityengagement   Goal Expiration Date 07/12/22   OT Treatment Day 5   OT Frequency 3-5x/wk   Recommendation   OT Discharge Recommendation Post acute rehabilitation services   OT - OK to Discharge Yes  (when medically stable)   Additional Comments  The patient's raw score on the AM-PAC Daily Activity inpatient short form low function score is 14, standardized score is Low Function Daily Activity Standardized Score: 24 79  Patients with a standardized score less than 39 4 are likely to benefit from discharge to post-acute rehab services  Please refer to the recommendation of the Occupational Therapist for safe discharge planning  Additional Comments 2 Pt seen as a co-treatment due to the patient's co-morbidities, clinically unstable presentation, and present impairments which are a regression from the patient's baseline     [facility] Daily Activity Inpatient   Lower Body Dressing 1   Bathing 1   Toileting 1   Upper Body Dressing 1   Grooming 2   Eating 2   Daily Activity Raw Score 8   Turning Head Towards Sound 3   Follow Simple Instructions 3   Low Function Daily Activity Raw Score 14   Low Function Daily Activity Standardized Score 24 79   AM-PAC Applied Cognition Inpatient   Following a Speech/Presentation 3   Understanding Ordinary Conversation 4   Taking Medications 4   Remembering Where Things Are Placed or Put Away 4   Remembering List of 4-5 Errands 4   Taking Care of Complicated Tasks 3   Applied Cognition Raw Score 22   Applied Cognition Standardized Score 47 83     Sakina Villagran MS, OTR/L

## 2022-07-07 NOTE — ASSESSMENT & PLAN NOTE
· Extensive upper and lower extremity proximal muscle weakness with marked improvement today  · Received IVIG with Rheumatology  · Supportive care

## 2022-07-07 NOTE — PROGRESS NOTES
Pastoral Care Progress Note    2022  Patient: Zainab Mccoy : 1940  Admission Date & Time: 2022  MRN: [id number] CSN: [id number]       provided follow-up visit to patient at bedside  Patient described that she has been in some physical pain and has not been resting as well as she hoped   provided active listening, empathetic presence, and encouraged self care  Patient expressed gratitude for 's visit  Spiritual Care will remain available       22 1400   Clinical Encounter Type   Visited With Patient   Routine Visit Follow-up

## 2022-07-07 NOTE — PROGRESS NOTES
Heart Failure/ Pulmonary Hypertension Progress Note - Tate Ellison 80 y o  female MRN: [id number]    Unit/Bed#: [facility] 204-01 Encounter: [id number]      Assessment:    Principal Problem:    Sacral wound  Active Problems:    Severe protein-calorie malnutrition (HCC)    Dysphagia, oropharyngeal phase    Scleroderma (HCC)    Acquired hypothyroidism    Anemia    Urinary retention    Acute respiratory failure with hypoxia (HCC)    Pneumonia    Hearing loss    Pulmonary hypertension (HCC)    Proximal muscle weakness    # Chronic HFpEF, Stage C   Diuretic: on hold - euvolemic   NT proBNP 1860 6/19/22      # Pulmonary arterial hypertension   PAH Rx: Adempas 2 5 mg TID- stopped due to hypotension, Opsumit 10 mg daily and sildanefil 10 mg TID  Given her hypotension and inability to properly decrease Adempas in the hospital (need 1 mg pills), changed to sildanefil 10 mg TID in hospital (after 24 hour washout)    Studies- personally reviewed by me   EKG, SR, increased LA size, IRBBB       Echocardiogram 6/20/22   LVEF: 70%, grade 2 DD   RV: mildly dilated, wall thickness increased  LV is apex forming   Interatrial septum bows in RA c/w increased LA pressures   MR:   PASP: 56 mmHg   RVOT: no notching   AV: moderately calcified, mild AS   TV: moderate TR       # Scleroderma, seen by Rheumatology   # Dysphagia, Peg tube in place, tube feeds   # severe protein calorie malnutrition, BMI 18   # Sacral wound   # Pneumonia   # urinary retention   # anemia, HgB 7 9, s/p 1 unit pRBCs   # acquired hypothyroid     Plan:  Doing well on current PAH therapy with Opsumit and macitentan as above on 1-2L oxygen per nasal cannula  Euvolemic on exam, off diuretics  Concern regarding opsumit causing anemia but hemoglobin has stabilized, 8 1 today  Awaiting placement - will follow prn, please call us as needed    Subjective:   Patient seen and examined  No significant events overnight        Review of Systems   Constitutional: Negative for chills and fever  Respiratory: Positive for cough  Negative for shortness of breath  Cardiovascular: Negative for chest pain, palpitations and leg swelling  Gastrointestinal: Negative for abdominal distention  Neurological: Negative for dizziness and light-headedness  Objective: Intake/ Output: 2481/1600, +881  Weight: 152 lbs 7/1/22    Vitals: Blood pressure 104/55, pulse 77, temperature 98 3 °F (36 8 °C), temperature source Axillary, resp  rate 21, height 5' 7 5" (1 715 m), weight 69 kg (152 lb 3 2 oz), SpO2 98 %  , Body mass index is 23 49 kg/m² , I/O last 3 completed shifts: In: 4408 [I V :20; NG/GT:1940; Feedings:2448]  Out: 1600 [Urine:1600]  I/O this shift:  In: 200 [NG/GT:200]  Out: -   Wt Readings from Last 3 Encounters:   07/01/22 69 kg (152 lb 3 2 oz)   06/14/22 57 9 kg (127 lb 10 3 oz)       Intake/Output Summary (Last 24 hours) at 7/7/2022 1721  Last data filed at 7/7/2022 1300  Gross per 24 hour   Intake 1924 ml   Output 750 ml   Net 1174 ml     I/O last 3 completed shifts:   In: 4408 [I V :20; NG/GT:1940; Feedings:2448]  Out: 1600 [Urine:1600]      Physical Exam:  Vitals:    07/07/22 0711 07/07/22 0745 07/07/22 1100 07/07/22 1500   BP: (!) 97/49  (!) 90/45 104/55   BP Location: Left arm  Left arm Right arm   Pulse:   84 77   Resp:   21 21   Temp: 97 6 °F (36 4 °C)  98 9 °F (37 2 °C) 98 3 °F (36 8 °C)   TempSrc: Oral  Axillary Axillary   SpO2: 94% 95% 92% 98%   Weight:       Height:           GEN: Celia Shore appears well, alert, pleasant and cooperative   HEENT: NC/AT, moist mucosa, anicteric sclerae; extraocular muscles intact  NECK: supple, no carotid bruits   HEART: regular rhythm, normal S1 and S2, no murmurs, clicks, gallops or rubs, JVP is  nonelevated  LUNGS: clear to auscultation bilaterally; no wheezes, rales, or rhonchi   ABDOMEN: normal bowel sounds, soft, no tenderness, no distention  EXTREMITIES: peripheral pulses normal; no clubbing, cyanosis, or edema  NEURO: no focal findings   SKIN: normal without suspicious lesions on exposed skin      Current Facility-Administered Medications:     acetaminophen (TYLENOL) oral suspension 650 mg, 650 mg, Per G Tube, Q6H, Zack Hurtado MD, 650 mg at 07/07/22 2673    acetaminophen (TYLENOL) oral suspension 650 mg, 650 mg, Per G Tube, Q4H PRN, Zack Hurtado MD, 650 mg at 07/07/22 0014    aspirin chewable tablet 81 mg, 81 mg, Per Deacon Tyson, Daily, Onesimo Hernandez MD, 81 mg at 07/07/22 0956    atovaquone (MEPRON) oral suspension 750 mg, 750 mg, Per G Tube, Daily With Breakfast, Onesimo Hernandez MD, 750 mg at 07/07/22 1001    cholecalciferol (VITAMIN D3) tablet 1,000 Units, 1,000 Units, Per Deacon Tyson, Daily, Onesimo Hernandez MD, 1,000 Units at 07/07/22 0955    enoxaparin (LOVENOX) subcutaneous injection 40 mg, 40 mg, Subcutaneous, Q24H [facility], Onesimo Hernandez MD, 40 mg at 07/07/22 0957    fluticasone (FLONASE) 50 mcg/act nasal spray 1 spray, 1 spray, Each Nare, BID, Leobardo Michel MD, 1 spray at 86/76/54 3905    folic acid (FOLVITE) tablet 1 mg, 1 mg, Per PEG Tube, Daily, Leobardo Michel MD, 1 mg at 07/07/22 0956    glycerin-hypromellose- (ARTIFICIAL TEARS) ophthalmic solution 2 drop, 2 drop, Both Eyes, BID, Onesimo Hernandez MD, 2 drop at 07/07/22 1000    levalbuterol (XOPENEX) inhalation solution 1 25 mg, 1 25 mg, Nebulization, TID, Kennedy Jara DO, 1 25 mg at 07/07/22 1413    levothyroxine tablet 25 mcg, 25 mcg, Per PEG Tube, Early Morning, Onesimo Hernandez MD, 25 mcg at 07/07/22 0610    loratadine (CLARITIN) tablet 10 mg, 10 mg, Per Joyannjosiane Tyson, Daily, Onesimo Hernandez MD, 10 mg at 07/07/22 0956    Macitentan (OPSUMIT) tablet 10 mg, 10 mg, Per G Tube, Daily, Josr Torres MD, 10 mg at 07/07/22 1001    melatonin tablet 3 mg, 3 mg, Oral, HS PRN, Zack Hurtado MD, 3 mg at 07/07/22 0014    nystatin (MYCOSTATIN) powder, , Topical, BID, Leobardo Michel MD, Given at 07/07/22 1001    ondansetron [facility]) injection 4 mg, 4 mg, Intravenous, Q6H PRN, Camilo Hammond MD    oxyCODONE (ROXICODONE) oral solution 2 5 mg, 2 5 mg, Per G Tube, Q4H PRN, Camilo Hammond MD, 2 5 mg at 07/07/22 1141    oxyCODONE (ROXICODONE) oral solution 5 mg, 5 mg, Per G Tube, Q4H PRN, Camilo Hammond MD, 5 mg at 07/07/22 2267    pantoprazole (PROTONIX) injection 40 mg, 40 mg, Intravenous, Q24H [facility], Apolonia Ching MD, 40 mg at 07/07/22 0957    predniSONE tablet 30 mg, 30 mg, Per [place]elle [place], Daily, Apolonia Ching MD, 30 mg at 07/07/22 0998    saccharomyces boulardii (FLORASTOR) capsule 250 mg, 250 mg, Per G Tube, BID, Apolonia Ching MD, 250 mg at 07/07/22 1002    sildenafil (REVATIO) tablet 10 mg, 10 mg, Oral, TID, Adelaida Kam, DO, 10 mg at 07/07/22 0955    sodium chloride (OCEAN) 0 65 % nasal spray 1 spray, 1 spray, Each Nare, Q1H PRN, Raissa Wong MD, 1 spray at 07/07/22 1000    sodium chloride 0 9 % inhalation solution 3 mL, 3 mL, Nebulization, TID, Ashanti Dover, DO, 3 mL at 07/07/22 1413      Labs & Results:    Results from last 7 days   Lab Units 07/02/22  0452 07/01/22  0514   CK TOTAL U/L 14* 16*     Results from last 7 days   Lab Units 07/07/22  0458 07/04/22  0431 07/01/22  0514   WBC Thousand/uL 11 89* 10 87* 10 40*   HEMOGLOBIN g/dL 8 1* 7 8* 7 9*   HEMATOCRIT % 27 8* 26 4* 26 6*   PLATELETS Thousands/uL 370 375 405*         Results from last 7 days   Lab Units 07/04/22  0431 07/01/22  0514   POTASSIUM mmol/L 3 9 4 1   CHLORIDE mmol/L 100 103   CO2 mmol/L 31 32   BUN mg/dL 32* 27*   CREATININE mg/dL 0 31* 0 30*   CALCIUM mg/dL 8 1* 8 5   ALK PHOS U/L 79  --    ALT U/L 14  --    AST U/L 17  --              Aguilar Donaldson MD  Advanced Heart Failure and Mechanical [place]

## 2022-07-07 NOTE — ASSESSMENT & PLAN NOTE
· Stage IV sacral ulcer POA  · Status post I&D with General surgery  · Wound care per General surgery, had a VAC which is now off and patient is getting wet-to-dry dressings  · Received IV antibiotics, presently being monitored off of antibiotics, Infectious Disease input noted  · Frequent repositioning  · Optimize nutritional status  · General surgery following  · Working on placement with family, anticipate discharge Friday to a facility     · Discussed with CM this morning and she will follow up with family to ensure there is a facility selected

## 2022-07-07 NOTE — ASSESSMENT & PLAN NOTE
· Scleroderma with crest syndrome  · Continue prednisone  · Rheumatology advised IVIG which patient completed with marked improvements to her ROM  · Ongoing goals of care discussions regarding prognosis should occur

## 2022-07-07 NOTE — PLAN OF CARE
Problem: OCCUPATIONAL THERAPY ADULT  Goal: Performs self-care activities at highest level of function for planned discharge setting  See evaluation for individualized goals  Description: Treatment Interventions: ADL retraining, Functional transfer training, UE strengthening/ROM, Endurance training, Cognitive reorientation, Patient/family training, Equipment evaluation/education, Compensatory technique education, Fine motor coordination activities, Continued evaluation, Energy conservation, Activityengagement          See flowsheet documentation for full assessment, interventions and recommendations  Outcome: Not Progressing  Note: Limitation: Decreased ADL status, Decreased UE ROM, Decreased UE strength, Decreased Safe judgement during ADL, Decreased endurance, Decreased fine motor control, Decreased self-care trans, Decreased high-level ADLs  Prognosis: Fair  Assessment: Patient participated in Skilled OT session 7/7/2022 with interventions consisting of Energy Conservation techniques, deep breathing technique, therapeutic exercise to: increase functional use of BUEs, increase BUE muscle strength  and  therapeutic activities to: increase activity tolerance   Patient agreeable to OT treatment session, upon arrival patient was found supine in bed  In comparison to previous session, patient with improvements in UE strengthening   Patient requiring verbal cues for correct technique, verbal cues for pacing thru activity steps and frequent rest periods  Patient continues to be functioning below baseline level, occupational performance remains limited secondary to factors listed above and increased risk for falls and injury  From OT standpoint, recommendation at time of d/c would be Short Term Rehab when medically stable     Patient to benefit from continued Occupational Therapy treatment while in the hospital to address deficits as defined above and maximize level of functional independence with ADLs and functional mobility       OT Discharge Recommendation: Post acute rehabilitation services  OT - OK to Discharge: Yes (when medically stable)     Alejandro Díaz MS, OTR/L

## 2022-07-07 NOTE — CASE MANAGEMENT
Case Management Progress Note    Patient name Sapphire Lai  [facility] [id number]  : 1940 Date 2022       LOS (days): 23  Geometric Mean LOS (GMLOS) (days): 9 60  Days to GMLOS:-13        OBJECTIVE:        Current admission status: Inpatient  Preferred Pharmacy:   PATIENT/FAMILY REPORTS NO PREFERRED PHARMACY  No address on file      Primary Care Provider: No primary care provider on file  Primary Insurance: [address] [place] [address],[address] [facility]  Secondary Insurance:     PROGRESS NOTE:      LATE ENTRY: Spoke to daughter Promise Maurer on Wednesday,  at 38, reviewed with her that available options at this time are return to [facility] or possible bed at [facility] admissions staff will be contacting her for information  Discussed that patient should be medically cleared for d/c on Friday so a decision regarding placement will need to be made so insurance authorization can be pursued  Spoke to Nadira at [facility] this morning   She left daughter Promise Maurer a message yesterday and will attempt to contact her again today  Reviewed with Nadira that patient will be likely cleared for d/c tomorrow so if family accepts bed at [facility] we will pursue authorization  CM to follow  Buzz Juarez RODRIGO  2022  11:46 AM      ADDENDUM:    Left another message for Nadira at [facility] this afternoon to inquire about available bed  Met with patient and daughter Promise Maurer at bedside  Discussed that at this point, we have accepting bed at [facility] and a possible bed at [facility] is patient/family first choice  Patient understands and agrees that if [facility] does not offer bed, she will return to [facility]  Insurance authorization required  CM to follow      Buzz [facility]  2022  3:58 PM

## 2022-07-07 NOTE — PLAN OF CARE
Problem: PHYSICAL THERAPY ADULT  Goal: Performs mobility at highest level of function for planned discharge setting  See evaluation for individualized goals  Description: Treatment/Interventions: LE strengthening/ROM, Therapeutic exercise, Endurance training, Patient/family training, Equipment eval/education, Bed mobility, OT, Spoke to nursing, Continued evaluation          See flowsheet documentation for full assessment, interventions and recommendations  Note: Prognosis: Fair  Problem List: Decreased strength, Decreased range of motion, Decreased endurance, Impaired balance, Decreased mobility, Impaired hearing, Pain, Decreased skin integrity, Decreased cognition  Assessment: Pt is seen today for therapy session  Pt tolerated therapeutic exercises well with O2 status decreasing to 85% but did not complained of SOB  During the session, pt demonstrated decrease BLE strength (L>R), decrease BLE ROM (L>R), impaired endurance, O2 dependent, decrease activity tolerance, and pain which limits pt to return to PLOF and increase pts risk for fall  Pt was educated on HEP and to continue to perform them throughout the day  Pt also educated on performing deep breathing techniques when performing exercises  Pt will continue to benefit with skilled PT interventions while in the hospital to address the impairments stated above  Pt is recommended to rehab upon D/C  At the end of the session, pt was left supine in bed with call bell within reach  RN present and gave medication to pt, also made aware of O2 sat  Barriers to Discharge: Other (Comment) (underlying conditions)        PT Discharge Recommendation: Post acute rehabilitation services          See flowsheet documentation for full assessment

## 2022-07-07 NOTE — PROGRESS NOTES
[facility]  Progress Note Obed Shore 1940, 80 y o  female MRN: [id number]  [facility]: [id number]  Primary Care Provider: No primary care provider on file  Date and time admitted to hospital: 6/14/2022  8:07 PM    * Sacral wound  Assessment & Plan  · Stage IV sacral ulcer POA  · Status post I&D with General surgery  · Wound care per General surgery, had a VAC which is now off and patient is getting wet-to-dry dressings  · Received IV antibiotics, presently being monitored off of antibiotics, Infectious Disease input noted  · Frequent repositioning  · Optimize nutritional status  · General surgery following  · Working on placement with family, anticipate discharge Friday to a facility     · Discussed with CM this morning and she will follow up with family to ensure there is a facility selected    Acute respiratory failure with hypoxia ([facility] [address] )  Assessment & Plan  · Multifactorial  · Encourage incentive spirometry, patient is doing better with this   · Remains on RA  · Pulmonary input noted   · Is stable from this standpoint but have concerns with her prognosis       Scleroderma ([address] )  Assessment & Plan  · Scleroderma with crest syndrome  · Continue prednisone  · Rheumatology advised IVIG which patient completed with marked improvements to her ROM  · Ongoing goals of care discussions regarding prognosis should occur     Severe protein-calorie malnutrition ([address] )  Assessment & Plan  Malnutrition Findings:   Adult Malnutrition type: Chronic illness  Adult Degree of Malnutrition: Other severe protein calorie malnutrition  Malnutrition Characteristics: Muscle loss, Fat loss, Weight loss, Inadequate energy           360 Statement: related to disease/condition evidenced by increased kcal/pro needs for wound healing Stage IV sacral decub, BMI 18 4 adjusted per prior facility wt 6/7/22;severe buccal fat pads loss, severe deltoid muscle loss, Patient lost 31# (20 6%) since 3/15/22 past 3 months with illness <75% energy intake versus needs for > 1 month  Treating with EN support and Robert TID via PEG for wound healing    BMI Findings:  Adult BMI Classifications: Underweight < 18 5        Body mass index is 23 49 kg/m²  · Continue tube feeding as ordered     Urinary retention  Assessment & Plan  · Urinary retention with Tejada catheter in place  · Outpatient Urology follow-up    Pulmonary hypertension ([address] )  Assessment & Plan  · Crest syndrome with pulmonary hypertension  · Continue supplemental oxygen as needed  · Sildenafil  · Would be appropriate for palliative care  · Family meeting on June 23--> continue medical directed care but DNR level established   · Will need ongoing conversations about realistic expectations and prognosis     Proximal muscle weakness  Assessment & Plan  · Extensive upper and lower extremity proximal muscle weakness with marked improvement today  · Received IVIG with Rheumatology  · Supportive care    Dysphagia, oropharyngeal phase  Assessment & Plan  · Patient with history of dysphagia  · PEG tube in place  · Continue current tube feeds  · Aspiration precautions    Pneumonia  Assessment & Plan    · Completed antibiotics  · Supportive care  · Aspiration precautions        VTE Pharmacologic Prophylaxis: VTE Score: 7 High Risk (Score >/= 5) - Pharmacological DVT Prophylaxis Ordered: enoxaparin (Lovenox)  Sequential Compression Devices Ordered  Patient Centered Rounds: I performed bedside rounds with nursing staff today  Discussions with Specialists or Other Care Team Provider: Primary RN and CM    Education and Discussions with Family / Patient: Patient declined call to   Time Spent for Care: 20 minutes  More than 50% of total time spent on counseling and coordination of care as described above      Current Length of Stay: 23 day(s)  Current Patient Status: Inpatient   Certification Statement: The patient will continue to require additional inpatient hospital stay due to safe discharge planning   Discharge Plan: Anticipate discharge tomorrow to rehab facility  Code Status: Level 3 - DNAR and DNI    Subjective:   Feels well today  Patient states that she continues to do her incentive spirometer  She is looking forward to finding out what facility she will be going to a discharge  Offered no complaints of chest pain or shortness of breath today  Nurse reports no overnight events    Objective:     Vitals:   Temp (24hrs), Av 3 °F (36 8 °C), Min:97 6 °F (36 4 °C), Max:98 9 °F (37 2 °C)    Temp:  [97 6 °F (36 4 °C)-98 9 °F (37 2 °C)] 98 9 °F (37 2 °C)  HR:  [66-84] 84  Resp:  [16-22] 21  BP: ()/(45-55) 90/45  SpO2:  [87 %-96 %] 92 %  Body mass index is 23 49 kg/m²  Input and Output Summary (last 24 hours): Intake/Output Summary (Last 24 hours) at 2022 1343  Last data filed at 2022 0348  Gross per 24 hour   Intake 1724 ml   Output 750 ml   Net 974 ml       Physical Exam:   Physical Exam  Constitutional:       Appearance: She is ill-appearing  HENT:      Mouth/Throat:      Mouth: Mucous membranes are moist    Cardiovascular:      Rate and Rhythm: Normal rate and regular rhythm  Pulmonary:      Effort: Pulmonary effort is normal       Breath sounds: Normal breath sounds  Abdominal:      General: Abdomen is flat  Palpations: Abdomen is soft  Musculoskeletal:      Cervical back: Normal range of motion  Skin:     General: Skin is warm and dry  Capillary Refill: Capillary refill takes less than 2 seconds  Neurological:      Mental Status: She is oriented to person, place, and time     Psychiatric:         Mood and Affect: Mood normal             Additional Data:     Labs:  Results from last 7 days   Lab Units 22  0458 22  0431 22  0514   WBC Thousand/uL 11 89*   < > 10 40*   HEMOGLOBIN g/dL 8 1*   < > 7 9*   HEMATOCRIT % 27 8*   < > 26 6*   PLATELETS Thousands/uL 370   < > 405*   BANDS PCT % 6   < >  --    NEUTROS PCT %  --   --  77*   LYMPHS PCT %  --   --  12*   LYMPHO PCT % 8*   < >  --    MONOS PCT %  --   --  7   MONO PCT % 4   < >  --    EOS PCT % 1   < > 2    < > = values in this interval not displayed  Results from last 7 days   Lab Units 07/04/22  0431   SODIUM mmol/L 135*   POTASSIUM mmol/L 3 9   CHLORIDE mmol/L 100   CO2 mmol/L 31   BUN mg/dL 32*   CREATININE mg/dL 0 31*   ANION GAP mmol/L 4   CALCIUM mg/dL 8 1*   ALBUMIN g/dL 1 9*   TOTAL BILIRUBIN mg/dL 0 19*   ALK PHOS U/L 79   ALT U/L 14   AST U/L 17   GLUCOSE RANDOM mg/dL 119                       Lines/Drains:  Invasive Devices  Report    Peripheral Intravenous Line  Duration           Long-Dwell Peripheral IV (Midline) 06/14/22 Right Brachial 22 days          Drain  Duration           Gastrostomy/Enterostomy -- days    Urethral Catheter -- days              Urinary Catheter:  Goal for removal: N/A - Chronic Tejada               Imaging: No pertinent imaging reviewed      Recent Cultures (last 7 days):         Last 24 Hours Medication List:   Current Facility-Administered Medications   Medication Dose Route Frequency Provider Last Rate    acetaminophen  650 mg Per G Tube Q6H Zack Hurtado MD      acetaminophen  650 mg Per G Tube Q4H PRN Zack Hurtado MD      aspirin  81 mg Per G Tube Daily Onesimo Hernandez MD      atovaquone  750 mg Per G Tube Daily With Breakfast Onesimo Hernandez MD      cholecalciferol  1,000 Units Per G Tube Daily Onesimo Hernandez MD      enoxaparin  40 mg Subcutaneous Q24H [facility] Onesimo Hernandez MD      fluticasone  1 spray Each Nare BID Leobardo Michel MD      folic acid  1 mg Per PEG Tube Daily Leobardo Michel MD      glycerin-hypromellose-  2 drop Both Eyes BID Onesimo Hernandez MD      levalbuterol  1 25 mg Nebulization TID Kennedy Jara DO      levothyroxine  25 mcg Per PEG Tube Early Morning Onesimo Hernandez MD      loratadine  10 mg Per G Tube Daily Shaquille Jose Guadalupe Bishop MD      Macitentan  10 mg Per G Tube Daily Madeleine Helton MD      melatonin  3 mg Oral HS PRN Candice Starr MD      nystatin   Topical BID Maile Decker MD      ondansetron  4 mg Intravenous Q6H PRN Candice Starr MD      oxyCODONE  2 5 mg Per G Tube Q4H PRN Candice Starr MD      oxyCODONE  5 mg Per G Tube Q4H PRN Candice Starr MD      pantoprazole  40 mg Intravenous Q24H [address] Celi Santacruz MD      predniSONE  30 mg Per G Tube Daily Celi Santacruz MD      saccharomyces boulardii  250 mg Per G Tube BID Celi Santacruz MD      sildenafil  10 mg Oral TID Genesis Shanks DO      sodium chloride  1 spray Each Nare Q1H PRN Madeleine Helton MD      sodium chloride  3 mL Nebulization TID Javy Song DO          Today, Patient Was Seen By: BETTIE Jackson    **Please Note: This note may have been constructed using a voice recognition system  **

## 2022-07-07 NOTE — ASSESSMENT & PLAN NOTE
· Multifactorial  · Encourage incentive spirometry, patient is doing better with this   · Remains on RA  · Pulmonary input noted   · Is stable from this standpoint but have concerns with her prognosis

## 2022-07-07 NOTE — PLAN OF CARE
Problem: MOBILITY - ADULT  Goal: Maintain or return to baseline ADL function  Description: INTERVENTIONS:  -  Assess patient's ability to carry out ADLs; assess patient's baseline for ADL function and identify physical deficits which impact ability to perform ADLs (bathing, care of mouth/teeth, toileting, grooming, dressing, etc )  - Assess/evaluate cause of self-care deficits   - Assess range of motion  - Assess patient's mobility; develop plan if impaired  - Assess patient's need for assistive devices and provide as appropriate  - Encourage maximum independence but intervene and supervise when necessary  - Involve family in performance of ADLs  - Assess for home care needs following discharge   - Consider OT consult to assist with ADL evaluation and planning for discharge  - Provide patient education as appropriate  Outcome: Adequate for Discharge  Goal: Maintains/Returns to pre admission functional level  Description: INTERVENTIONS:  - Perform BMAT or MOVE assessment daily    - Set and communicate daily mobility goal to care team and patient/family/caregiver  - Collaborate with rehabilitation services on mobility goals if consulted  - Perform Range of Motion 3 times a day  - Reposition patient every 2 hours    - Dangle patient 2 times a day  - Stand patient 1 times a day  - Ambulate patient 3 times a day  - Out of bed to chair 0 times a day   - Out of bed for meals 0 times a day  - Out of bed for toileting  - Record patient progress and toleration of activity level   Outcome: Adequate for Discharge     Problem: Potential for Falls  Goal: Patient will remain free of falls  Description: INTERVENTIONS:  - Educate patient/family on patient safety including physical limitations  - Instruct patient to call for assistance with activity   - Consult OT/PT to assist with strengthening/mobility   - Keep Call bell within reach  - Keep bed low and locked with side rails adjusted as appropriate  - Keep care items and personal belongings within reach  - Initiate and maintain comfort rounds  - Make Fall Risk Sign visible to staff  - Offer Toileting every 2 Hours, in advance of need  - Initiate/Maintain bed alarm  - Obtain necessary fall risk management equipment: alarms  - Apply yellow socks and bracelet for high fall risk patients  - Consider moving patient to room near nurses station  Outcome: Adequate for Discharge     Problem: Prexisting or High Potential for Compromised Skin Integrity  Goal: Skin integrity is maintained or improved  Description: INTERVENTIONS:  - Identify patients at risk for skin breakdown  - Assess and monitor skin integrity  - Assess and monitor nutrition and hydration status  - Monitor labs   - Assess for incontinence   - Turn and reposition patient  - Assist with mobility/ambulation  - Relieve pressure over bony prominences  - Avoid friction and shearing  - Provide appropriate hygiene as needed including keeping skin clean and dry  - Evaluate need for skin moisturizer/barrier cream  - Collaborate with interdisciplinary team   - Patient/family teaching  - Consider wound care consult   Outcome: Adequate for Discharge     Problem: PAIN - ADULT  Goal: Verbalizes/displays adequate comfort level or baseline comfort level  Description: Interventions:  - Encourage patient to monitor pain and request assistance  - Assess pain using appropriate pain scale  - Administer analgesics based on type and severity of pain and evaluate response  - Implement non-pharmacological measures as appropriate and evaluate response  - Consider cultural and social influences on pain and pain management  - Notify physician/advanced practitioner if interventions unsuccessful or patient reports new pain  Outcome: Adequate for Discharge     Problem: INFECTION - ADULT  Goal: Absence or prevention of progression during hospitalization  Description: INTERVENTIONS:  - Assess and monitor for signs and symptoms of infection  - Monitor lab/diagnostic results  - Monitor all insertion sites, i e  indwelling lines, tubes, and drains  - Monitor endotracheal if appropriate and nasal secretions for changes in amount and color  - Robinson appropriate cooling/warming therapies per order  - Administer medications as ordered  - Instruct and encourage patient and family to use good hand hygiene technique  - Identify and instruct in appropriate isolation precautions for identified infection/condition  Outcome: Adequate for Discharge  Goal: Absence of fever/infection during neutropenic period  Description: INTERVENTIONS:  - Monitor WBC    Outcome: Adequate for Discharge     Problem: SAFETY ADULT  Goal: Maintain or return to baseline ADL function  Description: INTERVENTIONS:  -  Assess patient's ability to carry out ADLs; assess patient's baseline for ADL function and identify physical deficits which impact ability to perform ADLs (bathing, care of mouth/teeth, toileting, grooming, dressing, etc )  - Assess/evaluate cause of self-care deficits   - Assess range of motion  - Assess patient's mobility; develop plan if impaired  - Assess patient's need for assistive devices and provide as appropriate  - Encourage maximum independence but intervene and supervise when necessary  - Involve family in performance of ADLs  - Assess for home care needs following discharge   - Consider OT consult to assist with ADL evaluation and planning for discharge  - Provide patient education as appropriate  Outcome: Adequate for Discharge    Problem: DISCHARGE PLANNING  Goal: Discharge to home or other facility with appropriate resources  Description: INTERVENTIONS:  - Identify barriers to discharge w/patient and caregiver  - Arrange for needed discharge resources and transportation as appropriate  - Identify discharge learning needs (meds, wound care, etc )  - Arrange for interpretive services to assist at discharge as needed  - Refer to Case Management Department for coordinating discharge planning if the patient needs post-hospital services based on physician/advanced practitioner order or complex needs related to functional status, cognitive ability, or social support system  Outcome: Adequate for Discharge     Problem: Knowledge Deficit  Goal: Patient/family/caregiver demonstrates understanding of disease process, treatment plan, medications, and discharge instructions  Description: Complete learning assessment and assess knowledge base  Interventions:  - Provide teaching at level of understanding  - Provide teaching via preferred learning methods  Outcome: Adequate for Discharge     Problem: Nutrition/Hydration-ADULT  Goal: Nutrient/Hydration intake appropriate for improving, restoring or maintaining nutritional needs  Description: Monitor and assess patient's nutrition/hydration status for malnutrition  Collaborate with interdisciplinary team and initiate plan and interventions as ordered  Monitor patient's weight and dietary intake as ordered or per policy  Utilize nutrition screening tool and intervene as necessary  Determine patient's food preferences and provide high-protein, high-caloric foods as appropriate       INTERVENTIONS:  - Monitor oral intake, urinary output, labs, and treatment plans  - Assess nutrition and hydration status and recommend course of action  - Evaluate amount of meals eaten  - Assist patient with eating if necessary   - Allow adequate time for meals  - Recommend/ encourage appropriate diets, oral nutritional supplements, and vitamin/mineral supplements  - Order, calculate, and assess calorie counts as needed  - Recommend, monitor, and adjust tube feedings and TPN/PPN based on assessed needs  - Assess need for intravenous fluids  - Provide specific nutrition/hydration education as appropriate  - Include patient/family/caregiver in decisions related to nutrition  Outcome: Adequate for Discharge

## 2022-07-08 PROCEDURE — C9113 INJ PANTOPRAZOLE SODIUM, VIA: HCPCS | Performed by: INTERNAL MEDICINE

## 2022-07-08 PROCEDURE — 99232 SBSQ HOSP IP/OBS MODERATE 35: CPT | Performed by: INTERNAL MEDICINE

## 2022-07-08 PROCEDURE — 94760 N-INVAS EAR/PLS OXIMETRY 1: CPT

## 2022-07-08 PROCEDURE — 94640 AIRWAY INHALATION TREATMENT: CPT

## 2022-07-08 RX ADMIN — PREDNISONE 30 MG: 20 TABLET ORAL at 09:08

## 2022-07-08 RX ADMIN — MACITENTAN 10 MG: 10 TABLET, FILM COATED ORAL at 09:11

## 2022-07-08 RX ADMIN — NYSTATIN: 100000 POWDER TOPICAL at 09:10

## 2022-07-08 RX ADMIN — OXYCODONE HYDROCHLORIDE 2.5 MG: 5 SOLUTION ORAL at 15:46

## 2022-07-08 RX ADMIN — OXYCODONE HYDROCHLORIDE 5 MG: 5 SOLUTION ORAL at 06:17

## 2022-07-08 RX ADMIN — LEVALBUTEROL HYDROCHLORIDE 1.25 MG: 1.25 SOLUTION, CONCENTRATE RESPIRATORY (INHALATION) at 19:25

## 2022-07-08 RX ADMIN — ACETAMINOPHEN 650 MG: 650 SUSPENSION ORAL at 17:16

## 2022-07-08 RX ADMIN — NYSTATIN: 100000 POWDER TOPICAL at 17:17

## 2022-07-08 RX ADMIN — LORATADINE 10 MG: 10 TABLET ORAL at 09:08

## 2022-07-08 RX ADMIN — ACETAMINOPHEN 650 MG: 650 SUSPENSION ORAL at 11:32

## 2022-07-08 RX ADMIN — PANTOPRAZOLE SODIUM 40 MG: 40 INJECTION, POWDER, FOR SOLUTION INTRAVENOUS at 09:09

## 2022-07-08 RX ADMIN — ASPIRIN 81 MG CHEWABLE TABLET 81 MG: 81 TABLET CHEWABLE at 09:08

## 2022-07-08 RX ADMIN — ACETAMINOPHEN 650 MG: 650 SUSPENSION ORAL at 05:12

## 2022-07-08 RX ADMIN — GLYCERIN, HYPROMELLOSE, POLYETHYLENE GLYCOL 2 DROP: .2; .2; 1 LIQUID OPHTHALMIC at 17:16

## 2022-07-08 RX ADMIN — LEVALBUTEROL HYDROCHLORIDE 1.25 MG: 1.25 SOLUTION, CONCENTRATE RESPIRATORY (INHALATION) at 08:21

## 2022-07-08 RX ADMIN — SILDENAFIL CITRATE 10 MG: 20 TABLET ORAL at 17:16

## 2022-07-08 RX ADMIN — ATOVAQUONE 750 MG: 750 SUSPENSION ORAL at 09:11

## 2022-07-08 RX ADMIN — LEVALBUTEROL HYDROCHLORIDE 1.25 MG: 1.25 SOLUTION, CONCENTRATE RESPIRATORY (INHALATION) at 14:04

## 2022-07-08 RX ADMIN — Medication 250 MG: at 17:17

## 2022-07-08 RX ADMIN — ACETAMINOPHEN 650 MG: 650 SUSPENSION ORAL at 21:33

## 2022-07-08 RX ADMIN — ISODIUM CHLORIDE 3 ML: 0.03 SOLUTION RESPIRATORY (INHALATION) at 14:04

## 2022-07-08 RX ADMIN — OXYCODONE HYDROCHLORIDE 5 MG: 5 SOLUTION ORAL at 21:32

## 2022-07-08 RX ADMIN — FLUTICASONE PROPIONATE 1 SPRAY: 50 SPRAY, METERED NASAL at 17:16

## 2022-07-08 RX ADMIN — ISODIUM CHLORIDE 3 ML: 0.03 SOLUTION RESPIRATORY (INHALATION) at 08:21

## 2022-07-08 RX ADMIN — MELATONIN 3 MG: at 00:16

## 2022-07-08 RX ADMIN — Medication 1000 UNITS: at 09:09

## 2022-07-08 RX ADMIN — LEVOTHYROXINE SODIUM 25 MCG: 25 TABLET ORAL at 05:12

## 2022-07-08 RX ADMIN — GLYCERIN, HYPROMELLOSE, POLYETHYLENE GLYCOL 2 DROP: .2; .2; 1 LIQUID OPHTHALMIC at 09:09

## 2022-07-08 RX ADMIN — SILDENAFIL CITRATE 10 MG: 20 TABLET ORAL at 09:08

## 2022-07-08 RX ADMIN — SILDENAFIL CITRATE 10 MG: 20 TABLET ORAL at 21:32

## 2022-07-08 RX ADMIN — ISODIUM CHLORIDE 3 ML: 0.03 SOLUTION RESPIRATORY (INHALATION) at 19:25

## 2022-07-08 RX ADMIN — FOLIC ACID 1 MG: 1 TABLET ORAL at 09:09

## 2022-07-08 RX ADMIN — ENOXAPARIN SODIUM 40 MG: 40 INJECTION SUBCUTANEOUS at 09:08

## 2022-07-08 RX ADMIN — Medication 250 MG: at 09:11

## 2022-07-08 RX ADMIN — FLUTICASONE PROPIONATE 1 SPRAY: 50 SPRAY, METERED NASAL at 09:09

## 2022-07-08 NOTE — CASE MANAGEMENT
Case Management Progress Note    Patient name Zainab Mccoy  [facility] [id number]  : 1940 Date 2022       LOS (days): 24  Geometric Mean LOS (GMLOS) (days): 9 60  Days to GMLOS:-14 1        OBJECTIVE:        Current admission status: Inpatient  Preferred Pharmacy:   PATIENT/FAMILY REPORTS NO PREFERRED PHARMACY  No address on file      Primary Care Provider: No primary care provider on file  Primary Insurance: [facility]  Secondary Insurance:     PROGRESS NOTE:      Left two messages with [facility] to check on status of bed availability  Spoke with daughter Lakshmi Gtz, she does not want to give the OK for patient to be sent to [facility] until she hears a definite answer from [facility]  Perryguillaume Leblanc also stated that she believes the surgeons want to see patient and that her mother's oxygen levels have decreased today, so she is not sure she is ready for discharge  Patient will need insurance authorization for rehab placement  CM will continue to follow      Anne Kussmaul LSW  2022  3:25 PM

## 2022-07-08 NOTE — PLAN OF CARE
Problem: MOBILITY - ADULT  Goal: Maintain or return to baseline ADL function  Description: INTERVENTIONS:  -  Assess patient's ability to carry out ADLs; assess patient's baseline for ADL function and identify physical deficits which impact ability to perform ADLs (bathing, care of mouth/teeth, toileting, grooming, dressing, etc )  - Assess/evaluate cause of self-care deficits   - Assess range of motion  - Assess patient's mobility; develop plan if impaired  - Assess patient's need for assistive devices and provide as appropriate  - Encourage maximum independence but intervene and supervise when necessary  - Involve family in performance of ADLs  - Assess for home care needs following discharge   - Consider OT consult to assist with ADL evaluation and planning for discharge  - Provide patient education as appropriate  Outcome: Adequate for Discharge       Problem: Prexisting or High Potential for Compromised Skin Integrity  Goal: Skin integrity is maintained or improved  Description: INTERVENTIONS:  - Identify patients at risk for skin breakdown  - Assess and monitor skin integrity  - Assess and monitor nutrition and hydration status  - Monitor labs   - Assess for incontinence   - Turn and reposition patient  - Assist with mobility/ambulation  - Relieve pressure over bony prominences  - Avoid friction and shearing  - Provide appropriate hygiene as needed including keeping skin clean and dry  - Evaluate need for skin moisturizer/barrier cream  - Collaborate with interdisciplinary team   - Patient/family teaching  - Consider wound care consult   Outcome: Adequate for Discharge     Problem: PAIN - ADULT  Goal: Verbalizes/displays adequate comfort level or baseline comfort level  Description: Interventions:  - Encourage patient to monitor pain and request assistance  - Assess pain using appropriate pain scale  - Administer analgesics based on type and severity of pain and evaluate response  - Implement non-pharmacological measures as appropriate and evaluate response  - Consider cultural and social influences on pain and pain management  - Notify physician/advanced practitioner if interventions unsuccessful or patient reports new pain  Outcome: Adequate for Discharge     Problem: INFECTION - ADULT  Goal: Absence or prevention of progression during hospitalization  Description: INTERVENTIONS:  - Assess and monitor for signs and symptoms of infection  - Monitor lab/diagnostic results  - Monitor all insertion sites, i e  indwelling lines, tubes, and drains  - Monitor endotracheal if appropriate and nasal secretions for changes in amount and color  - [place] appropriate cooling/warming therapies per order  - Administer medications as ordered  - Instruct and encourage patient and family to use good hand hygiene technique  - Identify and instruct in appropriate isolation precautions for identified infection/condition  Outcome: Adequate for Discharge  Goal: Absence of fever/infection during neutropenic period  Description: INTERVENTIONS:  - Monitor WBC    Outcome: Adequate for Discharge     Problem: SAFETY ADULT  Goal: Maintain or return to baseline ADL function  Description: INTERVENTIONS:  -  Assess patient's ability to carry out ADLs; assess patient's baseline for ADL function and identify physical deficits which impact ability to perform ADLs (bathing, care of mouth/teeth, toileting, grooming, dressing, etc )  - Assess/evaluate cause of self-care deficits   - Assess range of motion  - Assess patient's mobility; develop plan if impaired  - Assess patient's need for assistive devices and provide as appropriate  - Encourage maximum independence but intervene and supervise when necessary  - Involve family in performance of ADLs  - Assess for home care needs following discharge   - Consider OT consult to assist with ADL evaluation and planning for discharge  - Provide patient education as appropriate  Outcome: Adequate for Discharge    Goal: Patient will remain free of falls  Description: INTERVENTIONS:  - Educate patient/family on patient safety including physical limitations  - Instruct patient to call for assistance with activity   - Consult OT/PT to assist with strengthening/mobility   - Keep Call bell within reach  - Keep bed low and locked with side rails adjusted as appropriate  - Keep care items and personal belongings within reach  - Initiate and maintain comfort rounds  - Make Fall Risk Sign visible to staff  - Initiate/Maintain bed alarm  - Obtain necessary fall risk management equipment: alarms  - Apply yellow socks and bracelet for high fall risk patients  - Consider moving patient to room near nurses station  Outcome: Adequate for Discharge     Problem: DISCHARGE PLANNING  Goal: Discharge to home or other facility with appropriate resources  Description: INTERVENTIONS:  - Identify barriers to discharge w/patient and caregiver  - Arrange for needed discharge resources and transportation as appropriate  - Identify discharge learning needs (meds, wound care, etc )  - Arrange for interpretive services to assist at discharge as needed  - Refer to Case Management Department for coordinating discharge planning if the patient needs post-hospital services based on physician/advanced practitioner order or complex needs related to functional status, cognitive ability, or social support system  Outcome: Adequate for Discharge     Problem: Knowledge Deficit  Goal: Patient/family/caregiver demonstrates understanding of disease process, treatment plan, medications, and discharge instructions  Description: Complete learning assessment and assess knowledge base    Interventions:  - Provide teaching at level of understanding  - Provide teaching via preferred learning methods  Outcome: Adequate for Discharge     Problem: Nutrition/Hydration-ADULT  Goal: Nutrient/Hydration intake appropriate for improving, restoring or maintaining nutritional needs  Description: Monitor and assess patient's nutrition/hydration status for malnutrition  Collaborate with interdisciplinary team and initiate plan and interventions as ordered  Monitor patient's weight and dietary intake as ordered or per policy  Utilize nutrition screening tool and intervene as necessary  Determine patient's food preferences and provide high-protein, high-caloric foods as appropriate       INTERVENTIONS:  - Monitor oral intake, urinary output, labs, and treatment plans  - Assess nutrition and hydration status and recommend course of action  - Evaluate amount of meals eaten  - Assist patient with eating if necessary   - Allow adequate time for meals  - Recommend/ encourage appropriate diets, oral nutritional supplements, and vitamin/mineral supplements  - Order, calculate, and assess calorie counts as needed  - Recommend, monitor, and adjust tube feedings and TPN/PPN based on assessed needs  - Assess need for intravenous fluids  - Provide specific nutrition/hydration education as appropriate  - Include patient/family/caregiver in decisions related to nutrition  Outcome: Adequate for Discharge

## 2022-07-08 NOTE — PROGRESS NOTES
[facility]  Progress Note Mary Shore 1940, 80 y o  female MRN: [id number]  [facility]: [id number]  Primary Care Provider: No primary care provider on file  Date and time admitted to hospital: 6/14/2022  8:07 PM    * Sacral wound  Assessment & Plan  · Stage IV sacral ulcer POA  · Status post I&D with General surgery  · Wound care per General surgery, had a VAC which is now off and patient is getting wet-to-dry dressings  · Received IV antibiotics, presently being monitored off of antibiotics, Infectious Disease input noted  · Frequent repositioning  · Optimize nutritional status  · General surgery following  · Working on placement with family, anticipate discharge over the weekend to facilitate    Discussed the case management    Proximal muscle weakness  Assessment & Plan  · Extensive upper and lower extremity proximal muscle weakness with marked improvement today  · Received IVIG with Rheumatology  · Supportive care    Pulmonary hypertension (HCC)  Assessment & Plan  · Crest syndrome with pulmonary hypertension  · Continue supplemental oxygen as needed  · Sildenafil  · Would be appropriate for palliative care  · Family meeting on June 23--> continue medical directed care but DNR level established   · Will need ongoing conversations about realistic expectations and prognosis     Hearing loss  Assessment & Plan  · History of progressive hearing loss  · Outpatient ENT follow-up    Pneumonia  Assessment & Plan    · Completed antibiotics  · Supportive care  · Aspiration precautions    Acute respiratory failure with hypoxia ([address] )  Assessment & Plan  · Multifactorial  · Encourage incentive spirometry, patient is doing better with this   · Remains on RA  · Pulmonary input noted   · Is stable from this standpoint but have concerns with her prognosis       Urinary retention  Assessment & Plan  · Urinary retention with Tejada catheter in place  · Outpatient Urology follow-up    Anemia  Assessment & Plan  · Multifactorial  · Iron studies noted  · Iron supplementation  · Folic acid supplementation  · Monitor hemoglobin      Acquired hypothyroidism  Assessment & Plan  · Continue levothyroxine    Scleroderma (HCC)  Assessment & Plan  · Scleroderma with crest syndrome  · Continue prednisone  · Rheumatology advised IVIG which patient completed with marked improvements to her ROM  · Ongoing goals of care discussions regarding prognosis should occur     Dysphagia, oropharyngeal phase  Assessment & Plan  · Patient with history of dysphagia  · PEG tube in place  · Continue current tube feeds  · Aspiration precautions    Severe protein-calorie malnutrition ([address] )  Assessment & Plan  Malnutrition Findings:   Adult Malnutrition type: Chronic illness  Adult Degree of Malnutrition: Other severe protein calorie malnutrition  Malnutrition Characteristics: Muscle loss, Fat loss, Weight loss, Inadequate energy           360 Statement: related to disease/condition evidenced by increased kcal/pro needs for wound healing Stage IV sacral decub, BMI 18 4 adjusted per prior facility wt 6/7/22;severe buccal fat pads loss, severe deltoid muscle loss, Patient lost 31# (20 6%) since 3/15/22 past 3 months with illness <75% energy intake versus needs for > 1 month  Treating with EN support and Robert TID via PEG for wound healing    BMI Findings:  Adult BMI Classifications: Underweight < 18 5        Body mass index is 23 49 kg/m²  · Continue tube feeding as ordered           VTE Pharmacologic Prophylaxis: VTE Score: 7 High Risk (Score >/= 5) - Pharmacological DVT Prophylaxis Ordered: enoxaparin (Lovenox)  Sequential Compression Devices Ordered  Patient Centered Rounds: I performed bedside rounds with nursing staff today    Discussions with Specialists or Other Care Team Provider:  Nursing, case management    Education and Discussions with Family / Patient: Updated  (sister) at bedside  Time Spent for Care: 30 minutes  More than 50% of total time spent on counseling and coordination of care as described above  Current Length of Stay: 24 day(s)  Current Patient Status: Inpatient   Certification Statement: The patient will continue to require additional inpatient hospital stay due to Sacral wound  Discharge Plan: Anticipate discharge in 24-48 hrs to rehab facility  Code Status: Level 3 - DNAR and DNI    Subjective:   Patient was seen evaluated bedside  Awaiting wound dressing change  Objective:     Vitals:   Temp (24hrs), Av 2 °F (36 8 °C), Min:97 7 °F (36 5 °C), Max:98 6 °F (37 °C)    Temp:  [97 7 °F (36 5 °C)-98 6 °F (37 °C)] 98 °F (36 7 °C)  HR:  [64-86] 71  Resp:  [16-25] 18  BP: ()/(45-51) 96/46  SpO2:  [91 %-98 %] 96 %  Body mass index is 23 49 kg/m²  Input and Output Summary (last 24 hours): Intake/Output Summary (Last 24 hours) at 2022 1543  Last data filed at 2022 0404  Gross per 24 hour   Intake 1151 ml   Output 2350 ml   Net -1199 ml       Physical Exam:   Physical Exam  Constitutional:       General: She is not in acute distress  Comments: Appears chronically sick   HENT:      Head: Atraumatic  Cardiovascular:      Rate and Rhythm: Normal rate and regular rhythm  Heart sounds: No murmur heard  No friction rub  No gallop  Pulmonary:      Effort: Pulmonary effort is normal  No respiratory distress  Breath sounds: Normal breath sounds  No wheezing  Abdominal:      General: Bowel sounds are normal  There is no distension  Palpations: Abdomen is soft  Tenderness: There is no abdominal tenderness  Comments: Peg tube   Musculoskeletal:         General: No swelling  Skin:     General: Skin is warm and dry  Neurological:      General: No focal deficit present  Mental Status: She is alert     Psychiatric:         Mood and Affect: Mood normal           Additional Data:     Labs:  Results from last 7 days   Lab Units 07/07/22  0458   WBC Thousand/uL 11 89*   HEMOGLOBIN g/dL 8 1*   HEMATOCRIT % 27 8*   PLATELETS Thousands/uL 370   BANDS PCT % 6   LYMPHO PCT % 8*   MONO PCT % 4   EOS PCT % 1     Results from last 7 days   Lab Units 07/04/22  0431   SODIUM mmol/L 135*   POTASSIUM mmol/L 3 9   CHLORIDE mmol/L 100   CO2 mmol/L 31   BUN mg/dL 32*   CREATININE mg/dL 0 31*   ANION GAP mmol/L 4   CALCIUM mg/dL 8 1*   ALBUMIN g/dL 1 9*   TOTAL BILIRUBIN mg/dL 0 19*   ALK PHOS U/L 79   ALT U/L 14   AST U/L 17   GLUCOSE RANDOM mg/dL 119                       Lines/Drains:  Invasive Devices  Report    Peripheral Intravenous Line  Duration           Long-Dwell Peripheral IV (Midline) 06/14/22 Right Brachial 23 days          Drain  Duration           Gastrostomy/Enterostomy -- days    Urethral Catheter -- days              Urinary Catheter:  Goal for removal: N/A- Discharging with Tejada               Imaging:   XR chest portable   Final Result by Saravanan Bear MD (06/30 3001)      Bilateral parenchymal opacities have progressed  Workstation performed: [id number]         XR chest portable ICU   Final Result by Sylvain Larios MD (06/23 1617)      Partial clearing of bilateral pulmonary opacities, either multifocal pneumonia or asymmetric pulmonary edema, since 6/19/2022  Workstation performed: [id number]         XR chest portable   Final Result by Jose Rafael Gonzalez MD (06/19 1424)      Persistent, slightly worsened, bilateral pulmonary opacities in keeping with multilobar pneumonia  Workstation performed: [id number]         VAS ZA & waveform analysis, multiple levels   Final Result by Bud Romo DO (06/19 2335)      XR chest portable   Final Result by Kimberley Butler MD (06/16 2712)      Stable vascular congestion                    Workstation performed: [id number]             Recent Cultures (last 7 days):         Last 24 Hours Medication List:   Current Facility-Administered Medications   Medication Dose Route Frequency Provider Last Rate    acetaminophen  650 mg Per G Tube Q6H Alexey Bender MD      acetaminophen  650 mg Per G Tube Q4H PRN Alexey Bender MD      aspirin  81 mg Per G Tube Daily Deanna Vyas MD      atovaquone  750 mg Per G Tube Daily With Breakfast Deanna Vyas MD      cholecalciferol  1,000 Units Per G Tube Daily Deanna Vyas MD      enoxaparin  40 mg Subcutaneous Q24H [facility] Deanna Vyas MD      fluticasone  1 spray Each Nare BID Amita Ordaz MD      folic acid  1 mg Per PEG Tube Daily Amita Ordaz MD      glycerin-hypromellose-  2 drop Both Eyes BID Deanna Vyas MD      levalbuterol  1 25 mg Nebulization TID Makeda Hopper DO      levothyroxine  25 mcg Per PEG Tube Early Morning Deanna Vyas MD      loratadine  10 mg Per G Tube Daily Deanna Vyas MD      Macitentan  10 mg Per G Tube Daily Marika Wei MD      melatonin  3 mg Oral HS PRN Alexey Bender MD      nystatin   Topical BID Amita Ordaz MD      ondansetron  4 mg Intravenous Q6H PRN Alexey Bender MD      oxyCODONE  2 5 mg Per G Tube Q4H PRN Alexey Bender MD      oxyCODONE  5 mg Per G Tube Q4H PRN Alexey Bender MD      pantoprazole  40 mg Intravenous Q24H [facility] Deanna Vyas MD      predniSONE  30 mg Per G Tube Daily Deanna Vyas MD      saccharomyces boulardii  250 mg Per G Tube BID Deanna Vyas MD      sildenafil  10 mg Oral TID Catarina Keen DO      sodium chloride  1 spray Each Nare Q1H PRN Marika Wei MD      sodium chloride  3 mL Nebulization TID Makeda Hopper DO          Today, Patient Was Seen By: Kurt Rodriguez MD    **Please Note: This note may have been constructed using a voice recognition system  **

## 2022-07-08 NOTE — PLAN OF CARE
Problem: Potential for Falls  Goal: Patient will remain free of falls  Description: INTERVENTIONS:  - Educate patient/family on patient safety including physical limitations  - Instruct patient to call for assistance with activity   - Consult OT/PT to assist with strengthening/mobility   - Keep Call bell within reach  - Keep bed low and locked with side rails adjusted as appropriate  - Keep care items and personal belongings within reach  - Initiate and maintain comfort rounds  - Make Fall Risk Sign visible to staff  - Initiate/Maintain bed alarm  - Obtain necessary fall risk management equipment: alarms  Problem: PAIN - ADULT  Goal: Verbalizes/displays adequate comfort level or baseline comfort level  Description: Interventions:  - Encourage patient to monitor pain and request assistance  - Assess pain using appropriate pain scale  - Administer analgesics based on type and severity of pain and evaluate response  - Implement non-pharmacological measures as appropriate and evaluate response  - Consider cultural and social influences on pain and pain management  - Notify physician/advanced practitioner if interventions unsuccessful or patient reports new pain  Outcome: Adequate for Discharge     Problem: INFECTION - ADULT  Goal: Absence or prevention of progression during hospitalization  Description: INTERVENTIONS:  - Assess and monitor for signs and symptoms of infection  - Monitor lab/diagnostic results  - Monitor all insertion sites, i e  indwelling lines, tubes, and drains  - Monitor endotracheal if appropriate and nasal secretions for changes in amount and color  - [place] appropriate cooling/warming therapies per order  - Administer medications as ordered  - Instruct and encourage patient and family to use good hand hygiene technique  - Identify and instruct in appropriate isolation precautions for identified infection/condition  Outcome: Adequate for Discharge     Problem: DISCHARGE PLANNING  Goal: Discharge to home or other facility with appropriate resources  Description: INTERVENTIONS:  - Identify barriers to discharge w/patient and caregiver  - Arrange for needed discharge resources and transportation as appropriate  - Identify discharge learning needs (meds, wound care, etc )  - Arrange for interpretive services to assist at discharge as needed  - Refer to Case Management Department for coordinating discharge planning if the patient needs post-hospital services based on physician/advanced practitioner order or complex needs related to functional status, cognitive ability, or social support system  Outcome: Adequate for Discharge     Problem: Knowledge Deficit  Goal: Patient/family/caregiver demonstrates understanding of disease process, treatment plan, medications, and discharge instructions  Description: Complete learning assessment and assess knowledge base    Interventions:  - Provide teaching at level of understanding  - Provide teaching via preferred learning methods  Outcome: Adequate for Discharge     - Apply yellow socks and bracelet for high fall risk patients  - Consider moving patient to room near nurses station  Outcome: Adequate for Discharge

## 2022-07-08 NOTE — ASSESSMENT & PLAN NOTE
· Multifactorial  · Iron studies noted  · Iron supplementation  · Folic acid supplementation  · Monitor hemoglobin

## 2022-07-08 NOTE — ASSESSMENT & PLAN NOTE
· Stage IV sacral ulcer POA  · Status post I&D with General surgery  · Wound care per General surgery, had a VAC which is now off and patient is getting wet-to-dry dressings  · Received IV antibiotics, presently being monitored off of antibiotics, Infectious Disease input noted  · Frequent repositioning  · Optimize nutritional status  · General surgery following  · Working on placement with family, anticipate discharge over the weekend to facilitate    Discussed the case management

## 2022-07-09 PROCEDURE — C9113 INJ PANTOPRAZOLE SODIUM, VIA: HCPCS | Performed by: INTERNAL MEDICINE

## 2022-07-09 PROCEDURE — 99232 SBSQ HOSP IP/OBS MODERATE 35: CPT | Performed by: INTERNAL MEDICINE

## 2022-07-09 PROCEDURE — 94760 N-INVAS EAR/PLS OXIMETRY 1: CPT

## 2022-07-09 PROCEDURE — 94640 AIRWAY INHALATION TREATMENT: CPT

## 2022-07-09 RX ADMIN — ACETAMINOPHEN 650 MG: 650 SUSPENSION ORAL at 16:25

## 2022-07-09 RX ADMIN — LEVALBUTEROL HYDROCHLORIDE 1.25 MG: 1.25 SOLUTION, CONCENTRATE RESPIRATORY (INHALATION) at 19:10

## 2022-07-09 RX ADMIN — OXYCODONE HYDROCHLORIDE 5 MG: 5 SOLUTION ORAL at 15:05

## 2022-07-09 RX ADMIN — ACETAMINOPHEN 650 MG: 650 SUSPENSION ORAL at 06:10

## 2022-07-09 RX ADMIN — ENOXAPARIN SODIUM 40 MG: 40 INJECTION SUBCUTANEOUS at 09:52

## 2022-07-09 RX ADMIN — ATOVAQUONE 750 MG: 750 SUSPENSION ORAL at 09:53

## 2022-07-09 RX ADMIN — MACITENTAN 10 MG: 10 TABLET, FILM COATED ORAL at 09:53

## 2022-07-09 RX ADMIN — MELATONIN 3 MG: at 00:01

## 2022-07-09 RX ADMIN — LEVOTHYROXINE SODIUM 25 MCG: 25 TABLET ORAL at 06:10

## 2022-07-09 RX ADMIN — Medication 250 MG: at 09:53

## 2022-07-09 RX ADMIN — ACETAMINOPHEN 650 MG: 650 SUSPENSION ORAL at 21:18

## 2022-07-09 RX ADMIN — ISODIUM CHLORIDE 3 ML: 0.03 SOLUTION RESPIRATORY (INHALATION) at 19:10

## 2022-07-09 RX ADMIN — NYSTATIN: 100000 POWDER TOPICAL at 09:58

## 2022-07-09 RX ADMIN — Medication 1000 UNITS: at 09:51

## 2022-07-09 RX ADMIN — FLUTICASONE PROPIONATE 1 SPRAY: 50 SPRAY, METERED NASAL at 09:52

## 2022-07-09 RX ADMIN — SILDENAFIL CITRATE 10 MG: 20 TABLET ORAL at 09:50

## 2022-07-09 RX ADMIN — FLUTICASONE PROPIONATE 1 SPRAY: 50 SPRAY, METERED NASAL at 16:27

## 2022-07-09 RX ADMIN — Medication 250 MG: at 16:27

## 2022-07-09 RX ADMIN — ACETAMINOPHEN 650 MG: 650 SUSPENSION ORAL at 09:51

## 2022-07-09 RX ADMIN — ASPIRIN 81 MG CHEWABLE TABLET 81 MG: 81 TABLET CHEWABLE at 09:51

## 2022-07-09 RX ADMIN — MELATONIN 3 MG: at 23:52

## 2022-07-09 RX ADMIN — NYSTATIN: 100000 POWDER TOPICAL at 16:27

## 2022-07-09 RX ADMIN — OXYCODONE HYDROCHLORIDE 5 MG: 5 SOLUTION ORAL at 21:19

## 2022-07-09 RX ADMIN — SILDENAFIL CITRATE 10 MG: 20 TABLET ORAL at 16:25

## 2022-07-09 RX ADMIN — PANTOPRAZOLE SODIUM 40 MG: 40 INJECTION, POWDER, FOR SOLUTION INTRAVENOUS at 09:51

## 2022-07-09 RX ADMIN — LORATADINE 10 MG: 10 TABLET ORAL at 09:51

## 2022-07-09 RX ADMIN — GLYCERIN, HYPROMELLOSE, POLYETHYLENE GLYCOL 2 DROP: .2; .2; 1 LIQUID OPHTHALMIC at 09:52

## 2022-07-09 RX ADMIN — PREDNISONE 30 MG: 20 TABLET ORAL at 09:51

## 2022-07-09 RX ADMIN — GLYCERIN, HYPROMELLOSE, POLYETHYLENE GLYCOL 2 DROP: .2; .2; 1 LIQUID OPHTHALMIC at 16:27

## 2022-07-09 RX ADMIN — ISODIUM CHLORIDE 3 ML: 0.03 SOLUTION RESPIRATORY (INHALATION) at 13:23

## 2022-07-09 RX ADMIN — FOLIC ACID 1 MG: 1 TABLET ORAL at 09:51

## 2022-07-09 RX ADMIN — LEVALBUTEROL HYDROCHLORIDE 1.25 MG: 1.25 SOLUTION, CONCENTRATE RESPIRATORY (INHALATION) at 13:23

## 2022-07-09 RX ADMIN — LEVALBUTEROL HYDROCHLORIDE 1.25 MG: 1.25 SOLUTION, CONCENTRATE RESPIRATORY (INHALATION) at 07:33

## 2022-07-09 RX ADMIN — ISODIUM CHLORIDE 3 ML: 0.03 SOLUTION RESPIRATORY (INHALATION) at 07:33

## 2022-07-09 RX ADMIN — SILDENAFIL CITRATE 10 MG: 20 TABLET ORAL at 21:18

## 2022-07-09 NOTE — ASSESSMENT & PLAN NOTE
· Extensive upper and lower extremity proximal muscle weakness with marked improvement  · Received IVIG with Rheumatology  · Supportive care

## 2022-07-09 NOTE — ASSESSMENT & PLAN NOTE
· Stage IV sacral ulcer POA  · Status post I&D with General surgery  · Wound care per General surgery, had a VAC which is now off and patient is getting wet-to-dry dressings  · Received IV antibiotics, presently being monitored off of antibiotics, Infectious Disease input noted  · Frequent repositioning  · Optimize nutritional status  · General surgery following  · Working on placement with family, anticipate discharge over the weekend to facility

## 2022-07-09 NOTE — ASSESSMENT & PLAN NOTE
· Urinary retention with Tejada catheter in place prior to admission  · Outpatient Urology follow-up

## 2022-07-09 NOTE — ASSESSMENT & PLAN NOTE
· Crest syndrome with pulmonary hypertension  · Continue supplemental oxygen as needed  · Sildenafil  · Would be appropriate for palliative care  · Family meeting on June 23--> continue medical directed care but DNR level established   · Will need ongoing conversations about realistic expectations and prognosis

## 2022-07-09 NOTE — PROGRESS NOTES
[facility]  Progress Note Vernona Severin Stryker 1940, 80 y o  female MRN: [id number]  Unit/Bed#: [facility] 204-01 Encounter: [id number]  Primary Care Provider: No primary care provider on file  Date and time admitted to hospital: 6/14/2022  8:07 PM    * Sacral wound  Assessment & Plan  · Stage IV sacral ulcer POA  · Status post I&D with General surgery  · Wound care per General surgery, had a VAC which is now off and patient is getting wet-to-dry dressings  · Received IV antibiotics, presently being monitored off of antibiotics, Infectious Disease input noted  · Frequent repositioning  · Optimize nutritional status  · General surgery following  · Working on placement with family, anticipate discharge over the weekend to facility        Proximal muscle weakness  Assessment & Plan  · Extensive upper and lower extremity proximal muscle weakness with marked improvement  · Received IVIG with Rheumatology  · Supportive care    Pulmonary hypertension (HCC)  Assessment & Plan  · Crest syndrome with pulmonary hypertension  · Continue supplemental oxygen as needed  · Sildenafil  · Would be appropriate for palliative care  · Family meeting on June 23--> continue medical directed care but DNR level established   · Will need ongoing conversations about realistic expectations and prognosis     Hearing loss  Assessment & Plan  · History of progressive hearing loss  · Outpatient ENT follow-up    Pneumonia  Assessment & Plan    · Completed antibiotics  · Supportive care  · Aspiration precautions    Acute respiratory failure with hypoxia ([address] )  Assessment & Plan  · Multifactorial  · Encourage incentive spirometry, patient is doing better with this   · Remains on RA  · Pulmonary input noted   · Is stable from this standpoint but have concerns with her prognosis       Urinary retention  Assessment & Plan  · Urinary retention with Tejada catheter in place prior to admission  · Outpatient Urology follow-up    Anemia  Assessment & Plan  · Multifactorial  · Iron studies noted  · Iron supplementation  · Folic acid supplementation  · Monitor hemoglobin      Acquired hypothyroidism  Assessment & Plan  · Continue levothyroxine    Scleroderma (HCC)  Assessment & Plan  · Scleroderma with crest syndrome  · Continue prednisone  · Rheumatology advised IVIG which patient completed with marked improvements to her ROM  · Ongoing goals of care discussions regarding prognosis should occur     Dysphagia, oropharyngeal phase  Assessment & Plan  · Patient with history of dysphagia  · PEG tube in place  · Continue current tube feeds  · Aspiration precautions    Severe protein-calorie malnutrition ([address] )  Assessment & Plan  Malnutrition Findings:   Adult Malnutrition type: Chronic illness  Adult Degree of Malnutrition: Other severe protein calorie malnutrition  Malnutrition Characteristics: Muscle loss, Fat loss, Weight loss, Inadequate energy           360 Statement: related to disease/condition evidenced by increased kcal/pro needs for wound healing Stage IV sacral decub, BMI 18 4 adjusted per prior facility wt 6/7/22;severe buccal fat pads loss, severe deltoid muscle loss, Patient lost 31# (20 6%) since 3/15/22 past 3 months with illness <75% energy intake versus needs for > 1 month  Treating with EN support and Robert TID via PEG for wound healing    BMI Findings:  Adult BMI Classifications: Underweight < 18 5        Body mass index is 23 49 kg/m²  · Continue tube feeding as ordered         VTE Pharmacologic Prophylaxis: VTE Score: 7 High Risk (Score >/= 5) - Pharmacological DVT Prophylaxis Ordered: enoxaparin (Lovenox)  Sequential Compression Devices Ordered  Patient Centered Rounds: I performed bedside rounds with nursing staff today  Discussions with Specialists or Other Care Team Provider:  Nursing    Education and Discussions with Family / Patient: daughter over the phone    Time Spent for Care: 30 minutes   More than 50% of total time spent on counseling and coordination of care as described above  Current Length of Stay: 25 day(s)  Current Patient Status: Inpatient   Certification Statement: The patient will continue to require additional inpatient hospital stay due to Sacral wound  Discharge Plan: Anticipate discharge in 24-48 hrs to rehab facility  Code Status: Level 3 - DNAR and DNI    Subjective:   Patient was seen and evaluated bedside, no overnight events per nursing staff  Patient has no complaints  Objective:     Vitals:   Temp (24hrs), Av 2 °F (36 8 °C), Min:97 6 °F (36 4 °C), Max:98 6 °F (37 °C)    Temp:  [97 6 °F (36 4 °C)-98 6 °F (37 °C)] 98 4 °F (36 9 °C)  HR:  [62-72] 63  Resp:  [16-20] 20  BP: ()/(50-59) 112/59  SpO2:  [94 %-98 %] 97 %  Body mass index is 23 49 kg/m²  Input and Output Summary (last 24 hours): Intake/Output Summary (Last 24 hours) at 2022 3266  Last data filed at 2022 0670  Gross per 24 hour   Intake 1772 ml   Output 2600 ml   Net -828 ml       Physical Exam:   Physical Exam  Constitutional:       General: She is not in acute distress  Comments: Seems chronically sick   Cardiovascular:      Rate and Rhythm: Normal rate and regular rhythm  Heart sounds: No murmur heard  No friction rub  No gallop  Pulmonary:      Effort: Pulmonary effort is normal  No respiratory distress  Breath sounds: Normal breath sounds  No wheezing  Abdominal:      General: Bowel sounds are normal  There is no distension  Palpations: Abdomen is soft  Tenderness: There is no abdominal tenderness  Musculoskeletal:         General: No swelling  Cervical back: Neck supple  Skin:     Comments: Sacral wound   Neurological:      Mental Status: She is alert and oriented to person, place, and time  Mental status is at baseline     Psychiatric:         Mood and Affect: Mood normal           Additional Data:     Labs:  Results from last 7 days   Lab Units 07/07/22  0458   WBC Thousand/uL 11 89*   HEMOGLOBIN g/dL 8 1*   HEMATOCRIT % 27 8*   PLATELETS Thousands/uL 370   BANDS PCT % 6   LYMPHO PCT % 8*   MONO PCT % 4   EOS PCT % 1     Results from last 7 days   Lab Units 07/04/22  0431   SODIUM mmol/L 135*   POTASSIUM mmol/L 3 9   CHLORIDE mmol/L 100   CO2 mmol/L 31   BUN mg/dL 32*   CREATININE mg/dL 0 31*   ANION GAP mmol/L 4   CALCIUM mg/dL 8 1*   ALBUMIN g/dL 1 9*   TOTAL BILIRUBIN mg/dL 0 19*   ALK PHOS U/L 79   ALT U/L 14   AST U/L 17   GLUCOSE RANDOM mg/dL 119                       Lines/Drains:  Invasive Devices  Report    Peripheral Intravenous Line  Duration           Long-Dwell Peripheral IV (Midline) 06/14/22 Right Brachial 24 days          Drain  Duration           Gastrostomy/Enterostomy -- days    Urethral Catheter -- days              Urinary Catheter:  Goal for removal: N/A- Discharging with Tejada               Imaging:   XR chest portable   Final Result by Kirill Larios MD (06/30 5837)      Bilateral parenchymal opacities have progressed  Workstation performed: [id number]         XR chest portable ICU   Final Result by Danielito Scales MD (06/23 1617)      Partial clearing of bilateral pulmonary opacities, either multifocal pneumonia or asymmetric pulmonary edema, since 6/19/2022  Workstation performed: [id number]         XR chest portable   Final Result by Josef Mariee MD (06/19 3454)      Persistent, slightly worsened, bilateral pulmonary opacities in keeping with multilobar pneumonia  Workstation performed: [id number]         VAS ZA & waveform analysis, multiple levels   Final Result by Shen Pastrana DO (06/19 2147)      XR chest portable   Final Result by Nancy Borja MD (06/16 8251)      Stable vascular congestion                    Workstation performed: [id number]             Recent Cultures (last 7 days):         Last 24 Hours Medication List:   Current Facility-Administered Medications   Medication Dose Route Frequency Provider Last Rate    acetaminophen  650 mg Per G Tube Q6H Olinda Ansari MD      acetaminophen  650 mg Per G Tube Q4H PRN Olinda Ansari MD      aspirin  81 mg Per G Tube Daily Kalyani Giang MD      atovaquone  750 mg Per G Tube Daily With Breakfast Kalyani Giang MD      cholecalciferol  1,000 Units Per G Tube Daily Kalyani Giang MD      enoxaparin  40 mg Subcutaneous Q24H [address] Kalyani Giang MD      fluticasone  1 spray Each Nare BID Renuka Abdi MD      folic acid  1 mg Per PEG Tube Daily Renuka Abdi MD      glycerin-hypromellose-  2 drop Both Eyes BID Kalyani Giang MD      levalbuterol  1 25 mg Nebulization TID Matilde Herbert, DO      levothyroxine  25 mcg Per PEG Tube Early Morning Kalyani Giang MD      loratadine  10 mg Per G Tube Daily Kalyani Giang MD      Macitentan  10 mg Per G Tube Daily Ja Junior MD      melatonin  3 mg Oral HS PRN Olinda Ansari MD      nystatin   Topical BID Renuka Abdi MD      ondansetron  4 mg Intravenous Q6H PRN Olinda Ansari MD      oxyCODONE  2 5 mg Per G Tube Q4H PRN Olinda Ansari MD      oxyCODONE  5 mg Per G Tube Q4H PRN Olinda Ansari MD      pantoprazole  40 mg Intravenous Q24H [address] Kalyani Giang MD      predniSONE  30 mg Per G Tube Daily Kalyani Giang MD      saccharomyces boulardii  250 mg Per G Tube BID Kalyani Giang MD      sildenafil  10 mg Oral TID Erma Tran DO      sodium chloride  1 spray Each Nare Q1H PRN Ja Junior MD      sodium chloride  3 mL Nebulization TID Matilde Herbert, DO          Today, Patient Was Seen By: Jessica Junior MD    **Please Note: This note may have been constructed using a voice recognition system  **

## 2022-07-10 LAB
ANION GAP SERPL CALCULATED.3IONS-SCNC: 3 MMOL/L (ref 4–13)
BASOPHILS # BLD AUTO: 0.02 THOUSANDS/ΜL (ref 0–0.1)
BASOPHILS NFR BLD AUTO: 0 % (ref 0–1)
BUN SERPL-MCNC: 26 MG/DL (ref 5–25)
CALCIUM SERPL-MCNC: 7.7 MG/DL (ref 8.3–10.1)
CHLORIDE SERPL-SCNC: 104 MMOL/L (ref 100–108)
CO2 SERPL-SCNC: 30 MMOL/L (ref 21–32)
CREAT SERPL-MCNC: 0.2 MG/DL (ref 0.6–1.3)
EOSINOPHIL # BLD AUTO: 0.09 THOUSAND/ΜL (ref 0–0.61)
EOSINOPHIL NFR BLD AUTO: 1 % (ref 0–6)
ERYTHROCYTE [DISTWIDTH] IN BLOOD BY AUTOMATED COUNT: 22.6 % (ref 11.6–15.1)
GFR SERPL CREATININE-BSD FRML MDRD: 124 ML/MIN/1.73SQ M
GLUCOSE SERPL-MCNC: 78 MG/DL (ref 65–140)
HCT VFR BLD AUTO: 27.1 % (ref 34.8–46.1)
HGB BLD-MCNC: 8.2 G/DL (ref 11.5–15.4)
IMM GRANULOCYTES # BLD AUTO: 0.28 THOUSAND/UL (ref 0–0.2)
IMM GRANULOCYTES NFR BLD AUTO: 2 % (ref 0–2)
LYMPHOCYTES # BLD AUTO: 1.34 THOUSANDS/ΜL (ref 0.6–4.47)
LYMPHOCYTES NFR BLD AUTO: 11 % (ref 14–44)
MCH RBC QN AUTO: 30.9 PG (ref 26.8–34.3)
MCHC RBC AUTO-ENTMCNC: 30.3 G/DL (ref 31.4–37.4)
MCV RBC AUTO: 102 FL (ref 82–98)
MONOCYTES # BLD AUTO: 0.82 THOUSAND/ΜL (ref 0.17–1.22)
MONOCYTES NFR BLD AUTO: 7 % (ref 4–12)
NEUTROPHILS # BLD AUTO: 9.74 THOUSANDS/ΜL (ref 1.85–7.62)
NEUTS SEG NFR BLD AUTO: 79 % (ref 43–75)
NRBC BLD AUTO-RTO: 1 /100 WBCS
PLATELET # BLD AUTO: 267 THOUSANDS/UL (ref 149–390)
PMV BLD AUTO: 9.4 FL (ref 8.9–12.7)
POTASSIUM SERPL-SCNC: 3.9 MMOL/L (ref 3.5–5.3)
RBC # BLD AUTO: 2.65 MILLION/UL (ref 3.81–5.12)
SODIUM SERPL-SCNC: 137 MMOL/L (ref 136–145)
WBC # BLD AUTO: 12.29 THOUSAND/UL (ref 4.31–10.16)

## 2022-07-10 PROCEDURE — 99232 SBSQ HOSP IP/OBS MODERATE 35: CPT | Performed by: INTERNAL MEDICINE

## 2022-07-10 PROCEDURE — C9113 INJ PANTOPRAZOLE SODIUM, VIA: HCPCS | Performed by: INTERNAL MEDICINE

## 2022-07-10 PROCEDURE — 94760 N-INVAS EAR/PLS OXIMETRY 1: CPT

## 2022-07-10 PROCEDURE — 94640 AIRWAY INHALATION TREATMENT: CPT

## 2022-07-10 PROCEDURE — 85025 COMPLETE CBC W/AUTO DIFF WBC: CPT | Performed by: INTERNAL MEDICINE

## 2022-07-10 PROCEDURE — 80048 BASIC METABOLIC PNL TOTAL CA: CPT | Performed by: INTERNAL MEDICINE

## 2022-07-10 RX ORDER — HYDROXYZINE HYDROCHLORIDE 25 MG/1
25 TABLET, FILM COATED ORAL EVERY 6 HOURS PRN
Status: DISCONTINUED | OUTPATIENT
Start: 2022-07-10 | End: 2022-07-13 | Stop reason: HOSPADM

## 2022-07-10 RX ADMIN — ISODIUM CHLORIDE 3 ML: 0.03 SOLUTION RESPIRATORY (INHALATION) at 07:13

## 2022-07-10 RX ADMIN — ACETAMINOPHEN 650 MG: 650 SUSPENSION ORAL at 03:28

## 2022-07-10 RX ADMIN — LEVALBUTEROL HYDROCHLORIDE 1.25 MG: 1.25 SOLUTION, CONCENTRATE RESPIRATORY (INHALATION) at 13:13

## 2022-07-10 RX ADMIN — GLYCERIN, HYPROMELLOSE, POLYETHYLENE GLYCOL 2 DROP: .2; .2; 1 LIQUID OPHTHALMIC at 09:54

## 2022-07-10 RX ADMIN — GLYCERIN, HYPROMELLOSE, POLYETHYLENE GLYCOL 2 DROP: .2; .2; 1 LIQUID OPHTHALMIC at 16:42

## 2022-07-10 RX ADMIN — LEVALBUTEROL HYDROCHLORIDE 1.25 MG: 1.25 SOLUTION, CONCENTRATE RESPIRATORY (INHALATION) at 19:52

## 2022-07-10 RX ADMIN — SILDENAFIL CITRATE 10 MG: 20 TABLET ORAL at 16:25

## 2022-07-10 RX ADMIN — ACETAMINOPHEN 650 MG: 650 SUSPENSION ORAL at 09:54

## 2022-07-10 RX ADMIN — FLUTICASONE PROPIONATE 1 SPRAY: 50 SPRAY, METERED NASAL at 16:42

## 2022-07-10 RX ADMIN — NYSTATIN: 100000 POWDER TOPICAL at 09:55

## 2022-07-10 RX ADMIN — MACITENTAN 10 MG: 10 TABLET, FILM COATED ORAL at 09:55

## 2022-07-10 RX ADMIN — OXYCODONE HYDROCHLORIDE 5 MG: 5 SOLUTION ORAL at 06:26

## 2022-07-10 RX ADMIN — LEVALBUTEROL HYDROCHLORIDE 1.25 MG: 1.25 SOLUTION, CONCENTRATE RESPIRATORY (INHALATION) at 07:13

## 2022-07-10 RX ADMIN — FOLIC ACID 1 MG: 1 TABLET ORAL at 09:53

## 2022-07-10 RX ADMIN — SILDENAFIL CITRATE 10 MG: 20 TABLET ORAL at 22:10

## 2022-07-10 RX ADMIN — ACETAMINOPHEN 650 MG: 650 SUSPENSION ORAL at 16:26

## 2022-07-10 RX ADMIN — Medication 250 MG: at 09:55

## 2022-07-10 RX ADMIN — PREDNISONE 30 MG: 20 TABLET ORAL at 09:53

## 2022-07-10 RX ADMIN — ACETAMINOPHEN 650 MG: 650 SUSPENSION ORAL at 22:11

## 2022-07-10 RX ADMIN — OXYCODONE HYDROCHLORIDE 5 MG: 5 SOLUTION ORAL at 17:34

## 2022-07-10 RX ADMIN — ATOVAQUONE 750 MG: 750 SUSPENSION ORAL at 09:55

## 2022-07-10 RX ADMIN — OXYCODONE HYDROCHLORIDE 2.5 MG: 5 SOLUTION ORAL at 22:12

## 2022-07-10 RX ADMIN — Medication 250 MG: at 16:43

## 2022-07-10 RX ADMIN — ASPIRIN 81 MG CHEWABLE TABLET 81 MG: 81 TABLET CHEWABLE at 09:52

## 2022-07-10 RX ADMIN — ISODIUM CHLORIDE 3 ML: 0.03 SOLUTION RESPIRATORY (INHALATION) at 19:52

## 2022-07-10 RX ADMIN — LORATADINE 10 MG: 10 TABLET ORAL at 09:52

## 2022-07-10 RX ADMIN — Medication 1000 UNITS: at 09:53

## 2022-07-10 RX ADMIN — NYSTATIN: 100000 POWDER TOPICAL at 16:42

## 2022-07-10 RX ADMIN — PANTOPRAZOLE SODIUM 40 MG: 40 INJECTION, POWDER, FOR SOLUTION INTRAVENOUS at 09:53

## 2022-07-10 RX ADMIN — ISODIUM CHLORIDE 3 ML: 0.03 SOLUTION RESPIRATORY (INHALATION) at 13:13

## 2022-07-10 RX ADMIN — SALINE NASAL SPRAY 1 SPRAY: 1.5 SOLUTION NASAL at 16:42

## 2022-07-10 RX ADMIN — FLUTICASONE PROPIONATE 1 SPRAY: 50 SPRAY, METERED NASAL at 09:54

## 2022-07-10 RX ADMIN — LEVOTHYROXINE SODIUM 25 MCG: 25 TABLET ORAL at 06:26

## 2022-07-10 RX ADMIN — SILDENAFIL CITRATE 10 MG: 20 TABLET ORAL at 09:52

## 2022-07-10 RX ADMIN — OXYCODONE HYDROCHLORIDE 5 MG: 5 SOLUTION ORAL at 11:08

## 2022-07-10 RX ADMIN — ENOXAPARIN SODIUM 40 MG: 40 INJECTION SUBCUTANEOUS at 09:53

## 2022-07-10 NOTE — PROGRESS NOTES
[facility] [facility]  Progress Note Uli Shore 1940, 80 y o  female MRN: [id number]  [facility] Encounter: [id number]  Primary Care Provider: No primary care provider on file     Date and time admitted to hospital: 6/14/2022  8:07 PM    * Sacral wound  Assessment & Plan  · Stage IV sacral ulcer POA  · Status post I&D with General surgery  · Wound care per General surgery, had a VAC which is now off and patient is getting wet-to-dry dressings  · Received IV antibiotics, presently being monitored off of antibiotics, Infectious Disease input noted  · Frequent repositioning  · Optimize nutritional status  · General surgery following  · Working on placement with family, anticipate discharge early next week    Proximal muscle weakness  Assessment & Plan  · Extensive upper and lower extremity proximal muscle weakness with marked improvement  · Received IVIG with Rheumatology  · Supportive care    Pulmonary hypertension ([address] )  Assessment & Plan  · Crest syndrome with pulmonary hypertension  · Continue supplemental oxygen as needed  · Sildenafil  · Would be appropriate for palliative care  · Family meeting on June 23--> continue medical directed care but DNR level established   · Will need ongoing conversations about realistic expectations and prognosis     Hearing loss  Assessment & Plan  · History of progressive hearing loss  · Outpatient ENT follow-up    Pneumonia  Assessment & Plan    · Completed antibiotics  · Supportive care  · Aspiration precautions    Acute respiratory failure with hypoxia ([address] )  Assessment & Plan  · Multifactorial  · Encourage incentive spirometry, patient is doing better with this   · Remains on RA, requires 1-2 L of oxygen at night (prior to admission she required 2-3 L of oxygen at night)  · Pulmonary input noted   · Is stable from this standpoint but have concerns with her prognosis       Urinary retention  Assessment & Plan  · Urinary retention with Tejada catheter in place prior to admission  · Outpatient Urology follow-up    Anemia  Assessment & Plan  · Multifactorial  · Iron studies noted  · Iron supplementation  · Folic acid supplementation  · Monitor hemoglobin      Acquired hypothyroidism  Assessment & Plan  · Continue levothyroxine    Scleroderma (HCC)  Assessment & Plan  · Scleroderma with crest syndrome  · Continue prednisone  · Rheumatology advised IVIG which patient completed with marked improvements to her ROM  · Ongoing goals of care discussions regarding prognosis should occur     Dysphagia, oropharyngeal phase  Assessment & Plan  · Patient with history of dysphagia  · PEG tube in place  · Continue current tube feeds  · Aspiration precautions    Severe protein-calorie malnutrition ([address] )  Assessment & Plan  Malnutrition Findings:   Adult Malnutrition type: Chronic illness  Adult Degree of Malnutrition: Other severe protein calorie malnutrition  Malnutrition Characteristics: Muscle loss, Fat loss, Weight loss, Inadequate energy           360 Statement: related to disease/condition evidenced by increased kcal/pro needs for wound healing Stage IV sacral decub, BMI 18 4 adjusted per prior facility wt 6/7/22;severe buccal fat pads loss, severe deltoid muscle loss, Patient lost 31# (20 6%) since 3/15/22 past 3 months with illness <75% energy intake versus needs for > 1 month  Treating with EN support and Robert TID via PEG for wound healing    BMI Findings:  Adult BMI Classifications: Underweight < 18 5        Body mass index is 23 49 kg/m²  · Continue tube feeding as ordered           VTE Pharmacologic Prophylaxis: VTE Score: 7 High Risk (Score >/= 5) - Pharmacological DVT Prophylaxis Ordered: enoxaparin (Lovenox)  Sequential Compression Devices Ordered  Patient Centered Rounds: I performed bedside rounds with nursing staff today    Discussions with Specialists or Other Care Team Provider: nursing    Education and Discussions with Family / Patient:  Addendum:  Called patient's daughter Tom Mcmillan and left a voicemail    Time Spent for Care: 30 minutes  More than 50% of total time spent on counseling and coordination of care as described above  Current Length of Stay: 26 day(s)  Current Patient Status: Inpatient   Certification Statement: The patient will continue to require additional inpatient hospital stay due to Sacral wound  Discharge Plan: Anticipate discharge in 24-48 hrs to rehab facility  Code Status: Level 3 - DNAR and DNI    Subjective:   Patient was seen and evaluated bedside  She is complaining of a occasional anxiety  Slept well  No overnight events per nursing staff  Objective:     Vitals:   Temp (24hrs), Av 7 °F (36 5 °C), Min:97 6 °F (36 4 °C), Max:97 7 °F (36 5 °C)    Temp:  [97 6 °F (36 4 °C)-97 7 °F (36 5 °C)] 97 7 °F (36 5 °C)  HR:  [63-76] 70  Resp:  [22-24] 24  BP: ()/(48-65) 122/65  SpO2:  [91 %-99 %] 99 %  Body mass index is 23 49 kg/m²  Input and Output Summary (last 24 hours): Intake/Output Summary (Last 24 hours) at 7/10/2022 0909  Last data filed at 7/10/2022 0636  Gross per 24 hour   Intake 1994 ml   Output 1975 ml   Net 19 ml       Physical Exam:   Physical Exam  Constitutional:       Comments: Appears chronically sick   HENT:      Head: Atraumatic  Cardiovascular:      Rate and Rhythm: Normal rate and regular rhythm  Heart sounds: No murmur heard  No friction rub  No gallop  Pulmonary:      Effort: Pulmonary effort is normal  No respiratory distress  Breath sounds: Normal breath sounds  No wheezing  Abdominal:      General: Bowel sounds are normal  There is no distension  Palpations: Abdomen is soft  Tenderness: There is no abdominal tenderness  Musculoskeletal:         General: No swelling  Cervical back: Neck supple  Skin:     Comments: Sacral wound   Neurological:      General: No focal deficit present  Mental Status: She is alert     Psychiatric: Mood and Affect: Mood normal           Additional Data:     Labs:  Results from last 7 days   Lab Units 07/10/22  0648 07/07/22  0458   WBC Thousand/uL 12 29* 11 89*   HEMOGLOBIN g/dL 8 2* 8 1*   HEMATOCRIT % 27 1* 27 8*   PLATELETS Thousands/uL 267 370   BANDS PCT %  --  6   NEUTROS PCT % 79*  --    LYMPHS PCT % 11*  --    LYMPHO PCT %  --  8*   MONOS PCT % 7  --    MONO PCT %  --  4   EOS PCT % 1 1     Results from last 7 days   Lab Units 07/10/22  0648 07/04/22  0431   SODIUM mmol/L 137 135*   POTASSIUM mmol/L 3 9 3 9   CHLORIDE mmol/L 104 100   CO2 mmol/L 30 31   BUN mg/dL 26* 32*   CREATININE mg/dL 0 20* 0 31*   ANION GAP mmol/L 3* 4   CALCIUM mg/dL 7 7* 8 1*   ALBUMIN g/dL  --  1 9*   TOTAL BILIRUBIN mg/dL  --  0 19*   ALK PHOS U/L  --  79   ALT U/L  --  14   AST U/L  --  17   GLUCOSE RANDOM mg/dL 78 119                       Lines/Drains:  Invasive Devices  Report    Peripheral Intravenous Line  Duration           Long-Dwell Peripheral IV (Midline) 06/14/22 Right Brachial 25 days          Drain  Duration           Gastrostomy/Enterostomy -- days    Urethral Catheter -- days              Urinary Catheter:  Goal for removal: N/A - Chronic Tejada               Imaging:   XR chest portable   Final Result by Pedro Nelson MD (06/30 9726)      Bilateral parenchymal opacities have progressed  Workstation performed: [id number]         XR chest portable ICU   Final Result by Juanita Vaughan MD (06/23 1617)      Partial clearing of bilateral pulmonary opacities, either multifocal pneumonia or asymmetric pulmonary edema, since 6/19/2022  Workstation performed: [id number]         XR chest portable   Final Result by Morales Walls MD (06/19 8198)      Persistent, slightly worsened, bilateral pulmonary opacities in keeping with multilobar pneumonia                    Workstation performed: [id number]         VAS ZA & waveform analysis, multiple levels   Final Result by Damir Ventura DO (06/19 2146)      XR chest portable   Final Result by Sharon Webb MD (06/16 2893)      Stable vascular congestion                    Workstation performed: [id number]             Recent Cultures (last 7 days):         Last 24 Hours Medication List:   Current Facility-Administered Medications   Medication Dose Route Frequency Provider Last Rate    acetaminophen  650 mg Per G Tube Q6H Smita Ojeda MD      acetaminophen  650 mg Per G Tube Q4H PRN Smita Ojeda MD      aspirin  81 mg Per G Tube Daily Monique Morohco MD      atovaquone  750 mg Per G Tube Daily With Breakfast Monique Morocho MD      cholecalciferol  1,000 Units Per G Tube Daily Monique Morocho MD      enoxaparin  40 mg Subcutaneous Q24H [address] Monique Morocho MD      fluticasone  1 spray Each Nare BID Arpit Cid MD      folic acid  1 mg Per PEG Tube Daily Arpit Cid MD      glycerin-hypromellose-  2 drop Both Eyes BID Monique Morocho MD      hydrOXYzine HCL  25 mg Oral Q6H PRN Hannah Liang MD      levalbuterol  1 25 mg Nebulization TID Sindy Spencer,       levothyroxine  25 mcg Per PEG Tube Early Morning Monique Morocho MD      loratadine  10 mg Per G Tube Daily Monique Morocho MD      Macitentan  10 mg Per G Tube Daily Beto Coley MD      melatonin  3 mg Oral HS PRN Smita Ojeda MD      nystatin   Topical BID Arpit Cid MD      ondansetron  4 mg Intravenous Q6H PRN Smita Ojeda MD      oxyCODONE  2 5 mg Per G Tube Q4H PRN Smita Ojeda MD      oxyCODONE  5 mg Per G Tube Q4H PRN Smita Ojeda MD      pantoprazole  40 mg Intravenous Q24H [address] Monique Morocho MD      predniSONE  30 mg Per G Tube Daily Monique Morocho MD      saccharomyces boulardii  250 mg Per G Tube BID Monique Morocho MD      sildenafil  10 mg Oral TID Clair Wynn DO      sodium chloride  1 spray Each Nare Q1H PRN Beto Coley, MD      sodium chloride  3 mL Nebulization TID Rafy Parra DO          Today, Patient Was Seen By: Justyna Red MD    **Please Note: This note may have been constructed using a voice recognition system  **

## 2022-07-10 NOTE — ASSESSMENT & PLAN NOTE
· Stage IV sacral ulcer POA  · Status post I&D with General surgery  · Wound care per General surgery, had a VAC which is now off and patient is getting wet-to-dry dressings  · Received IV antibiotics, presently being monitored off of antibiotics, Infectious Disease input noted  · Frequent repositioning  · Optimize nutritional status  · General surgery following  · Working on placement with family, anticipate discharge early next week

## 2022-07-10 NOTE — ASSESSMENT & PLAN NOTE
· Multifactorial  · Encourage incentive spirometry, patient is doing better with this   · Remains on RA, requires 1-2 L of oxygen at night (prior to admission she required 2-3 L of oxygen at night)  · Pulmonary input noted   · Is stable from this standpoint but have concerns with her prognosis

## 2022-07-11 LAB
FLUAV RNA RESP QL NAA+PROBE: NEGATIVE
FLUBV RNA RESP QL NAA+PROBE: NEGATIVE
RSV RNA RESP QL NAA+PROBE: NEGATIVE
SARS-COV-2 RNA RESP QL NAA+PROBE: NEGATIVE

## 2022-07-11 PROCEDURE — 94760 N-INVAS EAR/PLS OXIMETRY 1: CPT

## 2022-07-11 PROCEDURE — 94640 AIRWAY INHALATION TREATMENT: CPT

## 2022-07-11 PROCEDURE — 0241U HB NFCT DS VIR RESP RNA 4 TRGT: CPT | Performed by: INTERNAL MEDICINE

## 2022-07-11 PROCEDURE — C9113 INJ PANTOPRAZOLE SODIUM, VIA: HCPCS | Performed by: INTERNAL MEDICINE

## 2022-07-11 PROCEDURE — 99232 SBSQ HOSP IP/OBS MODERATE 35: CPT | Performed by: INTERNAL MEDICINE

## 2022-07-11 RX ORDER — SILDENAFIL CITRATE 20 MG/1
10 TABLET ORAL 3 TIMES DAILY
Qty: 45 TABLET | Refills: 0 | Status: SHIPPED | OUTPATIENT
Start: 2022-07-11 | End: 2022-08-09

## 2022-07-11 RX ORDER — ATOVAQUONE 750 MG/5ML
750 SUSPENSION ORAL
Qty: 150 ML | Refills: 0 | Status: SHIPPED | OUTPATIENT
Start: 2022-07-12 | End: 2022-08-09

## 2022-07-11 RX ADMIN — Medication 250 MG: at 18:05

## 2022-07-11 RX ADMIN — ACETAMINOPHEN 650 MG: 650 SUSPENSION ORAL at 23:42

## 2022-07-11 RX ADMIN — NYSTATIN: 100000 POWDER TOPICAL at 18:04

## 2022-07-11 RX ADMIN — ENOXAPARIN SODIUM 40 MG: 40 INJECTION SUBCUTANEOUS at 10:07

## 2022-07-11 RX ADMIN — LEVALBUTEROL HYDROCHLORIDE 1.25 MG: 1.25 SOLUTION, CONCENTRATE RESPIRATORY (INHALATION) at 14:23

## 2022-07-11 RX ADMIN — SILDENAFIL CITRATE 10 MG: 20 TABLET ORAL at 18:04

## 2022-07-11 RX ADMIN — NYSTATIN: 100000 POWDER TOPICAL at 10:13

## 2022-07-11 RX ADMIN — ACETAMINOPHEN 650 MG: 650 SUSPENSION ORAL at 10:07

## 2022-07-11 RX ADMIN — MACITENTAN 10 MG: 10 TABLET, FILM COATED ORAL at 10:12

## 2022-07-11 RX ADMIN — ISODIUM CHLORIDE 3 ML: 0.03 SOLUTION RESPIRATORY (INHALATION) at 14:23

## 2022-07-11 RX ADMIN — ATOVAQUONE 750 MG: 750 SUSPENSION ORAL at 10:12

## 2022-07-11 RX ADMIN — PREDNISONE 30 MG: 20 TABLET ORAL at 10:08

## 2022-07-11 RX ADMIN — ISODIUM CHLORIDE 3 ML: 0.03 SOLUTION RESPIRATORY (INHALATION) at 07:26

## 2022-07-11 RX ADMIN — Medication 1000 UNITS: at 10:08

## 2022-07-11 RX ADMIN — LORATADINE 10 MG: 10 TABLET ORAL at 10:08

## 2022-07-11 RX ADMIN — FLUTICASONE PROPIONATE 1 SPRAY: 50 SPRAY, METERED NASAL at 10:11

## 2022-07-11 RX ADMIN — OXYCODONE HYDROCHLORIDE 5 MG: 5 SOLUTION ORAL at 23:42

## 2022-07-11 RX ADMIN — ACETAMINOPHEN 650 MG: 650 SUSPENSION ORAL at 05:38

## 2022-07-11 RX ADMIN — GLYCERIN, HYPROMELLOSE, POLYETHYLENE GLYCOL 2 DROP: .2; .2; 1 LIQUID OPHTHALMIC at 10:11

## 2022-07-11 RX ADMIN — FLUTICASONE PROPIONATE 1 SPRAY: 50 SPRAY, METERED NASAL at 18:05

## 2022-07-11 RX ADMIN — LEVALBUTEROL HYDROCHLORIDE 1.25 MG: 1.25 SOLUTION, CONCENTRATE RESPIRATORY (INHALATION) at 07:26

## 2022-07-11 RX ADMIN — SILDENAFIL CITRATE 10 MG: 20 TABLET ORAL at 10:08

## 2022-07-11 RX ADMIN — Medication 250 MG: at 10:12

## 2022-07-11 RX ADMIN — SILDENAFIL CITRATE 10 MG: 20 TABLET ORAL at 23:44

## 2022-07-11 RX ADMIN — ASPIRIN 81 MG CHEWABLE TABLET 81 MG: 81 TABLET CHEWABLE at 10:08

## 2022-07-11 RX ADMIN — ISODIUM CHLORIDE 3 ML: 0.03 SOLUTION RESPIRATORY (INHALATION) at 20:19

## 2022-07-11 RX ADMIN — PANTOPRAZOLE SODIUM 40 MG: 40 INJECTION, POWDER, FOR SOLUTION INTRAVENOUS at 10:07

## 2022-07-11 RX ADMIN — SALINE NASAL SPRAY 1 SPRAY: 1.5 SOLUTION NASAL at 10:11

## 2022-07-11 RX ADMIN — MELATONIN 3 MG: at 23:43

## 2022-07-11 RX ADMIN — LEVOTHYROXINE SODIUM 25 MCG: 25 TABLET ORAL at 05:39

## 2022-07-11 RX ADMIN — FOLIC ACID 1 MG: 1 TABLET ORAL at 10:08

## 2022-07-11 RX ADMIN — GLYCERIN, HYPROMELLOSE, POLYETHYLENE GLYCOL 2 DROP: .2; .2; 1 LIQUID OPHTHALMIC at 18:04

## 2022-07-11 RX ADMIN — OXYCODONE HYDROCHLORIDE 5 MG: 5 SOLUTION ORAL at 08:59

## 2022-07-11 RX ADMIN — LEVALBUTEROL HYDROCHLORIDE 1.25 MG: 1.25 SOLUTION, CONCENTRATE RESPIRATORY (INHALATION) at 20:19

## 2022-07-11 RX ADMIN — ACETAMINOPHEN 650 MG: 650 SUSPENSION ORAL at 16:34

## 2022-07-11 NOTE — PROGRESS NOTES
[address] [facility]  Progress Note [place] 1940, 80 y o  female MRN: [id number]  [facility] Encounter: [id number]  Primary Care Provider: No primary care provider on file     Date and time admitted to hospital: 6/14/2022  8:07 PM    * Sacral wound  Assessment & Plan  · Stage IV sacral ulcer POA  · Status post I&D with General surgery  · Wound care per General surgery, had a VAC which is now off and patient is getting wet-to-dry dressings  · Received IV antibiotics, presently being monitored off of antibiotics, Infectious Disease input noted  · Frequent repositioning  · Optimize nutritional status  · General surgery following  · Working on placement with family, anticipate discharge this week    [facility] ([facility] )  Assessment & Plan  · Scleroderma with crest syndrome  · Continue prednisone  · Rheumatology advised IVIG which patient completed with marked improvements to her ROM  · Continue Atovaquone for PCP prophylaxis     Pulmonary hypertension ([facility] )  Assessment & Plan  · Crest syndrome with pulmonary hypertension  · Continue supplemental oxygen as needed  · Adempas was stopped by heart failure due to hypotension  · Started Sildenafil 10 mg tid on this admission,  initiated priorauth sildenafil  · Continue Opsumit 10 mg daily  · Would be appropriate for palliative care  · Family meeting on June 23--> continue medical directed care but DNR level established     Dysphagia, oropharyngeal phase  Assessment & Plan  · Patient with history of dysphagia  · PEG tube in place  · Continue current tube feeds  · Aspiration precautions    Proximal muscle weakness  Assessment & Plan  · Extensive upper and lower extremity proximal muscle weakness with marked improvement  · Received IVIG with Rheumatology  · Supportive care    Hearing loss  Assessment & Plan  · History of progressive hearing loss  · Outpatient ENT follow-up    Pneumonia  Assessment & Plan    · Completed antibiotics  · Supportive care  · Aspiration precautions    Acute respiratory failure with hypoxia (HCC)  Assessment & Plan  · Multifactorial  · Encourage incentive spirometry, patient is doing better with this   · Remains on RA, requires 1-2 L of oxygen at night (prior to admission she required 2-3 L of oxygen at night)  · Pulmonary input noted         Urinary retention  Assessment & Plan  · Urinary retention with Tejada catheter in place prior to admission  · Outpatient Urology follow-up    Anemia  Assessment & Plan  · Multifactorial  · Iron studies noted  · Iron supplementation  · Folic acid supplementation  · Monitor hemoglobin      Acquired hypothyroidism  Assessment & Plan  · Continue levothyroxine    Severe protein-calorie malnutrition ([address] )  Assessment & Plan  Malnutrition Findings:   Adult Malnutrition type: Chronic illness  Adult Degree of Malnutrition: Other severe protein calorie malnutrition  Malnutrition Characteristics: Muscle loss, Fat loss, Weight loss, Inadequate energy           360 Statement: related to disease/condition evidenced by increased kcal/pro needs for wound healing Stage IV sacral decub, BMI 18 4 adjusted per prior facility wt 6/7/22;severe buccal fat pads loss, severe deltoid muscle loss, Patient lost 31# (20 6%) since 3/15/22 past 3 months with illness <75% energy intake versus needs for > 1 month  Treating with EN support and Robert TID via PEG for wound healing    BMI Findings:  Adult BMI Classifications: Underweight < 18 5        Body mass index is 23 49 kg/m²  · Continue tube feeding as ordered         VTE Pharmacologic Prophylaxis: VTE Score: 7 High Risk (Score >/= 5) - Pharmacological DVT Prophylaxis Ordered: enoxaparin (Lovenox)  Sequential Compression Devices Ordered  Patient Centered Rounds: I performed bedside rounds with nursing staff today    Discussions with Specialists or Other Care Team Provider:  Nursing, case management    Education and Discussions with Family / Patient: Updated  (daughter and sister) at bedside  Time Spent for Care: 30 minutes  More than 50% of total time spent on counseling and coordination of care as described above  Current Length of Stay: 27 day(s)  Current Patient Status: Inpatient   Certification Statement: The patient will continue to require additional inpatient hospital stay due to Sacral wound  Discharge Plan: Anticipate discharge in 24-48 hrs to rehab facility  Code Status: Level 3 - DNAR and DNI    Subjective:   Patient was seen and evaluated bedside, no events per nursing staff  Patient has no complaints    Objective:     Vitals:   Temp (24hrs), Av 3 °F (36 8 °C), Min:98 °F (36 7 °C), Max:98 9 °F (37 2 °C)    Temp:  [98 °F (36 7 °C)-98 9 °F (37 2 °C)] 98 2 °F (36 8 °C)  HR:  [63-78] 63  Resp:  [15-20] 15  BP: ()/(47-66) 86/47  SpO2:  [91 %-100 %] 91 %  Body mass index is 23 49 kg/m²  Input and Output Summary (last 24 hours): Intake/Output Summary (Last 24 hours) at 2022 1628  Last data filed at 2022 0241  Gross per 24 hour   Intake 0 ml   Output 1150 ml   Net -1150 ml       Physical Exam:   Physical Exam  Constitutional:       General: She is not in acute distress  Comments: Chronically ill-appearing   HENT:      Head: Atraumatic  Cardiovascular:      Rate and Rhythm: Normal rate and regular rhythm  Heart sounds: No murmur heard  No friction rub  No gallop  Pulmonary:      Effort: Pulmonary effort is normal  No respiratory distress  Breath sounds: Normal breath sounds  No wheezing  Abdominal:      General: Bowel sounds are normal  There is no distension  Palpations: Abdomen is soft  Tenderness: There is no abdominal tenderness  Comments: Peg tube   Musculoskeletal:         General: No swelling  Cervical back: Neck supple  Skin:     General: Skin is warm and dry  Neurological:      General: No focal deficit present  Mental Status: She is alert  Psychiatric:         Mood and Affect: Mood normal           Additional Data:     Labs:  Results from last 7 days   Lab Units 07/10/22  0648 07/07/22  0458   WBC Thousand/uL 12 29* 11 89*   HEMOGLOBIN g/dL 8 2* 8 1*   HEMATOCRIT % 27 1* 27 8*   PLATELETS Thousands/uL 267 370   BANDS PCT %  --  6   NEUTROS PCT % 79*  --    LYMPHS PCT % 11*  --    LYMPHO PCT %  --  8*   MONOS PCT % 7  --    MONO PCT %  --  4   EOS PCT % 1 1     Results from last 7 days   Lab Units 07/10/22  0648   SODIUM mmol/L 137   POTASSIUM mmol/L 3 9   CHLORIDE mmol/L 104   CO2 mmol/L 30   BUN mg/dL 26*   CREATININE mg/dL 0 20*   ANION GAP mmol/L 3*   CALCIUM mg/dL 7 7*   GLUCOSE RANDOM mg/dL 78                       Lines/Drains:  Invasive Devices  Report    Peripheral Intravenous Line  Duration           Long-Dwell Peripheral IV (Midline) 06/14/22 Right Brachial 27 days          Drain  Duration           Gastrostomy/Enterostomy -- days    Urethral Catheter -- days              Urinary Catheter:  Goal for removal: N/A - Chronic Tejada               Imaging:   XR chest portable   Final Result by Saravanan Bear MD (06/30 9682)      Bilateral parenchymal opacities have progressed  Workstation performed: [id number]         XR chest portable ICU   Final Result by Sylvain Larios MD (06/23 1617)      Partial clearing of bilateral pulmonary opacities, either multifocal pneumonia or asymmetric pulmonary edema, since 6/19/2022  Workstation performed: [id number]         XR chest portable   Final Result by Jose Rafael Gonzalez MD (06/19 3263)      Persistent, slightly worsened, bilateral pulmonary opacities in keeping with multilobar pneumonia                    Workstation performed: [id number]         VAS ZA & waveform analysis, multiple levels   Final Result by Bud Romo DO (06/19 8586)      XR chest portable   Final Result by Kimberley Butler MD (06/16 8995) Stable vascular congestion                    Workstation performed: [id number]             Recent Cultures (last 7 days):         Last 24 Hours Medication List:   Current Facility-Administered Medications   Medication Dose Route Frequency Provider Last Rate    acetaminophen  650 mg Per G Tube Q6H [place] MD [place]      acetaminophen  650 mg Per G Tube Q4H PRN [place] MD [place]      aspirin  81 mg Per G Tube Daily Ara Birmingham MD      atovaquone  750 mg Per G Tube Daily With Breakfast Ara Birmingham MD      cholecalciferol  1,000 Units Per G Tube Daily Ara Birmingham MD      enoxaparin  40 mg Subcutaneous Q24H [address] Ara Birmingham MD      fluticasone  1 spray Each Nare BID Nabila Andre MD      folic acid  1 mg Per PEG Tube Daily Nabila Andre MD      glycerin-hypromellose-  2 drop Both Eyes BID Ara Birmingham MD      hydrOXYzine HCL  25 mg Oral Q6H PRN Viviane Kenney MD      levalbuterol  1 25 mg Nebulization TID Michelle Silverman DO      levothyroxine  25 mcg Per PEG Tube Early Morning Ara Birmingham MD      loratadine  10 mg Per G Tube Daily Ara Birmingham MD      Macitentan  10 mg Per G Tube Daily Brionna Martinez MD      melatonin  3 mg Oral HS PRN [place] MD [place]      nystatin   Topical BID Nabila Andre MD      ondansetron  4 mg Intravenous Q6H PRN [place] MD [place]      oxyCODONE  2 5 mg Per G Tube Q4H PRN [place] MD [place]      oxyCODONE  5 mg Per G Tube Q4H PRN [place] MD [place]      pantoprazole  40 mg Intravenous Q24H [address] Ara Birmingham MD      predniSONE  30 mg Per G Tube Daily Ara Birmingham MD      saccharomyces boulardii  250 mg Per G Tube BID Ara Birmingham MD      sildenafil  10 mg Oral TID Cherylene Cleaver, DO      sodium chloride  1 spray Each Nare Q1H PRN Brionna Martinez MD      sodium chloride  3 mL Nebulization TID Michelle Silverman DO          Today, Patient Was Seen By: Tamiko Carras[address] Ave Oates MD    **Please Note: This note may have been constructed using a voice recognition system  **

## 2022-07-11 NOTE — ASSESSMENT & PLAN NOTE
· Crest syndrome with pulmonary hypertension  · Continue supplemental oxygen as needed  · Adempas was stopped by heart failure due to hypotension  · Started Sildenafil 10 mg tid on this admission,  initiated priorauth sildenafil  · Continue Opsumit 10 mg daily  · Would be appropriate for palliative care  · Family meeting on June 23--> continue medical directed care but DNR level established

## 2022-07-11 NOTE — PLAN OF CARE
Problem: MOBILITY - ADULT  Goal: Maintain or return to baseline ADL function  Description: INTERVENTIONS:  -  Assess patient's ability to carry out ADLs; assess patient's baseline for ADL function and identify physical deficits which impact ability to perform ADLs (bathing, care of mouth/teeth, toileting, grooming, dressing, etc )  - Assess/evaluate cause of self-care deficits   - Assess range of motion  - Assess patient's mobility; develop plan if impaired  - Assess patient's need for assistive devices and provide as appropriate  - Encourage maximum independence but intervene and supervise when necessary  - Involve family in performance of ADLs  - Assess for home care needs following discharge   - Consider OT consult to assist with ADL evaluation and planning for discharge  - Provide patient education as appropriate  Outcome: Progressing  Goal: Maintains/Returns to pre admission functional level  Description: INTERVENTIONS:  - Perform BMAT or MOVE assessment daily    - Set and communicate daily mobility goal to care team and patient/family/caregiver  - Collaborate with rehabilitation services on mobility goals if consulted  - Perform Range of Motion  times a day  - Reposition patient every  hours    - Dangle patient  times a day  - Stand patient  times a day  - Ambulate patient  times a day  - Out of bed to chair  times a day   - Out of bed for meals  times a day  - Out of bed for toileting  - Record patient progress and toleration of activity level   Outcome: Progressing     Problem: Potential for Falls  Goal: Patient will remain free of falls  Description: INTERVENTIONS:  - Educate patient/family on patient safety including physical limitations  - Instruct patient to call for assistance with activity   - Consult OT/PT to assist with strengthening/mobility   - Keep Call bell within reach  - Keep bed low and locked with side rails adjusted as appropriate  - Keep care items and personal belongings within reach  - Initiate and maintain comfort rounds  - Make Fall Risk Sign visible to staff  - Offer Toileting every  Hours, in advance of need  - Initiate/Maintain alarm  - Obtain necessary fall risk management equipment:   - Apply yellow socks and bracelet for high fall risk patients  - Consider moving patient to room near nurses station  Outcome: Progressing     Problem: Prexisting or High Potential for Compromised Skin Integrity  Goal: Skin integrity is maintained or improved  Description: INTERVENTIONS:  - Identify patients at risk for skin breakdown  - Assess and monitor skin integrity  - Assess and monitor nutrition and hydration status  - Monitor labs   - Assess for incontinence   - Turn and reposition patient  - Assist with mobility/ambulation  - Relieve pressure over bony prominences  - Avoid friction and shearing  - Provide appropriate hygiene as needed including keeping skin clean and dry  - Evaluate need for skin moisturizer/barrier cream  - Collaborate with interdisciplinary team   - Patient/family teaching  - Consider wound care consult   Outcome: Progressing     Problem: PAIN - ADULT  Goal: Verbalizes/displays adequate comfort level or baseline comfort level  Description: Interventions:  - Encourage patient to monitor pain and request assistance  - Assess pain using appropriate pain scale  - Administer analgesics based on type and severity of pain and evaluate response  - Implement non-pharmacological measures as appropriate and evaluate response  - Consider cultural and social influences on pain and pain management  - Notify physician/advanced practitioner if interventions unsuccessful or patient reports new pain  Outcome: Progressing     Problem: INFECTION - ADULT  Goal: Absence or prevention of progression during hospitalization  Description: INTERVENTIONS:  - Assess and monitor for signs and symptoms of infection  - Monitor lab/diagnostic results  - Monitor all insertion sites, i e  indwelling lines, tubes, and drains  - Monitor endotracheal if appropriate and nasal secretions for changes in amount and color  - [place] appropriate cooling/warming therapies per order  - Administer medications as ordered  - Instruct and encourage patient and family to use good hand hygiene technique  - Identify and instruct in appropriate isolation precautions for identified infection/condition  Outcome: Progressing  Goal: Absence of fever/infection during neutropenic period  Description: INTERVENTIONS:  - Monitor WBC    Outcome: Progressing     Problem: SAFETY ADULT  Goal: Maintain or return to baseline ADL function  Description: INTERVENTIONS:  -  Assess patient's ability to carry out ADLs; assess patient's baseline for ADL function and identify physical deficits which impact ability to perform ADLs (bathing, care of mouth/teeth, toileting, grooming, dressing, etc )  - Assess/evaluate cause of self-care deficits   - Assess range of motion  - Assess patient's mobility; develop plan if impaired  - Assess patient's need for assistive devices and provide as appropriate  - Encourage maximum independence but intervene and supervise when necessary  - Involve family in performance of ADLs  - Assess for home care needs following discharge   - Consider OT consult to assist with ADL evaluation and planning for discharge  - Provide patient education as appropriate  Outcome: Progressing  Goal: Maintains/Returns to pre admission functional level  Description: INTERVENTIONS:  - Perform BMAT or MOVE assessment daily    - Set and communicate daily mobility goal to care team and patient/family/caregiver  - Collaborate with rehabilitation services on mobility goals if consulted  - Perform Range of Motion  times a day  - Reposition patient every  hours    - Dangle patient  times a day  - Stand patient  times a day  - Ambulate patient  times a day  - Out of bed to chair  times a day   - Out of bed for meals  times a day  - Out of bed for toileting  - Record patient progress and toleration of activity level   Outcome: Progressing  Goal: Patient will remain free of falls  Description: INTERVENTIONS:  - Educate patient/family on patient safety including physical limitations  - Instruct patient to call for assistance with activity   - Consult OT/PT to assist with strengthening/mobility   - Keep Call bell within reach  - Keep bed low and locked with side rails adjusted as appropriate  - Keep care items and personal belongings within reach  - Initiate and maintain comfort rounds  - Make Fall Risk Sign visible to staff  - Offer Toileting every  Hours, in advance of need  - Initiate/Maintain alarm  - Obtain necessary fall risk management equipment:  - Apply yellow socks and bracelet for high fall risk patients  - Consider moving patient to room near nurses station  Outcome: Progressing     Problem: DISCHARGE PLANNING  Goal: Discharge to home or other facility with appropriate resources  Description: INTERVENTIONS:  - Identify barriers to discharge w/patient and caregiver  - Arrange for needed discharge resources and transportation as appropriate  - Identify discharge learning needs (meds, wound care, etc )  - Arrange for interpretive services to assist at discharge as needed  - Refer to Case Management Department for coordinating discharge planning if the patient needs post-hospital services based on physician/advanced practitioner order or complex needs related to functional status, cognitive ability, or social support system  Outcome: Progressing     Problem: Knowledge Deficit  Goal: Patient/family/caregiver demonstrates understanding of disease process, treatment plan, medications, and discharge instructions  Description: Complete learning assessment and assess knowledge base    Interventions:  - Provide teaching at level of understanding  - Provide teaching via preferred learning methods  Outcome: Progressing     Problem: Nutrition/Hydration-ADULT  Goal: Nutrient/Hydration intake appropriate for improving, restoring or maintaining nutritional needs  Description: Monitor and assess patient's nutrition/hydration status for malnutrition  Collaborate with interdisciplinary team and initiate plan and interventions as ordered  Monitor patient's weight and dietary intake as ordered or per policy  Utilize nutrition screening tool and intervene as necessary  Determine patient's food preferences and provide high-protein, high-caloric foods as appropriate       INTERVENTIONS:  - Monitor oral intake, urinary output, labs, and treatment plans  - Assess nutrition and hydration status and recommend course of action  - Evaluate amount of meals eaten  - Assist patient with eating if necessary   - Allow adequate time for meals  - Recommend/ encourage appropriate diets, oral nutritional supplements, and vitamin/mineral supplements  - Order, calculate, and assess calorie counts as needed  - Recommend, monitor, and adjust tube feedings and TPN/PPN based on assessed needs  - Assess need for intravenous fluids  - Provide specific nutrition/hydration education as appropriate  - Include patient/family/caregiver in decisions related to nutrition  Outcome: Progressing

## 2022-07-11 NOTE — ASSESSMENT & PLAN NOTE
· Stage IV sacral ulcer POA  · Status post I&D with General surgery  · Wound care per General surgery, had a VAC which is now off and patient is getting wet-to-dry dressings  · Received IV antibiotics, presently being monitored off of antibiotics, Infectious Disease input noted  · Frequent repositioning  · Optimize nutritional status  · General surgery following  · Working on placement with family, anticipate discharge this week

## 2022-07-11 NOTE — ASSESSMENT & PLAN NOTE
· Scleroderma with crest syndrome  · Continue prednisone  · Rheumatology advised IVIG which patient completed with marked improvements to her ROM  · Continue Atovaquone for PCP prophylaxis

## 2022-07-11 NOTE — PLAN OF CARE
Problem: MOBILITY - ADULT  Goal: Maintain or return to baseline ADL function  Description: INTERVENTIONS:  -  Assess patient's ability to carry out ADLs; assess patient's baseline for ADL function and identify physical deficits which impact ability to perform ADLs (bathing, care of mouth/teeth, toileting, grooming, dressing, etc )  - Assess/evaluate cause of self-care deficits   - Assess range of motion  - Assess patient's mobility; develop plan if impaired  - Assess patient's need for assistive devices and provide as appropriate  - Encourage maximum independence but intervene and supervise when necessary  - Involve family in performance of ADLs  - Assess for home care needs following discharge   - Consider OT consult to assist with ADL evaluation and planning for discharge  - Provide patient education as appropriate  Outcome: Progressing  Goal: Maintains/Returns to pre admission functional level  Description: INTERVENTIONS:  - Perform BMAT or MOVE assessment daily    - Set and communicate daily mobility goal to care team and patient/family/caregiver  - Collaborate with rehabilitation services on mobility goals if consulted  - Perform Range of Motion ` times a day  - Reposition patient every  hours    - Dangle patient   times a day  - Stand patient   times a day  - Ambulate patient   times a day  - Out of bed to chair   times a day   - Out of bed for meals   times a day  - Out of bed for toileting  - Record patient progress and toleration of activity level   Outcome: Progressing     Problem: Potential for Falls  Goal: Patient will remain free of falls  Description: INTERVENTIONS:  - Educate patient/family on patient safety including physical limitations  - Instruct patient to call for assistance with activity   - Consult OT/PT to assist with strengthening/mobility   - Keep Call bell within reach  - Keep bed low and locked with side rails adjusted as appropriate  - Keep care items and personal belongings within reach  - Initiate and maintain comfort rounds  - Make Fall Risk Sign visible to staff  - Offer Toileting every   Hours, in advance of need  - Initiate/Maintain  alarm  - Obtain necessary fall risk management equipment:    - Apply yellow socks and bracelet for high fall risk patients  - Consider moving patient to room near nurses station  Outcome: Progressing     Problem: Prexisting or High Potential for Compromised Skin Integrity  Goal: Skin integrity is maintained or improved  Description: INTERVENTIONS:  - Identify patients at risk for skin breakdown  - Assess and monitor skin integrity  - Assess and monitor nutrition and hydration status  - Monitor labs   - Assess for incontinence   - Turn and reposition patient  - Assist with mobility/ambulation  - Relieve pressure over bony prominences  - Avoid friction and shearing  - Provide appropriate hygiene as needed including keeping skin clean and dry  - Evaluate need for skin moisturizer/barrier cream  - Collaborate with interdisciplinary team   - Patient/family teaching  - Consider wound care consult   Outcome: Progressing     Problem: PAIN - ADULT  Goal: Verbalizes/displays adequate comfort level or baseline comfort level  Description: Interventions:  - Encourage patient to monitor pain and request assistance  - Assess pain using appropriate pain scale  - Administer analgesics based on type and severity of pain and evaluate response  - Implement non-pharmacological measures as appropriate and evaluate response  - Consider cultural and social influences on pain and pain management  - Notify physician/advanced practitioner if interventions unsuccessful or patient reports new pain  Outcome: Progressing     Problem: INFECTION - ADULT  Goal: Absence or prevention of progression during hospitalization  Description: INTERVENTIONS:  - Assess and monitor for signs and symptoms of infection  - Monitor lab/diagnostic results  - Monitor all insertion sites, i e  indwelling lines, tubes, and drains  - Monitor endotracheal if appropriate and nasal secretions for changes in amount and color  - [place] appropriate cooling/warming therapies per order  - Administer medications as ordered  - Instruct and encourage patient and family to use good hand hygiene technique  - Identify and instruct in appropriate isolation precautions for identified infection/condition  Outcome: Progressing  Goal: Absence of fever/infection during neutropenic period  Description: INTERVENTIONS:  - Monitor WBC    Outcome: Progressing     Problem: SAFETY ADULT  Goal: Maintain or return to baseline ADL function  Description: INTERVENTIONS:  -  Assess patient's ability to carry out ADLs; assess patient's baseline for ADL function and identify physical deficits which impact ability to perform ADLs (bathing, care of mouth/teeth, toileting, grooming, dressing, etc )  - Assess/evaluate cause of self-care deficits   - Assess range of motion  - Assess patient's mobility; develop plan if impaired  - Assess patient's need for assistive devices and provide as appropriate  - Encourage maximum independence but intervene and supervise when necessary  - Involve family in performance of ADLs  - Assess for home care needs following discharge   - Consider OT consult to assist with ADL evaluation and planning for discharge  - Provide patient education as appropriate  Outcome: Progressing  Goal: Maintains/Returns to pre admission functional level  Description: INTERVENTIONS:  - Perform BMAT or MOVE assessment daily    - Set and communicate daily mobility goal to care team and patient/family/caregiver  - Collaborate with rehabilitation services on mobility goals if consulted  - Perform Range of Motion   times a day  - Reposition patient every   hours    - Dangle patient   times a day  - Stand patient   times a day  - Ambulate patient   times a day  - Out of bed to chair   times a day   - Out of bed for meals   times a day  - Out of bed for toileting  - Record patient progress and toleration of activity level   Outcome: Progressing  Goal: Patient will remain free of falls  Description: INTERVENTIONS:  - Educate patient/family on patient safety including physical limitations  - Instruct patient to call for assistance with activity   - Consult OT/PT to assist with strengthening/mobility   - Keep Call bell within reach  - Keep bed low and locked with side rails adjusted as appropriate  - Keep care items and personal belongings within reach  - Initiate and maintain comfort rounds  - Make Fall Risk Sign visible to staff  - Offer Toileting every   Hours, in advance of need  - Initiate/Maintain  alarm  - Obtain necessary fall risk management equipment:    - Apply yellow socks and bracelet for high fall risk patients  - Consider moving patient to room near nurses station  Outcome: Progressing     Problem: DISCHARGE PLANNING  Goal: Discharge to home or other facility with appropriate resources  Description: INTERVENTIONS:  - Identify barriers to discharge w/patient and caregiver  - Arrange for needed discharge resources and transportation as appropriate  - Identify discharge learning needs (meds, wound care, etc )  - Arrange for interpretive services to assist at discharge as needed  - Refer to Case Management Department for coordinating discharge planning if the patient needs post-hospital services based on physician/advanced practitioner order or complex needs related to functional status, cognitive ability, or social support system  Outcome: Progressing     Problem: Knowledge Deficit  Goal: Patient/family/caregiver demonstrates understanding of disease process, treatment plan, medications, and discharge instructions  Description: Complete learning assessment and assess knowledge base    Interventions:  - Provide teaching at level of understanding  - Provide teaching via preferred learning methods  Outcome: Progressing     Problem: Nutrition/Hydration-ADULT  Goal: Nutrient/Hydration intake appropriate for improving, restoring or maintaining nutritional needs  Description: Monitor and assess patient's nutrition/hydration status for malnutrition  Collaborate with interdisciplinary team and initiate plan and interventions as ordered  Monitor patient's weight and dietary intake as ordered or per policy  Utilize nutrition screening tool and intervene as necessary  Determine patient's food preferences and provide high-protein, high-caloric foods as appropriate  INTERVENTIONS:  - Monitor oral intake, urinary output, labs, and treatment plans  - Assess nutrition and hydration status and recommend course of action  - Evaluate amount of meals eaten  - Assist patient with eating if necessary   - Allow adequate time for meals  - Recommend/ encourage appropriate diets, oral nutritional supplements, and vitamin/mineral supplements  - Order, calculate, and assess calorie counts as needed  - Recommend, monitor, and adjust tube feedings and TPN/PPN based on assessed needs  - Assess need for intravenous fluids  - Provide specific nutrition/hydration education as appropriate  - Include patient/family/caregiver in decisions related to nutrition  Outcome: Progressing     Problem: MOBILITY - ADULT  Goal: Maintains/Returns to pre admission functional level  Description: INTERVENTIONS:  - Perform BMAT or MOVE assessment daily    - Set and communicate daily mobility goal to care team and patient/family/caregiver  - Collaborate with rehabilitation services on mobility goals if consulted  - Perform Range of Motion   times a day  - Reposition patient every   hours    - Dangle patient   times a day  - Stand patient   times a day  - Ambulate patient   times a day  - Out of bed to chair   times a day   - Out of bed for meals   times a day  - Out of bed for toileting  - Record patient progress and toleration of activity level   Outcome: Progressing     Problem: MOBILITY - ADULT  Goal: Maintains/Returns to pre admission functional level  Description: INTERVENTIONS:  - Perform BMAT or MOVE assessment daily    - Set and communicate daily mobility goal to care team and patient/family/caregiver  - Collaborate with rehabilitation services on mobility goals if consulted  - Perform Range of Motion   times a day  - Reposition patient every   hours    - Dangle patient   times a day  - Stand patient   times a day  - Ambulate patient  times a day  - Out of bed to chair   times a day   - Out of bed for meals    times a day  - Out of bed for toileting  - Record patient progress and toleration of activity level   Outcome: Progressing

## 2022-07-11 NOTE — CASE MANAGEMENT
Case Management Progress Note    Patient name Raegan Ye  [facility] MRN [id number]  : 1940 Date 2022       LOS (days): 27  Geometric Mean LOS (GMLOS) (days): 9 60  Days to GMLOS:-17 2        OBJECTIVE:        Current admission status: Inpatient  Preferred Pharmacy:   PATIENT/FAMILY REPORTS NO PREFERRED PHARMACY  No address on file      [address], [facility] [address] [facility] [address] [address] [facility] [address] [address]  Phone: [phone] Fax: [phone]    Primary Care Provider: No primary care provider on file  Primary Insurance: [facility]  Secondary Insurance:     PROGRESS NOTE:        Numerous calls to Carisa Choi at [facility]  Carisa Choi indicated they are willing to take patient, except she is on the following medications with a significant cost:    Sildenafil (Revatio) - coming up with $10,278 SNF price  Atovaquone -- coming up with $1,637 SNF price  Opsumit -- this medication is actually provided by the family to the hospital     Carisa Choi will call daughter Iris Mcdaniels to discuss  Spoke to daughter Iris Mcdaniels at bedside along with attending physician  Anahi/family are willing to obtain medications and bring to [facility]  Priced Atovaquone through [facility], coming up with $10 price  Sildenafil requires a pre-authorization, CM submitted to [facility], [phone], Auth #[id number], marked as "urgent", should have authorization determination within 24 hours  Awaiting NPI information from [facility] in order to start insurance authorization for rehab  CM to follow      Jose A Jauregui  2022  4:41 PM

## 2022-07-11 NOTE — QUICK NOTE
General Surgery Quick Note    Wound Care Performed    Patient was seen and evaluated at bedside  Patient was administered PRN pain medication prior to beginning wound care  Patient was rolled to the left side to access sacral wound  The patient was found to have had a bowel movement, which was sanitized prior to initiating dressing change  One sacral pad and Kerlix were removed from the wound and subcutaneous tracks  Photos were taken to document progression of wound, see below  There are mild soft tissue changes showing pale and discolored surrounding skin  This was not purulent in nature  There was no foul odor coming from the wound  Per prior images and a recent account from nursing, this is an improvement from over the weekend  A new wet to dry Kerlix dressing was applied to the sacral wound  Care was taken to thoroughly pack the circumferential tracks surrounding open wound  A new sacral pad was applied  A clean teodoro was placed beneath patient  Patient was then repositioned to the alternate side and was made comfortable  Patient tolerated the procedure well  All questions were answered  Please tiger text the on call surgery resident with any questions or concerns regarding wound care  If a foul odor or purulent drainage is noted, please tiger text the on call surgery resident  We will continue to monitor the surrounding skin changes and modify wound care as needed              Torsten Goncalves MD  PGY1 Orthopedic Surgery

## 2022-07-11 NOTE — ASSESSMENT & PLAN NOTE
· Multifactorial  · Encourage incentive spirometry, patient is doing better with this   · Remains on RA, requires 1-2 L of oxygen at night (prior to admission she required 2-3 L of oxygen at night)  · Pulmonary input noted

## 2022-07-12 PROCEDURE — 94640 AIRWAY INHALATION TREATMENT: CPT

## 2022-07-12 PROCEDURE — 94760 N-INVAS EAR/PLS OXIMETRY 1: CPT

## 2022-07-12 PROCEDURE — 97110 THERAPEUTIC EXERCISES: CPT

## 2022-07-12 PROCEDURE — 97535 SELF CARE MNGMENT TRAINING: CPT

## 2022-07-12 PROCEDURE — C9113 INJ PANTOPRAZOLE SODIUM, VIA: HCPCS | Performed by: INTERNAL MEDICINE

## 2022-07-12 PROCEDURE — 99232 SBSQ HOSP IP/OBS MODERATE 35: CPT | Performed by: GENERAL PRACTICE

## 2022-07-12 PROCEDURE — 97530 THERAPEUTIC ACTIVITIES: CPT

## 2022-07-12 RX ADMIN — FLUTICASONE PROPIONATE 1 SPRAY: 50 SPRAY, METERED NASAL at 17:46

## 2022-07-12 RX ADMIN — PANTOPRAZOLE SODIUM 40 MG: 40 INJECTION, POWDER, FOR SOLUTION INTRAVENOUS at 09:58

## 2022-07-12 RX ADMIN — PREDNISONE 30 MG: 20 TABLET ORAL at 09:59

## 2022-07-12 RX ADMIN — OXYCODONE HYDROCHLORIDE 5 MG: 5 SOLUTION ORAL at 21:52

## 2022-07-12 RX ADMIN — FLUTICASONE PROPIONATE 1 SPRAY: 50 SPRAY, METERED NASAL at 10:01

## 2022-07-12 RX ADMIN — LEVALBUTEROL HYDROCHLORIDE 1.25 MG: 1.25 SOLUTION, CONCENTRATE RESPIRATORY (INHALATION) at 19:09

## 2022-07-12 RX ADMIN — NYSTATIN: 100000 POWDER TOPICAL at 17:43

## 2022-07-12 RX ADMIN — ENOXAPARIN SODIUM 40 MG: 40 INJECTION SUBCUTANEOUS at 10:01

## 2022-07-12 RX ADMIN — FOLIC ACID 1 MG: 1 TABLET ORAL at 09:59

## 2022-07-12 RX ADMIN — ISODIUM CHLORIDE 3 ML: 0.03 SOLUTION RESPIRATORY (INHALATION) at 13:59

## 2022-07-12 RX ADMIN — Medication 250 MG: at 17:47

## 2022-07-12 RX ADMIN — Medication 250 MG: at 10:02

## 2022-07-12 RX ADMIN — OXYCODONE HYDROCHLORIDE 5 MG: 5 SOLUTION ORAL at 10:02

## 2022-07-12 RX ADMIN — SILDENAFIL CITRATE 10 MG: 20 TABLET ORAL at 10:02

## 2022-07-12 RX ADMIN — MACITENTAN 10 MG: 10 TABLET, FILM COATED ORAL at 09:58

## 2022-07-12 RX ADMIN — ACETAMINOPHEN 650 MG: 650 SUSPENSION ORAL at 21:52

## 2022-07-12 RX ADMIN — LEVALBUTEROL HYDROCHLORIDE 1.25 MG: 1.25 SOLUTION, CONCENTRATE RESPIRATORY (INHALATION) at 07:02

## 2022-07-12 RX ADMIN — OXYCODONE HYDROCHLORIDE 5 MG: 5 SOLUTION ORAL at 04:58

## 2022-07-12 RX ADMIN — LEVOTHYROXINE SODIUM 25 MCG: 25 TABLET ORAL at 06:46

## 2022-07-12 RX ADMIN — SILDENAFIL CITRATE 10 MG: 20 TABLET ORAL at 17:43

## 2022-07-12 RX ADMIN — ACETAMINOPHEN 650 MG: 650 SUSPENSION ORAL at 04:43

## 2022-07-12 RX ADMIN — GLYCERIN, HYPROMELLOSE, POLYETHYLENE GLYCOL 2 DROP: .2; .2; 1 LIQUID OPHTHALMIC at 09:58

## 2022-07-12 RX ADMIN — LORATADINE 10 MG: 10 TABLET ORAL at 09:59

## 2022-07-12 RX ADMIN — SALINE NASAL SPRAY 1 SPRAY: 1.5 SOLUTION NASAL at 10:00

## 2022-07-12 RX ADMIN — ACETAMINOPHEN 650 MG: 650 SUSPENSION ORAL at 16:07

## 2022-07-12 RX ADMIN — ISODIUM CHLORIDE 3 ML: 0.03 SOLUTION RESPIRATORY (INHALATION) at 07:02

## 2022-07-12 RX ADMIN — NYSTATIN: 100000 POWDER TOPICAL at 10:03

## 2022-07-12 RX ADMIN — GLYCERIN, HYPROMELLOSE, POLYETHYLENE GLYCOL 2 DROP: .2; .2; 1 LIQUID OPHTHALMIC at 17:46

## 2022-07-12 RX ADMIN — ISODIUM CHLORIDE 3 ML: 0.03 SOLUTION RESPIRATORY (INHALATION) at 19:09

## 2022-07-12 RX ADMIN — SILDENAFIL CITRATE 10 MG: 20 TABLET ORAL at 21:52

## 2022-07-12 RX ADMIN — Medication 1000 UNITS: at 09:58

## 2022-07-12 RX ADMIN — LEVALBUTEROL HYDROCHLORIDE 1.25 MG: 1.25 SOLUTION, CONCENTRATE RESPIRATORY (INHALATION) at 13:59

## 2022-07-12 RX ADMIN — ACETAMINOPHEN 650 MG: 650 SUSPENSION ORAL at 09:58

## 2022-07-12 RX ADMIN — ATOVAQUONE 750 MG: 750 SUSPENSION ORAL at 10:02

## 2022-07-12 RX ADMIN — ASPIRIN 81 MG CHEWABLE TABLET 81 MG: 81 TABLET CHEWABLE at 10:01

## 2022-07-12 NOTE — PLAN OF CARE
Problem: PHYSICAL THERAPY ADULT  Goal: Performs mobility at highest level of function for planned discharge setting  See evaluation for individualized goals  Description: Treatment/Interventions: LE strengthening/ROM, Therapeutic exercise, Endurance training, Patient/family training, Equipment eval/education, Bed mobility, OT, Spoke to nursing, Continued evaluation          See flowsheet documentation for full assessment, interventions and recommendations  7/12/2022 1245 by Rachel Yancey, PT  Note: Prognosis: Fair  Problem List: Decreased strength, Decreased endurance, Impaired balance, Decreased mobility, Pain  Assessment: Pt seen for session for setup, therex as described above, repositioning  Pt cooperative w  session  needed frequent rests w/ therex and L rafita seems weakner than R   willing to be reposiitoned, declined sitting EOB and standing due to pain  continue to recommend rehab at d/c  Barriers to Discharge: Other (Comment) (underlying conditions)     PT Discharge Recommendation: Post acute rehabilitation services    See flowsheet documentation for full assessment

## 2022-07-12 NOTE — PLAN OF CARE
Problem: OCCUPATIONAL THERAPY ADULT  Goal: Performs self-care activities at highest level of function for planned discharge setting  See evaluation for individualized goals  Description: Treatment Interventions: ADL retraining, Functional transfer training, UE strengthening/ROM, Endurance training, Cognitive reorientation, Patient/family training, Equipment evaluation/education, Compensatory technique education, Fine motor coordination activities, Continued evaluation, Energy conservation, Activityengagement          See flowsheet documentation for full assessment, interventions and recommendations  Outcome: Progressing  Note: Limitation: Decreased ADL status, Decreased UE ROM, Decreased UE strength, Decreased Safe judgement during ADL, Decreased endurance, Decreased fine motor control, Decreased self-care trans, Decreased high-level ADLs  Prognosis: Fair  Assessment: Patient participated in Skilled OT session this date with interventions consisting of UE there-ex, ADL re-training, bed mobility  Patient agreeable to OT treatment session, upon arrival patient was found supine in bed  Pt rolled L with MAX A for re-positioning in bed; declined further bed mobility 2' sacral pain  Pt continues to require assist for grooming tasks 2' decreased UE AROM/strength  Patient continues to be functioning below baseline level, occupational performance remains limited secondary to factors listed above and increased risk for falls and injury  From OT standpoint, recommendation at time of d/c would be POST-ACUTE [facility]  Patient to benefit from continued Occupational Therapy treatment while in the hospital to address deficits as defined above and maximize level of functional independence with ADLs and functional mobility       OT Discharge Recommendation: Post acute rehabilitation services  OT - OK to Discharge- Retiring Row: Yes (when medically stable)

## 2022-07-12 NOTE — PHYSICAL THERAPY NOTE
Physical Therapy Treatment Note       07/12/22 0945   PT Last Visit   PT Visit Date 07/12/22   Note Type   Note Type Treatment   Pain Assessment   Pain Assessment Tool 0-10   Pain Score 10 - Worst Possible Pain   Pain Location/Orientation Location: Buttocks   Patient's Stated Pain Goal No pain   Hospital Pain Intervention(s) Repositioned   Restrictions/Precautions   Weight Bearing Precautions Per Order No   Other Precautions Multiple lines; Fall Risk;Pain;Telemetry;Cognitive   General   Chart Reviewed Yes   Family/Caregiver Present No   Cognition   Overall Cognitive Status Impaired   Arousal/Participation Responsive   Attention Attends with cues to redirect   Orientation Level Oriented X4   Memory Unable to assess   Following Commands Follows one step commands without difficulty   Subjective   Subjective "I don't think I can sit, I'm having too much pain  I want to do exercise"   Bed Mobility   Rolling R 2  Maximal assistance   Additional items Assist x 1   Additional Comments time spent prior to and post session to reposition to comfort  Transfers   Additional Comments refuses sitting EOB or standing due to pain   Endurance Deficit   Endurance Deficit Yes   Endurance Deficit Description weakness, pain, cog   Activity Tolerance   Activity Tolerance Patient limited by fatigue;Patient limited by pain;Treatment limited secondary to medical complications (Comment)   Nurse Made Aware yes   Exercises   Balance training  aarom x 15 min for APs, QS, GS, ab/add, Heel slides, LAQs   Assessment   Prognosis Fair   Problem List Decreased strength;Decreased endurance; Impaired balance;Decreased mobility;Pain   Assessment Pt seen for session for setup, therex as described above, repositioning  Pt cooperative w  session  needed frequent rests w/ therex and L rafita seems weakner than R   willing to be reposiitoned, declined sitting EOB and standing due to pain    continue to recommend rehab at d/c   Goals   Patient Goals to feel better   STG Expiration Date 07/26/22   Short Term Goal #1 1-2 wks: bed mob/rolling w/ S, sitting balance to good dynamically, sititng tolerance EOB x 30 min, transfers w/ mod A of 1, increase B LE strength by 1/2 -1 grade   PT Treatment Day 4   Plan   Treatment/Interventions Functional transfer training;LE strengthening/ROM; Therapeutic exercise; Endurance training;Patient/family training;Equipment eval/education; Bed mobility;Gait training   Progress Slow progress, multiple refusals   PT Frequency 2-3x/wk   Recommendation   PT Discharge Recommendation Post acute rehabilitation services   AM-PAC Basic Mobility Inpatient   Turning in Bed Without Bedrails 2   Lying on Back to Sitting on Edge of Flat Bed 1   Moving Bed to Chair 1   Standing Up From Chair 1   Walk in Room 1   Climb 3-5 Stairs 1   Basic Mobility Inpatient Raw Score 7   Highest Level Of Mobility   JH-HLM Goal 2: Bed activities/Dependent transfer   JH-HLM Achieved 2: Bed activities/Dependent transfer   Mk Burger PT, DPT CSRS

## 2022-07-12 NOTE — PROGRESS NOTES
[facility]  Progress Note [address] 1940, 80 y o  female MRN: [id number]  Unit/Bed#: ICCU 204-01 Encounter: [id number]  Primary Care Provider: No primary care provider on file     Date and time admitted to hospital: 6/14/2022  8:07 PM    * Sacral wound  Assessment & Plan  · Stage IV sacral ulcer POA  · Status post I&D with General surgery  · Wound care per General surgery, had a VAC which is now off and patient is getting wet-to-dry dressings  · Received IV antibiotics, presently being monitored off of antibiotics, Infectious Disease input noted  · Frequent repositioning  · Optimize nutritional status  · General surgery following  · Working on placement with family, anticipate discharge this week    Proximal muscle weakness  Assessment & Plan  · Extensive upper and lower extremity proximal muscle weakness with marked improvement  · Received IVIG with Rheumatology  · Supportive care    Pulmonary hypertension ([facility] [address] )  Assessment & Plan  · Crest syndrome with pulmonary hypertension  · Continue supplemental oxygen as needed  · Adempas was stopped by heart failure due to hypotension  · Started Sildenafil 10 mg tid on this admission,  initiated priorauth sildenafil  · Continue Opsumit 10 mg daily  · Would be appropriate for palliative care  · Family meeting on June 23--> continue medical directed care but DNR level established     Hearing loss  Assessment & Plan  · History of progressive hearing loss  · Outpatient ENT follow-up    Pneumonia  Assessment & Plan    · Completed antibiotics  · Supportive care  · Aspiration precautions    Acute respiratory failure with hypoxia ([address] )  Assessment & Plan  · Multifactorial  · Encourage incentive spirometry, patient is doing better with this   · Remains on RA, requires 1-2 L of oxygen at night (prior to admission she required 2-3 L of oxygen at night)  · Pulmonary input noted         Urinary retention  Assessment & Plan  · Urinary retention with Tejada catheter in place prior to admission  · Outpatient Urology follow-up    Anemia  Assessment & Plan  · Multifactorial  · Iron studies noted  · Iron supplementation  · Folic acid supplementation  · Monitor hemoglobin      Acquired hypothyroidism  Assessment & Plan  · Continue levothyroxine    Scleroderma (HCC)  Assessment & Plan  · Scleroderma with crest syndrome  · Continue prednisone  · Rheumatology advised IVIG which patient completed with marked improvements to her ROM  · Continue Atovaquone for PCP prophylaxis     Dysphagia, oropharyngeal phase  Assessment & Plan  · Patient with history of dysphagia  · PEG tube in place  · Continue current tube feeds  · Aspiration precautions    Severe protein-calorie malnutrition ([address] )  Assessment & Plan  Malnutrition Findings:   Adult Malnutrition type: Chronic illness  Adult Degree of Malnutrition: Other severe protein calorie malnutrition  Malnutrition Characteristics: Muscle loss, Fat loss, Weight loss, Inadequate energy           360 Statement: related to disease/condition evidenced by increased kcal/pro needs for wound healing Stage IV sacral decub, BMI 18 4 adjusted per prior facility wt 6/7/22;severe buccal fat pads loss, severe deltoid muscle loss, Patient lost 31# (20 6%) since 3/15/22 past 3 months with illness <75% energy intake versus needs for > 1 month  Treating with EN support and Robert TID via PEG for wound healing    BMI Findings:  Adult BMI Classifications: Underweight < 18 5        Body mass index is 23 49 kg/m²  · Continue tube feeding as ordered       VTE Pharmacologic Prophylaxis: VTE Score: 7 High Risk (Score >/= 5) - Pharmacological DVT Prophylaxis Ordered: enoxaparin (Lovenox)  Sequential Compression Devices Ordered  Patient Centered Rounds: I performed bedside rounds with nursing staff today    Discussions with Specialists or Other Care Team Provider: no    Education and Discussions with Family / Patient: Updated  (daughter) via phone  Time Spent for Care: 30 minutes  More than 50% of total time spent on counseling and coordination of care as described above  Current Length of Stay: 28 day(s)  Current Patient Status: Inpatient   Certification Statement: The patient will continue to require additional inpatient hospital stay due to need for placement  Discharge Plan: Anticipate discharge in 24-48 hrs to rehab facility  Code Status: Level 3 - DNAR and DNI    Subjective:   No acute complaints    Objective:     Vitals:   Temp (24hrs), Av 2 °F (36 8 °C), Min:97 6 °F (36 4 °C), Max:98 8 °F (37 1 °C)    Temp:  [97 6 °F (36 4 °C)-98 8 °F (37 1 °C)] 98 5 °F (36 9 °C)  HR:  [68-82] 71  Resp:  [15-21] 20  BP: ()/(44-67) 95/44  SpO2:  [91 %-96 %] 93 %  Body mass index is 23 49 kg/m²  Input and Output Summary (last 24 hours): Intake/Output Summary (Last 24 hours) at 2022 1553  Last data filed at 2022 2995  Gross per 24 hour   Intake --   Output 1825 ml   Net -1825 ml       Physical Exam:   Physical Exam  HENT:      Head: Normocephalic and atraumatic  Nose: Nose normal       Mouth/Throat:      Mouth: Mucous membranes are moist    Eyes:      Extraocular Movements: Extraocular movements intact  Conjunctiva/sclera: Conjunctivae normal    Cardiovascular:      Rate and Rhythm: Normal rate and regular rhythm  Pulmonary:      Effort: Pulmonary effort is normal       Breath sounds: Normal breath sounds  No wheezing or rales  Abdominal:      General: Bowel sounds are normal  There is no distension  Palpations: Abdomen is soft  Tenderness: There is no abdominal tenderness  Musculoskeletal:         General: Normal range of motion  Cervical back: Normal range of motion and neck supple  Right lower leg: No edema  Left lower leg: No edema  Skin:     General: Skin is warm and dry     Neurological:      Mental Status: She is alert and oriented to person, place, and time  Additional Data:     Labs:  Results from last 7 days   Lab Units 07/10/22  0648 07/07/22  0458   WBC Thousand/uL 12 29* 11 89*   HEMOGLOBIN g/dL 8 2* 8 1*   HEMATOCRIT % 27 1* 27 8*   PLATELETS Thousands/uL 267 370   BANDS PCT %  --  6   NEUTROS PCT % 79*  --    LYMPHS PCT % 11*  --    LYMPHO PCT %  --  8*   MONOS PCT % 7  --    MONO PCT %  --  4   EOS PCT % 1 1     Results from last 7 days   Lab Units 07/10/22  0648   SODIUM mmol/L 137   POTASSIUM mmol/L 3 9   CHLORIDE mmol/L 104   CO2 mmol/L 30   BUN mg/dL 26*   CREATININE mg/dL 0 20*   ANION GAP mmol/L 3*   CALCIUM mg/dL 7 7*   GLUCOSE RANDOM mg/dL 78                       Lines/Drains:  Invasive Devices  Report    Peripheral Intravenous Line  Duration           Long-Dwell Peripheral IV (Midline) 06/14/22 Right Brachial 27 days          Drain  Duration           Gastrostomy/Enterostomy -- days    Urethral Catheter -- days              Urinary Catheter:  Goal for removal: N/A - Chronic Tejada               Imaging: No pertinent imaging reviewed      Recent Cultures (last 7 days):         Last 24 Hours Medication List:   Current Facility-Administered Medications   Medication Dose Route Frequency Provider Last Rate    acetaminophen  650 mg Per G Tube Q6H Edmund Beckwith MD      acetaminophen  650 mg Per G Tube Q4H PRN Edmund Beckwith MD      aspirin  81 mg Per G Tube Daily Argenis rBooks MD      atovaquone  750 mg Per G Tube Daily With Breakfast Argenis Brooks MD      cholecalciferol  1,000 Units Per G Tube Daily Argenis Brooks MD      enoxaparin  40 mg Subcutaneous Q24H [address] Argenis Brooks MD      fluticasone  1 spray Each Nare BID Ken Espitia MD      folic acid  1 mg Per PEG Tube Daily Ken Espitia MD      glycerin-hypromellose-  2 drop Both Eyes BID Argenis Brooks MD      hydrOXYzine HCL  25 mg Oral Q6H PRN Robb Rose MD      levalbuterol  1 25 mg Nebulization TID Anita Vela [facility] levothyroxine  25 mcg Per PEG Tube Early Morning Angelina Ojeda MD      loratadine  10 mg Per G Tube Daily Angelina Ojeda MD      Macitentan  10 mg Per G Tube Daily Tita Calderon MD      melatonin  3 mg Oral HS PRN Maurisio Magaña MD      nystatin   Topical BID Promise Shukla MD      ondansetron  4 mg Intravenous Q6H PRN Maurisio Magaña MD      oxyCODONE  2 5 mg Per G Tube Q4H PRN Maurisio Magaña MD      oxyCODONE  5 mg Per G Tube Q4H PRN Maurisio Magaña MD      pantoprazole  40 mg Intravenous Q24H [address] Angelina Ojeda MD      predniSONE  30 mg Per G Tube Daily Angelina Ojeda MD      saccharomyces boulardii  250 mg Per G Tube BID Angelina Ojeda MD      sildenafil  10 mg Oral TID Gerson Stover DO      sodium chloride  1 spray Each Nare Q1H PRN Tita Calderon MD      sodium chloride  3 mL Nebulization TID Ty Alexis DO          Today, Patient Was Seen By: Torsten Cowart DO    **Please Note: This note may have been constructed using a voice recognition system  **

## 2022-07-12 NOTE — PLAN OF CARE
Problem: MOBILITY - ADULT  Goal: Maintain or return to baseline ADL function  Description: INTERVENTIONS:  -  Assess patient's ability to carry out ADLs; assess patient's baseline for ADL function and identify physical deficits which impact ability to perform ADLs (bathing, care of mouth/teeth, toileting, grooming, dressing, etc )  - Assess/evaluate cause of self-care deficits   - Assess range of motion  - Assess patient's mobility; develop plan if impaired  - Assess patient's need for assistive devices and provide as appropriate  - Encourage maximum independence but intervene and supervise when necessary  - Involve family in performance of ADLs  - Assess for home care needs following discharge   - Consider OT consult to assist with ADL evaluation and planning for discharge  - Provide patient education as appropriate  Outcome: Progressing  Goal: Maintains/Returns to pre admission functional level  Description: INTERVENTIONS:  - Perform BMAT or MOVE assessment daily    - Set and communicate daily mobility goal to care team and patient/family/caregiver  - Collaborate with rehabilitation services on mobility goals if consulted  - Perform Range of Motion  times a day  - Reposition patient every  hours    - Dangle patient  times a day  - Stand patient  times a day  - Ambulate patient  times a day  - Out of bed to chair  times a day   - Out of bed for meals  times a day  - Out of bed for toileting  - Record patient progress and toleration of activity level   Outcome: Progressing     Problem: Potential for Falls  Goal: Patient will remain free of falls  Description: INTERVENTIONS:  - Educate patient/family on patient safety including physical limitations  - Instruct patient to call for assistance with activity   - Consult OT/PT to assist with strengthening/mobility   - Keep Call bell within reach  - Keep bed low and locked with side rails adjusted as appropriate  - Keep care items and personal belongings within reach  - Initiate and maintain comfort rounds  - Make Fall Risk Sign visible to staff  - Offer Toileting every  Hours, in advance of need  - Initiate/Maintain alarm  - Obtain necessary fall risk management equipmen  - Apply yellow socks and bracelet for high fall risk patients  - Consider moving patient to room near nurses station  Outcome: Progressing     Problem: Prexisting or High Potential for Compromised Skin Integrity  Goal: Skin integrity is maintained or improved  Description: INTERVENTIONS:  - Identify patients at risk for skin breakdown  - Assess and monitor skin integrity  - Assess and monitor nutrition and hydration status  - Monitor labs   - Assess for incontinence   - Turn and reposition patient  - Assist with mobility/ambulation  - Relieve pressure over bony prominences  - Avoid friction and shearing  - Provide appropriate hygiene as needed including keeping skin clean and dry  - Evaluate need for skin moisturizer/barrier cream  - Collaborate with interdisciplinary team   - Patient/family teaching  - Consider wound care consult   Outcome: Progressing     Problem: PAIN - ADULT  Goal: Verbalizes/displays adequate comfort level or baseline comfort level  Description: Interventions:  - Encourage patient to monitor pain and request assistance  - Assess pain using appropriate pain scale  - Administer analgesics based on type and severity of pain and evaluate response  - Implement non-pharmacological measures as appropriate and evaluate response  - Consider cultural and social influences on pain and pain management  - Notify physician/advanced practitioner if interventions unsuccessful or patient reports new pain  Outcome: Progressing     Problem: INFECTION - ADULT  Goal: Absence or prevention of progression during hospitalization  Description: INTERVENTIONS:  - Assess and monitor for signs and symptoms of infection  - Monitor lab/diagnostic results  - Monitor all insertion sites, i e  indwelling lines, tubes, and drains  - Monitor endotracheal if appropriate and nasal secretions for changes in amount and color  - [place] appropriate cooling/warming therapies per order  - Administer medications as ordered  - Instruct and encourage patient and family to use good hand hygiene technique  - Identify and instruct in appropriate isolation precautions for identified infection/condition  Outcome: Progressing  Goal: Absence of fever/infection during neutropenic period  Description: INTERVENTIONS:  - Monitor WBC    Outcome: Progressing

## 2022-07-12 NOTE — OCCUPATIONAL THERAPY NOTE
Occupational Therapy Progress Note     Patient Name: Minal Michaels  XCXAL'[id number] Date: 7/12/2022  Problem List  Principal Problem:    Sacral wound  Active Problems:    Severe protein-calorie malnutrition (HCC)    Dysphagia, oropharyngeal phase    Scleroderma (HCC)    Acquired hypothyroidism    Anemia    Urinary retention    Acute respiratory failure with hypoxia (HCC)    Pneumonia    Hearing loss    Pulmonary hypertension (HCC)    Proximal muscle weakness          07/12/22 0947   OT Last Visit   OT Visit Date 07/12/22   Note Type   Note Type Treatment   Restrictions/Precautions   Weight Bearing Precautions Per Order No   Other Precautions Cognitive;Multiple lines; Fall Risk;Telemetry;Pain   General   Response to Previous Treatment Patient with no complaints from previous session   Pain Assessment   Pain Assessment Tool 0-10   Pain Score 8   Pain Location/Orientation Location: [address] [place] [address] Pain Intervention(s) Repositioned; Emotional support   ADL   Where Assessed Supine, bed   Grooming Assistance 3  Moderate Assistance   Grooming Deficit Brushing hair;Wash/dry face   Grooming Comments pt able to wash majority of face with LUE; assist for far right side of face  Able to comb front L of hair; assist for back and R side  Pt uses LUE to assist shoulder flexion of RUE   LB Dressing Assistance 1  Total Assistance   LB Dressing Deficit Don/doff R sock; Don/doff L sock   Bed Mobility   Rolling L 2  Maximal assistance   Additional items Assist x 1;Verbal cues;LE management   ROM- Right Upper Extremities   R Shoulder AAROM; Flexion   R Position Supine   R Weight/Reps/Sets 12 reps   ROM - Left Upper Extremities    L Shoulder AAROM; Flexion   L Position Supine   L Weight/Reps/Sets 12 reps   LUE ROM Comment more active movement in L shoulder as compared to R shoulder   Right Upper Extremity- Strength   R Shoulder Horizontal ABduction   R Elbow Elbow flexion;Elbow extension   R Position Supine   Equipment Theraband  (yellow)   R Weight/Reps/Sets 12 reps   Left Upper Extremity-Strength   L Shoulder Horizontal ABduction   L Elbow Elbow flexion;Elbow extension   L Position Supine   Equipment Theraband  (yellow)   L Weights/Reps/Sets 12 reps   Cognition   Overall Cognitive Status WFL   Arousal/Participation Alert; Cooperative   Attention Attends with cues to redirect   Orientation Level Oriented X4   Memory Decreased recall of precautions   Following Commands Follows one step commands without difficulty   Activity Tolerance   Activity Tolerance Patient limited by fatigue;Patient limited by pain   Medical Staff Made Aware Per RN pt appropriate to be seen   Assessment   Assessment Patient participated in Skilled OT session this date with interventions consisting of UE there-ex, ADL re-training, bed mobility  Patient agreeable to OT treatment session, upon arrival patient was found supine in bed  Pt rolled L with MAX A for re-positioning in bed; declined further bed mobility 2' sacral pain  Pt continues to require assist for grooming tasks 2' decreased UE AROM/strength  Patient continues to be functioning below baseline level, occupational performance remains limited secondary to factors listed above and increased risk for falls and injury  From OT standpoint, recommendation at time of d/c would be POST-ACUTE [facility] SERVICES  Patient to benefit from continued Occupational Therapy treatment while in the hospital to address deficits as defined above and maximize level of functional independence with ADLs and functional mobility     Plan   Treatment Interventions ADL retraining;Patient/family training;UE strengthening/ROM   Goal Expiration Date 07/26/22  (goals from OT IE extended +14 days)   OT Treatment Day 6   OT Frequency 2-3x/wk   Recommendation   OT Discharge Recommendation Post acute rehabilitation services   AM-PAC Daily Activity Inpatient   Lower Body Dressing 1   Bathing 1   Toileting 1   Upper Body Dressing 2   Grooming 2   Eating 2 Daily Activity Raw Score 9   Turning Head Towards Sound 3   Follow Simple Instructions 3   Low Function Daily Activity Raw Score 15   Low Function Daily Activity Standardized Score 26 28   AM-PAC Applied Cognition Inpatient   Following a Speech/Presentation 3   Understanding Ordinary Conversation 4   Taking Medications 4   Remembering Where Things Are Placed or Put Away 4   Remembering List of 4-5 Errands 3   Taking Care of Complicated Tasks 3   Applied Cognition Raw Score 21   Applied Cognition Standardized Score 44 3          The patient's raw score on the AM-PAC Daily Activity inpatient short form low function score is 15, standardized score is Low Function Daily Activity Standardized Score: 26 28  Patients with a standardized score less than 39 4 are likely to benefit from discharge to post-acute rehab services  Please refer to the recommendation of the Occupational Therapist for safe discharge planning           Isaac Gomez, OT

## 2022-07-12 NOTE — NUTRITION
07/12/22 1149   Biochemical Data,Medical Tests, and Procedures   Biochemical Data/Medical Tests/Procedures Lab values reviewed; Meds reviewed   Nutrition-Focused Physical Exam   Nutrition-Focused Physical Exam Findings Edema;RN skin assessment reviewed   Nutrition-Focused Physical Exam Findings Bilateral lower extremities trace edema, stage IV sacral wound, multiple unstageable wounds noted   Adequacy of Intake   Nutrition Modality NPO;EN   Feeding Route   PO NPO   Tube Feeding PEG tube   Formula Jevity 1  5;Other (Comment)  (Banatrol TID and Robert BID)   Formula rate 90   Cyclic/ Intermittent cycle, 16 hr   Continuous Continuous via Pump   Free Water From EN 1094 mL   Water Flushes Yes  (200 ml free water flush every 6 hours)   Total Free Water  1894 mL   Total EN Volume 1440 mL   Total Kcal intake 2380  (EN/Robert/Banatrol)   Protein Intake (g) 97 g  (EN/Robert)   Current PO Intake   Current Diet Order NPO, EN   Estimated Calorie Intake %   Estimated Protein Intake  %   Estimated Fluid Intake %   PES Statement   Problem Continue previous diagnosis   Recommendations/Interventions   Summary Cyclic EN continues to provide 100% nutritional needs  No updated weights  Interventions/Recommendations Continue EN as ordered; Supplement continue;Obtain current weight   Patient Nutrition Goals   Goal Status Met;Extended   Timeframe to complete goal by next f/u   Nutrition Complexity Risk   Nutrition complexity level Moderate risk   Follow up date 07/19/22

## 2022-07-13 VITALS
OXYGEN SATURATION: 94 % | BODY MASS INDEX: 23.07 KG/M2 | HEART RATE: 72 BPM | RESPIRATION RATE: 19 BRPM | HEIGHT: 68 IN | WEIGHT: 152.2 LBS | TEMPERATURE: 98.8 F | SYSTOLIC BLOOD PRESSURE: 112 MMHG | DIASTOLIC BLOOD PRESSURE: 51 MMHG

## 2022-07-13 PROBLEM — J96.01 ACUTE RESPIRATORY FAILURE WITH HYPOXIA (HCC): Status: RESOLVED | Noted: 2022-06-06 | Resolved: 2022-07-13

## 2022-07-13 PROCEDURE — 94640 AIRWAY INHALATION TREATMENT: CPT

## 2022-07-13 PROCEDURE — 97530 THERAPEUTIC ACTIVITIES: CPT

## 2022-07-13 PROCEDURE — 0051A: CPT | Performed by: GENERAL PRACTICE

## 2022-07-13 PROCEDURE — 91305 COVID-19 PFIZER VAC (TRIS SUCROSE, GRAY CAP FORM) 30 MCG/0.3ML SUSP: CPT | Performed by: GENERAL PRACTICE

## 2022-07-13 PROCEDURE — 94760 N-INVAS EAR/PLS OXIMETRY 1: CPT

## 2022-07-13 PROCEDURE — C9113 INJ PANTOPRAZOLE SODIUM, VIA: HCPCS | Performed by: INTERNAL MEDICINE

## 2022-07-13 PROCEDURE — 99239 HOSP IP/OBS DSCHRG MGMT >30: CPT | Performed by: GENERAL PRACTICE

## 2022-07-13 RX ORDER — MELATONIN
1000 DAILY
Refills: 0
Start: 2022-07-14 | End: 2022-09-17

## 2022-07-13 RX ORDER — ACETAMINOPHEN 160 MG/5ML
650 SUSPENSION, ORAL (FINAL DOSE FORM) ORAL EVERY 6 HOURS
Refills: 0
Start: 2022-07-13 | End: 2022-09-17

## 2022-07-13 RX ORDER — FOLIC ACID 1 MG/1
1 TABLET ORAL DAILY
Refills: 0
Start: 2022-07-14 | End: 2022-09-17

## 2022-07-13 RX ORDER — NYSTATIN 100000 [USP'U]/G
POWDER TOPICAL 2 TIMES DAILY
Qty: 15 G | Refills: 0
Start: 2022-07-13 | End: 2022-09-17

## 2022-07-13 RX ORDER — PREDNISONE 10 MG/1
30 TABLET ORAL DAILY
Refills: 0
Start: 2022-07-14 | End: 2022-09-17

## 2022-07-13 RX ORDER — LANOLIN ALCOHOL/MO/W.PET/CERES
3 CREAM (GRAM) TOPICAL
Refills: 0
Start: 2022-07-13 | End: 2022-08-09

## 2022-07-13 RX ORDER — ASPIRIN 81 MG/1
81 TABLET, CHEWABLE ORAL DAILY
Refills: 0
Start: 2022-07-14 | End: 2022-09-17

## 2022-07-13 RX ORDER — FLUTICASONE PROPIONATE 50 MCG
1 SPRAY, SUSPENSION (ML) NASAL 2 TIMES DAILY
Refills: 0
Start: 2022-07-13 | End: 2022-09-17

## 2022-07-13 RX ORDER — SACCHAROMYCES BOULARDII 250 MG
250 CAPSULE ORAL 2 TIMES DAILY
Refills: 0
Start: 2022-07-13 | End: 2022-09-17

## 2022-07-13 RX ORDER — LEVALBUTEROL 1.25 MG/.5ML
1.25 SOLUTION, CONCENTRATE RESPIRATORY (INHALATION)
Refills: 0
Start: 2022-07-13 | End: 2022-09-17

## 2022-07-13 RX ORDER — ECHINACEA PURPUREA EXTRACT 125 MG
1 TABLET ORAL
Refills: 0
Start: 2022-07-13 | End: 2022-09-17

## 2022-07-13 RX ORDER — LEVOTHYROXINE SODIUM 0.03 MG/1
25 TABLET ORAL
Refills: 0
Start: 2022-07-14 | End: 2022-09-17

## 2022-07-13 RX ORDER — SODIUM CHLORIDE FOR INHALATION 0.9 %
3 VIAL, NEBULIZER (ML) INHALATION
Refills: 0
Start: 2022-07-13 | End: 2022-09-17

## 2022-07-13 RX ORDER — LORATADINE 10 MG/1
10 TABLET ORAL DAILY
Refills: 0
Start: 2022-07-14 | End: 2022-09-17

## 2022-07-13 RX ORDER — OXYCODONE HCL 5 MG/5 ML
5 SOLUTION, ORAL ORAL EVERY 8 HOURS PRN
Qty: 45 ML | Refills: 0 | Status: SHIPPED | OUTPATIENT
Start: 2022-07-13 | End: 2022-07-23

## 2022-07-13 RX ADMIN — Medication 1000 UNITS: at 08:39

## 2022-07-13 RX ADMIN — ACETAMINOPHEN 650 MG: 650 SUSPENSION ORAL at 10:34

## 2022-07-13 RX ADMIN — NYSTATIN 2 APPLICATION: 100000 POWDER TOPICAL at 17:14

## 2022-07-13 RX ADMIN — LORATADINE 10 MG: 10 TABLET ORAL at 08:39

## 2022-07-13 RX ADMIN — ATOVAQUONE 750 MG: 750 SUSPENSION ORAL at 08:43

## 2022-07-13 RX ADMIN — OXYCODONE HYDROCHLORIDE 5 MG: 5 SOLUTION ORAL at 08:59

## 2022-07-13 RX ADMIN — GLYCERIN, HYPROMELLOSE, POLYETHYLENE GLYCOL 2 DROP: .2; .2; 1 LIQUID OPHTHALMIC at 17:14

## 2022-07-13 RX ADMIN — NYSTATIN 1 APPLICATION: 100000 POWDER TOPICAL at 08:44

## 2022-07-13 RX ADMIN — FLUTICASONE PROPIONATE 1 SPRAY: 50 SPRAY, METERED NASAL at 08:44

## 2022-07-13 RX ADMIN — SILDENAFIL CITRATE 10 MG: 20 TABLET ORAL at 08:38

## 2022-07-13 RX ADMIN — FLUTICASONE PROPIONATE 1 SPRAY: 50 SPRAY, METERED NASAL at 17:13

## 2022-07-13 RX ADMIN — FOLIC ACID 1 MG: 1 TABLET ORAL at 08:40

## 2022-07-13 RX ADMIN — BNT162B2 0.3 ML: 0.23 INJECTION, SUSPENSION INTRAMUSCULAR at 13:34

## 2022-07-13 RX ADMIN — ASPIRIN 81 MG CHEWABLE TABLET 81 MG: 81 TABLET CHEWABLE at 08:39

## 2022-07-13 RX ADMIN — PANTOPRAZOLE SODIUM 40 MG: 40 INJECTION, POWDER, FOR SOLUTION INTRAVENOUS at 08:40

## 2022-07-13 RX ADMIN — Medication 250 MG: at 17:14

## 2022-07-13 RX ADMIN — ISODIUM CHLORIDE 3 ML: 0.03 SOLUTION RESPIRATORY (INHALATION) at 13:58

## 2022-07-13 RX ADMIN — LEVALBUTEROL HYDROCHLORIDE 1.25 MG: 1.25 SOLUTION, CONCENTRATE RESPIRATORY (INHALATION) at 07:29

## 2022-07-13 RX ADMIN — ISODIUM CHLORIDE 3 ML: 0.03 SOLUTION RESPIRATORY (INHALATION) at 07:29

## 2022-07-13 RX ADMIN — GLYCERIN, HYPROMELLOSE, POLYETHYLENE GLYCOL 2 DROP: .2; .2; 1 LIQUID OPHTHALMIC at 08:44

## 2022-07-13 RX ADMIN — ENOXAPARIN SODIUM 40 MG: 40 INJECTION SUBCUTANEOUS at 08:40

## 2022-07-13 RX ADMIN — ACETAMINOPHEN 650 MG: 650 SUSPENSION ORAL at 04:28

## 2022-07-13 RX ADMIN — LEVALBUTEROL HYDROCHLORIDE 1.25 MG: 1.25 SOLUTION, CONCENTRATE RESPIRATORY (INHALATION) at 13:58

## 2022-07-13 RX ADMIN — ACETAMINOPHEN 650 MG: 650 SUSPENSION ORAL at 17:11

## 2022-07-13 RX ADMIN — OXYCODONE HYDROCHLORIDE 5 MG: 5 SOLUTION ORAL at 04:07

## 2022-07-13 RX ADMIN — OXYCODONE HYDROCHLORIDE 5 MG: 5 SOLUTION ORAL at 17:11

## 2022-07-13 RX ADMIN — MACITENTAN 10 MG: 10 TABLET, FILM COATED ORAL at 08:44

## 2022-07-13 RX ADMIN — Medication 250 MG: at 08:43

## 2022-07-13 RX ADMIN — PREDNISONE 30 MG: 20 TABLET ORAL at 08:40

## 2022-07-13 RX ADMIN — MELATONIN 3 MG: at 00:48

## 2022-07-13 RX ADMIN — SILDENAFIL CITRATE 10 MG: 20 TABLET ORAL at 17:12

## 2022-07-13 RX ADMIN — LEVOTHYROXINE SODIUM 25 MCG: 25 TABLET ORAL at 08:38

## 2022-07-13 NOTE — OCCUPATIONAL THERAPY NOTE
Occupational Therapy Progress Note     Patient Name: Jorge Robert  EUXZX'I Date: 7/13/2022  Problem List  Principal Problem:    Sacral wound  Active Problems:    Severe protein-calorie malnutrition (HCC)    Dysphagia, oropharyngeal phase    Scleroderma (HCC)    Acquired hypothyroidism    Anemia    Urinary retention    Acute respiratory failure with hypoxia (HCC)    Pneumonia    Hearing loss    Pulmonary hypertension (HCC)    Proximal muscle weakness        07/13/22 0931   OT Last Visit   OT Visit Date 07/13/22   Note Type   Note Type Treatment   Restrictions/Precautions   Weight Bearing Precautions Per Order No   Other Precautions Cognitive;Multiple lines;Telemetry; Fall Risk   General   Response to Previous Treatment Patient with no complaints from previous session   Lifestyle   Autonomy Pt reports that in March she was I with ADLS, IADLS, and mobility w/o AD, at [facility] assist with ADL's/IADL's, assist of 1 with RW with mobility/transfers  Reciprocal Relationships Pt has a supportive sister and a    Service to Others Pt is retired   Intrinsic Gratification Pt enjoys reading   Pain Assessment   Pain Assessment Tool 0-10   Pain Score 8   Pain Location/Orientation Location: [address] [place] [address] Pain Intervention(s) Repositioned; Ambulation/increased activity   Assessment   Assessment Pt participated in OT session focusing on improving fine motor coordination, bilateral hand coordination, and increasing hand strength  Pt reporting coloring as one of her favorite hobbies PTA  Pt completed flower/vase craft w/ varying levels of assistance throughout  Pt unable to cut out flowers requiring total assistance  Pt able to color in flower w/ L hand due to decreased hand strength in R hand  Pt able to layer flowers w/ min a for creating tape bubbles  Pt able to color in vase w/ min hand over hand assistance  Pt requested therapist hang completed project reporting she was proud of her work   Pt remains limited 2* decreased activity tolerance, decreased endurance, and decreased strength  Continue to recommend STR  OT will cont to follow to see as able  The patient's raw score on the AM-PAC Daily Activity inpatient short form low function score is 15, standardized score is Low Function Daily Activity Standardized Score: (P) 26 28  Patients with a standardized score less than 39 4 are likely to benefit from discharge to post-acute rehab services  Please refer to the recommendation of the Occupational Therapist for safe discharge planning  Plan   Treatment Interventions Fine motor coordination activities; Activityengagement   Goal Expiration Date 07/26/22   OT Treatment Day 7   OT Frequency 2-3x/wk   Recommendation   OT Discharge Recommendation Post acute rehabilitation services   [facility] Daily Activity Inpatient   Lower Body Dressing 1   Bathing 1   Toileting 1   Upper Body Dressing 2   Grooming 2   Eating 2   Daily Activity Raw Score 9   Turning Head Towards Sound 3   Follow Simple Instructions 3   Low Function Daily Activity Raw Score 15   Low Function Daily Activity Standardized Score 26 28   AM-PAC Applied Cognition Inpatient   Following a Speech/Presentation 3   Understanding Ordinary Conversation 4   Taking Medications 4   Remembering Where Things Are Placed or Put Away 4   Remembering List of 4-5 Errands 3   Taking Care of Complicated Tasks 3   Applied Cognition Raw Score 21   Applied Cognition Standardized Score 44 3   Modified [place] Scale   Modified [place] Scale 4     Gretchen Lozano MS, OTR/L

## 2022-07-13 NOTE — CASE MANAGEMENT
Case Management Discharge Planning Note    Patient name Marely Hahn  Location [facility]- MRN [id number]  : 1940 Date 2022       Current Admission Date: 2022  Current Admission Diagnosis:Sacral wound   Patient Active Problem List    Diagnosis Date Noted    Proximal muscle weakness 2022    Pulmonary hypertension ([facility] [address] ) 2022    Hearing loss 2022    Severe protein-calorie malnutrition ([facility] [address] ) 2022    Pneumonia 2022    Sacral wound 2022    Dysphagia, oropharyngeal phase 2022    Scleroderma ([facility] [address] ) 2022    Acquired hypothyroidism 2022    Anemia 2022    Urinary retention 2022    Acute respiratory failure with hypoxia ([facility] [address] ) 2022      LOS (days): 29  Geometric Mean LOS (GMLOS) (days): 9 60  Days to GMLOS:-19 1     OBJECTIVE:  Risk of Unplanned Readmission Score: 22 09         Current admission status: Inpatient   Preferred Pharmacy:   PATIENT/FAMILY REPORTS NO PREFERRED PHARMACY  No address on file      [address] [place] [facility]  Phone: [phone] Fax: [phone]    Primary Care Provider: No primary care provider on file      Primary Insurance: [facility]  Secondary Insurance:     DISCHARGE DETAILS:    Discharge planning discussed with[de-identified] Patient, Rick Oh and sister Yeni Burkt of Choice: Yes  Comments - Freedom of Choice: numerous referrals placed, accepted at [facility], authorization received  CM contacted family/caregiver?: Yes  Were Treatment Team discharge recommendations reviewed with patient/caregiver?: Yes  Did patient/caregiver verbalize understanding of patient care needs?: Yes  Were patient/caregiver advised of the risks associated with not following Treatment Team discharge recommendations?: Yes    Contacts  Patient Contacts: Pastora Meek, daughter  Relationship to Patient[de-identified] Family  Contact Method: Phone  Phone Number: [phone]  Reason/Outcome: Continuity of Care, Emergency [address]         Is the patient interested in [facility] at discharge?: No                   Treatment Team Recommendation: SNF  Discharge Destination Plan[de-identified] SNF  Transport at Discharge : [facility]  Dispatcher Contacted: Yes  Number/Name of Dispatcher: RANI/[phone]  Transported by [employer] and Unit #): [facility]  ETA of Transport (Date): 07/13/22  ETA of Transport (Time): 0285              IMM Given (Date):: 07/13/22  IMM Given to[de-identified] Patient  Family notified[de-identified] spoke with daughter Iris Mcdaniels and sister Dago Patel  Additional Comments: Patient cleared for discharge today to [facility] for [facility]; authorization received from [employer]  Transportation arranged and confirmed via [address] at [employer]  Patient, daughter Iris Mcdaniels, sister Dago Patel, nursing, and facility aware of discharge plan      Accepting Facility Name, [facility] : [place], [place]  Receiving Facility/Agency Phone Number: [phone]  Facility/Agency Fax Number: [phone]

## 2022-07-13 NOTE — DISCHARGE SUMMARY
[facility]  Discharge- Ammon Shore 1940, 80 y o  female MRN: [id number]  [facility] Encounter: [id number]  Primary Care Provider: No primary care provider on file     Date and time admitted to hospital: 6/14/2022  8:07 PM    * Sacral wound  Assessment & Plan  · Stage IV sacral ulcer POA  · Status post I&D with General surgery  · Wound care per General surgery, had a VAC which is now off and patient is getting wet-to-dry dressings  · Received IV antibiotics, presently being monitored off of antibiotics, Infectious Disease input noted  · Frequent repositioning  · Optimize nutritional status  · General surgery following  · Working on placement with family, anticipate discharge this week    Proximal muscle weakness  Assessment & Plan  · Extensive upper and lower extremity proximal muscle weakness with marked improvement  · Received IVIG with Rheumatology  · Supportive care    Pulmonary hypertension ([facility] [address] )  Assessment & Plan  · Crest syndrome with pulmonary hypertension  · Continue supplemental oxygen as needed  · Adempas was stopped by heart failure due to hypotension  · Started Sildenafil 10 mg tid on this admission,  initiated priorauth sildenafil  · Continue Opsumit 10 mg daily  · Would be appropriate for palliative care  · Family meeting on June 23--> continue medical directed care but DNR level established     Hearing loss  Assessment & Plan  · History of progressive hearing loss  · Outpatient ENT follow-up    Pneumonia  Assessment & Plan    · Completed antibiotics  · Supportive care  · Aspiration precautions    Urinary retention  Assessment & Plan  · Urinary retention with Tejada catheter in place prior to admission  · Outpatient Urology follow-up    Anemia  Assessment & Plan  · Multifactorial  · Iron studies noted  · Iron supplementation  · Folic acid supplementation  · Monitor hemoglobin      Acquired hypothyroidism  Assessment & Plan  · Continue levothyroxine    Scleroderma ([facility] [address] )  Assessment & Plan  · Scleroderma with crest syndrome  · Continue prednisone  · Rheumatology advised IVIG which patient completed with marked improvements to her ROM  · Continue Atovaquone for PCP prophylaxis     Dysphagia, oropharyngeal phase  Assessment & Plan  · Patient with history of dysphagia  · PEG tube in place  · Continue current tube feeds  · Aspiration precautions    Severe protein-calorie malnutrition ([address] )  Assessment & Plan  Malnutrition Findings:   Adult Malnutrition type: Chronic illness  Adult Degree of Malnutrition: Other severe protein calorie malnutrition  Malnutrition Characteristics: Muscle loss, Fat loss, Weight loss, Inadequate energy           360 Statement: related to disease/condition evidenced by increased kcal/pro needs for wound healing Stage IV sacral decub, BMI 18 4 adjusted per prior facility wt 6/7/22;severe buccal fat pads loss, severe deltoid muscle loss, Patient lost 31# (20 6%) since 3/15/22 past 3 months with illness <75% energy intake versus needs for > 1 month  Treating with EN support and Robert TID via PEG for wound healing    BMI Findings:  Adult BMI Classifications: Underweight < 18 5        Body mass index is 23 49 kg/m²  · Continue tube feeding as ordered     Acute respiratory failure with hypoxia (HCC)-resolved as of 7/13/2022  Assessment & Plan  · Multifactorial  · Encourage incentive spirometry, patient is doing better with this   · Remains on RA, requires 1-2 L of oxygen at night (prior to admission she required 2-3 L of oxygen at night)  · Pulmonary input noted           Medical Problems             Resolved Problems  Date Reviewed: 7/12/2022          Resolved    Acute respiratory failure with hypoxia ([facility] [address] ) 7/13/2022     Resolved by  Kirit Jo DO              Discharging Physician / Practitioner: Kirit Jo DO  PCP: No primary care provider on file    Admission Date:   Admission Orders (From admission, onward)     Ordered        06/14/22 2134  Inpatient Admission  Once                      Discharge Date: 07/13/22    Disposition:    Jennifer [place]: [facility]  [place] Texted [facility] Physician  Reason for Admission: infected sacral wound    Discharge Diagnoses:   Please see assessment and plan section above for further details regarding discharge diagnoses  Consultations During Hospital Stay:  · Gen surg - was originally primary  · RD  · ID  · Pharmacy  · Pulm  · Rheum  · Palliative  · Cardio    Procedures Performed:   · S/p sacral wound debridement 6/14     Significant Findings / Test Results:   · See above    Incidental Findings:   · none     Test Results Pending at Discharge (will require follow up):   · none     Outpatient Tests Requested:  · Per rehab    Complications:  none    Hospital Course:      Luis Cook is a 80 y o  female patient who originally presented to the hospital on 6/14/2022 due to infected sacral wound  Pt went for debridement  Completed abx  Had UE weakness and underwent IVIG  Completed abx for PNA as well  Urinary retention so lilly placed  Condition at Discharge: stable    Discharge Day Visit / Exam:   Subjective:  No acute complaints  Vitals: Blood Pressure: 112/62 (07/13/22 0700)  Pulse: 72 (07/13/22 1000)  Temperature: 97 5 °F (36 4 °C) (07/13/22 1100)  Temp Source: Oral (07/13/22 0700)  Respirations: 22 (07/13/22 1000)  Height: 5' 7 5" (171 5 cm) (06/20/22 1015)  Weight - Scale: 69 kg (152 lb 3 2 oz) (07/01/22 0800)  SpO2: 99 % (07/13/22 1402)  Exam:   Physical Exam  HENT:      Head: Normocephalic and atraumatic  Nose: Nose normal       Mouth/Throat:      Mouth: Mucous membranes are moist    Eyes:      Extraocular Movements: Extraocular movements intact  Conjunctiva/sclera: Conjunctivae normal    Cardiovascular:      Rate and Rhythm: Normal rate and regular rhythm     Pulmonary:      Effort: Pulmonary effort is normal  Breath sounds: Normal breath sounds  No wheezing or rales  Abdominal:      General: Bowel sounds are normal  There is no distension  Palpations: Abdomen is soft  Tenderness: There is no abdominal tenderness  Musculoskeletal:      Cervical back: Normal range of motion and neck supple  Right lower leg: No edema  Left lower leg: No edema  Skin:     General: Skin is warm and dry  Neurological:      Mental Status: She is alert and oriented to person, place, and time  Discussion with Family: spoke w/ sister bedside and dtr over phone    Medication Adjustments and Discharge Medications:  · Discharge Medication List: See after visit summary for reconciled discharge medications  · Medication Dosing Tapers - Please refer to Discharge Medication List for details on any medication dosing tapers (if applicable to patient)  · Summary of Medication Adjustments made as a result of this hospitalization: see med rec  · Medications being temporarily held (include recommended restart time): n/a    Wound Care Recommendations:  When applicable, please see wound care section of After Visit Summary  Instructions for any Catheters / Lines Present at Discharge (including removal date, if applicable): Tejada in place  Urology referral placed  Can coordinate w/ urology  May make sense to keep in due to sacral wound until fully healed  Diet Recommendations at Discharge:  Diet -        Diet Orders   (From admission, onward)             Start     Ordered    07/05/22 1331  Diet Enteral/Parenteral; Tube Feeding No Oral Diet; Jevity 1 5; Cyclic; 90; 16 hours; Robert - Two Packets Daily; Banatrol Plus Banana Flakes - Three Packets Daily; 200; Water; Every 6 hours  Diet effective now        Comments: Needs to be Jevity 1 5 at 90 ml/hr for a total of 14 hours  Run from 4 pm until 6 am  Free water q 6 hours while running plus an additional 240 ml free water flushes throughout the day      Add 1 pkt of robert via peg as slurry tid for wound healing    * at 90 ml/hr for 14 hours of Jevity 1 5 we are giving her 1890 kcal with 80 gms of protein   References:    Nutrtion Support Algorithm Enteral vs  Parenteral   Question Answer Comment   Diet Type Enteral/Parenteral    Enteral/Parenteral Tube Feeding No Oral Diet    Tube Feeding Formula: Jevity 1 5    Bolus/Cyclic/Continuous Cyclic    Tube Feeding Cyclic Rate (mL/hr): 90    Tube Feeding Cyclic: Administer over: 16 hours    Robert Temple Robert - Two Packets Daily    Banatrol Plus Banana Flakes Banatrol Plus Banana Flakes - Three Packets Daily    Tube Feeding flush (mL): 200    Water Flush type: Water    Water flush frequency: Every 6 hours    RD to adjust diet per protocol? No        07/05/22 1330                Goals of Care Discussions:  · Code Status at Discharge: Level 3 - DNAR and DNI  · Goals of care were discussed during this admission  Summary of discussion: DNR/DNI but tx focused  Discharge instructions/Information to patient and family:   See after visit summary section titled Discharge Instructions for information provided to patient and family  Planned Readmission: no      Discharge Statement:  I spent 45 minutes discharging the patient  This time was spent on the day of discharge  I had direct contact with the patient on the day of discharge  Greater than 50% of the total time was spent examining patient, answering all patient questions, arranging and discussing plan of care with patient as well as directly providing post-discharge instructions  Additional time then spent on discharge activities  **Please Note: This note may have been constructed using a voice recognition system  **

## 2022-07-13 NOTE — NURSING NOTE
Report called to Heidy Shah RN at [facility]  Pt left via [facility] at approx  18:45  VSS and belongings with meds given to Ele patterson

## 2022-07-13 NOTE — PLAN OF CARE
Problem: OCCUPATIONAL THERAPY ADULT  Goal: Performs self-care activities at highest level of function for planned discharge setting  See evaluation for individualized goals  Description: Treatment Interventions: ADL retraining, Functional transfer training, UE strengthening/ROM, Endurance training, Cognitive reorientation, Patient/family training, Equipment evaluation/education, Compensatory technique education, Fine motor coordination activities, Continued evaluation, Energy conservation, Activityengagement          See flowsheet documentation for full assessment, interventions and recommendations  Outcome: Progressing  Note: Limitation: Decreased ADL status, Decreased UE ROM, Decreased UE strength, Decreased Safe judgement during ADL, Decreased endurance, Decreased fine motor control, Decreased self-care trans, Decreased high-level ADLs  Prognosis: Fair  Assessment: Pt participated in OT session focusing on improving fine motor coordination, bilateral hand coordination, and increasing hand strength  Pt completed flower/vase craft w/ varying levels of assistance throughout  Pt unable to cut out flowers requiring total assistance  Pt able to color in flower w/ L hand due to decreased hand strength in R hand  Pt able to layer flowers w/ min a for creating tape bubbles  Pt able to color in vase w/ min hand over hand assistance  Pt requested therapist jayshree completed project reporting she was proud of her work  Pt remains limited 2* decreased activity tolerance, decreased endurance, and decreased strength  Continue to recommend STR  OT will cont to follow to see as able       OT Discharge Recommendation: Post acute rehabilitation services  OT - OK to Discharge- Retiring Row: Yes (when medically stable)

## 2022-07-13 NOTE — WOUND OSTOMY CARE
Progress Note - Wound   [facility] 80 y o  female MRN: [id number]  Unit/Bed#: [facility] 204-01 Encounter: [id number]        Assessment:   Patient seen today for wound care follow up visit  Patient in bed, on P-500 mattress, being repositioned with green foam wedges and heels floated on pillows  Patient is max assist with turning side to side  Patient is incontinent of bowel as witnessed during this assessment  1  POA stage IV sacrum- large full thickness wound with bone exposed and 75% beefy red tissue and 25% moist yellow slough, large amount of serosanguineous drainage  Circumferential undermining with largest area at 7 o clock at 4 3cm  Communicated with surgery team and orders changing to dakins moistened gauze packing instead of saline wet to dry  AVS updated as patient is discharging today  Applied silver alginate and foam for now  2  HA unstageable to left elbow- small full thickness wound with yellow slough tissue, pink fragile periwound, scant drainage  New dressing applied per order  3  HA unstageable to right elbow- previous week elbow scabbed and callused, however scab has lifted and wound had not fully closed  Small full thickness wound with yellow slough tissue  Applied new dressing  4  HA unstageable to left heel- full thickness wound with 100% yellow slough tissue and milky serosanguineous drainage  New dressing applied per order  5  Groin area excoriated from incontinence, calazime applied  No induration, fluctuance, odor, warmth/temperature differences, redness, or purulence noted to the above noted wounds and skin areas assessed  New dressings applied per orders listed below  Patient tolerated well- no s/s of non-verbal pain or discomfort observed during the encounter  Bedside nurse aware of plan of care  See flow sheets for more detailed assessment findings  Wound care will continue to follow  Skin care plans:  1   Irrigate sacral wound with normal saline, apply 1/4 strength dakins moistened gauze, cover with ABD, secure with medfix/paper tape  Change daily and PRN soilage/displacement  2  Apply skin nourishing cream the entire skin daily for moisture  3  Turn and reposition patient every  2 hours   4  Elevate heels off of bed with pillows to offload pressure   5  Apply EHOB waffle cushion to chair when OOB, if able  6  Cleanse meseret-anal with soap and water, pat dry, and apply calazime cream TID and PRN  7  Cleanse L heel wound with NSS, pat dry, and apply calcium alginate and cover with bordered Allevyn foam dressing  Cruzito dressing with T  Change every other day and as needed for soilage/dislodgement  8  Cleanse B/L elbow wound with NSS, pat dry, and apply bordered Allevyn foam dressing  Cruzito dressing with T  Change every other day and as needed for soilage/dislodgement  9  Continue on P-500 mattress, low air loss mattress at facility upon discharge   10  Cleanse b/l groin with soap and water, pat dry, and dust the skin lightly with nystatin powder BID       Wound 06/14/22 Pressure Injury Buttocks N/A (Active)   Wound Image   07/13/22 0941   Wound Description Exposed bone; Beefy red;Slough 07/13/22 0941   Pressure Injury Stage 4 07/13/22 0941   Meseret-wound Assessment Fragile;Pink 07/13/22 0941   Wound Length (cm) 11 5 cm 07/13/22 0941   Wound Width (cm) 9 6 cm 07/13/22 0941   Wound Depth (cm) 3 2 cm 07/13/22 0941   Wound Surface Area (cm^2) 110 4 cm^2 07/13/22 0941   Wound Volume (cm^3) 353 28 cm^3 07/13/22 0941   Calculated Wound Volume (cm^3) 353 28 cm^3 07/13/22 0941   Change in Wound Size % 46 47 07/13/22 0941   Tunneling 0 cm 07/13/22 0941   Tunneling in depth located at 0 07/13/22 0941   Undermining 4 3 07/13/22 0941   Undermining is depth extending from 12-12 07/13/22 0941   Wound Site Closure TERE 07/13/22 0941   Drainage Amount Large 07/13/22 0941   Drainage Description Serosanguineous 07/13/22 0941   Non-staged Wound Description Full thickness 07/13/22 0915 Treatments Cleansed 07/13/22 0941   Dressing Calcium Alginate with Silver; Foam, Silicon (eg  Allevyn, etc) 07/13/22 0941   Wound packed? Yes 07/13/22 0941   Packing- # removed 1 07/13/22 0941   Packing- # inserted 1 07/13/22 0941   Dressing Changed New 07/13/22 0941   Patient Tolerance Tolerated well 07/13/22 0941   Dressing Status Clean;Dry; Intact 07/13/22 0941       Wound 06/21/22 Pressure Injury Elbow Posterior; Left (Active)   Wound Image   07/13/22 0934   Wound Description Yellow;Slough 07/13/22 0934   Pressure Injury Stage U 07/13/22 0934   Romy-wound Assessment Fragile;Pink 07/13/22 0934   Wound Length (cm) 0 2 cm 07/13/22 0934   Wound Width (cm) 0 2 cm 07/13/22 0934   Wound Depth (cm) 0 2 cm 07/13/22 0934   Wound Surface Area (cm^2) 0 04 cm^2 07/13/22 0934   Wound Volume (cm^3) 0 008 cm^3 07/13/22 0934   Calculated Wound Volume (cm^3) 0 01 cm^3 07/13/22 0934   Tunneling 0 cm 07/13/22 0934   Tunneling in depth located at 0 07/13/22 0934   Undermining 0 07/13/22 0934   Undermining is depth extending from 0 07/13/22 0934   Wound Site Closure TERE 07/13/22 0934   Drainage Amount Scant 07/13/22 0934   Drainage Description Serosanguineous 07/13/22 0835   Non-staged Wound Description Full thickness 07/13/22 0934   Treatments Cleansed 07/13/22 0934   Dressing Foam, Silicon (eg  Allevyn, etc) 07/13/22 0934   Wound packed? No 07/13/22 0934   Packing- # removed 0 07/13/22 0934   Packing- # inserted 0 07/13/22 6513   Dressing Changed New 07/13/22 0934   Patient Tolerance Tolerated well 07/13/22 0934   Dressing Status Clean;Dry; Intact 07/13/22 0934       Wound 06/25/22 Pressure Injury Heel Left (Active)   Wound Image   07/13/22 0935   Wound Description [facility] 07/13/22 0935   Pressure Injury Stage U 07/13/22 0935   Romy-wound Assessment Fragile 07/13/22 0935   Wound Length (cm) 0 5 cm 07/13/22 0935   Wound Width (cm) 0 5 cm 07/13/22 0935   Wound Depth (cm) 0 2 cm 07/13/22 0935   Wound Surface Area (cm^2) 0 25 cm^2 07/13/22 0935   Wound Volume (cm^3) 0 05 cm^3 07/13/22 0935   Calculated Wound Volume (cm^3) 0 05 cm^3 07/13/22 0935   Change in Wound Size % 16 67 07/13/22 0935   Tunneling 0 cm 07/13/22 0935   Tunneling in depth located at 0 07/13/22 0935   Undermining 0 07/13/22 0935   Undermining is depth extending from 0 07/13/22 0935   Wound Site Closure TERE 07/13/22 0935   Drainage Amount Small 07/13/22 0935   Drainage Description Serosanguineous;Milky 07/13/22 0935   Non-staged Wound Description Full thickness 07/13/22 0935   Treatments Cleansed 07/13/22 0935   Dressing Calcium Alginate with Silver; Foam, Silicon (eg  Allevyn, etc) 07/13/22 0935   Wound packed? No 07/13/22 0935   Packing- # removed 0 07/13/22 0935   Packing- # inserted 0 07/13/22 0935   Dressing Changed New 07/13/22 0935   Patient Tolerance Tolerated well 07/13/22 0935   Dressing Status Clean;Dry; Intact 07/13/22 0935       Wound 07/06/22 MASD Perineum (Active)   Wound Image   07/13/22 0949   Wound Description Beefy red 07/13/22 0835   Romy-wound Assessment Black; Maceration; Hyperpigmented;Fragile 07/13/22 0835   Wound Length (cm) 3 cm 07/06/22 1138   Wound Width (cm) 3 cm 07/06/22 1138   Wound Depth (cm) 0 1 cm 07/06/22 1138   Wound Surface Area (cm^2) 9 cm^2 07/06/22 1138   Wound Volume (cm^3) 0 9 cm^3 07/06/22 1138   Calculated Wound Volume (cm^3) 0 9 cm^3 07/06/22 1138   Tunneling 0 cm 07/06/22 1138   Tunneling in depth located at 0 07/06/22 1138   Undermining 0 07/06/22 1138   Undermining is depth extending from 0 07/06/22 1138   Drainage Amount Scant 07/13/22 0835   Drainage Description Serosanguineous 07/13/22 0835   Non-staged Wound Description Partial thickness 07/12/22 0500   Treatments Cleansed;Site care 07/06/22 1200   Dressing Protective barrier 07/13/22 0400   Wound packed?  No 07/12/22 0500   Packing- # removed 0 07/12/22 0500   Packing- # inserted 0 07/12/22 0500   Dressing Changed New 07/13/22 0835   Patient Tolerance Tolerated well 07/13/22 2593   Dressing Status Clean; Intact 07/13/22 0835       Wound 07/13/22 Pressure Injury Elbow Posterior;Right (Active)   Wound Image   07/13/22 0933   Wound Description [facility] 07/13/22 0933   Pressure Injury Stage DTPI 07/13/22 0933   Romy-wound Assessment Intact 07/13/22 0933   Wound Length (cm) 0 5 cm 07/13/22 0933   Wound Width (cm) 0 5 cm 07/13/22 0933   Wound Depth (cm) 0 2 cm 07/13/22 0933   Wound Surface Area (cm^2) 0 25 cm^2 07/13/22 0933   Wound Volume (cm^3) 0 05 cm^3 07/13/22 0933   Calculated Wound Volume (cm^3) 0 05 cm^3 07/13/22 0933   Tunneling 0 cm 07/13/22 0933   Undermining 0 07/13/22 0933   Wound Site Closure TERE 07/13/22 0933   Non-staged Wound Description Full thickness 07/13/22 0933   Treatments Cleansed 07/13/22 0933   Dressing Foam, Silicon (eg  Allevyn, etc) 07/13/22 0933   Wound packed? No 07/13/22 0933   Dressing Changed Reinforced 07/13/22 0933   Patient Tolerance Tolerated well 07/13/22 0933   Dressing Status Clean;Dry; Intact 07/13/22 0933       Inez Godinez BSN, RN, [facility]

## 2022-07-13 NOTE — CASE MANAGEMENT
Case Management Progress Note    Patient name Raghu Bee  [facility] MRN [id number]  : 1940 Date 2022       LOS (days): 29  Geometric Mean LOS (GMLOS) (days): 9 60  Days to GMLOS:-18 9        OBJECTIVE:        Current admission status: Inpatient  Preferred Pharmacy:   PATIENT/FAMILY REPORTS NO PREFERRED PHARMACY  No address on file      [address] [place],[address]D, [facility] 232 [facility] [address] [facility] 83 [address]  Phone: [phone] Fax: [phone]    Primary Care Provider: No primary care provider on file  Primary Insurance: [address] [place] [address],[address] REP  Secondary Insurance:     PROGRESS NOTE:      LATE ENTRY from :  Discussed with attending patient is ready for discharge pending placement  Genn[facility] Esteban has offered patient a bed, will begin insurance pre-authorization  Discussed with patient, daughter Gabriella Constantino and sister Abran Reece at bedside  Awaiting authorization      Jonathan Loss LSW  2022  9:14 AM

## 2022-07-14 ENCOUNTER — NURSING HOME VISIT (OUTPATIENT)
Dept: GERIATRICS | Facility: OTHER | Age: 82
End: 2022-07-14
Payer: COMMERCIAL

## 2022-07-14 VITALS
BODY MASS INDEX: 18.3 KG/M2 | WEIGHT: 118.6 LBS | OXYGEN SATURATION: 96 % | SYSTOLIC BLOOD PRESSURE: 136 MMHG | HEART RATE: 72 BPM | TEMPERATURE: 97.8 F | RESPIRATION RATE: 16 BRPM | DIASTOLIC BLOOD PRESSURE: 76 MMHG

## 2022-07-14 DIAGNOSIS — S31.000A WOUND OF SACRAL REGION, INITIAL ENCOUNTER: Primary | ICD-10-CM

## 2022-07-14 PROCEDURE — 99306 1ST NF CARE HIGH MDM 50: CPT | Performed by: INTERNAL MEDICINE

## 2022-07-14 NOTE — PROGRESS NOTES
[address]  [address] [place] [address]    Nursing Home Admission    NAME: Cyndi Garcia  AGE: 80 y o  SEX: female [id number]      Patient Location     [facility]    Patients care was coordinated with nursing facility staff  Recent vitals, labs and updated medications were reviewed on [employer]  Past Medical, surgical, social, medication and allergy history and patients previous records reviewed  Assessment/Plan:    Sacral wound  Patient was recently hospitalized with stage IV sacral ulcer, underwent I&D by surgical service  Initially had a wound VAC on which was later removed  Patient completed antibiotic course recently at the hospital   Continue local wound care per wound care team   Encourage frequent repositioning, optimize nutritional status  Monitor closely for signs of infection      Proximal muscle weakness:  Patient has known history of scleroderma  She was recently noted to have significant weakness of both upper and lower extremities  Patient was evaluated by rheumatology service  Received IV immunoglobulin therapy at the hospital with favorable results     Pulmonary hypertension :  ·History of Crest syndrome with pulmonary hypertension    Continue Sildenafil 10 mg tid , additionally remains on Opsumit 10 mg daily  Adempas was stopped due to hypotension  Last echo revealed EF of 70% with grade 2 diastolic dysfunction  Currently not on any maintenance diuretics  Patient was evaluated by palliative care team at the hospital   She is DNR     Hearing loss:  ·History of progressive hearing loss  ·Outpatient ENT follow-up     Pneumonia:   ·Completed antibiotics  ·Supportive care  ·Aspiration precautions    Continue tube feeds     Acute respiratory failure with hypoxia :  Patient was noted to have acute respiratory failure with hypoxia at the hospital suspected to be multifactorial   Per records she has been requiring 1-2 L of oxygen at night  She was recently treated for pneumonia additionally has underlying pulmonary hypertension, all contributing to dyspnea and hypoxia times  Follow-up with Pulmonary Service     Urinary retention:  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  Follow-up with urology service     Anemia:  Suspected to be multifactorial   Continue folic acid and iron supplements  Follow-up repeat CBC        Acquired hypothyroidism:  ·Continue levothyroxine  Lab Results   Component Value Date    [id number] 3 080 06/16/2022        Scleroderma :  ·Scleroderma with crest syndrome  ·Continue prednisone  · Patient completed course of IV immunoglobulins per rheumatology recommendation with marked improvements to her ROM  ·Continue Atovaquone for PCP prophylaxis      Dysphagia, oropharyngeal phase:  ·Patient with history of dysphagia  ·PEG tube in place  ·Continue current tube feeds  ·Aspiration precautions     Severe protein-calorie malnutrition :  Malnutrition Findings:   Adult Malnutrition type: Chronic illness  Adult Degree of Malnutrition: Other severe protein calorie malnutrition  Malnutrition Characteristics: Muscle loss, Fat loss, Weight loss, Inadequate energy   360 Statement: related to disease/condition evidenced by increased kcal/pro needs for wound healing Stage IV sacral decub, BMI 18 4 adjusted per prior facility wt 6/7/22;severe buccal fat pads loss, severe deltoid muscle loss, Patient lost 31# (20 6%) since 3/15/22 past 3 months with illness <75% energy intake versus needs for > 1 month  Follow-up with dietitian    Continue tube feeds    Vitamin-D deficiency:  Continue vitamin-D supplement    GERD:  Stable on omeprazole  Chief Complaint     Infected sacral wound status post debridement, scleroderma, generalized weakness  HPI       Patient is a 80 y o  female with past medical history significant for scleroderma, pulmonary hypertension, anemia, hypothyroidism, urinary retention status post Tejada catheter placement, anemia and severe protein calorie malnutrition  Patient was hospitalized on 06/14/2022 with infected sacral wound  She was seen by multiple consultants, underwent wound debridement, additionally completed antibiotic treatment  Patient had a complicated hospital stay  She was noted to have significant upper extremity weakness in the setting of scleroderma  Patient was seen by rheumatology service and started on IV immuno globulins  Patient was additionally treated for pneumonia  Patient was subsequently discharged to [facility] rehab where she is being seen for post hospital admission  At the time of my evaluation patient appears to be weak and frail  Denies any dyspnea or chest pain  Per records patient was needing 1-2 L of oxygen at night for episodes of hypoxia    There have been no reports of any fever wheezing or chest congestion     Past Medical History:   Diagnosis Date    Dysphagia, oropharyngeal phase 06/06/2022   -scleroderma  -pulmonary hypertension  -hearing impairment  -urinary retention status post Tejada catheter  -anemia  -hypothyroidism  -severe protein calorie malnutrition  Past Surgical History:   Procedure Laterality Date    WOUND DEBRIDEMENT N/A 6/14/2022    Procedure: DEBRIDEMENT WOUND [facility]); SACRUM AND BUTTOCKS;  Surgeon: Jann Brumfield MD;  Location: [facility];  Service: General       Social History     Tobacco Use   Smoking Status Never Smoker   Smokeless Tobacco Never Used       Family history:  Noncontributory    Allergies   Allergen Reactions   Rissa Chandler [Selexipag] Anaphylaxis    Sulfa Antibiotics Tachycardia    Penicillins Rash          Current Outpatient Medications:     acetaminophen (TYLENOL) 160 mg/5 mL suspension, 20 3 mL (650 mg total) by Per G Tube route every 6 (six) hours, Disp: , Rfl: 0    aspirin 81 mg chewable tablet, 1 tablet (81 mg total) by Per G Tube route daily, Disp: , Rfl: 0    atovaquone (MEPRON) 750 mg/5 mL suspension, 5 mL (750 mg total) by Per G Tube route daily with breakfast, Disp: 150 mL, Rfl: 0    cholecalciferol (VITAMIN D3) 1,000 units tablet, 1 tablet (1,000 Units total) by Per G Tube route daily, Disp: , Rfl: 0    fluticasone (FLONASE) 50 mcg/act nasal spray, 1 spray into each nostril 2 (two) times a day, Disp: , Rfl: 0    folic acid (FOLVITE) 1 mg tablet, 1 tablet (1 mg total) by Per PEG Tube route daily, Disp: , Rfl: 0    glycerin-hypromellose- (ARTIFICIAL TEARS) 0 2-0 2-1 % SOLN, Administer 2 drops to both eyes 2 (two) times a day, Disp: , Rfl: 0    levalbuterol (XOPENEX) 1 25 mg/0 5 mL nebulizer solution, Take 0 5 mL (1 25 mg total) by nebulization 3 (three) times a day, Disp: , Rfl: 0    levothyroxine 25 mcg tablet, 1 tablet (25 mcg total) by Per G Tube route daily in the early morning, Disp: , Rfl: 0    loratadine (CLARITIN) 10 mg tablet, 1 tablet (10 mg total) by Per G Tube route daily, Disp: , Rfl: 0    Macitentan (OPSUMIT) 10 MG tablet, 1 tablet (10 mg total) by Per G Tube route daily, Disp: , Rfl: 0    melatonin 3 mg, Take 1 tablet (3 mg total) by mouth daily at bedtime, Disp: , Rfl: 0    nystatin (MYCOSTATIN) powder, Apply topically 2 (two) times a day, Disp: 15 g, Rfl: 0    omeprazole 2 mg/mL in sodium bicarbonate, Take 10 mL (20 mg total) by mouth daily in the early morning, Disp: 50 mL, Rfl: 0    oxyCODONE (ROXICODONE) 5 mg/5 mL solution, 5 mL (5 mg total) by Per G Tube route every 8 (eight) hours as needed for severe pain for up to 10 days Max Daily Amount: 15 mg, Disp: 45 mL, Rfl: 0    predniSONE 10 mg tablet, 3 tablets (30 mg total) by Per G Tube route daily, Disp: , Rfl: 0    saccharomyces boulardii (FLORASTOR) 250 mg capsule, 1 capsule (250 mg total) by Per G Tube route 2 (two) times a day, Disp: , Rfl: 0    sildenafil (REVATIO) 20 mg tablet, Take 0 5 tablets (10 mg total) by mouth 3 (three) times a day, Disp: 45 tablet, Rfl: 0    sodium chloride (OCEAN) 0 65 % nasal spray, 1 spray into each nostril every hour as needed for congestion, Disp: , Rfl: 0    sodium chloride 0 9 % nebulizer solution, Take 3 mL by nebulization 3 (three) times a day, Disp: , Rfl: 0  No current facility-administered medications for this visit  Updated list was reviewed in [address] [place] [address] system of facility  Vitals:    07/14/22 1317   BP: 136/76   Pulse: 72   Resp: 16   Temp: 97 8 °F (36 6 °C)   SpO2: 96%       Vital signs were reviewed in point click care    Review of Systems   Constitutional: Positive for fatigue  Negative for chills and fever  Respiratory: Positive for shortness of breath (At times)  Negative for chest tightness, wheezing and stridor  Cardiovascular: Negative for chest pain and leg swelling  Gastrointestinal: Negative for abdominal distention, abdominal pain and vomiting  Genitourinary: Negative for flank pain and hematuria  History of urinary retention with Tejada catheter in place   Musculoskeletal: Positive for arthralgias and gait problem  Skin: Positive for wound (Sacral wound, additionally has left heel ulcer)  Neurological: Positive for weakness (Generalized)  Negative for seizures  Psychiatric/Behavioral: Negative for agitation and behavioral problems  Physical Exam  Constitutional:       Appearance: She is ill-appearing  HENT:      Head: Normocephalic and atraumatic  Eyes:      General: No scleral icterus  Right eye: No discharge  Left eye: No discharge  Cardiovascular:      Rate and Rhythm: Normal rate and regular rhythm  Pulmonary:      Breath sounds: No wheezing, rhonchi or rales  Abdominal:      General: There is no distension  Palpations: Abdomen is soft  Tenderness: There is no abdominal tenderness  There is no guarding        Comments: PEG tube in place, site okay   Genitourinary:     Comments: Tejada catheter in place  Musculoskeletal:         General: Deformity (Muscle wasting of all extremities and supraclavicular area noted) present  Cervical back: Neck supple  Right lower leg: No edema  Left lower leg: No edema  Comments: Range of movement of bilateral upper extremities is significantly limited   Skin:     Coloration: Skin is pale  Skin is not jaundiced  Comments: Sacral wound being followed by wound care team, not examined at this time  Patient additionally has a left heel ulcer with overlying dressing   Neurological:      Mental Status: Mental status is at baseline  Cranial Nerves: No cranial nerve deficit  Motor: Weakness present  Comments: Patient appears to have generalized weakness  Muscle strength is reduced in both upper extremities and hands bilaterally  Oriented in month and year   Psychiatric:         Mood and Affect: Mood normal          Behavior: Behavior normal            Diagnostic Data       Recent labs and imaging studies were reviewed    Lab Results   Component Value Date    WBC 12 29 (H) 07/10/2022    HGB 8 2 (L) 07/10/2022    HCT 27 1 (L) 07/10/2022     (H) 07/10/2022     07/10/2022      Lab Results   Component Value Date    SODIUM 137 07/10/2022    K 3 9 07/10/2022     07/10/2022    CO2 30 07/10/2022    BUN 26 (H) 07/10/2022    CREATININE 0 20 (L) 07/10/2022    GLUC 78 07/10/2022    CALCIUM 7 7 (L) 07/10/2022       CBC BMP from today reveals hemoglobin of 10, WBC count 8 4, platelet count 472  BUN 21, creatinine 0 30, sodium 137, potassium 4 2, calcium 8 4    Code Status:      DNI, DNR    :            This note was electronically signed by Dr Angela Degroot

## 2022-07-14 NOTE — UTILIZATION REVIEW
Notification of Discharge   This is a Notification of Discharge from our facility [address]  Please be advised that this patient has been discharge from our facility  Below you will find the admission and discharge date and time including the patients disposition  UTILIZATION REVIEW CONTACT:  Maite Santos  Utilization   Network Utilization Review Department  Phone: [phone] x carefully listen to the prompts  All voicemails are confidential   Email: [email or web address]     PHYSICIAN ADVISORY SERVICES:  FOR AORK-VB-DENX REVIEW - MEDICAL NECESSITY DENIAL  Phone: [phone]  Fax: [phone]  Email: [email or web address]     PRESENTATION DATE: 6/14/2022  8:07 PM  OBERVATION ADMISSION DATE:   INPATIENT ADMISSION DATE: 6/14/22  9:34 PM   DISCHARGE DATE: 7/13/2022  6:45 AM  DISPOSITION: Non [facility] SN[facility]/SNU Non [facility] SNF/TC[facility]/SNU      IMPORTANT INFORMATION:  Send all requests for admission clinical reviews, approved or denied determinations and any other requests to dedicated fax number below belonging to the campus where the patient is receiving treatment   List of dedicated fax numbers:  [address] DENIALS (Administrative/Medical Necessity) [phone]   [address] (Maternity/NICU/Pediatrics) [phone]   [facility] [phone]   130 [facility] [phone]   [address] [place] [address] [phone]   [address] [phone]   [facility]  [phone]   2205 [facility], [facility]  2401 [facility] 1000 [address] [facility] [phone]

## 2022-07-14 NOTE — PROGRESS NOTES
Family called requesting us to check on medications from pharmacy  Medications were not sent with patient  Confirmed with pharmacy that medications are there  Offered  service to patient or family p/u will call back with which they prefer  n

## 2022-07-14 NOTE — ASSESSMENT & PLAN NOTE
Patient was recently hospitalized with stage IV sacral ulcer, underwent I&D by surgical service  Initially had a wound VAC on which was later removed  Patient completed antibiotic course recently at the hospital   Continue local wound care per wound care team   Encourage frequent repositioning, optimize nutritional status    Monitor closely for signs of infection

## 2022-07-19 ENCOUNTER — TELEPHONE (OUTPATIENT)
Dept: OTHER | Facility: OTHER | Age: 82
End: 2022-07-19

## 2022-07-19 ENCOUNTER — NURSING HOME VISIT (OUTPATIENT)
Dept: GERIATRICS | Facility: OTHER | Age: 82
End: 2022-07-19
Payer: COMMERCIAL

## 2022-07-19 VITALS
HEART RATE: 80 BPM | BODY MASS INDEX: 23.24 KG/M2 | TEMPERATURE: 98.4 F | SYSTOLIC BLOOD PRESSURE: 138 MMHG | WEIGHT: 150.6 LBS | OXYGEN SATURATION: 96 % | RESPIRATION RATE: 18 BRPM | DIASTOLIC BLOOD PRESSURE: 88 MMHG

## 2022-07-19 DIAGNOSIS — S31.000A WOUND OF SACRAL REGION, INITIAL ENCOUNTER: Primary | ICD-10-CM

## 2022-07-19 PROCEDURE — 99309 SBSQ NF CARE MODERATE MDM 30: CPT | Performed by: INTERNAL MEDICINE

## 2022-07-19 NOTE — PROGRESS NOTES
[address]  [address]   [address] [facility] 83,[address] Floor 3214 [facility] [address]  49     Progress Note                                 Code SNF 31  Patient Location     [facility] re[facility]    Reason for visit     Follow-up scleroderma, pulmonary hypertension, sacral ulcer, hypothyroidism, GERD    Patients care was coordinated with nursing facility staff  Recent vitals, labs and updated medications were reviewed on [facility] system of facility  Problem List Items Addressed This Visit        Other    Sacral wound - Primary     Patient was recently hospitalized with stage IV sacral ulcer, underwent I&D by surgical service  Wound VAC was removed after being placed for some  time  Patient completed antibiotic course prior to discharge  Continue local wound care per wound care team   Encourage frequent repositioning, optimize nutritional status  Proximal muscle weakness:  Patient was recently noted to have significant weakness of extremities in the setting of scleroderma at the hospital   She was treated with IV immunoglobulins with good results  Follow-up with rheumatology service     Pulmonary hypertension :  ·History of Crest syndrome with pulmonary hypertension    Continue Sildenafil 10 mg tid , additionally remains on Opsumit 10 mg daily  Adempas was stopped due to hypotension  Last echo revealed EF of 70% with grade 2 diastolic dysfunction  Currently not on any maintenance diuretics         Pneumonia:  Completed antibiotics  SaO2 97% on room air    Acute respiratory failure with hypoxia :  Patient was noted to have acute respiratory failure with hypoxia at the hospital suspected to be multifactorial    She was recently treated for pneumonia additionally has underlying pulmonary hypertension, all contributing to dyspnea and hypoxia times  Follow-up with Pulmonary Service  SaO2 is currently stable at 97% on room air    Patient is requesting Flonase and saline nasal spray for nasal plugging at times  Nurse was place have helped in the past   Will order     Urinary retention:  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  Follow-up with urology service     Anemia:  Suspected to be multifactorial   Continue folic acid and iron supplements  Hemoglobin was stable at 10 on 07/14/2022        Acquired hypothyroidism:  ·Continue levothyroxine  Lab Results  Component Value Date    [id number] 3 080 06/16/2022        Scleroderma :  ·Scleroderma with crest syndrome  · patient remains on prednisone 30 mg daily  · Patient completed course of IV immunoglobulins per rheumatology recommendation with marked improvements to her ROM  ·Continue Atovaquone for PCP prophylaxis   Follow-up with rheumatology service     Dysphagia, oropharyngeal phase:  ·Patient with history of dysphagia status post PEG tube  · Tolerating tube feeds     Severe protein-calorie malnutrition :  Malnutrition Findings:   Adult Malnutrition type: Chronic illness  Adult Degree of Malnutrition: Other severe protein calorie malnutrition  Malnutrition Characteristics: Muscle loss, Fat loss, Weight loss, Inadequate energy  related to disease/condition evidenced by increased kcal/pro needs for wound healing Stage IV sacral decub, BMI 18 4 ;severe buccal fat pads loss, severe deltoid muscle loss, Patient lost 31# (20 6%) since 3/15/22 past 3 months with illness <75% energy intake versus needs for > 1 month  Follow-up with dietitian  Continue tube feeds     Vitamin-D deficiency:  Continue vitamin-D supplement     GERD:  Stable on omeprazole      HPI       Patient is being seen for a follow-up visit today  She is doing okay at present  Tolerating tube feeds  Tejada catheter remains in place  Local wound care is being performed for sacral ulcer  Respiratory status remains stable with SaO2 of 97% on room air    Patient is requesting Flonase and saline nasal spray, has helped with nasal clogging in the past   Additionally wishes to use banana flakes has helped with bowel movements in the past     Review of Systems   HENT: Positive for trouble swallowing  Respiratory: Negative for cough, shortness of breath, wheezing and stridor  Gastrointestinal: Negative for abdominal distention, abdominal pain, diarrhea and vomiting  Genitourinary: Negative for flank pain and hematuria  Musculoskeletal: Positive for arthralgias and gait problem  Psychiatric/Behavioral: Negative for agitation and behavioral problems  Past Medical History:   Diagnosis Date    Dysphagia, oropharyngeal phase 06/06/2022       Past Surgical History:   Procedure Laterality Date    WOUND DEBRIDEMENT N/A 6/14/2022    Procedure: DEBRIDEMENT WOUND [facility] OUT); SACRUM AND BUTTOCKS;  Surgeon: Eliane Matias MD;  Location: [facility];  Service: General       Social History     Tobacco Use   Smoking Status Never Smoker   Smokeless Tobacco Never Used       No family history on file       Allergies   Allergen Reactions   Amarjit Chata [Selexipag] Anaphylaxis    Sulfa Antibiotics Tachycardia    Penicillins Rash         Current Outpatient Medications:     acetaminophen (TYLENOL) 160 mg/5 mL suspension, 20 3 mL (650 mg total) by Per G Tube route every 6 (six) hours, Disp: , Rfl: 0    aspirin 81 mg chewable tablet, 1 tablet (81 mg total) by Per G Tube route daily, Disp: , Rfl: 0    atovaquone (MEPRON) 750 mg/5 mL suspension, 5 mL (750 mg total) by Per G Tube route daily with breakfast, Disp: 150 mL, Rfl: 0    cholecalciferol (VITAMIN D3) 1,000 units tablet, 1 tablet (1,000 Units total) by Per G Tube route daily, Disp: , Rfl: 0    fluticasone (FLONASE) 50 mcg/act nasal spray, 1 spray into each nostril 2 (two) times a day, Disp: , Rfl: 0    folic acid (FOLVITE) 1 mg tablet, 1 tablet (1 mg total) by Per PEG Tube route daily, Disp: , Rfl: 0    glycerin-hypromellose- (ARTIFICIAL TEARS) 0 2-0 2-1 % SOLN, Administer 2 drops to both eyes 2 (two) times a day, Disp: , Rfl: 0   levalbuterol (XOPENEX) 1 25 mg/0 5 mL nebulizer solution, Take 0 5 mL (1 25 mg total) by nebulization 3 (three) times a day, Disp: , Rfl: 0    levothyroxine 25 mcg tablet, 1 tablet (25 mcg total) by Per G Tube route daily in the early morning, Disp: , Rfl: 0    loratadine (CLARITIN) 10 mg tablet, 1 tablet (10 mg total) by Per G Tube route daily, Disp: , Rfl: 0    Macitentan (OPSUMIT) 10 MG tablet, 1 tablet (10 mg total) by Per G Tube route daily, Disp: , Rfl: 0    melatonin 3 mg, Take 1 tablet (3 mg total) by mouth daily at bedtime, Disp: , Rfl: 0    nystatin (MYCOSTATIN) powder, Apply topically 2 (two) times a day, Disp: 15 g, Rfl: 0    omeprazole 2 mg/mL in sodium bicarbonate, Take 10 mL (20 mg total) by mouth daily in the early morning, Disp: 50 mL, Rfl: 0    oxyCODONE (ROXICODONE) 5 mg/5 mL solution, 5 mL (5 mg total) by Per G Tube route every 8 (eight) hours as needed for severe pain for up to 10 days Max Daily Amount: 15 mg, Disp: 45 mL, Rfl: 0    predniSONE 10 mg tablet, 3 tablets (30 mg total) by Per G Tube route daily, Disp: , Rfl: 0    saccharomyces boulardii (FLORASTOR) 250 mg capsule, 1 capsule (250 mg total) by Per G Tube route 2 (two) times a day, Disp: , Rfl: 0    sildenafil (REVATIO) 20 mg tablet, Take 0 5 tablets (10 mg total) by mouth 3 (three) times a day, Disp: 45 tablet, Rfl: 0    sodium chloride (OCEAN) 0 65 % nasal spray, 1 spray into each nostril every hour as needed for congestion, Disp: , Rfl: 0    sodium chloride 0 9 % nebulizer solution, Take 3 mL by nebulization 3 (three) times a day, Disp: , Rfl: 0    Updated list was reviewed in [facility] system of facility  Vitals:    07/19/22 0940   BP: 138/88   Pulse: 80   Resp: 18   Temp: 98 4 °F (36 9 °C)   SpO2: 96%       Physical Exam  HENT:      Head: Normocephalic and atraumatic  Eyes:      General: No scleral icterus  Right eye: No discharge  Left eye: No discharge     Cardiovascular:      Rate and Rhythm: Normal rate and regular rhythm  Abdominal:      Palpations: Abdomen is soft  There is no mass  Tenderness: There is no abdominal tenderness  There is no guarding  Comments: PEG tube in place   Genitourinary:     Comments: Tejada catheter remains in place  Musculoskeletal:      Right lower leg: No edema  Left lower leg: No edema  Comments: Muscle wasting involving facial muscles an all extremeties   Skin:     Coloration: Skin is not jaundiced  Comments: Sacral ulcer, not examined at this time  Being followed by wound care team   Neurological:      Motor: Weakness (Involving all 4 extremities) present  Psychiatric:         Mood and Affect: Mood normal          Behavior: Behavior normal          Diagnostic Data:    Recent labs were reviewed  Labs done on 07/14/2022 revealed hemoglobin of 10, WBC count 8 4, platelet count 369  BUN 21, creatinine 0 30, sodium 137, potassium 4 2    Additional Notes:    Add Flonase and saline nasal spray per patient's request  Add Metamucil  Continue PT OT  Monitor respiratory status closely    This note was electronically signed by Dr Rachael Robles

## 2022-07-19 NOTE — ASSESSMENT & PLAN NOTE
Patient was recently hospitalized with stage IV sacral ulcer, underwent I&D by surgical service  Wound VAC was removed after being placed for some  time  Patient completed antibiotic course prior to discharge  Continue local wound care per wound care team   Encourage frequent repositioning, optimize nutritional status

## 2022-07-20 ENCOUNTER — NURSING HOME VISIT (OUTPATIENT)
Dept: WOUND CARE | Facility: HOSPITAL | Age: 82
End: 2022-07-20
Payer: COMMERCIAL

## 2022-07-20 DIAGNOSIS — R26.2 AMBULATORY DYSFUNCTION: ICD-10-CM

## 2022-07-20 DIAGNOSIS — L89.154 PRESSURE INJURY OF SACRAL REGION, STAGE 4 (HCC): Primary | ICD-10-CM

## 2022-07-20 PROCEDURE — 99305 1ST NF CARE MODERATE MDM 35: CPT | Performed by: NURSE PRACTITIONER

## 2022-07-21 NOTE — PROGRESS NOTES
[address] [facility]NAG[facility]   [facility]       Patient ID: Sonya Weaver is a 80 y o  female Date of Birth 1940     Location of Service: [address] [place] [address]    Performed wound round with: Wound team       Chief complaint : sacrum    Wound Instructions:  Wound: Sacrum   Discontinue previous wound order  Cleanse the wound bed with Dakins quarter strength  Apply non-sting skin prep to periwound area  Gently fill the wound cavity with kerlix moistened with Dakins quarter strength and cover with ABD pad  Frequency : daily and prn for soiling  Order E-stim ( PT)  Offload all wounds  Turn and reposition frequently, maximum of every two hours  Can be OOB only for therapy  Increase protein intake  Monitor for any sign of infection or worsening, inform PCP or patient's primary physician in your facility  Allergies  Uptravi [selexipag], Sulfa antibiotics, and Penicillins      Assessment & Plan:  1  Pressure injury of sacral region, stage 4 ([facility])  Assessment & Plan:  - Location : Sacrum  - Wound healing status: First visit  - wound is 10 2 x 11 6 cm with undermining, with slough, no obvious sign of infection  - Mechanical debridement using Dakins wet to dry  - ordered E-stim  - Pressure redistribution device - air mattress, pressure relief wheelchair cushion  - Continue to offload  - Increase protein   ON tube feeding  - No sign localized infection during visit  - Clinical factors affecting wound healing includes age, chronicity, cognitive issue co-morbidities, incontinence, location of the wound, mobility impairement,  behavioral issues  -Follow up : Next week           2  Ambulatory dysfunction  Assessment & Plan:  On STR  - can be OOB only for therapy  Needs to be on pressure relief wheelchair cushion             Subjective: This is a 80year old female referred to or service for wound on the sacrum  Patient have a complex medical history including but not limited toscleroderma, pulmonary hypertension, anemia, hypothyroidism, urinary retention status post Tejada catheter placement, anemia and severe protein calorie malnutrition  Patient was referred by Senior Care Team  Patient was seen in collaboration with the facility wound team  As per medical record review, patient was admitted in acute care for infected sacral wound  Debridement done on the 6/14/2022  Patient completed antibiotic course  I did not see any imaging to check for osteomyelitis  Received patient in bed, anxious  Patient is concern that her wound is not clean  Patient have indwelling catheter and currently on tube feeding  Review of Systems   Constitutional: Negative  HENT: Negative  Eyes: Negative  Respiratory: Negative  Cardiovascular: Negative for chest pain and leg swelling  Gastrointestinal: Negative  Endocrine: Negative  Genitourinary: Negative  Musculoskeletal: Positive for gait problem  Skin: Positive for wound  See HPI   Neurological: Negative for dizziness and headaches  Psychiatric/Behavioral: Negative for behavioral problems  Objective: There were no vitals taken for this visit  Physical Exam  Constitutional:       Appearance: She is ill-appearing  HENT:      Head: Normocephalic and atraumatic  Nose: Nose normal       Mouth/Throat:      Pharynx: Oropharynx is clear  Eyes:      Conjunctiva/sclera: Conjunctivae normal    Cardiovascular:      Rate and Rhythm: Normal rate  Pulmonary:      Effort: Pulmonary effort is normal    Abdominal:      Tenderness: There is no abdominal tenderness  There is no guarding  Comments: With peg tube   Genitourinary:     Comments: With indwelling catheter  Musculoskeletal:         General: No tenderness  Cervical back: Normal range of motion  Right lower leg: No edema  Left lower leg: No edema  Comments: LROM   Skin:     General: Skin is warm  Findings: Lesion present        Comments: Location Sacrum wound bed - 10 2 x 11 6 x 2 cm  , with undermining deepest at 5 o'clock - 5 cm, 0% epithelial, 80%granulation, 20%slough, exudate - large amount of serous drainage, no malodor ( assess after dressing removal and cleansing), wound edge - attached to base, periwound - slightly macerated  No localized sign of infection, Denies pain     Neurological:      Mental Status: She is alert  Gait: Gait abnormal    Psychiatric:         Mood and Affect: Mood normal          Behavior: Behavior normal               Procedures           Patient's care was coordinated with nursing facility staff  Recent vitals, labs and updated medications were reviewed on EMR or chart system of facility   Past Medical, surgical, social, medication and allergy history and patient's previous records were reviewed and updated as appropriate:     Patient Active Problem List   Diagnosis    Sacral wound    Severe protein-calorie malnutrition ([address] )    Dysphagia, oropharyngeal phase    Scleroderma ([address] )    Acquired hypothyroidism    Anemia    Urinary retention    Pneumonia    Hearing loss    Pulmonary hypertension ([address] )    Proximal muscle weakness    Pressure injury of sacral region, stage 4 ([address] )    Ambulatory dysfunction     Past Medical History:   Diagnosis Date    Dysphagia, oropharyngeal phase 06/06/2022     Past Surgical History:   Procedure Laterality Date    WOUND DEBRIDEMENT N/A 6/14/2022    Procedure: DEBRIDEMENT WOUND [facility]); SACRUM AND BUTTOCKS;  Surgeon: Uche Schuler MD;  [facility]: [facility];  Service: General     Social History     Socioeconomic History    Marital status: /Civil Union     Spouse name: None    Number of children: None    Years of education: None    Highest education level: None   Occupational History    None   Tobacco Use    Smoking status: Never Smoker    Smokeless tobacco: Never Used   Substance and Sexual Activity    Alcohol use: Never    Drug use: None    Sexual activity: None   Other Topics Concern    None   Social History Narrative    None     Social Determinants of Health     Financial Resource Strain: Not on file   Food Insecurity: No Food Insecurity    Worried About Running Out of Food in the Last Year: Never true    Bib of Food in the Last Year: Never true   Transportation Needs: No Transportation Needs    Lack of Transportation (Medical): No    Lack of Transportation (Non-Medical):  No   Physical Activity: Not on file   Stress: Not on file   Social Connections: Not on file   Intimate Partner Violence: Not on file   Housing Stability: Low Risk     Unable to Pay for Housing in the Last Year: No    Number of Places Lived in the Last Year: 1    Unstable Housing in the Last Year: No        Current Outpatient Medications:     acetaminophen (TYLENOL) 160 mg/5 mL suspension, 20 3 mL (650 mg total) by Per G Tube route every 6 (six) hours, Disp: , Rfl: 0    aspirin 81 mg chewable tablet, 1 tablet (81 mg total) by Per G Tube route daily, Disp: , Rfl: 0    atovaquone (MEPRON) 750 mg/5 mL suspension, 5 mL (750 mg total) by Per G Tube route daily with breakfast, Disp: 150 mL, Rfl: 0    cholecalciferol (VITAMIN D3) 1,000 units tablet, 1 tablet (1,000 Units total) by Per G Tube route daily, Disp: , Rfl: 0    fluticasone (FLONASE) 50 mcg/act nasal spray, 1 spray into each nostril 2 (two) times a day, Disp: , Rfl: 0    folic acid (FOLVITE) 1 mg tablet, 1 tablet (1 mg total) by Per PEG Tube route daily, Disp: , Rfl: 0    glycerin-hypromellose- (ARTIFICIAL TEARS) 0 2-0 2-1 % SOLN, Administer 2 drops to both eyes 2 (two) times a day, Disp: , Rfl: 0    levalbuterol (XOPENEX) 1 25 mg/0 5 mL nebulizer solution, Take 0 5 mL (1 25 mg total) by nebulization 3 (three) times a day, Disp: , Rfl: 0    levothyroxine 25 mcg tablet, 1 tablet (25 mcg total) by Per G Tube route daily in the early morning, Disp: , Rfl: 0    loratadine (CLARITIN) 10 mg tablet, 1 tablet (10 mg total) by Per G Tube route daily, Disp: , Rfl: 0    Macitentan (OPSUMIT) 10 MG tablet, 1 tablet (10 mg total) by Per G Tube route daily, Disp: , Rfl: 0    melatonin 3 mg, Take 1 tablet (3 mg total) by mouth daily at bedtime, Disp: , Rfl: 0    nystatin (MYCOSTATIN) powder, Apply topically 2 (two) times a day, Disp: 15 g, Rfl: 0    omeprazole 2 mg/mL in sodium bicarbonate, Take 10 mL (20 mg total) by mouth daily in the early morning, Disp: 50 mL, Rfl: 0    oxyCODONE (ROXICODONE) 5 mg/5 mL solution, 5 mL (5 mg total) by Per G Tube route every 8 (eight) hours as needed for severe pain for up to 10 days Max Daily Amount: 15 mg, Disp: 45 mL, Rfl: 0    predniSONE 10 mg tablet, 3 tablets (30 mg total) by Per G Tube route daily, Disp: , Rfl: 0    saccharomyces boulardii (FLORASTOR) 250 mg capsule, 1 capsule (250 mg total) by Per G Tube route 2 (two) times a day, Disp: , Rfl: 0    sildenafil (REVATIO) 20 mg tablet, Take 0 5 tablets (10 mg total) by mouth 3 (three) times a day, Disp: 45 tablet, Rfl: 0    sodium chloride (OCEAN) 0 65 % nasal spray, 1 spray into each nostril every hour as needed for congestion, Disp: , Rfl: 0    sodium chloride 0 9 % nebulizer solution, Take 3 mL by nebulization 3 (three) times a day, Disp: , Rfl: 0  History reviewed  No pertinent family history  Coordination of Care: Wound team aware of the treatment plan  Facility nurse will provide wound treatment and monitor the wound for any changes  Patient / Staff education : Patient / Staff was given education on sign of infection and pressure ulcer prevention  Patient/ Staff verbalized understanding     Follow up :  Next week    Voice-recognition software may have been used in the preparation of this document  Occasional wrong word or "sound-alike" substitutions may have occurred due to the inherent limitations of voice recognition software  Interpretation should be guided by context        BETTIE Mcgrath

## 2022-07-21 NOTE — ASSESSMENT & PLAN NOTE
- Location : Sacrum  - Wound healing status: First visit  - wound is 10 2 x 11 6 cm with undermining, with slough, no obvious sign of infection  - Mechanical debridement using Dakins wet to dry  - ordered E-stim  - Pressure redistribution device - air mattress, pressure relief wheelchair cushion  - Continue to offload  - Increase protein    ON tube feeding  - No sign localized infection during visit  - Clinical factors affecting wound healing includes age, chronicity, cognitive issue co-morbidities, incontinence, location of the wound, mobility impairement,  behavioral issues  -Follow up : Next week

## 2022-07-25 ENCOUNTER — NURSING HOME VISIT (OUTPATIENT)
Dept: PULMONOLOGY | Facility: CLINIC | Age: 82
End: 2022-07-25
Payer: COMMERCIAL

## 2022-07-25 DIAGNOSIS — I27.20 PULMONARY HYPERTENSION (HCC): Primary | ICD-10-CM

## 2022-07-25 DIAGNOSIS — B59 PNEUMONIA DUE TO PNEUMOCYSTIS JIROVECII, UNSPECIFIED LATERALITY, UNSPECIFIED PART OF LUNG (HCC): ICD-10-CM

## 2022-07-25 PROBLEM — J96.11 CHRONIC HYPOXEMIC RESPIRATORY FAILURE (HCC): Status: ACTIVE | Noted: 2022-07-25

## 2022-07-25 PROCEDURE — 99307 SBSQ NF CARE SF MDM 10: CPT | Performed by: INTERNAL MEDICINE

## 2022-07-25 NOTE — PROGRESS NOTES
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, unspecified laterality, unspecified part of lung ([facility] )          Subjective:      Patient ID: Paco Schuler is a 80 y o  female  Patient denies any shortness of breath  She denies any cough  She denies a dyspnea with exertion or wheezing  She denies any trouble swallowing  The following portions of the patient's history were reviewed and updated as appropriate: allergies, current medications, past family history, past medical history, past social history, past surgical history and problem list     Review of Systems   Constitutional: Negative  Negative for unexpected weight change  HENT: Negative  Negative for postnasal drip  Eyes: Negative  Respiratory: Negative  Negative for cough, shortness of breath and wheezing  Cardiovascular: Negative  Negative for chest pain and leg swelling  Gastrointestinal: Negative  Endocrine: Negative  Genitourinary: Negative  Musculoskeletal: Negative  Allergic/Immunologic: Negative  Neurological: Negative  Hematological: Negative  Objective: There were no vitals taken for this visit  Physical Exam  Constitutional:       Appearance: She is well-developed  HENT:      Head: Normocephalic  Eyes:      Pupils: Pupils are equal, round, and reactive to light  Cardiovascular:      Rate and Rhythm: Normal rate  Heart sounds: No murmur heard    Pulmonary:      Effort: Pulmonary effort is normal  No respiratory distress  Breath sounds: Normal breath sounds  No wheezing or rales  Abdominal:      Palpations: Abdomen is soft  Musculoskeletal:         General: Normal range of motion  Cervical back: Normal range of motion and neck supple  Skin:     General: Skin is warm and dry  Neurological:      Mental Status: She is alert and oriented to person, place, and time

## 2022-07-25 NOTE — ASSESSMENT & PLAN NOTE
I reviewed patient's most recent 2D echocardiogram which shows mild-to-moderate pulmonary hypertension currently on no active treatment

## 2022-07-26 ENCOUNTER — NURSING HOME VISIT (OUTPATIENT)
Dept: GERIATRICS | Facility: OTHER | Age: 82
End: 2022-07-26
Payer: COMMERCIAL

## 2022-07-26 VITALS
HEART RATE: 66 BPM | RESPIRATION RATE: 16 BRPM | WEIGHT: 115.6 LBS | BODY MASS INDEX: 17.84 KG/M2 | DIASTOLIC BLOOD PRESSURE: 80 MMHG | TEMPERATURE: 96 F | SYSTOLIC BLOOD PRESSURE: 148 MMHG | OXYGEN SATURATION: 96 %

## 2022-07-26 DIAGNOSIS — M34.9 SCLERODERMA (HCC): Primary | ICD-10-CM

## 2022-07-26 PROCEDURE — 99309 SBSQ NF CARE MODERATE MDM 30: CPT | Performed by: INTERNAL MEDICINE

## 2022-07-26 NOTE — PROGRESS NOTES
[facility]  [address]   [address]  51     Progress Note                                 Code SNF 31  Patient Location     [facility]    Reason for visit     Follow-up scleroderma, pulmonary hypertension, sacral ulcer, hypothyroidism, GERD      Problem List Items Addressed This Visit    None       Proximal muscle weakness:  History of scleroderma  Patient was noted to have significant weakness of extremities during recent hospitalization  She was treated with IV immunoglobulins with good results  Follow-up with rheumatology service     Pulmonary hypertension :  ·History of Crest syndrome with pulmonary hypertension    Continue Sildenafil 10 mg tid , additionally remains on Opsumit 10 mg daily  Adempas was stopped due to hypotension  Last echo revealed EF of 70% with grade 2 diastolic dysfunction  No signs of CHF currently  Patient is not on any maintenance diuretics       Pneumonia:  Completed antibiotics  SaO2 97% on room air  Patient remains afebrile with normal WBC count on 07/21/2022    Acute respiratory failure with hypoxia :  Patient was noted to have acute respiratory failure with hypoxia at the hospital suspected to be multifactorial related to pulmonary hypertension and pneumonia  Patient recently completed antibiotic treatment  Respiratory status remains stable with SaO2 of 96% on oxygen  Will continue to monitor     Urinary retention:  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  Follow-up with urology service     Anemia:  Hemoglobin level was stable at 56 [id number]  Continue folic acid and iron supplements           Acquired hypothyroidism:  ·Continue levothyroxine    Lab Results  Component Value Date    [id number] 3 080 06/16/2022        Scleroderma :  ·Scleroderma with crest syndrome  Continue prednisone 30 mg daily  Patient completed course of IV immunoglobulins per rheumatology recommendation with marked improvements to her ROM  Continue Atovaquone for PCP prophylaxis   Follow-up with rheumatology service     Dysphagia, oropharyngeal phase:  Patient with history of dysphagia status post PEG tube  Tolerating tube feeds  Patient is requesting to add fiber to her meds due to loose stools at times  Increase Metamucil to twice daily     Severe protein-calorie malnutrition :  Malnutrition Findings:   Adult Malnutrition type: Chronic illness  Adult Degree of Malnutrition: Other severe protein calorie malnutrition  Malnutrition Characteristics: Muscle loss, Fat loss, Weight loss, Inadequate energy  related to disease/condition evidenced by increased kcal/pro needs for wound healing Stage IV sacral decub, BMI 18 4 ;severe buccal fat pads loss, severe deltoid muscle loss, Patient lost 31# (20 6%) since 3/15/22 past 3 months with illness <75% energy intake versus needs for > 1 month  Follow-up with dietitian  Continue tube feeds     Vitamin-D deficiency:  Continue vitamin-D supplement     GERD:  Stable on omeprazole      HPI       Patient is being seen for a follow-up visit today  She is doing okay at present  Tolerating tube feeds  Tejada catheter remains in place  Local wound care is being performed for sacral ulcer  Respiratory status remains stable with SaO2 of 97% on room air  Patient is requesting Flonase and saline nasal spray, has helped with nasal clogging in the past   Additionally wishes to use banana flakes has helped with bowel movements in the past     Review of Systems   HENT: Positive for trouble swallowing  Respiratory: Negative for cough, shortness of breath, wheezing and stridor  Gastrointestinal: Negative for abdominal distention, abdominal pain, diarrhea and vomiting  Genitourinary: Negative for flank pain and hematuria  Musculoskeletal: Positive for arthralgias and gait problem  Psychiatric/Behavioral: Negative for agitation and behavioral problems         Past Medical History:   Diagnosis Date    Dysphagia, oropharyngeal phase 06/06/2022       Past Surgical History:   Procedure Laterality Date    WOUND DEBRIDEMENT N/A 6/14/2022    Procedure: DEBRIDEMENT WOUND [facility]); SACRUM AND BUTTOCKS;  Surgeon: Uche Schuler MD;  Location: [facility];  Service: General       Social History     Tobacco Use   Smoking Status Never Smoker   Smokeless Tobacco Never Used       No family history on file       Allergies   Allergen Reactions   [place] [Selexipag] Anaphylaxis    Sulfa Antibiotics Tachycardia    Penicillins Rash         Current Outpatient Medications:     acetaminophen (TYLENOL) 160 mg/5 mL suspension, 20 3 mL (650 mg total) by Per G Tube route every 6 (six) hours, Disp: , Rfl: 0    aspirin 81 mg chewable tablet, 1 tablet (81 mg total) by Per G Tube route daily, Disp: , Rfl: 0    atovaquone (MEPRON) 750 mg/5 mL suspension, 5 mL (750 mg total) by Per G Tube route daily with breakfast, Disp: 150 mL, Rfl: 0    cholecalciferol (VITAMIN D3) 1,000 units tablet, 1 tablet (1,000 Units total) by Per G Tube route daily, Disp: , Rfl: 0    fluticasone (FLONASE) 50 mcg/act nasal spray, 1 spray into each nostril 2 (two) times a day, Disp: , Rfl: 0    folic acid (FOLVITE) 1 mg tablet, 1 tablet (1 mg total) by Per PEG Tube route daily, Disp: , Rfl: 0    glycerin-hypromellose- (ARTIFICIAL TEARS) 0 2-0 2-1 % SOLN, Administer 2 drops to both eyes 2 (two) times a day, Disp: , Rfl: 0    levalbuterol (XOPENEX) 1 25 mg/0 5 mL nebulizer solution, Take 0 5 mL (1 25 mg total) by nebulization 3 (three) times a day, Disp: , Rfl: 0    levothyroxine 25 mcg tablet, 1 tablet (25 mcg total) by Per G Tube route daily in the early morning, Disp: , Rfl: 0    loratadine (CLARITIN) 10 mg tablet, 1 tablet (10 mg total) by Per G Tube route daily, Disp: , Rfl: 0    Macitentan (OPSUMIT) 10 MG tablet, 1 tablet (10 mg total) by Per G Tube route daily, Disp: , Rfl: 0    melatonin 3 mg, Take 1 tablet (3 mg total) by mouth daily at bedtime, Disp: , Rfl: 0    nystatin (MYCOSTATIN) powder, Apply topically 2 (two) times a day, Disp: 15 g, Rfl: 0    omeprazole 2 mg/mL in sodium bicarbonate, Take 10 mL (20 mg total) by mouth daily in the early morning, Disp: 50 mL, Rfl: 0    predniSONE 10 mg tablet, 3 tablets (30 mg total) by Per G Tube route daily, Disp: , Rfl: 0    saccharomyces boulardii (FLORASTOR) 250 mg capsule, 1 capsule (250 mg total) by Per G Tube route 2 (two) times a day, Disp: , Rfl: 0    sildenafil (REVATIO) 20 mg tablet, Take 0 5 tablets (10 mg total) by mouth 3 (three) times a day, Disp: 45 tablet, Rfl: 0    sodium chloride (OCEAN) 0 65 % nasal spray, 1 spray into each nostril every hour as needed for congestion, Disp: , Rfl: 0    sodium chloride 0 9 % nebulizer solution, Take 3 mL by nebulization 3 (three) times a day, Disp: , Rfl: 0    Updated list was reviewed in [facility] system of facility  Vitals:    07/26/22 1317   BP: 148/80   Pulse: 66   Resp: 16   Temp: (!) 96 °F (35 6 °C)   SpO2: 96%       Physical Exam  HENT:      Head: Normocephalic and atraumatic  Eyes:      General: No scleral icterus  Right eye: No discharge  Left eye: No discharge  Cardiovascular:      Rate and Rhythm: Normal rate and regular rhythm  Pulmonary:      Breath sounds: No wheezing or rhonchi  Abdominal:      Palpations: Abdomen is soft  There is no mass  Tenderness: There is no abdominal tenderness  There is no guarding  Comments: PEG tube in place   Genitourinary:     Comments: Tejada catheter remains in place  Musculoskeletal:      Cervical back: Neck supple  Right lower leg: No edema  Left lower leg: No edema  Comments: Facial and extremities muscle wasting noted   Skin:     Coloration: Skin is not jaundiced  Comments: Sacral ulcer, not examined at this time    Being followed by wound care team   Neurological:      Motor: Weakness (Chronic weakness of all extremities, more pronounced in bilateral lower extremities) present     Psychiatric:         Mood and Affect: Mood normal          Behavior: Behavior normal          Diagnostic Data:  Labs done on 07/21/2022 revealed hemoglobin of 10 5, WBC count 9 8, platelet count 264  BUN 23, creatinine 0 27, sodium 135, potassium 4 6      This note was electronically signed by Dr Dennie Carter

## 2022-07-27 ENCOUNTER — NURSING HOME VISIT (OUTPATIENT)
Dept: WOUND CARE | Facility: HOSPITAL | Age: 82
End: 2022-07-27
Payer: COMMERCIAL

## 2022-07-27 DIAGNOSIS — R26.2 AMBULATORY DYSFUNCTION: ICD-10-CM

## 2022-07-27 DIAGNOSIS — L89.154 PRESSURE INJURY OF SACRAL REGION, STAGE 4 (HCC): Primary | ICD-10-CM

## 2022-07-27 DIAGNOSIS — L89.020: ICD-10-CM

## 2022-07-27 DIAGNOSIS — L89.620 PRESSURE INJURY OF LEFT HEEL, UNSTAGEABLE (HCC): ICD-10-CM

## 2022-07-27 DIAGNOSIS — L89.010 PRESSURE INJURY OF RIGHT ELBOW, UNSTAGEABLE (HCC): ICD-10-CM

## 2022-07-27 PROCEDURE — 99308 SBSQ NF CARE LOW MDM 20: CPT | Performed by: NURSE PRACTITIONER

## 2022-07-28 ENCOUNTER — NURSING HOME VISIT (OUTPATIENT)
Dept: GERIATRICS | Facility: OTHER | Age: 82
End: 2022-07-28
Payer: COMMERCIAL

## 2022-07-28 VITALS
WEIGHT: 114.6 LBS | DIASTOLIC BLOOD PRESSURE: 72 MMHG | OXYGEN SATURATION: 94 % | RESPIRATION RATE: 18 BRPM | BODY MASS INDEX: 17.68 KG/M2 | TEMPERATURE: 97.9 F | HEART RATE: 66 BPM | SYSTOLIC BLOOD PRESSURE: 129 MMHG

## 2022-07-28 DIAGNOSIS — M34.9 SCLERODERMA (HCC): Primary | ICD-10-CM

## 2022-07-28 PROCEDURE — 99309 SBSQ NF CARE MODERATE MDM 30: CPT | Performed by: INTERNAL MEDICINE

## 2022-07-28 NOTE — ASSESSMENT & PLAN NOTE
Left elbow  - wound is covered with slough, no drainage, no obvious sign of infection  - local wound care with medihoney and border foam  - continued offload  - followup next week

## 2022-07-28 NOTE — ASSESSMENT & PLAN NOTE
Right elbow  - wound is covered with slough, no drainage, no obvious sign of infection  - local wound care with medihoney and border foam  - continued offload  - followup next week

## 2022-07-28 NOTE — ASSESSMENT & PLAN NOTE
- Location : Sacrum  - Wound healing status: increase in size, improved wound bed  - wound - increase in size, 12 x 10 3 compared to last week measurement of 10 2 x 11 6 cm with undermining,  no obvious sign of infection, increase granulation  - Mechanical debridement using Dakins wet to dry  - ordered E-stim  - Pressure redistribution device - air mattress, pressure relief wheelchair cushion  - Continue to offload  - Increase protein    ON tube feeding  - No sign localized infection during visit  - Clinical factors affecting wound healing includes age, chronicity, cognitive issue co-morbidities, incontinence, location of the wound, mobility impairement,  behavioral issues  -Follow up : Next week

## 2022-07-28 NOTE — PROGRESS NOTES
Ebony Wylie  [facility]  [address]   [address] [place], [place] [address]Bony U  51     Progress Note                                 Code SNF 31  Patient Location     [address] rehab    Reason for visit     Abnormal labs including leukocytosis, hyponatremia and hyperkalemia Follow-up scleroderma, pulmonary hypertension, sacral ulcer, hypothyroidism, GERD      Problem List Items Addressed This Visit    None       Proximal muscle weakness:  History of scleroderma  Patient was noted to have significant weakness of extremities during recent hospitalization  She was treated with IV immunoglobulins   Muscle strength in extremities has improved from before however patient still needs significant assistance with all ADLs  She is unable to ambulate on her own  Patient states she was quite active prior to recent hospitalization   Patient was managing household work and driving by herself  Follow-up with rheumatology service    Hyponatremia:  Na level was mildly low at 132 today  BUN creat ok  Clinically euvolemic  Possible SIADH  Repeat BMP in 4 days    Hyperkalemia:  K level was 5 8 today  Pt is currently not on any K raising medication  Will give Kayexaalate 15 gm and follow     Pulmonary hypertension :  ·History of Crest syndrome with pulmonary hypertension    Continue Sildenafil 10 mg tid and Opsumit 10 mg daily  Last echo revealed EF of 70% with grade 2 diastolic dysfunction  Patient is not on any maintenance diuretics       Pneumonia:  Completed antibiotics  SaO2 97% on room air  Patient remains afebrile  WBC count was mildly high at 13  Will continue to monitor    Leukocytosis:  WBC count was noted to be elevated at 13 6 today up from 9 8 on 07/21  Probably due to steroids    Clinically patient appears to be nontoxic  Will repeat labs in 4 days    Acute respiratory failure with hypoxia :  Patient was noted to have acute respiratory failure with hypoxia at the hospital suspected to be multifactorial related to pulmonary hypertension and pneumonia  Patient recently completed antibiotic treatment  Sao2 currently stable on RA  Will continue to monitor      Urinary retention:  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  Follow-up with urology service     Anemia:  Hemoglobin level was stable on today's CBC  Continue folic acid and iron supplements           Acquired hypothyroidism:  ·Continue levothyroxine  Lab Results  Component Value Date    [id number] 3 080 06/16/2022        Scleroderma :  Scleroderma with crest syndrome  Continue prednisone 30 mg daily  Patient completed course of IV immunoglobulins  with improvement in extremities strength  Follow-up with rheumatology service     Dysphagia, oropharyngeal phase:  Status post G-tube placement  Continue tube feeds       Severe protein-calorie malnutrition :  Malnutrition Findings:   Adult Malnutrition type: Chronic illness  Adult Degree of Malnutrition: Other severe protein calorie malnutrition  Malnutrition Characteristics: Muscle loss, Fat loss, Weight loss, Inadequate energy  related to disease/condition evidenced by increased kcal/pro needs for wound healing Stage IV sacral decub, BMI 18 4 ;severe buccal fat pads loss, severe deltoid muscle loss, Patient lost 31# (20 6%) since 3/15/22 past 3 months with illness <75% energy intake versus needs for > 1 month  Follow-up with dietitian  Continue tube feeds     Vitamin-D deficiency:  Continue vitamin-D supplement     GERD:  Stable on omeprazole      HPI       Patient is being seen for a follow-up visit today  She is doing okay at present  Remains weak  Patient is needing significant assistance with all ADLs  She is unable to ambulate on her own  Patient is awake alert able to make her needs known  Tolerating tube feeds  Tejada catheter remains in place  Labs from today revealed sodium level to be low at 132  K was elevated at 5 8    Patient complains of loose bowel movements and pain involving sacral area  Remains on oxycodone every 4 hours p r n  Pam Marino Patient feels this is working well for her    Review of Systems   HENT: Positive for trouble swallowing  Respiratory: Negative for cough, shortness of breath, wheezing and stridor  Gastrointestinal: Negative for abdominal distention, abdominal pain, diarrhea and vomiting  Genitourinary: Negative for flank pain and hematuria  Musculoskeletal: Positive for arthralgias and gait problem  Neurological: Positive for weakness (Of all extremities)  Negative for seizures  Psychiatric/Behavioral: Negative for agitation and behavioral problems  Past Medical History:   Diagnosis Date    Dysphagia, oropharyngeal phase 06/06/2022       Past Surgical History:   Procedure Laterality Date    WOUND DEBRIDEMENT N/A 6/14/2022    Procedure: DEBRIDEMENT WOUND [facility] OUT); SACRUM AND BUTTOCKS;  Surgeon: Olga Mosquera MD;  [facility];  Service: General       Social History     Tobacco Use   Smoking Status Never Smoker   Smokeless Tobacco Never Used       No family history on file       Allergies   Allergen Reactions   April Barnettf [Selexipag] Anaphylaxis    Sulfa Antibiotics Tachycardia    Penicillins Rash         Current Outpatient Medications:     acetaminophen (TYLENOL) 160 mg/5 mL suspension, 20 3 mL (650 mg total) by Per G Tube route every 6 (six) hours, Disp: , Rfl: 0    aspirin 81 mg chewable tablet, 1 tablet (81 mg total) by Per G Tube route daily, Disp: , Rfl: 0    atovaquone (MEPRON) 750 mg/5 mL suspension, 5 mL (750 mg total) by Per G Tube route daily with breakfast, Disp: 150 mL, Rfl: 0    cholecalciferol (VITAMIN D3) 1,000 units tablet, 1 tablet (1,000 Units total) by Per G Tube route daily, Disp: , Rfl: 0    fluticasone (FLONASE) 50 mcg/act nasal spray, 1 spray into each nostril 2 (two) times a day, Disp: , Rfl: 0    folic acid (FOLVITE) 1 mg tablet, 1 tablet (1 mg total) by Per PEG Tube route daily, Disp: , Rfl: 0   glycerin-hypromellose- (ARTIFICIAL TEARS) 0 2-0 2-1 % SOLN, Administer 2 drops to both eyes 2 (two) times a day, Disp: , Rfl: 0    levalbuterol (XOPENEX) 1 25 mg/0 5 mL nebulizer solution, Take 0 5 mL (1 25 mg total) by nebulization 3 (three) times a day, Disp: , Rfl: 0    levothyroxine 25 mcg tablet, 1 tablet (25 mcg total) by Per G Tube route daily in the early morning, Disp: , Rfl: 0    loratadine (CLARITIN) 10 mg tablet, 1 tablet (10 mg total) by Per G Tube route daily, Disp: , Rfl: 0    Macitentan (OPSUMIT) 10 MG tablet, 1 tablet (10 mg total) by Per G Tube route daily, Disp: , Rfl: 0    melatonin 3 mg, Take 1 tablet (3 mg total) by mouth daily at bedtime, Disp: , Rfl: 0    nystatin (MYCOSTATIN) powder, Apply topically 2 (two) times a day, Disp: 15 g, Rfl: 0    omeprazole 2 mg/mL in sodium bicarbonate, Take 10 mL (20 mg total) by mouth daily in the early morning, Disp: 50 mL, Rfl: 0    predniSONE 10 mg tablet, 3 tablets (30 mg total) by Per G Tube route daily, Disp: , Rfl: 0    saccharomyces boulardii (FLORASTOR) 250 mg capsule, 1 capsule (250 mg total) by Per G Tube route 2 (two) times a day, Disp: , Rfl: 0    sildenafil (REVATIO) 20 mg tablet, Take 0 5 tablets (10 mg total) by mouth 3 (three) times a day, Disp: 45 tablet, Rfl: 0    sodium chloride (OCEAN) 0 65 % nasal spray, 1 spray into each nostril every hour as needed for congestion, Disp: , Rfl: 0    sodium chloride 0 9 % nebulizer solution, Take 3 mL by nebulization 3 (three) times a day, Disp: , Rfl: 0    Updated list was reviewed in [facility] system of facility  Vitals:    07/28/22 1507   BP: 129/72   Pulse: 66   Resp: 18   Temp: 97 9 °F (36 6 °C)   SpO2: 94%       Physical Exam  HENT:      Head: Normocephalic and atraumatic  Eyes:      General: No scleral icterus  Right eye: No discharge  Left eye: No discharge  Cardiovascular:      Rate and Rhythm: Normal rate and regular rhythm     Pulmonary: Breath sounds: No wheezing or rhonchi  Abdominal:      Palpations: Abdomen is soft  There is no mass  Tenderness: There is no abdominal tenderness  There is no guarding  Comments: G tube in place   Genitourinary:     Comments: Tejada catheter remains in place  Musculoskeletal:      Cervical back: Neck supple  Right lower leg: No edema  Left lower leg: No edema  Comments: Muscle wasting of extremities and facial muscles noted noted   Skin:     Coloration: Skin is not jaundiced  Comments: Sacral ulcer, not examined at this time  Being followed by wound care team   Neurological:      Mental Status: Mental status is at baseline  Motor: Weakness (Chronic weakness of all extremities, more pronounced in bilateral lower extremities) present        Comments: Awake alert   Psychiatric:         Mood and Affect: Mood normal          Behavior: Behavior normal          Diagnostic Data:  Labs done on 07/21/2022 revealed hemoglobin of 10 5, WBC count 9 8, platelet count 235  BUN 23, creatinine 0 27, sodium 135, potassium 4 6    Labs from today revealed hemoglobin of 11, WBC count 13 6, platelet count 356  Sodium 132, potassium 5 8, BUN 21, creatinine 0 38    This note was electronically signed by Dr Michele Green

## 2022-07-28 NOTE — PROGRESS NOTES
[address] [facility]       Patient ID: Paco Schuler is a 80 y o  female Date of Birth 1940     Location of Service: [address] [place] [address]    Performed wound round with: Wound team       Chief complaint : sacrum    Wound Instructions:  Wound: Sacrum   Discontinue previous wound order  Cleanse the wound bed with Dakins quarter strength  Apply non-sting skin prep to periwound area  Gently fill the wound cavity with kerlix moistened with Dakins quarter strength and cover with ABD pad  Frequency : daily and prn for soiling  Order E-stim ( PT)    Location : Left heel  Cleanse the wound bed with NSS  Apply skin prep to periwound and wound bed  Daily and prn for soiling    Location : Bilateral elbows  Cleanse the wound bed with NSS  Apply skin prep to periwound area  Apply medihoney gel to wound bed and cover with bordered foam  Daily and prn for soiling    Offload all wounds  Turn and reposition frequently, maximum of every two hours  Can be OOB only for therapy  Increase protein intake  Monitor for any sign of infection or worsening, inform PCP or patient's primary physician in your facility  Allergies  Uptravi [selexipag], Sulfa antibiotics, and Penicillins      Assessment & Plan:  1  Pressure injury of sacral region, stage 4 (formerly [facility])  Assessment & Plan:  - Location : Sacrum  - Wound healing status: increase in size, improved wound bed  - wound - increase in size, 12 x 10 3 compared to last week measurement of 10 2 x 11 6 cm with undermining,  no obvious sign of infection, increase granulation  - Mechanical debridement using Dakins wet to dry  - ordered E-stim  - Pressure redistribution device - air mattress, pressure relief wheelchair cushion  - Continue to offload  - Increase protein    ON tube feeding  - No sign localized infection during visit  - Clinical factors affecting wound healing includes age, chronicity, cognitive issue co-morbidities, incontinence, location of the wound, mobility impairement,  behavioral issues  -Follow up : Next week           2  Ambulatory dysfunction  Assessment & Plan:  On STR      3  Pressure injury of left elbow, unstageable (HCC)  Assessment & Plan:  Left elbow  - wound is covered with slough, no drainage, no obvious sign of infection  - local wound care with medihoney and border foam  - continued offload  - followup next week      4  Pressure injury of right elbow, unstageable (HCC)  Assessment & Plan:  Right elbow  - wound is covered with slough, no drainage, no obvious sign of infection  - local wound care with medihoney and border foam  - continued offload  - followup next week        5  Pressure injury of left heel, unstageable (HCC)  Assessment & Plan:  Left heel  - covered with eschar, stable, with no obvious sign of infection  - Local wound care with skin prep  - continued offload, on prevalon boots  - follow up next week             Subjective: This is a follow up for wound on the sacrum, left heel and bilateral elbows  Patient have a complex medical history including but not limited toscleroderma, pulmonary hypertension, anemia, hypothyroidism, urinary retention status post Tejada catheter placement, anemia and severe protein calorie malnutrition  Patient was referred by Senior Care Team  Patient was seen in collaboration with the facility wound team  As per medical record review, patient was admitted in acute care for infected sacral wound  Debridement done on the 6/14/2022  Patient completed antibiotic course  I did not see any imaging to check for osteomyelitis  Received patient in bed, not in distress  Patient have indwelling catheter and currently on tube feeding  The wound on the left heel and bilateral elbows were both present on admission at SNF as per report  Review of Systems   Constitutional: Negative  HENT: Negative  Eyes: Negative  Respiratory: Negative      Cardiovascular: Negative for chest pain and leg swelling  Gastrointestinal: Negative  Endocrine: Negative  Genitourinary: Negative  Musculoskeletal: Positive for gait problem  Skin: Positive for wound  See HPI   Neurological: Negative for dizziness and headaches  Psychiatric/Behavioral: Negative for behavioral problems  Objective: There were no vitals taken for this visit  Physical Exam  Constitutional:       Appearance: She is ill-appearing  HENT:      Head: Normocephalic and atraumatic  Nose: Nose normal       Mouth/Throat:      Pharynx: Oropharynx is clear  Eyes:      Conjunctiva/sclera: Conjunctivae normal    Cardiovascular:      Rate and Rhythm: Normal rate  Pulmonary:      Effort: Pulmonary effort is normal    Abdominal:      Tenderness: There is no abdominal tenderness  There is no guarding  Comments: With peg tube   Genitourinary:     Comments: With indwelling catheter  Musculoskeletal:         General: No tenderness  Cervical back: Normal range of motion  Right lower leg: No edema  Left lower leg: No edema  Comments: LROM   Skin:     General: Skin is warm  Findings: Lesion present  Comments: Location Sacrum wound bed - 12 x 10 3 x 2 cm  , with undermining deepest at 3 o'clock - 3 cm, 0% epithelial, 100%granulation with exposed bone, exudate - moderate amount of serous drainage, no malodor ( assess after dressing removal and cleansing), wound edge - attached to base, periwound - not macerated  No localized sign of infection, Denies pain    Left heel - wound size is 1 x 1 cm , 100% eschar, no drainage, no obvious sign of infection    Right elbow - wound is 0 5 x 0 5 x 0 1 cm , 100% slough, no drainage, periwound is normal, no obvious sign of infection    Left elbow - wound is 0 5 x 0 5 x 0 1 cm  , 100% slough, no drainage, periwound is normal, no obvious sign of infection   Neurological:      Mental Status: She is alert        Gait: Gait abnormal    Psychiatric:         Mood and Affect: Mood normal          Behavior: Behavior normal               Procedures           Patient's care was coordinated with nursing facility staff  Recent vitals, labs and updated medications were reviewed on EMR or chart system of facility   Past Medical, surgical, social, medication and allergy history and patient's previous records were reviewed and updated as appropriate:     Patient Active Problem List   Diagnosis    Sacral wound    Severe protein-calorie malnutrition ([facility] )    Dysphagia, oropharyngeal phase    Scleroderma ([facility] )    Acquired hypothyroidism    Anemia    Urinary retention    Pneumonia    Hearing loss    Pulmonary hypertension ([facility] )    Proximal muscle weakness    Pressure injury of sacral region, stage 4 ([facility] )    Ambulatory dysfunction    Chronic hypoxemic respiratory failure (HCC)    Pressure injury of left elbow, unstageable (HCC)    Pressure injury of right elbow, unstageable (HCC)    Pressure injury of left heel, unstageable ([facility] )     Past Medical History:   Diagnosis Date    Dysphagia, oropharyngeal phase 06/06/2022     Past Surgical History:   Procedure Laterality Date    WOUND DEBRIDEMENT N/A 6/14/2022    Procedure: DEBRIDEMENT WOUND [facility] OUT); SACRUM AND BUTTOCKS;  Surgeon: Kiya Blum MD;  [facility];  Service: General     Social History     Socioeconomic History    Marital status: /Civil Union     Spouse name: None    Number of children: None    Years of education: None    Highest education level: None   Occupational History    None   Tobacco Use    Smoking status: Never Smoker    Smokeless tobacco: Never Used   Substance and Sexual Activity    Alcohol use: Never    Drug use: None    Sexual activity: None   Other Topics Concern    None   Social History Narrative    None     Social Determinants of Health     Financial Resource Strain: Not on file   Food Insecurity: No Food Insecurity    Worried About Running Out of Food in the Last Year: Never true    Ran Out of Food in the Last Year: Never true   Transportation Needs: No Transportation Needs    Lack of Transportation (Medical): No    Lack of Transportation (Non-Medical):  No   Physical Activity: Not on file   Stress: Not on file   Social Connections: Not on file   Intimate Partner Violence: Not on file   Housing Stability: Low Risk     Unable to Pay for Housing in the Last Year: No    Number of Places Lived in the Last Year: 1    Unstable Housing in the Last Year: No        Current Outpatient Medications:     acetaminophen (TYLENOL) 160 mg/5 mL suspension, 20 3 mL (650 mg total) by Per G Tube route every 6 (six) hours, Disp: , Rfl: 0    aspirin 81 mg chewable tablet, 1 tablet (81 mg total) by Per G Tube route daily, Disp: , Rfl: 0    atovaquone (MEPRON) 750 mg/5 mL suspension, 5 mL (750 mg total) by Per G Tube route daily with breakfast, Disp: 150 mL, Rfl: 0    cholecalciferol (VITAMIN D3) 1,000 units tablet, 1 tablet (1,000 Units total) by Per G Tube route daily, Disp: , Rfl: 0    fluticasone (FLONASE) 50 mcg/act nasal spray, 1 spray into each nostril 2 (two) times a day, Disp: , Rfl: 0    folic acid (FOLVITE) 1 mg tablet, 1 tablet (1 mg total) by Per PEG Tube route daily, Disp: , Rfl: 0    glycerin-hypromellose- (ARTIFICIAL TEARS) 0 2-0 2-1 % SOLN, Administer 2 drops to both eyes 2 (two) times a day, Disp: , Rfl: 0    levalbuterol (XOPENEX) 1 25 mg/0 5 mL nebulizer solution, Take 0 5 mL (1 25 mg total) by nebulization 3 (three) times a day, Disp: , Rfl: 0    levothyroxine 25 mcg tablet, 1 tablet (25 mcg total) by Per G Tube route daily in the early morning, Disp: , Rfl: 0    loratadine (CLARITIN) 10 mg tablet, 1 tablet (10 mg total) by Per G Tube route daily, Disp: , Rfl: 0    Macitentan (OPSUMIT) 10 MG tablet, 1 tablet (10 mg total) by Per G Tube route daily, Disp: , Rfl: 0    melatonin 3 mg, Take 1 tablet (3 mg total) by mouth daily at bedtime, Disp: , Rfl: 0   nystatin (MYCOSTATIN) powder, Apply topically 2 (two) times a day, Disp: 15 g, Rfl: 0    omeprazole 2 mg/mL in sodium bicarbonate, Take 10 mL (20 mg total) by mouth daily in the early morning, Disp: 50 mL, Rfl: 0    predniSONE 10 mg tablet, 3 tablets (30 mg total) by Per G Tube route daily, Disp: , Rfl: 0    saccharomyces boulardii (FLORASTOR) 250 mg capsule, 1 capsule (250 mg total) by Per G Tube route 2 (two) times a day, Disp: , Rfl: 0    sildenafil (REVATIO) 20 mg tablet, Take 0 5 tablets (10 mg total) by mouth 3 (three) times a day, Disp: 45 tablet, Rfl: 0    sodium chloride (OCEAN) 0 65 % nasal spray, 1 spray into each nostril every hour as needed for congestion, Disp: , Rfl: 0    sodium chloride 0 9 % nebulizer solution, Take 3 mL by nebulization 3 (three) times a day, Disp: , Rfl: 0  History reviewed  No pertinent family history  Coordination of Care: Wound team aware of the treatment plan  Facility nurse will provide wound treatment and monitor the wound for any changes  Patient / Staff education : Patient / Staff was given education on sign of infection and pressure ulcer prevention  Patient/ Staff verbalized understanding     Follow up :  Next week    Voice-recognition software may have been used in the preparation of this document  Occasional wrong word or "sound-alike" substitutions may have occurred due to the inherent limitations of voice recognition software  Interpretation should be guided by context        BETTIE Treadwell

## 2022-07-28 NOTE — ASSESSMENT & PLAN NOTE
Left heel  - covered with eschar, stable, with no obvious sign of infection  - Local wound care with skin prep  - continued offload, on prevalon boots  - follow up next week

## 2022-07-31 ENCOUNTER — HOSPITAL ENCOUNTER (INPATIENT)
Facility: HOSPITAL | Age: 82
LOS: 9 days | Discharge: NURSING, REHAB OR LONG-TERM CARE | DRG: 189 | End: 2022-08-09
Attending: EMERGENCY MEDICINE | Admitting: STUDENT IN AN ORGANIZED HEALTH CARE EDUCATION/TRAINING PROGRAM
Payer: COMMERCIAL

## 2022-07-31 ENCOUNTER — TELEPHONE (OUTPATIENT)
Dept: OTHER | Facility: OTHER | Age: 82
End: 2022-07-31

## 2022-07-31 ENCOUNTER — APPOINTMENT (EMERGENCY)
Dept: RADIOLOGY | Facility: HOSPITAL | Age: 82
DRG: 189 | End: 2022-07-31
Payer: COMMERCIAL

## 2022-07-31 DIAGNOSIS — R13.12 DYSPHAGIA, OROPHARYNGEAL PHASE: ICD-10-CM

## 2022-07-31 DIAGNOSIS — J96.20 ACUTE ON CHRONIC RESPIRATORY FAILURE (HCC): ICD-10-CM

## 2022-07-31 DIAGNOSIS — Z87.39 H/O SCLERODERMA: ICD-10-CM

## 2022-07-31 DIAGNOSIS — M34.9 SCLERODERMA (HCC): ICD-10-CM

## 2022-07-31 DIAGNOSIS — I27.20 PULMONARY HYPERTENSION (HCC): ICD-10-CM

## 2022-07-31 DIAGNOSIS — J98.8 PSEUDOMONAS RESPIRATORY INFECTION: ICD-10-CM

## 2022-07-31 DIAGNOSIS — M34.1 CREST SYNDROME (HCC): ICD-10-CM

## 2022-07-31 DIAGNOSIS — R65.10 SIRS (SYSTEMIC INFLAMMATORY RESPONSE SYNDROME) (HCC): ICD-10-CM

## 2022-07-31 DIAGNOSIS — B96.5 PSEUDOMONAS RESPIRATORY INFECTION: ICD-10-CM

## 2022-07-31 DIAGNOSIS — S31.000A WOUND OF SACRAL REGION, INITIAL ENCOUNTER: ICD-10-CM

## 2022-07-31 DIAGNOSIS — E87.1 HYPONATREMIA: ICD-10-CM

## 2022-07-31 DIAGNOSIS — R06.02 SOB (SHORTNESS OF BREATH): Primary | ICD-10-CM

## 2022-07-31 DIAGNOSIS — A41.9 SEPSIS (HCC): ICD-10-CM

## 2022-07-31 LAB
2HR DELTA HS TROPONIN: 10 NG/L
ALBUMIN SERPL BCP-MCNC: 3.4 G/DL (ref 3.5–5)
ALP SERPL-CCNC: 109 U/L (ref 34–104)
ALT SERPL W P-5'-P-CCNC: 14 U/L (ref 7–52)
AMORPH URATE CRY URNS QL MICRO: ABNORMAL /HPF
ANION GAP SERPL CALCULATED.3IONS-SCNC: 10 MMOL/L (ref 4–13)
ANISOCYTOSIS BLD QL SMEAR: PRESENT
AST SERPL W P-5'-P-CCNC: 22 U/L (ref 13–39)
BACTERIA UR QL AUTO: ABNORMAL /HPF
BASE EX.OXY STD BLDV CALC-SCNC: 76.7 % (ref 60–80)
BASE EXCESS BLDV CALC-SCNC: 2.9 MMOL/L
BASOPHILS # BLD MANUAL: 0 THOUSAND/UL (ref 0–0.1)
BASOPHILS NFR MAR MANUAL: 0 % (ref 0–1)
BILIRUB SERPL-MCNC: 0.43 MG/DL (ref 0.2–1)
BILIRUB UR QL STRIP: NEGATIVE
BNP SERPL-MCNC: 372 PG/ML (ref 0–100)
BUN SERPL-MCNC: 32 MG/DL (ref 5–25)
CALCIUM ALBUM COR SERPL-MCNC: 10.4 MG/DL (ref 8.3–10.1)
CALCIUM SERPL-MCNC: 9.9 MG/DL (ref 8.4–10.2)
CAOX CRY URNS QL MICRO: ABNORMAL /HPF
CARDIAC TROPONIN I PNL SERPL HS: 69 NG/L
CARDIAC TROPONIN I PNL SERPL HS: 79 NG/L
CHLORIDE SERPL-SCNC: 86 MMOL/L (ref 96–108)
CLARITY UR: ABNORMAL
CO2 SERPL-SCNC: 31 MMOL/L (ref 21–32)
COLOR UR: YELLOW
CREAT SERPL-MCNC: 0.45 MG/DL (ref 0.6–1.3)
EOSINOPHIL # BLD MANUAL: 0 THOUSAND/UL (ref 0–0.4)
EOSINOPHIL NFR BLD MANUAL: 0 % (ref 0–6)
ERYTHROCYTE [DISTWIDTH] IN BLOOD BY AUTOMATED COUNT: 21.2 % (ref 11.6–15.1)
ETHANOL SERPL-MCNC: <10 MG/DL
FLUAV RNA RESP QL NAA+PROBE: NEGATIVE
FLUBV RNA RESP QL NAA+PROBE: NEGATIVE
GFR SERPL CREATININE-BSD FRML MDRD: 95 ML/MIN/1.73SQ M
GLUCOSE SERPL-MCNC: 107 MG/DL (ref 65–140)
GLUCOSE UR STRIP-MCNC: NEGATIVE MG/DL
HCO3 BLDV-SCNC: 28.1 MMOL/L (ref 24–30)
HCT VFR BLD AUTO: 35.6 % (ref 34.8–46.1)
HGB BLD-MCNC: 11.4 G/DL (ref 11.5–15.4)
HGB UR QL STRIP.AUTO: ABNORMAL
KETONES UR STRIP-MCNC: NEGATIVE MG/DL
LEUKOCYTE ESTERASE UR QL STRIP: ABNORMAL
LYMPHOCYTES # BLD AUTO: 1.15 THOUSAND/UL (ref 0.6–4.47)
LYMPHOCYTES # BLD AUTO: 6 % (ref 14–44)
MACROCYTES BLD QL AUTO: PRESENT
MCH RBC QN AUTO: 31.8 PG (ref 26.8–34.3)
MCHC RBC AUTO-ENTMCNC: 32 G/DL (ref 31.4–37.4)
MCV RBC AUTO: 99 FL (ref 82–98)
MONOCYTES # BLD AUTO: 0.77 THOUSAND/UL (ref 0–1.22)
MONOCYTES NFR BLD: 4 % (ref 4–12)
NEUTROPHILS # BLD MANUAL: 17.22 THOUSAND/UL (ref 1.85–7.62)
NEUTS BAND NFR BLD MANUAL: 8 % (ref 0–8)
NEUTS SEG NFR BLD AUTO: 82 % (ref 43–75)
NITRITE UR QL STRIP: NEGATIVE
NON-SQ EPI CELLS URNS QL MICRO: ABNORMAL /HPF
O2 CT BLDV-SCNC: 12.1 ML/DL
PCO2 BLDV: 45.5 MM HG (ref 42–50)
PH BLDV: 7.41 [PH] (ref 7.3–7.4)
PH UR STRIP.AUTO: 7 [PH]
PLATELET # BLD AUTO: 343 THOUSANDS/UL (ref 149–390)
PLATELET BLD QL SMEAR: ADEQUATE
PMV BLD AUTO: 9.3 FL (ref 8.9–12.7)
PO2 BLDV: 43.8 MM HG (ref 35–45)
POLYCHROMASIA BLD QL SMEAR: PRESENT
POTASSIUM SERPL-SCNC: 4.8 MMOL/L (ref 3.5–5.3)
PROT SERPL-MCNC: 8.1 G/DL (ref 6.4–8.4)
PROT UR STRIP-MCNC: NEGATIVE MG/DL
RBC # BLD AUTO: 3.58 MILLION/UL (ref 3.81–5.12)
RBC #/AREA URNS AUTO: ABNORMAL /HPF
RBC MORPH BLD: PRESENT
RSV RNA RESP QL NAA+PROBE: NEGATIVE
SARS-COV-2 RNA RESP QL NAA+PROBE: NEGATIVE
SODIUM SERPL-SCNC: 127 MMOL/L (ref 135–147)
SP GR UR STRIP.AUTO: 1.01 (ref 1–1.03)
UROBILINOGEN UR QL STRIP.AUTO: 0.2 E.U./DL
WBC # BLD AUTO: 19.13 THOUSAND/UL (ref 4.31–10.16)
WBC #/AREA URNS AUTO: ABNORMAL /HPF

## 2022-07-31 PROCEDURE — 0T2BX0Z CHANGE DRAINAGE DEVICE IN BLADDER, EXTERNAL APPROACH: ICD-10-PCS | Performed by: EMERGENCY MEDICINE

## 2022-07-31 PROCEDURE — 83930 ASSAY OF BLOOD OSMOLALITY: CPT

## 2022-07-31 PROCEDURE — 82805 BLOOD GASES W/O2 SATURATION: CPT | Performed by: PHYSICIAN ASSISTANT

## 2022-07-31 PROCEDURE — 85027 COMPLETE CBC AUTOMATED: CPT | Performed by: PHYSICIAN ASSISTANT

## 2022-07-31 PROCEDURE — 83880 ASSAY OF NATRIURETIC PEPTIDE: CPT | Performed by: STUDENT IN AN ORGANIZED HEALTH CARE EDUCATION/TRAINING PROGRAM

## 2022-07-31 PROCEDURE — 84484 ASSAY OF TROPONIN QUANT: CPT | Performed by: PHYSICIAN ASSISTANT

## 2022-07-31 PROCEDURE — 96365 THER/PROPH/DIAG IV INF INIT: CPT

## 2022-07-31 PROCEDURE — 93005 ELECTROCARDIOGRAM TRACING: CPT

## 2022-07-31 PROCEDURE — 84550 ASSAY OF BLOOD/URIC ACID: CPT

## 2022-07-31 PROCEDURE — 81001 URINALYSIS AUTO W/SCOPE: CPT | Performed by: PHYSICIAN ASSISTANT

## 2022-07-31 PROCEDURE — 87186 SC STD MICRODIL/AGAR DIL: CPT | Performed by: PHYSICIAN ASSISTANT

## 2022-07-31 PROCEDURE — 87040 BLOOD CULTURE FOR BACTERIA: CPT | Performed by: PHYSICIAN ASSISTANT

## 2022-07-31 PROCEDURE — 80053 COMPREHEN METABOLIC PANEL: CPT | Performed by: PHYSICIAN ASSISTANT

## 2022-07-31 PROCEDURE — 84443 ASSAY THYROID STIM HORMONE: CPT

## 2022-07-31 PROCEDURE — 99285 EMERGENCY DEPT VISIT HI MDM: CPT

## 2022-07-31 PROCEDURE — 85007 BL SMEAR W/DIFF WBC COUNT: CPT | Performed by: PHYSICIAN ASSISTANT

## 2022-07-31 PROCEDURE — 96361 HYDRATE IV INFUSION ADD-ON: CPT

## 2022-07-31 PROCEDURE — 71045 X-RAY EXAM CHEST 1 VIEW: CPT

## 2022-07-31 PROCEDURE — 36415 COLL VENOUS BLD VENIPUNCTURE: CPT | Performed by: PHYSICIAN ASSISTANT

## 2022-07-31 PROCEDURE — 99285 EMERGENCY DEPT VISIT HI MDM: CPT | Performed by: PHYSICIAN ASSISTANT

## 2022-07-31 PROCEDURE — 82077 ASSAY SPEC XCP UR&BREATH IA: CPT | Performed by: PHYSICIAN ASSISTANT

## 2022-07-31 PROCEDURE — 87077 CULTURE AEROBIC IDENTIFY: CPT | Performed by: PHYSICIAN ASSISTANT

## 2022-07-31 PROCEDURE — 0241U HB NFCT DS VIR RESP RNA 4 TRGT: CPT | Performed by: PHYSICIAN ASSISTANT

## 2022-07-31 PROCEDURE — 87086 URINE CULTURE/COLONY COUNT: CPT | Performed by: PHYSICIAN ASSISTANT

## 2022-07-31 RX ORDER — ASPIRIN 81 MG/1
162 TABLET, CHEWABLE ORAL ONCE
Status: DISCONTINUED | OUTPATIENT
Start: 2022-07-31 | End: 2022-08-05

## 2022-07-31 RX ORDER — SODIUM CHLORIDE 9 MG/ML
125 INJECTION, SOLUTION INTRAVENOUS CONTINUOUS
Status: DISCONTINUED | OUTPATIENT
Start: 2022-07-31 | End: 2022-08-01

## 2022-07-31 RX ORDER — ASPIRIN 81 MG/1
162 TABLET, CHEWABLE ORAL ONCE
Status: DISCONTINUED | OUTPATIENT
Start: 2022-07-31 | End: 2022-08-01

## 2022-07-31 RX ORDER — CEFTRIAXONE 1 G/50ML
1000 INJECTION, SOLUTION INTRAVENOUS ONCE
Status: COMPLETED | OUTPATIENT
Start: 2022-07-31 | End: 2022-07-31

## 2022-07-31 RX ORDER — OXYCODONE HCL 5 MG/5 ML
5 SOLUTION, ORAL ORAL ONCE
Status: COMPLETED | OUTPATIENT
Start: 2022-07-31 | End: 2022-07-31

## 2022-07-31 RX ADMIN — SODIUM CHLORIDE 125 ML/HR: 0.9 INJECTION, SOLUTION INTRAVENOUS at 20:43

## 2022-07-31 RX ADMIN — CEFTRIAXONE 1000 MG: 1 INJECTION, SOLUTION INTRAVENOUS at 20:50

## 2022-07-31 RX ADMIN — OXYCODONE HYDROCHLORIDE 5 MG: 5 SOLUTION ORAL at 20:58

## 2022-07-31 NOTE — TELEPHONE ENCOUNTER
Dylan Prieto from [facility], Patient's condition has failed to to improve, they are  transferring to hospital, paging on call for senior care, message was read

## 2022-08-01 PROBLEM — J96.21 ACUTE ON CHRONIC RESPIRATORY FAILURE WITH HYPOXIA (HCC): Status: ACTIVE | Noted: 2022-01-01

## 2022-08-01 PROBLEM — E87.1 HYPONATREMIA: Status: ACTIVE | Noted: 2022-08-01

## 2022-08-01 PROBLEM — E44.0 MODERATE PROTEIN-CALORIE MALNUTRITION (HCC): Status: ACTIVE | Noted: 2022-08-01

## 2022-08-01 PROBLEM — R77.8 ELEVATED TROPONIN: Status: ACTIVE | Noted: 2022-01-01

## 2022-08-01 PROBLEM — N39.0 UTI (URINARY TRACT INFECTION): Status: ACTIVE | Noted: 2022-01-01

## 2022-08-01 LAB
4HR DELTA HS TROPONIN: 3 NG/L
ANION GAP SERPL CALCULATED.3IONS-SCNC: 9 MMOL/L (ref 4–13)
ATRIAL RATE: 90 BPM
ATRIAL RATE: 95 BPM
BUN SERPL-MCNC: 23 MG/DL (ref 5–25)
CALCIUM SERPL-MCNC: 9.3 MG/DL (ref 8.4–10.2)
CARDIAC TROPONIN I PNL SERPL HS: 72 NG/L
CHLORIDE SERPL-SCNC: 91 MMOL/L (ref 96–108)
CO2 SERPL-SCNC: 29 MMOL/L (ref 21–32)
CREAT SERPL-MCNC: 0.35 MG/DL (ref 0.6–1.3)
ERYTHROCYTE [DISTWIDTH] IN BLOOD BY AUTOMATED COUNT: 21.2 % (ref 11.6–15.1)
GFR SERPL CREATININE-BSD FRML MDRD: 103 ML/MIN/1.73SQ M
GLUCOSE SERPL-MCNC: 72 MG/DL (ref 65–140)
HCT VFR BLD AUTO: 33.9 % (ref 34.8–46.1)
HGB BLD-MCNC: 10.6 G/DL (ref 11.5–15.4)
MCH RBC QN AUTO: 31.6 PG (ref 26.8–34.3)
MCHC RBC AUTO-ENTMCNC: 31.3 G/DL (ref 31.4–37.4)
MCV RBC AUTO: 101 FL (ref 82–98)
OSMOLALITY UR/SERPL-RTO: 287 MMOL/KG (ref 282–298)
OSMOLALITY UR: 483 MMOL/KG
P AXIS: 53 DEGREES
P AXIS: 83 DEGREES
PLATELET # BLD AUTO: 309 THOUSANDS/UL (ref 149–390)
PMV BLD AUTO: 9.8 FL (ref 8.9–12.7)
POTASSIUM SERPL-SCNC: 3.8 MMOL/L (ref 3.5–5.3)
PR INTERVAL: 156 MS
PR INTERVAL: 156 MS
PROCALCITONIN SERPL-MCNC: 0.2 NG/ML
QRS AXIS: -44 DEGREES
QRS AXIS: -74 DEGREES
QRSD INTERVAL: 86 MS
QRSD INTERVAL: 94 MS
QT INTERVAL: 347 MS
QT INTERVAL: 350 MS
QTC INTERVAL: 425 MS
QTC INTERVAL: 439 MS
RBC # BLD AUTO: 3.35 MILLION/UL (ref 3.81–5.12)
SODIUM 24H UR-SCNC: 59 MOL/L
SODIUM SERPL-SCNC: 129 MMOL/L (ref 135–147)
T WAVE AXIS: -10 DEGREES
T WAVE AXIS: -36 DEGREES
TSH SERPL DL<=0.05 MIU/L-ACNC: 2.63 UIU/ML (ref 0.45–4.5)
URATE SERPL-MCNC: 5.5 MG/DL (ref 2–7.5)
VENTRICULAR RATE: 90 BPM
VENTRICULAR RATE: 95 BPM
WBC # BLD AUTO: 23.01 THOUSAND/UL (ref 4.31–10.16)

## 2022-08-01 PROCEDURE — 80048 BASIC METABOLIC PNL TOTAL CA: CPT

## 2022-08-01 PROCEDURE — 97163 PT EVAL HIGH COMPLEX 45 MIN: CPT

## 2022-08-01 PROCEDURE — 83935 ASSAY OF URINE OSMOLALITY: CPT

## 2022-08-01 PROCEDURE — 94664 DEMO&/EVAL PT USE INHALER: CPT

## 2022-08-01 PROCEDURE — 93010 ELECTROCARDIOGRAM REPORT: CPT | Performed by: INTERNAL MEDICINE

## 2022-08-01 PROCEDURE — 99223 1ST HOSP IP/OBS HIGH 75: CPT | Performed by: INTERNAL MEDICINE

## 2022-08-01 PROCEDURE — 84484 ASSAY OF TROPONIN QUANT: CPT

## 2022-08-01 PROCEDURE — 94760 N-INVAS EAR/PLS OXIMETRY 1: CPT

## 2022-08-01 PROCEDURE — 97530 THERAPEUTIC ACTIVITIES: CPT

## 2022-08-01 PROCEDURE — 87070 CULTURE OTHR SPECIMN AEROBIC: CPT

## 2022-08-01 PROCEDURE — 94640 AIRWAY INHALATION TREATMENT: CPT

## 2022-08-01 PROCEDURE — 85025 COMPLETE CBC W/AUTO DIFF WBC: CPT

## 2022-08-01 PROCEDURE — 87077 CULTURE AEROBIC IDENTIFY: CPT

## 2022-08-01 PROCEDURE — 93005 ELECTROCARDIOGRAM TRACING: CPT

## 2022-08-01 PROCEDURE — 84145 PROCALCITONIN (PCT): CPT | Performed by: STUDENT IN AN ORGANIZED HEALTH CARE EDUCATION/TRAINING PROGRAM

## 2022-08-01 PROCEDURE — 94669 MECHANICAL CHEST WALL OSCILL: CPT

## 2022-08-01 PROCEDURE — 99223 1ST HOSP IP/OBS HIGH 75: CPT | Performed by: STUDENT IN AN ORGANIZED HEALTH CARE EDUCATION/TRAINING PROGRAM

## 2022-08-01 PROCEDURE — 84300 ASSAY OF URINE SODIUM: CPT

## 2022-08-01 PROCEDURE — 87186 SC STD MICRODIL/AGAR DIL: CPT

## 2022-08-01 PROCEDURE — 82533 TOTAL CORTISOL: CPT

## 2022-08-01 PROCEDURE — 87205 SMEAR GRAM STAIN: CPT

## 2022-08-01 PROCEDURE — 94002 VENT MGMT INPAT INIT DAY: CPT

## 2022-08-01 RX ORDER — NYSTATIN 100000 [USP'U]/G
POWDER TOPICAL 2 TIMES DAILY
Status: DISCONTINUED | OUTPATIENT
Start: 2022-08-01 | End: 2022-08-09 | Stop reason: HOSPADM

## 2022-08-01 RX ORDER — OXYCODONE HCL 5 MG/5 ML
2.5 SOLUTION, ORAL ORAL EVERY 4 HOURS PRN
Status: DISCONTINUED | OUTPATIENT
Start: 2022-08-01 | End: 2022-08-04

## 2022-08-01 RX ORDER — GLYCOPYRROLATE 1 MG/1
1 TABLET ORAL 3 TIMES DAILY
Status: DISCONTINUED | OUTPATIENT
Start: 2022-08-01 | End: 2022-08-05

## 2022-08-01 RX ORDER — ONDANSETRON 2 MG/ML
4 INJECTION INTRAMUSCULAR; INTRAVENOUS EVERY 6 HOURS PRN
Status: DISCONTINUED | OUTPATIENT
Start: 2022-08-01 | End: 2022-08-09 | Stop reason: HOSPADM

## 2022-08-01 RX ORDER — GUAIFENESIN 600 MG/1
1200 TABLET, EXTENDED RELEASE ORAL EVERY EVENING
Status: DISCONTINUED | OUTPATIENT
Start: 2022-08-01 | End: 2022-08-01 | Stop reason: ALTCHOICE

## 2022-08-01 RX ORDER — CEFTRIAXONE 1 G/50ML
1000 INJECTION, SOLUTION INTRAVENOUS EVERY 24 HOURS
Status: DISCONTINUED | OUTPATIENT
Start: 2022-08-01 | End: 2022-08-03

## 2022-08-01 RX ORDER — ASPIRIN 81 MG/1
81 TABLET, CHEWABLE ORAL DAILY
Status: DISCONTINUED | OUTPATIENT
Start: 2022-08-01 | End: 2022-08-09 | Stop reason: HOSPADM

## 2022-08-01 RX ORDER — FLUTICASONE PROPIONATE 50 MCG
1 SPRAY, SUSPENSION (ML) NASAL 2 TIMES DAILY
Status: DISCONTINUED | OUTPATIENT
Start: 2022-08-01 | End: 2022-08-09 | Stop reason: HOSPADM

## 2022-08-01 RX ORDER — LEVOTHYROXINE SODIUM 0.03 MG/1
25 TABLET ORAL
Status: DISCONTINUED | OUTPATIENT
Start: 2022-08-01 | End: 2022-08-09 | Stop reason: HOSPADM

## 2022-08-01 RX ORDER — ATOVAQUONE 750 MG/5ML
750 SUSPENSION ORAL
Status: DISCONTINUED | OUTPATIENT
Start: 2022-08-01 | End: 2022-08-03

## 2022-08-01 RX ORDER — SODIUM CHLORIDE 9 MG/ML
100 INJECTION, SOLUTION INTRAVENOUS CONTINUOUS
Status: DISCONTINUED | OUTPATIENT
Start: 2022-08-01 | End: 2022-08-02

## 2022-08-01 RX ORDER — SACCHAROMYCES BOULARDII 250 MG
250 CAPSULE ORAL 2 TIMES DAILY
Status: DISCONTINUED | OUTPATIENT
Start: 2022-08-01 | End: 2022-08-09 | Stop reason: HOSPADM

## 2022-08-01 RX ORDER — LEVALBUTEROL INHALATION SOLUTION 1.25 MG/3ML
1.25 SOLUTION RESPIRATORY (INHALATION)
Status: DISCONTINUED | OUTPATIENT
Start: 2022-08-01 | End: 2022-08-09 | Stop reason: HOSPADM

## 2022-08-01 RX ORDER — HEPARIN SODIUM 5000 [USP'U]/ML
5000 INJECTION, SOLUTION INTRAVENOUS; SUBCUTANEOUS EVERY 12 HOURS
Status: DISCONTINUED | OUTPATIENT
Start: 2022-08-01 | End: 2022-08-09 | Stop reason: HOSPADM

## 2022-08-01 RX ORDER — LANOLIN ALCOHOL/MO/W.PET/CERES
3 CREAM (GRAM) TOPICAL
Status: DISCONTINUED | OUTPATIENT
Start: 2022-08-01 | End: 2022-08-04

## 2022-08-01 RX ORDER — SODIUM HYPOCHLORITE 2.5 MG/ML
1 SOLUTION TOPICAL DAILY
Status: DISCONTINUED | OUTPATIENT
Start: 2022-08-02 | End: 2022-08-09 | Stop reason: HOSPADM

## 2022-08-01 RX ORDER — ECHINACEA PURPUREA EXTRACT 125 MG
1 TABLET ORAL
Status: DISCONTINUED | OUTPATIENT
Start: 2022-08-01 | End: 2022-08-09 | Stop reason: HOSPADM

## 2022-08-01 RX ORDER — MELATONIN
1000 DAILY
Status: DISCONTINUED | OUTPATIENT
Start: 2022-08-01 | End: 2022-08-09 | Stop reason: HOSPADM

## 2022-08-01 RX ORDER — GLYCOPYRROLATE 0.2 MG/ML
0.1 INJECTION INTRAMUSCULAR; INTRAVENOUS ONCE
Status: COMPLETED | OUTPATIENT
Start: 2022-08-01 | End: 2022-08-01

## 2022-08-01 RX ORDER — GUAIFENESIN/DEXTROMETHORPHAN 100-10MG/5
10 SYRUP ORAL 2 TIMES DAILY
Status: DISCONTINUED | OUTPATIENT
Start: 2022-08-01 | End: 2022-08-04

## 2022-08-01 RX ORDER — LORATADINE 10 MG/1
10 TABLET ORAL DAILY
Status: DISCONTINUED | OUTPATIENT
Start: 2022-08-01 | End: 2022-08-09 | Stop reason: HOSPADM

## 2022-08-01 RX ORDER — FOLIC ACID 1 MG/1
1 TABLET ORAL DAILY
Status: DISCONTINUED | OUTPATIENT
Start: 2022-08-01 | End: 2022-08-09 | Stop reason: HOSPADM

## 2022-08-01 RX ORDER — SILDENAFIL CITRATE 20 MG/1
10 TABLET ORAL 3 TIMES DAILY
Status: DISCONTINUED | OUTPATIENT
Start: 2022-08-01 | End: 2022-08-09 | Stop reason: HOSPADM

## 2022-08-01 RX ORDER — ACETAMINOPHEN 160 MG/5ML
650 SUSPENSION, ORAL (FINAL DOSE FORM) ORAL EVERY 4 HOURS PRN
Status: DISCONTINUED | OUTPATIENT
Start: 2022-08-01 | End: 2022-08-05

## 2022-08-01 RX ORDER — GUAIFENESIN 100 MG/5ML
400 SYRUP ORAL 3 TIMES DAILY
Status: DISCONTINUED | OUTPATIENT
Start: 2022-08-01 | End: 2022-08-09 | Stop reason: HOSPADM

## 2022-08-01 RX ORDER — SILDENAFIL CITRATE 20 MG/1
10 TABLET ORAL 3 TIMES DAILY
Status: DISCONTINUED | OUTPATIENT
Start: 2022-08-01 | End: 2022-08-01

## 2022-08-01 RX ORDER — SODIUM CHLORIDE FOR INHALATION 0.9 %
3 VIAL, NEBULIZER (ML) INHALATION
Status: DISCONTINUED | OUTPATIENT
Start: 2022-08-01 | End: 2022-08-01

## 2022-08-01 RX ADMIN — Medication 250 MG: at 09:32

## 2022-08-01 RX ADMIN — LEVOTHYROXINE SODIUM 25 MCG: 25 TABLET ORAL at 05:20

## 2022-08-01 RX ADMIN — OMEPRAZOLE 20 MG: 20 CAPSULE, DELAYED RELEASE ORAL at 11:51

## 2022-08-01 RX ADMIN — GUAIFENESIN 400 MG: 100 SOLUTION ORAL at 23:26

## 2022-08-01 RX ADMIN — ACETAMINOPHEN 650 MG: 650 SUSPENSION ORAL at 03:37

## 2022-08-01 RX ADMIN — GLYCOPYRROLATE 1 MG: 1 TABLET ORAL at 17:51

## 2022-08-01 RX ADMIN — Medication 250 MG: at 17:51

## 2022-08-01 RX ADMIN — GLYCOPYRROLATE 1 MG: 1 TABLET ORAL at 23:27

## 2022-08-01 RX ADMIN — ASPIRIN 81 MG: 81 TABLET, CHEWABLE ORAL at 09:32

## 2022-08-01 RX ADMIN — SODIUM CHLORIDE 100 ML/HR: 9 INJECTION, SOLUTION INTRAVENOUS at 03:16

## 2022-08-01 RX ADMIN — SILDENAFIL CITRATE 10 MG: 20 TABLET ORAL at 09:32

## 2022-08-01 RX ADMIN — HEPARIN SODIUM 5000 UNITS: 5000 INJECTION INTRAVENOUS; SUBCUTANEOUS at 09:33

## 2022-08-01 RX ADMIN — GLYCOPYRROLATE 1 MG: 1 TABLET ORAL at 11:52

## 2022-08-01 RX ADMIN — FOLIC ACID 1 MG: 1 TABLET ORAL at 09:33

## 2022-08-01 RX ADMIN — FLUTICASONE PROPIONATE 1 SPRAY: 50 SPRAY, METERED NASAL at 17:44

## 2022-08-01 RX ADMIN — SODIUM CHLORIDE 100 ML/HR: 9 INJECTION, SOLUTION INTRAVENOUS at 17:51

## 2022-08-01 RX ADMIN — LEVALBUTEROL HYDROCHLORIDE 1.25 MG: 1.25 SOLUTION RESPIRATORY (INHALATION) at 07:29

## 2022-08-01 RX ADMIN — LEVALBUTEROL HYDROCHLORIDE 1.25 MG: 1.25 SOLUTION RESPIRATORY (INHALATION) at 19:34

## 2022-08-01 RX ADMIN — ACETAMINOPHEN 650 MG: 650 SUSPENSION ORAL at 23:26

## 2022-08-01 RX ADMIN — GUAIFENESIN AND DEXTROMETHORPHAN 10 ML: 100; 10 SYRUP ORAL at 17:44

## 2022-08-01 RX ADMIN — GLYCERIN 2 DROP: .002; .002; .01 SOLUTION/ DROPS OPHTHALMIC at 11:51

## 2022-08-01 RX ADMIN — HEPARIN SODIUM 5000 UNITS: 5000 INJECTION INTRAVENOUS; SUBCUTANEOUS at 23:27

## 2022-08-01 RX ADMIN — Medication 3 MG: at 23:28

## 2022-08-01 RX ADMIN — CEFTRIAXONE 1000 MG: 1 INJECTION, SOLUTION INTRAVENOUS at 23:26

## 2022-08-01 RX ADMIN — ATOVAQUONE 750 MG: 750 SUSPENSION ORAL at 11:51

## 2022-08-01 RX ADMIN — Medication 1000 UNITS: at 09:33

## 2022-08-01 RX ADMIN — NYSTATIN: 100000 POWDER TOPICAL at 18:00

## 2022-08-01 RX ADMIN — PREDNISONE 30 MG: 20 TABLET ORAL at 09:33

## 2022-08-01 RX ADMIN — GUAIFENESIN AND DEXTROMETHORPHAN 10 ML: 100; 10 SYRUP ORAL at 09:40

## 2022-08-01 RX ADMIN — GLYCOPYRROLATE 0.1 MG: 0.2 INJECTION INTRAMUSCULAR; INTRAVENOUS at 09:34

## 2022-08-01 RX ADMIN — LORATADINE 10 MG: 10 TABLET ORAL at 09:33

## 2022-08-01 RX ADMIN — LEVALBUTEROL HYDROCHLORIDE 1.25 MG: 1.25 SOLUTION RESPIRATORY (INHALATION) at 14:01

## 2022-08-01 RX ADMIN — FLUTICASONE PROPIONATE 1 SPRAY: 50 SPRAY, METERED NASAL at 09:39

## 2022-08-01 RX ADMIN — NYSTATIN: 100000 POWDER TOPICAL at 09:39

## 2022-08-01 RX ADMIN — SILDENAFIL CITRATE 10 MG: 20 TABLET ORAL at 23:27

## 2022-08-01 RX ADMIN — GLYCERIN 2 DROP: .002; .002; .01 SOLUTION/ DROPS OPHTHALMIC at 17:51

## 2022-08-01 NOTE — ASSESSMENT & PLAN NOTE
Lab Results   Component Value Date    SODIUM 127 (L) 07/31/2022    SODIUM 137 07/10/2022    SODIUM 135 (L) 07/04/2022     · POA  · Check hyponatremia labs  · Received NS in ED, repeat BMP in AM  · Consider Nephrology consult if no improvement or worsening hyponatremia

## 2022-08-01 NOTE — ASSESSMENT & PLAN NOTE
Lab Results   Component Value Date    HSTNI0 69 (H) 07/31/2022    HSTNID2 10 07/31/2022    HSTNI2 79 (H) 07/31/2022      · Patient arrived to the ED in respiratory distress requiring 6 L nasal cannula  · Elevated troponin likely due to hypoxia from respiratory distress  · EKG with no ischemic changes, less likely ACS as patient has no chest pain  · Received 162 mg ASA in ED  · Continue home 81 mg ASA

## 2022-08-01 NOTE — ASSESSMENT & PLAN NOTE
· Patient presented to ED from SNF for sudden onset respiratory distress  · Per patient, had coughing episode on Friday with clear/white sputum  · Yesterday around 1300 began having coughing episode and unable to clear airway from sputum  · In ED, patient noted to have thick, dry secretions in trachea after suctioning  · Patient required 6 L NC for continued hypoxia and respiratory distress  · Normally only on 2 L HS for comfort   · Oxygen humidified to assist with thinning secretions  · CXR improved from previous admission  · Check sputum culture given elevated leukocytosis   · Cause of AoC hypoxic RF likely dried mucous  · Continue home Xopenex and Sodium Chloride Neb treatments TID  · Humidify oxygen  · Suction frequently, patient with weak cough  · Patient on Mucinex outpatient, continue  · Respiratory protocol, IS, flutter valve  · Consult Pulm

## 2022-08-01 NOTE — ASSESSMENT & PLAN NOTE
· With crest syndrome  · Continue Prednisone 10 mg TID  · Continue Atovaquone for PCP prophylaxis  · Received IVIG 6/14-7/13 admission for proximal muscle weakness  · PT/OT consult

## 2022-08-01 NOTE — MALNUTRITION/BMI
This medical record reflects one or more clinical indicators suggestive of malnutrition  Malnutrition Findings:   Adult Malnutrition type: Acute illness  Adult Degree of Malnutrition: Malnutrition of moderate degree  Malnutrition Characteristics: Muscle loss, Fat loss    360 Statement: Moderate malnutrition in the constext of acute illness r/t inadequate energy intake as evidenced by moderate muscle wasting and subcutaneous fat loss (orbitals, clavicles, temples, cheeks)  Will treat with nutrition therapy and EN recommendation  BMI Findings: Body mass index is 18 54 kg/m²  See Nutrition note dated 08/01/22 for additional details  Completed nutrition assessment is viewable in the nutrition documentation

## 2022-08-01 NOTE — ED CARE HANDOFF
Emergency Department Sign Out Note        Sign out and transfer of care from [facility]  See Separate Emergency Department note  The patient, Paco Schuler, was evaluated by the previous provider for respiratory distress  Workup Completed:  Results Reviewed     Procedure Component Value Units Date/Time    TSH, 3rd generation [[id number]] Collected: 07/31/22 2011    Lab Status: In process Specimen: Blood from Arm, Left Updated: 08/01/22 0337    Blood culture #1 [[id number]] Collected: 07/31/22 2011    Lab Status: Preliminary result Specimen: Blood from Arm, Left Updated: 08/01/22 0103     Blood Culture Received in Microbiology Lab  Culture in Progress  Blood culture #2 [[id number]] Collected: 07/31/22 2011    Lab Status: Preliminary result Specimen: Blood from Arm, Left Updated: 08/01/22 0103     Blood Culture Received in Microbiology Lab  Culture in Progress  HS Troponin I 4hr [[id number]] Updated: 08/01/22 0026    Lab Status: No result Specimen: Blood from Hand, Left     HS Troponin I 2hr [[id number]]  (Abnormal) Collected: 07/31/22 2203    Lab Status: Final result Specimen: Blood from Arm, Left Updated: 07/31/22 2235     hs TnI 2hr 79 ng/L      Delta 2hr hsTnI 10 ng/L     B-Type Natriuretic Peptide(BNP), AN, CA, EA Campuses Only [[id number]]  (Abnormal) Collected: 07/31/22 2203    Lab Status: Final result Specimen: Blood from Arm, Left Updated: 07/31/22 2234      pg/mL     Urine Microscopic [[id number]]  (Abnormal) Collected: 07/31/22 2033    Lab Status: Final result Specimen: Urine, Clean Catch Updated: 07/31/22 2102     RBC, UA None Seen /hpf      WBC, UA 30-50 /hpf      Epithelial Cells Occasional /hpf      Bacteria, UA Moderate /hpf      AMORPH URATES Occasional /hpf      Ca Oxalate Batool, UA Occasional /hpf     Urine culture [[id number]] Collected: 07/31/22 2033    Lab Status:  In process Specimen: Urine, Clean Catch Updated: 07/31/22 2102    FLU/RSV/COVID - if FLU/RSV clinically relevant [[id number]]  (Normal) Collected: 07/31/22 2020    Lab Status: Final result Specimen: Nares from Nose Updated: 07/31/22 2101     SARS-CoV-2 Negative     INFLUENZA A PCR Negative     INFLUENZA B PCR Negative     RSV PCR Negative    Narrative:      FOR PEDIATRIC PATIENTS - copy/paste COVID Guidelines URL to browser: [email or web address]    SARS-CoV-2 assay is a Nucleic Acid Amplification assay intended for the  qualitative detection of nucleic acid from SARS-CoV-2 in nasopharyngeal  swabs  Results are for the presumptive identification of SARS-CoV-2 RNA  Positive results are indicative of infection with SARS-CoV-2, the virus  causing COVID-19, but do not rule out bacterial infection or co-infection  with other viruses  Laboratories within the [place] and its  territories are required to report all positive results to the appropriate  public health authorities  Negative results do not preclude SARS-CoV-2  infection and should not be used as the sole basis for treatment or other  patient management decisions  Negative results must be combined with  clinical observations, patient history, and epidemiological information  This test has not been FDA cleared or approved  This test has been authorized by FDA under an Emergency Use Authorization  (EUA)  This test is only authorized for the duration of time the  declaration that circumstances exist justifying the authorization of the  emergency use of an in vitro diagnostic tests for detection of SARS-CoV-2  virus and/or diagnosis of COVID-19 infection under section 564(b)(1) of  the Act, 21 U  S C  573RGJ-0(P)(9), unless the authorization is terminated  or revoked sooner  The test has been validated but independent review by FDA  and CLIA is pending  Test performed using [employer]: This RT-PCR assay targets N2,  a region unique to SARS-CoV-2   A conserved region in the E-gene was chosen  for pan-Sarbecovirus detection which includes SARS-CoV-2  CBC and differential [[id number]]  (Abnormal) Collected: 07/31/22 2011    Lab Status: Final result Specimen: Blood from Arm, Left Updated: 07/31/22 2056     WBC 19 13 Thousand/uL      RBC 3 58 Million/uL      Hemoglobin 11 4 g/dL      Hematocrit 35 6 %      MCV 99 fL      MCH 31 8 pg      MCHC 32 0 g/dL      RDW 21 2 %      MPV 9 3 fL      Platelets 458 Thousands/uL     Narrative: This is an appended report  These results have been appended to a previously verified report      Manual Differential(PHLEBS Do Not Order) [[id number]]  (Abnormal) Collected: 07/31/22 2011    Lab Status: Final result Specimen: Blood from Arm, Left Updated: 07/31/22 2056     Segmented % 82 %      Bands % 8 %      Lymphocytes % 6 %      Monocytes % 4 %      Eosinophils, % 0 %      Basophils % 0 %      Absolute Neutrophils 17 22 Thousand/uL      Lymphocytes Absolute 1 15 Thousand/uL      Monocytes Absolute 0 77 Thousand/uL      Eosinophils Absolute 0 00 Thousand/uL      Basophils Absolute 0 00 Thousand/uL      Total Counted --     RBC Morphology Present     Anisocytosis Present     Macrocytes Present     Polychromasia Present     Platelet Estimate Adequate    UA w Reflex to Microscopic w Reflex to Culture [[id number]]  (Abnormal) Collected: 07/31/22 2033    Lab Status: Final result Specimen: Urine, Clean Catch Updated: 07/31/22 2043     Color, UA Yellow     Clarity, UA Slightly Cloudy     Specific Perry, UA 1 010     pH, UA 7 0     Leukocytes, UA 3+     Nitrite, UA Negative     Protein, UA Negative mg/dl      Glucose, UA Negative mg/dl      Ketones, UA Negative mg/dl      Urobilinogen, UA 0 2 E U /dl      Bilirubin, UA Negative     Occult Blood, UA Trace-Intact    HS Troponin 0hr (reflex protocol) [[id number]]  (Abnormal) Collected: 07/31/22 2011    Lab Status: Final result Specimen: Blood from Arm, Left Updated: 07/31/22 2041     hs TnI 0hr 69 ng/L     Ethanol [[id number]]  (Normal) Collected: 07/31/22 2019    Lab Status: Final result Specimen: Blood from Arm, Left Updated: 07/31/22 2039     Ethanol Lvl <10 mg/dL     Comprehensive metabolic panel [[id number]]  (Abnormal) Collected: 07/31/22 2011    Lab Status: Final result Specimen: Blood from Arm, Left Updated: 07/31/22 2037     Sodium 127 mmol/L      Potassium 4 8 mmol/L      Chloride 86 mmol/L      CO2 31 mmol/L      ANION GAP 10 mmol/L      BUN 32 mg/dL      Creatinine 0 45 mg/dL      Glucose 107 mg/dL      Calcium 9 9 mg/dL      Corrected Calcium 10 4 mg/dL      AST 22 U/L      ALT 14 U/L      Alkaline Phosphatase 109 U/L      Total Protein 8 1 g/dL      Albumin 3 4 g/dL      Total Bilirubin 0 43 mg/dL      eGFR 95 ml/min/1 73sq m     Narrative:      Renetta guidelines for Chronic Kidney Disease (CKD):     Stage 1 with normal or high GFR (GFR > 90 mL/min/1 73 square meters)    Stage 2 Mild CKD (GFR = 60-89 mL/min/1 73 square meters)    Stage 3A Moderate CKD (GFR = 45-59 mL/min/1 73 square meters)    Stage 3B Moderate CKD (GFR = 30-44 mL/min/1 73 square meters)    Stage 4 Severe CKD (GFR = 15-29 mL/min/1 73 square meters)    Stage 5 End Stage CKD (GFR <15 mL/min/1 73 square meters)  Note: GFR calculation is accurate only with a steady state creatinine    Blood gas, venous [[id number]]  (Abnormal) Collected: 07/31/22 2011    Lab Status: Final result Specimen: Blood from Arm, Left Updated: 07/31/22 2036     pH, Nader 7 408     pCO2, Nader 45 5 mm Hg      pO2, Nader 43 8 mm Hg      HCO3, Nader 28 1 mmol/L      Base Excess, Nader 2 9 mmol/L      O2 Content, Nader 12 1 ml/dL      O2 HGB, VENOUS 76 7 %         XR chest 1 view portable   ED Interpretation by Gretchen Klein PA-C (07/31 2040)   Chronic disease, improvement from 6/29/2022            ED Course / Workup Pending (followup):  Repeat troponin and BNP, reevaluation and disposition                                    ED Course as of 08/01/22 7881   Murali Fill Jul 31, 2022 2118 Sign out from Neida Godwin, [facility]  Pt presents with respiratory distress, hx scleroderma  SLIM aware  Pending repeat troponin, reevaluation and disposition  Procedures  MDM  Number of Diagnoses or Management Options  H/O scleroderma  Hyponatremia  SOB (shortness of breath)  Diagnosis management comments: Patient is a 72-year-old female history of scleroderma, pulmonary retention, dysphagia, CHF, hypothyroidism presents emergency department today concern for respiratory distress and difficulty breathing  Evaluation performed by prior provider showed signs of acute respiratory distress with rhonchi in bilateral lung fields  Laboratory evaluation showed initial troponin of 69, repeat of 79 and BNP of 327  Leukocytosis at 19, hyponatremia 127 patient given 1 L normal saline, VBG was unremarkable  UA was leukocyte positive and patient has a chronic indwelling Tejada catheter, urine micro showed moderate bacteria concerning for UTI, started on Rocephin IV  Chest x-ray appeared improved from patient's prior  I suspect likely cause of patient's respiratory distress due to large amount of mucus in her trachea, currently on 6 L of humidified nasal cannula with O2 sats at 99%  Remainder of vitals remained normal and stable  I did not currently believes his ACS nor acute CHF exacerbation  Contacted hospitalist, Tayo MEJIA, who agreed to admit patient under the care of Dr Benjamin Benjamin for continued evaluation of respiratory distress, elevated troponins and BNP and suspected UTI         Amount and/or Complexity of Data Reviewed  Clinical lab tests: reviewed  Tests in the radiology section of CPT®: reviewed    Patient Progress  Patient progress: stable          Disposition  Final diagnoses:   SOB (shortness of breath)   Hyponatremia   H/O scleroderma     Time reflects when diagnosis was documented in both MDM as applicable and the Disposition within this note     Time User Action Codes Description Comment    7/31/2022  8:43 PM Jaqueline Pepper Add [R06 02] SOB (shortness of breath)     7/31/2022  8:43 PM Jaqueline Pepper Add [E87 1] Hyponatremia     7/31/2022  8:44 PM Jaqueline Pepper Add [Z87 39] H/O scleroderma     8/1/2022  1:42 AM Aubrey Gang Add [I27 20] Pulmonary hypertension ([facility] [address] )     8/1/2022  1:42 AM Aubrey Gang Add [J96 20] Acute on chronic respiratory failure ([address] )     8/1/2022  2:26 AM Aubrey Gang Add [R13 12] Dysphagia, oropharyngeal phase       ED Disposition     ED Disposition   Admit    Condition   Stable    Date/Time   Sun Jul 31, 2022 11:25 PM    Comment   Case was discussed with Hospitalist (JACKIE ngo) and the patient's admission status was agreed to be Admission Status: inpatient status to the service of Dr Heladio Clark             Follow-up Information    None       Current Discharge Medication List      CONTINUE these medications which have NOT CHANGED    Details   acetaminophen (TYLENOL) 160 mg/5 mL suspension 20 3 mL (650 mg total) by Per G Tube route every 6 (six) hours  Refills: 0    Associated Diagnoses: Pulmonary hypertension (HCC)      aspirin 81 mg chewable tablet 1 tablet (81 mg total) by Per G Tube route daily  Refills: 0    Associated Diagnoses: Pulmonary hypertension (HCC)      atovaquone (MEPRON) 750 mg/5 mL suspension 5 mL (750 mg total) by Per G Tube route daily with breakfast  Qty: 150 mL, Refills: 0    Associated Diagnoses: Scleroderma (HCC)      cholecalciferol (VITAMIN D3) 1,000 units tablet 1 tablet (1,000 Units total) by Per G Tube route daily  Refills: 0    Associated Diagnoses: Wound of sacral region, initial encounter      fluticasone (FLONASE) 50 mcg/act nasal spray 1 spray into each nostril 2 (two) times a day  Refills: 0    Associated Diagnoses: Acute respiratory failure with hypoxia (HCC)      folic acid (FOLVITE) 1 mg tablet 1 tablet (1 mg total) by Per PEG Tube route daily  Refills: 0    Associated Diagnoses: Pulmonary hypertension (HCC)      glycerin-hypromellose- (ARTIFICIAL TEARS) 0 2-0 2-1 % SOLN Administer 2 drops to both eyes 2 (two) times a day  Refills: 0    Associated Diagnoses: Acute respiratory failure with hypoxia (HCC)      levalbuterol (XOPENEX) 1 25 mg/0 5 mL nebulizer solution Take 0 5 mL (1 25 mg total) by nebulization 3 (three) times a day  Refills: 0    Associated Diagnoses: Acute respiratory failure with hypoxia (HCC)      levothyroxine 25 mcg tablet 1 tablet (25 mcg total) by Per G Tube route daily in the early morning  Refills: 0    Associated Diagnoses: Acquired hypothyroidism      loratadine (CLARITIN) 10 mg tablet 1 tablet (10 mg total) by Per G Tube route daily  Refills: 0    Associated Diagnoses: Acute respiratory failure with hypoxia (HCC)      Macitentan (OPSUMIT) 10 MG tablet 1 tablet (10 mg total) by Per G Tube route daily  Refills: 0    Associated Diagnoses: Pulmonary hypertension (HCC)      melatonin 3 mg Take 1 tablet (3 mg total) by mouth daily at bedtime  Refills: 0    Associated Diagnoses: Pulmonary hypertension (HCC)      omeprazole 2 mg/mL in sodium bicarbonate Take 10 mL (20 mg total) by mouth daily in the early morning  Qty: 50 mL, Refills: 0    Associated Diagnoses: Acute respiratory failure with hypoxia (HCC)      predniSONE 10 mg tablet 3 tablets (30 mg total) by Per G Tube route daily  Refills: 0    Associated Diagnoses: Acute respiratory failure with hypoxia (HCC)      saccharomyces boulardii (FLORASTOR) 250 mg capsule 1 capsule (250 mg total) by Per G Tube route 2 (two) times a day  Refills: 0    Associated Diagnoses: Acute respiratory failure with hypoxia (HCC)      sodium chloride 0 9 % nebulizer solution Take 3 mL by nebulization 3 (three) times a day  Refills: 0    Associated Diagnoses: Acute respiratory failure with hypoxia (HCC)      nystatin (MYCOSTATIN) powder Apply topically 2 (two) times a day  Qty: 15 g, Refills: 0    Associated Diagnoses: Pulmonary hypertension (HCC)      sildenafil (REVATIO) 20 mg tablet Take 0 5 tablets (10 mg total) by mouth 3 (three) times a day  Qty: 45 tablet, Refills: 0    Associated Diagnoses: Pulmonary hypertension (HCC)      sodium chloride (OCEAN) 0 65 % nasal spray 1 spray into each nostril every hour as needed for congestion  Refills: 0    Associated Diagnoses: Acute respiratory failure with hypoxia ([facility] [address] )           No discharge procedures on file         ED Provider  Electronically Signed by     Allan Jimenez PA-C  08/01/22 4456

## 2022-08-01 NOTE — ASSESSMENT & PLAN NOTE
· Crest syndrome with pulmonary hypertension  · Continue Sildenafil 10 mg TID and Opsumit 10 mg daily  · Wears 2 L oxygen HS as needed

## 2022-08-01 NOTE — ASSESSMENT & PLAN NOTE
· Previous admission 6/14-7/13 with I&D by General Surgery  · S/P IV ABX, wound vac  · Frequent repositioning  · Optimize nutritional status   · Consult wound

## 2022-08-01 NOTE — PLAN OF CARE
Problem: PHYSICAL THERAPY ADULT  Goal: Performs mobility at highest level of function for planned discharge setting  See evaluation for individualized goals  Description: Treatment/Interventions: LE strengthening/ROM, Therapeutic exercise, Endurance training, Patient/family training, Equipment eval/education, Bed mobility, Spoke to MD, Spoke to nursing, Spoke to case management, Continued evaluation (PT to see for transfer and gait training when appropriate )    See flowsheet documentation for full assessment, interventions and recommendations  Outcome: Progressing  Note: Prognosis: Guarded  Problem List: Decreased strength, Decreased range of motion, Decreased endurance, Impaired balance, Decreased mobility, Decreased coordination, Decreased safety awareness, Decreased skin integrity, Pain  Assessment: Pt seen for PT evaluation; PT consult received for mobility assessment & discharge needs  Pt admitted 7/31/2022 w/ hyponatremia, SOB, dx Acute on chronic respiratory failure with hypoxia ([facility] [address] )  Comorbidities affecting pt's fnxl performance include: dysphagia, scleroderma, hypothyroidism, anemia, chronic lilly catheter, CHF, sacral wound s/p debridement 6/14/2022  PTA, pt was in short term rehab, needing assistance with all care and dependent in mobility  During PT IE, pt requires total A for rolling R<>L in bed, total assist for repositioning in bed, AAROM>>PROM for LE therex/stretching  Pt demonstrates fnxl impairments identified in objective findings from Elderly Mobility Scale assessment, scoring 0/20, indicating full dependence on others for completion of ADLs and mobility  The AM-PAC objective tool in combination w/ Barthel Index outcome tool were used to assist in determining pt safety w/ mobility/ADLs & appropriate d/c recommendations, see above for scores   Pt is at risk of falls d/t multiple comorbidities, impaired balance, varying levels of pain , acuity of medical illness, ongoing medical treatment of primary dx and abnormal lab values  Pt's clinical presentation is currently unstable/unpredictable as seen in pt's presentation of changing level of pain, varying levels of cognitive performance, increased fall risk, new onset of impairment of functional mobility, decreased endurance and new onset of weakness, and requires high complexity clinical decision making  Pt will benefit from continued PT treatment in order to address impairments, decrease risk of falls, maximize independence w/ fnxl mobility, & ensure safety w/ mobility for transition to next level of care  Based on pt presentation & impairments, pt would most appropriately benefit from return to post acute STR with increased frequency of PT services  Barriers to Discharge: Decreased caregiver support, Inaccessible home environment     PT Discharge Recommendation: Post acute rehabilitation services    See flowsheet documentation for full assessment

## 2022-08-01 NOTE — CONSULTS
Consultation - Pulmonary Medicine   Laureano Ornelas 80 y o  female MRN: [id number]  Unit/Bed#: -01 Encounter: [id number]      Assessment and Plan:    1  Acute on chronic hypoxic respiratory failure:  She has you sleepy on 2 L of nasal cannula oxygen at home during sleep  Currently she is on 5 liters/minute  She is saturating well at this level  Her FiO2 can be titrated down as tolerated for sats of 94%  This was post likely precipitated by acute tracheobronchitis  2  Acute tracheobronchitis:  She has acute tracheobronchitis and has reported yellow phlegm  Currently she is on treatment with ceftriaxone  Her blood cultures have been negative  She has COVID negative  Sputum results are pending  Currently she is on bronchodilator therapy  3   Pulmonary hypertension:  She has mild to moderate pulmonary hypertension  Her echocardiogram from 06/20/2022 showed a right ventricular systolic pressure of 56 mmHg and most recently has been on treatment with Opsumit 10 mg and sildenafil 10 mg 3 times a day  She was previously on Adempas which was stopped due to hypotension  She has reported allergy to Uptravi  We will continue with sildenafil and Opsumit  4  Scleroderma/crest syndrome:  She has been on treatment with prednisone 30 mg once a day  5  Interstitial lung disease:  She has interstitial lung disease likely from her connective tissue disorder  Her chest x-ray showed bilateral opacities and evidence of fibrosis  Her chest auscultation had revealed occasional coarse inspiratory crackles at lung bases  I spoke to the patient as well as her daughter at the bedside  Her son also was following online  I spoke to Dr Ifeanyi Christianson the patient's hospitalist     Donna samuel for allowing me to participate in the care of the patient          History of Present Illness      Physician Requesting Consult: Eudora Heimlich, DO     Reason for Consult / Principal Problem:  Shortness of breath; respiratory failure  Hx and PE limited by: None    HPI: Paco Schuler is a 80y o  year old female with past medical history of scleroderma/crest syndrome, pulmonary hypertension, anemia, sacral ulcer, hypothyroidism, congestive heart and dysphagia currently admitted with worsening shortness of breath and was found to be in acute on chronic respiratory failure  He was requiring up to 6 liters/minute of oxygen supplementation  She had history of cough with phlegm and increasing shortness of breath and excess secretions  She has yellow phlegm  This cough reportedly started after she was given some fluids  Her shortness of breath did not improve you not after excessive suctioning  She has dysphagia and has been on PEG tube feeding  She also has sacral decubitus ulcer  She was also suspected to have UTI  She has chronic Tejada catheter  Her  Venous blood gas showed a pH of 7 408 with pCO2 45 5  Hyponatremic at sodium 127  Has had leukocytosis at 19 13  Consults    Review of Systems   Constitutional: Positive for activity change, appetite change, fatigue and unexpected weight change  Negative for chills and fever  HENT: Positive for trouble swallowing  Negative for hearing loss, postnasal drip, rhinorrhea, sinus pain, sore throat and voice change  Eyes: Negative for visual disturbance  Respiratory: Positive for cough and shortness of breath  Negative for chest tightness and wheezing  Cardiovascular: Negative for chest pain, palpitations and leg swelling  Gastrointestinal: Positive for diarrhea  Negative for abdominal pain, constipation, nausea and vomiting  PEG tube   Genitourinary:        Chronic Etjada catheter   Musculoskeletal: Positive for arthralgias and gait problem  Negative for joint swelling  Skin: Positive for wound (Sacral ulcer)  Sacral decubitus ulcer    Telengiectaciae   Allergic/Immunologic: Negative for environmental allergies     Neurological: Negative for dizziness, syncope, light-headedness and headaches  Psychiatric/Behavioral: Negative for agitation and confusion  The patient is nervous/anxious  Historical Information   Past Medical History:   Diagnosis Date    Anemia     CHF [facility]congestive heart failure) ([facility])     Dysphagia, oropharyngeal phase 06/06/2022    Hypothyroidism     Protein-calorie malnutrition ([facility]     Pulmonary hypertension (HCC)     Sacral ulcer ([facility]     Scleroderma ([facility] )     Urinary retention      Past Surgical History:   Procedure Laterality Date    WOUND DEBRIDEMENT N/A 6/14/2022    Procedure: DEBRIDEMENT WOUND [facility]); SACRUM AND BUTTOCKS;  Surgeon: Lucinda Phillips MD;  Location: [facility];  Service: General     Social History   Social History     Substance and Sexual Activity   Alcohol Use Never     Social History     Substance and Sexual Activity   Drug Use Not on file     E-Cigarette/Vaping     E-Cigarette/Vaping Substances     Social History     Tobacco Use   Smoking Status Never Smoker   Smokeless Tobacco Never Used     Occupational History:  Non contributing    Family History: non-contributory    Meds/Allergies   all current active meds have been reviewed    Allergies   Allergen Reactions   Elisa Griffiths [Selexipag] Anaphylaxis    Sulfa Antibiotics Tachycardia    Penicillins Rash       Objective   Vitals: Blood pressure 112/52, pulse 94, temperature 97 8 °F (36 6 °C), temperature source Tympanic, resp  rate 20, height 5' 7 5" (1 715 m), weight 54 5 kg (120 lb 2 4 oz), SpO2 97 %  ,Body mass index is 18 54 kg/m²  Intake/Output Summary (Last 24 hours) at 8/1/2022 1631  Last data filed at 8/1/2022 0600  Gross per 24 hour   Intake 50 ml   Output 250 ml   Net -200 ml     Invasive Devices  Report    Peripheral Intravenous Line  Duration           Peripheral IV 07/31/22 Left Wrist <1 day          Drain  Duration           Gastrostomy/Enterostomy -- days    Urethral Catheter -- days                Physical Exam  Vitals reviewed  Constitutional:       General: She is not in acute distress  Appearance: She is ill-appearing  She is not toxic-appearing or diaphoretic  HENT:      Head: Normocephalic  Mouth/Throat:      Mouth: Mucous membranes are dry  Eyes:      General: No scleral icterus  Conjunctiva/sclera: Conjunctivae normal    Cardiovascular:      Rate and Rhythm: Normal rate and regular rhythm  Heart sounds: Normal heart sounds  No murmur heard  Pulmonary:      Effort: Pulmonary effort is normal  No respiratory distress  Breath sounds: No stridor  Rales (Bilateral basal coarse inspiratory crackles) present  No wheezing or rhonchi  Abdominal:      General: Bowel sounds are normal       Palpations: Abdomen is soft  Tenderness: There is no abdominal tenderness  There is no guarding  Comments: PEG tube in place  Urinary catheter   Musculoskeletal:      Cervical back: No rigidity  Right lower leg: No edema  Left lower leg: No edema  Lymphadenopathy:      Cervical: No cervical adenopathy  Skin:     Coloration: Skin is not jaundiced or pale  Comments: Telangiectasiae   Neurological:      Mental Status: She is alert and oriented to person, place, and time  Psychiatric:         Mood and Affect: Mood normal          Behavior: Behavior normal          Thought Content: Thought content normal          Judgment: Judgment normal          Lab Results: I have personally reviewed pertinent lab results  Imaging Studies: I have personally reviewed pertinent films in PACS  EKG, Pathology, and Other Studies: I have personally reviewed pertinent reports      VTE Prophylaxis: Heparin    Code Status: Level 3 - DNAR and DNI  Advance Directive and Living Will:      Power of :    POLST:      None

## 2022-08-01 NOTE — ASSESSMENT & PLAN NOTE
· H/o dysphagia S/P PEG  · Continue cyclic tube feeds  · Consult Nutrition   · Aspiration precautions 27

## 2022-08-01 NOTE — UTILIZATION REVIEW
Initial Clinical Review    Admission: Date/Time/Statement:   Admission Orders (From admission, onward)     Ordered        07/31/22 2325  INPATIENT ADMISSION  Once                      Orders Placed This Encounter   Procedures    INPATIENT ADMISSION     Standing Status:   Standing     Number of Occurrences:   1     Order Specific Question:   Level of Care     Answer:   Med Surg [16]     Order Specific Question:   Bed Type     Answer:   Mor [4]     Order Specific Question:   Estimated length of stay     Answer:   More than 2 Midnights     Order Specific Question:   Certification     Answer:   I certify that inpatient services are medically necessary for this patient for a duration of greater than two midnights  See H&P and MD Progress Notes for additional information about the patient's course of treatment  ED Arrival Information     Expected   -    Arrival   7/31/2022 19:53    Acuity   Urgent            Means of arrival   Ambulance    Escorted by   [facility]    Service   Hospitalist    Admission type   Urgent            Arrival complaint   -           Chief Complaint   Patient presents with    Respiratory Distress     Report that patient has SOB since this afternoon  O2 applied at 6 liter n/c-Spo2-94%  Patient states "not able to cough out mucus"       Initial Presentation: 80 y o  female  To ER From SNF via EMS for sudden onset resp distress  PMH severe protein-calorie malnutrition, dysphagia (s/p PEG) oropharyngeal phase, scleroderma (chronic prednisone)  acquired hypothyroidism, Anemia  CREST syndrome w/pulmonary HTN;  PI Sacral region stage 4  Per patient, on Friday (7/29)   she began having coughing episodes where she would could up clear-white sputum  Yesterday around 1300 she began having another coughing fit but was unable to cough anything up  She was unable to stop coughing and her SNF placed her on oxygen and called EMS    Patient reports some congestion and rhinorrhea with chest tightness, continued SOB, and mucus production  In the ED, patient had bilateral rhonchi and noted to have large amounts of thick mucous in her trachea  She was placed on humidified oxygen and suctioned by RT for some improvement in her respiratory status  Her work-up revealed leukocytosis  (on chronic prednisone)   and a UTI from a blocked chronic lilly  CXR improved from previous admission  She was started on IV Rocephin and her lilly catheter was exchanged  She was started on normal saline for her hyponatremia  Elevated troponin likely due to hypoxia from respiratory distress  (EKG with no ischemic changes, no chest pain)    Chronic PI Sacral region, stage IV  IN ER,  O2 applied at 6 liter n/c-pOx 94%  (Baseline 2L NC for comfort)    Will ADMIT IP status, MS level of care on telemetry and continuous pulse oximetry,  cont IVAB, trend wbc/vs/urine-blood cx  Monitor/replete electrolytes prn  Optimize nutrition, consult wound care  Cont home prednisone, atovaquone, mucinex  Consult pulmonary & nutrition  (cont cyclic tube feeds)    FUP sputum/blood cx  Continue home Xopenex nebs and oscillatory pos exp pressure rx TID,  Frequent trach suctioning,  IS, flutter valve   Date:  8/1     Day 2:   chest auscultation had revealed occasional coarse inspiratory crackles at lung bases    8/1  PULMONOLOGY CONSULT:  Suspect acute tracheobronchitis:  Currently she is on 5 liters/minute - saturating well - wean as tolerated for sats of at least 94%   Cont bronchodilator therapy;  continue with sildenafil and Opsumit  Cannot do flutter valve - cont w/vest therapy  8/1    NUTRITION CONSULT:    Moderate malnutrition in the constext of acute illness r/t inadequate energy intake as evidenced by moderate muscle wasting and subcutaneous fat loss (orbitals, clavicles, temples, cheeks)  Will treat with nutrition therapy and EN recommendation  Body mass index is 18 54 kg/m²  -----------------------------------------------------------------------------------------------------------------------------------------------------------    ED Triage Vitals   Temperature Pulse Respirations Blood Pressure SpO2   07/31/22 2009 07/31/22 1957 07/31/22 1957 07/31/22 2001 07/31/22 1957   98 4 °F (36 9 °C) 96 19 125/66 94 %      Temp Source Heart Rate Source Patient Position - Orthostatic VS BP Location FiO2 (%)  6 L NC    07/31/22 2009 07/31/22 1957 07/31/22 2001 07/31/22 2001 --   Oral Monitor Lying Right arm       Pain Score       08/01/22 0337       6          Wt Readings from Last 1 Encounters:   08/01/22 54 5 kg (120 lb 2 4 oz)     Additional Vital Signs:   08/01/22 0820 -- -- -- -- 96 % -- 6 L/min Mid flow nasal cannula   08/01/22 0752 99 °F (37 2 °C) 97 16 102/49 Abnormal  98 % -- -- --   08/01/22 0729 -- -- -- -- 97 % -- 8 L/min Mid flow nasal cannula   08/01/22 0243 98 2 °F  107 Abnormal  21 150/78 95 % -- 6 L/min Nasal cannula   08/01/22 0217 -- -- -- -- 92 % 4 L/min -- Nasal cannula   08/01/22 0013 -- 90 19 120/61 99 % 4 L/min -- Nasal cannula   07/31/22 2158 -- -- 18 123/60 98 % 4 L/min -- Mid flow nasal cannula   07/31/22 2009 98 4 °F  -- -- -- -- -- -- --   07/31/22 2003 -- -- -- -- -- -- -- Nasal cannula   07/31/22 2001 -- -- -- 125/66 -- -- -- --   07/31/22 1958 -- -- -- -- 94 % -- -- --         Pertinent Labs/Diagnostic Test Results:   echocardiogram from 06/20/2022 showed a right ventricular systolic pressure of 56 mmHg   7/31 EKG:      Interpretation: non-specific   ;   Non specific stt wave changes, no acute ischemic changes    XR chest 1 view portable   ED Interpretation by Hakan Brar PA-C (07/31 2040)   Chronic disease, improvement from 6/29/2022        Results from last 7 days   Lab Units 07/31/22 2020   SARS-COV-2  Negative     Results from last 7 days   Lab Units 08/01/22  0446 07/31/22 2011   WBC Thousand/uL 23 01* 19 13*   HEMOGLOBIN g/dL 10 6* 11 4*   HEMATOCRIT % 33 9* 35 6   PLATELETS Thousands/uL 309 343   BANDS PCT %  --  8         Results from last 7 days   Lab Units 08/01/22 0446 07/31/22 2011   SODIUM mmol/L 129* 127*   POTASSIUM mmol/L 3 8 4 8   CHLORIDE mmol/L 91* 86*   CO2 mmol/L 29 31   ANION GAP mmol/L 9 10   BUN mg/dL 23 32*   CREATININE mg/dL 0 35* 0 45*   EGFR ml/min/1 73sq m 103 95   CALCIUM mg/dL 9 3 9 9     Results from last 7 days   Lab Units 07/31/22 2011   AST U/L 22   ALT U/L 14   ALK PHOS U/L 109*   TOTAL PROTEIN g/dL 8 1   ALBUMIN g/dL 3 4*   TOTAL BILIRUBIN mg/dL 0 43         Results from last 7 days   Lab Units 08/01/22 0446 07/31/22 2011   GLUCOSE RANDOM mg/dL 72 107     Results from last 7 days   Lab Units 07/31/22 2011   PH MICHAEL  7 408*   PCO2 MICHAEL mm Hg 45 5   PO2 MICHAEL mm Hg 43 8   HCO3 MICHAEL mmol/L 28 1   BASE EXC MICHAEL mmol/L 2 9   O2 CONTENT MICHAEL ml/dL 12 1   O2 HGB, VENOUS % 76 7             Results from last 7 days   Lab Units 08/01/22 0446 07/31/22 2203 07/31/22 2011   HS TNI 0HR ng/L  --   --  69*   HS TNI 2HR ng/L  --  79*  --    HSTNI D2 ng/L  --  10  --    HS TNI 4HR ng/L 72*  --   --    HSTNI D4 ng/L 3  --   --      Results from last 7 days   Lab Units 07/31/22 2011   TSH 3RD GENERATON uIU/mL 2 626     Results from last 7 days   Lab Units 08/01/22 0446   PROCALCITONIN ng/ml 0 20     Results from last 7 days   Lab Units 07/31/22 2203   BNP pg/mL 372*     Results from last 7 days   Lab Units 07/31/22 2033   CLARITY UA  Slightly Cloudy*   COLOR UA  Yellow   SPEC GRAV UA  1 010   PH UA  7 0   GLUCOSE UA mg/dl Negative   KETONES UA mg/dl Negative   BLOOD UA  Trace-Intact*   PROTEIN UA mg/dl Negative   NITRITE UA  Negative   BILIRUBIN UA  Negative   UROBILINOGEN UA E U /dl 0 2   LEUKOCYTES UA  3+*   WBC UA /hpf 30-50*   RBC UA /hpf None Seen   BACTERIA UA /hpf Moderate*   EPITHELIAL CELLS WET PREP /hpf Occasional     Results from last 7 days   Lab Units 07/31/22 2020   INFLUENZA A PCR  Negative   INFLUENZA B PCR  Negative   RSV PCR Negative     Results from last 7 days   Lab Units 07/31/22 2019   ETHANOL LVL mg/dL <10         Results from last 7 days   Lab Units 07/31/22 2011   BLOOD CULTURE  Received in Microbiology Lab  Culture in Progress  Received in Microbiology Lab  Culture in Progress  ED Treatment:   Medication Administration from 07/31/2022 1953 to 08/01/2022 0207       Date/Time Order Dose Route Action     07/31/2022 2043 sodium chloride 0 9 % infusion 125 mL/hr Intravenous New Bag     07/31/2022 2050 cefTRIAXone (ROCEPHIN) IVPB (premix in dextrose) 1,000 mg 50 mL 1,000 mg Intravenous New Bag     07/31/2022 2058 oxyCODONE (ROXICODONE) oral solution 5 mg 5 mg Per G Tube Given        Past Medical History:   Diagnosis Date    Anemia     CHF (congestive heart failure) (HCC)     Dysphagia, oropharyngeal phase 06/06/2022    Hypothyroidism     Protein-calorie malnutrition ([address] )     Pulmonary hypertension ([address] )     Sacral ulcer ([address] )     Scleroderma ([address] )     Urinary retention      Present on Admission:   Acute on chronic respiratory failure with hypoxia ([address] )   Anemia   Dysphagia, oropharyngeal phase   Pressure injury of sacral region, stage 4 (HCC)   Scleroderma (HCC)   Pulmonary hypertension (HCC)   Urinary retention   UTI (urinary tract infection)   Hyponatremia   Elevated troponin      Admitting Diagnosis: Hyponatremia [E87 1]  Respiratory distress [R06 03]  SOB (shortness of breath) [R06 02]  Pulmonary hypertension (HCC) [I27 20]  H/O scleroderma [Z87 39]  Acute on chronic respiratory failure ([facility] [address] ) [J96 20]  Age/Sex: 80 y o  female  Admission Orders:   See above;  PT/OT;   Airway clearance resp therapy protocol;  Cyclic tube feeding q 16 hrs  Scheduled Medications:  aspirin, 162 mg, Oral, Once  aspirin, 81 mg, Per G Tube, Daily  atovaquone, 750 mg, Per G Tube, Daily With Breakfast  cefTRIAXone, 1,000 mg, Intravenous, Q24H  cholecalciferol, 1,000 Units, Per G Tube, Daily  dextromethorphan-guaiFENesin, 10 mL, Oral, BID  fluticasone, 1 spray, Nasal, BID  folic acid, 1 mg, Per PEG Tube, Daily  glycerin-hypromellose-, 2 drop, Both Eyes, BID  glycopyrrolate, 0 1 mg, Intravenous, Once  glycopyrrolate, 1 mg, Oral, TID  guaiFENesin, 1,200 mg, Oral, QPM  heparin (porcine), 5,000 Units, Subcutaneous, Q12H  levalbuterol, 1 25 mg, Nebulization, TID  levothyroxine, 25 mcg, Per G Tube, Early Morning  loratadine, 10 mg, Per G Tube, Daily  melatonin, 3 mg, Per G Tube, HS  nystatin, , Topical, BID  omeprazole (PRILOSEC) oral suspension, 20 mg, Per PEG Tube, Early Morning  predniSONE, 30 mg, Per G Tube, Daily  saccharomyces boulardii, 250 mg, Per G Tube, BID  sildenafil, 10 mg, Per G Tube, TID      Continuous IV Infusions:  sodium chloride, 100 mL/hr, Intravenous, Continuous      PRN Meds:  acetaminophen, 650 mg, Oral, Q4H PRN  ondansetron, 4 mg, Intravenous, Q6H PRN  sodium chloride, 1 spray, Each Nare, Q1H PRN        IP CONSULT TO PULMONOLOGY  IP CONSULT TO NUTRITION SERVICES    Network Utilization Review Department  ATTENTION: Please call with any questions or concerns to [phone] and carefully listen to the prompts so that you are directed to the right person  All voicemails are confidential   Escobar Sigrid all requests for admission clinical reviews, approved or denied determinations and any other requests to dedicated fax number below belonging to the campus where the patient is receiving treatment   List of dedicated fax numbers for the Facilities:  [address] [place] [address] DENIALS (Administrative/Medical Necessity) [phone]   [address] (Maternity/NICU/Pediatrics) [phone] [address]  [address] [facility] [place] [address] [place] [address] [phone] [facility] [phone]

## 2022-08-01 NOTE — ED PROVIDER NOTES
History  Chief Complaint   Patient presents with    Respiratory Distress     Report that patient has SOB since this afternoon  O2 applied at 6 liter n/c-Spo2-94%  Patient states "not able to cough out mucus"     PMH: severe protein-calorie malnutrition, dysphagia, oropharyngeal phase, scleroderma, acquired hypothyroidism, Anemia  Urinary retention-has indwelling lilly, pulmonary hypertension, Pressure injury of sacral region, stage 4, Ambulatory dysfunction, Chronic hypoxemic respiratory failure, CHF, on 1-2L NC at night  PSH:  Decub ulcer debridgement  Presents to ED c/o sob for past several hours, increased mucous production, which pt states she is having trouble clearing  O2 applied at 6 liter n/c-pOx 94%  NO fever, no cp, no LE edema, no abd pain, no NVD, no rash, no joint pain  Pt from SN[facility]  Prior to Admission Medications   Prescriptions Last Dose Informant Patient Reported? Taking?    Macitentan (OPSUMIT) 10 MG tablet   No No   Si tablet (10 mg total) by Per G Tube route daily   acetaminophen (TYLENOL) 160 mg/5 mL suspension   No No   Si 3 mL (650 mg total) by Per G Tube route every 6 (six) hours   aspirin 81 mg chewable tablet   No No   Si tablet (81 mg total) by Per G Tube route daily   atovaquone (MEPRON) 750 mg/5 mL suspension   No No   Si mL (750 mg total) by Per G Tube route daily with breakfast   cholecalciferol (VITAMIN D3) 1,000 units tablet   No No   Si tablet (1,000 Units total) by Per G Tube route daily   fluticasone (FLONASE) 50 mcg/act nasal spray   No No   Si spray into each nostril 2 (two) times a day   folic acid (FOLVITE) 1 mg tablet   No No   Si tablet (1 mg total) by Per PEG Tube route daily   glycerin-hypromellose- (ARTIFICIAL TEARS) 0 2-0 2-1 % SOLN   No No   Sig: Administer 2 drops to both eyes 2 (two) times a day   levalbuterol (XOPENEX) 1 25 mg/0 5 mL nebulizer solution   No No   Sig: Take 0 5 mL (1 25 mg total) by nebulization 3 (three) times a day   levothyroxine 25 mcg tablet   No No   Si tablet (25 mcg total) by Per G Tube route daily in the early morning   loratadine (CLARITIN) 10 mg tablet   No No   Si tablet (10 mg total) by Per G Tube route daily   melatonin 3 mg   No No   Sig: Take 1 tablet (3 mg total) by mouth daily at bedtime   nystatin (MYCOSTATIN) powder   No No   Sig: Apply topically 2 (two) times a day   omeprazole 2 mg/mL in sodium bicarbonate   No No   Sig: Take 10 mL (20 mg total) by mouth daily in the early morning   predniSONE 10 mg tablet   No No   Sig: 3 tablets (30 mg total) by Per G Tube route daily   saccharomyces boulardii (FLORASTOR) 250 mg capsule   No No   Si capsule (250 mg total) by Per G Tube route 2 (two) times a day   sildenafil (REVATIO) 20 mg tablet   No No   Sig: Take 0 5 tablets (10 mg total) by mouth 3 (three) times a day   sodium chloride (OCEAN) 0 65 % nasal spray   No No   Si spray into each nostril every hour as needed for congestion   sodium chloride 0 9 % nebulizer solution   No No   Sig: Take 3 mL by nebulization 3 (three) times a day      Facility-Administered Medications: None       Past Medical History:   Diagnosis Date    Anemia     CHF (congestive heart failure) (HCC)     Dysphagia, oropharyngeal phase 2022    Hypothyroidism     Protein-calorie malnutrition ([address] )     Pulmonary hypertension ([address] )     [facility]     Scleroderma ([address] )     Urinary retention        Past Surgical History:   Procedure Laterality Date    WOUND DEBRIDEMENT N/A 2022    Procedure: DEBRIDEMENT WOUND [facility] OUT); SACRUM AND BUTTOCKS;  Surgeon: Martin Jacobo MD;  Location: [facility];  Service: General       History reviewed  No pertinent family history  I have reviewed and agree with the history as documented      E-Cigarette/Vaping     E-Cigarette/Vaping Substances     Social History     Tobacco Use    Smoking status: Never Smoker    Smokeless tobacco: Never Used Substance Use Topics    Alcohol use: Never       Review of Systems   Constitutional: Negative for chills and fever  HENT: Positive for congestion and rhinorrhea  Negative for ear pain, hearing loss, postnasal drip and sore throat  Respiratory: Positive for cough and shortness of breath  Cardiovascular: Negative for chest pain and leg swelling  Gastrointestinal: Positive for diarrhea  Negative for abdominal pain, constipation and vomiting  Genitourinary: Negative for dysuria and frequency  Musculoskeletal: Negative for arthralgias and myalgias  Skin: Positive for wound  Negative for pallor  Neurological: Negative for headaches  Psychiatric/Behavioral: Negative for behavioral problems  All other systems reviewed and are negative  Physical Exam  Physical Exam  Vitals and nursing note reviewed  Constitutional:       General: She is in acute distress  Appearance: She is well-developed  Comments: Thin, tight skin   HENT:      Head: Normocephalic and atraumatic  Right Ear: External ear normal       Left Ear: External ear normal       Nose: Nose normal       Mouth/Throat:      Mouth: Mucous membranes are moist       Pharynx: Oropharynx is clear  Eyes:      Conjunctiva/sclera: Conjunctivae normal    Cardiovascular:      Rate and Rhythm: Normal rate and regular rhythm  Pulmonary:      Effort: Respiratory distress present  Breath sounds: Rhonchi present  Abdominal:      General: Bowel sounds are normal       Palpations: Abdomen is soft  Comments: G tube, lilly   Musculoskeletal:         General: Normal range of motion  Cervical back: Normal range of motion  Right lower leg: No edema  Left lower leg: No edema  Skin:     General: Skin is warm and dry  Neurological:      General: No focal deficit present  Mental Status: She is alert  Motor: No weakness     Psychiatric:         Behavior: Behavior normal          Vital Signs  ED Triage Vitals Temperature Pulse Respirations Blood Pressure SpO2   07/31/22 2009 07/31/22 1957 07/31/22 1957 07/31/22 2001 07/31/22 1957   98 4 °F (36 9 °C) 96 19 125/66 94 %      Temp Source Heart Rate Source Patient Position - Orthostatic VS BP Location FiO2 (%)   07/31/22 2009 07/31/22 1957 07/31/22 2001 07/31/22 2001 --   Oral Monitor Lying Right arm       Pain Score       --                  Vitals:    07/31/22 1957 07/31/22 2001   BP:  125/66   Pulse: 96    Patient Position - Orthostatic VS:  Lying         Visual Acuity      ED Medications  Medications   sodium chloride 0 9 % infusion (125 mL/hr Intravenous New Bag 7/31/22 2043)   cefTRIAXone (ROCEPHIN) IVPB (premix in dextrose) 1,000 mg 50 mL (1,000 mg Intravenous New Bag 7/31/22 2050)   oxyCODONE (ROXICODONE) oral solution 5 mg (5 mg Per G Tube Given 7/31/22 2058)       Diagnostic Studies  Results Reviewed     Procedure Component Value Units Date/Time    Urine Microscopic [[id number]]  (Abnormal) Collected: 07/31/22 2033    Lab Status: Final result Specimen: Urine, Clean Catch Updated: 07/31/22 2102     RBC, UA None Seen /hpf      WBC, UA 30-50 /hpf      Epithelial Cells Occasional /hpf      Bacteria, UA Moderate /hpf      AMORPH URATES Occasional /hpf      Ca Oxalate Batool, UA Occasional /hpf     Urine culture [[id number]] Collected: 07/31/22 2033    Lab Status: In process Specimen: Urine, Clean Catch Updated: 07/31/22 2102    FLU/RSV/COVID - if FLU/RSV clinically relevant [[id number]]  (Normal) Collected: 07/31/22 2020    Lab Status: Final result Specimen: Nares from Nose Updated: 07/31/22 2101     SARS-CoV-2 Negative     INFLUENZA A PCR Negative     INFLUENZA B PCR Negative     RSV PCR Negative    Narrative:      FOR PEDIATRIC PATIENTS - copy/paste COVID Guidelines URL to browser: [email or web address]    SARS-CoV-2 assay is a Nucleic Acid Amplification assay intended for the  qualitative detection of nucleic acid from SARS-CoV-2 in nasopharyngeal  swabs  Results are for the presumptive identification of SARS-CoV-2 RNA  Positive results are indicative of infection with SARS-CoV-2, the virus  causing COVID-19, but do not rule out bacterial infection or co-infection  with other viruses  Laboratories within the [place] and its  territories are required to report all positive results to the appropriate  public health authorities  Negative results do not preclude SARS-CoV-2  infection and should not be used as the sole basis for treatment or other  patient management decisions  Negative results must be combined with  clinical observations, patient history, and epidemiological information  This test has not been FDA cleared or approved  This test has been authorized by FDA under an Emergency Use Authorization  (EUA)  This test is only authorized for the duration of time the  declaration that circumstances exist justifying the authorization of the  emergency use of an in vitro diagnostic tests for detection of SARS-CoV-2  virus and/or diagnosis of COVID-19 infection under section 564(b)(1) of  the Act, 21 U  S C  442DDL-9(U)(3), unless the authorization is terminated  or revoked sooner  The test has been validated but independent review by FDA  and CLIA is pending  Test performed using [employer] Gene[employer]pert: This RT-PCR assay targets N2,  a region unique to SARS-CoV-2  A conserved region in the E-gene was chosen  for pan-Sarbecovirus detection which includes SARS-CoV-2  CBC and differential [[id number]]  (Abnormal) Collected: 07/31/22 2011    Lab Status: Final result Specimen: Blood from Arm, Left Updated: 07/31/22 2056     WBC 19 13 Thousand/uL      RBC 3 58 Million/uL      Hemoglobin 11 4 g/dL      Hematocrit 35 6 %      MCV 99 fL      MCH 31 8 pg      MCHC 32 0 g/dL      RDW 21 2 %      MPV 9 3 fL      Platelets 650 Thousands/uL     Narrative: This is an appended report    These results have been appended to a previously verified report      Manual Differential(PHLEBS Do Not Order) [[id number]]  (Abnormal) Collected: 07/31/22 2011    Lab Status: Final result Specimen: Blood from Arm, Left Updated: 07/31/22 2056     Segmented % 82 %      Bands % 8 %      Lymphocytes % 6 %      Monocytes % 4 %      Eosinophils, % 0 %      Basophils % 0 %      Absolute Neutrophils 17 22 Thousand/uL      Lymphocytes Absolute 1 15 Thousand/uL      Monocytes Absolute 0 77 Thousand/uL      Eosinophils Absolute 0 00 Thousand/uL      Basophils Absolute 0 00 Thousand/uL      Total Counted --     RBC Morphology Present     Anisocytosis Present     Macrocytes Present     Polychromasia Present     Platelet Estimate Adequate    UA w Reflex to Microscopic w Reflex to Culture [[id number]]  (Abnormal) Collected: 07/31/22 2033    Lab Status: Final result Specimen: Urine, Clean Catch Updated: 07/31/22 2043     Color, UA Yellow     Clarity, UA Slightly Cloudy     Specific [place], UA 1 010     pH, UA 7 0     Leukocytes, UA 3+     Nitrite, UA Negative     Protein, UA Negative mg/dl      Glucose, UA Negative mg/dl      Ketones, UA Negative mg/dl      Urobilinogen, UA 0 2 E U /dl      Bilirubin, UA Negative     Occult Blood, UA Trace-Intact    HS Troponin 0hr (reflex protocol) [[id number]]  (Abnormal) Collected: 07/31/22 2011    Lab Status: Final result Specimen: Blood from Arm, Left Updated: 07/31/22 2041     hs TnI 0hr 69 ng/L     HS Troponin I 2hr [[id number]]     Lab Status: No result Specimen: Blood     Ethanol [[id number]]  (Normal) Collected: 07/31/22 2019    Lab Status: Final result Specimen: Blood from Arm, Left Updated: 07/31/22 2039     Ethanol Lvl <10 mg/dL     Comprehensive metabolic panel [[id number]]  (Abnormal) Collected: 07/31/22 2011    Lab Status: Final result Specimen: Blood from Arm, Left Updated: 07/31/22 2037     Sodium 127 mmol/L      Potassium 4 8 mmol/L      Chloride 86 mmol/L      CO2 31 mmol/L      ANION GAP 10 mmol/L      BUN 32 mg/dL Creatinine 0 45 mg/dL      Glucose 107 mg/dL      Calcium 9 9 mg/dL      Corrected Calcium 10 4 mg/dL      AST 22 U/L      ALT 14 U/L      Alkaline Phosphatase 109 U/L      Total Protein 8 1 g/dL      Albumin 3 4 g/dL      Total Bilirubin 0 43 mg/dL      eGFR 95 ml/min/1 73sq m     Narrative:      [place] guidelines for Chronic Kidney Disease (CKD):     Stage 1 with normal or high GFR (GFR > 90 mL/min/1 73 square meters)    Stage 2 Mild CKD (GFR = 60-89 mL/min/1 73 square meters)    Stage 3A Moderate CKD (GFR = 45-59 mL/min/1 73 square meters)    Stage 3B Moderate CKD (GFR = 30-44 mL/min/1 73 square meters)    Stage 4 Severe CKD (GFR = 15-29 mL/min/1 73 square meters)    Stage 5 End Stage CKD (GFR <15 mL/min/1 73 square meters)  Note: GFR calculation is accurate only with a steady state creatinine    Blood gas, venous [[id number]]  (Abnormal) Collected: 07/31/22 2011    Lab Status: Final result Specimen: Blood from Arm, Left Updated: 07/31/22 2036     pH, Nader 7 408     pCO2, Nader 45 5 mm Hg      pO2, Nader 43 8 mm Hg      HCO3, Nader 28 1 mmol/L      Base Excess, Nader 2 9 mmol/L      O2 Content, Nader 12 1 ml/dL      O2 HGB, VENOUS 76 7 %     B-Type Natriuretic Peptide(BNP) , [place],  Campuses Only [[id number]]     Lab Status: No result Specimen: Blood     Blood culture #2 [[id number]] Collected: 07/31/22 2011    Lab Status: In process Specimen: Blood from Arm, Left Updated: 07/31/22 2015    Blood culture #1 [[id number]] Collected: 07/31/22 2011    Lab Status:  In process Specimen: Blood from Arm, Left Updated: 07/31/22 2015                 XR chest 1 view portable   ED Interpretation by Sukumar Adams PA-C (07/31 2040)   Chronic disease, improvement from 6/29/2022                 Procedures  ECG 12 Lead Documentation Only    Date/Time: 7/31/2022 8:36 PM  Performed by: Sukumar Adams PA-C  Authorized by: Sukumar Adams PA-C     Indications / Diagnosis:  Sob  ECG reviewed by me, the ED Provider: yes    Patient location:  ED  Previous ECG:     Comparison to cardiac monitor: Yes    Interpretation:     Interpretation: non-specific    Rate:     ECG rate:  90    ECG rate assessment: normal    Comments:      Non specific stt wave changes, no acute ischemic changes             ED Course  ED Course as of 07/31/22 2110   Sun Jul 31, 2022 2041 WBC(!): 19 13  Pt is on steroids wbc 13, 3 days ago   2041 Sodium(!): 127                                             MDM  Number of Diagnoses or Management Options  H/O scleroderma  Hyponatremia  SOB (shortness of breath)  Diagnosis management comments: Increased leukocytosis but pt on prednisone  [place] appears blocked, not draining fluid, asked nurse to change bag  Care turned over to [facility] pending labs, trop #2, bnp, viral panel, re-eval dispo, suggest admission, sob, hyponatremia, scleroderma       Amount and/or Complexity of Data Reviewed  Clinical lab tests: ordered and reviewed  Review and summarize past medical records: yes        Disposition  Final diagnoses:   SOB (shortness of breath)   Hyponatremia   H/O scleroderma     Time reflects when diagnosis was documented in both MDM as applicable and the Disposition within this note     Time User Action Codes Description Comment    7/31/2022  8:43 PM Griffin Segura Add [R06 02] SOB (shortness of breath)     7/31/2022  8:43 PM Griffin Segura Add [E87 1] Hyponatremia     7/31/2022  8:44 PM Griffin Segura Add [Z87 39] H/O scleroderma       ED Disposition     None      Follow-up Information    None         Patient's Medications   Discharge Prescriptions    No medications on file       No discharge procedures on file      PDMP Review     None          ED Provider  Electronically Signed by           Evens Ellis PA-C  07/31/22 2110

## 2022-08-01 NOTE — H&P
Cruz U  68   H&P- Matt Shore 1940, 80 y o  female MRN: [id number]  Unit/Bed#: -01 Encounter: [id number]  Primary Care Provider: Yamileth Steele MD   Date and time admitted to hospital: 7/31/2022  7:53 PM    * Acute on chronic respiratory failure with hypoxia [facility])  Assessment & Plan  · Patient presented to ED from SNF for sudden onset respiratory distress  · Per patient, had coughing episode on Friday with clear/white sputum  · Yesterday around 1300 began having coughing episode and unable to clear airway from sputum  · In ED, patient noted to have thick, dry secretions in trachea after suctioning  · Patient required 6 L NC for continued hypoxia and respiratory distress  · Normally only on 2 L HS for comfort   · Oxygen humidified to assist with thinning secretions  · CXR improved from previous admission  · Check sputum culture given elevated leukocytosis   · Cause of AoC hypoxic RF likely dried mucous  · Continue home Xopenex and Sodium Chloride Neb treatments TID  · Humidify oxygen  · Suction frequently, patient with weak cough  · Patient on Mucinex outpatient, continue  · Respiratory protocol, IS, flutter valve  · Consult Pulm     UTI (urinary tract infection)  Assessment & Plan  · In ED, chronic lilly appeared blocked and was not draining  · WBC 19K, patient on chronic prednisone  · UA suspicious for UTI  · Urine culture and blood cultures x 2 pending   · Adjust ABX pending culture results  · Received Rocephin in ED  · Continue for daily for UTI, low cross sensitivity to PCN allergy   · Lilly catheter replaced on admission  · Trend WBC and fever curve     Hyponatremia  Assessment & Plan  Lab Results   Component Value Date    SODIUM 127 (L) 07/31/2022    SODIUM 137 07/10/2022    SODIUM 135 (L) 07/04/2022     · POA  · Check hyponatremia labs  · Received NS in ED, repeat BMP in AM  · Consider Nephrology consult if no improvement or worsening hyponatremia     Elevated troponin  Assessment & Plan  Lab Results   Component Value Date    HSTNI0 69 (H) 07/31/2022    HSTNID2 10 07/31/2022    HSTNI2 79 (H) 07/31/2022      · Patient arrived to the ED in respiratory distress requiring 6 L nasal cannula  · Elevated troponin likely due to hypoxia from respiratory distress  · EKG with no ischemic changes, less likely ACS as patient has no chest pain  · Received 162 mg ASA in ED  · Continue home 81 mg ASA    Pressure injury of sacral region, stage 4 (HCC)  Assessment & Plan  · Previous admission 6/14-7/13 with I&D by General Surgery  · S/P IV ABX, wound vac  · Frequent repositioning  · Optimize nutritional status   · Consult wound     Pulmonary hypertension (HCC)  Assessment & Plan  · Crest syndrome with pulmonary hypertension  · Continue Sildenafil 10 mg TID and Opsumit 10 mg daily  · Wears 2 L oxygen HS as needed    Urinary retention  Assessment & Plan  · Tejada catheter placed prior to admission  · Outpatient Urology follow-up    Anemia  Assessment & Plan  · Multifactorial  · Continue iron and folic acid supplementation   · Hgb stable, monitor daily while in hospital     Scleroderma [facility])  Assessment & Plan  · With crest syndrome  · Continue Prednisone 10 mg TID  · Continue Atovaquone for PCP prophylaxis  · Received IVIG 6/14-7/13 admission for proximal muscle weakness  · PT/OT consult     Dysphagia, oropharyngeal phase  Assessment & Plan  · H/o dysphagia S/P PEG  · Continue cyclic tube feeds  · Consult Nutrition   · Aspiration precautions     VTE Pharmacologic Prophylaxis: VTE Score: 9 High Risk (Score >/= 5) - Pharmacological DVT Prophylaxis Ordered: heparin  Sequential Compression Devices Ordered  Code Status: Level 3 - DNAR and DNI   Discussion with family: Updated  (daughter) via phone      Anticipated Length of Stay: Patient will be admitted on an inpatient basis with an anticipated length of stay of greater than 2 midnights secondary to respiratory distress requiring supplemental oxygen, UTI requiring IV ABX  Total Time for Visit, including Counseling / Coordination of Care: 60 minutes Greater than 50% of this total time spent on direct patient counseling and coordination of care  Chief Complaint: Respiratory distress    History of Present Illness:  Jorge Robert is a 80 y o  female with a PMH of scleroderma with crest syndrome, anemia, hypothyroid, urinary retention, dysphagia S/P PEG who presents with respiratory distress  Per patient, on Friday she began having coughing episodes where she would could up clear-white sputum  Yesterday around 1300 she began having another coughing fit but was unable to cough anything up  She was unable to stop coughing and her SNF placed her on oxygen and called EMS  Patient reports some congestion and rhinorrhea with chest tightness, continued SOB, and mucus production  Patient denies any fever, chills, CP, abdominal pain, flank pain, or any other complaints  In the ED, patient had bilateral rhonchi and noted to have large amounts of thick mucous in her trachea  She was placed on humidified oxygen and suctioned by RT for some improvement in her respiratory status  Her work-up revealed leukocytosis and a UTI from a blocked chronic lilly  She was started on IV Rocephin and her lilly catheter was exchanged  She was started on normal saline for her hyponatremia  Review of Systems:  Review of Systems   Constitutional: Positive for fatigue  Negative for appetite change, chills, diaphoresis and fever  HENT: Positive for congestion and rhinorrhea  Negative for ear pain, postnasal drip, sinus pain, sore throat and trouble swallowing  Eyes: Negative for pain and visual disturbance  Respiratory: Positive for cough, chest tightness and shortness of breath  Negative for choking, wheezing and stridor  Cardiovascular: Negative for chest pain, palpitations and leg swelling     Gastrointestinal: Negative for abdominal pain, constipation, nausea and vomiting  Genitourinary: Negative for flank pain, hematuria and pelvic pain  Musculoskeletal: Negative for arthralgias, back pain and myalgias  Skin: Positive for wound  Negative for color change  Neurological: Negative for dizziness, syncope, light-headedness, numbness and headaches  Psychiatric/Behavioral: Negative for confusion  The patient is not nervous/anxious  Past Medical and Surgical History:   Past Medical History:   Diagnosis Date    Anemia     CHF (congestive heart failure) ([facility])     Dysphagia, oropharyngeal phase 06/06/2022    Hypothyroidism     Protein-calorie malnutrition ([facility] )     Pulmonary hypertension ([facility])     Sacral ulcer ([facility] )     Scleroderma ([facility] )     Urinary retention        Past Surgical History:   Procedure Laterality Date    WOUND DEBRIDEMENT N/A 6/14/2022    Procedure: DEBRIDEMENT WOUND [facility]); SACRUM AND BUTTOCKS;  Surgeon: Tessie Bonds MD;  Location: [facility];  Service: General       Meds/Allergies:  Prior to Admission medications    Medication Sig Start Date End Date Taking?  Authorizing Provider   acetaminophen (TYLENOL) 160 mg/5 mL suspension 20 3 mL (650 mg total) by Per G Tube route every 6 (six) hours 7/13/22   Abe Michaels DO   aspirin 81 mg chewable tablet 1 tablet (81 mg total) by Per G Tube route daily 7/14/22   Abe Michaels DO   atovaquone (MEPRON) 750 mg/5 mL suspension 5 mL (750 mg total) by Per G Tube route daily with breakfast 7/12/22 8/11/22  Ramona Owen MD   cholecalciferol (VITAMIN D3) 1,000 units tablet 1 tablet (1,000 Units total) by Per G Tube route daily 7/14/22   Abe Michaels DO   fluticasone [facility]) 50 mcg/act nasal spray 1 spray into each nostril 2 (two) times a day 7/13/22   Abe Michaels DO   folic acid (FOLVITE) 1 mg tablet 1 tablet (1 mg total) by Per PEG Tube route daily 7/14/22   Abe Michaels DO   glycerin-hypromellose- (ARTIFICIAL TEARS) 0 2-0 2-1 % SOLN Administer 2 drops to both eyes 2 (two) times a day 7/13/22   Kirit Hemp, DO   levalbuterol (XOPENEX) 1 25 mg/0 5 mL nebulizer solution Take 0 5 mL (1 25 mg total) by nebulization 3 (three) times a day 7/13/22   Kirit Hemp, DO   levothyroxine 25 mcg tablet 1 tablet (25 mcg total) by Per G Tube route daily in the early morning 7/14/22   Kirit Hemp, DO   loratadine (CLARITIN) 10 mg tablet 1 tablet (10 mg total) by Per G Tube route daily 7/14/22   Kirit Hemp, DO   Macitentan (OPSUMIT) 10 MG tablet 1 tablet (10 mg total) by Per G Tube route daily 7/14/22   Kirit Hemp, DO   melatonin 3 mg Take 1 tablet (3 mg total) by mouth daily at bedtime 7/13/22   Kirit Hemp, DO   nystatin (MYCOSTATIN) powder Apply topically 2 (two) times a day 7/13/22   Kirit Hemp, DO   omeprazole 2 mg/mL in sodium bicarbonate Take 10 mL (20 mg total) by mouth daily in the early morning 7/13/22   Kirit Hemp, DO   predniSONE 10 mg tablet 3 tablets (30 mg total) by Per G Tube route daily 7/14/22   Kirit Hemp, DO   saccharomyces boulardii (FLORASTOR) 250 mg capsule 1 capsule (250 mg total) by Per G Tube route 2 (two) times a day 7/13/22   Kirit Hemp, DO   sildenafil (REVATIO) 20 mg tablet Take 0 5 tablets (10 mg total) by mouth 3 (three) times a day 7/11/22 8/10/22  Lamona Angelucci, MD   sodium chloride (OCEAN) 0 65 % nasal spray 1 spray into each nostril every hour as needed for congestion 7/13/22   Kirit Hemp, DO   sodium chloride 0 9 % nebulizer solution Take 3 mL by nebulization 3 (three) times a day 7/13/22   [facility], DO     I have reveiwed home medications using records provided by [facility]  Allergies:    Allergies   Allergen Reactions   [place] [Selexipag] Anaphylaxis    Sulfa Antibiotics Tachycardia    Penicillins Rash       Social History:  Marital Status: /Civil Union   Occupation:   Patient Pre-hospital Living Situation: [facility]  Patient Pre-hospital Level of Mobility: non-ambulatory/bed bound  Patient Pre-hospital Diet Restrictions:   Substance Use History:   Social History     Substance and Sexual Activity   Alcohol Use Never     Social History     Tobacco Use   Smoking Status Never Smoker   Smokeless Tobacco Never Used     Social History     Substance and Sexual Activity   Drug Use Not on file       Family History:  History reviewed  No pertinent family history  Physical Exam:     Vitals:   Blood Pressure: 150/78 (08/01/22 0243)  Pulse: (!) 107 (08/01/22 0243)  Temperature: 98 2 °F (36 8 °C) (08/01/22 0243)  Temp Source: Oral (08/01/22 0243)  Respirations: 21 (08/01/22 0243)  Height: 5' 7 5" (171 5 cm) (08/01/22 0243)  Weight - Scale: 54 2 kg (119 lb 7 8 oz) (08/01/22 0243)  SpO2: 95 % (08/01/22 0243)    Physical Exam  Constitutional:       General: She is not in acute distress  Appearance: She is cachectic  She is not ill-appearing, toxic-appearing or diaphoretic  Interventions: Nasal cannula in place  HENT:      Head: Normocephalic and atraumatic  Nose: Congestion present  Mouth/Throat:      Mouth: Mucous membranes are moist       Pharynx: Oropharynx is clear  Eyes:      Extraocular Movements: Extraocular movements intact  Conjunctiva/sclera: Conjunctivae normal       Pupils: Pupils are equal, round, and reactive to light  Cardiovascular:      Rate and Rhythm: Regular rhythm  Tachycardia present  Pulses: Normal pulses  Heart sounds: Normal heart sounds  No murmur heard  Pulmonary:      Effort: Tachypnea and accessory muscle usage present  No respiratory distress  Breath sounds: No stridor  Examination of the right-upper field reveals rhonchi  Examination of the left-upper field reveals rhonchi  Decreased breath sounds and rhonchi present  No wheezing  Abdominal:      General: Bowel sounds are normal  There is no distension  Palpations: Abdomen is soft  Tenderness: There is no abdominal tenderness  There is no guarding or rebound  Comments: G tube   Genitourinary:     Comments: Tejada catheter with yellow urine  Musculoskeletal:         General: No swelling  Cervical back: Normal range of motion and neck supple  Right lower leg: No edema  Left lower leg: No edema  Comments: Strength +3 b/l LEs, +4 b/l UEs   Skin:     General: Skin is warm and dry  Capillary Refill: Capillary refill takes 2 to 3 seconds  Coloration: Skin is not pale  Findings: Erythema present  Comments: Pressure injury, sacrum    Neurological:      General: No focal deficit present  Mental Status: She is alert and oriented to person, place, and time  Motor: Weakness (generalized ) present  Psychiatric:         Mood and Affect: Mood normal          Behavior: Behavior normal           Additional Data:     Lab Results:  Results from last 7 days   Lab Units 07/31/22 2011   WBC Thousand/uL 19 13*   HEMOGLOBIN g/dL 11 4*   HEMATOCRIT % 35 6   PLATELETS Thousands/uL 343   BANDS PCT % 8   LYMPHO PCT % 6*   MONO PCT % 4   EOS PCT % 0     Results from last 7 days   Lab Units 07/31/22 2011   SODIUM mmol/L 127*   POTASSIUM mmol/L 4 8   CHLORIDE mmol/L 86*   CO2 mmol/L 31   BUN mg/dL 32*   CREATININE mg/dL 0 45*   ANION GAP mmol/L 10   CALCIUM mg/dL 9 9   ALBUMIN g/dL 3 4*   TOTAL BILIRUBIN mg/dL 0 43   ALK PHOS U/L 109*   ALT U/L 14   AST U/L 22   GLUCOSE RANDOM mg/dL 107                       Imaging: Personally reviewed the following imaging: chest xray  XR chest 1 view portable   ED Interpretation by Sukumar Adams PA-C (07/31 2040)   Chronic disease, improvement from 6/29/2022          EKG and Other Studies Reviewed on Admission:   · EKG: NSR  HR 90     ** Please Note: This note has been constructed using a voice recognition system   ** Home

## 2022-08-01 NOTE — CASE MANAGEMENT
Case Management Assessment & Discharge Planning Note    Patient name Alejandra Perez  Location /-01 MRN [id number]  : 1940 Date 2022       Current Admission Date: 2022  Current Admission Diagnosis:Acute on chronic respiratory failure with hypoxia [facility])   Patient Active Problem List    Diagnosis Date Noted    UTI (urinary tract infection) 2022    Hyponatremia 2022    Elevated troponin 2022    Pressure injury of left elbow, unstageable ([address] ) 2022    Pressure injury of right elbow, unstageable ([address] ) 2022    Pressure injury of left heel, unstageable ([address] ) 2022    Chronic hypoxemic respiratory failure ([address] ) 2022    Pressure injury of sacral region, stage 4 ([address] ) 2022    Ambulatory dysfunction 2022    Proximal muscle weakness 2022    Pulmonary hypertension ([address] ) 2022    Hearing loss 2022    Severe protein-calorie malnutrition ([address] ) 2022    Pneumonia 2022    Sacral wound 2022    Dysphagia, oropharyngeal phase 2022    Scleroderma ([address] ) 2022    Acquired hypothyroidism 2022    Anemia 2022    Urinary retention 2022    Acute on chronic respiratory failure with hypoxia ([address] ) 2022      LOS (days): 1  Geometric Mean LOS (GMLOS) (days):   Days to GMLOS:     OBJECTIVE:  PATIENT READMITTED TO HOSPITAL  Risk of Unplanned Readmission Score: 27 34         Current admission status: Inpatient       Preferred Pharmacy:   PATIENT/FAMILY REPORTS NO PREFERRED PHARMACY  No address on file      [address] [place], [address] [facility] [address] [facility]  Phone: [phone] Fax: [phone]    Primary Care Provider: Tiffanie Brar MD    Primary Insurance: [facility]  Secondary Insurance:     ASSESSMENT:    Readmission Root Cause  30 Day Readmission: Yes  Who directed you to return to the hospital?: Other (comment)  Did you understand whom to contact if you had questions or problems?: Yes  Did you get your prescriptions before you left the hospital?: No  Reason[de-identified]  (DISCHARGED TO SNF)  Were you able to get your prescriptions filled when you left the hospital?: Yes  Did you take your medications as prescribed?: Yes  During previous admission, was a post-acute recommendation made?: Yes  What post-acute resources were offered?: STR  Patient was readmitted due to: Acute episode of respiratory failure    [facility] Information  [facility]  Mental Status: Confused  During Assessment patient was accompanied by: Not accompanied during assessment  Assessment information provided by<[facility] Daughter  Primary Caregiver: Spouse  Support Systems: Spouse/significant other, Daughter, Family members        DISCHARGE DETAILS:    Discharge planning discussed with<IPAR> Patient's daughter Rand Bateman)  Freedom of Choice: Yes  Comments - Freedom of Choice: Choice is to return to [facility] @ D/C  CM contacted family/caregiver?: Yes  Were Treatment Team discharge recommendations reviewed with patient/caregiver?: Yes  Did patient/caregiver verbalize understanding of patient care needs?: N/A- going to facility  Were patient/caregiver advised of the risks associated with not following Treatment Team discharge recommendations?: Yes    Contacts  Patient Contacts: Mirtha Baxter  Relationship to Patient[de-identified] Family  Contact Method: Phone  Phone Number: [phone]  Reason/Outcome: Continuity of Care, Emergency Contact, Discharge [address]         Is the patient interested in [place] [address] at discharge?: No    DME Referral Provided  Referral made for DME?: No    Other Referral/Resources/Interventions Provided:  Interventions: Facility Return, Short Term Rehab  Referral Comments: [facility] confirmed able to return at discharge  [facility] stating will need auth when ready to D/C  CM will continue to follow      Would you like to participate in our [address] service program?  : No - Declined    Treatment Team Recommendation: Facility Return, Short Term Rehab  Discharge Destination Plan[de-identified] Facility Return, Short Term Rehab ([facility])  Transport at Discharge : [facility]

## 2022-08-01 NOTE — PHYSICAL THERAPY NOTE
PHYSICAL THERAPY EVALUATION & TREATMENT  DATE: 08/01/22  TIME: 1492-2676    NAME:  Angy Canales  AGE:   80 y o   Mrn:   [id number]  Length Of Stay: 1    ADMIT DX:  Hyponatremia [E87 1]  Respiratory distress [R06 03]  SOB (shortness of breath) [R06 02]  Pulmonary hypertension (HCC) [I27 20]  H/O scleroderma [Z87 39]  Acute on chronic respiratory failure (HCC) [J96 20]    Past Medical History:   Diagnosis Date    Anemia     CHF (congestive heart failure) (HCC)     Dysphagia, oropharyngeal phase 06/06/2022    Hypothyroidism     Protein-calorie malnutrition ([address] )     Pulmonary hypertension ([address] )     Sacral ulcer ([address] )     Scleroderma ([address] )     Urinary retention      Past Surgical History:   Procedure Laterality Date    WOUND DEBRIDEMENT N/A 6/14/2022    Procedure: DEBRIDEMENT WOUND [facility] OUT); SACRUM AND BUTTOCKS;  Surgeon: Martin Jacobo MD;  Location: [facility];  Service: General       Performed at least 2 patient identifiers during session: Name, Consuelo Ye, and ID bracelet       08/01/22 1156   PT Last Visit   PT Visit Date 08/01/22   Note Type   Note type Evaluation  (& treatment (7129-5329))   Pain Assessment   Pain Assessment Tool 0-10   Pain Score 4   Pain Location/Orientation Location: Buttocks   Pain Onset/Description Onset: Ongoing; Descriptor: Discomfort   Patient's Stated Pain Goal No pain   Hospital Pain Intervention(s) Repositioned; Ambulation/increased activity; Emotional support   Multiple Pain Sites Yes   Pain 2   Pain Location/Orientation 2 Orientation: Left; Other (Comment)  (heel)   Pain Onset/Description 2 Onset: Ongoing; Descriptor: Aching;Descriptor: Discomfort   Hospital Pain Intervention(s) 2 Elevated; Emotional support   Pain 3   Pain Location/Orientation 3 Orientation: Right;Other (Comment)  (elbow)   Pain Onset/Description 3 Onset: Ongoing; Descriptor: Aching;Descriptor: Discomfort   Hospital Pain Intervention(s) 3 Elevated; Emotional support   Restrictions/Precautions   Weight Bearing Precautions Per Order No   Other Precautions Chair Alarm; Bed Alarm;Multiple lines;Telemetry; Fall Risk;Pain;O2;Hard of hearing;Aspiration  (+O2, IV, tube feed, lilly catheter, NPO)   Home Living   Type of Home Other (Comment)  (currently at [facility] STR since March 2022)   Prior Function   Level of [place] Needs assistance with ADLs and functional mobility; Needs assistance with IADLs   Lives With Facility staff   Receives Help From Family;Personal care attendant; Other (Comment)  (PT/OT/SLP services)   ADL Assistance Needs assistance   IADLs Needs assistance   Falls in the last 6 months 0   Vocational Retired   Comments AT BASELINE: pt was living at home with her spouse in [facility] w/ [address]  pt was ambulatory w/in the home w/ no AD and utilized rollator walker for in the community  was independent with both ADLs and IADLs  IN [facility] 2022: pt with hospitalization and then was d/c to STR  since March 2022, pt reports multiple hospitalizations and STR stays  CURRENTLY: pt is at [facility] STR - states she receives PT services 2-3x/wk, for only 15 minutes each visit  pt self reports that she wishes they would do more therapy with her  She states the nursing staff utilizes a [employer] for OOB to [facility], is dependent in [facility] propulsion  Pt reports she has gone to the therapy gym "a couple times" but mostly they come to her room for therapy  Pt reports she has been able to sit EOB with therapy, but has not stood up in many months  General   Additional Pertinent History Pt admitted 7/31/2022 with SOB, pulm HTN, hyponatremia, Dx: acute on chronic respiratory failure with hypoxia     Family/Caregiver Present Yes  (sister present t/o session)   Cognition   Overall Cognitive Status Impaired  (WFL for participation in session,  higher level cog questionable, defer to OT)   Arousal/Participation Cooperative   Orientation Level Oriented to person;Oriented to place;Oriented to situation   Memory Decreased recall of recent events  (questionable pt report of timeline of events)   Following Commands Follows one step commands with increased time or repetition   Subjective   Subjective "I want to be able to walk with the walker "   RUE Assessment   RUE Assessment X   RUE Overall AROM   R Mass Grasp ~3/5 MMT, able to effectively mass grap remote but unable to effectively press buttons   RUE Strength   RUE Overall Strength Deficits   LUE Assessment   LUE Assessment X   LUE Overall AROM   L Mass Grasp ~3+/5 MMT, able to effectively mass grap remote but unable to effectively press buttons, requires adaptive call bell   LUE Strength   LUE Overall Strength Deficits   RLE Assessment   RLE Assessment X   RLE Overall PROM   R Hip Flexion PROM WNL, AROM severely limited d/t weakness   R Knee Flexion PROM WNL, AROM severely limited d/t weakness   R Knee Extension PROM WNL, AROM severely limited d/t weakness   R Ankle Dorsiflexion PROM limited but functional, AROM severely limited d/t weakness   Strength RLE   RLE Overall Strength 2-/5   LLE Assessment   LLE Assessment X   LLE Overall PROM   L Hip Flexion PROM WNL, AROM severely limited d/t weakness   L Knee Flexion PROM WNL, AROM severely limited d/t weakness   L Knee Extension PROM WNL, AROM severely limited d/t weakness   L Ankle Dorsiflexion PROM limited but functional, AROM severely limited d/t weakness   Strength LLE   LLE Overall Strength 2/5   Coordination   Movements are Fluid and Coordinated 0   Sensation WFL  (pt reports intermittent numbness to B foot, but currently intact with testing)   Light Touch   RLE Light Touch Grossly intact   LLE Light Touch Grossly intact   Proprioception   RLE Proprioception Grossly intact   LLE Proprioception Grossly Intact   Bed Mobility   Rolling R 1  Dependent   Additional items Increased time required;Verbal cues   Rolling L 1  Dependent   Additional items Increased time required;Verbal cues   Supine to Sit Unable to assess   Sit to Supine Unable to assess Additional Comments Pt needing total A x1 person for rolling R<>L in bed  Total A x2 person for scooting towards HOB and upright positioning, repositioned with pillows and wedges for optimal positioning/offloading  Transfers   Sit to Stand Unable to assess   Stand to Sit Unable to assess   Stand pivot Unable to assess   Ambulation/Elevation   Gait pattern Not appropriate   Gait Assistance Not tested   Endurance Deficit   Endurance Deficit Yes   Activity Tolerance   Activity Tolerance Patient limited by fatigue;Patient limited by pain   Medical Staff Made Aware Spoke with TYREL YOUNG   Nurse Made Aware Spoke with RN Danisha Blanc and YOVANY العراقي   Assessment   Prognosis Guarded   Problem List Decreased strength;Decreased range of motion;Decreased endurance; Impaired balance;Decreased mobility; Decreased coordination;Decreased safety awareness;Decreased skin integrity;Pain   Assessment Pt seen for PT evaluation; PT consult received for mobility assessment & discharge needs  Pt admitted 7/31/2022 w/ hyponatremia, SOB, dx Acute on chronic respiratory failure with hypoxia ([facility] [address] )  Comorbidities affecting pt's fnxl performance include: dysphagia, scleroderma, hypothyroidism, anemia, chronic lilly catheter, CHF, sacral wound s/p debridement 6/14/2022  PTA, pt was in short term rehab, needing assistance with all care and dependent in mobility  During PT IE, pt requires total A for rolling R<>L in bed, total assist for repositioning in bed, AAROM>>PROM for LE therex/stretching  Pt demonstrates fnxl impairments identified in objective findings from Elderly Mobility Scale assessment, scoring 0/20, indicating full dependence on others for completion of ADLs and mobility  The AM-PAC objective tool in combination w/ Barthel Index outcome tool were used to assist in determining pt safety w/ mobility/ADLs & appropriate d/c recommendations, see above for scores   Pt is at risk of falls d/t multiple comorbidities, impaired balance, varying levels of pain , acuity of medical illness, ongoing medical treatment of primary dx and abnormal lab values  Pt's clinical presentation is currently unstable/unpredictable as seen in pt's presentation of changing level of pain, varying levels of cognitive performance, increased fall risk, new onset of impairment of functional mobility, decreased endurance and new onset of weakness, and requires high complexity clinical decision making  Pt will benefit from continued PT treatment in order to address impairments, decrease risk of falls, maximize independence w/ fnxl mobility, & ensure safety w/ mobility for transition to next level of care  Based on pt presentation & impairments, pt would most appropriately benefit from return to post acute STR with increased frequency of PT services  Barriers to Discharge Decreased caregiver support; Inaccessible home environment   Goals   Patient Goals "to be able to walk with a walker"   [facility] Expiration Date 08/11/22   Short Term Goal #1 Patient PT goals established in order to address patient self reported goal of "to be able to walk with a walker"   Pt will: complete all bed mobility in hospital bed with MAXA 1 perosn in order to promote increased OOB functional mobility to improve overall activity tolerance; PT to see for transfers and ambulation training when appropriate; improve B LE strength to >/= 3-/5 MMT t/o in order to increase safety with functional mobility and decrease risk of falls; demonstrate understanding and independence with LE strengthening HEP; improve static sitting balance to >/= fair grade in order to promote safety and increased independence with mobility; tolerate >1hrs OOB in upright position, in order to improve muscular endurance and respiratory status; improve AM-PAC score to >/= 11/24 in order to increase independence with mobility and decrease burden of care; improve Barthel Index score to >/= 15/100 in order to increase independence and decrease risk of falls; improve Elderly Mobility Scale score to >/= 4/20 in order to decrease burden of care and increase independence with functional mobility and ADLs  PT Treatment Day 1   Plan   Treatment/Interventions LE strengthening/ROM; Therapeutic exercise; Endurance training;Patient/family training;Equipment eval/education; Bed mobility;Spoke to MD;Spoke to nursing;Spoke to case management;Continued evaluation  (PT to see for transfer and gait training when appropriate )   PT Frequency 2-3x/wk   Recommendation   PT Discharge Recommendation Post acute rehabilitation services   AM-PAC Basic Mobility Inpatient   Turning in Bed Without Bedrails 1   Lying on Back to Sitting on Edge of Flat Bed 1   Moving Bed to Chair 1   Standing Up From Chair 1   Walk in Room 1   Climb 3-5 Stairs 1   Basic Mobility Inpatient Raw Score 6   Turning Head Towards Sound 4   Follow Simple Instructions 3   Low Function Basic Mobility Raw Score 13   Low Function Basic Mobility Standardized Score 20 14   Highest Level Of Mobility   JH-HLM Goal 2: Bed activities/Dependent transfer   JH-HLM Achieved 2: Bed activities/Dependent transfer   Modified [place] Scale   Modified [place]win Scale 5   Barthel Index   Feeding 0   Bathing 0   Grooming Score 0   Dressing Score 0   Bladder Score 0   Bowels Score 5   Toilet Use Score 0   Transfers (Bed/Chair) Score 0   Mobility (Level Surface) Score 0   Stairs Score 0   Barthel Index Score 5   Additional Treatment Session   Start Time 1136   End Time 1156   Treatment Assessment Pt is agreeable to participate in additional therapeutic activities post IE  Exercises completed (10 reps of AAROM quickly turned PROM) or ankle flex/ext, knee flex/ext (from hooklying), hip flex/ext  Therapist provided pt with and educated pt and sister on adaptive call bell and [employer] systems, pt demonstrates proper use   Therapist educated pt and sister on positioning and offloading while in bed and in manual WC  Pt's functional mobility/activity tolerance remains very low, requires frequent bouts of low level activity in order to improve pt's endurance  At end of session, pt was left in upright [facility] positioning with all extremities offloaded, sister in room  Continue to recommend return to post acute STR once medically cleared for d/c from the acute care setting  Will continue skilled PT POC as able and appropriate to address functional impairments and progress towards therapy goals  Equipment Use Recommend RN staffing utilization of Gogo Sosa means of transfer for OOB to recliner chair  Recommend evaluation for seating/cushion  Additional Treatment Day 1   End of Consult   Patient Position at End of Consult Supine;Bed/Chair alarm activated; All needs within reach  (sister in room)   End of Consult Comments Based on patient's Yamileth Macias Highest Level of Mobility scores today, patient currently has a goal of [facility] Levels: 2: BED ACTIVITIES/DEPENDENT TRANSFER, to be completed with RN staffing each shift, in order to improve overall activity tolerance and mobility, combat hospital related deconditioning, and maximize outcomes for d/c from the acute care setting  The patient's AM-PAC Basic Mobility Inpatient Short Form Raw Score is 6  A Raw score of less than or equal to 16 suggests the patient may benefit from discharge to post-acute rehabilitation services  Please also refer to the recommendation of the Physical Therapist for safe discharge planning      ELDERLY MOBILITY SCALE OUTCOME MEASURE:   [facility]: ([address] [facility]; n=36; age= 70-93)  Level of independence and EMS scores:  Score > 14 = independent in basic ADLs and safe for independent mobility maneuvers (with or without device)  Score 10-13 = borderline in terms of safe mobility and independence in ADLs (require some help with ADLs and mobility maneuvers)   Score < 10 = dependent or high degree of dependency for basic ADLs and limited mobility maneuvers (such as transfers, toileting, dressing)  Discharge recommendations and EMS scores:  Score 0-6 = post acute STR / SNF / IRF  Score 7-13 = home with high levels of care - ie: skilled caretaker, community resources, family asssistance  Score 14-20 = home   Please refer to therapist full assessment & documentation for most appropriate recommendation and details for discharge         Nila Geronimo PT, DPT   Available via [email or web address]  [facility] # [phone]  [place] License - [id number]  1/0/6519

## 2022-08-01 NOTE — ASSESSMENT & PLAN NOTE
· Multifactorial  · Continue iron and folic acid supplementation   · Hgb stable, monitor daily while in hospital

## 2022-08-01 NOTE — RESPIRATORY THERAPY NOTE
RT Protocol Note  Luciano Goltz 80 y o  female MRN: [id number]  Unit/Bed#: -01 Encounter: [id number]    Assessment    Principal Problem:    Acute on chronic respiratory failure with hypoxia (HCC)  Active Problems:    Dysphagia, oropharyngeal phase    Scleroderma (HCC)    Anemia    Urinary retention    Pulmonary hypertension (HCC)    Pressure injury of sacral region, stage 4 (HCC)    UTI (urinary tract infection)    Hyponatremia    Elevated troponin      Home Pulmonary Medications:Xopenex   Xopenex    Past Medical History:   Diagnosis Date    Anemia     CHF (congestive heart failure) (HCC)     Dysphagia, oropharyngeal phase 06/06/2022    Hypothyroidism     Protein-calorie malnutrition ([address] )     Pulmonary hypertension ([address] )     Sacral ulcer ([address] )     Scleroderma ([address] )     Urinary retention      Social History     Socioeconomic History    Marital status: /Civil Union     Spouse name: None    Number of children: None    Years of education: None    Highest education level: None   Occupational History    None   Tobacco Use    Smoking status: Never Smoker    Smokeless tobacco: Never Used   Substance and Sexual Activity    Alcohol use: Never    Drug use: None    Sexual activity: None   Other Topics Concern    None   Social History Narrative    None     Social Determinants of Health     Financial Resource Strain: Not on file   Food Insecurity: No Food Insecurity    Worried About Running Out of Food in the Last Year: Never true    Bib of Food in the Last Year: Never true   Transportation Needs: No Transportation Needs    Lack of Transportation (Medical): No    Lack of Transportation (Non-Medical):  No   Physical Activity: Not on file   Stress: Not on file   Social Connections: Not on file   Intimate Partner Violence: Not on file   Housing Stability: Low Risk     Unable to Pay for Housing in the Last Year: No    Number of Places Lived in the Last Year: 1    Unstable Housing in the Last Year: No       Subjective     Pt congestion unable to clear secretions requires suction  Mild distress    Objective    Physical Exam:   Assessment Type: Assess only  General Appearance: Alert, Awake  Respiratory Pattern: Dyspnea at rest, Shallow, Tachypneic  Chest Assessment: Trachea midline  Bilateral Breath Sounds: Rhonchi  Cough: Moist, Weak  Suction: Oral  O2 Device: Nasal Cannula 6 liters    Vitals:  Blood pressure 150/78, pulse (!) 107, temperature 98 2 °F (36 8 °C), temperature source Oral, resp  rate 21, height 5' 7 5" (1 715 m), weight 54 5 kg (120 lb 2 4 oz), SpO2 95 %            Imaging and other studies:    O2 Device: Nasal Cannula 6 liters     Plan    Respiratory Plan: Mild Distress pathway  Airway Clearance Plan: Percussive Vest      Xopenex TID Vest

## 2022-08-01 NOTE — NUTRITION
TF recommendation    Recommend Jevity 1 5, 75mL x 16 hours, total volume 1200 mL 130 mL flushes q 4 once no longer receiving IVF, plus 2 packets of Prosource TF  EN + Prosource TF will provide a total of 1880 kcal, 99 g protein, 1692 mL total free water (912 EN + 780 flushes)

## 2022-08-02 PROBLEM — A41.9 SEPSIS (HCC): Status: ACTIVE | Noted: 2022-08-02

## 2022-08-02 LAB
ALBUMIN SERPL BCP-MCNC: 2.6 G/DL (ref 3.5–5)
ANION GAP SERPL CALCULATED.3IONS-SCNC: 9 MMOL/L (ref 4–13)
BUN SERPL-MCNC: 18 MG/DL (ref 5–25)
CALCIUM ALBUM COR SERPL-MCNC: 9.8 MG/DL (ref 8.3–10.1)
CALCIUM SERPL-MCNC: 8.7 MG/DL (ref 8.4–10.2)
CHLORIDE SERPL-SCNC: 97 MMOL/L (ref 96–108)
CO2 SERPL-SCNC: 26 MMOL/L (ref 21–32)
CORTIS AM PEAK SERPL-MCNC: 26.8 UG/DL (ref 4.2–22.4)
CREAT SERPL-MCNC: 0.3 MG/DL (ref 0.6–1.3)
ERYTHROCYTE [DISTWIDTH] IN BLOOD BY AUTOMATED COUNT: 21.3 % (ref 11.6–15.1)
GFR SERPL CREATININE-BSD FRML MDRD: 109 ML/MIN/1.73SQ M
GLUCOSE SERPL-MCNC: 152 MG/DL (ref 65–140)
HCT VFR BLD AUTO: 26.9 % (ref 34.8–46.1)
HGB BLD-MCNC: 8.4 G/DL (ref 11.5–15.4)
LACTATE SERPL-SCNC: 2 MMOL/L (ref 0.5–2)
LACTATE SERPL-SCNC: 2.7 MMOL/L (ref 0.5–2)
MAGNESIUM SERPL-MCNC: 1.7 MG/DL (ref 1.9–2.7)
MCH RBC QN AUTO: 31.7 PG (ref 26.8–34.3)
MCHC RBC AUTO-ENTMCNC: 31.2 G/DL (ref 31.4–37.4)
MCV RBC AUTO: 102 FL (ref 82–98)
PHOSPHATE SERPL-MCNC: 2.5 MG/DL (ref 2.3–4.1)
PLATELET # BLD AUTO: 233 THOUSANDS/UL (ref 149–390)
PMV BLD AUTO: 10.4 FL (ref 8.9–12.7)
POTASSIUM SERPL-SCNC: 3.9 MMOL/L (ref 3.5–5.3)
PROCALCITONIN SERPL-MCNC: 0.49 NG/ML
RBC # BLD AUTO: 2.65 MILLION/UL (ref 3.81–5.12)
SODIUM SERPL-SCNC: 132 MMOL/L (ref 135–147)
WBC # BLD AUTO: 18.26 THOUSAND/UL (ref 4.31–10.16)

## 2022-08-02 PROCEDURE — 94669 MECHANICAL CHEST WALL OSCILL: CPT

## 2022-08-02 PROCEDURE — 99232 SBSQ HOSP IP/OBS MODERATE 35: CPT | Performed by: INTERNAL MEDICINE

## 2022-08-02 PROCEDURE — 94760 N-INVAS EAR/PLS OXIMETRY 1: CPT

## 2022-08-02 PROCEDURE — 94640 AIRWAY INHALATION TREATMENT: CPT

## 2022-08-02 PROCEDURE — 83735 ASSAY OF MAGNESIUM: CPT | Performed by: STUDENT IN AN ORGANIZED HEALTH CARE EDUCATION/TRAINING PROGRAM

## 2022-08-02 PROCEDURE — 83605 ASSAY OF LACTIC ACID: CPT | Performed by: INTERNAL MEDICINE

## 2022-08-02 PROCEDURE — 80069 RENAL FUNCTION PANEL: CPT | Performed by: STUDENT IN AN ORGANIZED HEALTH CARE EDUCATION/TRAINING PROGRAM

## 2022-08-02 PROCEDURE — 99222 1ST HOSP IP/OBS MODERATE 55: CPT | Performed by: INTERNAL MEDICINE

## 2022-08-02 PROCEDURE — 87040 BLOOD CULTURE FOR BACTERIA: CPT | Performed by: INTERNAL MEDICINE

## 2022-08-02 PROCEDURE — 85027 COMPLETE CBC AUTOMATED: CPT | Performed by: STUDENT IN AN ORGANIZED HEALTH CARE EDUCATION/TRAINING PROGRAM

## 2022-08-02 PROCEDURE — 84145 PROCALCITONIN (PCT): CPT | Performed by: STUDENT IN AN ORGANIZED HEALTH CARE EDUCATION/TRAINING PROGRAM

## 2022-08-02 PROCEDURE — 99233 SBSQ HOSP IP/OBS HIGH 50: CPT | Performed by: INTERNAL MEDICINE

## 2022-08-02 RX ORDER — FUROSEMIDE 10 MG/ML
10 INJECTION INTRAMUSCULAR; INTRAVENOUS ONCE
Status: COMPLETED | OUTPATIENT
Start: 2022-08-02 | End: 2022-08-02

## 2022-08-02 RX ADMIN — FOLIC ACID 1 MG: 1 TABLET ORAL at 10:08

## 2022-08-02 RX ADMIN — Medication 1000 UNITS: at 10:09

## 2022-08-02 RX ADMIN — GUAIFENESIN 400 MG: 100 SOLUTION ORAL at 23:13

## 2022-08-02 RX ADMIN — SILDENAFIL CITRATE 10 MG: 20 TABLET ORAL at 10:08

## 2022-08-02 RX ADMIN — LEVOTHYROXINE SODIUM 25 MCG: 25 TABLET ORAL at 05:45

## 2022-08-02 RX ADMIN — LEVALBUTEROL HYDROCHLORIDE 1.25 MG: 1.25 SOLUTION RESPIRATORY (INHALATION) at 14:35

## 2022-08-02 RX ADMIN — HEPARIN SODIUM 5000 UNITS: 5000 INJECTION INTRAVENOUS; SUBCUTANEOUS at 05:45

## 2022-08-02 RX ADMIN — GLYCERIN 2 DROP: .002; .002; .01 SOLUTION/ DROPS OPHTHALMIC at 10:10

## 2022-08-02 RX ADMIN — LORATADINE 10 MG: 10 TABLET ORAL at 10:08

## 2022-08-02 RX ADMIN — ASPIRIN 81 MG: 81 TABLET, CHEWABLE ORAL at 10:09

## 2022-08-02 RX ADMIN — Medication 3 MG: at 23:15

## 2022-08-02 RX ADMIN — LEVALBUTEROL HYDROCHLORIDE 1.25 MG: 1.25 SOLUTION RESPIRATORY (INHALATION) at 20:25

## 2022-08-02 RX ADMIN — OXYCODONE HYDROCHLORIDE 2.5 MG: 5 SOLUTION ORAL at 00:47

## 2022-08-02 RX ADMIN — GUAIFENESIN 400 MG: 100 SOLUTION ORAL at 15:21

## 2022-08-02 RX ADMIN — FLUTICASONE PROPIONATE 1 SPRAY: 50 SPRAY, METERED NASAL at 10:10

## 2022-08-02 RX ADMIN — SILDENAFIL CITRATE 10 MG: 20 TABLET ORAL at 15:21

## 2022-08-02 RX ADMIN — GLYCOPYRROLATE 1 MG: 1 TABLET ORAL at 17:46

## 2022-08-02 RX ADMIN — NYSTATIN: 100000 POWDER TOPICAL at 10:07

## 2022-08-02 RX ADMIN — PREDNISONE 30 MG: 20 TABLET ORAL at 10:07

## 2022-08-02 RX ADMIN — GLYCERIN 2 DROP: .002; .002; .01 SOLUTION/ DROPS OPHTHALMIC at 17:46

## 2022-08-02 RX ADMIN — GUAIFENESIN 400 MG: 100 SOLUTION ORAL at 10:09

## 2022-08-02 RX ADMIN — GLYCOPYRROLATE 1 MG: 1 TABLET ORAL at 10:09

## 2022-08-02 RX ADMIN — ATOVAQUONE 750 MG: 750 SUSPENSION ORAL at 15:21

## 2022-08-02 RX ADMIN — Medication 250 MG: at 10:09

## 2022-08-02 RX ADMIN — GLYCOPYRROLATE 1 MG: 1 TABLET ORAL at 23:15

## 2022-08-02 RX ADMIN — HYOSCYAMINE SULFATE 1 APPLICATION: 16 SOLUTION at 17:44

## 2022-08-02 RX ADMIN — CEFTRIAXONE 1000 MG: 1 INJECTION, SOLUTION INTRAVENOUS at 23:14

## 2022-08-02 RX ADMIN — FLUTICASONE PROPIONATE 1 SPRAY: 50 SPRAY, METERED NASAL at 17:46

## 2022-08-02 RX ADMIN — HEPARIN SODIUM 5000 UNITS: 5000 INJECTION INTRAVENOUS; SUBCUTANEOUS at 23:13

## 2022-08-02 RX ADMIN — ACETAMINOPHEN 650 MG: 650 SUSPENSION ORAL at 10:24

## 2022-08-02 RX ADMIN — GUAIFENESIN AND DEXTROMETHORPHAN 10 ML: 100; 10 SYRUP ORAL at 15:21

## 2022-08-02 RX ADMIN — Medication 250 MG: at 17:46

## 2022-08-02 RX ADMIN — SODIUM CHLORIDE 1700 ML: 0.9 INJECTION, SOLUTION INTRAVENOUS at 11:50

## 2022-08-02 RX ADMIN — NYSTATIN: 100000 POWDER TOPICAL at 17:46

## 2022-08-02 RX ADMIN — OMEPRAZOLE 20 MG: 20 CAPSULE, DELAYED RELEASE ORAL at 10:13

## 2022-08-02 RX ADMIN — GUAIFENESIN AND DEXTROMETHORPHAN 10 ML: 100; 10 SYRUP ORAL at 10:09

## 2022-08-02 RX ADMIN — LEVALBUTEROL HYDROCHLORIDE 1.25 MG: 1.25 SOLUTION RESPIRATORY (INHALATION) at 07:22

## 2022-08-02 RX ADMIN — FUROSEMIDE 10 MG: 10 INJECTION, SOLUTION INTRAMUSCULAR; INTRAVENOUS at 17:45

## 2022-08-02 RX ADMIN — OXYCODONE HYDROCHLORIDE 2.5 MG: 5 SOLUTION ORAL at 23:13

## 2022-08-02 RX ADMIN — SILDENAFIL CITRATE 10 MG: 20 TABLET ORAL at 23:17

## 2022-08-02 NOTE — PROGRESS NOTES
Progress Note - Pulmonary   Raegan Ye 80 y o  female MRN: [id number]  Unit/Bed#: -01 Encounter: [id number]    Assessment and Plan:    1  Acute on chronic hypoxic respiratory failure:  She has you sleepy on 2 L of nasal cannula oxygen at home during sleep  Currently she is on 3 liters/minute, improved from 5 liters/minute which she was on a earlier     She is saturating well at this level  Her FiO2 can be titrated down as tolerated for sats of 94%  This was post likely precipitated by acute tracheobronchitis      2  Acute tracheobronchitis:  She has acute tracheobronchitis and has reported yellow phlegm  Currently she is on treatment with ceftriaxone  Her blood cultures have been negative  She is also COVID negative  Sputum results are pending  Urine growing Gram-negative danyelle  Currently she is on bronchodilator therapy      3   Pulmonary hypertension:  She has mild to moderate pulmonary hypertension  Her echocardiogram from 06/20/2022 showed a right ventricular systolic pressure of 56 mmHg and most recently has been on treatment with Opsumit 10 mg and sildenafil 10 mg 3 times a day  She was previously on Adempas which was stopped due to hypotension  She has reported allergy to Uptravi  We will continue with sildenafil and Opsumit  Reportedly the Opsumit  is not supposed to be crushed  It is not clear whether this can be dissolved and given through the G-tube  I spoke to Jean Dean, the pharmacist as well as Dr Verito Sam the patient's hospitalist   They will check with the the [facility] how they have been administering this medication there  She is already on sildenafil      4  Scleroderma/crest syndrome:  She has been on treatment with prednisone 30 mg once a day      5  Interstitial lung disease:  She has interstitial lung disease likely from her connective tissue disorder  Her chest x-ray showed bilateral opacities and evidence of fibrosis    Her chest auscultation revealed occasional coarse inspiratory crackles at lung bases      I spoke to the patient as well as her daughter at the bedside    updated  I spoke to Anabell Guevara   the patient's hospitalist      Thank you for allowing me to participate in the care of the patient  Chief Complaint:   Cough and shortness of breath    Subjective:   She states that her cough has improved  She still brings up occasional yellow phlegm  Shortness of breath has improved  Her oxygen requirements have come down to 3 L  No fever or chills  Objective:     Vitals: Blood pressure (!) 96/46, pulse 97, temperature 97 9 °F (36 6 °C), temperature source Tympanic, resp  rate (!) 23, height 5' 7 5" (1 715 m), weight 56 6 kg (124 lb 12 5 oz), SpO2 94 %  ,Body mass index is 19 25 kg/m²  Intake/Output Summary (Last 24 hours) at 8/2/2022 1648  Last data filed at 8/2/2022 0700  Gross per 24 hour   Intake --   Output 910 ml   Net -910 ml       Invasive Devices  Report    Peripheral Intravenous Line  Duration           Peripheral IV 07/31/22 Left Wrist 1 day          Drain  Duration           Gastrostomy/Enterostomy -- days    Urethral Catheter 2 days                Physical Exam:  On clinical examination, she was hemodynamically stable and afebrile  Saturating 94% on 3 L of nasal cannula oxygen  Not in any apparent distress  HEENT:  Has conjunctival pallor no cyanosis  Neck:  No jugular venous distention no significant neck or supraclavicular adenopathy  Heart S1-S2 heard  Chest air entry present bilaterally  Occasional crackles bilaterally no rhonchi  Abdomen soft and nontender G-tube in place  Bowel sounds audible  Neuro awake alert oriented  Extremities no clubbing no edema  Labs: I have personally reviewed pertinent lab results  The white cell count improved to 18 26  Elevated procalcitonin  Magnesium 1 7  Urine growing Gram-negative rods  Blood cultures negative    Hemoglobin 8 4  Imaging and other studies: I have personally reviewed pertinent reports

## 2022-08-02 NOTE — ASSESSMENT & PLAN NOTE
Lab Results   Component Value Date    HSTNI0 69 (H) 07/31/2022    HSTNID2 10 07/31/2022    HSTNI2 79 (H) 07/31/2022    HSTNID4 3 08/01/2022    HSTNI4 72 (H) 08/01/2022      · Patient arrived to the ED in respiratory distress requiring 6 L nasal cannula  · Elevated troponin likely due to hypoxia from respiratory distress  · EKG with no ischemic changes, less likely ACS as patient has no chest pain  · Received 162 mg ASA in ED  · Continue home 81 mg ASA

## 2022-08-02 NOTE — PLAN OF CARE
Problem: Prexisting or High Potential for Compromised Skin Integrity  Goal: Skin integrity is maintained or improved  Description: INTERVENTIONS:  - Identify patients at risk for skin breakdown  - Assess and monitor skin integrity  - Assess and monitor nutrition and hydration status  - Monitor labs   - Assess for incontinence   - Turn and reposition patient  - Assist with mobility/ambulation  - Relieve pressure over bony prominences  - Avoid friction and shearing  - Provide appropriate hygiene as needed including keeping skin clean and dry  - Evaluate need for skin moisturizer/barrier cream  - Collaborate with interdisciplinary team   - Patient/family teaching  - Consider wound care consult   Outcome: Progressing     Problem: RESPIRATORY - ADULT  Goal: Achieves optimal ventilation and oxygenation  Description: INTERVENTIONS:  - Assess for changes in respiratory status  - Assess for changes in mentation and behavior  - Position to facilitate oxygenation and minimize respiratory effort  - Oxygen administered by appropriate delivery if ordered  - Initiate smoking cessation education as indicated  - Encourage broncho-pulmonary hygiene including cough, deep breathe, Incentive Spirometry  - Assess the need for suctioning and aspirate as needed  - Assess and instruct to report SOB or any respiratory difficulty  - Respiratory Therapy support as indicated  Outcome: Progressing     Problem: METABOLIC, FLUID AND ELECTROLYTES - ADULT  Goal: Electrolytes maintained within normal limits  Description: INTERVENTIONS:  - Monitor labs and assess patient for signs and symptoms of electrolyte imbalances  - Administer electrolyte replacement as ordered  - Monitor response to electrolyte replacements, including repeat lab results as appropriate  - Instruct patient on fluid and nutrition as appropriate  Outcome: Progressing  Goal: Fluid balance maintained  Description: INTERVENTIONS:  - Monitor labs   - Monitor I/O and WT  - Instruct patient on fluid and nutrition as appropriate  - Assess for signs & symptoms of volume excess or deficit  Outcome: Progressing  Goal: Glucose maintained within target range  Description: INTERVENTIONS:  - Monitor Blood Glucose as ordered  - Assess for signs and symptoms of hyperglycemia and hypoglycemia  - Administer ordered medications to maintain glucose within target range  - Assess nutritional intake and initiate nutrition service referral as needed  Outcome: Progressing     Problem: Nutrition/Hydration-ADULT  Goal: Nutrient/Hydration intake appropriate for improving, restoring or maintaining nutritional needs  Description: Monitor and assess patient's nutrition/hydration status for malnutrition  Collaborate with interdisciplinary team and initiate plan and interventions as ordered  Monitor patient's weight and dietary intake as ordered or per policy  Utilize nutrition screening tool and intervene as necessary  Determine patient's food preferences and provide high-protein, high-caloric foods as appropriate       INTERVENTIONS:  - Monitor oral intake, urinary output, labs, and treatment plans  - Assess nutrition and hydration status and recommend course of action  - Evaluate amount of meals eaten  - Assist patient with eating if necessary   - Allow adequate time for meals  - Recommend/ encourage appropriate diets, oral nutritional supplements, and vitamin/mineral supplements  - Order, calculate, and assess calorie counts as needed  - Recommend, monitor, and adjust tube feedings and TPN/PPN based on assessed needs  - Assess need for intravenous fluids  - Provide specific nutrition/hydration education as appropriate  - Include patient/family/caregiver in decisions related to nutrition  Outcome: Progressing     Problem: Potential for Falls  Goal: Patient will remain free of falls  Description: INTERVENTIONS:  - Educate patient/family on patient safety including physical limitations  - Instruct patient to call for assistance with activity   - Consult OT/PT to assist with strengthening/mobility   - Keep Call bell within reach  - Keep bed low and locked with side rails adjusted as appropriate  - Keep care items and personal belongings within reach  - Initiate and maintain comfort rounds  - Make Fall Risk Sign visible to staff  - Offer Toileting every 2   Hours, in advance of need  - Initiate/Maintain  bed alarm  - Obtain necessary fall risk management equipment  - Apply yellow socks and bracelet for high fall risk patients  - Consider moving patient to room near nurses station  Outcome: Progressing

## 2022-08-02 NOTE — PLAN OF CARE
Problem: MOBILITY - ADULT  Goal: Maintain or return to baseline ADL function  Description: INTERVENTIONS:  -  Assess patient's ability to carry out ADLs; assess patient's baseline for ADL function and identify physical deficits which impact ability to perform ADLs (bathing, care of mouth/teeth, toileting, grooming, dressing, etc )  - Assess/evaluate cause of self-care deficits   - Assess range of motion  - Assess patient's mobility; develop plan if impaired  - Assess patient's need for assistive devices and provide as appropriate  - Encourage maximum independence but intervene and supervise when necessary  - Involve family in performance of ADLs  - Assess for home care needs following discharge   - Consider OT consult to assist with ADL evaluation and planning for discharge  - Provide patient education as appropriate  Outcome: Progressing     Problem: RESPIRATORY - ADULT  Goal: Achieves optimal ventilation and oxygenation  Description: INTERVENTIONS:  - Assess for changes in respiratory status  - Assess for changes in mentation and behavior  - Position to facilitate oxygenation and minimize respiratory effort  - Oxygen administered by appropriate delivery if ordered  - Initiate smoking cessation education as indicated  - Encourage broncho-pulmonary hygiene including cough, deep breathe, Incentive Spirometry  - Assess the need for suctioning and aspirate as needed  - Assess and instruct to report SOB or any respiratory difficulty  - Respiratory Therapy support as indicated  Outcome: Progressing     Problem: METABOLIC, FLUID AND ELECTROLYTES - ADULT  Goal: Electrolytes maintained within normal limits  Description: INTERVENTIONS:  - Monitor labs and assess patient for signs and symptoms of electrolyte imbalances  - Administer electrolyte replacement as ordered  - Monitor response to electrolyte replacements, including repeat lab results as appropriate  - Instruct patient on fluid and nutrition as appropriate  Outcome: Progressing

## 2022-08-02 NOTE — ASSESSMENT & PLAN NOTE
· In ED, chronic lilly appeared blocked and was not draining  · WBC 19K, patient on chronic prednisone  · UA suspicious for UTI  · Urine culture and blood cultures x 2 pending   · Adjust ABX pending culture results  · Received Rocephin in ED  · Continue for daily for UTI, low cross sensitivity to PCN allergy   · Lilly catheter replaced on admission  · Trend WBC and fever curve

## 2022-08-02 NOTE — PROGRESS NOTES
Cruz U  68   Progress Note Freeman Shore 1940, 80 y o  female MRN: [id number]  Unit/Bed#: -Rena Encounter: [id number]  Primary Care Provider: Leland Price MD   Date and time admitted to hospital: 7/31/2022  7:53 PM    Sepsis [facility])  Assessment & Plan  Secondary to pneumonia versus UTI with leukocytosis and mean arterial blood pressure of less than 65 mm Hg with elevated lactic acid of more than 2 7, will give continue IV antibiotic with Rocephin and will give IV fluid bolus of 1 7 later and repeat lactic acid in 2 hours, repeat blood cultures also initial blood cultures have been negative,    Moderate protein-calorie malnutrition (HCC)  Assessment & Plan  Malnutrition Findings:   Adult Malnutrition type: Acute illness  Adult Degree of Malnutrition: Malnutrition of moderate degree  Malnutrition Characteristics: Muscle loss, Fat loss                  360 Statement: Moderate malnutrition in the constext of acute illness r/t inadequate energy intake as evidenced by moderate muscle wasting and subcutaneous fat loss (orbitals, clavicles, temples, cheeks)  Will treat with nutrition therapy and EN recommendation  BMI Findings: Body mass index is 19 25 kg/m²         Elevated troponin  Assessment & Plan  Lab Results   Component Value Date    HSTNI0 69 (H) 07/31/2022    HSTNID2 10 07/31/2022    HSTNI2 79 (H) 07/31/2022    HSTNID4 3 08/01/2022    HSTNI4 72 (H) 08/01/2022      · Patient arrived to the ED in respiratory distress requiring 6 L nasal cannula  · Elevated troponin likely due to hypoxia from respiratory distress  · EKG with no ischemic changes, less likely ACS as patient has no chest pain  · Received 162 mg ASA in ED  · Continue home 81 mg ASA    Hyponatremia  Assessment & Plan  Lab Results   Component Value Date    SODIUM 132 (L) 08/02/2022    SODIUM 129 (L) 08/01/2022    SODIUM 127 (L) 07/31/2022     · POA  · Check hyponatremia labs  · Received NS in ED, repeat BMP in AM  · Consider Nephrology consult if no improvement or worsening hyponatremia     UTI (urinary tract infection)  Assessment & Plan  · In ED, chronic lilly appeared blocked and was not draining  · WBC 19K, patient on chronic prednisone  · UA suspicious for UTI  · Urine culture and blood cultures x 2 pending   · Adjust ABX pending culture results  · Received Rocephin in ED  · Continue for daily for UTI, low cross sensitivity to PCN allergy   · Lilly catheter replaced on admission  · Trend WBC and fever curve     Pressure injury of sacral region, stage 4 ([address] )  Assessment & Plan  · Previous admission 6/14-7/13 with I&D by General Surgery  · S/P IV ABX, wound vac  · Frequent repositioning  · Optimize nutritional status   · Consult wound     Pulmonary hypertension (HCC)  Assessment & Plan  · Crest syndrome with pulmonary hypertension  · Continue Sildenafil 10 mg TID and Opsumit 10 mg daily  · Wears 2 L oxygen HS as needed    Urinary retention  Assessment & Plan  · Lilly catheter placed prior to admission  · Outpatient Urology follow-up    Anemia  Assessment & Plan  · Multifactorial  · Continue iron and folic acid supplementation   · Hgb stable, monitor daily while in hospital     Scleroderma [facility])  Assessment & Plan  · With crest syndrome  · Continue Prednisone 10 mg TID  · Continue Atovaquone for PCP prophylaxis  · Received IVIG 6/14-7/13 admission for proximal muscle weakness  · PT/OT consult     Dysphagia, oropharyngeal phase  Assessment & Plan  · H/o dysphagia S/P PEG  · Continue cyclic tube feeds  · Consult Nutrition   · Aspiration precautions     * Acute on chronic respiratory failure with hypoxia [facility])  Assessment & Plan  · Patient presented to ED from SNF for sudden onset respiratory distress  · Per patient, had coughing episode on Friday with clear/white sputum  · Yesterday around 1300 began having coughing episode and unable to clear airway from sputum  · In ED, patient noted to have thick, dry secretions in trachea after suctioning  · Patient required 6 L NC for continued hypoxia and respiratory distress  · Normally only on 2 L HS for comfort   · Oxygen humidified to assist with thinning secretions  · CXR improved from previous admission  · Check sputum culture given elevated leukocytosis   · Cause of AoC hypoxic RF likely dried mucous  · Continue home Xopenex and Sodium Chloride Neb treatments TID  · Humidify oxygen  · Suction frequently, patient with weak cough  · Patient on Mucinex outpatient, continue  · Respiratory protocol, IS, flutter valve  · Consult Pulm         Problem list   sepsis likely tracheobronchitis and UTI  Acute hypoxic respiratory on chronic respiratory failure with tracheobronchitis  Pulmonary hypertension  Scleroderma crest syndrome  Interstitial lung disease  Protein calorie malnutrition  Hyponatremia  Congestive heart failure with preserved ejection fraction  Nonischemic myocardial injury  Anemia  Leukocytosis with lactic acidosis  Dysphagia with PEG tube in-situ  Stage IV sacral decubitus ulcer  Chronic Te[place]ada catheter    Subjective/Objective     Subjective:   Pt has SOB ,has generalized weakness ,pt is afebrile,pt has no nausea or vomiting ,      Objective:  Vitals: Blood pressure (!) 100/40, pulse 88, temperature 100 °F (37 8 °C), temperature source Tympanic, resp  rate 20, height 5' 7 5" (1 715 m), weight 56 6 kg (124 lb 12 5 oz), SpO2 97 %  ,Body mass index is 19 25 kg/m²        Intake/Output Summary (Last 24 hours) at 8/2/2022 1320  Last data filed at 8/2/2022 0700  Gross per 24 hour   Intake --   Output 910 ml   Net -910 ml       Invasive Devices  Report    Peripheral Intravenous Line  Duration           Peripheral IV 07/31/22 Left Wrist 1 day          Drain  Duration           Gastrostomy/Enterostomy -- days    Urethral Catheter 2 days                Physical Exam:  HEENT-PERRLA, moist oral mucosa,frail   Neck-supple, no JVD elevation   Respiratory-decreased air entry bilaterally  Cardiovascular system-S1, S2 heard, no murmur or gallops or rubs  Abdomen-soft, nontender, no guarding or rigidity, bowel sounds heard  peg tube insitu  Extremities-no pedal edema  Peripheral pulses palpable  Musculoskeletal-no contractures  Central nervous system-no acute focal neurological deficit ,no sensory or motor deficit noted  Skin-stage 4 sacral wound present , clean base        Lab, Imaging and other studies: I have personally reviewed pertinent reports      VTE Pharmacologic Prophylaxis: Heparin  VTE Mechanical Prophylaxis: sequential compression device

## 2022-08-02 NOTE — ASSESSMENT & PLAN NOTE
· H/o dysphagia S/P PEG  · Continue cyclic tube feeds  · Consult Nutrition   · Aspiration precautions

## 2022-08-02 NOTE — SEPSIS NOTE
Sepsis Note   Jayna Velez 80 y o  female MRN: [id number]  Unit/Bed#: -01 Encounter: [id number]       qSOFA     Row Name 08/02/22 1206 08/02/22 0733 08/02/22 0400 08/02/22 0000 08/01/22 2043    Altered mental status GCS < 15 -- -- -- -- --    Respiratory Rate > / =22 0 1 0 0 0    Systolic BP < / =223 1 0 1 0 0    Q Sofa Score 1 1 1 0 0    Row Name 08/01/22 1736 08/01/22 1107 08/01/22 0752 08/01/22 0243 08/01/22 0013    Altered mental status GCS < 15 -- -- -- -- --    Respiratory Rate > / =22 0 0 0 0 0    Systolic BP < / =221 0 0 0 0 0    Q Sofa Score 0 0 0 0 0    Row Name 07/31/22 2158 07/31/22 2001 07/31/22 1957          Altered mental status GCS < 15 -- -- --      Respiratory Rate > / =22 0 -- 0      Systolic BP < / =806 0 0 --      Q Sofa Score 0 0 --                 Initial Sepsis Screening     Row Name 08/02/22 1330 08/02/22 1151             Is the patient's history suggestive of a new or worsening infection? -- --       Suspected source of infection pneumonia;urinary tract infection  -JK --       Are two or more of the following signs & symptoms of infection both present and new to the patient? -- --       Indicate SIRS criteria Leukocytosis (WBC > 18864 IJL); Tachypnea > 20 resp per min  -JK Leukocytosis (WBC > 46777 IJL)  -JK       If the answer is yes to both questions, suspicion of sepsis is present -- --       If severe sepsis is present AND tissue hypoperfusion perists in the hour after fluid resuscitation or lactate > 4, the patient meets criteria for SEPTIC SHOCK -- --       Are any of the following organ dysfunction criteria present within 6 hours of suspected infection and SIRS criteria that are NOT considered to be chronic conditions? Yes  -JK No  -JK       Organ dysfunction MAP < 65 mmHg; Lactate > 2 0 mmol/L  -JK --       Date of presentation of severe sepsis -- --       Time of presentation of severe sepsis -- --       Tissue hypoperfusion persists in the hour after crystalloid fluid administration, evidenced, by either: -- Mean arterial pressure < 65 mm/Hg ( ___ mm Hg in comment field)  -JK       Was hypotension present within one hour of the conclusion of crystalloid fluid administration? No  -JK --       Date of presentation of septic shock -- --       Time of presentation of septic shock -- --             User Key  (r) = Recorded By, (t) = Taken By, (c) = Cosigned By    [address] Name Provider Aydee Ibarra MD Physician                Initial assessment patient noted to sepsis with evidence mean arterial pressure less than 65 mm Hg and leukocytosis admitted with acute on chronic hypoxic respiratory failure with tracheobronchitis and stage IV sacral UTI and chest x-ray showing resolving pulmonary opacities on antibiotic with Rocephin, blood cultures drawn at time of admission negative so far and lactic acid was drawn which showed lactic acid level of 2 7 and given IV fluid hydration and repeat lactic acid, repeat blood cultures were drawn

## 2022-08-02 NOTE — CASE MANAGEMENT
Case Management Progress Note    Patient name Dung Murillo  Location /-01 MRN [id number]  : 1940 Date 2022       LOS (days): 2  Geometric Mean LOS (GMLOS) (days): 4 80  Days to GMLOS:3 2        OBJECTIVE:        Current admission status: Inpatient  Preferred Pharmacy:   PATIENT/FAMILY REPORTS NO PREFERRED PHARMACY  No address on file      [address], [facility] [address] [facility]  Phone: [phone] Fax: [phone]    Primary Care Provider: Roberto Nielsen MD    Primary Insurance: [facility] REP  Secondary Insurance:     PROGRESS NOTE:    CM s/w patient's daughter regarding DCP  Patient's daughter aware that [facility] is able to accept patient back at discharge however stating she is not sure she wants her to return  Daughter requesting a facility back in [address] [place] [address] as it is closer to family  Daughter requesting referrals sent out to [address] [place] [address] - referrals opened via [address] [place] [address] for 50-mile radius to determine available options  Daughter aware that [facility] is able to accept back and [facility] is able to offer bed at this time  Daughter stating that she will consider options once [address] [place] [address] facilities have been exhausted  Referrals pending review at this time

## 2022-08-02 NOTE — ASSESSMENT & PLAN NOTE
Malnutrition Findings:   Adult Malnutrition type: Acute illness  Adult Degree of Malnutrition: Malnutrition of moderate degree  Malnutrition Characteristics: Muscle loss, Fat loss                  360 Statement: Moderate malnutrition in the constext of acute illness r/t inadequate energy intake as evidenced by moderate muscle wasting and subcutaneous fat loss (orbitals, clavicles, temples, cheeks)  Will treat with nutrition therapy and EN recommendation  BMI Findings: Body mass index is 19 25 kg/m²

## 2022-08-02 NOTE — ASSESSMENT & PLAN NOTE
Secondary to pneumonia versus UTI with leukocytosis and mean arterial blood pressure of less than 65 mm Hg with elevated lactic acid of more than 2 7, will give continue IV antibiotic with Rocephin and will give IV fluid bolus of 1 7 later and repeat lactic acid in 2 hours, repeat blood cultures also initial blood cultures have been negative,

## 2022-08-02 NOTE — ASSESSMENT & PLAN NOTE
Lab Results   Component Value Date    SODIUM 132 (L) 08/02/2022    SODIUM 129 (L) 08/01/2022    SODIUM 127 (L) 07/31/2022     · POA  · Check hyponatremia labs  · Received NS in ED, repeat BMP in AM  · Consider Nephrology consult if no improvement or worsening hyponatremia

## 2022-08-02 NOTE — UTILIZATION REVIEW
CONTINUED STAY REVIEW - DAY 2 INPATIENT CLINICAL REVIEW  Date: 8/2/22 Tuesday                     Current Patient Class: Inpatient    Current Level of Care: Acute Med Surg     HPI:  80year old female with PMHx CREST syndrome w/pulmonary HTN, scleroderma (chronic prednisone)  acquired hypothyroidism, Anemia, severe protein-calorie malnutrition, dysphagia (s/p PEG) oropharyngeal phase, scleroderma (chronic prednisone)  acquired hypothyroidism -  initially admitted on 7/31/22 2nd Acute or Chronic hypoxic respiratory failure due to increased secretions, Sepsis 2nd Pneumonia versus UTI + Hyponatremia     8/2/22 DAY 2:  Generalized weakness  Temp max 100  WBC down to 18,260 on IV Rocephin  SpO2 97 % with NC O2 weaned from 5 to 3 L    decreased air entry bilaterally  PEG tube intact with enteral feedings  IVF continued      Sepsis - secondary to pneumonia versus UTI with leukocytosis and mean arterial blood pressure of less than 65 mm Hg with elevated lactic acid of more than 2 7, will give continue IV antibiotic with Rocephin and will give IV fluid bolus of 1 7 later and repeat lactic acid in 2 hours, repeat blood cultures also initial blood cultures have been negative,     Moderate protein-calorie malnutrition -  Adult Malnutrition type: Acute illness- Moderate malnutrition in the constext of acute illness r/t inadequate energy intake as evidenced by moderate muscle wasting and subcutaneous fat loss (orbitals, clavicles, temples, cheeks)  Will treat with nutrition therapy and EN recommendation        Vital Signs:   Date/Time Temp Pulse Resp BP MAP (mmHg) SpO2 Calculated FIO2 (%) - Nasal Cannula O2 Flow Rate (L/min) Nasal Cannula O2 (L/min) O2 Device O2 Interface Device Patient Position - Orthostatic VS   08/02/22 1206 100 °F (37 8 °C) 88 20 100/40 Abnormal  -- 97 % -- -- 3 L/ min Nasal Cannula -- Lying   08/02/22 0745 -- -- -- -- -- 97 % 32 -- 3 L/min Nasal cannula -- --   08/02/22 0733 99 4 °F (37 4 °C) 84 26 Abnormal  118/51 -- 100 % -- -- -- -- -- Lying   08/02/22 0723 -- -- -- -- -- 95 % 36 -- 4 L/min Nasal cannula -- --   08/02/22 0400 99 °F (37 2 °C) 80 20 98/41 Abnormal  62 Abnormal  98 % -- 5 L/min -- Mid flow nasal cannula -- Lying   08/02/22 0000 99 6 °F (37 6 °C) 84 18 106/49 Abnormal  68 98 % -- 5 L/min -- Mid flow nasal cannula -- Lying   08/01/22 2043 99 1 °F (37 3 °C) 89 20 107/48 Abnormal  -- 100 % -- -- -- -- -- Lying   08/01/22 1934 -- -- -- -- -- 99 % 40 -- 5 L/min Mid flow nasal cannula -- --   08/01/22 1736 99 4 °F (37 4 °C) 91 20 115/59 -- 91 % -- -- -- -- -- Lying   08/01/22 1405 -- -- -- -- -- 97 % 40 -- 5 L/min Mid flow nasal cannula MFNC prongs --      07/31 0701 08/01 0700 08/01 0701 08/02 0700   IV Piggyback 50    Total Intake(mL/kg) 50 (0 9)    Urine (mL/kg/hr) 250 910 (0 7)   Total Output 250 910   Net -200 -910     Pertinent Labs/Diagnostic Results:   Results from last 7 days   Lab Units 07/31/22 2020   SARS-COV-2  Negative     Results from last 7 days   Lab Units 08/02/22 0459 08/01/22 0446 07/31/22 2011   WBC Thousand/uL 18 26* 23 01* 19 13*   HEMOGLOBIN g/dL 8 4* 10 6* 11 4*   HEMATOCRIT % 26 9* 33 9* 35 6   PLATELETS Thousands/uL 233 309 343   BANDS PCT %  --   --  8     Results from last 7 days   Lab Units 08/02/22 0459 08/01/22 0446 07/31/22 2011   SODIUM mmol/L 132* 129* 127*   POTASSIUM mmol/L 3 9 3 8 4 8   CHLORIDE mmol/L 97 91* 86*   CO2 mmol/L 26 29 31   ANION GAP mmol/L 9 9 10   BUN mg/dL 18 23 32*   CREATININE mg/dL 0 30* 0 35* 0 45*   EGFR ml/min/1 73sq m 109 103 95   CALCIUM mg/dL 8 7 9 3 9 9   MAGNESIUM mg/dL 1 7*  --   --    PHOSPHORUS mg/dL 2 5  --   --      Results from last 7 days   Lab Units 08/02/22  0459 07/31/22 2011   AST U/L  --  22   ALT U/L  --  14   ALK PHOS U/L  --  109*   TOTAL PROTEIN g/dL  --  8 1   ALBUMIN g/dL 2 6* 3 4*   TOTAL BILIRUBIN mg/dL  --  0 43     Results from last 7 days   Lab Units 08/02/22 0459 08/01/22 0446 07/31/22 2011 GLUCOSE RANDOM mg/dL 152* 72 107     Results from last 7 days   Lab Units 07/31/22 2011   OSMOLALITY, SERUM mmol/     Results from last 7 days   Lab Units 07/31/22 2011   PH MICHAEL  7 408*   PCO2 MICHAEL mm Hg 45 5   PO2 MICHAEL mm Hg 43 8   HCO3 MICHAEL mmol/L 28 1   BASE EXC MICHAEL mmol/L 2 9   O2 CONTENT MICHAEL ml/dL 12 1   O2 HGB, VENOUS % 76 7     Results from last 7 days   Lab Units 08/01/22  0446 07/31/22  2203 07/31/22 2011   HS TNI 0HR ng/L  --   --  69*   HS TNI 2HR ng/L  --  79*  --    HSTNI D2 ng/L  --  10  --    HS TNI 4HR ng/L 72*  --   --    HSTNI D4 ng/L 3  --   --      Results from last 7 days   Lab Units 07/31/22 2011   TSH 3RD GENERATON uIU/mL 2 626     Results from last 7 days   Lab Units 08/02/22  0459 08/01/22  0446   PROCALCITONIN ng/ml 0 49* 0 20     Results from last 7 days   Lab Units 08/02/22  1259 08/02/22  1042   LACTIC ACID mmol/L 2 0 2 7*     Results from last 7 days   Lab Units 07/31/22  2203   BNP pg/mL 372*     Results from last 7 days   Lab Units 08/01/22  0502 07/31/22 2011   OSMOLALITY, SERUM mmol/KG  --  287   OSMO UR mmol/  --      Results from last 7 days   Lab Units 08/01/22  0502 07/31/22  2033   CLARITY UA   --  Slightly Cloudy*   COLOR UA   --  Yellow   SPEC GRAV UA   --  1 010   PH UA   --  7 0   GLUCOSE UA mg/dl  --  Negative   KETONES UA mg/dl  --  Negative   BLOOD UA   --  Trace-Intact*   PROTEIN UA mg/dl  --  Negative   NITRITE UA   --  Negative   BILIRUBIN UA   --  Negative   UROBILINOGEN UA E U /dl  --  0 2   LEUKOCYTES UA   --  3+*   WBC UA /hpf  --  30-50*   RBC UA /hpf  --  None Seen   BACTERIA UA /hpf  --  Moderate*   EPITHELIAL CELLS WET PREP /hpf  --  Occasional   SODIUM UR  59  --      Results from last 7 days   Lab Units 07/31/22 2020   INFLUENZA A PCR  Negative   INFLUENZA B PCR  Negative   RSV PCR  Negative     Results from last 7 days   Lab Units 07/31/22  2019   ETHANOL LVL mg/dL <10     Results from last 7 days   Lab Units 08/01/22  0824 07/31/22 2033 07/31/22 2011   BLOOD CULTURE   --   --  No Growth at 24 hrs  No Growth at 24 hrs  SPUTUM CULTURE  Culture too young- will reincubate  --   --    GRAM STAIN RESULT  No Polys or Bacteria seen  --   --    URINE CULTURE   --  >100,000 cfu/ml Gram Negative Say Enteric Like*  --      Diet Enteral/Parenteral; Tube Feeding No Oral Diet; Jevity 1 5; Cyclic; 75; 16 hours     Scheduled Medications:  aspirin, 162 mg, Oral, Once  aspirin, 81 mg, Per G Tube, Daily  atovaquone, 750 mg, Per G Tube, Daily With Breakfast  IV cefTRIAXone, 1,000 mg, Intravenous, Q24H  cholecalciferol, 1,000 Units, Per G Tube, Daily  dextromethorphan-guaiFENesin, 10 mL, Oral, BID  fluticasone, 1 spray, Nasal, BID  folic acid, 1 mg, Per PEG Tube, Daily  glycerin-hypromellose-, 2 drop, Both Eyes, BID  glycopyrrolate, 1 mg, Oral, TID  guaiFENesin, 400 mg, Per G Tube, TID  heparin (porcine), 5,000 Units, Subcutaneous, Q12H  levalbuterol, 1 25 mg, Nebulization, TID  levothyroxine, 25 mcg, Per G Tube, Early Morning  loratadine, 10 mg, Per G Tube, Daily  Macitentan, 10 mg, Per G Tube, Daily  melatonin, 3 mg, Per G Tube, HS  nystatin, , Topical, BID  omeprazole (PRILOSEC) oral suspension, 20 mg, Per PEG Tube, Early Morning  predniSONE, 30 mg, Per G Tube, Daily  saccharomyces boulardii, 250 mg, Per G Tube, BID  sildenafil, 10 mg, Per G Tube, TID  IV sodium chloride, 1,700 mL, Intravenous, Once  sodium hypochlorite, 1 application, Irrigation, Daily    Continuous IV Infusions:  IVF sodium chloride, 100 mL/hr, Intravenous, Continuous    PRN Meds:  acetaminophen, 650 mg, Oral, Q4H PRN - 8/1 X 2, 8/2 X 1  ondansetron, 4 mg, Intravenous, Q6H PRN  oxyCODONE, 2 5 mg, Per PEG Tube, Q4H PRN - 8/2 X 1  sodium chloride, 1 spray, Each Nare, Q1H PRN    Discharge Plan:    To be determined   Inpatient Case Management following for all discharge needs    Network Utilization Review Department  ATTENTION: Please call with any questions or concerns to [phone] and carefully listen to the prompts so that you are directed to the right person  All voicemails are confidential   Brigid Menendez all requests for admission clinical reviews, approved or denied determinations and any other requests to dedicated fax number below belonging to the campus where the patient is receiving treatment   List of dedicated fax numbers for the Facilities:  [address] DENIALS (Administrative/Medical Necessity) [phone]   [address] (Maternity/NICU/Pediatrics) [phone]   [address]  [address] [place] [address] [place] [address] [address] [facility] [place] [address] [phone]

## 2022-08-02 NOTE — CONSULTS
Consultation - Cardiology   [facility] 80 y o  female MRN: [id number]  Unit/Bed#: -01 Encounter: [id number]        Inpatient consult to Cardiology  Consult performed by: Noelle Cadena MD  Consult ordered by: Jabier Amado DO            Assessment/Plan   Principal Problem:    Acute on chronic respiratory failure with hypoxia ([facility] [address] )  Active Problems:    Dysphagia, oropharyngeal phase    Scleroderma ([facility] [address] )    Anemia    Urinary retention    Pulmonary hypertension ([facility] [address] )    Pressure injury of sacral region, stage 4 (HCC)    UTI (urinary tract infection)    Hyponatremia    Elevated troponin    Moderate protein-calorie malnutrition ([facility] [address] )    Acute hypoxemic respiratory failure related to acute tracheobronchitis:  Clinically improved overnight  Oxygen requirements are now down to 3 L  Baseline oxygen requirement at home is 2 L at night  Chest x-ray reviewed, showed diffuse parenchymal opacities  Heart failure with preserved EF:  Clinically compensated  Was not on diuretics at home  Recent   Continue low-sodium diet and fluid restriction  Moderate pulmonary hypertension:  Recent notes from heart failure clinic reviewed  Previously she was on Adempas which was stopped due to hypotension  Currently on sildenafil and Opsumit  I feel that the Opsumit can be safely administered via her PEG tube  Nonischemic myocardial injury:  Very minor troponin elevation  4 hr troponin was 72  EKG with sinus rhythm, left axis deviation and incomplete right bundle branch block but no ischemic changes  Further ischemic inpatient workup not planned  Further inpatient cardiac workup not planned  Outpatient follow-up will be with heart failure/pulmonary hypertension clinic as scheduled  Plan discussed with the patient and primary team     Thank you for allowing us to participate in the care of this patient  If there are any further questions regarding this patient please feel free to contact us        Physician Requesting Consult: Maddison Westbrook*    Reason for Consult / Principal Problem:   Chief Complaint   Patient presents with    Respiratory Distress     Report that patient has SOB since this afternoon  O2 applied at 6 liter n/c-Spo2-94%  Patient states "not able to cough out mucus"       HPI: Karan Doan is a 80y o  year old female with a known history of crest syndrome/scleroderma with moderate pulmonary hypertension, hypothyroidism, heart failure with preserved EF, chronic anemia, hypothyroidism, sacral ulcer, admitted with worsening shortness of breath  Her oxygen requirements went up to 3 L  She had reported recent cough with yellow sputum  Denied any chest pain  She reports cough  She states she cannot clear her mucous completely  She has dysphagia and has been on PEG tube feeding  She is concerned that her pulmonary hypertension medication (Opsumit) cannot be safely crushed and need to be changed  She states that her breathing is improved  She has a history of sacral decubitus ulcer and also an underlying UTI  She has a chronic Tejada catheter as well  Cardiology consulted in view of pulmonary hypertension and minor troponin elevation  She has had no recent angina  Her EKG shows no ischemic changes  Recent echo personally reviewed  Recent heart failure Clinic notes reviewed  Last echo personally reviewed from 06/20/2022:  EF 71%, grade 2 diastolic dysfunction, mildly dilated RV and increased RA/RV pressures noted  PA systolic pressure 56  Mild aortic stenosis and moderate TR also seen  Since admission, she feels better  She reports generalized weakness  Reports dysphagia and has a PEG tube  We discussed of at the Opsumit can be continued safely via the PEG tube  Review of Systems   Constitutional: Positive for activity change, appetite change, fatigue and unexpected weight change  HENT: Positive for congestion      Respiratory: Positive for cough and shortness of breath  Gastrointestinal: Positive for diarrhea  Endocrine: Negative  Genitourinary: Tejada   Musculoskeletal: Positive for arthralgias and gait problem  Skin: Positive for color change and wound  Neurological: Negative for syncope  Hematological: Bruises/bleeds easily  Psychiatric/Behavioral: Positive for sleep disturbance  The patient is nervous/anxious  Historical Information   Past Medical History:   Diagnosis Date    Anemia     [facility]congestive [facility] failure) [facility]     Dysphagia, oropharyngeal phase 06/06/2022    Hypothyroidism     Protein-calorie malnutrition (HCC)     Pulmonary hypertension (HCC)     Sacral ulcer ([facility] [address] )     Scleroderma ([facility] [address] )     Urinary retention      Past Surgical History:   Procedure Laterality Date    WOUND DEBRIDEMENT N/A 6/14/2022    Procedure: DEBRIDEMENT WOUND [facility]); SACRUM AND BUTTOCKS;  Surgeon: Tessie Bonds MD;  Location: [facility];  Service: General     Social History     Substance and Sexual Activity   Alcohol Use Never     Social History     Substance and Sexual Activity   Drug Use Not on file     Social History     Tobacco Use   Smoking Status Never Smoker   Smokeless Tobacco Never Used     History reviewed  No pertinent family history  Allergies:   Allergies   Allergen Reactions   Alvester Carton [Selexipag] Anaphylaxis    Adempas [Riociguat] Other (See Comments)     Discontinued 2/2 hypotension    Sulfa Antibiotics Tachycardia    Penicillins Rash       Medications:     Current Facility-Administered Medications:     acetaminophen (TYLENOL) oral suspension 650 mg, 650 mg, Oral, Q4H PRN, [employer], CRNP, 650 mg at 08/01/22 2326    aspirin chewable tablet 162 mg, 162 mg, Oral, Once, [employer], PA-C    aspirin chewable tablet 81 mg, 81 mg, Per G Tube, Daily, [employer], CRNP, 81 mg at 08/01/22 0932    atovaquone (MEPRON) oral suspension 750 mg, 750 mg, Per G Tube, Daily With Breakfast, [employer], FRANKNP, 750 mg at 08/01/22 1151    cefTRIAXone (ROCEPHIN) IVPB (premix in dextrose) 1,000 mg 50 mL, 1,000 mg, Intravenous, Q24H, [employer], CRNP, Last Rate: 100 mL/hr at 08/01/22 2326, 1,000 mg at 08/01/22 2326    cholecalciferol (VITAMIN D3) tablet 1,000 Units, 1,000 Units, Per G Tube, Daily, [employer] [facility], CRNP, 1,000 Units at 08/01/22 0933    dextromethorphan-guaiFENesin (ROBITUSSIN DM) oral syrup 10 mL, 10 mL, Oral, BID, David Sanchez DO, 10 mL at 08/01/22 1744    fluticasone (FLONASE) 50 mcg/act nasal spray 1 spray, 1 spray, Nasal, BID, [employer], CRNP, 1 spray at 00/86/38 6486    folic acid (FOLVITE) tablet 1 mg, 1 mg, Per PEG Tube, Daily, [employer], CRNP, 1 mg at 08/01/22 0933    glycerin-hypromellose- (ARTIFICIAL TEARS) ophthalmic solution 2 drop, 2 drop, Both Eyes, BID, [employer], CRNP, 2 drop at 08/01/22 1751    glycopyrrolate (ROBINUL) tablet 1 mg, 1 mg, Oral, TID, David ToDO larry, 1 mg at 08/01/22 2327    guaiFENesin (ROBITUSSIN) oral liquid 400 mg, 400 mg, Per G Tube, TID, David Meraze DO, 400 mg at 08/01/22 2326    heparin (porcine) subcutaneous injection 5,000 Units, 5,000 Units, Subcutaneous, Q12H, 5,000 Units at 08/02/22 0545 **AND** Platelet count, , , Once, [employer], CRNP    levalbuterol [facility]) inhalation solution 1 25 mg, 1 25 mg, Nebulization, TID, [employer] [facility], CRNP, 1 25 mg at 08/02/22 5061    levothyroxine tablet 25 mcg, 25 mcg, Per G Tube, Early Morning, BETTIE Brambila, 25 mcg at 08/02/22 0545    loratadine (CLARITIN) tablet 10 mg, 10 mg, Per G Tube, Daily, [employer] [facility], CRNP, 10 mg at 08/01/22 0933    Macitentan (OPSUMIT) tablet 10 mg, 10 mg, Per G Tube, Daily, David Sanchez DO    melatonin tablet 3 mg, 3 mg, Per G Tube, HS, [employer], CRNP, 3 mg at 08/01/22 2328    nystatin (MYCOSTATIN) powder, , Topical, BID, Jermaine Jimenez, CRNP, Given at 22 1800    omeprazole (PriLOSEC) oral suspension 20 mg, 20 mg, Per PEG Tube, Early Morning, [employer], CRNP, 20 mg at 22 1151    ondansetron (ZOFRAN) injection 4 mg, 4 mg, Intravenous, Q6H PRN, [employer], CRNP    oxyCODONE (ROXICODONE) oral solution 2 5 mg, 2 5 mg, Per PEG Tube, Q4H PRN, Shae Adamson PA-C, 2 5 mg at 22 0047    predniSONE tablet 30 mg, 30 mg, Per G Tube, Daily, [employer], CRNP, 30 mg at 22 3139    saccharomyces boulardii (FLORASTOR) capsule 250 mg, 250 mg, Per G Tube, BID, [employer], CRNP, 250 mg at 22 1751    sildenafil (REVATIO) tablet 10 mg, 10 mg, Per G Tube, TID, David Sanchez DO, 10 mg at 22 2327    sodium chloride (OCEAN) 0 65 % nasal spray 1 spray, 1 spray, Each Nare, Q1H PRN, [employer], CRNP    sodium chloride 0 9 % infusion, 100 mL/hr, Intravenous, Continuous, [employer], CRNP, Last Rate: 100 mL/hr at 22 175, 100 mL/hr at 22 1751    sodium hypochlorite (DAKIN'S HALF-STRENGTH) 0 25 percent topical solution 1 application, 1 application, Irrigation, Daily, David Sanchez DO     Objective:   Vitals:   Vitals:    22 0745   BP:    Pulse:    Resp:    Temp:    SpO2: 97%     Body mass index is 19 25 kg/m²    Orthostatic Blood Pressures    Flowsheet Row Most Recent Value   Blood Pressure 118/51 filed at 2022 5868   Patient Position - Orthostatic VS Lying filed at 2022 3869        Systolic (34IRR), WMV:026 , Min:98 , TP     Diastolic (70PSM), VQL:32, Min:41, Max:59      Intake/Output Summary (Last 24 hours) at 2022 0920  Last data filed at 2022 0700  Gross per 24 hour   Intake --   Output 910 ml   Net -910 ml     Weight (last 2 days)     Date/Time Weight    22 0600 56 6 (124 78)    22 0541 54 5 (120 15)    22 0243 54 2 (119 49)        Invasive Devices  Report    Peripheral Intravenous Line Duration           Peripheral IV 07/31/22 Left Wrist 1 day          Drain  Duration           Gastrostomy/Enterostomy -- days    Urethral Catheter -- days                Physical Exam        GEN:  Chronically ill  EYES:  Conjunctival pallor  ENT:  Nasal oxygen at 3 L, dry oral mucosa  NECK: no bruit  HEART: regular rhythm, normal S1 and S2, grade 3 systolic murmur  LUNGS:  Decreased air entry, bilateral rhonchi  ABDOMEN:  Peg tube and Tejada catheter  EXTREMITIES: no edema  JOINTS: No deformities  NEURO:  Alert, no focal deficits  SKIN:  Dry scaly skin   PSYCH:  Appears anxious      Labs:       CBC with diff:   Results from last 7 days   Lab Units 08/02/22 0459 08/01/22 0446 07/31/22 2011   WBC Thousand/uL 18 26* 23 01* 19 13*   HEMOGLOBIN g/dL 8 4* 10 6* 11 4*   HEMATOCRIT % 26 9* 33 9* 35 6   MCV fL 102* 101* 99*   PLATELETS Thousands/uL 233 309 343   MCH pg 31 7 31 6 31 8   MCHC g/dL 31 2* 31 3* 32 0   RDW % 21 3* 21 2* 21 2*   MPV fL 10 4 9 8 9 3     CMP:  Results from last 7 days   Lab Units 08/02/22 0459 08/01/22 0446 07/31/22 2011   POTASSIUM mmol/L 3 9 3 8 4 8   CHLORIDE mmol/L 97 91* 86*   CO2 mmol/L 26 29 31   BUN mg/dL 18 23 32*   CREATININE mg/dL 0 30* 0 35* 0 45*   CALCIUM mg/dL 8 7 9 3 9 9   AST U/L  --   --  22   ALT U/L  --   --  14   ALK PHOS U/L  --   --  109*   EGFR ml/min/1 73sq m 109 103 95     NT-proBNP:   Lab Results   Component Value Date    NTBNP 1,860 (H) 06/19/2022      Magnesium:  Results from last 7 days   Lab Units 08/02/22 0459   MAGNESIUM mg/dL 1 7*     Coags:    TSH:   Results from last 7 days   Lab Units 07/31/22 2011   TSH 3RD GENERATON uIU/mL 2 626     Hemoglobin A1C:    Lipid Profile:   No results found for: CHOL  No results found for: HDL  No results found for: LDLCALC  No results found for: TRIG    Imaging & Testing   Cardiac testing:     EKG reviewed personally: Sinus rhythm, left axis deviation and incomplete right bundle branch block but no ischemic changes  Imaging:   I have personally reviewed pertinent films in PACS  XR chest 1 view portable    Result Date: 8/1/2022  Narrative: CHEST INDICATION:   sob  COMPARISON:  June 29, 2022 EXAM PERFORMED/VIEWS:  XR CHEST PORTABLE FINDINGS: Cardiomediastinal silhouette appears unremarkable  Interval improvement of the diffuse parenchymal opacities with remaining underlying prominence of the interstitial markings  Osseous structures appear within normal limits for patient age  Impression: Improvement in the diffuse parenchymal opacities Workstation performed: Ruma Stacy MD, [facility]      Portions of the record may have been created with voice recognition software  Occasional wrong word or "sound a like" substitutions may have occurred due to the inherent limitations of voice recognition software  Please read the chart carefully and recognize, using context, where substitutions have occurred  Please call the Griselda Buchanan of the note for any clarifications

## 2022-08-03 PROBLEM — R65.10 SIRS (SYSTEMIC INFLAMMATORY RESPONSE SYNDROME) (HCC): Status: ACTIVE | Noted: 2022-01-01

## 2022-08-03 LAB
ANION GAP SERPL CALCULATED.3IONS-SCNC: 7 MMOL/L (ref 4–13)
BACTERIA SPT RESP CULT: ABNORMAL
BACTERIA SPT RESP CULT: ABNORMAL
BACTERIA UR CULT: ABNORMAL
BACTERIA UR CULT: ABNORMAL
BUN SERPL-MCNC: 14 MG/DL (ref 5–25)
CALCIUM SERPL-MCNC: 8.1 MG/DL (ref 8.4–10.2)
CHLORIDE SERPL-SCNC: 99 MMOL/L (ref 96–108)
CO2 SERPL-SCNC: 29 MMOL/L (ref 21–32)
CREAT SERPL-MCNC: 0.22 MG/DL (ref 0.6–1.3)
ERYTHROCYTE [DISTWIDTH] IN BLOOD BY AUTOMATED COUNT: 21.2 % (ref 11.6–15.1)
GFR SERPL CREATININE-BSD FRML MDRD: 120 ML/MIN/1.73SQ M
GLUCOSE SERPL-MCNC: 151 MG/DL (ref 65–140)
GRAM STN SPEC: ABNORMAL
HCT VFR BLD AUTO: 25.2 % (ref 34.8–46.1)
HGB BLD-MCNC: 7.8 G/DL (ref 11.5–15.4)
MAGNESIUM SERPL-MCNC: 1.6 MG/DL (ref 1.9–2.7)
MCH RBC QN AUTO: 31.8 PG (ref 26.8–34.3)
MCHC RBC AUTO-ENTMCNC: 31 G/DL (ref 31.4–37.4)
MCV RBC AUTO: 103 FL (ref 82–98)
PLATELET # BLD AUTO: 230 THOUSANDS/UL (ref 149–390)
PMV BLD AUTO: 9.9 FL (ref 8.9–12.7)
POTASSIUM SERPL-SCNC: 3.5 MMOL/L (ref 3.5–5.3)
RBC # BLD AUTO: 2.45 MILLION/UL (ref 3.81–5.12)
SODIUM SERPL-SCNC: 135 MMOL/L (ref 135–147)
WBC # BLD AUTO: 14.72 THOUSAND/UL (ref 4.31–10.16)

## 2022-08-03 PROCEDURE — 99232 SBSQ HOSP IP/OBS MODERATE 35: CPT | Performed by: PHYSICIAN ASSISTANT

## 2022-08-03 PROCEDURE — 99232 SBSQ HOSP IP/OBS MODERATE 35: CPT | Performed by: INTERNAL MEDICINE

## 2022-08-03 PROCEDURE — 94640 AIRWAY INHALATION TREATMENT: CPT

## 2022-08-03 PROCEDURE — 99221 1ST HOSP IP/OBS SF/LOW 40: CPT | Performed by: SURGERY

## 2022-08-03 PROCEDURE — 94664 DEMO&/EVAL PT USE INHALER: CPT

## 2022-08-03 PROCEDURE — 94760 N-INVAS EAR/PLS OXIMETRY 1: CPT

## 2022-08-03 PROCEDURE — 80048 BASIC METABOLIC PNL TOTAL CA: CPT | Performed by: INTERNAL MEDICINE

## 2022-08-03 PROCEDURE — 99223 1ST HOSP IP/OBS HIGH 75: CPT | Performed by: INTERNAL MEDICINE

## 2022-08-03 PROCEDURE — 83735 ASSAY OF MAGNESIUM: CPT | Performed by: STUDENT IN AN ORGANIZED HEALTH CARE EDUCATION/TRAINING PROGRAM

## 2022-08-03 PROCEDURE — 85027 COMPLETE CBC AUTOMATED: CPT | Performed by: STUDENT IN AN ORGANIZED HEALTH CARE EDUCATION/TRAINING PROGRAM

## 2022-08-03 PROCEDURE — 94669 MECHANICAL CHEST WALL OSCILL: CPT

## 2022-08-03 RX ORDER — ATOVAQUONE 750 MG/5ML
1500 SUSPENSION ORAL
Status: DISCONTINUED | OUTPATIENT
Start: 2022-08-04 | End: 2022-08-09 | Stop reason: HOSPADM

## 2022-08-03 RX ORDER — POTASSIUM CHLORIDE 20MEQ/15ML
40 LIQUID (ML) ORAL ONCE
Status: COMPLETED | OUTPATIENT
Start: 2022-08-03 | End: 2022-08-03

## 2022-08-03 RX ORDER — MAGNESIUM SULFATE HEPTAHYDRATE 40 MG/ML
2 INJECTION, SOLUTION INTRAVENOUS ONCE
Status: DISCONTINUED | OUTPATIENT
Start: 2022-08-03 | End: 2022-08-03

## 2022-08-03 RX ORDER — CEFEPIME HYDROCHLORIDE 2 G/50ML
2000 INJECTION, SOLUTION INTRAVENOUS EVERY 12 HOURS
Status: DISCONTINUED | OUTPATIENT
Start: 2022-08-03 | End: 2022-08-03

## 2022-08-03 RX ORDER — MAGNESIUM SULFATE 1 G/100ML
1 INJECTION INTRAVENOUS ONCE
Status: COMPLETED | OUTPATIENT
Start: 2022-08-03 | End: 2022-08-03

## 2022-08-03 RX ADMIN — GUAIFENESIN AND DEXTROMETHORPHAN 10 ML: 100; 10 SYRUP ORAL at 18:22

## 2022-08-03 RX ADMIN — FLUTICASONE PROPIONATE 1 SPRAY: 50 SPRAY, METERED NASAL at 10:34

## 2022-08-03 RX ADMIN — GLYCOPYRROLATE 1 MG: 1 TABLET ORAL at 10:34

## 2022-08-03 RX ADMIN — SILDENAFIL CITRATE 10 MG: 20 TABLET ORAL at 22:34

## 2022-08-03 RX ADMIN — LORATADINE 10 MG: 10 TABLET ORAL at 10:35

## 2022-08-03 RX ADMIN — HEPARIN SODIUM 5000 UNITS: 5000 INJECTION INTRAVENOUS; SUBCUTANEOUS at 18:22

## 2022-08-03 RX ADMIN — Medication 250 MG: at 10:35

## 2022-08-03 RX ADMIN — GLYCERIN 2 DROP: .002; .002; .01 SOLUTION/ DROPS OPHTHALMIC at 18:22

## 2022-08-03 RX ADMIN — LEVOTHYROXINE SODIUM 25 MCG: 25 TABLET ORAL at 06:00

## 2022-08-03 RX ADMIN — OXYCODONE HYDROCHLORIDE 2.5 MG: 5 SOLUTION ORAL at 03:59

## 2022-08-03 RX ADMIN — ASPIRIN 81 MG: 81 TABLET, CHEWABLE ORAL at 10:34

## 2022-08-03 RX ADMIN — HEPARIN SODIUM 5000 UNITS: 5000 INJECTION INTRAVENOUS; SUBCUTANEOUS at 06:00

## 2022-08-03 RX ADMIN — GUAIFENESIN 400 MG: 100 SOLUTION ORAL at 18:22

## 2022-08-03 RX ADMIN — NYSTATIN: 100000 POWDER TOPICAL at 10:33

## 2022-08-03 RX ADMIN — ATOVAQUONE 750 MG: 750 SUSPENSION ORAL at 10:34

## 2022-08-03 RX ADMIN — MAGNESIUM SULFATE HEPTAHYDRATE 1 G: 1 INJECTION, SOLUTION INTRAVENOUS at 10:33

## 2022-08-03 RX ADMIN — OMEPRAZOLE 20 MG: 20 CAPSULE, DELAYED RELEASE ORAL at 13:54

## 2022-08-03 RX ADMIN — GUAIFENESIN 400 MG: 100 SOLUTION ORAL at 22:41

## 2022-08-03 RX ADMIN — FOLIC ACID 1 MG: 1 TABLET ORAL at 10:35

## 2022-08-03 RX ADMIN — GUAIFENESIN 400 MG: 100 SOLUTION ORAL at 10:34

## 2022-08-03 RX ADMIN — Medication 3 MG: at 22:34

## 2022-08-03 RX ADMIN — LEVALBUTEROL HYDROCHLORIDE 1.25 MG: 1.25 SOLUTION RESPIRATORY (INHALATION) at 08:28

## 2022-08-03 RX ADMIN — NYSTATIN: 100000 POWDER TOPICAL at 18:22

## 2022-08-03 RX ADMIN — Medication 1000 UNITS: at 10:34

## 2022-08-03 RX ADMIN — HYOSCYAMINE SULFATE 1 APPLICATION: 16 SOLUTION at 10:33

## 2022-08-03 RX ADMIN — SODIUM CHLORIDE 250 ML: 0.9 INJECTION, SOLUTION INTRAVENOUS at 12:18

## 2022-08-03 RX ADMIN — OXYCODONE HYDROCHLORIDE 2.5 MG: 5 SOLUTION ORAL at 13:54

## 2022-08-03 RX ADMIN — POTASSIUM CHLORIDE 40 MEQ: 20 SOLUTION ORAL at 10:34

## 2022-08-03 RX ADMIN — SILDENAFIL CITRATE 10 MG: 20 TABLET ORAL at 10:35

## 2022-08-03 RX ADMIN — GUAIFENESIN AND DEXTROMETHORPHAN 10 ML: 100; 10 SYRUP ORAL at 10:34

## 2022-08-03 RX ADMIN — GLYCERIN 2 DROP: .002; .002; .01 SOLUTION/ DROPS OPHTHALMIC at 10:34

## 2022-08-03 RX ADMIN — FLUTICASONE PROPIONATE 1 SPRAY: 50 SPRAY, METERED NASAL at 18:22

## 2022-08-03 RX ADMIN — Medication 250 MG: at 18:22

## 2022-08-03 RX ADMIN — GLYCOPYRROLATE 1 MG: 1 TABLET ORAL at 22:34

## 2022-08-03 RX ADMIN — LEVALBUTEROL HYDROCHLORIDE 1.25 MG: 1.25 SOLUTION RESPIRATORY (INHALATION) at 19:20

## 2022-08-03 RX ADMIN — LEVALBUTEROL HYDROCHLORIDE 1.25 MG: 1.25 SOLUTION RESPIRATORY (INHALATION) at 14:41

## 2022-08-03 RX ADMIN — SILDENAFIL CITRATE 10 MG: 20 TABLET ORAL at 18:22

## 2022-08-03 RX ADMIN — PREDNISONE 30 MG: 20 TABLET ORAL at 10:35

## 2022-08-03 RX ADMIN — GLYCOPYRROLATE 1 MG: 1 TABLET ORAL at 18:22

## 2022-08-03 RX ADMIN — ACETAMINOPHEN 650 MG: 650 SUSPENSION ORAL at 18:22

## 2022-08-03 NOTE — DISCHARGE INSTR - OTHER ORDERS
Plan:   Skin care Plan:  1-Please follow orders from Surgical team regarding Wound Vac  2-Cleanse bilateral elbows with foaming cleanser & pat dry  Apply plain Maxorb alginate dressing (in white & green package) and Silvasorb gel to both wounds  Cover with foam dressings or ABD pad, ari & tape  Change every other day & prn excessive drainage/dislodgement  3-Cleanse feeding tube site with foaming cleanser & dry well  Apply split gauze & change daily and prn excessive drainage/dislodgement  4-Turn/reposition q2h or when medically stable for pressure re-distribution on skin   5-Bilateral heel protectors to offload pressure  If patient refuses must float heels on 2 pillows so heels are not in contact with mattress or pillows  6-Moisturize skin daily with skin nourishing cream  7-Ehob cushion in chair when out of bed  Must limit sitting to 1-2 hrs maximum due to stage 4 pressure injury to sacrum  Then offload for at least 2 hrs in bed due to pressure from sitting  8-Hydraguard to bilateral heels BID and PRN

## 2022-08-03 NOTE — ASSESSMENT & PLAN NOTE
Malnutrition Findings:   Adult Malnutrition type: Acute illness  Adult Degree of Malnutrition: Malnutrition of moderate degree  Malnutrition Characteristics: Muscle loss, Fat loss                  360 Statement: Moderate malnutrition in the constext of acute illness r/t inadequate energy intake as evidenced by moderate muscle wasting and subcutaneous fat loss (orbitals, clavicles, temples, cheeks)  Will treat with nutrition therapy and EN recommendation  BMI Findings: Body mass index is 18 71 kg/m²     · No oral diet with tube feeding, nutrition following

## 2022-08-03 NOTE — PLAN OF CARE
Problem: MOBILITY - ADULT  Goal: Maintain or return to baseline ADL function  Description: INTERVENTIONS:  -  Assess patient's ability to carry out ADLs; assess patient's baseline for ADL function and identify physical deficits which impact ability to perform ADLs (bathing, care of mouth/teeth, toileting, grooming, dressing, etc )  - Assess/evaluate cause of self-care deficits   - Assess range of motion  - Assess patient's mobility; develop plan if impaired  - Assess patient's need for assistive devices and provide as appropriate  - Encourage maximum independence but intervene and supervise when necessary  - Involve family in performance of ADLs  - Assess for home care needs following discharge   - Consider OT consult to assist with ADL evaluation and planning for discharge  - Provide patient education as appropriate  Outcome: Progressing     Problem: RESPIRATORY - ADULT  Goal: Achieves optimal ventilation and oxygenation  Description: INTERVENTIONS:  - Assess for changes in respiratory status  - Assess for changes in mentation and behavior  - Position to facilitate oxygenation and minimize respiratory effort  - Oxygen administered by appropriate delivery if ordered  - Initiate smoking cessation education as indicated  - Encourage broncho-pulmonary hygiene including cough, deep breathe, Incentive Spirometry  - Assess the need for suctioning and aspirate as needed  - Assess and instruct to report SOB or any respiratory difficulty  - Respiratory Therapy support as indicated  Outcome: Progressing     Problem: METABOLIC, FLUID AND ELECTROLYTES - ADULT  Goal: Electrolytes maintained within normal limits  Description: INTERVENTIONS:  - Monitor labs and assess patient for signs and symptoms of electrolyte imbalances  - Administer electrolyte replacement as ordered  - Monitor response to electrolyte replacements, including repeat lab results as appropriate  - Instruct patient on fluid and nutrition as appropriate  Outcome: Progressing     Problem: Nutrition/Hydration-ADULT  Goal: Nutrient/Hydration intake appropriate for improving, restoring or maintaining nutritional needs  Description: Monitor and assess patient's nutrition/hydration status for malnutrition  Collaborate with interdisciplinary team and initiate plan and interventions as ordered  Monitor patient's weight and dietary intake as ordered or per policy  Utilize nutrition screening tool and intervene as necessary  Determine patient's food preferences and provide high-protein, high-caloric foods as appropriate       INTERVENTIONS:  - Monitor oral intake, urinary output, labs, and treatment plans  - Assess nutrition and hydration status and recommend course of action  - Evaluate amount of meals eaten  - Assist patient with eating if necessary   - Allow adequate time for meals  - Recommend/ encourage appropriate diets, oral nutritional supplements, and vitamin/mineral supplements  - Order, calculate, and assess calorie counts as needed  - Recommend, monitor, and adjust tube feedings and TPN/PPN based on assessed needs  - Assess need for intravenous fluids  - Provide specific nutrition/hydration education as appropriate  - Include patient/family/caregiver in decisions related to nutrition  Outcome: Progressing

## 2022-08-03 NOTE — PROGRESS NOTES
Progress Note - Cardiology   [facility] 80 y o  female MRN: [id number]  Unit/Bed#: -01 Encounter: [id number]    Assessment:  Principal Problem:    Acute on chronic respiratory failure with hypoxia (HCC)  Active Problems:    Dysphagia, oropharyngeal phase    Scleroderma (HCC)    Anemia    Urinary retention    Pulmonary hypertension (HCC)    Pressure injury of sacral region, stage 4 (HCC)    UTI (urinary tract infection)    Hyponatremia    Elevated troponin    Moderate protein-calorie malnutrition ([address] )    Sepsis [facility])    33-year-old female who presented with acute on chronic hypoxic respiratory failure  She has tracheobronchitis and has growth from the sputum culture now  Known pulmonary hypertension for which she takes sildenafil and Opsumit as an outpatient  She is not on any chronic diuretic therapy  Plan:    Receive single dose of IV Lasix yesterday  Would hold on this today  Blood pressure is borderline she does not appear overtly overloaded  Continue sildenafil and Opsumit  These are chronic outpatient therapy for her  Minimal elevation troponin does not represent acute coronary syndrome  No additional ischemic evaluation recommended at this time  Pulmonary team following, as well  Treatment of infection per them and the primary team       Subjective/Objective     Subjective:   Patient reports ongoing cough  Feels she is improving overall      Objective:    Vitals: BP 97/57 (BP Location: Left arm)   Pulse 82   Temp 99 4 °F (37 4 °C) (Tympanic)   Resp 15   Ht 5' 7 5" (1 715 m)   Wt 55 kg (121 lb 4 1 oz)   SpO2 99%   BMI 18 71 kg/m²   Vitals:    08/02/22 0600 08/03/22 0600   Weight: 56 6 kg (124 lb 12 5 oz) 55 kg (121 lb 4 1 oz)     Orthostatic Blood Pressures    Flowsheet Row Most Recent Value   Blood Pressure 97/57 filed at 08/03/2022 9872   Patient Position - Orthostatic VS Lying filed at 08/03/2022 0717            Intake/Output Summary (Last 24 hours) at 8/3/2022 1113  Last data filed at 8/3/2022 0359  Gross per 24 hour   Intake --   Output 1850 ml   Net -1850 ml       Physical Exam:   General appearance: elderly female, laying in bed  Head: Normocephalic, without obvious abnormality, atraumatic  Neck: JVD  Lungs: rhonchi heard  Heart: S1, S2 normal and no S3 or S4  No murmurs  Abdomen: soft, non-tender; bowel sounds normal; no masses,  no organomegaly  Extremities: no LE edema  Pulses: symmetric bilaterally  Skin: Skin color, texture, turgor normal  No rashes or lesions  Neurologic: Grossly normal  Alert and oriented      Medications:    Current Facility-Administered Medications:     acetaminophen (TYLENOL) oral suspension 650 mg, 650 mg, Oral, Q4H PRN, BETTIE Solis, 650 mg at 08/02/22 1024    aspirin chewable tablet 162 mg, 162 mg, Oral, Once, Evangelista Meeks PA-C    aspirin chewable tablet 81 mg, 81 mg, Per G Tube, Daily, FRANK SolisNP, 81 mg at 08/03/22 1034    [START ON 8/4/2022] atovaquone (MEPRON) oral suspension 1,500 mg, 1,500 mg, Per G Tube, Daily With Breakfast, Royce Flores MD    cholecalciferol (VITAMIN D3) tablet 1,000 Units, 1,000 Units, Per G Tube, Daily, BETTIE Solis, 1,000 Units at 08/03/22 1034    dextromethorphan-guaiFENesin (ROBITUSSIN DM) oral syrup 10 mL, 10 mL, Oral, BID, David Sanchez DO, 10 mL at 08/03/22 1034    fluticasone (FLONASE) 50 mcg/act nasal spray 1 spray, 1 spray, Nasal, BID, FRANK SolisNP, 1 spray at 13/24/46 6351    folic acid (FOLVITE) tablet 1 mg, 1 mg, Per PEG Tube, Daily, FRANK SolisNP, 1 mg at 08/03/22 1035    glycerin-hypromellose- (ARTIFICIAL TEARS) ophthalmic solution 2 drop, 2 drop, Both Eyes, BID, FRANK SolisNP, 2 drop at 08/03/22 1034    glycopyrrolate (ROBINUL) tablet 1 mg, 1 mg, Oral, TID, David Sanchez DO, 1 mg at 08/03/22 1034    guaiFENesin (ROBITUSSIN) oral liquid 400 mg, 400 mg, Per G Tube, TID, David Sanchez, , 400 mg at 08/03/22 1034    heparin (porcine) subcutaneous injection 5,000 Units, 5,000 Units, Subcutaneous, Q12H, 5,000 Units at 08/03/22 0600 **AND** Platelet count, , , Once, [employer], CRNP    levalbuterol [facility]) inhalation solution 1 25 mg, 1 25 mg, Nebulization, TID, [facility], CRNP, 1 25 mg at 08/03/22 3944    levothyroxine tablet 25 mcg, 25 mcg, Per G Tube, Early Morning, Jermaine Jimenez, CRNP, 25 mcg at 08/03/22 0600    loratadine (CLARITIN) tablet 10 mg, 10 mg, Per G Tube, Daily, [facility], CRNP, 10 mg at 08/03/22 1035    Macitentan (OPSUMIT) tablet 10 mg, 10 mg, Per G Tube, Daily, David Sanchez DO    magnesium sulfate IVPB (premix) SOLN 1 g, 1 g, Intravenous, Once, Chloe Mi PA-C, 1 g at 08/03/22 1033    melatonin tablet 3 mg, 3 mg, Per G Tube, HS, [place] [facility], CRNP, 3 mg at 08/02/22 2315    nystatin (MYCOSTATIN) powder, , Topical, BID, [facility], CRNP, Given at 08/03/22 1033    omeprazole (PriLOSEC) oral suspension 20 mg, 20 mg, Per PEG Tube, Early Morning, [facility], CRNP, 20 mg at 08/02/22 1013    ondansetron (ZOFRAN) injection 4 mg, 4 mg, Intravenous, Q6H PRN, [facility], CRNP    oxyCODONE (ROXICODONE) oral solution 2 5 mg, 2 5 mg, Per PEG Tube, Q4H PRN, Siobhan Hudson PA-C, 2 5 mg at 08/03/22 0359    predniSONE tablet 30 mg, 30 mg, Per G Tube, Daily, [facility], CRNP, 30 mg at 08/03/22 1035    saccharomyces boulardii (FLORASTOR) capsule 250 mg, 250 mg, Per G Tube, BID, [employer], CRNP, 250 mg at 08/03/22 1035    sildenafil (REVATIO) tablet 10 mg, 10 mg, Per G Tube, TID, David Sanchez DO, 10 mg at 08/03/22 1035    sodium chloride (OCEAN) 0 65 % nasal spray 1 spray, 1 spray, Each Nare, Q1H PRN, [employer], CRNP    sodium hypochlorite (DAKIN'S HALF-STRENGTH) 0 25 percent topical solution 1 application, 1 application, Irrigation, Daily, David Sanchez DO, 1 application at 15/62/89 1033    Lab Results:      Results from last 7 days   Lab Units 08/03/22 0528 08/02/22 0459 08/01/22 0446   WBC Thousand/uL 14 72* 18 26* 23 01*   HEMOGLOBIN g/dL 7 8* 8 4* 10 6*   HEMATOCRIT % 25 2* 26 9* 33 9*   PLATELETS Thousands/uL 230 233 309         Results from last 7 days   Lab Units 08/03/22 0528 08/02/22 0459 08/01/22 0446 07/31/22 2011   SODIUM mmol/L 135 132* 129* 127*   POTASSIUM mmol/L 3 5 3 9 3 8 4 8   CHLORIDE mmol/L 99 97 91* 86*   CO2 mmol/L 29 26 29 31   BUN mg/dL 14 18 23 32*   CREATININE mg/dL 0 22* 0 30* 0 35* 0 45*   CALCIUM mg/dL 8 1* 8 7 9 3 9 9   ALK PHOS U/L  --   --   --  109*   ALT U/L  --   --   --  14   AST U/L  --   --   --  22         Results from last 7 days   Lab Units 08/03/22 0528 08/02/22 0459   MAGNESIUM mg/dL 1 6* 1 7*       Telemetry: Personally reviewed  AFib  Echo 6/20/22:   Left Ventricle: Left ventricular cavity size is normal  Wall thickness is normal  There is no concentric hypertrophy  The left ventricular ejection fraction is 70%  Systolic function is vigorous  Wall motion is normal  Diastolic function is moderately abnormal, consistent with grade II (pseudonormal) relaxation  Left atrial filling pressure is elevated    IVS: There is systolic flattening of the interventricular septum consistent with right ventricle pressure overload    Right Ventricle: Right ventricular cavity size is mildly dilated  Wall thickness is increased    Left Atrium: The atrium is mildly dilated    Right Atrium: The atrium is mildly dilated    Atrial Septum: The septum bows into the right atrium, suggesting increased left atrial pressure    Aortic Valve: The leaflets are moderately thickened  The leaflets are moderately calcified  There is mildly reduced mobility  There is mild stenosis  The aortic valve peak velocity is 2 18 m/s  The aortic valve mean gradient is 12 mmHg  The DVI is 0 59  The aortic valve area is 1 82 cm2     Mitral Valve: There is mild annular calcification    Tricuspid Valve: There is moderate regurgitation  The right ventricular systolic pressure is moderately elevated  The estimated right ventricular systolic pressure is 12 32 mmHg    Pulmonary Artery: Notching of the RVOT Doppler waveform is noted    Pulmonary Veins: There is systolic blunting in the pulmonary veins

## 2022-08-03 NOTE — ASSESSMENT & PLAN NOTE
· Chronic anemia likely multifactorial  Hgb baseline appears to be between 7-8    · Hgb has remained generally stable at known baseline, initial Hgb levels elevated likely due to hemoconcentration  · Continue iron and folic acid supplementation   · Monitor CBC

## 2022-08-03 NOTE — ASSESSMENT & PLAN NOTE
· Tejada catheter placed prior to admission  · Outpatient Urology follow-up  · Urinary retention protocol

## 2022-08-03 NOTE — ASSESSMENT & PLAN NOTE
· Crest syndrome with pulmonary hypertension, home regimen: Sildenafil 10 mg TID and Opsumit 10 mg daily  · Of note, there was question of whether patient was actually receiving Opsumit medication at SNF  · Per pharmacy medication can be crushed to be given via PEG, however there is risk with dust/particles being inhaled by staff when administering medications  · To follow-up with PCP and SNF on whether patient was actually receiving medication  · Wears 2 L oxygen HS as needed

## 2022-08-03 NOTE — NURSING NOTE
Tube feeds started per order  Jevity 1 5 at 75 ml/hr   Residual checked before start of tube feeds, residual 0ml

## 2022-08-03 NOTE — ASSESSMENT & PLAN NOTE
· With CREST syndrome, 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 for proximal muscle weakness  · Continue Prednisone 10 mg TID  · Continue Atovaquone for PCP prophylaxis  · PT/OT consult

## 2022-08-03 NOTE — RESPIRATORY THERAPY NOTE
Pt continues to require suction as pt is not strong enough to fully expectorate secretions  Pt had requested suction at this time as she was having trouble breathing as she had secretions stuck in her throat  Pt suctioned for mod thick yellow secretions, with eventual mucous plug removed  Pt stated that she felt better after  Scheduled Neb and Vest therapy given after suction

## 2022-08-03 NOTE — PROGRESS NOTES
Cruz [address]  68   Progress Note Cullen Shore 1940, 80 y o  female MRN: [id number]  Unit/Bed#: -Rena Encounter: [id number]  Primary Care Provider: Angela Degroot MD   Date and time admitted to hospital: 7/31/2022  7:53 PM     * Acute on chronic respiratory failure with hypoxia [facility])  Assessment & Plan  Patient presented to ED from SNF for sudden onset respiratory distress  Per patient, had coughing episode on Friday with clear/white sputum, day prior to arrival around 1300 began having coughing episode and unable to clear airway from sputum  Chronically on 2 L at HS for comfort  · In ED, patient noted to have thick, dry secretions in trachea after suctioning  Initially required 6 L NC for continued hypoxia and respiratory distress  Oxygen humidified to assist with thinning secretions  · CXR improved from previous admission  · Cause of acute on chronic hypoxic RF likely dried mucous  Patient's respiratory status improving, currently maintained on 3 L NC O2, per RT no indication for tracheal suctioning  · Pulmonology following, recommendations appreciated  · Continue home Xopenex and Sodium Chloride Neb treatments TID  · Humidify oxygen  · Suction frequently, patient with weak cough  · Patient on Mucinex outpatient, continue  · Respiratory protocol, IS, flutter valve    SIRS (systemic inflammatory response syndrome) ([facility] [address] )  Assessment & Plan  · Patient met SIRS POA with leukocytosis, tachycardia, tachypnea  Also with intermittent hypotension with MAP <65  · Clinically patient improving, leukocytosis trending down  Still with intermittent tachycardia and tachypnea, as well as intermittent hypotension - BP 89/51 today, to receive 250 cc IVF bolus, monitor volume status and BPs closely  · Initially suspected to be 2/2 PNA vs UTI  · CXR: Improvement in the diffuse parenchymal opacities  · Sputum cx:  3+ growth of Pseudomonas aeruginosa MDRO, 3+ growth of mixed respiratory ash    · UA: 3+ leukocytes, 30-50 WBCs, moderate bacteria and occasional calcium oxalate crystals  · Urine cx: >100,000 cfu/ml Klebsiella pneumoniae, Pseudomonas aeruginosa MDRO  · Lactic acid: 2 7->2 0 after IV fluid bolus  · Procalcitonin: 0 2->0  49  · BC x2 obtained, results pending  · S/p 3 days IV Rocephin, D/C per ID  · Infectious disease consulted:  · Pseudomonas from urine and sputum cultures suspected to be colonization verses active infection  · Klebsiella is possible pathogen given chronic Tejada catheter with recent malfunction, however patient already completed 3 days of IV Rocephin, no further antibiotics needed at this time  · Monitor CBC/temperature curve off antibiotics, follow culture results    Hyponatremia  Assessment & Plan  Lab Results   Component Value Date    SODIUM 135 08/03/2022    SODIUM 132 (L) 08/02/2022    SODIUM 129 (L) 08/01/2022     · POA with Na 127  Has improved with appropriate correction, Na 135 today  · Uric acid, urine sodium, serum and urine osmolality wnl  · AM cortisol: elevated at 26 8  · BNP: elevated at 372  · Mild hypomagnesemia and low-normal K, replete K and Mag  · Baseline creatinine appears to be between 0 2-0 4  Has remained stable at baseline    · S/p aggressive IV fluid hydration with NS, to receive 250 cc IV fluid bolus today  · Consider Nephrology consult if no improvement or worsening hyponatremia     Pulmonary hypertension (HCC)  Assessment & Plan  · Crest syndrome with pulmonary hypertension, home regimen: Sildenafil 10 mg TID and Opsumit 10 mg daily  · Of note, there was question of whether patient was actually receiving Opsumit medication at SNF  · Per pharmacy medication can be crushed to be given via PEG, however there is risk with dust/particles being inhaled by staff when administering medications  · To follow-up with PCP and SNF on whether patient was actually receiving medication  · Wears 2 L oxygen HS as needed    Moderate protein-calorie malnutrition [facility])  Assessment & Plan  Malnutrition Findings:   Adult Malnutrition type: Acute illness  Adult Degree of Malnutrition: Malnutrition of moderate degree  Malnutrition Characteristics: Muscle loss, Fat loss                  360 Statement: Moderate malnutrition in the constext of acute illness r/t inadequate energy intake as evidenced by moderate muscle wasting and subcutaneous fat loss (orbitals, clavicles, temples, cheeks)  Will treat with nutrition therapy and EN recommendation  BMI Findings: Body mass index is 18 71 kg/m²  · No oral diet with tube feeding, nutrition following    Elevated troponin  Assessment & Plan  Lab Results   Component Value Date    HSTNI0 69 (H) 07/31/2022    HSTNID2 10 07/31/2022    HSTNI2 79 (H) 07/31/2022    HSTNID4 3 08/01/2022    HSTNI4 72 (H) 08/01/2022      · Patient arrived to the ED in respiratory distress requiring 6 L nasal cannula  · HS troponins peaked at 79  Elevation likely due to hypoxia from respiratory distress  · EKG with no ischemic changes, less likely ACS as patient has no chest pain  · Received 162 mg ASA in ED  · Continue home 81 mg ASA    Pressure injury of sacral region, stage 4 (HCC)  Assessment & Plan  · Previous admission 6/14-7/13 with I&D by General Surgery, S/P IV ABX, wound vac  · General surgery following for management of sacral wound  · Wound care consult  · Frequent repositioning  · Optimize nutritional status     Urinary retention  Assessment & Plan  · Tejada catheter placed prior to admission  · Outpatient Urology follow-up  · Urinary retention protocol    Anemia  Assessment & Plan  · Chronic anemia likely multifactorial  Hgb baseline appears to be between 7-8    · Hgb has remained generally stable at known baseline, initial Hgb levels elevated likely due to hemoconcentration  · Continue iron and folic acid supplementation   · Monitor CBC    Scleroderma (HCC)  Assessment & Plan  · With CREST syndrome, 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 for proximal muscle weakness  · Continue Prednisone 10 mg TID  · Continue Atovaquone for PCP prophylaxis  · PT/OT consult     Dysphagia, oropharyngeal phase  Assessment & Plan  · H/o dysphagia S/P PEG  · Continue cyclic tube feeds with no oral feeds, Nutrition following  · Aspiration precautions       VTE Pharmacologic Prophylaxis: VTE Score: 9 High Risk (Score >/= 5) - Pharmacological DVT Prophylaxis Ordered: heparin  Sequential Compression Devices Ordered  Patient Centered Rounds: I performed bedside rounds with nursing staff today  Discussions with Specialists or Other Care Team Provider:  Infectious Disease, pulmonology, General surgery, case management    Education and Discussions with Family / Patient: Updated  (daughter) via phone  Time Spent for Care: 45 minutes  More than 50% of total time spent on counseling and coordination of care as described above  Current Length of Stay: 3 day(s)   Current Patient Status: Inpatient   Certification Statement: The patient will continue to require additional inpatient hospital stay due to SIRs, respiratory failure  Discharge Plan: Anticipate discharge in 24-48 hrs to rehab facility  Code Status: Level 3 - DNAR and DNI    Subjective:   Patient is seen at bedside this a m , reports improvement in breathing, denies nausea/vomiting or abdominal pain    Objective:     Vitals:   Temp (24hrs), Av 8 °F (37 1 °C), Min:97 9 °F (36 6 °C), Max:99 7 °F (37 6 °C)    Temp:  [97 9 °F (36 6 °C)-99 7 °F (37 6 °C)] 99 7 °F (37 6 °C)  HR:  [] 133  Resp:  [15-24] 20  BP: ()/(50-63) 103/58  SpO2:  [91 %-99 %] 91 %  Body mass index is 18 71 kg/m²  Input and Output Summary (last 24 hours): Intake/Output Summary (Last 24 hours) at 8/3/2022 1817  Last data filed at 8/3/2022 1447  Gross per 24 hour   Intake 100 ml   Output 2150 ml   Net -2050 ml       Physical Exam:   Physical Exam  Constitutional:       General: She is not in acute distress  Appearance: She is not ill-appearing, toxic-appearing or diaphoretic  Cardiovascular:      Rate and Rhythm: Normal rate and regular rhythm  Pulses: Normal pulses  Heart sounds: Normal heart sounds  Pulmonary:      Effort: Pulmonary effort is normal  No respiratory distress  Abdominal:      General: Bowel sounds are normal  There is no distension  Palpations: Abdomen is soft  Tenderness: There is no abdominal tenderness  Comments: PEG tube in place   Musculoskeletal:         General: No swelling or tenderness  Skin:     General: Skin is warm  Neurological:      General: No focal deficit present  Mental Status: She is alert and oriented to person, place, and time  Psychiatric:         Mood and Affect: Mood normal          Behavior: Behavior normal          Additional Data:     Labs:  Results from last 7 days   Lab Units 08/03/22 0528 08/01/22 0446 07/31/22 2011   WBC Thousand/uL 14 72*   < > 19 13*   HEMOGLOBIN g/dL 7 8*   < > 11 4*   HEMATOCRIT % 25 2*   < > 35 6   PLATELETS Thousands/uL 230   < > 343   BANDS PCT %  --   --  8   LYMPHO PCT %  --   --  6*   MONO PCT %  --   --  4   EOS PCT %  --   --  0    < > = values in this interval not displayed  Results from last 7 days   Lab Units 08/03/22  0528 08/02/22  0459 08/01/22 0446 07/31/22 2011   SODIUM mmol/L 135 132*   < > 127*   POTASSIUM mmol/L 3 5 3 9   < > 4 8   CHLORIDE mmol/L 99 97   < > 86*   CO2 mmol/L 29 26   < > 31   BUN mg/dL 14 18   < > 32*   CREATININE mg/dL 0 22* 0 30*   < > 0 45*   ANION GAP mmol/L 7 9   < > 10   CALCIUM mg/dL 8 1* 8 7   < > 9 9   ALBUMIN g/dL  --  2 6*  --  3 4*   TOTAL BILIRUBIN mg/dL  --   --   --  0 43   ALK PHOS U/L  --   --   --  109*   ALT U/L  --   --   --  14   AST U/L  --   --   --  22   GLUCOSE RANDOM mg/dL 151* 152*   < > 107    < > = values in this interval not displayed                   Results from last 7 days   Lab Units 08/02/22  1259 08/02/22  1042 08/02/22  0459 22  0446   LACTIC ACID mmol/L 2 0 2 7*  --   --    PROCALCITONIN ng/ml  --   --  0 49* 0 20       Lines/Drains:  Invasive Devices  Report    Peripheral Intravenous Line  Duration           Peripheral IV 22 Left Wrist 2 days          Drain  Duration           Gastrostomy/Enterostomy -- days    Urethral Catheter 3 days              Urinary Catheter:  Goal for removal: N/A - Chronic Tejada           Telemetry:  Telemetry Orders (From admission, onward)             24 Hour Telemetry Monitoring  Continuous x 24 Hours (Telem)           References:    Telemetry Guidelines   Question:  Reason for 24 Hour Telemetry  Answer:  Metabolic/Electrolyte Disturbance with High Probability of Dysrhythmia (K level <3 or >6, or KCL infusion >=10mEq/hr)                 Telemetry Reviewed: PVCs  Indication for Continued Telemetry Use: No indication for continued use  Will discontinue  Imaging: No pertinent imaging reviewed  Recent Cultures (last 7 days):   Results from last 7 days   Lab Units 22  1257 22  0824 22   BLOOD CULTURE  No Growth at 24 hrs  No Growth at 24 hrs   --   --  No Growth at 48 hrs  No Growth at 48 hrs     SPUTUM CULTURE   --  3+ Growth of Pseudomonas aeruginosa MDR*  3+ Growth of   --   --    GRAM STAIN RESULT   --  No Polys or Bacteria seen  --   --    URINE CULTURE   --   --  >100,000 cfu/ml Klebsiella pneumoniae*  >100,000 cfu/ml Pseudomonas aeruginosa MDR*  --        Last 24 Hours Medication List:   Current Facility-Administered Medications   Medication Dose Route Frequency Provider Last Rate    acetaminophen  650 mg Oral Q4H PRN BETTIE Brambila      aspirin  162 mg Oral Once [employer], PA-C      aspirin  81 mg Per G Tube Daily [employer], CRNP      [START ON 2022] atovaquone  1,500 mg Per G Tube Daily With Breakfast Micah Maher MD      cholecalciferol  1,000 Units Per G Tube Daily Jermaine Olesya Gruber, CRNP      dextromethorphan-guaiFENesin  10 mL Oral BID David Tolarry DO      fluticasone  1 spray Nasal BID [facility], [address]      folic acid  1 mg Per PEG Tube Daily [facility] [place], [address]      glycerin-hypromellose-  2 drop Both Eyes BID [facility] [place], [address]      glycopyrrolate  1 mg Oral TID David Tolarry, DO      guaiFENesin  400 mg Per G Tube TID David Todidiere, DO      heparin (porcine)  5,000 Units Subcutaneous [facility] Olesya Allyn, [address]      levalbuterol  1 25 mg Nebulization TID [facility], [address]      levothyroxine  25 mcg Per G Tube Early Morning [facility] [place], [address]      loratadine  10 mg Per G Tube Daily [facility], [address]      Macitentan  10 mg Per G Tube Daily David Sanchez DO      melatonin  3 mg Per G Tube HS [facility] [place], CRNP      nystatin   Topical BID [facility] FRANK GruberNP      omeprazole (PRILOSEC) oral suspension  20 mg Per PEG Tube Early Morning [facility] FRANK GruberNP      ondansetron  4 mg Intravenous Q6H PRN [facility], [address]      oxyCODONE  2 5 mg Per PEG Tube Q4H PRN Anabela Nunez PA-C      predniSONE  30 mg Per G Tube Daily [facility] [place], [address]      saccharomyces boulardii  250 mg Per G Tube BID [address] [facility], [address]      sildenafil  10 mg Per G Tube TID Jack Nielson DO      sodium chloride  1 spray Each Nare Q1H PRN [facility]      sodium hypochlorite  1 application Irrigation Daily David Sanchez DO          Today, Patient Was Seen By: Brian Jorgensen PA-C    **Please Note: This note may have been constructed using a voice recognition system  **

## 2022-08-03 NOTE — PLAN OF CARE
Problem: Prexisting or High Potential for Compromised Skin Integrity  Goal: Skin integrity is maintained or improved  Description: INTERVENTIONS:  - Identify patients at risk for skin breakdown  - Assess and monitor skin integrity  - Assess and monitor nutrition and hydration status  - Monitor labs   - Assess for incontinence   - Turn and reposition patient  - Assist with mobility/ambulation  - Relieve pressure over bony prominences  - Avoid friction and shearing  - Provide appropriate hygiene as needed including keeping skin clean and dry  - Evaluate need for skin moisturizer/barrier cream  - Collaborate with interdisciplinary team   - Patient/family teaching  - Consider wound care consult   Outcome: Progressing     Problem: RESPIRATORY - ADULT  Goal: Achieves optimal ventilation and oxygenation  Description: INTERVENTIONS:  - Assess for changes in respiratory status  - Assess for changes in mentation and behavior  - Position to facilitate oxygenation and minimize respiratory effort  - Oxygen administered by appropriate delivery if ordered  - Initiate smoking cessation education as indicated  - Encourage broncho-pulmonary hygiene including cough, deep breathe, Incentive Spirometry  - Assess the need for suctioning and aspirate as needed  - Assess and instruct to report SOB or any respiratory difficulty  - Respiratory Therapy support as indicated  Outcome: Progressing     Problem: METABOLIC, FLUID AND ELECTROLYTES - ADULT  Goal: Electrolytes maintained within normal limits  Description: INTERVENTIONS:  - Monitor labs and assess patient for signs and symptoms of electrolyte imbalances  - Administer electrolyte replacement as ordered  - Monitor response to electrolyte replacements, including repeat lab results as appropriate  - Instruct patient on fluid and nutrition as appropriate  Outcome: Progressing  Goal: Fluid balance maintained  Description: INTERVENTIONS:  - Monitor labs   - Monitor I/O and WT  - Instruct patient on fluid and nutrition as appropriate  - Assess for signs & symptoms of volume excess or deficit  Outcome: Progressing  Goal: Glucose maintained within target range  Description: INTERVENTIONS:  - Monitor Blood Glucose as ordered  - Assess for signs and symptoms of hyperglycemia and hypoglycemia  - Administer ordered medications to maintain glucose within target range  - Assess nutritional intake and initiate nutrition service referral as needed  Outcome: Progressing     Problem: Nutrition/Hydration-ADULT  Goal: Nutrient/Hydration intake appropriate for improving, restoring or maintaining nutritional needs  Description: Monitor and assess patient's nutrition/hydration status for malnutrition  Collaborate with interdisciplinary team and initiate plan and interventions as ordered  Monitor patient's weight and dietary intake as ordered or per policy  Utilize nutrition screening tool and intervene as necessary  Determine patient's food preferences and provide high-protein, high-caloric foods as appropriate       INTERVENTIONS:  - Monitor oral intake, urinary output, labs, and treatment plans  - Assess nutrition and hydration status and recommend course of action  - Evaluate amount of meals eaten  - Assist patient with eating if necessary   - Allow adequate time for meals  - Recommend/ encourage appropriate diets, oral nutritional supplements, and vitamin/mineral supplements  - Order, calculate, and assess calorie counts as needed  - Recommend, monitor, and adjust tube feedings and TPN/PPN based on assessed needs  - Assess need for intravenous fluids  - Provide specific nutrition/hydration education as appropriate  - Include patient/family/caregiver in decisions related to nutrition  Outcome: Progressing

## 2022-08-03 NOTE — ASSESSMENT & PLAN NOTE
Lab Results   Component Value Date    HSTNI0 69 (H) 07/31/2022    HSTNID2 10 07/31/2022    HSTNI2 79 (H) 07/31/2022    HSTNID4 3 08/01/2022    HSTNI4 72 (H) 08/01/2022      · Patient arrived to the ED in respiratory distress requiring 6 L nasal cannula  · HS troponins peaked at 79  Elevation likely due to hypoxia from respiratory distress  · EKG with no ischemic changes, less likely ACS as patient has no chest pain    · Received 162 mg ASA in ED  · Continue home 81 mg ASA General

## 2022-08-03 NOTE — NURSING NOTE
Patients coccyx dressing soiled d/t incontinent bowel movement  Patient provided with incontinence care  Sacral wound cleansed with NS, Maxorb AG silver applied and covered with Allevyn  Hydraguard cream applied to patients bilateral heels  Heels floated off the bed  Patints NG tube site cleansed with foam cleanser, pat dry and split gauze applied

## 2022-08-03 NOTE — ASSESSMENT & PLAN NOTE
· Previous admission 6/14-7/13 with I&D by General Surgery, S/P IV ABX, wound vac    · General surgery following for management of sacral wound  · Wound care consult  · Frequent repositioning  · Optimize nutritional status

## 2022-08-03 NOTE — PROGRESS NOTES
Progress Note - Pulmonary   Tang Hawkins 80 y o  female MRN: [id number]  Unit/Bed#: -01 Encounter: [id number]    Assessment and Plan:    1  Acute on chronic hypoxic respiratory failure:  Celia Shore was on 2 L of nasal cannula oxygen at home during sleep   Currently she is on 3 liters/minute, improved from 5 liters/minute   She is saturating well at this level and her FiO2 can be titrated down as tolerated for sats of 94%       2  Acute tracheobronchitis:  She has acute tracheobronchitis and has reported yellow phlegm   Currently was on treatment with ceftriaxone   Her blood cultures have been negative  However her sputum and urine are growing Pseudomonas aeruginosa  She should be started on cefepime IV   She is also COVID negative   Currently she is on bronchodilator therapy      3   Pulmonary hypertension:  She has mild to moderate pulmonary hypertension   Her echocardiogram from 06/20/2022 showed a right ventricular systolic pressure of 56 mmHg and most recently has been on treatment with Opsumit 10 mg and sildenafil 10 mg 3 times a day   She was previously on Adempas which was stopped due to hypotension   She has reported allergy to Darwyn Abbot will continue with sildenafil and Opsumit   sildenafil is a tablet and Opsumit can be given in a dissolved form through the PEG tube      4  Scleroderma/crest syndrome:  She has been on treatment with prednisone 30 mg once a day      5  Interstitial lung disease:  She has interstitial lung disease likely from her connective tissue disorder   Her chest x-ray showed bilateral opacities and evidence of fibrosis   Her chest auscultation revealed occasional coarse inspiratory crackles at lung bases      I spoke to the patient  She is DNR status      I spoke to Alyson Doyle   the patient's hospitalist      Thank you for allowing me to participate in the care of the patient  Chief Complaint:   Shortness of breath is better      Subjective:     She stated that her shortness of breath is getting better and her cough is also getting better  Her oxygen requirements have come down  Her sputum is growing Pseudomonas and her urine also is growing Pseudomonas  She was on treatment with ceftriaxone which has been discontinued  Her blood cultures have been negative so far  Objective:     Vitals: Blood pressure 103/58, pulse (!) 133, temperature 99 7 °F (37 6 °C), temperature source Tympanic, resp  rate 20, height 5' 7 5" (1 715 m), weight 55 kg (121 lb 4 1 oz), SpO2 91 %  ,Body mass index is 18 71 kg/m²  Intake/Output Summary (Last 24 hours) at 8/3/2022 1628  Last data filed at 8/3/2022 1447  Gross per 24 hour   Intake 100 ml   Output 2150 ml   Net -2050 ml       Invasive Devices  Report    Peripheral Intravenous Line  Duration           Peripheral IV 07/31/22 Left Wrist 2 days          Drain  Duration           Gastrostomy/Enterostomy -- days    Urethral Catheter 3 days                Physical Exam:  On clinical examination, she is hemodynamically stable and afebrile  Saturating 97% on 3 L of nasal cannula oxygen  Not in any apparent distress  HEENT:  Has conjunctival pallor no cyanosis  Neck:  No jugular venous distention no significant neck or supraclavicular adenopathy  Heart S1-S2 heard  Chest air entry present bilaterally no significant rhonchi  Has bilateral coarse inspiratory crackles  Abdomen soft and nontender bowel sounds audible  Neurologically awake alert oriented  Extremities no clubbing no edema  Labs: I have personally reviewed pertinent lab results  White cell count 14 72 creatinine normal   Magnesium 1 6  Procalcitonin elevated  Urine and sputum growing Pseudomonas  Imaging and other studies: I have personally reviewed pertinent reports

## 2022-08-03 NOTE — CONSULTS
Consultation - General Surgery  [facility] 80 y o  female MRN: [id number]  Unit/Bed#: -01 Encounter: [id number]    Reason for Consult: Sacral decubitus ulcer      Assessment/Plan:  Sacral decubitus ulcer, stage IV  Present for at least 4 months  Previous I&D at [facility] on 6/14/22  Receiving daily wound care at [facility]  Pt on prednisone 30mg daily for scleroderma  Pt met sepsis criteria last night, likely pulmonary or urinary source  UO 1 8L - chronic indwelling lilly catheter  PEG tube in place, cyclic feeding schedule  Tmax 100F, mild tachypnea, Bps soft, on 3L NC  WBC 14 72 (18 26)  Hgb 7 8 (8 4)  Sacral wound appears healthy but could benefit from wound VAC    Wound VAC application tomorrow, Tues/Thurs/Sat VAC changes  Until VAC application, continue dressing changes per wound care nursing orders  P500 specialty bed  Appreciate wound care   Appreciate nutrition recommendations for TF optimization  SQH  Continue IV Rocephin for UTI  Remainder of care per primary team  To discuss with attending    HPI:    [facility] is a 80 y o  female with 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, anemia, hypothyroidism, urinary retention, on 2L O2 at night chronically who presented from [address] [place] [address] with respiratory distress  She was found to have UTI in the ED, and also has chronic sacral decubitus ulcer  She was recently admitted to [facility] 6/14-7/13 for sacral wound, which required debridement in OR and VAC therapy which was discontinued prior to discharge; pt was discharged with wet-to-dry dressings  Pt reports that wound dressings are changed daily at SNF  She reports some pain with dressing changes and normally receives pain medications afterwards  Overall pt is disappointed in the way her health has deteriorated in the past 6 months  She feels overall weak and like she has lost the ability to be independent and to do most of the things that she used to enjoy      Review of Systems:  Review of Systems   Constitutional: Positive for fatigue  Negative for chills and fever  Respiratory: Positive for shortness of breath  Gastrointestinal: Negative for abdominal pain, nausea and vomiting  Bowel/bladder incontinence   Genitourinary: Negative for dysuria  Skin: Positive for wound  Neurological: Positive for weakness  Historical Information   Past Medical History:   Diagnosis Date    Anemia     CHF (congestive heart failure) (HCC)     Dysphagia, oropharyngeal phase 06/06/2022    Hypothyroidism     Protein-calorie malnutrition (HCC)     Pulmonary hypertension (HCC)     [facility] ulcer [facility] [address] [facility]     Scleroderma [facility] [address] [facility]     Urinary retention      Past Surgical History:   Procedure Laterality Date    WOUND DEBRIDEMENT N/A 6/14/2022    Procedure: DEBRIDEMENT WOUND [facility]); SACRUM AND BUTTOCKS;  Surgeon: Olga Mosquera MD;  Location: [facility];  Service: General     Social History   Social History     Substance and Sexual Activity   Alcohol Use Never     Social History     Substance and Sexual Activity   Drug Use Not on file     Social History     Tobacco Use   Smoking Status Never Smoker   Smokeless Tobacco Never Used     History reviewed  No pertinent family history      Medications:  Current Facility-Administered Medications   Medication Dose Route Frequency    acetaminophen (TYLENOL) oral suspension 650 mg  650 mg Oral Q4H PRN    aspirin chewable tablet 162 mg  162 mg Oral Once    aspirin chewable tablet 81 mg  81 mg Per G Tube Daily    atovaquone (MEPRON) oral suspension 750 mg  750 mg Per G Tube Daily With Breakfast    cefTRIAXone (ROCEPHIN) IVPB (premix in dextrose) 1,000 mg 50 mL  1,000 mg Intravenous Q24H    cholecalciferol (VITAMIN D3) tablet 1,000 Units  1,000 Units Per G Tube Daily    dextromethorphan-guaiFENesin (ROBITUSSIN DM) oral syrup 10 mL  10 mL Oral BID    fluticasone (FLONASE) 50 mcg/act nasal spray 1 spray  1 spray Nasal BID    folic acid (FOLVITE) tablet 1 mg  1 mg Per PEG Tube Daily    glycerin-hypromellose- (ARTIFICIAL TEARS) ophthalmic solution 2 drop  2 drop Both Eyes BID    glycopyrrolate (ROBINUL) tablet 1 mg  1 mg Oral TID    guaiFENesin (ROBITUSSIN) oral liquid 400 mg  400 mg Per G Tube TID    heparin (porcine) subcutaneous injection 5,000 Units  5,000 Units Subcutaneous Q12H    levalbuterol (XOPENEX) inhalation solution 1 25 mg  1 25 mg Nebulization TID    levothyroxine tablet 25 mcg  25 mcg Per G Tube Early Morning    loratadine (CLARITIN) tablet 10 mg  10 mg Per G Tube Daily    Macitentan (OPSUMIT) tablet 10 mg  10 mg Per G Tube Daily    magnesium sulfate IVPB (premix) SOLN 1 g  1 g Intravenous Once    melatonin tablet 3 mg  3 mg Per G Tube HS    nystatin (MYCOSTATIN) powder   Topical BID    omeprazole (PriLOSEC) oral suspension 20 mg  20 mg Per PEG Tube Early Morning    ondansetron (ZOFRAN) injection 4 mg  4 mg Intravenous Q6H PRN    oxyCODONE (ROXICODONE) oral solution 2 5 mg  2 5 mg Per PEG Tube Q4H PRN    potassium chloride oral solution 40 mEq  40 mEq Per PEG Tube Once    predniSONE tablet 30 mg  30 mg Per G Tube Daily    saccharomyces boulardii (FLORASTOR) capsule 250 mg  250 mg Per G Tube BID    sildenafil (REVATIO) tablet 10 mg  10 mg Per G Tube TID    sodium chloride (OCEAN) 0 65 % nasal spray 1 spray  1 spray Each Nare Q1H PRN    sodium hypochlorite (DAKIN'S HALF-STRENGTH) 0 25 percent topical solution 1 application  1 application Irrigation Daily       Allergies   Allergen Reactions    Uptravi [Selexipag] Anaphylaxis    Adempas [Riociguat] Other (See Comments)     Discontinued 2/2 hypotension    Sulfa Antibiotics Tachycardia    Penicillins Rash       Physical Examination:  Vitals:   Vitals:    08/03/22 0359 08/03/22 0600 08/03/22 0717 08/03/22 0830   BP: 96/50  97/57    BP Location: Left arm  Left arm    Pulse: 100  82    Resp: 22  15    Temp: 98 5 °F (36 9 °C)  99 4 °F (37 4 °C)    TempSrc: Tympanic  Tympanic    SpO2: 97%  99% 99%   Weight:  55 kg (121 lb 4 1 oz)     Height:         Temp  Min: 97 8 °F (36 6 °C)  Max: 100 °F (37 8 °C)       Physical Exam  Vitals and nursing note reviewed  Constitutional:       Appearance: She is ill-appearing  Comments: Frail   HENT:      Head: Normocephalic and atraumatic  Eyes:      Extraocular Movements: Extraocular movements intact  Pulmonary:      Effort: Pulmonary effort is normal  No respiratory distress  Comments: 3L O2  Abdominal:      General: Bowel sounds are normal  There is no distension  Palpations: Abdomen is soft  Tenderness: There is no abdominal tenderness  There is no guarding  Comments: PEG tube in place   Skin:     General: Skin is warm and dry  Comments: Sacral decubitus ulcer   Neurological:      Mental Status: She is alert and oriented to person, place, and time  Psychiatric:         Mood and Affect: Mood normal          Behavior: Behavior normal          Thought Content: Thought content normal           Diagnostic Data:  Lab: I have personally reviewed pertinent lab results  Results from last 7 days   Lab Units 08/03/22  0528   WBC Thousand/uL 14 72*   HEMOGLOBIN g/dL 7 8*   HEMATOCRIT % 25 2*   PLATELETS Thousands/uL 230     Results from last 7 days   Lab Units 08/03/22  0528 08/01/22  0446 07/31/22 2011   POTASSIUM mmol/L 3 5   < > 4 8   CHLORIDE mmol/L 99   < > 86*   CO2 mmol/L 29   < > 31   BUN mg/dL 14   < > 32*   CREATININE mg/dL 0 22*   < > 0 45*   CALCIUM mg/dL 8 1*   < > 9 9   ALK PHOS U/L  --   --  109*   ALT U/L  --   --  14   AST U/L  --   --  22    < > = values in this interval not displayed  Imaging: I have personally reviewed the pertinent imaging studies on the PACS system  Procedure: XR chest 1 view portable    Result Date: 8/1/2022  Narrative: CHEST INDICATION:   sob   COMPARISON:  June 29, 2022 EXAM PERFORMED/VIEWS:  XR CHEST PORTABLE FINDINGS: Cardiomediastinal silhouette appears unremarkable  Interval improvement of the diffuse parenchymal opacities with remaining underlying prominence of the interstitial markings  Osseous structures appear within normal limits for patient age  Impression: Improvement in the diffuse parenchymal opacities Workstation performed: [id number]       Active medications:   The patients active medications were reviewed and modified as appropriate  VTE Prophylaxis: Sequential compression device (Venodyne)  and Heparin      Code Status: Level 3 - DNAR and DNI    Radha Velásquez PA-C  8/3/2022

## 2022-08-03 NOTE — QUICK NOTE
Reached out to [address] [place] [address] out wound VAC with supplies for patient's sacral ulceration  Wound VAC to be applied tomorrow  Wound VAC supplies were placed in the closet of the patient's room    Nursing were informed

## 2022-08-03 NOTE — CONSULTS
Consultation - Infectious Disease   Tate Ellison 80 y o  female MRN: [id number]  Unit/Bed#: -01 Encounter: [id number]      IMPRESSION & RECOMMENDATIONS:   1  SIRS syndrome, POA  Noted to have tachycardia and leukocytosis on presentation  Clinical picture is clouded by chronic steroids  Suspect presentation is multifactorial and possibly related to 2 and 3  Sputum cultures likely represent colonization  Initial blood cultures and urine cultures reviewed  Patient has already completed 3 days of IV ceftriaxone  Will discontinue further antibiotic as below  Atovaquone dosing adjusted as below  Continue to trend fever curve/vitals  Repeat CBC/chemistry tomorrow  Follow-up pending blood cultures  Continue aggressive airway clearance  Follow-up surgical evaluation  Continue local wound care  Additional supportive care as per primary  Additional interventions pending clinical course    2  Abnormal UA, Tejada malfunction and possible urinary tract infection  UA noted to be abnormal on admission and was reported to have Tejada catheter malfunction in the ER and catheter was exchanged  Consideration for possible urinary tract infection as patient also reported some intermittent dysuria leading up to admission  Urine cultures polymicrobial in the setting of catheter  Klebsiella possible pathogen and Pseudomonas likely colonizer  Patient completed 3 days of IV ceftriaxone  Discontinue further antibiotics for now  Continue to trend fever curve/vitals  Maintain Tejada catheter  Repeat labs tomorrow  Monitor abdominal exam  Additional care as per primary    3  Acute on chronic hypoxic respiratory failure  Oxygen requirements rapidly improved with secretion clearance  She reports lack of devices at her facility  Lower suspicion at this point for pneumonia based on exam and with improvement in imaging compared to prior  Suspect overall poor secretion handling in the setting of 6    No antibiotics for this issue  Continue aggressive suctioning and chest PT  These measures will likely need to be added as outpatient  Continue chronic oxygen support  Ongoing follow-up by Pulmonary  Continue to trend fever curve/WBC    4  Positive sputum culture with MDR Pseudomonas  Suspect that this represents colonization in the patient's sputum  Lower suspicion for active pneumonia  No antibiotics for this issue  Continue aggressive respiratory secretion clearance  Will likely need chest PT measures as outpatient  Continue to trend fever curve/WBC    5  Stage IV pressure injury, POA  Clinical images reviewed  Attempted to evaluate the patient wanted to defer only when needed due to pain and is pending surgical evaluation  Clinical images seem relatively benign  Lower suspicion this is contributing to the above  Will maintain off antibiotics  Will follow-up formal surgical evaluation  Continue local wound  Continue to trend fever curve/WBC  Continue pressure offloading    6  Scleroderma and chronic steroids  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  She has had IVIG and IV steroids  Her course is also complicated by pulmonary hypertension and interstitial lung disease  Currently on 30 mg of steroids daily  Recommend outpatient follow-up with Rheumatology  Atovaquone dosing increased to 1500 daily for prophylaxis  Ongoing follow-up by Pulmonary  Continue to trend fever curve/WBC    Above plan discussed in detail with the patient and with primary service  ID consult service will continue to follow  HISTORY OF PRESENT ILLNESS:  Reason for Consult:  Possible UTI and drug-resistant Pseudomonas on sputum culture    HPI: Waymond Simmonds is a 80y o  year old female with scleroderma with crest syndrome, anemia, hypothyroidism, reported urinary incontinence with chronic Tejada catheter due to chronic sacral ulcer, dysphagia with chronic PEG    Patient also has chronic respiratory failure and at baseline requires 2 L of oxygen constantly  She presented to the emergency department on 08/01 reportedly with increasing coughing episodes and inability to clear her secretions  She reports that facility is unable to provide suction or chest PT  She was sent in for further evaluation  She was noted to have large thick mucus on suctioning in the ER  She had improvement in her respiratory status with frequent suctioning  She was noted to have elevated white count but this is also clouded by steroid  UA was also collected and was abnormal   Reportedly in the ER she was noted to have a clogged Tejada catheter which was not draining and this was ultimately exchanged  Patient was started on ceftriaxone for possible UTI  She had chest x-ray done which shows improvement compared to June images in terms of prior infiltrates  Patient at this time denies having any nausea, vomiting, chest pain  She denies having any pain in her lower abdomen around her Tejada catheter  Denies having any pain around her PEG tube  She does report/recall some recent burning with urine passage into her Tejada catheter and had been told at facility it was draining although she felt discomfort  Patient has also been seen by Pulmonary  Patient has also been seen by Cardiology  Wound care images reviewed  Attempted to evaluate patient's sacral wound with nursing present but she wished to defer due to pain associated with turning and manipulation of the wound  She requested holding off until surgical evaluation which is pending for today  No other acute events noted overnight  Patient's white blood cell count has downtrended from prior  Previous labs reviewed  No fevers noted  Initial blood cultures from admission without growth  Sputum cultures and urine cultures reviewed  Chest x-ray reviewed  Vitals are otherwise stable  Previous ID evaluations and cultures reviewed  We are consulted for further assistance with management      Extensive review of the medical records in epic including review of the notes, radiographs, and laboratory results  REVIEW OF SYSTEMS:  A complete 12 point system-based review of systems is negative other than that noted in the HPI  PAST MEDICAL HISTORY:  Past Medical History:   Diagnosis Date    Anemia     [facility] [address] [facility]     Dysphagia, oropharyngeal phase 2022    Hypothyroidism     Protein-calorie malnutrition ([facility] [address] [facility]     Pulmonary hypertension (HCC)     Sacral ulcer ([facility] [address] [facility]     Scleroderma ([facility] [address] [facility]     Urinary retention      Past Surgical History:   Procedure Laterality Date    WOUND DEBRIDEMENT N/A 2022    Procedure: DEBRIDEMENT WOUND [facility]); SACRUM AND BUTTOCKS;  Surgeon: Leny Gastelum MD;  Location: [facility];  Service: General       FAMILY HISTORY:  Non-contributory    SOCIAL HISTORY:  Social History   Social History     Substance and Sexual Activity   Alcohol Use Never     Social History     Substance and Sexual Activity   Drug Use Not on file     Social History     Tobacco Use   Smoking Status Never Smoker   Smokeless Tobacco Never Used       ALLERGIES:  Allergies   Allergen Reactions   Guy Malay [Selexipag] Anaphylaxis    Adempas [Riociguat] Other (See Comments)     Discontinued 2/2 hypotension    Sulfa Antibiotics Tachycardia    Penicillins Rash       MEDICATIONS:  All current active medications have been reviewed      PHYSICAL EXAM:  Temp:  [97 9 °F (36 6 °C)-100 °F (37 8 °C)] 99 4 °F (37 4 °C)  HR:  [] 82  Resp:  [15-24] 15  BP: ()/(40-63) 97/57  SpO2:  [94 %-99 %] 99 %  Temp (24hrs), Av 8 °F (37 1 °C), Min:97 9 °F (36 6 °C), Max:100 °F (37 8 °C)  Current: Temperature: 99 4 °F (37 4 °C)    Intake/Output Summary (Last 24 hours) at 8/3/2022 1035  Last data filed at 8/3/2022 0359  Gross per 24 hour   Intake --   Output 1850 ml   Net -1850 ml       General Appearance:  Chronically ill-appearing, frail, nontoxic, and in no distress; patient has a very tight raspy voice on exam    Head:  Normocephalic, without obvious abnormality, atraumatic   Eyes:  Conjunctiva pink and sclera anicteric, both eyes   Nose: Nares normal, mucosa normal, no drainage   Throat: Oropharynx dry and without any posterior lesions  Neck: Supple, symmetrical, no adenopathy, no tenderness/mass/nodules   Back:   Attempted to turn the patient to examine her back but she requested deferring until surgical evaluation due to pain  Reviewed clinical images  Lungs: On posterior auscultation there are some faint crackles in the bases but otherwise no obvious wheezes, rales or rhonchi otherwise  Patient currently satting well on 3L  Chest Wall:  No tenderness or deformity   Heart:  RRR; no murmur, rub or gallop appreciated   Abdomen:   Soft, non-tender, non-distended, positive bowel sounds; Tejada catheter draining clear urine  Extremities: No cyanosis, clubbing or edema   Skin: No rashes or lesions on exposed skin  No draining wounds noted on exposed skin  Attempted to remove dressings at the various sites placed by Wound Care yesterday and again patient with significant discomfort with minimal manipulations  Reviewed images and exam with nursing  Lymph nodes: Cervical, supraclavicular nodes normal   Neurologic: Alert and oriented times 3, patient seems to have limited range of motion in all of her extremities likely due to overall debilitation and frailty  Sensation in the extremities is grossly intact  LABS, IMAGING, & OTHER STUDIES:  Lab Results:  I have personally reviewed pertinent labs    Results from last 7 days   Lab Units 08/03/22 0528 08/02/22 0459 08/01/22 0446   WBC Thousand/uL 14 72* 18 26* 23 01*   HEMOGLOBIN g/dL 7 8* 8 4* 10 6*   PLATELETS Thousands/uL 230 233 309     Results from last 7 days   Lab Units 08/03/22  0528 08/01/22 0446 07/31/22 2011   POTASSIUM mmol/L 3 5   < > 4 8   CHLORIDE mmol/L 99   < > 86*   CO2 mmol/L 29   < > 31   BUN mg/dL 14   < > 32* CREATININE mg/dL 0 22*   < > 0 45*   EGFR ml/min/1 73sq m 120   < > 95   CALCIUM mg/dL 8 1*   < > 9 9   AST U/L  --   --  22   ALT U/L  --   --  14   ALK PHOS U/L  --   --  109*    < > = values in this interval not displayed  Results from last 7 days   Lab Units 08/02/22  1257 08/01/22  0824 07/31/22 2033 07/31/22 2011   BLOOD CULTURE  Received in Microbiology Lab  Culture in Progress  Received in Microbiology Lab  Culture in Progress  --   --  No Growth at 48 hrs  No Growth at 48 hrs  SPUTUM CULTURE   --  3+ Growth of Pseudomonas aeruginosa MDR*  3+ Growth of   --   --    GRAM STAIN RESULT   --  No Polys or Bacteria seen  --   --    URINE CULTURE   --   --  >100,000 cfu/ml Gram Negative Say Enteric Like*  --        Imaging Studies:   I have personally reviewed pertinent imaging study reports and images in PACS  Other Studies:   I have personally reviewed pertinent reports

## 2022-08-03 NOTE — ASSESSMENT & PLAN NOTE
· H/o dysphagia S/P PEG  · Continue cyclic tube feeds with no oral feeds, Nutrition following  · Aspiration precautions

## 2022-08-03 NOTE — ASSESSMENT & PLAN NOTE
Patient presented to ED from SNF for sudden onset respiratory distress  Per patient, had coughing episode on Friday with clear/white sputum, day prior to arrival around 1300 began having coughing episode and unable to clear airway from sputum  Chronically on 2 L at HS for comfort  · In ED, patient noted to have thick, dry secretions in trachea after suctioning  Initially required 6 L NC for continued hypoxia and respiratory distress  Oxygen humidified to assist with thinning secretions  · CXR improved from previous admission  · Cause of acute on chronic hypoxic RF likely dried mucous  Patient's respiratory status improving, currently maintained on 3 L NC O2, per RT no indication for tracheal suctioning    · Pulmonology following, recommendations appreciated  · Continue home Xopenex and Sodium Chloride Neb treatments TID  · Humidify oxygen  · Suction frequently, patient with weak cough  · Patient on Mucinex outpatient, continue  · Respiratory protocol, IS, flutter valve

## 2022-08-03 NOTE — WOUND OSTOMY CARE
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  She was awake, alert & oriented x 3 and agreeable to have wound consult done  She is dependent upon staff for all of her care and is repositioned in bed with assist of 2 persons  She has urinary catheter in place to gravity and is incontinent of stool  She was incontinent of a large amount of watery stool during this visit and was given total care with assist of 2 persons  Assessment Findings:  1-Stage 4 pressure injury to sacrum with exposed bone  This wound RN spoke with Dr Cesar Sutherland and requested surgical consult for surgical debridement and wound vac placement  Wound care and prevention orders written and surgical consult requested by MD  Please see below for all wound assessments  2-Unstageable pressure injuries to bilateral elbows  Wound care orders written  3-Feeding tube with dried drainage noted  Orders written for wound care  4-Left heel unstageable pressure injury with no drainage  Orders written for skin care and for prevention  Plan:   Skin care Plan:  1-Cleanse sacro-buttocks with foaming cleanser & pat dry  Apply Adaptic to areas with exposed bone  Cover wound with Maxorb Ag+ silver alginate in silver package  Cover with Allevyn foam  Cruzito with T for treatment  Change every two days and PRN  check skin q-shift  Discontinue order if wound vac ordered or if order changed  2-Cleanse bilateral elbows with foaming cleanser & pat dry  Apply plain Maxorb alginate dressing (in white & green package) and Silvasorb gel to both wounds  Cover with foam dressings or ABD pad, ari & tape  Change every other day & prn excessive drainage/dislodgement    3-Cleanse feeding tube site with foaming cleanser & dry well  Apply split gauze & change daily and prn excessive drainage/dislodgement  4-Turn/reposition q2h or when medically stable for pressure re-distribution on skin   5-Bilateral heel protectors to offload pressure  If patient refuses must float heels on 2 pillows so heels are not in contact with mattress or pillows  6-Moisturize skin daily with skin nourishing cream  7-Ehob cushion in chair when out of bed  Must limit sitting to 1-2 hrs maximum due to stage 4 pressure injury to sacrum  Then offload for at least 2 hrs in bed due to pressure from sitting  8-Hydraguard to bilateral heels BID and PRN  Wound 06/14/22 Pressure Injury Buttocks N/A (Active)   Wound Image   08/02/22 1317   Wound Description Pink;Granulation tissue; Yellow;Slough;Black; Brown;Eschar;Exposed bone 08/02/22 1317   Pressure Injury Stage 4 08/02/22 1317   Romy-wound Assessment Non-intact due to undermining circumferentially 08/02/22 1317   Wound Length (cm) 9 cm 08/02/22 1317   Wound Width (cm) 10 cm 08/02/22 1317   Wound Depth (cm) 2 cm 08/02/22 1317   Wound Surface Area (cm^2) 90 cm^2 08/02/22 1317   Wound Volume (cm^3) 180 cm^3 08/02/22 1317   Calculated Wound Volume (cm^3) 180 cm^3 08/02/22 1317   Change in Wound Size % 72 73 08/02/22 1317   Undermining 6 08/02/22 1317   Drainage Amount Moderate 08/02/22 1317   Drainage Description Serosanguineous 08/02/22 1317   Treatments Cleansed 08/02/22 1317   Dressing Calcium Alginate with Silver; Foam, Silicon (eg  Allevyn, etc) 08/02/22 1317   Wound packed? No 08/02/22 1317   Dressing Changed New 08/02/22 1317   Dressing Status Clean;Dry; Intact 08/02/22 1317       Wound 06/21/22 Pressure Injury Elbow Posterior; Left (Active)   Wound Image   08/02/22 1332   Wound Description Slough; Yellow 08/02/22 1332   Pressure Injury Stage Unstageable 08/02/22 1332   Wound Length (cm) 0 3 cm 08/02/22 1332   Wound Width (cm) 0 4 cm 08/02/22 1332   Wound Depth (cm) 0 2 cm 08/02/22 1332   Wound Surface Area (cm^2) 0 12 cm^2 08/02/22 1332   Wound Volume (cm^3) 0 024 cm^3 08/02/22 1332   Calculated Wound Volume (cm^3) 0 02 cm^3 08/02/22 1332   Drainage Amount Small 08/02/22 1332   Drainage Description Serous 08/02/22 1332   Treatments Cleansed 08/02/22 1332   Dressing Calcium Alginate;Silvasorb gel; Foam, Silicon (eg  Allevyn, etc) 08/02/22 1332   Wound packed? No 08/02/22 1332   Dressing Changed New 08/02/22 1332   Dressing Status Clean;Dry; Intact 08/02/22 1332       Wound 06/25/22 Pressure Injury Heel Left (Active)   Wound Image   08/02/22 1336   Wound Description Brown;Wallace;Eschar 08/02/22 1336   Pressure Injury Stage Unstageable 08/02/22 1336   Romy-wound Assessment Dry;Scaly 08/02/22 1336   Wound Length (cm) 1 cm 08/02/22 1336   Wound Width (cm) 1 5 cm 08/02/22 1336   Wound Depth (cm) 0 cm 08/02/22 1336   Wound Surface Area (cm^2) 1 5 cm^2 08/02/22 1336   Wound Volume (cm^3) 0 cm^3 08/02/22 1336   Calculated Wound Volume (cm^3) 0 cm^3 08/02/22 1336   Change in Wound Size % 100 08/02/22 1336   Drainage Amount None 08/02/22 1336   Treatments Cleansed 08/02/22 1336   Dressing Moisture barrier 08/02/22 1336   Wound packed? No 08/02/22 1336   Dressing Status Clean;Dry; Intact 08/02/22 1336       Wound 07/13/22 Pressure Injury Elbow Posterior;Right (Active)   Wound Image   08/02/22 1347   Wound Description Yellow;Slough 08/02/22 1347   Pressure Injury Stage Unstageable 08/02/22 1347   Romy-wound Assessment Excoriated 08/02/22 1347   Wound Length (cm) 0 2 cm 08/02/22 1347   Wound Width (cm) 0 2 cm 08/02/22 1347   Wound Depth (cm) 0 3 cm 08/02/22 1347   Wound Surface Area (cm^2) 0 04 cm^2 08/02/22 1347   Wound Volume (cm^3) 0 012 cm^3 08/02/22 1347   Calculated Wound Volume (cm^3) 0 01 cm^3 08/02/22 1347   Change in Wound Size % 80 08/02/22 1347   Drainage Amount Small 08/02/22 1347   Drainage Description Serous 08/02/22 1347   Treatments Cleansed 08/02/22 1347   Dressing Calcium Alginate;Silvasorb gel; Foam, Silicon (eg  Allevyn, etc) 08/02/22 1347   Wound packed? No 08/02/22 1347   Dressing Status Clean;Dry; Intact 08/02/22 1347     Attempted to discuss assessment findings, and plan of care/recommendations with Jarred Jiménez RN but she was unable to discuss at time of assessment or after  Tiger Text sent regarding wound care orders and note  Wound care will follow along with patient throughout admission, please call or tiger text with questions and concerns    Recommendations written as orders    Elroy Luna RN, BSN, [employer]

## 2022-08-03 NOTE — ASSESSMENT & PLAN NOTE
· Patient met SIRS POA with leukocytosis, tachycardia, tachypnea  Also with intermittent hypotension with MAP <65  · Clinically patient improving, leukocytosis trending down  Still with intermittent tachycardia and tachypnea, as well as intermittent hypotension - BP 89/51 today, to receive 250 cc IVF bolus, monitor volume status and BPs closely  · Initially suspected to be 2/2 PNA vs UTI  · CXR: Improvement in the diffuse parenchymal opacities  · Sputum cx:  3+ growth of Pseudomonas aeruginosa MDRO, 3+ growth of mixed respiratory ash  · UA: 3+ leukocytes, 30-50 WBCs, moderate bacteria and occasional calcium oxalate crystals  · Urine cx: >100,000 cfu/ml Klebsiella pneumoniae, Pseudomonas aeruginosa MDRO  · Lactic acid: 2 7->2 0 after IV fluid bolus  · Procalcitonin: 0 2->0  49    · BC x2 obtained, results pending  · S/p 3 days IV Rocephin, D/C per ID  · Infectious disease consulted:  · Pseudomonas from urine and sputum cultures suspected to be colonization verses active infection  · Klebsiella is possible pathogen given chronic Tejada catheter with recent malfunction, however patient already completed 3 days of IV Rocephin, no further antibiotics needed at this time  · Monitor CBC/temperature curve off antibiotics, follow culture results

## 2022-08-04 LAB
ANION GAP SERPL CALCULATED.3IONS-SCNC: 8 MMOL/L (ref 4–13)
BASOPHILS # BLD MANUAL: 0 THOUSAND/UL (ref 0–0.1)
BASOPHILS NFR MAR MANUAL: 0 % (ref 0–1)
BUN SERPL-MCNC: 15 MG/DL (ref 5–25)
CALCIUM SERPL-MCNC: 8.6 MG/DL (ref 8.4–10.2)
CHLORIDE SERPL-SCNC: 99 MMOL/L (ref 96–108)
CO2 SERPL-SCNC: 28 MMOL/L (ref 21–32)
CREAT SERPL-MCNC: 0.25 MG/DL (ref 0.6–1.3)
EOSINOPHIL # BLD MANUAL: 0.22 THOUSAND/UL (ref 0–0.4)
EOSINOPHIL NFR BLD MANUAL: 2 % (ref 0–6)
ERYTHROCYTE [DISTWIDTH] IN BLOOD BY AUTOMATED COUNT: 21.6 % (ref 11.6–15.1)
GFR SERPL CREATININE-BSD FRML MDRD: 116 ML/MIN/1.73SQ M
GLUCOSE SERPL-MCNC: 130 MG/DL (ref 65–140)
HCT VFR BLD AUTO: 25.4 % (ref 34.8–46.1)
HGB BLD-MCNC: 7.7 G/DL (ref 11.5–15.4)
LG PLATELETS BLD QL SMEAR: PRESENT
LYMPHOCYTES # BLD AUTO: 1.22 THOUSAND/UL (ref 0.6–4.47)
LYMPHOCYTES # BLD AUTO: 11 % (ref 14–44)
MACROCYTES BLD QL AUTO: PRESENT
MAGNESIUM SERPL-MCNC: 1.8 MG/DL (ref 1.9–2.7)
MCH RBC QN AUTO: 31.8 PG (ref 26.8–34.3)
MCHC RBC AUTO-ENTMCNC: 30.3 G/DL (ref 31.4–37.4)
MCV RBC AUTO: 105 FL (ref 82–98)
METAMYELOCYTES NFR BLD MANUAL: 1 % (ref 0–1)
MONOCYTES # BLD AUTO: 0.44 THOUSAND/UL (ref 0–1.22)
MONOCYTES NFR BLD: 4 % (ref 4–12)
MYELOCYTES NFR BLD MANUAL: 2 % (ref 0–1)
NEUTROPHILS # BLD MANUAL: 8.84 THOUSAND/UL (ref 1.85–7.62)
NEUTS BAND NFR BLD MANUAL: 9 % (ref 0–8)
NEUTS SEG NFR BLD AUTO: 71 % (ref 43–75)
PLATELET # BLD AUTO: 236 THOUSANDS/UL (ref 149–390)
PLATELET BLD QL SMEAR: ADEQUATE
PMV BLD AUTO: 10 FL (ref 8.9–12.7)
POTASSIUM SERPL-SCNC: 4.2 MMOL/L (ref 3.5–5.3)
PROCALCITONIN SERPL-MCNC: 0.17 NG/ML
RBC # BLD AUTO: 2.42 MILLION/UL (ref 3.81–5.12)
RBC MORPH BLD: PRESENT
SODIUM SERPL-SCNC: 135 MMOL/L (ref 135–147)
WBC # BLD AUTO: 11.05 THOUSAND/UL (ref 4.31–10.16)

## 2022-08-04 PROCEDURE — 80048 BASIC METABOLIC PNL TOTAL CA: CPT | Performed by: PHYSICIAN ASSISTANT

## 2022-08-04 PROCEDURE — 85027 COMPLETE CBC AUTOMATED: CPT | Performed by: PHYSICIAN ASSISTANT

## 2022-08-04 PROCEDURE — 97530 THERAPEUTIC ACTIVITIES: CPT

## 2022-08-04 PROCEDURE — 99222 1ST HOSP IP/OBS MODERATE 55: CPT | Performed by: PHYSICIAN ASSISTANT

## 2022-08-04 PROCEDURE — 85007 BL SMEAR W/DIFF WBC COUNT: CPT | Performed by: PHYSICIAN ASSISTANT

## 2022-08-04 PROCEDURE — 83735 ASSAY OF MAGNESIUM: CPT | Performed by: PHYSICIAN ASSISTANT

## 2022-08-04 PROCEDURE — 84145 PROCALCITONIN (PCT): CPT | Performed by: PHYSICIAN ASSISTANT

## 2022-08-04 PROCEDURE — 97167 OT EVAL HIGH COMPLEX 60 MIN: CPT

## 2022-08-04 PROCEDURE — 94640 AIRWAY INHALATION TREATMENT: CPT

## 2022-08-04 PROCEDURE — 2W15X6Z COMPRESSION OF BACK USING PRESSURE DRESSING: ICD-10-PCS | Performed by: SURGERY

## 2022-08-04 PROCEDURE — 97606 NEG PRS WND THER DME>50 SQCM: CPT | Performed by: PHYSICIAN ASSISTANT

## 2022-08-04 PROCEDURE — 99232 SBSQ HOSP IP/OBS MODERATE 35: CPT | Performed by: INTERNAL MEDICINE

## 2022-08-04 PROCEDURE — 99232 SBSQ HOSP IP/OBS MODERATE 35: CPT | Performed by: PHYSICIAN ASSISTANT

## 2022-08-04 PROCEDURE — 94760 N-INVAS EAR/PLS OXIMETRY 1: CPT

## 2022-08-04 PROCEDURE — 94669 MECHANICAL CHEST WALL OSCILL: CPT

## 2022-08-04 RX ORDER — MAGNESIUM SULFATE 1 G/100ML
1 INJECTION INTRAVENOUS ONCE
Status: COMPLETED | OUTPATIENT
Start: 2022-08-04 | End: 2022-08-04

## 2022-08-04 RX ORDER — HYDROMORPHONE HCL IN WATER/PF 6 MG/30 ML
0.2 PATIENT CONTROLLED ANALGESIA SYRINGE INTRAVENOUS ONCE
Status: COMPLETED | OUTPATIENT
Start: 2022-08-04 | End: 2022-08-04

## 2022-08-04 RX ORDER — ACETAMINOPHEN 325 MG/1
650 TABLET ORAL ONCE
Status: COMPLETED | OUTPATIENT
Start: 2022-08-04 | End: 2022-08-04

## 2022-08-04 RX ORDER — HYDROMORPHONE HCL/PF 1 MG/ML
0.5 SYRINGE (ML) INJECTION EVERY 6 HOURS PRN
Status: DISCONTINUED | OUTPATIENT
Start: 2022-08-04 | End: 2022-08-04

## 2022-08-04 RX ORDER — ACETAMINOPHEN 160 MG/5ML
975 SUSPENSION, ORAL (FINAL DOSE FORM) ORAL EVERY 8 HOURS SCHEDULED
Status: DISCONTINUED | OUTPATIENT
Start: 2022-08-04 | End: 2022-08-05

## 2022-08-04 RX ORDER — LANOLIN ALCOHOL/MO/W.PET/CERES
3 CREAM (GRAM) TOPICAL
Status: DISCONTINUED | OUTPATIENT
Start: 2022-08-04 | End: 2022-08-09 | Stop reason: HOSPADM

## 2022-08-04 RX ORDER — HYDROMORPHONE HCL IN WATER/PF 6 MG/30 ML
0.2 PATIENT CONTROLLED ANALGESIA SYRINGE INTRAVENOUS
Status: DISCONTINUED | OUTPATIENT
Start: 2022-08-04 | End: 2022-08-09 | Stop reason: HOSPADM

## 2022-08-04 RX ORDER — OXYCODONE HCL 5 MG/5 ML
2.5 SOLUTION, ORAL ORAL EVERY 4 HOURS PRN
Status: DISCONTINUED | OUTPATIENT
Start: 2022-08-04 | End: 2022-08-04

## 2022-08-04 RX ORDER — OXYCODONE HCL 5 MG/5 ML
2.5 SOLUTION, ORAL ORAL EVERY 4 HOURS PRN
Status: DISCONTINUED | OUTPATIENT
Start: 2022-08-04 | End: 2022-08-05

## 2022-08-04 RX ADMIN — PREDNISONE 30 MG: 20 TABLET ORAL at 08:18

## 2022-08-04 RX ADMIN — FLUTICASONE PROPIONATE 1 SPRAY: 50 SPRAY, METERED NASAL at 18:05

## 2022-08-04 RX ADMIN — GLYCERIN 2 DROP: .002; .002; .01 SOLUTION/ DROPS OPHTHALMIC at 18:05

## 2022-08-04 RX ADMIN — HYDROMORPHONE HYDROCHLORIDE 0.2 MG: 0.2 INJECTION, SOLUTION INTRAMUSCULAR; INTRAVENOUS; SUBCUTANEOUS at 16:15

## 2022-08-04 RX ADMIN — IPRATROPIUM BROMIDE 0.5 MG: 0.5 SOLUTION RESPIRATORY (INHALATION) at 14:15

## 2022-08-04 RX ADMIN — SILDENAFIL CITRATE 10 MG: 20 TABLET ORAL at 20:20

## 2022-08-04 RX ADMIN — ASPIRIN 81 MG: 81 TABLET, CHEWABLE ORAL at 08:18

## 2022-08-04 RX ADMIN — LEVALBUTEROL HYDROCHLORIDE 1.25 MG: 1.25 SOLUTION RESPIRATORY (INHALATION) at 14:15

## 2022-08-04 RX ADMIN — HYDROMORPHONE HYDROCHLORIDE 0.2 MG: 0.2 INJECTION, SOLUTION INTRAMUSCULAR; INTRAVENOUS; SUBCUTANEOUS at 15:07

## 2022-08-04 RX ADMIN — GLYCOPYRROLATE 1 MG: 1 TABLET ORAL at 16:32

## 2022-08-04 RX ADMIN — Medication 3 MG: at 23:26

## 2022-08-04 RX ADMIN — GUAIFENESIN AND DEXTROMETHORPHAN 10 ML: 100; 10 SYRUP ORAL at 08:17

## 2022-08-04 RX ADMIN — ATOVAQUONE 1500 MG: 750 SUSPENSION ORAL at 09:06

## 2022-08-04 RX ADMIN — FLUTICASONE PROPIONATE 1 SPRAY: 50 SPRAY, METERED NASAL at 09:06

## 2022-08-04 RX ADMIN — FOLIC ACID 1 MG: 1 TABLET ORAL at 08:18

## 2022-08-04 RX ADMIN — LEVOTHYROXINE SODIUM 25 MCG: 25 TABLET ORAL at 05:51

## 2022-08-04 RX ADMIN — HEPARIN SODIUM 5000 UNITS: 5000 INJECTION INTRAVENOUS; SUBCUTANEOUS at 20:21

## 2022-08-04 RX ADMIN — ACETAMINOPHEN 975 MG: 650 SUSPENSION ORAL at 16:48

## 2022-08-04 RX ADMIN — GLYCERIN 2 DROP: .002; .002; .01 SOLUTION/ DROPS OPHTHALMIC at 09:06

## 2022-08-04 RX ADMIN — HEPARIN SODIUM 5000 UNITS: 5000 INJECTION INTRAVENOUS; SUBCUTANEOUS at 08:17

## 2022-08-04 RX ADMIN — Medication 1000 UNITS: at 08:18

## 2022-08-04 RX ADMIN — GUAIFENESIN 400 MG: 100 SOLUTION ORAL at 09:05

## 2022-08-04 RX ADMIN — GUAIFENESIN 400 MG: 100 SOLUTION ORAL at 16:39

## 2022-08-04 RX ADMIN — NYSTATIN: 100000 POWDER TOPICAL at 08:20

## 2022-08-04 RX ADMIN — SILDENAFIL CITRATE 10 MG: 20 TABLET ORAL at 08:17

## 2022-08-04 RX ADMIN — OXYCODONE HYDROCHLORIDE 2.5 MG: 5 SOLUTION ORAL at 09:05

## 2022-08-04 RX ADMIN — LEVALBUTEROL HYDROCHLORIDE 1.25 MG: 1.25 SOLUTION RESPIRATORY (INHALATION) at 19:40

## 2022-08-04 RX ADMIN — LORATADINE 10 MG: 10 TABLET ORAL at 08:18

## 2022-08-04 RX ADMIN — LEVALBUTEROL HYDROCHLORIDE 1.25 MG: 1.25 SOLUTION RESPIRATORY (INHALATION) at 07:48

## 2022-08-04 RX ADMIN — OXYCODONE HYDROCHLORIDE 2.5 MG: 5 SOLUTION ORAL at 20:21

## 2022-08-04 RX ADMIN — NYSTATIN: 100000 POWDER TOPICAL at 18:05

## 2022-08-04 RX ADMIN — GUAIFENESIN 400 MG: 100 SOLUTION ORAL at 20:21

## 2022-08-04 RX ADMIN — ACETAMINOPHEN 650 MG: 650 SUSPENSION ORAL at 05:51

## 2022-08-04 RX ADMIN — GLYCOPYRROLATE 1 MG: 1 TABLET ORAL at 09:06

## 2022-08-04 RX ADMIN — ACETAMINOPHEN 650 MG: 325 TABLET ORAL at 05:54

## 2022-08-04 RX ADMIN — MAGNESIUM SULFATE HEPTAHYDRATE 1 G: 1 INJECTION, SOLUTION INTRAVENOUS at 09:05

## 2022-08-04 RX ADMIN — Medication 250 MG: at 18:00

## 2022-08-04 RX ADMIN — SALINE NASAL SPRAY 1 SPRAY: 1.5 SOLUTION NASAL at 08:20

## 2022-08-04 RX ADMIN — SILDENAFIL CITRATE 10 MG: 20 TABLET ORAL at 16:32

## 2022-08-04 RX ADMIN — OMEPRAZOLE 20 MG: 20 CAPSULE, DELAYED RELEASE ORAL at 05:52

## 2022-08-04 RX ADMIN — HYDROMORPHONE HYDROCHLORIDE 0.2 MG: 0.2 INJECTION, SOLUTION INTRAMUSCULAR; INTRAVENOUS; SUBCUTANEOUS at 23:26

## 2022-08-04 RX ADMIN — IPRATROPIUM BROMIDE 0.5 MG: 0.5 SOLUTION RESPIRATORY (INHALATION) at 19:40

## 2022-08-04 RX ADMIN — Medication 250 MG: at 08:18

## 2022-08-04 RX ADMIN — GLYCOPYRROLATE 1 MG: 1 TABLET ORAL at 20:20

## 2022-08-04 NOTE — PROCEDURES
Acute Care Surgery  Bedside V A C  Procedure Note    Location of wound: Sacral Decubitus Ulcer    Dressings and Foam removed:  First application, nothing to remove    Dimensions of wound:  11 5 cm x 9 5 cm x 2 cm          Description of wound:   Majority of wound bed clean  No granulation tissue present  Small amount yellow slough around the inferior border  Periwound edges clean, intact  Small ecchymotic area at the inferior left lateral border  No significant drainage, no malodor  VAC dressing application:  The patient was given preprocedural pain medication, 1 time dosage of 0 2 mg Dilaudid  The periwound skin was cleaned and dried with wound cleanser  One black sponge and 1 white sponge  were cut to size of the wound and placed into the wound  The white sponge was placed over the area of exposed bone  The sponges were then covered with VAC drape  Additional VAC drape and black foam was used to create a bridge to the patient's left hip and a base for the track pad  The track pad was then placed over the base of black foam  The VAC was then set to 125 mmHg low Continuous suction  The patient tolerated the procedure well and there were no complications  The patient did not require any excisional debridement during today's dressing change  VAC settings:  125 mmHg  Continuous      Additional Notes: The [facility] sticker placed over the dressing per protocol  The next [facility] dressing change will be planned for Saturday 8/6  Planned for Saturday, Tuesday, Thursday VAC changes  The [facility] paperwork will be completed tomorrow and will be given tothe case management staff to arrange home VAC therapy  Vinita Portillo PA-C was also present for the dressing change  The patient's sister was also in the room for emotional support  This dressing change took greater than 25 minutes to complete      Abdias Galvan PA-C  8/4/2022 3:44 PM

## 2022-08-04 NOTE — ASSESSMENT & PLAN NOTE
· Patient met SIRS POA with leukocytosis, tachycardia, tachypnea  Also with intermittent hypotension with MAP <65  · Improving, leukocytosis trending down  Still with intermittent tachycardia and tachypnea, intermittent hypotensive, requiring 250 cc IVF bolus yesterday, BP stable today  · Initially suspected to be 2/2 PNA vs UTI  · CXR: Improvement in the diffuse parenchymal opacities  · Sputum cx:  3+ growth of Pseudomonas aeruginosa MDRO, 3+ growth of mixed respiratory ash  · UA: 3+ leukocytes, 30-50 WBCs, moderate bacteria and occasional calcium oxalate crystals  · Urine cx: >100,000 cfu/ml Klebsiella pneumoniae, Pseudomonas aeruginosa MDRO  · Lactic acid: 2 7->2 0 after IV fluid bolus  · Procalcitonin: 0 2->0 49->0  17  · BC x2 obtained, results pending  · S/p 3 days IV Rocephin, D/C per ID  Patient clinically improving off antibiotics    · Infectious disease consulted:  · Pseudomonas from urine and sputum cultures suspected to be colonization verses active infection  · Klebsiella is possible pathogen given chronic Tejada catheter with recent malfunction, however patient already completed 3 days of IV Rocephin, no further antibiotics needed at this time  · Monitor CBC/temperature curve off antibiotics, follow culture results

## 2022-08-04 NOTE — PHYSICAL THERAPY NOTE
PHYSICAL THERAPY TREATMENT  DATE: 08/04/22  TIME: 5541-9283    NAME:  Dung Murillo  AGE:   80 y o   Mrn:   [id number]  Length Of Stay: 4    ADMIT DX:  Hyponatremia [E87 1]  Respiratory distress [R06 03]  SOB (shortness of breath) [R06 02]  Pulmonary hypertension ([address] ) [I27 20]  H/O scleroderma [Z87 39]  Acute on chronic respiratory failure (HCC) [J96 20]    Past Medical History:   Diagnosis Date    Anemia     CHF (congestive heart failure) (HCC)     Dysphagia, oropharyngeal phase 06/06/2022    Hypothyroidism     Protein-calorie malnutrition ([address] )     Pulmonary hypertension ([address] )     Sacral ulcer ([address] )     Scleroderma ([address] )     Urinary retention      Past Surgical History:   Procedure Laterality Date    WOUND DEBRIDEMENT N/A 6/14/2022    Procedure: DEBRIDEMENT WOUND [facility] OUT); SACRUM AND BUTTOCKS;  Surgeon: Nohelia Hyatt MD;  Location: [facility];  Service: General       Performed at least 2 patient identifiers during session: Name, Redell Guise, and ID bracelet       08/04/22 1504   PT Last Visit   PT Visit Date 08/04/22   Note Type   Note Type Treatment   Pain Assessment   Pain Assessment Tool FLACC   Pain Location/Orientation Location: Buttocks   Pain Radiating Towards non radiating   Pain Onset/Description Onset: Ongoing;Frequency: Constant/Continuous   Effect of Pain on Daily Activities limits tolerance of ADLs and functional mobility   Patient's Stated Pain Goal No pain   Hospital Pain Intervention(s) Repositioned; Ambulation/increased activity; Emotional support   Multiple Pain Sites Yes   Pain Rating: FLACC (Rest) - Face 0   Pain Rating: FLACC (Rest) - Legs 0   Pain Rating: FLACC (Rest) - Activity 0   Pain Rating: FLACC (Rest) - Cry 0   Pain Rating: FLACC (Rest) - Consolability 0   Score: FLACC (Rest) 0   Pain Rating: FLACC (Activity) - Face 1   Pain Rating: FLACC (Activity) - Legs 1   Pain Rating: FLACC (Activity) - Activity 1   Pain Rating: FLACC (Activity) - Cry 1   Pain Rating: FLACC (Activity) - Consolability 1   Score: FLACC (Activity) 5   Pain 2   Pain Location/Orientation 2 Orientation: Bilateral;Other (Comment)  (elbows)   Pain 3   Pain Location/Orientation 3 Orientation: Bilateral  (heels)   Restrictions/Precautions   Weight Bearing Precautions Per Order No   Other Precautions Contact/isolation; Chair Alarm; Bed Alarm;Multiple lines;Telemetry;O2;Fall Risk;Pain  (+MDRO, lilly, PEG tube, O2 via NC, sacral wound w/ P500 air mattress)   General   Chart Reviewed Yes   Additional Pertinent History Pt admitted 7/31/2022 with SOB, pulm HTN, hyponatremia, Dx: acute on chronic respiratory failure with hypoxia  Response to Previous Treatment Patient reporting fatigue but able to participate  Family/Caregiver Present Yes  (sister present t/o session)   Cognition   Overall Cognitive Status Impaired   Arousal/Participation Cooperative   Attention Attends with cues to redirect   Orientation Level Oriented to person;Oriented to place;Oriented to situation   Memory Decreased recall of recent events   Following Commands Follows one step commands with increased time or repetition   Subjective   Subjective "I'm afraid to not be suctioned after a treatment "   Bed Mobility   Rolling R 2  Maximal assistance   Additional items Assist x 1;Bedrails; Increased time required;Verbal cues;LE management  (trunk management)   Rolling L 2  Maximal assistance   Additional items Assist x 1;Bedrails; Increased time required;Verbal cues;LE management  (trunk management)   Supine to Sit Unable to assess   Sit to Supine Unable to assess   Additional Comments Attempt at EOB sitting balance/tolerance deferred as surgery team arrived for placement of sacral wound vac     Transfers   Sit to Stand Unable to assess   Stand to Sit Unable to assess   Stand pivot Unable to assess   Ambulation/Elevation   Gait pattern Not appropriate   Gait Assistance Not tested   Balance   Static Sitting Zero  (TERE)   Endurance Deficit   Endurance Deficit Yes Activity Tolerance   Activity Tolerance Patient limited by fatigue;Patient limited by pain;Treatment limited secondary to medical complications (Comment)  (sacral wound vac placement)   Medical Staff Made Aware Spoke with TYREL YOUNG OT   Nurse Made Aware Spoke with CHITRA West   Assessment   Prognosis Guarded   Problem List Decreased strength;Decreased range of motion;Decreased endurance; Impaired balance;Decreased mobility; Decreased cognition; Impaired judgement;Decreased safety awareness;Decreased skin integrity;Pain   Assessment Pt seen for PT treatment today with focus on bed mobility and upright/EOB positioning tolerance  Pt presents semi-supine in bed, reports ongoing sacral wound pain, but is agreeable to participate in session  Pt just finishing breathing treatment, and notably anxious re: not being suctioned post treatment, respiratory therapist present and aware, reports she has no indication for suction  Pt requires [address] 1 person for rolling R<>L in bed, MAXA + bedrail to maintain sidelying  Pt noted to be incontinent of bowel and requires dependent hygiene care to be completed  Attempt at upright sitting at EOB deferred as surgery team arrived for application of sacral wound vac  Therapist provided education to pt and sister regarding positioning after wound vac placement, for ongoing pressure relief and offloading as well as comfort - both verbalize understanding  At end of session pt was left semi-supine in bed with sister and RN present  Will continue skilled PT POC as able and appropriate  Pt with overall limited activity tolerance; requires frequent repositioning t/o the day which wears her out and results in decreased tolerance of therapy participation  Barriers to Discharge Decreased caregiver support; Inaccessible home environment   Goals   Patient Goals "to be able to walk with a walker"   [facility] Expiration Date 08/11/22   Short Term Goal #1 Patient PT goals established in order to address patient self reported goal of "to be able to walk with a walker"  Pt will: complete all bed mobility in hospital bed with MAXA 1 perosn in order to promote increased OOB functional mobility to improve overall activity tolerance; PT to see for transfers and ambulation training when appropriate; improve B LE strength to >/= 3-/5 MMT t/o in order to increase safety with functional mobility and decrease risk of falls; demonstrate understanding and independence with LE strengthening HEP; improve static sitting balance to >/= fair grade in order to promote safety and increased independence with mobility; tolerate >1hrs OOB in upright position, in order to improve muscular endurance and respiratory status; improve AM-PAC score to >/= 11/24 in order to increase independence with mobility and decrease burden of care; improve Barthel Index score to >/= 15/100 in order to increase independence and decrease risk of falls; improve Elderly Mobility Scale score to >/= 4/20 in order to decrease burden of care and increase independence with functional mobility and ADLs  PT Treatment Day 2   Plan   Treatment/Interventions LE strengthening/ROM; Therapeutic exercise; Endurance training;Cognitive reorientation;Patient/family training;Equipment eval/education; Bed mobility;Continued evaluation;Spoke to MD;Spoke to nursing;Spoke to case management;OT  (PT to see for transfer and gait training when appropriate )   Progress No functional improvements   PT Frequency 2-3x/wk   Recommendation   PT Discharge Recommendation Post acute rehabilitation services   AM-PAC Basic Mobility Inpatient   Turning in Bed Without Bedrails 1   Lying on Back to Sitting on Edge of Flat Bed 1   Moving Bed to Chair 1   Standing Up From Chair 1   Walk in Room 1   Climb 3-5 Stairs 1   Basic Mobility Inpatient Raw Score 6   Turning Head Towards Sound 4   Follow Simple Instructions 3   Low Function Basic Mobility Raw Score 13   Low Function Basic Mobility Standardized Score 20 14   Highest Level Of Mobility   -HL Goal 2: Bed activities/Dependent transfer   JH-HLM Achieved 2: Bed activities/Dependent transfer   Education   Education Provided Mobility training   Patient Explanation/teachback used; Reinforcement needed   End of Consult   Patient Position at End of Consult Supine;Bed/Chair alarm activated; All needs within reach  (sister and RN in room)   End of Consult Comments Based on patient's Automatic Data Highest Level of Mobility scores today, patient currently has a goal of -[facility] Levels: 2: BED ACTIVITIES/DEPENDENT TRANSFER, to be completed with RN staffing each shift, in order to improve overall activity tolerance and mobility, combat hospital related deconditioning, and maximize outcomes for d/c from the acute care setting  The patient's AM-PAC Basic Mobility Inpatient Short Form Raw Score is 6  A Raw score of less than or equal to 16 suggests the patient may benefit from discharge to post-acute rehabilitation services  Please also refer to the recommendation of the Physical Therapist for safe discharge planning        Obdulia Griggs PT, DPT   Available via [employer] Watch  NPI # [id number]  [place] License - [id number]  6/2/4956

## 2022-08-04 NOTE — PROGRESS NOTES
Progress Note - Pulmonary   Ifeoma Quiros 80 y o  female MRN: [id number]  Unit/Bed#: -01 Encounter: [id number]    Assessment and Plan:    1  Acute on chronic hypoxic respiratory failure:  Celia Shore has chronic respiratory failure was on 2 L of nasal cannula oxygen at home during sleep   Currently she is on 2 5 liters/minute, improved from 5 liters/minute      2  Acute tracheobronchitis:  She has acute tracheobronchitis and has reported yellow phlegm  She  was on treatment with ceftriaxone  Gricelda Hendrix blood cultures have been negative  However her sputum and urine are growing MDR Pseudomonas aeruginosa  She is currently off antibiotics as per ID recommendations  She was COVID negative   Currently she is on bronchodilator therapy  She has thick secretions which are difficult to bring out  She has been on vest therapy  Kelly Feast has been discontinued     3   Pulmonary hypertension:  She has mild to moderate pulmonary hypertension   Her echocardiogram from 06/20/2022 showed a right ventricular systolic pressure of 56 mmHg and most recently has been on treatment with Opsumit 10 mg and sildenafil 10 mg 3 times a day   She was previously on Adempas which was stopped due to hypotension   She has reported allergy to Lilyan Clines will continue with sildenafil and Opsumit   Opsumit can be given in a dissolved form through the PEG tube      4  Scleroderma/crest syndrome:  She has been on treatment with prednisone 30 mg once a day      5  Interstitial lung disease:  She has interstitial lung disease likely from her connective tissue disorder   Her chest x-ray showed bilateral opacities and evidence of fibrosis   Her chest auscultation revealed occasional coarse inspiratory crackles at lung bases      I spoke to the patient and sister at the bedside  Updated      She is DNR status  Her overall prognosis is guarded        Thank you for allowing me to participate in the care of the patient          Chief Complaint: Shortness of breath ; cough    Subjective:   Shortness of breath better  However has cough with thick phlegm  She is not able to bring up the phlegm  She had thick mucus plugs according to RT which were difficult to come out  No fever or chills  She was on Rubinal all which has been discontinued  Objective:     Vitals: Blood pressure 120/68, pulse 78, temperature 98 °F (36 7 °C), temperature source Tympanic, resp  rate 21, height 5' 7 5" (1 715 m), weight 55 kg (121 lb 4 1 oz), SpO2 100 %  ,Body mass index is 18 71 kg/m²  Intake/Output Summary (Last 24 hours) at 8/4/2022 1617  Last data filed at 8/4/2022 0702  Gross per 24 hour   Intake 100 ml   Output 710 ml   Net -610 ml       Invasive Devices  Report    Peripheral Intravenous Line  Duration           Peripheral IV 07/31/22 Left Wrist 3 days          Drain  Duration           Gastrostomy/Enterostomy -- days    Urethral Catheter 4 days                Physical Exam:  On clinical examination, she is comfortable lying in bed  Saturating 95% on 2 5 L of oxygen supplementation  HEENT:  Has conjunctival pallor  No cyanosis  Mucous membranes dry  Neck:  No jugular venous distention no significant neck or supraclavicular adenopathy  Heart S1-S2 heard  Chest air entry present bilaterally inspiratory crackles at lung bases  No rhonchi  Abdomen soft and nontender bowel sounds audible  She has urinary catheter  Neuro awake alert oriented  Extremities no clubbing no edema  Labs: I have personally reviewed pertinent lab results  Urine growing Klebsiella and Pseudomonas  Sputum growing Pseudomonas  Leukocytosis better  Imaging and other studies: I have personally reviewed pertinent reports

## 2022-08-04 NOTE — PLAN OF CARE
Problem: OCCUPATIONAL THERAPY ADULT  Goal: Performs self-care activities at highest level of function for planned discharge setting  See evaluation for individualized goals  Description: Treatment Interventions: ADL retraining, Functional transfer training, UE strengthening/ROM, Endurance training, Patient/family training, Equipment evaluation/education, Compensatory technique education, Continued evaluation, Energy conservation, Activityengagement          See flowsheet documentation for full assessment, interventions and recommendations  Note: Limitation: Decreased ADL status, Decreased UE ROM, Decreased UE strength, Decreased cognition, Decreased endurance, Decreased fine motor control, Decreased self-care trans, Decreased high-level ADLs     Assessment: Pt is a 81 yo female admitted to [facility] on 7/31/22  Pt presented w/ SOB< coughing w/ sputum production from [facility] Ades  Diagnosed w/ acute on chronic respiratory failure w/ hypoxia  Significant PMH impacting her occupational performance includes scleroderma w/ crest syndrome, anemia, urinary retention, dysphagia s/p PEG tube, CHF, sacral ulcer, s/p sacral wound debridement (6/14/2022)  Per surgery, plan for wound vac application today  Pt w/ active OT Orders and activity orders  Personal factors impacting performance includes inability to complete IADLs, difficulty completing ADLs, increased pain, and limited insight into deficits  At baseline, pt lives w/ her  in [facility] w/ 4 MARY using rollator for community mobility  Pt completing ADL / IADL w/ out assistance  Pt has been in hospital vs rehab since March 2022 and requires lyssa lift OOB to w/c  Upon eval, pt alert and oriented to person, place  Demonstrated appropriate long term recall during conversation about her family / work history  Pt required max A x1 to roll R<>L  Pt reports R hand dominance and demonstrated limited AROM B UE at shoulders and generalized weakness  Grasp strength L 3/5, R 2+/5  Pt required max A to complete UBD, total A to complete LBD and toileting  Pt presents w/ decreased activity tolerance, decreased endurance, decreased UE strength / ROM, decreased LE strength, limited insight into deficits, and increased pain impacting her I w/ dressing, bathing, oral hygiene, functional mobility, functional transfers, activity engagement, and clothing mgmt  Pt completing ADL below baseline level of I and would benefit from OT while in acute care to address deficits  Recommend post acute rehab when medically stable for discharge from acute care   Will continue to follow     OT Discharge Recommendation: Post acute rehabilitation services

## 2022-08-04 NOTE — CASE MANAGEMENT
Case Management Progress Note    Patient name Guera [place]  Location /-01 MRN [id number]  : 1940 Date 2022       LOS (days): 4  Geometric Mean LOS (GMLOS) (days): 4 80  Days to GMLOS:1 3        OBJECTIVE:        Current admission status: Inpatient  Preferred Pharmacy:   PATIENT/FAMILY REPORTS NO PREFERRED PHARMACY  No address on file      [address] [place], [facility] [address] [facility] [address] [facility]  Phone: [phone] Fax: [phone]    Primary Care Provider: Michele Green MD    Primary Insurance: [address] [place] [address],[address] REP  Secondary Insurance:     PROGRESS NOTE:    Santa Cardona completed - negative  EARC submitted for placement at [facility]  (ID #: [id number])      Per SLIM, patient may be appropriate for LTACH  [place] referrals opened via  [facility]

## 2022-08-04 NOTE — ASSESSMENT & PLAN NOTE
· Previous admission 6/14-7/13 with I&D by General Surgery, S/P IV ABX, wound vac    · General surgery following:  · Placement of wound VAC today  · Wound care following, contine  · Frequent repositioning  · Optimize nutritional status

## 2022-08-04 NOTE — ASSESSMENT & PLAN NOTE
· With CREST syndrome, 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 for proximal muscle weakness  · Continue Prednisone 10 mg TID  · Continue Atovaquone for PCP prophylaxis  · PT/OT following, needs rehab placement

## 2022-08-04 NOTE — ASSESSMENT & PLAN NOTE
· Crest syndrome with pulmonary hypertension  · Continue home regimen: Sildenafil 10 mg TID and Opsumit 10 mg daily  · Of note, there was question of safety of administration of Opsumit via PEG, unclear whether patient was actually receiving medication at SNF    · Per pharmacy medication can be crushed to be given via PEG, however there is risk with dust/particles being inhaled by staff when administering medications

## 2022-08-04 NOTE — ASSESSMENT & PLAN NOTE
Lab Results   Component Value Date    SODIUM 135 08/04/2022    SODIUM 135 08/03/2022    SODIUM 132 (L) 08/02/2022     · POA with Na 127  Improved with appropriate correction, Na stable  · Uric acid, urine sodium, serum and urine osmolality wnl  · AM cortisol: elevated at 26 8  · BNP: elevated at 372  · Mild hypomagnesemia and low-normal K, replete K and Mag  · Baseline creatinine appears to be between 0 2-0 4  Has remained stable at baseline    · S/p aggressive IV fluid hydration with NS, to receive 250 cc IV fluid bolus today  · Consider Nephrology consult if no improvement or worsening hyponatremia

## 2022-08-04 NOTE — PROGRESS NOTES
[facility] [address]  Progress Note Cullen Shore 1940, 80 y o  female MRN: [id number]  Unit/Bed#: -01 Encounter: [id number]  Primary Care Provider: Angela Degroot MD   Date and time admitted to hospital: 7/31/2022  7:53 PM    * Acute on chronic respiratory failure with hypoxia [facility])  Assessment & Plan  Patient presented to ED from SNF for sudden onset respiratory distress  Per patient, had coughing episode on Friday with clear/white sputum, day prior to arrival around 1300 began having coughing episode and unable to clear airway from sputum  Chronically on 2 L at HS for comfort  · In ED, patient noted to have thick, dry secretions in trachea after suctioning  Initially required 6 L NC for continued hypoxia and respiratory distress  Oxygen humidified to assist with thinning secretions  · CXR improved from previous admission  · Cause of acute on chronic hypoxic RF likely dried mucous  · Patient yesterday evening had episode of worsening SOB requiring tracheal suctioning from RT  · Patient currently maintained on 3 L NC O2, would recommend only using tracheal suctioning as needed and not scheduled basis after respiratory treatments  · Pulmonology following, recommendations appreciated  · Continue home Xopenex and Sodium Chloride Neb treatments TID  · Humidify oxygen  · Suction frequently, patient with weak cough  · Patient on Mucinex outpatient, continue  · Respiratory protocol, IS, flutter valve    SIRS (systemic inflammatory response syndrome) ([facility] [address] )  Assessment & Plan  · Patient met SIRS POA with leukocytosis, tachycardia, tachypnea  Also with intermittent hypotension with MAP <65  · Improving, leukocytosis trending down  Still with intermittent tachycardia and tachypnea, intermittent hypotensive, requiring 250 cc IVF bolus yesterday, BP stable today  · Initially suspected to be 2/2 PNA vs UTI  · CXR: Improvement in the diffuse parenchymal opacities    · Sputum cx:  3+ growth of Pseudomonas aeruginosa MDRO, 3+ growth of mixed respiratory ash  · UA: 3+ leukocytes, 30-50 WBCs, moderate bacteria and occasional calcium oxalate crystals  · Urine cx: >100,000 cfu/ml Klebsiella pneumoniae, Pseudomonas aeruginosa MDRO  · Lactic acid: 2 7->2 0 after IV fluid bolus  · Procalcitonin: 0 2->0 49->0  17  · BC x2 obtained, results pending  · S/p 3 days IV Rocephin, D/C per ID  Patient clinically improving off antibiotics  · Infectious disease consulted:  · Pseudomonas from urine and sputum cultures suspected to be colonization verses active infection  · Klebsiella is possible pathogen given chronic Tejada catheter with recent malfunction, however patient already completed 3 days of IV Rocephin, no further antibiotics needed at this time  · Monitor CBC/temperature curve off antibiotics, follow culture results    Hyponatremia  Assessment & Plan  Lab Results   Component Value Date    SODIUM 135 08/04/2022    SODIUM 135 08/03/2022    SODIUM 132 (L) 08/02/2022     · POA with Na 127  Improved with appropriate correction, Na stable  · Uric acid, urine sodium, serum and urine osmolality wnl  · AM cortisol: elevated at 26 8  · BNP: elevated at 372  · Mild hypomagnesemia and low-normal K, replete K and Mag  · Baseline creatinine appears to be between 0 2-0 4  Has remained stable at baseline  · S/p aggressive IV fluid hydration with NS, to receive 250 cc IV fluid bolus today  · Consider Nephrology consult if no improvement or worsening hyponatremia     Pressure injury of sacral region, stage 4 ([address] )  Assessment & Plan  · Previous admission 6/14-7/13 with I&D by General Surgery, S/P IV ABX, wound vac    · General surgery following:  · Placement of wound VAC today  · Wound care following, contine  · Frequent repositioning  · Optimize nutritional status     Moderate protein-calorie malnutrition ([address] )  Assessment & Plan  Malnutrition Findings:   Adult Malnutrition type: Acute illness  Adult Degree of Malnutrition: Malnutrition of moderate degree  Malnutrition Characteristics: Muscle loss, Fat loss              360 Statement: Moderate malnutrition in the constext of acute illness r/t inadequate energy intake as evidenced by moderate muscle wasting and subcutaneous fat loss (orbitals, clavicles, temples, cheeks)  Will treat with nutrition therapy and EN recommendation  BMI Findings: Body mass index is 18 71 kg/m²  · No oral diet with tube feeding, nutrition following    Elevated troponin  Assessment & Plan  Lab Results   Component Value Date    HSTNI0 69 (H) 07/31/2022    HSTNID2 10 07/31/2022    HSTNI2 79 (H) 07/31/2022    HSTNID4 3 08/01/2022    HSTNI4 72 (H) 08/01/2022      · Patient arrived to the ED in respiratory distress requiring 6 L nasal cannula  · HS troponins peaked at 79  Elevation likely due to hypoxia from respiratory distress  · EKG with no ischemic changes, less likely ACS as patient has no chest pain  · Received 162 mg ASA in ED  · Continue home 81 mg ASA    Pulmonary hypertension (HCC)  Assessment & Plan  · Crest syndrome with pulmonary hypertension  · Continue home regimen: Sildenafil 10 mg TID and Opsumit 10 mg daily  · Of note, there was question of safety of administration of Opsumit via PEG, unclear whether patient was actually receiving medication at SNF  · Per pharmacy medication can be crushed to be given via PEG, however there is risk with dust/particles being inhaled by staff when administering medications    Urinary retention  Assessment & Plan  · Tejada catheter placed prior to admission  · Outpatient Urology follow-up  · Urinary retention protocol    Anemia  Assessment & Plan  · Chronic anemia likely multifactorial  Hgb baseline appears to be between 7-8    · Hgb has remained generally stable at known baseline, initial Hgb levels elevated likely due to hemoconcentration  · Continue iron and folic acid supplementation   · Monitor CBC    Scleroderma (HCC)  Assessment & Plan  · With CREST syndrome, received IVIG during admission - for proximal muscle weakness  · Continue Prednisone 10 mg TID  · Continue Atovaquone for PCP prophylaxis  · PT/OT following, needs rehab placement    Dysphagia, oropharyngeal phase  Assessment & Plan  · H/o dysphagia S/P PEG  · Continue cyclic tube feeds with no oral feeds, Nutrition following  · Aspiration precautions       VTE Pharmacologic Prophylaxis: VTE Score: 9 High Risk (Score >/= 5) - Pharmacological DVT Prophylaxis Ordered: heparin  Sequential Compression Devices Ordered  Patient Centered Rounds: I performed bedside rounds with nursing staff today  Discussions with Specialists or Other Care Team Provider:  Cardiology, case management    Education and Discussions with Family / Patient: Updated  (sister and brother-in-law) at bedside  Attempted to call patient's daughter, was unable to reach but left voicemail  Will attempt to reach out to patient's son  Time Spent for Care: 60 minutes  More than 50% of total time spent on counseling and coordination of care as described above  Current Length of Stay: 4 day(s)  Current Patient Status: Inpatient   Certification Statement: The patient will continue to require additional inpatient hospital stay due to Monitor respiratory status  Discharge Plan: Anticipate discharge tomorrow to rehab facility  Code Status: Level 3 - DNAR and DNI    Subjective:   Patient is seen at bedside this a m , reporting episode of increased SOB last night requiring suction of mucus plug  Patient at this time denies any active SOB, chest pain or other acute complaints  Objective:     Vitals:   Temp (24hrs), Av °F (36 7 °C), Min:97 3 °F (36 3 °C), Max:99 7 °F (37 6 °C)    Temp:  [97 3 °F (36 3 °C)-99 7 °F (37 6 °C)] 98 °F (36 7 °C)  HR:  [] 78  Resp:  [18-25] 21  BP: (101-126)/(58-71) 120/68  SpO2:  [91 %-100 %] 98 %  Body mass index is 18 71 kg/m²       Input and Output Summary (last 24 hours): Intake/Output Summary (Last 24 hours) at 8/4/2022 1400  Last data filed at 8/4/2022 0702  Gross per 24 hour   Intake 100 ml   Output 1010 ml   Net -910 ml       Physical Exam:   Physical Exam  Constitutional:       General: She is not in acute distress  Appearance: She is not ill-appearing, toxic-appearing or diaphoretic  Cardiovascular:      Rate and Rhythm: Normal rate and regular rhythm  Pulses: Normal pulses  Heart sounds: Normal heart sounds  Pulmonary:      Effort: Pulmonary effort is normal  No respiratory distress  Breath sounds: Normal breath sounds  Abdominal:      General: Bowel sounds are normal  There is no distension  Palpations: Abdomen is soft  Tenderness: There is no abdominal tenderness  Musculoskeletal:         General: No swelling or tenderness  Skin:     General: Skin is warm  Neurological:      General: No focal deficit present  Mental Status: She is alert  Mental status is at baseline     Psychiatric:         Mood and Affect: Mood normal          Behavior: Behavior normal           Additional Data:     Labs:  Results from last 7 days   Lab Units 08/04/22  0454   WBC Thousand/uL 11 05*   HEMOGLOBIN g/dL 7 7*   HEMATOCRIT % 25 4*   PLATELETS Thousands/uL 236   BANDS PCT % 9*   LYMPHO PCT % 11*   MONO PCT % 4   EOS PCT % 2     Results from last 7 days   Lab Units 08/04/22  0454 08/03/22  0528 08/02/22  0459 08/01/22  0446 07/31/22 2011   SODIUM mmol/L 135   < > 132*   < > 127*   POTASSIUM mmol/L 4 2   < > 3 9   < > 4 8   CHLORIDE mmol/L 99   < > 97   < > 86*   CO2 mmol/L 28   < > 26   < > 31   BUN mg/dL 15   < > 18   < > 32*   CREATININE mg/dL 0 25*   < > 0 30*   < > 0 45*   ANION GAP mmol/L 8   < > 9   < > 10   CALCIUM mg/dL 8 6   < > 8 7   < > 9 9   ALBUMIN g/dL  --   --  2 6*  --  3 4*   TOTAL BILIRUBIN mg/dL  --   --   --   --  0 43   ALK PHOS U/L  --   --   --   --  109*   ALT U/L  --   --   --   --  14   AST U/L  --   -- --   --  22   GLUCOSE RANDOM mg/dL 130   < > 152*   < > 107    < > = values in this interval not displayed  Results from last 7 days   Lab Units 08/04/22  0454 08/02/22  1259 08/02/22  1042 08/02/22  0459 08/01/22  0446   LACTIC ACID mmol/L  --  2 0 2 7*  --   --    PROCALCITONIN ng/ml 0 17  --   --  0 49* 0 20       Lines/Drains:  Invasive Devices  Report    Peripheral Intravenous Line  Duration           Peripheral IV 07/31/22 Left Wrist 3 days          Drain  Duration           Gastrostomy/Enterostomy -- days    Urethral Catheter 4 days              Urinary Catheter:   Goal for removal: N/A - Chronic Tejada           Telemetry:  Telemetry Orders (From admission, onward)             24 Hour Telemetry Monitoring  Continuous x 24 Hours (Telem)        Expiring   References:    Telemetry Guidelines   Question:  Reason for 24 Hour Telemetry  Answer:  Metabolic/Electrolyte Disturbance with High Probability of Dysrhythmia (K level <3 or >6, or KCL infusion >=10mEq/hr)                 Telemetry Reviewed: Normal Sinus Rhythm  Indication for Continued Telemetry Use: No indication for continued use  Will discontinue  Imaging: No pertinent imaging reviewed  Recent Cultures (last 7 days):   Results from last 7 days   Lab Units 08/02/22  1257 08/01/22  0824 07/31/22  2033 07/31/22 2011   BLOOD CULTURE  No Growth at 24 hrs  No Growth at 24 hrs   --   --  No Growth at 72 hrs  No Growth at 72 hrs     SPUTUM CULTURE   --  3+ Growth of Pseudomonas aeruginosa MDR*  3+ Growth of   --   --    GRAM STAIN RESULT   --  No Polys or Bacteria seen  --   --    URINE CULTURE   --   --  >100,000 cfu/ml Klebsiella pneumoniae*  >100,000 cfu/ml Pseudomonas aeruginosa MDR*  --        Last 24 Hours Medication List:   Current Facility-Administered Medications   Medication Dose Route Frequency Provider Last Rate    acetaminophen  650 mg Oral Q4H PRN Deshpande Livers, CRNP      aspirin  162 mg Oral Once Jeovany Bauman PA-C      aspirin  81 mg Per G Tube Daily BETTIE Brambila      atovaquone  1,500 mg Per G Tube Daily With Breakfast Jason Biggs MD      cholecalciferol  1,000 Units Per G Tube Daily Jerson Brambila [address]      dextromethorphan-guaiFENesin  10 mL Oral BID David Sanchez DO      fluticasone  1 spray Nasal BID Jerson Brambila [address]      folic acid  1 mg Per PEG Tube Daily Jerson Brambila [address]      glycerin-hypromellose-  2 drop Both Eyes BID Jerson Brambila [address]      glycopyrrolate  1 mg Oral TID David Sanchez DO      guaiFENesin  400 mg Per G Tube TID David Sanchez DO      heparin (porcine)  5,000 Units Subcutaneous Q12H BETTIE Brambila      ipratropium  0 5 mg Nebulization TID Dunia Garber MD      levalbuterol  1 25 mg Nebulization TID BETTIE Brambila      levothyroxine  25 mcg Per G Tube Early Morning Jerson Brambila [address]      loratadine  10 mg Per G Tube Daily [facility] [facility] [address]      Macitentan  10 mg Per G Tube Daily David Sanchez DO      melatonin  3 mg Per G Tube HS BETTIE Brambila      nystatin   Topical BID BETTIE Brambila      omeprazole (PRILOSEC) oral suspension  20 mg Per PEG Tube Early Morning BETTIE Brambila      ondansetron  4 mg Intravenous Q6H PRN Jerson Brambila [address]      oxyCODONE  2 5 mg Per PEG Tube Q4H PRN Stone Sanchez PA-C      predniSONE  30 mg Per G Tube Daily BETTIE Brambila      saccharomyces boulardii  250 mg Per G Tube BID [address] [facility] [place], [address]      sildenafil  10 mg Per G Tube TID Ailyn Cooper DO      sodium chloride  1 spray Each Nare Q1H PRN [address] [facility]      sodium hypochlorite  1 application Irrigation Daily David Sanchez DO          Today, Patient Was Seen By: Sanjiv Abernathy PA-C    **Please Note: This note may have been constructed using a voice recognition system  **

## 2022-08-04 NOTE — CONSULTS
Consultation - Palliative and Supportive Care   Ifeoma Quiros 80 y o  female [id number]    Patient Active Problem List   Diagnosis    Sacral wound    Severe protein-calorie malnutrition ([facility] [address] )    Dysphagia, oropharyngeal phase    Scleroderma (HCC)    Acquired hypothyroidism    Anemia    Urinary retention    Acute on chronic respiratory failure with hypoxia (HCC)    Pneumonia    Hearing loss    Pulmonary hypertension (HCC)    Proximal muscle weakness    Pressure injury of sacral region, stage 4 (HCC)    Ambulatory dysfunction    Chronic hypoxemic respiratory failure (HCC)    Pressure injury of left elbow, unstageable (HCC)    Pressure injury of right elbow, unstageable (HCC)    Pressure injury of left heel, unstageable (HCC)    UTI (urinary tract infection)    Hyponatremia    Elevated troponin    Moderate protein-calorie malnutrition (HCC)    SIRS (systemic inflammatory response syndrome) (HCC)     Active issues specifically addressed today include:    Palliative care encounter   Goals of care discussion   PEG tube with tube feed dependence   Acute on chronic respiratory failure    Plan:  1  Symptom management -    Melatonin 3mg HS --> pushed back to administration at 2300 per pt request   Acetaminophen 975mg via PEG Q8H [address]   Oxycodone 2 5mg via PEG Q4H PRN moderate-severe pain or prior to repositioning/VAC change   Dilaudid 0 2mg IV Q4H PRN BT pain    2  Goals -    Ongoing disease directed cares with limits of DNR/DNI   Patient wishes for alternative placement in a SNF that has suction capabilities to better manage her secretions  Names Erin in particular  Code Status: DNR - Level 3   Decisional apparatus:  Patient is competent on my exam today  If competence is lost, patient's substitute decision maker would default to spouse by [place]  Advance Directive / Living Will / POLST:  Completed, but not on file    3   Social support   Patient is supported by her spouse, son, daughter, among other family members  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   Sister, Destiny Leon, at bedside during time of visit   Offered to call patient's daughter, but patient says Destiny Jinchantel will update her  4  Follow-up   Palliative care will check in on symptom management remotely tomorrow   Outpatient follow-up is unlikely given patient's poor functional status     I have reviewed the patient's controlled substance dispensing history in the Prescription Drug Monitoring Program in compliance with the [facility] regulations before prescribing any controlled substances  We appreciate the invitation to be involved in this patient's care  We will see on an as needed basis for the remainder of the hospital stay  Please do not hesitate to reach our on call provider through our clinic answering service at  should you have acute symptom control concerns  Dominga Jonas PA-C  Palliative and Supportive Care  Clinic/Answering Service: [phone]  You can find me on [employer]! IDENTIFICATION:  Inpatient consult to Palliative Care  Consult performed by: Dominga Jonas PA-C  Consult ordered by: Sofia Isaac MD        Physician Requesting Consult: Sofia Isaac*  Reason for Consult / Principal Problem: goals of care  Hx and PE limited by: n/a    HISTORY OF PRESENT ILLNESS:       Estrella Olmstead is a 80 y o  female with pulmonary hypertension, interstitial lung disease, crest syndrome, anemia, urinary retention, dysphagia status post PEG presented with respiratory distress to [facility] on 08/01  Patient was residing in status when she developed a coughing fit  Patient was suctioned in the ED resulting in improved rhonchi wrist breath sounds  She was also found to have UTI for which IV Rocephin was initiated and Tejada catheter exchanged  Pulmonology was consulted and determined patient has acute tracheobronchitis    For 7 has since been discontinued after 3 day course  Hypernatremia has been corrected  General surgery evaluated patient for sacral wound VAC  She is now awaiting discharge back to see if  Palliative Care has been consulted for repeat goals of care discussion  Prior to admission patient was residing at [facility] for short-term rehab where she was discharged to after prolonged hospitalization at [facility] from 06/14 through 7/13  Palliative Care was consulted during that admission resulting in patient expressing desire for DNR 3  Review of Systems   Cardiovascular: Negative  Respiratory: Positive for cough, shortness of breath and sputum production  Skin: Positive for poor wound healing  Musculoskeletal:        Pain associated with sacral wound   Gastrointestinal: Negative for nausea  Unable to tolerate anything PO   Genitourinary:        Urinary catheter blocked on admission   Neurological: Positive for weakness  Psychiatric/Behavioral: The patient has insomnia (improved with proper timing of melatonin)  All other systems reviewed and are negative        Past Medical History:   Diagnosis Date    Anemia     CHF (congestive heart failure) (HCC)     Dysphagia, oropharyngeal phase 06/06/2022    Hypothyroidism     Protein-calorie malnutrition ([address] )     Pulmonary hypertension (HCC)     Sacral ulcer ([facility] [address] )     Scleroderma ([facility] [address] )     Urinary retention      Past Surgical History:   Procedure Laterality Date    WOUND DEBRIDEMENT N/A 6/14/2022    Procedure: DEBRIDEMENT WOUND [facility] OUT); SACRUM AND BUTTOCKS;  Surgeon: Nohelia Hyatt MD;  Location: [facility];  Service: General     Social History     Socioeconomic History    Marital status: /Civil Union     Spouse name: Not on file    Number of children: Not on file    Years of education: Not on file    Highest education level: Not on file   Occupational History    Not on file   Tobacco Use    Smoking status: Never Smoker    Smokeless tobacco: Never Used   Substance and Sexual Activity    Alcohol use: Never    Drug use: Not on file    Sexual activity: Not on file   Other Topics Concern    Not on file   Social History Narrative    Not on file     Social Determinants of Health     Financial Resource Strain: Not on file   Food Insecurity: No Food Insecurity    Worried About Running Out of Food in the Last Year: Never true    Bib of Food in the Last Year: Never true   Transportation Needs: No Transportation Needs    Lack of Transportation (Medical): No    Lack of Transportation (Non-Medical): No   Physical Activity: Not on file   Stress: Not on file   Social Connections: Not on file   Intimate Partner Violence: Not on file   Housing Stability: Low Risk     Unable to Pay for Housing in the Last Year: No    Number of Places Lived in the Last Year: 1    Unstable Housing in the Last Year: No     History reviewed  No pertinent family history  MEDICATIONS / ALLERGIES:    all current active meds have been reviewed    Allergies   Allergen Reactions   [employer] [Selexipag] Anaphylaxis    Adempas [Riociguat] Other (See Comments)     Discontinued 2/2 hypotension    Sulfa Antibiotics Tachycardia    Penicillins Rash       OBJECTIVE:    Physical Exam  Physical Exam  HENT:      Head: Normocephalic and atraumatic  Mouth/Throat:      Mouth: Mucous membranes are dry  Eyes:      Conjunctiva/sclera: Conjunctivae normal    Cardiovascular:      Rate and Rhythm: Normal rate  Pulmonary:      Effort: No respiratory distress  Abdominal:      Comments: PEG in place   Genitourinary:     Comments: Urinary catheter in place  Skin:     Comments: Sacral wound examined via media images   Neurological:      Mental Status: She is alert and oriented to person, place, and time  Psychiatric:         Mood and Affect: Mood normal          Behavior: Behavior normal          Thought Content:  Thought content normal          Judgment: Judgment normal          Lab Results:  Results from last 7 days   Lab Units 08/04/22 0454 08/03/22 0528 08/02/22 0459 08/01/22 0446 07/31/22 2011   WBC Thousand/uL 11 05* 14 72* 18 26*   < > 19 13*   HEMOGLOBIN g/dL 7 7* 7 8* 8 4*   < > 11 4*   HEMATOCRIT % 25 4* 25 2* 26 9*   < > 35 6   PLATELETS Thousands/uL 236 230 233   < > 343   MONO PCT % 4  --   --   --  4    < > = values in this interval not displayed  Results from last 7 days   Lab Units 08/04/22 0454 08/03/22 0528 08/02/22 0459 08/01/22 0446 07/31/22 2011   POTASSIUM mmol/L 4 2 3 5 3 9   < > 4 8   CHLORIDE mmol/L 99 99 97   < > 86*   CO2 mmol/L 28 29 26   < > 31   BUN mg/dL 15 14 18   < > 32*   CREATININE mg/dL 0 25* 0 22* 0 30*   < > 0 45*   CALCIUM mg/dL 8 6 8 1* 8 7   < > 9 9   ALK PHOS U/L  --   --   --   --  109*   ALT U/L  --   --   --   --  14   AST U/L  --   --   --   --  22    < > = values in this interval not displayed  Imaging Studies: Reviewed pertinent studies  EKG, Pathology, and Other Studies: reviewed pertinent studies    Counseling / Coordination of Care    Total floor / unit time spent today 45+ minutes  Greater than 50% of total time was spent with the patient and / or family counseling and / or coordination of care  A description of the counseling / coordination of care: time spent assessing patient, communicating with primary team, RN, family at bedside, complex symptom management, discussing goals of care

## 2022-08-04 NOTE — ASSESSMENT & PLAN NOTE
Lab Results   Component Value Date    HSTNI0 69 (H) 07/31/2022    HSTNID2 10 07/31/2022    HSTNI2 79 (H) 07/31/2022    HSTNID4 3 08/01/2022    HSTNI4 72 (H) 08/01/2022      · Patient arrived to the ED in respiratory distress requiring 6 L nasal cannula  · HS troponins peaked at 79  Elevation likely due to hypoxia from respiratory distress  · EKG with no ischemic changes, less likely ACS as patient has no chest pain    · Received 162 mg ASA in ED  · Continue home 81 mg ASA

## 2022-08-04 NOTE — RESPIRATORY THERAPY NOTE
Respiratory Therapy Note       Patient orally suctioned at this time  Small amount of thick tan/yellow colored secretions removed

## 2022-08-04 NOTE — CASE MANAGEMENT
Case Management Discharge Planning Note    Patient name Tate Ellison  Location /-01 MRN [id number]  : 1940 Date 2022       Current Admission Date: 2022  Current Admission Diagnosis:Acute on chronic respiratory failure with hypoxia [facility])   Patient Active Problem List    Diagnosis Date Noted    SIRS (systemic inflammatory response syndrome) ([address] ) 2022    UTI (urinary tract infection) 2022    Hyponatremia 2022    Elevated troponin 2022    Moderate protein-calorie malnutrition ([address] ) 2022    Pressure injury of left elbow, unstageable ([address] ) 2022    Pressure injury of right elbow, unstageable ([address] ) 2022    Pressure injury of left heel, unstageable ([address] ) 2022    Chronic hypoxemic respiratory failure ([address] ) 2022    Pressure injury of sacral region, stage 4 ([address] ) 2022    Ambulatory dysfunction 2022    Proximal muscle weakness 2022    Pulmonary hypertension ([address] ) 2022    Hearing loss 2022    Severe protein-calorie malnutrition ([address] ) 2022    Pneumonia 2022    Sacral wound 2022    Dysphagia, oropharyngeal phase 2022    Scleroderma ([address] ) 2022    Acquired hypothyroidism 2022    Anemia 2022    Urinary retention 2022    Acute on chronic respiratory failure with hypoxia (HCC) 2022      LOS (days): 4  Geometric Mean LOS (GMLOS) (days): 4 80  Days to GMLOS:1 1     OBJECTIVE:  Risk of Unplanned Readmission Score: 31 79         Current admission status: Inpatient   Preferred Pharmacy:   PATIENT/FAMILY REPORTS NO PREFERRED PHARMACY  No address on file      [address] [place] [place] [address] [facility]  Phone: [phone] Fax: [phone]    Primary Care Provider: Rafat Neal MD    Primary Insurance: [facility]  Secondary Insurance:     DISCHARGE DETAILS:    CM f/u in AM with pt's dtr's preferred choice for [facility] - facility able to offer bed  Dtr remained in agreement to [place]  SLIM notified CM during rounds pt may qualify for LTAC - pt was approved for [facility] f/u with family, dtr does not want pt to go to [facility], plan remains for [facility]  CM notifed CM d/c support to move forward for auth for [facility]  CM received notice from d/c support, pt's [employer] currently inactive  CM to follow up re: insurance

## 2022-08-04 NOTE — PLAN OF CARE
Problem: PHYSICAL THERAPY ADULT  Goal: Performs mobility at highest level of function for planned discharge setting  See evaluation for individualized goals  Description: Treatment/Interventions: LE strengthening/ROM, Therapeutic exercise, Endurance training, Patient/family training, Equipment eval/education, Bed mobility, Spoke to MD, Spoke to nursing, Spoke to case management, Continued evaluation (PT to see for transfer and gait training when appropriate )    See flowsheet documentation for full assessment, interventions and recommendations  Outcome: Not Progressing  Note: Prognosis: Guarded  Problem List: Decreased strength, Decreased range of motion, Decreased endurance, Impaired balance, Decreased mobility, Decreased cognition, Impaired judgement, Decreased safety awareness, Decreased skin integrity, Pain  Assessment: Pt seen for PT treatment today with focus on bed mobility and upright/EOB positioning tolerance  Pt presents semi-supine in bed, reports ongoing sacral wound pain, but is agreeable to participate in session  Pt just finishing breathing treatment, and notably anxious re: not being suctioned post treatment, respiratory therapist present and aware, reports she has no indication for suction  Pt requires [address] person for rolling R<>L in bed, MAXA + bedrail to maintain sidelying  Pt noted to be incontinent of bowel and requires dependent hygiene care to be completed  Attempt at upright sitting at EOB deferred as surgery team arrived for application of sacral wound vac  Therapist provided education to pt and sister regarding positioning after wound vac placement, for ongoing pressure relief and offloading as well as comfort - both verbalize understanding  At end of session pt was left semi-supine in bed with sister and RN present  Will continue skilled PT POC as able and appropriate   Pt with overall limited activity tolerance; requires frequent repositioning t/o the day which wears her out and results in decreased tolerance of therapy participation  Barriers to Discharge: Decreased caregiver support, Inaccessible home environment     PT Discharge Recommendation: Post acute rehabilitation services    See flowsheet documentation for full assessment

## 2022-08-04 NOTE — PROGRESS NOTES
Progress Note - Cardiology   [facility] 80 y o  female MRN: [id number]  Unit/Bed#: -01 Encounter: [id number]    Assessment:  Principal Problem:    Acute on chronic respiratory failure with hypoxia (HCC)  Active Problems:    Dysphagia, oropharyngeal phase    Scleroderma (HCC)    Anemia    Urinary retention    Pulmonary hypertension (HCC)    Pressure injury of sacral region, stage 4 (HCC)    Hyponatremia    Elevated troponin    Moderate protein-calorie malnutrition (HCC)    SIRS (systemic inflammatory response syndrome) [facility]    79-year-old female who presented with acute on chronic hypoxic respiratory failure  She has tracheobronchitis and has growth from the sputum culture now  Known pulmonary hypertension for which she takes sildenafil and Opsumit as an outpatient  She is not on any chronic diuretic therapy  Plan:    Continue sildenafil and Opsumit  These are chronic outpatient therapy for her  Minimal elevation troponin does not represent acute coronary syndrome  No additional ischemic evaluation recommended at this time  Pulmonary team following, as well  Treatment of infection per them and the primary team  Requires suctioning for secretions  Subjective/Objective     Subjective: Had episode yesterday of thick mucous requiring suctioning  Remains weak, but overall reports improvement in breathing compared to yesterday      Objective:    Vitals: /69 (BP Location: Right arm)   Pulse 96   Temp 97 9 °F (36 6 °C) (Oral)   Resp (!) 25   Ht 5' 7 5" (1 715 m)   Wt 55 kg (121 lb 4 1 oz)   SpO2 100%   BMI 18 71 kg/m²   Vitals:    08/03/22 0600 08/04/22 0552   Weight: 55 kg (121 lb 4 1 oz) 55 kg (121 lb 4 1 oz)     Orthostatic Blood Pressures    Flowsheet Row Most Recent Value   Blood Pressure 126/69 filed at 08/04/2022 0813   Patient Position - Orthostatic VS Lying filed at 08/04/2022 0813          Intake/Output Summary (Last 24 hours) at 8/4/2022 2285  Last data filed at 8/4/2022 4763  Gross per 24 hour   Intake --   Output 750 ml   Net -750 ml     Physical Exam:  GEN: Celia Shore thin elderly female  Chronically ill appearing  Weak  HEENT: pupils equal, round, and reactive to light; extraocular muscles intact  NECK: supple, no carotid bruits   HEART: irregularly irregular    LUNGS: rhonchi  ABDOMEN: normal bowel sounds, soft, no tenderness, no distention  EXTREMITIES: peripheral pulses normal; no clubbing, cyanosis, or edema  NEURO: no focal findings   SKIN: normal without suspicious lesions on exposed skin    Medications:    Current Facility-Administered Medications:     acetaminophen (TYLENOL) oral suspension 650 mg, 650 mg, Oral, Q4H PRN, [address] [facility] [address], CRNP, 650 mg at 08/04/22 8863    aspirin chewable tablet 162 mg, 162 mg, Oral, Once, Dory Dobbins PA-C    aspirin chewable tablet 81 mg, 81 mg, Per G Tube, Daily, [address] [facility] [address], CRNP, 81 mg at 08/04/22 0818    atovaquone (MEPRON) oral suspension 1,500 mg, 1,500 mg, Per G Tube, Daily With Breakfast, Myles Garcia MD    cholecalciferol (VITAMIN D3) tablet 1,000 Units, 1,000 Units, Per G Tube, Daily, [address] [facility] [address], CRNP, 1,000 Units at 08/04/22 0818    dextromethorphan-guaiFENesin (ROBITUSSIN DM) oral syrup 10 mL, 10 mL, Oral, BID, David Sanchez DO, 10 mL at 08/04/22 0817    fluticasone (FLONASE) 50 mcg/act nasal spray 1 spray, 1 spray, Nasal, BID, [address] [facility] [address] [employer], CRNP, 1 spray at 73/68/94 2340    folic acid (FOLVITE) tablet 1 mg, 1 mg, Per PEG Tube, Daily, [address] [facility] [address] [employer], CRNP, 1 mg at 08/04/22 0818    glycerin-hypromellose- (ARTIFICIAL TEARS) ophthalmic solution 2 drop, 2 drop, Both Eyes, BID, [address] [facility] [address] [employer], CRNP, 2 drop at 08/03/22 1822    glycopyrrolate (ROBINUL) tablet 1 mg, 1 mg, Oral, TID, David Sanchez DO, 1 mg at 08/03/22 2234    guaiFENesin (ROBITUSSIN) oral liquid 400 mg, 400 mg, Per G Tube, TID, David Sanchez DO, 400 mg at 08/03/22 2241    heparin (porcine) subcutaneous injection 5,000 Units, 5,000 Units, Subcutaneous, Q12H, 5,000 Units at 08/04/22 0817 **AND** Platelet count, , , Once, [facility], BETTIE    ipratropium (ATROVENT) 0 02 % inhalation solution 0 5 mg, 0 5 mg, Nebulization, TID, Alessia Jara MD    levalbuterol Chadwickscarlett Singhel) inhalation solution 1 25 mg, 1 25 mg, Nebulization, TID, [facility], CRNP, 1 25 mg at 08/04/22 2348    levothyroxine tablet 25 mcg, 25 mcg, Per G Tube, Early Morning, BETTIE Brambila, 25 mcg at 08/04/22 0551    loratadine (CLARITIN) tablet 10 mg, 10 mg, Per G Tube, Daily, [facility], BETTIE, 10 mg at 08/04/22 0818    Macitentan (OPSUMIT) tablet 10 mg, 10 mg, Per G Tube, Daily, David Sanchez DO    magnesium sulfate IVPB (premix) SOLN 1 g, 1 g, Intravenous, Once, Chloe Mi PA-C    melatonin tablet 3 mg, 3 mg, Per G Tube, HS, [facility], BETTIE, 3 mg at 08/03/22 2234    nystatin (MYCOSTATIN) powder, , Topical, BID, BETTIE Brambila, Given at 08/04/22 0820    omeprazole (PriLOSEC) oral suspension 20 mg, 20 mg, Per PEG Tube, Early Morning, [facility], BETTIE, 20 mg at 08/04/22 0552    ondansetron (ZOFRAN) injection 4 mg, 4 mg, Intravenous, Q6H PRN, [facility], BETTIE    oxyCODONE (ROXICODONE) oral solution 2 5 mg, 2 5 mg, Per PEG Tube, Q4H PRN, Nehal Mckeon PA-C, 2 5 mg at 08/03/22 1354    predniSONE tablet 30 mg, 30 mg, Per G Tube, Daily, [facility], BETTIE, 30 mg at 08/04/22 0818    saccharomyces boulardii (FLORASTOR) capsule 250 mg, 250 mg, Per G Tube, BID, [employer], CRNP, 250 mg at 08/04/22 0818    sildenafil (REVATIO) tablet 10 mg, 10 mg, Per G Tube, TID, David Sanchez DO, 10 mg at 08/04/22 0817    sodium chloride (OCEAN) 0 65 % nasal spray 1 spray, 1 spray, Each Nare, Q1H PRN, [employer], CRNP, 1 spray at 08/04/22 0820    sodium hypochlorite (DAKIN'S HALF-STRENGTH) 0 25 percent topical solution 1 application, 1 application, Irrigation, Daily, David Sanchez DO, 1 application at 49/01/82 1033    Lab Results:      Results from last 7 days   Lab Units 08/04/22 0454 08/03/22 0528 08/02/22 0459   WBC Thousand/uL 11 05* 14 72* 18 26*   HEMOGLOBIN g/dL 7 7* 7 8* 8 4*   HEMATOCRIT % 25 4* 25 2* 26 9*   PLATELETS Thousands/uL 236 230 233         Results from last 7 days   Lab Units 08/04/22 0454 08/03/22 0528 08/02/22 0459 08/01/22 0446 07/31/22 2011   SODIUM mmol/L 135 135 132*   < > 127*   POTASSIUM mmol/L 4 2 3 5 3 9   < > 4 8   CHLORIDE mmol/L 99 99 97   < > 86*   CO2 mmol/L 28 29 26   < > 31   BUN mg/dL 15 14 18   < > 32*   CREATININE mg/dL 0 25* 0 22* 0 30*   < > 0 45*   CALCIUM mg/dL 8 6 8 1* 8 7   < > 9 9   ALK PHOS U/L  --   --   --   --  109*   ALT U/L  --   --   --   --  14   AST U/L  --   --   --   --  22    < > = values in this interval not displayed  Results from last 7 days   Lab Units 08/04/22 0454 08/03/22 0528 08/02/22  0459   MAGNESIUM mg/dL 1 8* 1 6* 1 7*     Telemetry: Personally reviewed  AFib  At about 3 am, rapid rates transiently  Echo 6/20/22:   Left Ventricle: Left ventricular cavity size is normal  Wall thickness is normal  There is no concentric hypertrophy  The left ventricular ejection fraction is 70%  Systolic function is vigorous  Wall motion is normal  Diastolic function is moderately abnormal, consistent with grade II (pseudonormal) relaxation  Left atrial filling pressure is elevated    IVS: There is systolic flattening of the interventricular septum consistent with right ventricle pressure overload    Right Ventricle: Right ventricular cavity size is mildly dilated  Wall thickness is increased    Left Atrium: The atrium is mildly dilated    Right Atrium: The atrium is mildly dilated    Atrial Septum: The septum bows into the right atrium, suggesting increased left atrial pressure    Aortic Valve:  The leaflets are moderately thickened  The leaflets are moderately calcified  There is mildly reduced mobility  There is mild stenosis  The aortic valve peak velocity is 2 18 m/s  The aortic valve mean gradient is 12 mmHg  The DVI is 0 59  The aortic valve area is 1 82 cm2    Mitral Valve: There is mild annular calcification    Tricuspid Valve: There is moderate regurgitation  The right ventricular systolic pressure is moderately elevated  The estimated right ventricular systolic pressure is 74 07 mmHg    Pulmonary Artery: Notching of the RVOT Doppler waveform is noted    Pulmonary Veins: There is systolic blunting in the pulmonary veins

## 2022-08-04 NOTE — OCCUPATIONAL THERAPY NOTE
Occupational Therapy Evaluation     Patient Name: Guera Brambila  EQSRQ'[id number] Date: 8/4/2022  Problem List  Principal Problem:    Acute on chronic respiratory failure with hypoxia [facility])  Active Problems:    Dysphagia, oropharyngeal phase    Scleroderma (HCC)    Anemia    Urinary retention    Pulmonary hypertension (HCC)    Pressure injury of sacral region, stage 4 (HCC)    Hyponatremia    Elevated troponin    Moderate protein-calorie malnutrition (HCC)    SIRS (systemic inflammatory response syndrome) (HCC)    Past Medical History  Past Medical History:   Diagnosis Date    Anemia     CHF (congestive heart failure) (HCC)     Dysphagia, oropharyngeal phase 06/06/2022    Hypothyroidism     Protein-calorie malnutrition ([address] )     Pulmonary hypertension ([address] )     Sacral ulcer ([facility] )     Scleroderma ([facility] )     Urinary retention      Past Surgical History  Past Surgical History:   Procedure Laterality Date    WOUND DEBRIDEMENT N/A 6/14/2022    Procedure: DEBRIDEMENT WOUND [facility] OUT); SACRUM AND BUTTOCKS;  Surgeon: Tiffanie Yan MD;  Location: [facility];  Service: General         08/04/22 1458   OT Last Visit   OT Visit Date 08/04/22  (Thursday)   Note Type   Note type Evaluation   Restrictions/Precautions   Weight Bearing Precautions Per Order No   Other Precautions Bed Alarm;O2;Fall Risk;Pain;Multiple lines;Contact/isolation  (lilly catheter; O2 via NC, sacral wound; air mattress)   Pain Assessment   Pain Assessment Tool FLACC   Effect of Pain on Daily Activities limits activity tolerance and I w/ ADLs   Patient's Stated Pain Goal No pain   Hospital Pain Intervention(s) Repositioned; Ambulation/increased activity; Emotional support   Pain Rating: FLACC (Rest) - Face 0   Pain Rating: FLACC (Rest) - Legs 0   Pain Rating: FLACC (Rest) - Activity 0   Pain Rating: FLACC (Rest) - Cry 0   Pain Rating: FLACC (Rest) - Consolability 0   Score: FLACC (Rest) 0   Pain Rating: FLACC (Activity) - Face 1   Pain Rating: FLACC (Activity) - Legs 1   Pain Rating: FLACC (Activity) - Activity 1   Pain Rating: FLACC (Activity) - Cry 1   Pain Rating: FLACC (Activity) - Consolability 1   Score: FLACC (Activity) 5   Home Living   Type of Home   (admit from rehab;)   Additional Comments Pt admit from rehab at [facility]  At baseline, pt lives w/ her  in [address] [place] [address] w/ 4 MARY  Prior Function   Level of [place] Needs assistance with IADLs   Lives With Facility staff   Receives Help From Family;Personal care attendant  (supportive sister and  present during eval)   ADL Assistance Needs assistance   IADLs Needs assistance   Falls in the last 6 months 0   Vocational Retired   Comments At baseline prior to rehab and recent admission (s), pt completing ADL / IADL independently  Pt required total A using lyssa lift for OOB to w/c at rehab and assist w/ ADL/ IADL   Lifestyle   Autonomy Pt needs assistance w/ ADL/ IADL   Reciprocal Relationships Supportive sister and  present  Staff assist w/ ADL/ IADL at rehab  Service to Others 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[place], [address] Pt reports enjoying her grand children   Subjective   Subjective "I need pain medication before the wound vac"   ADL   Where Assessed Supine, bed  (vs sidelying in bed)   Eating Assistance Unable to assess   Eating Deficit Other (Comment)  (PEG tube)   Grooming Assistance 2  Maximal Assistance   UB Bathing Assistance Unable to assess   LB Bathing Assistance 1  Total Assistance   UB Dressing Assistance 2  Maximal Assistance   LB Dressing Assistance 1  Total Assistance   Toileting Assistance  1  Total Assistance   Toileting Deficit   (incontinent of bowel, unable to sense when needs to void)   Additional Comments on O2 via NC  O2 sats %   Bed Mobility   Rolling R 2  Maximal assistance   Additional items Assist x 1;Bedrails; Increased time required;Verbal cues;LE management   Rolling L 2  Maximal assistance   Additional items Assist x 1; Increased time required;LE management;Verbal cues; Bedrails   Supine to Sit Unable to assess   Sit to Supine Unable to assess   Additional Comments Pt required max A x1 to roll R<>L  Deferred sitting at EOB due to decreased activity tolerance and surgery presnt for wound vac application   Transfers   Sit to Stand Unable to assess   Stand to Sit Unable to assess   Stand pivot Unable to assess   Functional Mobility   Additional Comments NT  Balance   Static Sitting Zero   Activity Tolerance   Activity Tolerance Patient limited by fatigue;Patient limited by pain;Treatment limited secondary to medical complications (Comment)   Medical Staff Made Aware care coordination w/ PTMegan   Nurse Made Aware per Carla BUENROSTRO appropriate to see pt   RUE Assessment   RUE Assessment X  (limited AROM R UE)   RUE Overall AROM   R Mass Grasp grossly 2+/5   RUE Strength   RUE Overall Strength Deficits;Due to pain   R Shoulder Flexion 1/5   R Elbow Flexion 2/5   R Elbow Extension 2-/5   LUE Assessment   LUE Assessment X   LUE Overall AROM   L Mass Grasp 3/5   LUE Strength   LUE Overall Strength Deficits   L Shoulder Flexion 2-/5   L Elbow Flexion 2+/5   L Elbow Extension 2-/5   Hand Function   Gross Motor Coordination Impaired   Fine Motor Coordination Impaired   Sensation   Light Touch   (responded appropriately to light touch)   Sharp/Dull Not tested   Cognition   Overall Cognitive Status Impaired   Arousal/Participation Alert; Cooperative   Attention Attends with cues to redirect   Orientation Level Oriented to person;Oriented to place;Oriented to situation   Memory Decreased recall of recent events  (details, timeline events; appropriate long term recall)   Following Commands Follows one step commands with increased time or repetition   Comments Identified pt by full name and birthdate  Alert and agreeable to participate in OT eval  Oriented to person, place   Demonstrated appropriate long term recall when discussing work history and her grandchildren  Able to recall where they are going to college  Assessment   Limitation Decreased ADL status; Decreased UE ROM; Decreased UE strength;Decreased cognition;Decreased endurance;Decreased fine motor control;Decreased self-care trans;Decreased high-level ADLs   Assessment Pt is a 79 yo female admitted to [facility] on 7/31/22  Pt presented w/ SOB< coughing w/ sputum production from [place]  Diagnosed w/ acute on chronic respiratory failure w/ hypoxia  Significant PMH impacting her occupational performance includes scleroderma w/ crest syndrome, anemia, urinary retention, dysphagia s/p PEG tube, CHF, sacral ulcer, s/p sacral wound debridement (6/14/2022)  Per surgery, plan for wound vac application today  Pt w/ active OT Orders and activity orders  Personal factors impacting performance includes inability to complete IADLs, difficulty completing ADLs, increased pain, and limited insight into deficits  At baseline, pt lives w/ her  in [facility] w/ 4 MARY using rollator for community mobility  Pt completing ADL / IADL w/ out assistance  Pt has been in hospital vs rehab since March 2022 and requires lyssa lift OOB to w/c  Upon eval, pt alert and oriented to person, place  Demonstrated appropriate long term recall during conversation about her family / work history  Pt required max A x1 to roll R<>L  Pt reports R hand dominance and demonstrated limited AROM B UE at shoulders and generalized weakness  Grasp strength L 3/5, R 2+/5  Pt required max A to complete UBD, total A to complete LBD and toileting  Pt presents w/ decreased activity tolerance, decreased endurance, decreased UE strength / ROM, decreased LE strength, limited insight into deficits, and increased pain impacting her I w/ dressing, bathing, oral hygiene, functional mobility, functional transfers, activity engagement, and clothing mgmt   Pt completing ADL below baseline level of I and would benefit from OT while in acute care to address deficits  Recommend post acute rehab when medically stable for discharge from acute care  Will continue to follow   Goals   Patient Goals Pt stated that she would like to be able to walk w/ a walker   Plan   Treatment Interventions ADL retraining;Functional transfer training;UE strengthening/ROM; Endurance training;Patient/family training;Equipment evaluation/education; Compensatory technique education;Continued evaluation; Energy conservation; Activityengagement   Goal Expiration Date 08/18/22   OT Frequency 2-3x/wk   Recommendation   OT Discharge Recommendation Post acute rehabilitation services   AM-[facility] Daily Activity Inpatient   Lower Body Dressing 1   Bathing 1   Toileting 1   Upper Body Dressing 2   Grooming 2   Eating 2   Daily Activity Raw Score 9   Turning Head Towards Sound 4   Follow Simple Instructions 4   Low Function Daily Activity Raw Score 17   Low Function Daily Activity Standardized Score 28 95   AM-PAC Applied Cognition Inpatient   Following a Speech/Presentation 3   Understanding Ordinary Conversation 4   Taking Medications 3   Remembering Where Things Are Placed or Put Away 3   Remembering List of 4-5 Errands 2   Taking Care of Complicated Tasks 2   Applied Cognition Raw Score 17   Applied Cognition Standardized Score 36 52   Barthel Index   Feeding 0   Bathing 0   Grooming Score 0   Dressing Score 0   Bladder Score 5   Bowels Score 0   Toilet Use Score 0   Transfers (Bed/Chair) Score 0   Mobility (Level Surface) Score 0   Stairs Score 0   Barthel Index Score 5   Modified Irwin Scale   Modified Irwin Scale 5      The patient's raw score on the AM-PAC Daily Activity inpatient short form low function score is 17, standardized score is Low Function Daily Activity Standardized Score: 28 95  Patients with a standardized score less than 39 4 are likely to benefit from discharge to post-acute rehab services   Please refer to the recommendation of the Occupational Therapist for safe discharge planning      Pt goals to be met by 8/18/22:  -Pt will demonstrate good attention and participation in ongoing eval of functional cognitive skills to assist in DC planning    -Pt will complete bed mobility to roll R<>L w/ min A x1 to max I and minimize burden of care     -Pt will complete grooming w/ min A    -Pt will demonstrate improved UE ROM and strength at least 3/5 to max I w/ ADLs and improve engagement    -Pt will complete UBD w/ min A to max I and minimize burden of care    -Pt will demonstrate improved activity tolerance to participate in ADLs / functional tasks for at least 10 minutes w/ no more leonel 1 rest break or sign / symptoms of fatigue to max I     -Pt will consistently follow multi step directions during ADLs w/ no more than 1 cue / prompt    -Pt will demonstrate good attention and participation in ongoing evaluation to assess supine to sit         Aric Conley, OTR/L

## 2022-08-04 NOTE — PROGRESS NOTES
Progress Note - Infectious Disease   Zainab Nadine 80 y o  female MRN: [id number]  Unit/Bed#: -01 Encounter: [id number]      Impression/Plan:  1  SIRS syndrome, POA  Noted to have tachycardia and leukocytosis on presentation  Clinical picture is clouded by chronic steroids  Suspect presentation is multifactorial and possibly related to 2 and 3  Sputum cultures likely represent colonization  Blood cultures and urine cultures reviewed  White count has further down trended and procalcitonin normalized  Would maintain off systemic antibiotics  Continue atovaquone prophylaxis as below  Continue to trend fever curve/WBC  Follow-up pending blood cultures  Continue aggressive airway clearance  Ongoing follow-up by General surgery  Continue local wound care  Ongoing follow-up by Pulmonary  Ongoing follow-up by case management for placement  Palliative care consulted  Additional supportive care/discharge as per primary     2  Abnormal UA, Tejada malfunction and possible urinary tract infection  UA noted to be abnormal on admission and was reported to have Tejada catheter malfunction in the ER and catheter was exchanged  Consideration for possible urinary tract infection as patient also reported some intermittent dysuria leading up to admission  Urine cultures polymicrobial in the setting of catheter  Klebsiella possible pathogen and Pseudomonas likely colonizer  Patient completed 3 days of IV ceftriaxone  She has no urinary complaints, abdominal exam benign, and Tejada draining clear urine  No further antibiotics for this issue  Continue to trend fever curve/WBC  Maintain Tejada catheter  Monitor abdominal exam  Additional care as per primary     3  Acute on chronic hypoxic respiratory failure  Oxygen requirements rapidly improved with secretion clearance  She reports lack of devices at her facility  Lower suspicion at this point for pneumonia based on exam and with improvement in imaging compared to prior  Suspect overall poor secretion handling in the setting of 6  Procalcitonin normalized white count further down trended  Maintain off antibiotics as above  Continue aggressive suctioning and chest PT  Case management following for placement  Continue chronic oxygen support  Ongoing follow-up by Pulmonary  Continue to trend fever curve/WBC  Palliative care consulted     4  Positive sputum culture with MDR Pseudomonas  Suspect that this represents colonization in the patient's sputum  Lower suspicion for active pneumonia  Maintain off antibiotics for now  Continue aggressive respiratory secretion clearance  Case management following for placement needs  Continue to trend fever curve/WBC     5  Stage IV pressure injury, POA  Clinical images reviewed  Patient reports that she was told her wound is doing well on surgical evaluation  She is pending VAC placement  Maintain off antibiotics as above  Ongoing follow-up by surgery  Continue local wound  Continue to trend fever curve/WBC  Continue pressure offloading     6  Scleroderma and chronic steroids  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  She has had IVIG and IV steroids  Her course is also complicated by pulmonary hypertension and interstitial lung disease  Currently on 30 mg of steroids daily  Recommend outpatient follow-up with Rheumatology  Continue atovaquone prophylaxis 1500 mg daily  Ongoing follow-up by Pulmonary  Continue to trend fever curve/WBC    Above plan discussed in detail with the patient, primary Service and Case Management  ID consult service will continue to follow  Antibiotics:  Discontinued yesterday    Subjective:  Patient currently denies having any nausea vomiting, chest pain shortness of breath  She had an episode of mucous plugging overnight the responded to suctioning and caused increased work of breathing  Labs reviewed and white count has further down trended and procalcitonin normalized    Patient reports evaluation with surgery went well and plans are for [facility] dressing  She reportedly was told but wound appeared healthy  She is aware of plans for palliative care evaluation and is agreeable to discussion  She also seems well aware of the chronicity/progression of her disease and her multiple conditions  Reviewed case management notes with referral sent out for placement  Objective:  Vitals:  Temp:  [97 3 °F (36 3 °C)-99 7 °F (37 6 °C)] 97 9 °F (36 6 °C)  HR:  [] 96  Resp:  [18-25] 25  BP: ()/(51-71) 126/69  SpO2:  [91 %-100 %] 100 %  Temp (24hrs), Av 9 °F (36 6 °C), Min:97 3 °F (36 3 °C), Max:99 7 °F (37 6 °C)  Current: Temperature: 97 9 °F (36 6 °C)    Physical Exam:   General Appearance:  Alert, interactive, nontoxic, no acute distress  Chronically ill-appearing and frail  Throat: Oropharynx moist without lesions  Lungs:   Decreased breath sounds throughout, no wheezes, rales or rhonchi appreciated  Intermittent upper airway suctioning needed within secretion  Heart:  RRR; no murmur, rub or gallop appreciated   Abdomen:   Soft, non-tender, non-distended, positive bowel sounds  Tejada catheter draining clear urine  No suprapubic discomfort appreciated  Extremities: No clubbing, cyanosis or edema   Skin: No new rashes or lesions  No new draining wounds noted         Labs, Imaging, & Other studies:   All pertinent labs and imaging studies were personally reviewed  Results from last 7 days   Lab Units 22   WBC Thousand/uL 11 05* 14 72* 18 26*   HEMOGLOBIN g/dL 7 7* 7 8* 8 4*   PLATELETS Thousands/uL 236 230 233     Results from last 7 days   Lab Units 22  0454 226 22   POTASSIUM mmol/L 4 2   < > 4 8   CHLORIDE mmol/L 99   < > 86*   CO2 mmol/L 28   < > 31   BUN mg/dL 15   < > 32*   CREATININE mg/dL 0 25*   < > 0 45*   EGFR ml/min/1 73sq m 116   < > 95   CALCIUM mg/dL 8 6   < > 9 9   AST U/L  --   --  22   ALT U/L --   --  14   ALK PHOS U/L  --   --  109*    < > = values in this interval not displayed  Results from last 7 days   Lab Units 08/02/22  1257 08/01/22  0824 07/31/22 2033 07/31/22 2011   BLOOD CULTURE  No Growth at 24 hrs  No Growth at 24 hrs   --   --  No Growth at 72 hrs  No Growth at 72 hrs     SPUTUM CULTURE   --  3+ Growth of Pseudomonas aeruginosa MDR*  3+ Growth of   --   --    GRAM STAIN RESULT   --  No Polys or Bacteria seen  --   --    URINE CULTURE   --   --  >100,000 cfu/ml Klebsiella pneumoniae*  >100,000 cfu/ml Pseudomonas aeruginosa MDR*  --

## 2022-08-04 NOTE — ASSESSMENT & PLAN NOTE
Patient presented to ED from SNF for sudden onset respiratory distress  Per patient, had coughing episode on Friday with clear/white sputum, day prior to arrival around 1300 began having coughing episode and unable to clear airway from sputum  Chronically on 2 L at HS for comfort  · In ED, patient noted to have thick, dry secretions in trachea after suctioning  Initially required 6 L NC for continued hypoxia and respiratory distress  Oxygen humidified to assist with thinning secretions  · CXR improved from previous admission  · Cause of acute on chronic hypoxic RF likely dried mucous  · Patient yesterday evening had episode of worsening SOB requiring tracheal suctioning from RT  · Patient currently maintained on 3 L NC O2, would recommend only using tracheal suctioning as needed and not scheduled basis after respiratory treatments    · Pulmonology following, recommendations appreciated  · Continue home Xopenex and Sodium Chloride Neb treatments TID  · Humidify oxygen  · Suction frequently, patient with weak cough  · Patient on Mucinex outpatient, continue  · Respiratory protocol, IS, flutter valve

## 2022-08-05 ENCOUNTER — APPOINTMENT (INPATIENT)
Dept: RADIOLOGY | Facility: HOSPITAL | Age: 82
DRG: 189 | End: 2022-08-05
Payer: COMMERCIAL

## 2022-08-05 LAB
ANION GAP SERPL CALCULATED.3IONS-SCNC: 7 MMOL/L (ref 4–13)
BUN SERPL-MCNC: 14 MG/DL (ref 5–25)
CALCIUM SERPL-MCNC: 8.9 MG/DL (ref 8.4–10.2)
CHLORIDE SERPL-SCNC: 95 MMOL/L (ref 96–108)
CO2 SERPL-SCNC: 31 MMOL/L (ref 21–32)
CREAT SERPL-MCNC: 0.24 MG/DL (ref 0.6–1.3)
ERYTHROCYTE [DISTWIDTH] IN BLOOD BY AUTOMATED COUNT: 21.6 % (ref 11.6–15.1)
GFR SERPL CREATININE-BSD FRML MDRD: 117 ML/MIN/1.73SQ M
GLUCOSE SERPL-MCNC: 125 MG/DL (ref 65–140)
HCT VFR BLD AUTO: 28.9 % (ref 34.8–46.1)
HGB BLD-MCNC: 9 G/DL (ref 11.5–15.4)
MAGNESIUM SERPL-MCNC: 1.9 MG/DL (ref 1.9–2.7)
MCH RBC QN AUTO: 32.7 PG (ref 26.8–34.3)
MCHC RBC AUTO-ENTMCNC: 31.1 G/DL (ref 31.4–37.4)
MCV RBC AUTO: 105 FL (ref 82–98)
NRBC BLD AUTO-RTO: 1 /100 WBCS
PLATELET # BLD AUTO: 328 THOUSANDS/UL (ref 149–390)
PMV BLD AUTO: 10 FL (ref 8.9–12.7)
POTASSIUM SERPL-SCNC: 4.7 MMOL/L (ref 3.5–5.3)
RBC # BLD AUTO: 2.75 MILLION/UL (ref 3.81–5.12)
SODIUM SERPL-SCNC: 133 MMOL/L (ref 135–147)
WBC # BLD AUTO: 18 THOUSAND/UL (ref 4.31–10.16)

## 2022-08-05 PROCEDURE — 80048 BASIC METABOLIC PNL TOTAL CA: CPT | Performed by: PHYSICIAN ASSISTANT

## 2022-08-05 PROCEDURE — 71045 X-RAY EXAM CHEST 1 VIEW: CPT

## 2022-08-05 PROCEDURE — 94669 MECHANICAL CHEST WALL OSCILL: CPT

## 2022-08-05 PROCEDURE — 99232 SBSQ HOSP IP/OBS MODERATE 35: CPT | Performed by: INTERNAL MEDICINE

## 2022-08-05 PROCEDURE — 83735 ASSAY OF MAGNESIUM: CPT | Performed by: PHYSICIAN ASSISTANT

## 2022-08-05 PROCEDURE — 94760 N-INVAS EAR/PLS OXIMETRY 1: CPT

## 2022-08-05 PROCEDURE — 85027 COMPLETE CBC AUTOMATED: CPT | Performed by: PHYSICIAN ASSISTANT

## 2022-08-05 PROCEDURE — 99232 SBSQ HOSP IP/OBS MODERATE 35: CPT | Performed by: PHYSICIAN ASSISTANT

## 2022-08-05 PROCEDURE — 99232 SBSQ HOSP IP/OBS MODERATE 35: CPT | Performed by: SURGERY

## 2022-08-05 PROCEDURE — 94640 AIRWAY INHALATION TREATMENT: CPT

## 2022-08-05 RX ORDER — ACETAMINOPHEN 160 MG/5ML
975 SUSPENSION, ORAL (FINAL DOSE FORM) ORAL EVERY 8 HOURS SCHEDULED
Status: DISCONTINUED | OUTPATIENT
Start: 2022-08-05 | End: 2022-08-09 | Stop reason: HOSPADM

## 2022-08-05 RX ORDER — LORAZEPAM 2 MG/ML
0.25 INJECTION INTRAMUSCULAR EVERY 6 HOURS PRN
Status: DISCONTINUED | OUTPATIENT
Start: 2022-08-05 | End: 2022-08-09 | Stop reason: HOSPADM

## 2022-08-05 RX ORDER — OXYCODONE HCL 5 MG/5 ML
5 SOLUTION, ORAL ORAL EVERY 4 HOURS PRN
Status: DISCONTINUED | OUTPATIENT
Start: 2022-08-05 | End: 2022-08-09 | Stop reason: HOSPADM

## 2022-08-05 RX ORDER — BUSPIRONE HYDROCHLORIDE 10 MG/1
10 TABLET ORAL 3 TIMES DAILY
Status: DISCONTINUED | OUTPATIENT
Start: 2022-08-05 | End: 2022-08-09 | Stop reason: HOSPADM

## 2022-08-05 RX ORDER — SODIUM CHLORIDE 9 MG/ML
75 INJECTION, SOLUTION INTRAVENOUS CONTINUOUS
Status: DISCONTINUED | OUTPATIENT
Start: 2022-08-05 | End: 2022-08-05

## 2022-08-05 RX ADMIN — SILDENAFIL CITRATE 10 MG: 20 TABLET ORAL at 08:27

## 2022-08-05 RX ADMIN — Medication 250 MG: at 17:16

## 2022-08-05 RX ADMIN — LEVOTHYROXINE SODIUM 25 MCG: 25 TABLET ORAL at 05:42

## 2022-08-05 RX ADMIN — ACETAMINOPHEN 975 MG: 650 SUSPENSION ORAL at 05:41

## 2022-08-05 RX ADMIN — SODIUM CHLORIDE 75 ML/HR: 0.9 INJECTION, SOLUTION INTRAVENOUS at 13:28

## 2022-08-05 RX ADMIN — IPRATROPIUM BROMIDE 0.5 MG: 0.5 SOLUTION RESPIRATORY (INHALATION) at 14:37

## 2022-08-05 RX ADMIN — LORATADINE 10 MG: 10 TABLET ORAL at 08:27

## 2022-08-05 RX ADMIN — IPRATROPIUM BROMIDE 0.5 MG: 0.5 SOLUTION RESPIRATORY (INHALATION) at 08:06

## 2022-08-05 RX ADMIN — GUAIFENESIN 400 MG: 100 SOLUTION ORAL at 20:12

## 2022-08-05 RX ADMIN — Medication 975 MG: at 13:28

## 2022-08-05 RX ADMIN — GLYCERIN 2 DROP: .002; .002; .01 SOLUTION/ DROPS OPHTHALMIC at 17:04

## 2022-08-05 RX ADMIN — SILDENAFIL CITRATE 10 MG: 20 TABLET ORAL at 20:11

## 2022-08-05 RX ADMIN — PREDNISONE 30 MG: 20 TABLET ORAL at 08:27

## 2022-08-05 RX ADMIN — LEVALBUTEROL HYDROCHLORIDE 1.25 MG: 1.25 SOLUTION RESPIRATORY (INHALATION) at 19:32

## 2022-08-05 RX ADMIN — Medication 3 MG: at 23:03

## 2022-08-05 RX ADMIN — GLYCERIN 2 DROP: .002; .002; .01 SOLUTION/ DROPS OPHTHALMIC at 08:28

## 2022-08-05 RX ADMIN — ATOVAQUONE 1500 MG: 750 SUSPENSION ORAL at 11:02

## 2022-08-05 RX ADMIN — OXYCODONE HYDROCHLORIDE 5 MG: 5 SOLUTION ORAL at 15:19

## 2022-08-05 RX ADMIN — NYSTATIN: 100000 POWDER TOPICAL at 17:04

## 2022-08-05 RX ADMIN — ASPIRIN 81 MG: 81 TABLET, CHEWABLE ORAL at 08:27

## 2022-08-05 RX ADMIN — OXYCODONE HYDROCHLORIDE 2.5 MG: 5 SOLUTION ORAL at 02:10

## 2022-08-05 RX ADMIN — HEPARIN SODIUM 5000 UNITS: 5000 INJECTION INTRAVENOUS; SUBCUTANEOUS at 08:27

## 2022-08-05 RX ADMIN — GLYCOPYRROLATE 1 MG: 1 TABLET ORAL at 08:26

## 2022-08-05 RX ADMIN — IPRATROPIUM BROMIDE 0.5 MG: 0.5 SOLUTION RESPIRATORY (INHALATION) at 19:32

## 2022-08-05 RX ADMIN — OXYCODONE HYDROCHLORIDE 2.5 MG: 5 SOLUTION ORAL at 08:47

## 2022-08-05 RX ADMIN — FOLIC ACID 1 MG: 1 TABLET ORAL at 08:27

## 2022-08-05 RX ADMIN — LORAZEPAM 0.25 MG: 2 INJECTION INTRAMUSCULAR; INTRAVENOUS at 18:09

## 2022-08-05 RX ADMIN — OXYCODONE HYDROCHLORIDE 5 MG: 5 SOLUTION ORAL at 19:46

## 2022-08-05 RX ADMIN — Medication 1000 UNITS: at 08:27

## 2022-08-05 RX ADMIN — BUSPIRONE HYDROCHLORIDE 10 MG: 10 TABLET ORAL at 20:11

## 2022-08-05 RX ADMIN — GUAIFENESIN 400 MG: 100 SOLUTION ORAL at 15:18

## 2022-08-05 RX ADMIN — Medication 250 MG: at 08:27

## 2022-08-05 RX ADMIN — HEPARIN SODIUM 5000 UNITS: 5000 INJECTION INTRAVENOUS; SUBCUTANEOUS at 19:45

## 2022-08-05 RX ADMIN — LEVALBUTEROL HYDROCHLORIDE 1.25 MG: 1.25 SOLUTION RESPIRATORY (INHALATION) at 08:06

## 2022-08-05 RX ADMIN — LEVALBUTEROL HYDROCHLORIDE 1.25 MG: 1.25 SOLUTION RESPIRATORY (INHALATION) at 14:37

## 2022-08-05 RX ADMIN — FLUTICASONE PROPIONATE 1 SPRAY: 50 SPRAY, METERED NASAL at 08:28

## 2022-08-05 RX ADMIN — FLUTICASONE PROPIONATE 1 SPRAY: 50 SPRAY, METERED NASAL at 17:04

## 2022-08-05 RX ADMIN — SALINE NASAL SPRAY 1 SPRAY: 1.5 SOLUTION NASAL at 08:28

## 2022-08-05 RX ADMIN — BUSPIRONE HYDROCHLORIDE 10 MG: 10 TABLET ORAL at 17:04

## 2022-08-05 RX ADMIN — SILDENAFIL CITRATE 10 MG: 20 TABLET ORAL at 15:19

## 2022-08-05 RX ADMIN — NYSTATIN: 100000 POWDER TOPICAL at 08:28

## 2022-08-05 RX ADMIN — GUAIFENESIN 400 MG: 100 SOLUTION ORAL at 08:27

## 2022-08-05 RX ADMIN — HYDROMORPHONE HYDROCHLORIDE 0.2 MG: 0.2 INJECTION, SOLUTION INTRAMUSCULAR; INTRAVENOUS; SUBCUTANEOUS at 05:42

## 2022-08-05 NOTE — CASE MANAGEMENT
Case Management Discharge Planning Note    Patient name Tate Ellison  Location /-01 MRN [id number]  : 1940 Date 2022       Current Admission Date: 2022  Current Admission Diagnosis:Acute on chronic respiratory failure with hypoxia [facility])   Patient Active Problem List    Diagnosis Date Noted    SIRS (systemic inflammatory response syndrome) ([address] ) 2022    UTI (urinary tract infection) 2022    Hyponatremia 2022    Elevated troponin 2022    Moderate protein-calorie malnutrition ([address] ) 2022    Pressure injury of left elbow, unstageable ([address] ) 2022    Pressure injury of right elbow, unstageable ([address] ) 2022    Pressure injury of left heel, unstageable ([address] ) 2022    Chronic hypoxemic respiratory failure ([address] ) 2022    Pressure injury of sacral region, stage 4 ([address] ) 2022    Ambulatory dysfunction 2022    Proximal muscle weakness 2022    Pulmonary hypertension ([address] ) 2022    Hearing loss 2022    Severe protein-calorie malnutrition ([address] ) 2022    Pneumonia 2022    Sacral wound 2022    Dysphagia, oropharyngeal phase 2022    Scleroderma ([address] ) 2022    Acquired hypothyroidism 2022    Anemia 2022    Urinary retention 2022    Acute on chronic respiratory failure with hypoxia ([address] ) 2022      LOS (days): 5  Geometric Mean LOS (GMLOS) (days): 4 80  Days to GMLOS:0 1     OBJECTIVE:  Risk of Unplanned Readmission Score: 29 6         Current admission status: Inpatient   Preferred Pharmacy:   PATIENT/FAMILY REPORTS NO PREFERRED PHARMACY  No address on file      [address] [place] [facility]  Phone: [phone] Fax: [phone]    Primary Care Provider: Rafat Neal MD    Primary Insurance: [id number] [address]  Cyndi Kang  [address] REP  Secondary Insurance: MEDICARE    DISCHARGE DETAILS:     TYREL valencia with pt's dtr, Promise Maurer, at bedside to discuss alternative STR options, as [place] no longer available  Pt's dtr chose [facility] for placement  Facility notified via jace SARAH to continue to follow for STR placement

## 2022-08-05 NOTE — PROGRESS NOTES
Progress Note - Cardiology   [facility] 80 y o  female MRN: [id number]  Unit/Bed#: -01 Encounter: [id number]    Assessment:  Principal Problem:    Acute on chronic respiratory failure with hypoxia (HCC)  Active Problems:    Dysphagia, oropharyngeal phase    Scleroderma (HCC)    Anemia    Urinary retention    Pulmonary hypertension (HCC)    Pressure injury of sacral region, stage 4 (HCC)    Hyponatremia    Elevated troponin    Moderate protein-calorie malnutrition (HCC)    SIRS (systemic inflammatory response syndrome) [facility]    70-year-old female who presented with acute on chronic hypoxic respiratory failure  Known pulmonary hypertension for which she takes sildenafil and Opsumit as an outpatient  She is not on any chronic diuretic therapy  Recurrent mucous plugging leading to hypoxia  Plan:    Continue sildenafil and Opsumit  These are chronic outpatient therapy for her  Minimal elevation troponin does not represent acute coronary syndrome  No additional ischemic evaluation recommended at this time  Issues seem to be mostly with mucous plugging  She does not appear volume overloaded, PRN lasix only  Palliative care following  No new cardiac issues  Will sign off, please call with questions or changes  Subjective/Objective     Subjective:   Another episode of mucous plugging this AM which again required deep suction      Objective:    Vitals: /72 (BP Location: Right arm)   Pulse 95   Temp 98 7 °F (37 1 °C) (Oral)   Resp (!) 26   Ht 5' 7 5" (1 715 m)   Wt 54 4 kg (119 lb 14 9 oz)   SpO2 95%   BMI 18 51 kg/m²   Vitals:    08/04/22 0552 08/05/22 0600   Weight: 55 kg (121 lb 4 1 oz) 54 4 kg (119 lb 14 9 oz)     Orthostatic Blood Pressures    Flowsheet Row Most Recent Value   Blood Pressure 132/72 filed at 08/05/2022 0818   Patient Position - Orthostatic VS Lying filed at 08/05/2022 0818          Intake/Output Summary (Last 24 hours) at 8/5/2022 0935  Last data filed at 8/5/2022 2497  Gross per 24 hour   Intake 300 ml   Output 1200 ml   Net -900 ml     Physical Exam:  GEN: Delta Borrow elderly female, frail  HEENT: pupils equal, round, and reactive to light; extraocular muscles intact  HEART: irregular  LUNGS: rhonchi   ABDOMEN: normal bowel sounds, soft, no tenderness, no distention  EXTREMITIES: no edema   Boots  NEURO: no focal findings   SKIN: normal without suspicious lesions on exposed skin    Medications:    Current Facility-Administered Medications:     acetaminophen (TYLENOL) oral suspension 650 mg, 650 mg, Oral, Q4H PRN, [employer], CRNP, 650 mg at 08/04/22 0551    acetaminophen (TYLENOL) oral suspension 975 mg, 975 mg, Oral, Q8H [address], Nehal Flores PA-C, 975 mg at 08/05/22 0541    aspirin chewable tablet 162 mg, 162 mg, Oral, Once, [employer], PA-C    aspirin chewable tablet 81 mg, 81 mg, Per G Tube, Daily, [employer], CRNP, 81 mg at 08/05/22 0827    [facility]) oral suspension 1,500 mg, 1,500 mg, Per G Tube, Daily With Breakfast, Gisselle Bowman MD, 1,500 mg at 08/04/22 0906    cholecalciferol (VITAMIN D3) tablet 1,000 Units, 1,000 Units, Per G Tube, Daily, [employer], CRNP, 1,000 Units at 08/05/22 0827    fluticasone (FLONASE) 50 mcg/act nasal spray 1 spray, 1 spray, Nasal, BID, [employer], CRNP, 1 spray at 41/54/89 9862    folic acid (FOLVITE) tablet 1 mg, 1 mg, Per PEG Tube, Daily, [employer], CRNP, 1 mg at 08/05/22 0827    glycerin-hypromellose- (ARTIFICIAL TEARS) ophthalmic solution 2 drop, 2 drop, Both Eyes, BID, [employer], CRNP, 2 drop at 08/05/22 2083    guaiFENesin (ROBITUSSIN) oral liquid 400 mg, 400 mg, Per G Tube, TID, Pandi Todhe, DO, 400 mg at 08/05/22 0827    heparin (porcine) subcutaneous injection 5,000 Units, 5,000 Units, Subcutaneous, Q12H, 5,000 Units at 08/05/22 0827 **AND** Platelet count, , , Once, [employer], CRNP    HYDROmorphone HCl (DILAUDID) injection 0 2 mg, 0 2 mg, Intravenous, Q3H PRN, Melissa Kuhn PA-C, 0 2 mg at 08/05/22 0542    ipratropium (ATROVENT) 0 02 % inhalation solution 0 5 mg, 0 5 mg, Nebulization, TID, Cyndi Cooper MD, 0 5 mg at 08/05/22 0806    levalbuterol (Andreas Peterson) inhalation solution 1 25 mg, 1 25 mg, Nebulization, TID, [facility], CRNP, 1 25 mg at 08/05/22 3981    levothyroxine tablet 25 mcg, 25 mcg, Per G Tube, Early Morning, Jermaine Jimenez, BETTIE, 25 mcg at 08/05/22 0542    loratadine (CLARITIN) tablet 10 mg, 10 mg, Per G Tube, Daily, [facility], CRNP, 10 mg at 08/05/22 0827    Macitentan (OPSUMIT) tablet 10 mg, 10 mg, Per G Tube, Daily, David Sanchez DO    melatonin tablet 3 mg, 3 mg, Per G Tube, HS, Nehal Flores PA-C, 3 mg at 08/04/22 2326    nystatin (MYCOSTATIN) powder, , Topical, BID, [facility], CRNP, Given at 08/05/22 1869    omeprazole (PriLOSEC) oral suspension 20 mg, 20 mg, Per PEG Tube, Early Morning, [facility], CRNP, 20 mg at 08/04/22 0552    ondansetron (ZOFRAN) injection 4 mg, 4 mg, Intravenous, Q6H PRN, [facility], CRNP    oxyCODONE (ROXICODONE) oral solution 2 5 mg, 2 5 mg, Per PEG Tube, Q4H PRN, Radhames Avelar PA-C, 2 5 mg at 08/05/22 0847    predniSONE tablet 30 mg, 30 mg, Per G Tube, Daily, [facility], CRNP, 30 mg at 08/05/22 0827    saccharomyces boulardii (FLORASTOR) capsule 250 mg, 250 mg, Per G Tube, BID, [facility], CRNP, 250 mg at 08/05/22 0827    sildenafil (REVATIO) tablet 10 mg, 10 mg, Per G Tube, TID, David Sanchez DO, 10 mg at 08/05/22 0827    sodium chloride (OCEAN) 0 65 % nasal spray 1 spray, 1 spray, Each Nare, Q1H PRN, [employer], CRNP, 1 spray at 08/05/22 1446    sodium hypochlorite (DAKIN'S HALF-STRENGTH) 0 25 percent topical solution 1 application, 1 application, Irrigation, Daily, David Sanchez DO, 1 application at 44/60/64 1033    Lab Results: Results from last 7 days   Lab Units 08/05/22  0712 08/04/22 0454 08/03/22  0528   WBC Thousand/uL 18 00* 11 05* 14 72*   HEMOGLOBIN g/dL 9 0* 7 7* 7 8*   HEMATOCRIT % 28 9* 25 4* 25 2*   PLATELETS Thousands/uL 328 236 230         Results from last 7 days   Lab Units 08/05/22  0537 08/04/22  0454 08/03/22  0528 08/01/22 0446 07/31/22 2011   SODIUM mmol/L 133* 135 135   < > 127*   POTASSIUM mmol/L 4 7 4 2 3 5   < > 4 8   CHLORIDE mmol/L 95* 99 99   < > 86*   CO2 mmol/L 31 28 29   < > 31   BUN mg/dL 14 15 14   < > 32*   CREATININE mg/dL 0 24* 0 25* 0 22*   < > 0 45*   CALCIUM mg/dL 8 9 8 6 8 1*   < > 9 9   ALK PHOS U/L  --   --   --   --  109*   ALT U/L  --   --   --   --  14   AST U/L  --   --   --   --  22    < > = values in this interval not displayed  Results from last 7 days   Lab Units 08/05/22  0537 08/04/22 0454 08/03/22  0528   MAGNESIUM mg/dL 1 9 1 8* 1 6*       Echo 6/20/22:   Left Ventricle: Left ventricular cavity size is normal  Wall thickness is normal  There is no concentric hypertrophy  The left ventricular ejection fraction is 70%  Systolic function is vigorous  Wall motion is normal  Diastolic function is moderately abnormal, consistent with grade II (pseudonormal) relaxation  Left atrial filling pressure is elevated    IVS: There is systolic flattening of the interventricular septum consistent with right ventricle pressure overload    Right Ventricle: Right ventricular cavity size is mildly dilated  Wall thickness is increased    Left Atrium: The atrium is mildly dilated    Right Atrium: The atrium is mildly dilated    Atrial Septum: The septum bows into the right atrium, suggesting increased left atrial pressure    Aortic Valve: The leaflets are moderately thickened  The leaflets are moderately calcified  There is mildly reduced mobility  There is mild stenosis  The aortic valve peak velocity is 2 18 m/s  The aortic valve mean gradient is 12 mmHg  The DVI is 0 59   The aortic valve area is 1 82 cm2    Mitral Valve: There is mild annular calcification    Tricuspid Valve: There is moderate regurgitation  The right ventricular systolic pressure is moderately elevated  The estimated right ventricular systolic pressure is 28 84 mmHg    Pulmonary Artery: Notching of the RVOT Doppler waveform is noted    Pulmonary Veins: There is systolic blunting in the pulmonary veins

## 2022-08-05 NOTE — ASSESSMENT & PLAN NOTE
Patient presented to ED from [facility] for sudden onset respiratory distress  Per patient, had coughing episode on Friday with clear/white sputum, day prior to arrival around 1300 began having coughing episode and unable to clear airway from sputum  Chronically on 2 L at HS for comfort  · In ED, patient noted to have thick, dry secretions in trachea after suctioning  Initially required 6 L NC for continued hypoxia and respiratory distress  · CXR improved from previous admission  · Cause of acute on chronic hypoxic RF likely dried mucous  Patient's respiratory status overall stable, titrated down to 2L NC O2, occasionally needs tracheal suctioning with RT for mucus plugging  · Pulmonology following, recommendations appreciated  · Palliative care following:  · Increased pain after wound VAC change - Tylenol to around the clock, add oxycodone and Dilaudid PRN for pain  · Currently patient appears to be competent to make medical decisions, would like to continue level 3 DNR/DNI    · Continue home Xopenex and Sodium Chloride Neb treatments TID  · Humidify oxygen  · Patient on Mucinex outpatient, continue  · Respiratory protocol, IS, flutter valve

## 2022-08-05 NOTE — ASSESSMENT & PLAN NOTE
· Previous admission 6/14-7/13 with I&D by General Surgery, S/P IV ABX, wound vac  · General surgery following:  · Placement of wound VAC 8/4, will need dressing changes 3 times a week, CM on board for orders    · Wound care following, contine  · Frequent repositioning  · Optimize nutritional status

## 2022-08-05 NOTE — PROGRESS NOTES
Progress Note - General Surgery   [facility] 80 y o  female MRN: [id number]  Unit/Bed#: -01 Encounter: [id number]    Assessment/Plan    79 y/o female with sacral decubitus ulcer  VAC in place with no leaks (applied yesterday afternoon)  Pt denies pain at wound site this morning  AFVSS on 2L  UO 1 4L  Pt tolerating tube feeds without N/V    Continue wound VAC to 125mmHg continuous suction  Continue Tues/Thurs/Sat VAC change schedule  VAC insurance auth form completed today; appreciate CM assistance  Frequent repositioning, offloading  Optimize nutritional status  Remainder of care per primary team    /72 (BP Location: Right arm)   Pulse 84   Temp 97 7 °F (36 5 °C) (Tympanic)   Resp 18   Ht 5' 7 5" (1 715 m)   Wt 54 4 kg (119 lb 14 9 oz)   SpO2 95%   BMI 18 51 kg/m²     Labs in chart were reviewed  Intake/Output Summary (Last 24 hours) at 8/5/2022 0818  Last data filed at 8/5/2022 0601  Gross per 24 hour   Intake 300 ml   Output 1200 ml   Net -900 ml           Subjective/Objective     Subjective: Pt seen and examined  Pt reports that this morning she had SOB and a mucus plug was suctioned by RT  After suctioning her respiratory status returned to baseline and she denies SOB at the time of my exam  Appears comfortable, resting in bed  Denies pain at sacral wound site this morning  Denies CP, abdominal pain, N/V, calf tenderness, fever/chills  Review of Systems   Constitutional: Negative for chills and fever  Respiratory: Negative for shortness of breath  Cardiovascular: Negative for chest pain  Gastrointestinal: Negative for abdominal distention, abdominal pain, nausea and vomiting  Objective:     Physical Exam  Vitals and nursing note reviewed  Constitutional:       General: She is not in acute distress  Appearance: Normal appearance  She is not toxic-appearing  HENT:      Head: Normocephalic and atraumatic     Eyes:      Extraocular Movements: Extraocular movements intact  Conjunctiva/sclera: Conjunctivae normal    Pulmonary:      Effort: Pulmonary effort is normal  No respiratory distress  Abdominal:      General: Bowel sounds are normal  There is no distension  Palpations: Abdomen is soft  Tenderness: There is no abdominal tenderness  There is no guarding  Comments: PEG in place   Musculoskeletal:      Cervical back: Normal range of motion  Right lower leg: No edema  Left lower leg: No edema  Comments: Sacral decubitus ulcer with wound VAC in place, no leaks, 125mmHg continuous suction  Minimal SS output in canister  Skin:     General: Skin is warm and dry  Neurological:      Mental Status: She is alert and oriented to person, place, and time  Psychiatric:         Mood and Affect: Mood normal          Behavior: Behavior normal          Thought Content:  Thought content normal             Celeste Mcgee PA-C  8/5/2022

## 2022-08-05 NOTE — PROGRESS NOTES
[address]  Progress Note [place] 1940, 80 y o  female MRN: [id number]  Unit/Bed#: -01 Encounter: [id number]  Primary Care Provider: Dennie Carter, MD   Date and time admitted to hospital: 7/31/2022  7:53 PM    * Acute on chronic respiratory failure with hypoxia [facility])  Assessment & Plan  Patient presented to ED from SNF for sudden onset respiratory distress  Per patient, had coughing episode on Friday with clear/white sputum, day prior to arrival around 1300 began having coughing episode and unable to clear airway from sputum  Chronically on 2 L at HS for comfort  · In ED, patient noted to have thick, dry secretions in trachea after suctioning  Initially required 6 L NC for continued hypoxia and respiratory distress  · CXR improved from previous admission  · Cause of acute on chronic hypoxic RF likely dried mucous  Patient's respiratory status overall stable, titrated down to 2L NC O2, occasionally needs tracheal suctioning with RT for mucus plugging  · Pulmonology following, recommendations appreciated  · Palliative care following:  · Increased pain after wound VAC change - Tylenol to around the clock, add oxycodone and Dilaudid PRN for pain  · Currently patient appears to be competent to make medical decisions, would like to continue level 3 DNR/DNI  · Continue home Xopenex and Sodium Chloride Neb treatments TID  · Humidify oxygen  · Patient on Mucinex outpatient, continue  · Respiratory protocol, IS, flutter valve    SIRS (systemic inflammatory response syndrome) ([facility] [address] )  Assessment & Plan  · Patient met SIRS POA with leukocytosis, tachycardia, tachypnea  Also with intermittent hypotension with MAP <65  · Leukocytosis trended up today but likely reactive s/p wound VAC placement yesterday  Still with intermittent tachycardia and tachypnea, BP now stable  · Initially suspected to be 2/2 PNA vs UTI  · CXR: Improvement in the diffuse parenchymal opacities    · Sputum cx: 3+ growth of Pseudomonas aeruginosa MDRO, 3+ growth of mixed respiratory ash  · UA: 3+ leukocytes, 30-50 WBCs, moderate bacteria and occasional calcium oxalate crystals  · Urine cx: >100,000 cfu/ml Klebsiella pneumoniae, Pseudomonas aeruginosa MDRO  · Lactic acid: 2 7->2 0 after IV fluid bolus  · Procalcitonin: 0 2->0 49->0  17  · BC x2 obtained, results pending  · S/p 3 days IV Rocephin, D/C per ID  Patient clinically improving off antibiotics  · Infectious disease consulted:  · Pseudomonas from urine and sputum cultures suspected to be colonization verses active infection  · Klebsiella is possible pathogen given chronic Tejada catheter with recent malfunction, however patient already completed 3 days of IV Rocephin, no further antibiotics needed at this time  · Monitor CBC/temperature curve off antibiotics, follow culture results    Hyponatremia  Assessment & Plan  Lab Results   Component Value Date    SODIUM 133 (L) 08/05/2022    SODIUM 135 08/04/2022    SODIUM 135 08/03/2022     · POA with Na 127  Initially improved with appropriate correction, Na today decreased at 133  · Baseline creatinine appears to be between 0 2-0 4  Has remained stable at baseline  · Uric acid, urine sodium, serum and urine osmolality wnl  · AM cortisol: elevated at 26 8  · BNP: elevated at 372  Patient clinically not volume overloaded  · S/p aggressive IV fluid hydration with NS and IVF boluses, since D/C  Will give gentle IVFs with [email or web address] today and monitor response  · Consider Nephrology consult if no improvement or worsening hyponatremia     Pressure injury of sacral region, stage 4 ([address] )  Assessment & Plan  · Previous admission 6/14-7/13 with I&D by General Surgery, S/P IV ABX, wound vac  · General surgery following:  · Placement of wound VAC 8/4, will need dressing changes 3 times a week, CM on board for orders    · Wound care following, contine  · Frequent repositioning  · Optimize nutritional status     Moderate protein-calorie malnutrition ([facility] [address] )  Assessment & Plan  Malnutrition Findings:   Adult Malnutrition type: Acute illness  Adult Degree of Malnutrition: Malnutrition of moderate degree  Malnutrition Characteristics: Muscle loss, Fat loss              360 Statement: Moderate malnutrition in the constext of acute illness r/t inadequate energy intake as evidenced by moderate muscle wasting and subcutaneous fat loss (orbitals, clavicles, temples, cheeks)  Will treat with nutrition therapy and EN recommendation  BMI Findings: Body mass index is 18 51 kg/m²  · No oral diet with tube feeding, nutrition following    Elevated troponin  Assessment & Plan  Lab Results   Component Value Date    HSTNI0 69 (H) 07/31/2022    HSTNID2 10 07/31/2022    HSTNI2 79 (H) 07/31/2022    HSTNID4 3 08/01/2022    HSTNI4 72 (H) 08/01/2022      · Patient arrived to the ED in respiratory distress requiring 6 L nasal cannula  · HS troponins peaked at 79  Elevation likely due to hypoxia from respiratory distress  · EKG with no ischemic changes, less likely ACS as patient has no chest pain  · Received 162 mg ASA in ED  · Continue home 81 mg ASA    Pulmonary hypertension (HCC)  Assessment & Plan  · Crest syndrome with pulmonary hypertension  · Continue home regimen: Sildenafil 10 mg TID and Opsumit 10 mg daily  · Of note, there was question of safety of administration of Opsumit via PEG, unclear whether patient was actually receiving medication at SNF  · Per pharmacy medication can be crushed to be given via PEG, however there is risk with dust/particles being inhaled by staff when administering medications  · Cardiology was following, signed off    Urinary retention  Assessment & Plan  · Tejada catheter placed prior to admission  · Outpatient Urology follow-up  · Urinary retention protocol    Anemia  Assessment & Plan  · Chronic anemia likely multifactorial  Hgb baseline appears to be between 7-8    · Hgb has remained generally stable at known baseline, initial Hgb levels elevated likely due to hemoconcentration  · Continue iron and folic acid supplementation   · Monitor CBC    Scleroderma (HCC)  Assessment & Plan  · With CREST syndrome, received IVIG during admission - for proximal muscle weakness  · Follows with Dr Deyvi Zavala at [facility] in [place] for IVIG infusions  · Continue Prednisone 10 mg TID  · Continue Atovaquone for PCP prophylaxis  · PT/OT following, needs rehab placement      VTE Pharmacologic Prophylaxis: VTE Score: 9 High Risk (Score >/= 5) - Pharmacological DVT Prophylaxis Ordered: heparin  Sequential Compression Devices Ordered  Patient Centered Rounds: I performed bedside rounds with nursing staff today  Discussions with Specialists or Other Care Team Provider:  Case management    Education and Discussions with Family / Patient: Updated  (daughter) via phone  Time Spent for Care: 45 minutes  More than 50% of total time spent on counseling and coordination of care as described above  Current Length of Stay: 5 day(s)  Current Patient Status: Inpatient   Certification Statement: The patient will continue to require additional inpatient hospital stay due to Pending rehab placement, monitor hyponatremia  Discharge Plan: Anticipate discharge in 24-48 hrs to rehab facility  Code Status: Level 3 - DNAR and DNI    Subjective:   Patient is seen at bedside this a m , reports that she had episode of mucus plugging overnight, denies any excessive secretions this afternoon  Reports pain is controlled well and is comfortable today  Objective:     Vitals:   Temp (24hrs), Av 9 °F (36 6 °C), Min:97 4 °F (36 3 °C), Max:98 7 °F (37 1 °C)    Temp:  [97 4 °F (36 3 °C)-98 7 °F (37 1 °C)] 98 7 °F (37 1 °C)  HR:  [] 95  Resp:  [18-26] 26  BP: (130-156)/(72-78) 132/72  SpO2:  [90 %-96 %] 95 %  Body mass index is 18 51 kg/m²  Input and Output Summary (last 24 hours):      Intake/Output Summary (Last 24 hours) at 8/5/2022 1453  Last data filed at 8/5/2022 1328  Gross per 24 hour   Intake 300 ml   Output 1200 ml   Net -900 ml       Physical Exam:   Physical Exam  Constitutional:       General: She is not in acute distress  Appearance: She is not ill-appearing, toxic-appearing or diaphoretic  Cardiovascular:      Rate and Rhythm: Normal rate and regular rhythm  Pulses: Normal pulses  Heart sounds: Normal heart sounds  Pulmonary:      Effort: Pulmonary effort is normal  No respiratory distress  Breath sounds: Normal breath sounds  Abdominal:      General: Bowel sounds are normal  There is no distension  Palpations: Abdomen is soft  Tenderness: There is no abdominal tenderness  Comments: PEG in place   Musculoskeletal:         General: No swelling or tenderness  Skin:     General: Skin is warm  Neurological:      General: No focal deficit present  Mental Status: She is alert     Psychiatric:         Mood and Affect: Mood normal          Behavior: Behavior normal           Additional Data:     Labs:  Results from last 7 days   Lab Units 08/05/22  0712 08/04/22  0454   WBC Thousand/uL 18 00* 11 05*   HEMOGLOBIN g/dL 9 0* 7 7*   HEMATOCRIT % 28 9* 25 4*   PLATELETS Thousands/uL 328 236   BANDS PCT %  --  9*   LYMPHO PCT %  --  11*   MONO PCT %  --  4   EOS PCT %  --  2     Results from last 7 days   Lab Units 08/05/22  0537 08/03/22  0528 08/02/22  0459 08/01/22  0446 07/31/22 2011   SODIUM mmol/L 133*   < > 132*   < > 127*   POTASSIUM mmol/L 4 7   < > 3 9   < > 4 8   CHLORIDE mmol/L 95*   < > 97   < > 86*   CO2 mmol/L 31   < > 26   < > 31   BUN mg/dL 14   < > 18   < > 32*   CREATININE mg/dL 0 24*   < > 0 30*   < > 0 45*   ANION GAP mmol/L 7   < > 9   < > 10   CALCIUM mg/dL 8 9   < > 8 7   < > 9 9   ALBUMIN g/dL  --   --  2 6*  --  3 4*   TOTAL BILIRUBIN mg/dL  --   --   --   --  0 43   ALK PHOS U/L  --   --   --   --  109*   ALT U/L  --   --   --   --  14 AST U/L  --   --   --   --  22   GLUCOSE RANDOM mg/dL 125   < > 152*   < > 107    < > = values in this interval not displayed  Results from last 7 days   Lab Units 08/04/22  0454 08/02/22  1259 08/02/22  1042 08/02/22  0459 08/01/22  0446   LACTIC ACID mmol/L  --  2 0 2 7*  --   --    PROCALCITONIN ng/ml 0 17  --   --  0 49* 0 20       Lines/Drains:  Invasive Devices  Report    Peripheral Intravenous Line  Duration           Peripheral IV 07/31/22 Left Wrist 4 days          Drain  Duration           Gastrostomy/Enterostomy -- days    Urethral Catheter 5 days               Urinary Catheter:  Goal for removal: N/A - Chronic Tejada               Imaging: No pertinent imaging reviewed  Recent Cultures (last 7 days):   Results from last 7 days   Lab Units 08/02/22  1257 08/01/22  0824 07/31/22  2033 07/31/22 2011   BLOOD CULTURE  No Growth at 48 hrs  No Growth at 48 hrs   --   --  No Growth After 4 Days  No Growth After 4 Days     SPUTUM CULTURE   --  3+ Growth of Pseudomonas aeruginosa MDR*  3+ Growth of   --   --    GRAM STAIN RESULT   --  No Polys or Bacteria seen  --   --    URINE CULTURE   --   --  >100,000 cfu/ml Klebsiella pneumoniae*  >100,000 cfu/ml Pseudomonas aeruginosa MDR*  --        Last 24 Hours Medication List:   Current Facility-Administered Medications   Medication Dose Route Frequency Provider Last Rate    acetaminophen  975 mg Per G Tube Q8H [address] Adonis Whitney PA-C      aspirin  81 mg Per G Tube Daily BETTIE Brambila      atovaquone  1,500 mg Per G Tube Daily With Breakfast Leela Olivas MD      cholecalciferol  1,000 Units Per G Tube Daily BETTIE Brambila      fluticasone  1 spray Nasal BID Jerson Brambila [address]      folic acid  1 mg Per PEG Tube Daily Jerson Brambila [address]      glycerin-hypromellose-  2 drop Both Eyes BID Jerson Brambila [address]      guaiFENesin  400 mg Per G Tube TID David Sanchez DO      heparin (porcine)  5,000 Units Subcutaneous [address] [place], [place]      HYDROmorphone  0 2 mg Intravenous Q3H PRN Melissa Kuhn PA-C      ipratropium  0 5 mg Nebulization TID Amarilis Singh MD      levalbuterol  1 25 mg Nebulization TID BETTIE Brambila      levothyroxine  25 mcg Per G Tube Early Morning [place], [place]      loratadine  10 mg Per G Tube Daily [place], [place]      Macitentan  10 mg Per G Tube Daily David Sanchez DO      melatonin  3 mg Per G Tube HS Nehal Alexis ASTRID Huynh      nystatin   Topical BID [facility] Jimenez      omeprazole (PRILOSEC) oral suspension  20 mg Per PEG Tube Early Morning [facility]      ondansetron  4 mg Intravenous Q6H PRN [place], [place]      oxyCODONE  5 mg Per PEG Tube Q4H PRN BETTIE Robles      predniSONE  30 mg Per G Tube Daily [place], [place]      saccharomyces boulardii  250 mg Per G Tube BID [address] [place], [place]      sildenafil  10 mg Per G Tube TID Janeth Vaughan DO      sodium chloride  1 spray Each Nare Q1H PRN BETTIE Brambila      sodium chloride  75 mL/hr Intravenous Continuous Kiet Christianson PA-C 75 mL/hr (08/05/22 1328)    sodium hypochlorite  1 application Irrigation Daily David Sanchez DO          Today, Patient Was Seen By: Kiet Christianson PA-C    **Please Note: This note may have been constructed using a voice recognition system  **

## 2022-08-05 NOTE — PLAN OF CARE
Problem: MOBILITY - ADULT  Goal: Maintain or return to baseline ADL function  Description: INTERVENTIONS:  -  Assess patient's ability to carry out ADLs; assess patient's baseline for ADL function and identify physical deficits which impact ability to perform ADLs (bathing, care of mouth/teeth, toileting, grooming, dressing, etc )  - Assess/evaluate cause of self-care deficits   - Assess range of motion  - Assess patient's mobility; develop plan if impaired  - Assess patient's need for assistive devices and provide as appropriate  - Encourage maximum independence but intervene and supervise when necessary  - Involve family in performance of ADLs  - Assess for home care needs following discharge   - Consider OT consult to assist with ADL evaluation and planning for discharge  - Provide patient education as appropriate  Outcome: Progressing  Goal: Maintains/Returns to pre admission functional level  Description: INTERVENTIONS:  - Perform BMAT or MOVE assessment daily    - Set and communicate daily mobility goal to care team and patient/family/caregiver  - Collaborate with rehabilitation services on mobility goals if consulted  - Perform Range of Motion 2 times a day  - Reposition patient every 2 hours    - Dangle patient 2 times a day  - Stand patient 2 times a day  - Ambulate patient 2 times a day  - Out of bed to chair 2 times a day   - Out of bed for meals 2 times a day  - Out of bed for toileting  - Record patient progress and toleration of activity level   Outcome: Progressing     Problem: Prexisting or High Potential for Compromised Skin Integrity  Goal: Skin integrity is maintained or improved  Description: INTERVENTIONS:  - Identify patients at risk for skin breakdown  - Assess and monitor skin integrity  - Assess and monitor nutrition and hydration status  - Monitor labs   - Assess for incontinence   - Turn and reposition patient  - Assist with mobility/ambulation  - Relieve pressure over bony prominences  - Avoid friction and shearing  - Provide appropriate hygiene as needed including keeping skin clean and dry  - Evaluate need for skin moisturizer/barrier cream  - Collaborate with interdisciplinary team   - Patient/family teaching  - Consider wound care consult   Outcome: Progressing     Problem: RESPIRATORY - ADULT  Goal: Achieves optimal ventilation and oxygenation  Description: INTERVENTIONS:  - Assess for changes in respiratory status  - Assess for changes in mentation and behavior  - Position to facilitate oxygenation and minimize respiratory effort  - Oxygen administered by appropriate delivery if ordered  - Initiate smoking cessation education as indicated  - Encourage broncho-pulmonary hygiene including cough, deep breathe, Incentive Spirometry  - Assess the need for suctioning and aspirate as needed  - Assess and instruct to report SOB or any respiratory difficulty  - Respiratory Therapy support as indicated  Outcome: Progressing     Problem: METABOLIC, FLUID AND ELECTROLYTES - ADULT  Goal: Electrolytes maintained within normal limits  Description: INTERVENTIONS:  - Monitor labs and assess patient for signs and symptoms of electrolyte imbalances  - Administer electrolyte replacement as ordered  - Monitor response to electrolyte replacements, including repeat lab results as appropriate  - Instruct patient on fluid and nutrition as appropriate  Outcome: Progressing  Goal: Fluid balance maintained  Description: INTERVENTIONS:  - Monitor labs   - Monitor I/O and WT  - Instruct patient on fluid and nutrition as appropriate  - Assess for signs & symptoms of volume excess or deficit  Outcome: Progressing  Goal: Glucose maintained within target range  Description: INTERVENTIONS:  - Monitor Blood Glucose as ordered  - Assess for signs and symptoms of hyperglycemia and hypoglycemia  - Administer ordered medications to maintain glucose within target range  - Assess nutritional intake and initiate nutrition service referral as needed  Outcome: Progressing     Problem: Potential for Falls  Goal: Patient will remain free of falls  Description: INTERVENTIONS:  - Educate patient/family on patient safety including physical limitations  - Instruct patient to call for assistance with activity   - Consult OT/PT to assist with strengthening/mobility   - Keep Call bell within reach  - Keep bed low and locked with side rails adjusted as appropriate  - Keep care items and personal belongings within reach  - Initiate and maintain comfort rounds  - Make Fall Risk Sign visible to staff  - Offer Toileting every 2 Hours, in advance of need  - Initiate/Maintain alarm  - Obtain necessary fall risk management equipment  - Apply yellow socks and bracelet for high fall risk patients  - Consider moving patient to room near nurses station  Outcome: Progressing     Problem: Nutrition/Hydration-ADULT  Goal: Nutrient/Hydration intake appropriate for improving, restoring or maintaining nutritional needs  Description: Monitor and assess patient's nutrition/hydration status for malnutrition  Collaborate with interdisciplinary team and initiate plan and interventions as ordered  Monitor patient's weight and dietary intake as ordered or per policy  Utilize nutrition screening tool and intervene as necessary  Determine patient's food preferences and provide high-protein, high-caloric foods as appropriate       INTERVENTIONS:  - Monitor oral intake, urinary output, labs, and treatment plans  - Assess nutrition and hydration status and recommend course of action  - Evaluate amount of meals eaten  - Assist patient with eating if necessary   - Allow adequate time for meals  - Recommend/ encourage appropriate diets, oral nutritional supplements, and vitamin/mineral supplements  - Order, calculate, and assess calorie counts as needed  - Recommend, monitor, and adjust tube feedings and TPN/PPN based on assessed needs  - Assess need for intravenous fluids  - Provide specific nutrition/hydration education as appropriate  - Include patient/family/caregiver in decisions related to nutrition  Outcome: Progressing

## 2022-08-05 NOTE — CASE MANAGEMENT
Case Management Progress Note    Patient name Nikky Weldon  Location /-01 MRN [id number]  : 1940 Date 2022       LOS (days): 5  Geometric Mean LOS (GMLOS) (days): 4 80  Days to GMLOS:0 4        OBJECTIVE:        Current admission status: Inpatient  Preferred Pharmacy:   PATIENT/FAMILY REPORTS NO PREFERRED PHARMACY  No address on file      [address] [place], [facility] [address] [facility] [address] [facility]  Phone: [phone] Fax: [phone]    Primary Care Provider: Capo Moser MD    Primary Insurance: [facility]  Secondary Insurance:     PROGRESS NOTE:    Email sent to to inpatient insurance verifiers for benefits - per Gavin Munguia, will have answer today  Final OCCO determination APPROVED for SNF placement at discharge  [facility] now stating unable to accept patient due to unable to meet respiratory needs  Call made to Iris Osorio (business office @ [facility]) @ [phone] regarding insurance benefits/eligibility  No answer, TYREL left  requesting CB

## 2022-08-05 NOTE — PROGRESS NOTES
Progress Note - Pulmonary   Heriberto Fermin 80 y o  female MRN: [id number]  Unit/Bed#: -01 Encounter: [id number]    Assessment and Plan:    1  Chronic hypoxic respiratory failure:  Celia Shore has chronic respiratory failure was on 2 L of nasal cannula oxygen at home during sleep   Currently she is down to 2 liters/minute from 5 liters/minute on admission       2  Acute tracheobronchitis:  She has acute tracheobronchitis and has reported yellow phlegm  She  was on treatment with ceftriaxone  Anglin Bryce blood cultures have been negative   However her sputum and urine are growing MDR Pseudomonas aeruginosa  She is currently off antibiotics as per ID recommendations  She was COVID negative   Currently she is on bronchodilator therapy  She has thick secretions which are difficult to bring out  She gets recurrent mucus plugs  She has been on vest therapy  [place] has been discontinued  She has significant dryness from xerostomia from scleroderma  We will continue with frequent suctioning for secretions at this time  We cannot get her out to chair because of her poor strength  She has considerable difficulty mobilizing secretions  I doubt whether bronchoscopy will solve this long-term problem  We will get a chest x-ray      3   Pulmonary hypertension:  She has mild to moderate pulmonary hypertension   Her echocardiogram from 06/20/2022 showed a right ventricular systolic pressure of 56 mmHg and most recently has been on treatment with Opsumit 10 mg and sildenafil 10 mg 3 times a day   She was previously on Adempas which was stopped due to hypotension   She has reported allergy to Virgilio Civil will continue with sildenafil and Opsumit   Opsumit can be given in a dissolved form through the PEG tube      4  Scleroderma/crest syndrome:  She has been on treatment with prednisone 30 mg once a day      5  Interstitial lung disease:  She has interstitial lung disease likely from her connective tissue disorder   Her chest x-ray showed bilateral opacities and evidence of fibrosis   Her chest auscultation revealed occasional coarse inspiratory crackles at lung bases      I spoke to the patient and daughter at the bedside  Updated      She is DNR status  Her overall prognosis is poor  Palliative Care has been consulted      I discussed with Dr Marine Dinh the patient's hospitalist       Thank you for allowing me to participate in the care of the patient  Chief Complaint:   Shortness of breath; cough excess secretions; mucus plugs    Subjective:   Shortness of breath; excess secretions, worsening leukocytosis  Patient gets thick mucus plugs which are difficult to cough up  Needs frequent suctioning  Oxygen requirements have come down to 2 liters/minute    Objective:     Vitals: Blood pressure 132/72, pulse 95, temperature 98 7 °F (37 1 °C), temperature source Oral, resp  rate (!) 26, height 5' 7 5" (1 715 m), weight 54 4 kg (119 lb 14 9 oz), SpO2 95 %  ,Body mass index is 18 51 kg/m²  Intake/Output Summary (Last 24 hours) at 8/5/2022 1512  Last data filed at 8/5/2022 1328  Gross per 24 hour   Intake 300 ml   Output 1200 ml   Net -900 ml       Invasive Devices  Report    Peripheral Intravenous Line  Duration           Peripheral IV 07/31/22 Left Wrist 4 days          Drain  Duration           Gastrostomy/Enterostomy -- days    Urethral Catheter 5 days                Physical Exam:  On clinical examination, she is hemodynamically stable and afebrile  Saturating 95% on 2 L of nasal cannula oxygen  HEENT:  Has conjunctival pallor no cyanosis  Dry mucous membranes  Neck:  No jugular venous distention no significant neck or supraclavicular adenopathy  Heart S1-S2 heard  Chest air entry present bilaterally coarse inspiratory crackles bilaterally no rhonchi  Abdomen soft bowel sounds audible  G-tube in place  Neuro awake alert oriented  Extremities no clubbing no edema    Skin:  Changes of scleroderma telangiectasiae  Labs: I have personally reviewed pertinent lab results  The leukocytosis has worsened  Hemoglobin 9  Blood cultures have been negative   Imaging and other studies: I have personally reviewed pertinent reports

## 2022-08-05 NOTE — ASSESSMENT & PLAN NOTE
· Crest syndrome with pulmonary hypertension  · Continue home regimen: Sildenafil 10 mg TID and Opsumit 10 mg daily  · Of note, there was question of safety of administration of Opsumit via PEG, unclear whether patient was actually receiving medication at SNF    · Per pharmacy medication can be crushed to be given via PEG, however there is risk with dust/particles being inhaled by staff when administering medications  · Cardiology was following, signed off

## 2022-08-05 NOTE — CASE MANAGEMENT
Case Management Progress Note    Patient name Laureano Ornelas  Location /-01 MRN [id number]  : 1940 Date 2022       LOS (days): 5  Geometric Mean LOS (GMLOS) (days): 4 80  Days to GMLOS:0 1        OBJECTIVE:        Current admission status: Inpatient  Preferred Pharmacy:   PATIENT/FAMILY REPORTS NO PREFERRED PHARMACY  No address on file      [address] [place], [facility] [address] [facility] [address] [facility]  Phone: [phone] Fax: [phone]    Primary Care Provider: Myranda Biswas MD    Primary Insurance: [facility]  Secondary Insurance: MEDICARE    PROGRESS NOTE:    CM confirmed with Silviano Liriano @ [facility] that patient reverted to straight medicare coverage  Edilia Linda confirmed Medicare ID#: [id number]  UR/FC notified of need to update chart  Referrals pending review at this time for alternative STR options now that [facility] is unable to accept patient

## 2022-08-05 NOTE — ASSESSMENT & PLAN NOTE
Lab Results   Component Value Date    SODIUM 133 (L) 08/05/2022    SODIUM 135 08/04/2022    SODIUM 135 08/03/2022     · POA with Na 127  Initially improved with appropriate correction, Na today decreased at 133  · Baseline creatinine appears to be between 0 2-0 4  Has remained stable at baseline  · Uric acid, urine sodium, serum and urine osmolality wnl  · AM cortisol: elevated at 26 8  · BNP: elevated at 372  Patient clinically not volume overloaded  · S/p aggressive IV fluid hydration with NS and IVF boluses, since D/C   Will give gentle IVFs with [email or web address] today and monitor response  · Consider Nephrology consult if no improvement or worsening hyponatremia

## 2022-08-05 NOTE — ASSESSMENT & PLAN NOTE
Malnutrition Findings:   Adult Malnutrition type: Acute illness  Adult Degree of Malnutrition: Malnutrition of moderate degree  Malnutrition Characteristics: Muscle loss, Fat loss              360 Statement: Moderate malnutrition in the constext of acute illness r/t inadequate energy intake as evidenced by moderate muscle wasting and subcutaneous fat loss (orbitals, clavicles, temples, cheeks)  Will treat with nutrition therapy and EN recommendation  BMI Findings: Body mass index is 18 51 kg/m²     · No oral diet with tube feeding, nutrition following

## 2022-08-05 NOTE — ASSESSMENT & PLAN NOTE
· Patient met SIRS POA with leukocytosis, tachycardia, tachypnea  Also with intermittent hypotension with MAP <65  · Leukocytosis trended up today but likely reactive s/p wound VAC placement yesterday  Still with intermittent tachycardia and tachypnea, BP now stable  · Initially suspected to be 2/2 PNA vs UTI  · CXR: Improvement in the diffuse parenchymal opacities  · Sputum cx: 3+ growth of Pseudomonas aeruginosa MDRO, 3+ growth of mixed respiratory ash  · UA: 3+ leukocytes, 30-50 WBCs, moderate bacteria and occasional calcium oxalate crystals  · Urine cx: >100,000 cfu/ml Klebsiella pneumoniae, Pseudomonas aeruginosa MDRO  · Lactic acid: 2 7->2 0 after IV fluid bolus  · Procalcitonin: 0 2->0 49->0  17  · BC x2 obtained, results pending  · S/p 3 days IV Rocephin, D/C per ID  Patient clinically improving off antibiotics    · Infectious disease consulted:  · Pseudomonas from urine and sputum cultures suspected to be colonization verses active infection  · Klebsiella is possible pathogen given chronic Tejada catheter with recent malfunction, however patient already completed 3 days of IV Rocephin, no further antibiotics needed at this time  · Monitor CBC/temperature curve off antibiotics, follow culture results

## 2022-08-05 NOTE — PROGRESS NOTES
Progress Note - Infectious Disease   Angy Hague 80 y o  female MRN: [id number]  Unit/Bed#: -01 Encounter: [id number]      Impression/Plan:  1  SIRS syndrome, POA   Noted to have tachycardia and leukocytosis on presentation  Clinical picture is clouded by chronic steroids   Suspect presentation is multifactorial and possibly related to 2 and 3  Sputum cultures likely represent colonization   Blood cultures and urine cultures reviewed  Procalcitonin previously normalized  White count elevation today likely multifactorial with steroids, vac placement and volume with noted rise in Hgb as well  Given current stability, will maintain off antibiotics  Continue atovaquone prophylaxis as below  Continue to trend fever curve/WBC  Follow-up pending blood cultures  Continue aggressive airway clearance  Ongoing follow-up by General surgery  Continue local wound care  Ongoing follow-up by Pulmonary  Ongoing follow-up by case management for placement  Further pain management as per palliative care  Additional supportive care/discharge as per primary     2  Abnormal UA, Tejada malfunction and possible urinary tract infection   UA noted to be abnormal on admission and was reported to have Tejada catheter malfunction in the ER and catheter was exchanged   Consideration for possible urinary tract infection as patient also reported some intermittent dysuria leading up to admission   Urine cultures polymicrobial in the setting of catheter  Ruchi Chalet possible pathogen and Pseudomonas likely colonizer   Patient completed 3 days of IV ceftriaxone  She has no urinary complaints, abdominal exam benign, and Tejada draining clear urine    No further antibiotics for this issue  Continue to trend fever curve/WBC  Maintain Tejada catheter  Monitor abdominal exam  Additional care as per primary     3  Acute on chronic hypoxic respiratory failure   Oxygen requirements rapidly improved with secretion clearance   She reports lack of devices at her facility   Lower suspicion at this point for pneumonia based on exam and with improvement in imaging compared to prior   Suspect overall poor secretion handling in the setting of 6  Procalcitonin normalized and white count fluctuation as above likely multifactorial   Maintain off antibiotics as above  Continue aggressive suctioning and chest PT  Case management following for placement  Continue chronic oxygen support  Ongoing follow-up by Pulmonary  Continue to trend fever curve/WBC  Ongoing pain management as per palliative care     4  Positive sputum culture with MDR Pseudomonas   Suspect that this represents colonization in the patient's sputum   Lower suspicion for active pneumonia  Maintain off antibiotics for now  Continue aggressive respiratory secretion clearance  Case management following for placement needs  Continue to trend fever curve/WBC     5  Stage IV pressure injury, POA   Clinical images reviewed  VAC dressing placed on 08/04  Maintain off antibiotics as above  Ongoing follow-up by surgery  Continue local wound  Continue to trend fever curve/WBC  Continue pressure offloading     6  Scleroderma and chronic steroids   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  Drake Lemos has had IVIG and IV steroids   Her course is also complicated by pulmonary hypertension and interstitial lung disease   Currently on 30 mg of steroids daily  Recommend outpatient follow-up with Rheumatology  Continue atovaquone for PCP prophylaxis 1500 mg daily  Ongoing follow-up by Pulmonary  Continue to trend fever curve/WBC     Above plan discussed with the patient at bedside  ID consult service will re-evaluate this patient again on Monday, if she is admitted  Please contact ID attending on call if questions in the interim      Antibiotics:  Off systemic antibiotic day 3    Subjective:  Patient currently denies having any nausea, vomiting, chest pain    She continues to have episodes of mucus plugging and is requiring ongoing chest PT  She reports improved suctioning and airway clearance after vest therapy  She reports some tolerable pain with her VAC in place and palliative care following with adjustments to pain medication  She is pending placement to facility manage her respiratory needs  We discussed unfortunately the continued need for aggressive pulmonary therapy in the future as she is unable to clear secretions which was also an issue at her admission in  at [place]  Additional questions answered  Objective:  Vitals:  Temp:  [97 4 °F (36 3 °C)-98 7 °F (37 1 °C)] 98 7 °F (37 1 °C)  HR:  [] 95  Resp:  [18-26] 26  BP: (120-156)/(68-78) 132/72  SpO2:  [90 %-100 %] 95 %  Temp (24hrs), Av °F (36 7 °C), Min:97 4 °F (36 3 °C), Max:98 7 °F (37 1 °C)  Current: Temperature: 98 7 °F (37 1 °C)    Physical Exam:   General Appearance:  Alert, interactive, nontoxic, no acute distress  Chronically ill-appearing and frail   Throat: Oropharynx moist without lesions  Lungs:   Decreased breath sounds throughout, no wheezes, rales and some intermittent rhonchi noted  Heart:  RRR; no murmur, rub or gallop appreciated   Abdomen:   Soft, non-tender, non-distended, positive bowel sounds  Extremities: No clubbing, cyanosis or edema   Skin: No new rashes or lesions  No new draining wounds noted  Images reviewed of sacral wound         Labs, Imaging, & Other studies:   All pertinent labs and imaging studies were personally reviewed  Results from last 7 days   Lab Units 22  0712 22  0454 22  0528   WBC Thousand/uL 18 00* 11 05* 14 72*   HEMOGLOBIN g/dL 9 0* 7 7* 7 8*   PLATELETS Thousands/uL 328 236 230     Results from last 7 days   Lab Units 22  0537 22  0446 22   POTASSIUM mmol/L 4 7   < > 4 8   CHLORIDE mmol/L 95*   < > 86*   CO2 mmol/L 31   < > 31   BUN mg/dL 14   < > 32*   CREATININE mg/dL 0 24*   < > 0 45*   EGFR ml/min/1 73sq m 117   < > 95   CALCIUM mg/dL 8 9   < > 9 9   AST U/L  --   --  22   ALT U/L  --   --  14   ALK PHOS U/L  --   --  109*    < > = values in this interval not displayed  Results from last 7 days   Lab Units 08/02/22  1257 08/01/22  0824 07/31/22 2033 07/31/22 2011   BLOOD CULTURE  No Growth at 48 hrs  No Growth at 48 hrs   --   --  No Growth After 4 Days  No Growth After 4 Days     SPUTUM CULTURE   --  3+ Growth of Pseudomonas aeruginosa MDR*  3+ Growth of   --   --    GRAM STAIN RESULT   --  No Polys or Bacteria seen  --   --    URINE CULTURE   --   --  >100,000 cfu/ml Klebsiella pneumoniae*  >100,000 cfu/ml Pseudomonas aeruginosa MDR*  --

## 2022-08-05 NOTE — SPEECH THERAPY NOTE
Consult received  Pt is s/p MBS/VBS on 5/20/22 at [address] [place])  Unfortunately, she presented with s/s severe pharyngeal dysphagia (Overall minimal pharyngeal contraction   Episodes of aspiration with thin liquids and honey thick liquid   Significant residue with puree with high risk for overflow aspiration)  She has remained NPO at her SNF  Recommendation:  Continue frequent oral care (keep ice and swabs at bedside for frequent oral care and moistening)  Consider offering a MINIMUM of well chipped ice for moistening of secretions to promote clearance/management of the same    Gaurang [facility] 1978 [phone]

## 2022-08-05 NOTE — QUICK NOTE
8/5/2022 9:39 AM -  Hanna Gomes chart and case were reviewed by Jerson Griffith  Mode of review included electronic chart check, and case review with RN  Patient and family goals are clear and were last confirmed on 8/4/22  Although pain has improved with 2 5mg it is not as effective as we had hoped for  She is tolerating oxycodone without issues including lethargy or confusion  Will increase to 5mg today  For dispo plan, please review Case Management notes  Palliative care will return on 8/8/22  Patient is appropriate for outpatient follow-up  We will make arrangements through our office  For urgent issues or any questions/concerns, please notify on-call provider via [facility]  You may also call our answering service 24/7 at       BETTIE Griffith  Palliative and Supportive Care  Clinic/Answering Service: [phone]  You can find me on [employer]!   ]

## 2022-08-05 NOTE — ASSESSMENT & PLAN NOTE
· With CREST syndrome, 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 for proximal muscle weakness  · Follows with Dr Ivett Nichole at [facility] in [facility] for IVIG infusions  · Continue Prednisone 10 mg TID  · Continue Atovaquone for PCP prophylaxis  · PT/OT following, needs rehab placement

## 2022-08-06 LAB
ANION GAP SERPL CALCULATED.3IONS-SCNC: 8 MMOL/L (ref 4–13)
BACTERIA BLD CULT: NORMAL
BACTERIA BLD CULT: NORMAL
BUN SERPL-MCNC: 12 MG/DL (ref 5–25)
CALCIUM SERPL-MCNC: 8.9 MG/DL (ref 8.4–10.2)
CHLORIDE SERPL-SCNC: 97 MMOL/L (ref 96–108)
CO2 SERPL-SCNC: 32 MMOL/L (ref 21–32)
CREAT SERPL-MCNC: 0.26 MG/DL (ref 0.6–1.3)
ERYTHROCYTE [DISTWIDTH] IN BLOOD BY AUTOMATED COUNT: 21 % (ref 11.6–15.1)
GFR SERPL CREATININE-BSD FRML MDRD: 114 ML/MIN/1.73SQ M
GLUCOSE SERPL-MCNC: 78 MG/DL (ref 65–140)
HCT VFR BLD AUTO: 29.3 % (ref 34.8–46.1)
HGB BLD-MCNC: 8.8 G/DL (ref 11.5–15.4)
MCH RBC QN AUTO: 31.5 PG (ref 26.8–34.3)
MCHC RBC AUTO-ENTMCNC: 30 G/DL (ref 31.4–37.4)
MCV RBC AUTO: 105 FL (ref 82–98)
NRBC BLD AUTO-RTO: 1 /100 WBCS
PLATELET # BLD AUTO: 306 THOUSANDS/UL (ref 149–390)
PMV BLD AUTO: 9.8 FL (ref 8.9–12.7)
POTASSIUM SERPL-SCNC: 4.3 MMOL/L (ref 3.5–5.3)
RBC # BLD AUTO: 2.79 MILLION/UL (ref 3.81–5.12)
SODIUM SERPL-SCNC: 137 MMOL/L (ref 135–147)
WBC # BLD AUTO: 13.23 THOUSAND/UL (ref 4.31–10.16)

## 2022-08-06 PROCEDURE — 97606 NEG PRS WND THER DME>50 SQCM: CPT | Performed by: SURGERY

## 2022-08-06 PROCEDURE — 80048 BASIC METABOLIC PNL TOTAL CA: CPT | Performed by: PHYSICIAN ASSISTANT

## 2022-08-06 PROCEDURE — 99233 SBSQ HOSP IP/OBS HIGH 50: CPT | Performed by: FAMILY MEDICINE

## 2022-08-06 PROCEDURE — 94760 N-INVAS EAR/PLS OXIMETRY 1: CPT

## 2022-08-06 PROCEDURE — 94668 MNPJ CHEST WALL SBSQ: CPT

## 2022-08-06 PROCEDURE — 94640 AIRWAY INHALATION TREATMENT: CPT

## 2022-08-06 PROCEDURE — 2W05X6Z CHANGE PRESSURE DRESSING ON BACK: ICD-10-PCS | Performed by: SURGERY

## 2022-08-06 PROCEDURE — 94669 MECHANICAL CHEST WALL OSCILL: CPT

## 2022-08-06 PROCEDURE — 85027 COMPLETE CBC AUTOMATED: CPT | Performed by: PHYSICIAN ASSISTANT

## 2022-08-06 RX ADMIN — BUSPIRONE HYDROCHLORIDE 10 MG: 10 TABLET ORAL at 20:23

## 2022-08-06 RX ADMIN — HYDROMORPHONE HYDROCHLORIDE 0.2 MG: 0.2 INJECTION, SOLUTION INTRAMUSCULAR; INTRAVENOUS; SUBCUTANEOUS at 21:48

## 2022-08-06 RX ADMIN — IPRATROPIUM BROMIDE 0.5 MG: 0.5 SOLUTION RESPIRATORY (INHALATION) at 20:12

## 2022-08-06 RX ADMIN — ATOVAQUONE 1500 MG: 750 SUSPENSION ORAL at 09:10

## 2022-08-06 RX ADMIN — GUAIFENESIN 400 MG: 100 SOLUTION ORAL at 20:23

## 2022-08-06 RX ADMIN — OXYCODONE HYDROCHLORIDE 5 MG: 5 SOLUTION ORAL at 06:10

## 2022-08-06 RX ADMIN — LEVALBUTEROL HYDROCHLORIDE 1.25 MG: 1.25 SOLUTION RESPIRATORY (INHALATION) at 07:20

## 2022-08-06 RX ADMIN — FOLIC ACID 1 MG: 1 TABLET ORAL at 08:54

## 2022-08-06 RX ADMIN — IPRATROPIUM BROMIDE 0.5 MG: 0.5 SOLUTION RESPIRATORY (INHALATION) at 07:20

## 2022-08-06 RX ADMIN — HYOSCYAMINE SULFATE 1 APPLICATION: 16 SOLUTION at 10:36

## 2022-08-06 RX ADMIN — LORAZEPAM 0.25 MG: 2 INJECTION INTRAMUSCULAR; INTRAVENOUS at 21:08

## 2022-08-06 RX ADMIN — LEVALBUTEROL HYDROCHLORIDE 1.25 MG: 1.25 SOLUTION RESPIRATORY (INHALATION) at 20:12

## 2022-08-06 RX ADMIN — SILDENAFIL CITRATE 10 MG: 20 TABLET ORAL at 08:54

## 2022-08-06 RX ADMIN — HEPARIN SODIUM 5000 UNITS: 5000 INJECTION INTRAVENOUS; SUBCUTANEOUS at 20:26

## 2022-08-06 RX ADMIN — OMEPRAZOLE 20 MG: 20 CAPSULE, DELAYED RELEASE ORAL at 06:05

## 2022-08-06 RX ADMIN — HYDROMORPHONE HYDROCHLORIDE 0.2 MG: 0.2 INJECTION, SOLUTION INTRAMUSCULAR; INTRAVENOUS; SUBCUTANEOUS at 10:36

## 2022-08-06 RX ADMIN — GLYCERIN 2 DROP: .002; .002; .01 SOLUTION/ DROPS OPHTHALMIC at 08:55

## 2022-08-06 RX ADMIN — GUAIFENESIN 400 MG: 100 SOLUTION ORAL at 08:54

## 2022-08-06 RX ADMIN — GUAIFENESIN 400 MG: 100 SOLUTION ORAL at 17:15

## 2022-08-06 RX ADMIN — SILDENAFIL CITRATE 10 MG: 20 TABLET ORAL at 17:19

## 2022-08-06 RX ADMIN — LORATADINE 10 MG: 10 TABLET ORAL at 09:02

## 2022-08-06 RX ADMIN — BUSPIRONE HYDROCHLORIDE 10 MG: 10 TABLET ORAL at 17:18

## 2022-08-06 RX ADMIN — Medication 975 MG: at 13:08

## 2022-08-06 RX ADMIN — PREDNISONE 30 MG: 20 TABLET ORAL at 08:54

## 2022-08-06 RX ADMIN — SILDENAFIL CITRATE 10 MG: 20 TABLET ORAL at 20:23

## 2022-08-06 RX ADMIN — LEVALBUTEROL HYDROCHLORIDE 1.25 MG: 1.25 SOLUTION RESPIRATORY (INHALATION) at 13:35

## 2022-08-06 RX ADMIN — Medication 250 MG: at 17:18

## 2022-08-06 RX ADMIN — NYSTATIN: 100000 POWDER TOPICAL at 17:18

## 2022-08-06 RX ADMIN — IPRATROPIUM BROMIDE 0.5 MG: 0.5 SOLUTION RESPIRATORY (INHALATION) at 13:35

## 2022-08-06 RX ADMIN — OXYCODONE HYDROCHLORIDE 5 MG: 5 SOLUTION ORAL at 21:55

## 2022-08-06 RX ADMIN — Medication 250 MG: at 08:54

## 2022-08-06 RX ADMIN — OXYCODONE HYDROCHLORIDE 5 MG: 5 SOLUTION ORAL at 17:50

## 2022-08-06 RX ADMIN — HEPARIN SODIUM 5000 UNITS: 5000 INJECTION INTRAVENOUS; SUBCUTANEOUS at 09:01

## 2022-08-06 RX ADMIN — NYSTATIN: 100000 POWDER TOPICAL at 08:55

## 2022-08-06 RX ADMIN — HYDROMORPHONE HYDROCHLORIDE 0.2 MG: 0.2 INJECTION, SOLUTION INTRAMUSCULAR; INTRAVENOUS; SUBCUTANEOUS at 04:06

## 2022-08-06 RX ADMIN — FLUTICASONE PROPIONATE 1 SPRAY: 50 SPRAY, METERED NASAL at 08:55

## 2022-08-06 RX ADMIN — ASPIRIN 81 MG: 81 TABLET, CHEWABLE ORAL at 08:54

## 2022-08-06 RX ADMIN — Medication 975 MG: at 06:03

## 2022-08-06 RX ADMIN — LEVOTHYROXINE SODIUM 25 MCG: 25 TABLET ORAL at 06:03

## 2022-08-06 RX ADMIN — FLUTICASONE PROPIONATE 1 SPRAY: 50 SPRAY, METERED NASAL at 17:19

## 2022-08-06 RX ADMIN — GLYCERIN 2 DROP: .002; .002; .01 SOLUTION/ DROPS OPHTHALMIC at 17:18

## 2022-08-06 RX ADMIN — BUSPIRONE HYDROCHLORIDE 10 MG: 10 TABLET ORAL at 08:54

## 2022-08-06 RX ADMIN — Medication 1000 UNITS: at 08:54

## 2022-08-06 NOTE — ASSESSMENT & PLAN NOTE
Malnutrition Findings:   Adult Malnutrition type: Acute illness  Adult Degree of Malnutrition: Malnutrition of moderate degree  Malnutrition Characteristics: Muscle loss, Fat loss              360 Statement: Moderate malnutrition in the constext of acute illness r/t inadequate energy intake as evidenced by moderate muscle wasting and subcutaneous fat loss (orbitals, clavicles, temples, cheeks)  Will treat with nutrition therapy and EN recommendation  BMI Findings: Body mass index is 18 57 kg/m²  · No oral diet with tube feeding, nutrition following

## 2022-08-06 NOTE — PROGRESS NOTES
Cruz [address]  68   Progress Note Damion Police Silviano 1940, 80 y o  female MRN: [id number]  Unit/Bed#: -Rena Encounter: [id number]  Primary Care Provider: Zulma Dalton MD   Date and time admitted to hospital: 7/31/2022  7:53 PM    SIRS (systemic inflammatory response syndrome) ([facility] [address] )  Assessment & Plan  · Patient met SIRS POA with leukocytosis, tachycardia, tachypnea  Also with intermittent hypotension with MAP <65  · Leukocytosis trended up today but likely reactive s/p wound VAC placement yesterday  Still with intermittent tachycardia and tachypnea, BP now stable  · Initially suspected to be 2/2 PNA vs UTI  · CXR: Improvement in the diffuse parenchymal opacities  · Sputum cx: 3+ growth of Pseudomonas aeruginosa MDRO, 3+ growth of mixed respiratory ash  · UA: 3+ leukocytes, 30-50 WBCs, moderate bacteria and occasional calcium oxalate crystals  · Urine cx: >100,000 cfu/ml Klebsiella pneumoniae, Pseudomonas aeruginosa MDRO  · Lactic acid: 2 7->2 0 after IV fluid bolus  · Procalcitonin: 0 2->0 49->0  17  · BC x2 obtained, results pending  · S/p 3 days IV Rocephin, D/C per ID  Patient clinically improving off antibiotics  · Infectious disease consulted:  · Pseudomonas from urine and sputum cultures suspected to be colonization verses active infection  · Id evaluation appreciated  Monitor off antibiotics    Moderate protein-calorie malnutrition (HCC)  Assessment & Plan  Malnutrition Findings:   Adult Malnutrition type: Acute illness  Adult Degree of Malnutrition: Malnutrition of moderate degree  Malnutrition Characteristics: Muscle loss, Fat loss              360 Statement: Moderate malnutrition in the constext of acute illness r/t inadequate energy intake as evidenced by moderate muscle wasting and subcutaneous fat loss (orbitals, clavicles, temples, cheeks)  Will treat with nutrition therapy and EN recommendation  BMI Findings: Body mass index is 18 57 kg/m²     · No oral diet with tube feeding, nutrition following  Elevated troponin  Assessment & Plan  Lab Results   Component Value Date    HSTNI0 69 (H) 07/31/2022    HSTNID2 10 07/31/2022    HSTNI2 79 (H) 07/31/2022    HSTNID4 3 08/01/2022    HSTNI4 72 (H) 08/01/2022      · Patient arrived to the ED in respiratory distress requiring 6 L nasal cannula  · HS troponins peaked at 79  Elevation likely due to hypoxia from respiratory distress  · EKG with no ischemic changes, less likely ACS as patient has no chest pain  · Received 162 mg ASA in ED  · Continue home 81 mg ASA  Hyponatremia  Assessment & Plan  Lab Results   Component Value Date    SODIUM 137 08/06/2022    SODIUM 133 (L) 08/05/2022    SODIUM 135 08/04/2022     · Hyponatremia resolved  · Baseline creatinine appears to be between 0 2-0 4  Has remained stable at baseline  · Uric acid, urine sodium, serum and urine osmolality wnl  · AM cortisol: elevated at 26 8  · Resolved    Pressure injury of sacral region, stage 4 (HCC)  Assessment & Plan  · Previous admission 6/14-7/13 with I&D by General Surgery, S/P IV ABX, wound vac  · General surgery following:  · Placement of wound VAC 8/4, will need dressing changes 3 times a week, CM on board for orders  · Wound care following, continue per wound care/general surgery recommendation  · Frequent repositioning  · Optimize nutritional status   Pulmonary hypertension (HCC)  Assessment & Plan  · Crest syndrome with pulmonary hypertension  · Continue home regimen: Sildenafil 10 mg TID and Opsumit 10 mg daily  · Of note, there was question of safety of administration of Opsumit via PEG, unclear whether patient was actually receiving medication at SNF  · Per pharmacy medication can be crushed to be given via PEG, however there is risk with dust/particles being inhaled by staff when administering medications  · Cardiology was following, signed off      Urinary retention  Assessment & Plan  · Tejada catheter placed prior to admission  · Outpatient Urology follow-up  · Urinary retention protocol  Anemia  Assessment & Plan  · Chronic anemia likely multifactorial  Hgb baseline appears to be between 7-8  · Hgb has remained generally stable at known baseline, initial Hgb levels elevated likely due to hemoconcentration  · Continue iron and folic acid supplementation   · CBC stable from the day before  I am going to give the patient a lab holiday tomorrow and they can recheck labs on Monday    Scleroderma [facility])  Assessment & Plan  · With CREST syndrome, 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 for proximal muscle weakness  · Follows with Dr Jose Alfredo Aldrich at [facility] in [place] for IVIG infusions  · Patient was getting prednisone 10 mg t i d  at home but has been getting 30 mg once daily here  · Continue Atovaquone for PCP prophylaxis  · PT/OT following, needs rehab placement  Case management aware and working on placement  Dysphagia, oropharyngeal phase  Assessment & Plan  · H/o dysphagia S/P PEG  · Continue cyclic tube feeds with no oral feeds, Nutrition following  · Aspiration precautions   * Acute on chronic respiratory failure with hypoxia [facility])  Assessment & Plan  Patient presented to ED from SNF for sudden onset respiratory distress  Per patient, had coughing episode on Friday with clear/white sputum, day prior to arrival around 1300 began having coughing episode and unable to clear airway from sputum  Chronically on 2 L at HS for comfort  · In ED, patient noted to have thick, dry secretions in trachea after suctioning  Initially required 6 L NC for continued hypoxia and respiratory distress  · CXR improved from previous admission  · Cause of acute on chronic hypoxic RF likely dried mucous  Patient's respiratory status overall stable, titrated down to 2L NC O2, occasionally needs tracheal suctioning with RT for mucus plugging    · Pulmonology following, recommendations appreciated  · Palliative care following:  · Increased pain after wound VAC change - Tylenol to around the clock, add oxycodone and Dilaudid PRN for pain  · Currently patient appears to be competent to make medical decisions, would like to continue level 3 DNR/DNI  · Continue home Xopenex and Sodium Chloride Neb treatments TID  · Humidify oxygen  · Patient on Mucinex outpatient, continue  · Respiratory protocol, IS, flutter valve  Patient states that she has not had any mucus plugging for over 24 hours and feels good about that  Pulmonology evaluation appreciated  They were considering bronchoscopy however that would only be short-term resolution so favor conservative management with chest PT/vest therapy and mucolytics for now  Case management working on placement as well  Patient will not go back to [facility]  Agree with palliative care consultation        VTE Pharmacologic Prophylaxis:   Pharmacologic: Heparin  Mechanical VTE Prophylaxis in Place: Yes    Patient Centered Rounds: I have performed bedside rounds with nursing staff today  Education and Discussions with Family / Patient:  Discussed with the daughter Anson Jones over the phone    Time Spent for Care: 30 minutes  More than 50% of total time spent on counseling and coordination of care as described above  Current Length of Stay: 6 day(s)    Current Patient Status: Inpatient   Certification Statement: The patient will continue to require additional inpatient hospital stay due to Needing monitoring of respiratory status and secretions and patient is also requiring placement which has been challenging for case management    Discharge Plan:  Patient is medically stable for discharge at this time  She has a lot of chronic issues which are stable but is now pending placement  Case Management is actively looking for facilities  Lot of facilities denied patient    Code Status: Level 3 - DNAR and DNI      Subjective:   Patient seen examined    She states she is doing okay because she has not had any mucus plugging for over 24 hours    Objective:     Vitals:   Temp (24hrs), Av 6 °F (36 4 °C), Min:96 6 °F (35 9 °C), Max:98 1 °F (36 7 °C)    Temp:  [96 6 °F (35 9 °C)-98 1 °F (36 7 °C)] 98 °F (36 7 °C)  HR:  [64-88] 64  Resp:  [16-24] 16  BP: ()/(50-62) 115/59  SpO2:  [95 %-99 %] 95 %  Body mass index is 18 57 kg/m²  Input and Output Summary (last 24 hours): Intake/Output Summary (Last 24 hours) at 2022 1013  Last data filed at 2022 0700  Gross per 24 hour   Intake 400 ml   Output 3100 ml   Net -2700 ml       Physical Exam:     Physical Exam  (   General Appearance:    Alert, cooperative, no distress, appears stated age                               Lungs:     Clear to auscultation bilaterally, respirations unlabored       Heart:    Regular rate and rhythm, S1 and S2 normal, no murmur, rub    or gallop   Abdomen:     Soft, non-tender, bowel sounds active all four quadrants,     no masses, no organomegaly           Extremities:   Extremities normal, atraumatic, no cyanosis or edema     Additional Data:     Labs:    Results from last 7 days   Lab Units 224   WBC Thousand/uL 13 23*   < > 11 05*   HEMOGLOBIN g/dL 8 8*   < > 7 7*   HEMATOCRIT % 29 3*   < > 25 4*   PLATELETS Thousands/uL 306   < > 236   BANDS PCT %  --   --  9*   LYMPHO PCT %  --   --  11*   MONO PCT %  --   --  4   EOS PCT %  --   --  2    < > = values in this interval not displayed       Results from last 7 days   Lab Units 22  0528 229 22   SODIUM mmol/L 137   < > 132*   < > 127*   POTASSIUM mmol/L 4 3   < > 3 9   < > 4 8   CHLORIDE mmol/L 97   < > 97   < > 86*   CO2 mmol/L 32   < > 26   < > 31   BUN mg/dL 12   < > 18   < > 32*   CREATININE mg/dL 0 26*   < > 0 30*   < > 0 45*   ANION GAP mmol/L 8   < > 9   < > 10   CALCIUM mg/dL 8 9   < > 8 7   < > 9 9   ALBUMIN g/dL  --   --  2 6*  --  3 4*   TOTAL BILIRUBIN mg/dL  --   --   --   --  0 43   ALK PHOS U/L  --   --   --   --  109*   ALT U/L  --   --   --   --  14   AST U/L  --   --   --   --  22   GLUCOSE RANDOM mg/dL 78   < > 152*   < > 107    < > = values in this interval not displayed  Results from last 7 days   Lab Units 08/04/22  0454 08/02/22  1259 08/02/22  1042 08/02/22  0459 08/01/22  0446   LACTIC ACID mmol/L  --  2 0 2 7*  --   --    PROCALCITONIN ng/ml 0 17  --   --  0 49* 0 20           * I Have Reviewed All Lab Data Listed Above  * Additional Pertinent Lab Tests Reviewed: Brandon 66 Admission Reviewed        Recent Cultures (last 7 days):     Results from last 7 days   Lab Units 08/02/22  1257 08/01/22  0824 07/31/22  2033 07/31/22 2011   BLOOD CULTURE  No Growth at 72 hrs  No Growth at 72 hrs   --   --  No Growth After 5 Days  No Growth After 5 Days     SPUTUM CULTURE   --  3+ Growth of Pseudomonas aeruginosa MDR*  3+ Growth of   --   --    GRAM STAIN RESULT   --  No Polys or Bacteria seen  --   --    URINE CULTURE   --   --  >100,000 cfu/ml Klebsiella pneumoniae*  >100,000 cfu/ml Pseudomonas aeruginosa MDR*  --        Last 24 Hours Medication List:   Current Facility-Administered Medications   Medication Dose Route Frequency Provider Last Rate    acetaminophen  975 mg Per G Tube [facility] Curt Fu PA-C      aspirin  81 mg Per G Tube Daily BETTIE Brambila      atovaquone  1,500 mg Per G Tube Daily With Breakfast Susy Anderson MD      busPIRone  10 mg Per PEG Tube TID Curt Fu PA-C      cholecalciferol  1,000 Units Per G Tube Daily [employer], CRNP      fluticasone  1 spray Nasal BID Jerson Brambial [address]      folic acid  1 mg Per PEG Tube Daily Jerson Brambila [address]      glycerin-hypromellose-  2 drop Both Eyes BID Jerson Brambila [address]      guaiFENesin  400 mg Per G Tube TID Pandi Todhe, DO      heparin (porcine)  5,000 Units Subcutaneous Q12H Rayshawn Brambila      HYDROmorphone  0 2 mg Intravenous Q3H PRN ASTRID Witt      ipratropium  0 5 mg Nebulization TID Marimar Iglesias MD      levalbuterol  1 25 mg Nebulization TID BETTIE Brambila      levothyroxine  25 mcg Per G Tube Early Morning Rayshawn Brambila      loratadine  10 mg Per G Tube Daily Rayshawn Brambila      LORazepam  0 25 mg Intravenous Q6H PRN Jeanine Villareal PA-C      Macitentan  10 mg Per G Tube Daily David Sanchez DO      melatonin  3 mg Per G Tube HS Nehal Juhi Mayer PA-C      nystatin   Topical BID BETTIE Brambila      omeprazole (PRILOSEC) oral suspension  20 mg Per PEG Tube Early Morning BETTIE Brambila      ondansetron  4 mg Intravenous Q6H PRN Rayshawn Brambila      oxyCODONE  5 mg Per PEG Tube Q4H PRN BETTIE Fernandez      predniSONE  30 mg Per G Tube Daily Rayshawn Brambila      saccharomyces boulardii  250 mg Per G Tube BID [address]      sildenafil  10 mg Per G Tube TID Edna Doe DO      sodium chloride  1 spray Each Nare Q1H PRN [address], BETTIE      sodium hypochlorite  1 application Irrigation Daily David Sanchez DO          Today, Patient Was Seen By: Mahesh Bai MD    ** Please Note: Dictation voice to text software may have been used in the creation of this document   **

## 2022-08-06 NOTE — ASSESSMENT & PLAN NOTE
· Chronic anemia likely multifactorial  Hgb baseline appears to be between 7-8  · Hgb has remained generally stable at known baseline, initial Hgb levels elevated likely due to hemoconcentration  · Continue iron and folic acid supplementation   · CBC stable from the day before    I am going to give the patient a lab holiday tomorrow and they can recheck labs on Monday

## 2022-08-06 NOTE — ASSESSMENT & PLAN NOTE
· With CREST syndrome, 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 for proximal muscle weakness  · Follows with Dr Gloria Damico at [facility] in [place] for IVIG infusions  · Patient was getting prednisone 10 mg t i d  at home but has been getting 30 mg once daily here  · Continue Atovaquone for PCP prophylaxis  · PT/OT following, needs rehab placement  Case management aware and working on placement

## 2022-08-06 NOTE — ASSESSMENT & PLAN NOTE
· Previous admission 6/14-7/13 with I&D by General Surgery, S/P IV ABX, wound vac  · General surgery following:  · Placement of wound VAC 8/4, will need dressing changes 3 times a week, CM on board for orders  · Wound care following, continue per wound care/general surgery recommendation  · Frequent repositioning  · Optimize nutritional status

## 2022-08-06 NOTE — PROCEDURES
Acute Care Surgery  Bedside V A C  Procedure Note    Location of wound: Sacral Decubitus Ulcer    Dressings and Foam removed:  2 black foam (one in wound, one used for bridge), one xeroform over exposed bone  Dimensions of wound:  10 x 8 x 2 cm      Description of wound:   Clean healthy wound with granulation tissue at base and exposed sacrum  VAC dressing application:  The patient was given preprocedural pain medication, 1 time dosage of 0 2 mg Dilaudid  The periwound skin and wound was cleaned and dried with wound cleanser  Xeroform was placed over exposed bone  One black sponge  were cut to size of the wound and placed into the wound  The sponges were then covered with VAC drape  Additional VAC drape and black foam was used to create a bridge to the patient's left hip and a base for the track pad  The track pad was then placed over the base of black foam  The VAC was then set to 125 mmHg low Continuous suction  The patient tolerated the procedure well  VAC settings:  125 mmHg  Continuous      Additional Notes: The [facility] sticker placed over the dressing per protocol  The next [facility] dressing change will be planned for Monday 8/8    This dressing change took greater than 25 minutes to complete      Leobardo Motta DO  8/6/2022 11:11 AM PULMONARY OFFICE NEW PATIENT VISIT    CONSULTING PHYSICIAN:      REASON FOR VISIT:   Chief Complaint   Patient presents with    Asthma       DATE OF VISIT: 4/5/2022    HISTORY OF PRESENT ILLNESS: 47y.o. year old female with past medical history of asthma, hypertension who is here for evaluation of cough. Patient states that she was diagnosed with asthma when she was in her 25s. At some point she had used steroid inhalers as well as were getting allergy shots. Her asthma and allergies were done under control and she was not taking inhalers consistently. She was only using albuterol inhaler as needed. She developed COVID in December 2021 which was managed at home. Symptoms resolved except shortness of breath and cough. She her primary care physician started her on Symbicort which she has been taking twice a day along with albuterol nebulization and albuterol inhaler to be used as needed. Patient has noticed improvement in coughing but has been struggling with cough. Cough is mostly dry and comes in spells and she ends up throwing up after the coughing spells. Cough does not wakes her up from sleep. Cough is persistent and is not getting better. Patient also has environmental allergies mostly to dust, dust mites and cats. She has runny nose with sinus congestion and postnasal drip which is uncontrolled. She does not have any pets. She is a lifelong non-smoker. She is a . REVIEW OF SYSTEMS:   CONSTITUTIONAL SYMPTOMS: The patient denies fever, fatigue, night sweats, weight loss or weight gain. HEENT: No vision changes. No tinnitus, Denies sinus pain. No hoarseness, or dysphagia. NECK: Patient denies swelling in the neck. CARDIOVASCULAR: Denies chest pain, palpitation, syncope. RESPIRATORY: See above  GASTROINTESTINAL: Denies nausea, abdominal pain or change in bowel function. GENITOURINARY: Denies obstructive symptoms. No history of incontinence.   BREASTS: No masses or lumps in the breasts. SKIN: No rashes or itching. MUSCULOSKELETAL: Denies weakness or bone pain. NEUROLOGICAL: No headaches or seizures. PSYCHIATRIC: Denies mood swings or depression. ENDOCRINE: Denies heat or cold intolerance or excessive thirst.  HEMATOLOGIC/LYMPHATIC: Denies easy bruising or lymph node swelling. ALLERGIC/IMMUNOLOGIC: No environmental allergies.     PAST MEDICAL HISTORY:   Past Medical History:   Diagnosis Date    Asthma 2011    Colitis, collagenous 2016    Depression     History of chicken pox 1989    IBS (irritable bowel syndrome)     Telogen effluvium 2011       PAST SURGICAL HISTORY:   Past Surgical History:   Procedure Laterality Date     SECTION       SECTION      CHOLECYSTECTOMY  2010    WISDOM TOOTH EXTRACTION      APPROX 5 YRS AGO        SOCIAL HISTORY:   Social History     Tobacco Use    Smoking status: Never Smoker    Smokeless tobacco: Never Used   Substance Use Topics    Alcohol use: No    Drug use: No       FAMILY HISTORY:   Family History   Problem Relation Age of Onset    High Cholesterol Mother     High Blood Pressure Mother     Asthma Mother     Depression Mother     High Cholesterol Father     High Blood Pressure Father     Other Father         diverticulitis    Asthma Maternal Aunt     Depression Maternal Aunt     High Cholesterol Maternal Grandmother     High Blood Pressure Maternal Grandmother     Stroke Maternal Grandmother     Depression Maternal Grandmother     High Cholesterol Maternal Grandfather     High Blood Pressure Maternal Grandfather     Cancer Maternal Grandfather         LUNG CA    High Cholesterol Paternal Grandmother     High Blood Pressure Paternal Grandmother     High Cholesterol Paternal Grandfather     High Blood Pressure Paternal Grandfather     Cancer Paternal Grandfather         LUNG CANCER    Asthma Daughter        MEDICATIONS:     Current Outpatient Medications on File Prior to Visit   Medication Sig Dispense Refill    Cholecalciferol (VITAMIN D3) 125 MCG (5000 UT) TABS Take 5,000 tablets by mouth      albuterol sulfate HFA (VENTOLIN HFA) 108 (90 Base) MCG/ACT inhaler Inhale 2 puffs into the lungs every 4 hours as needed for Wheezing or Shortness of Breath 3 each 1    albuterol (PROVENTIL) (2.5 MG/3ML) 0.083% nebulizer solution Take 3 mLs by nebulization every 6 hours as needed for Wheezing or Shortness of Breath 180 each 5    budesonide-formoterol (SYMBICORT) 160-4.5 MCG/ACT AERO Inhale 2 puffs into the lungs 2 times daily 30.6 g 1    amLODIPine-benazepril (LOTREL) 10-40 MG per capsule TAKE ONE CAPSULE BY MOUTH DAILY 90 capsule 3    acetaminophen (TYLENOL) 325 MG tablet Take 975 mg by mouth every 8 hours      Ascorbic Acid (HEBER-C PO) Take 1 tablet by mouth daily        No current facility-administered medications on file prior to visit. ALLERGIES:   Allergies as of 04/05/2022 - Fully Reviewed 04/05/2022   Allergen Reaction Noted    Other Nausea And Vomiting 12/02/2016      OBJECTIVE:   weight is 281 lb (127.5 kg). Her blood pressure is 130/66 and her pulse is 80. Her oxygen saturation is 97%. PHYSICAL EXAM:    CONSTITUTIONAL: She is a 47y.o.-year-old who appears her stated age. She is alert and oriented x 3 and in no acute distress. HEENT: PERRL. No scleral icterus. No thrush, atraumatic, normocephalic. NECK: Supple, without cervical or supraclavicular lymphadenopathy:  CARDIOVASCULAR: S1 S2 RRR. Without murmer  RESPIRATORY & CHEST: Lungs are clear to auscultation and percussion. No wheezing, no crackles. Good air movement  GASTROINTESTINAL & ABDOMEN: Soft, nontender, positive bowel sounds in all quadrants, non-distended, without hepatosplenomegaly. GENITOURINARY: Deferred. MUSCULOSKELETAL: No tenderness to palpation of the axial skeleton. There is no clubbing. No cyanosis. No edema of the lower extremities.    SKIN OF BODY: No rash or jaundice. PSYCHIATRIC EVALUATION: Normal affect. Patient answers questions appropriately. HEMATOLOGIC/LYMPHATIC/ IMMUNOLOGIC: No palpable lymphadenopathy. NEUROLOGIC: Alert and oriented x 3. Groslly non-focal. Motor strength is 5+/5 in all muscle groups. The patient has a normal sensorium globally. LABS:  Lab Results   Component Value Date    WBC 8.3 02/28/2018    HGB 12.6 02/28/2018    HCT 37.1 02/28/2018     02/28/2018    CHOL 220 (H) 06/15/2021    TRIG 119 06/15/2021    HDL 62 (H) 06/15/2021    ALT 8 (L) 01/27/2022    AST 10 (L) 01/27/2022     01/27/2022    K 4.0 01/27/2022     01/27/2022    CREATININE 0.6 01/27/2022    BUN 8 01/27/2022    CO2 23 01/27/2022    TSH 2.56 01/27/2022       Lab Results   Component Value Date    GLUCOSE 96 01/27/2022    CALCIUM 9.1 01/27/2022     01/27/2022    K 4.0 01/27/2022    CO2 23 01/27/2022     01/27/2022    BUN 8 01/27/2022    CREATININE 0.6 01/27/2022           ASSESSMENT AND PLAN:     1. Seasonal allergic rhinitis, unspecified trigger  Patient likely has uncontrolled allergies with postnasal drip leading to cough. She is not able to tolerate nasal sprays because of a strong gag reflex and vomitings. Start Singulair and see response to treatment. - montelukast (SINGULAIR) 10 MG tablet; Take 1 tablet by mouth daily  Dispense: 30 tablet; Refill: 3      2. Moderate persistent asthma without complication  Continue Symbicort twice a day. Continue albuterol inhaler and albuterol nebs as needed. - Full PFT Study With Bronchodilator; Future      Return in about 3 months (around 7/5/2022). Lisa Cadena MD  Pulmonary Critical Care and Sleep Medicine  Electronically signed by Lisa Cadena MD on 4/5/2022 at 9:40 AM     This note was completed using dragon medical speech recognition software.  Grammatical errors, random word insertions, pronoun errors and incomplete sentences are occasional consequences of this technology due to software limitations. If there are questions or concerns about the content of this note of information contained within the body of this dictation they should be addressed with the provider for clarification.

## 2022-08-06 NOTE — ASSESSMENT & PLAN NOTE
· Patient met SIRS POA with leukocytosis, tachycardia, tachypnea  Also with intermittent hypotension with MAP <65  · Leukocytosis trended up today but likely reactive s/p wound VAC placement yesterday  Still with intermittent tachycardia and tachypnea, BP now stable  · Initially suspected to be 2/2 PNA vs UTI  · CXR: Improvement in the diffuse parenchymal opacities  · Sputum cx: 3+ growth of Pseudomonas aeruginosa MDRO, 3+ growth of mixed respiratory ash  · UA: 3+ leukocytes, 30-50 WBCs, moderate bacteria and occasional calcium oxalate crystals  · Urine cx: >100,000 cfu/ml Klebsiella pneumoniae, Pseudomonas aeruginosa MDRO  · Lactic acid: 2 7->2 0 after IV fluid bolus  · Procalcitonin: 0 2->0 49->0  17  · BC x2 obtained, results pending  · S/p 3 days IV Rocephin, D/C per ID  Patient clinically improving off antibiotics  · Infectious disease consulted:  · Pseudomonas from urine and sputum cultures suspected to be colonization verses active infection  · Id evaluation appreciated    Monitor off antibiotics

## 2022-08-06 NOTE — NURSING NOTE
Patient had BM and the wound vac dressing was compromised  Dressing and foam was changed by staff  Wound vac intact with no leaks

## 2022-08-06 NOTE — ASSESSMENT & PLAN NOTE
Lab Results   Component Value Date    SODIUM 137 08/06/2022    SODIUM 133 (L) 08/05/2022    SODIUM 135 08/04/2022     · Hyponatremia resolved  · Baseline creatinine appears to be between 0 2-0 4  Has remained stable at baseline    · Uric acid, urine sodium, serum and urine osmolality wnl  · AM cortisol: elevated at 26 8  · Resolved

## 2022-08-06 NOTE — PLAN OF CARE
Problem: MOBILITY - ADULT  Goal: Maintain or return to baseline ADL function  Description: INTERVENTIONS:  -  Assess patient's ability to carry out ADLs; assess patient's baseline for ADL function and identify physical deficits which impact ability to perform ADLs (bathing, care of mouth/teeth, toileting, grooming, dressing, etc )  - Assess/evaluate cause of self-care deficits   - Assess range of motion  - Assess patient's mobility; develop plan if impaired  - Assess patient's need for assistive devices and provide as appropriate  - Encourage maximum independence but intervene and supervise when necessary  - Involve family in performance of ADLs  - Assess for home care needs following discharge   - Consider OT consult to assist with ADL evaluation and planning for discharge  - Provide patient education as appropriate  Outcome: Progressing  Goal: Maintains/Returns to pre admission functional level  Description: INTERVENTIONS:  - Perform BMAT or MOVE assessment daily    - Set and communicate daily mobility goal to care team and patient/family/caregiver  - Collaborate with rehabilitation services on mobility goals if consulted  - Perform Range of Motion  3 times a day  - Reposition patient every 2  hours    - Dangle patient 3  times a day  - Stand patient  3  times a day  - Out of bed to chair 3 times a day   - Out of bed for meals 3  times a day  - Out of bed for toileting  - Record patient progress and toleration of activity level   Outcome: Progressing     Problem: Prexisting or High Potential for Compromised Skin Integrity  Goal: Skin integrity is maintained or improved  Description: INTERVENTIONS:  - Identify patients at risk for skin breakdown  - Assess and monitor skin integrity  - Assess and monitor nutrition and hydration status  - Monitor labs   - Assess for incontinence   - Turn and reposition patient  - Assist with mobility/ambulation  - Relieve pressure over bony prominences  - Avoid friction and shearing  - Provide appropriate hygiene as needed including keeping skin clean and dry  - Evaluate need for skin moisturizer/barrier cream  - Collaborate with interdisciplinary team   - Patient/family teaching  - Consider wound care consult   Outcome: Progressing     Problem: RESPIRATORY - ADULT  Goal: Achieves optimal ventilation and oxygenation  Description: INTERVENTIONS:  - Assess for changes in respiratory status  - Assess for changes in mentation and behavior  - Position to facilitate oxygenation and minimize respiratory effort  - Oxygen administered by appropriate delivery if ordered  - Encourage broncho-pulmonary hygiene including cough, deep breathe, Incentive Spirometry  - Assess the need for suctioning and aspirate as needed  - Assess and instruct to report SOB or any respiratory difficulty  - Respiratory Therapy support as indicated  Outcome: Progressing     Problem: METABOLIC, FLUID AND ELECTROLYTES - ADULT  Goal: Electrolytes maintained within normal limits  Description: INTERVENTIONS:  - Monitor labs and assess patient for signs and symptoms of electrolyte imbalances  - Administer electrolyte replacement as ordered  - Monitor response to electrolyte replacements, including repeat lab results as appropriate  - Instruct patient on fluid and nutrition as appropriate  Outcome: Progressing  Goal: Fluid balance maintained  Description: INTERVENTIONS:  - Monitor labs   - Monitor I/O and WT  - Instruct patient on fluid and nutrition as appropriate  - Assess for signs & symptoms of volume excess or deficit  Outcome: Progressing     Problem: Potential for Falls  Goal: Patient will remain free of falls  Description: INTERVENTIONS:  - Educate patient/family on patient safety including physical limitations  - Instruct patient to call for assistance with activity   - Consult OT/PT to assist with strengthening/mobility   - Keep Call bell within reach  - Keep bed low and locked with side rails adjusted as appropriate  - Keep care items and personal belongings within reach  - Initiate and maintain comfort rounds  - Make Fall Risk Sign visible to staff  - Offer Toileting every 2 Hours, in advance of need  - Initiate/Maintain  bed and chair alarm  - Apply yellow socks and bracelet for high fall risk patients  - Consider moving patient to room near nurses station  Outcome: Progressing     Problem: Nutrition/Hydration-ADULT  Goal: Nutrient/Hydration intake appropriate for improving, restoring or maintaining nutritional needs  Description: Monitor and assess patient's nutrition/hydration status for malnutrition  Collaborate with interdisciplinary team and initiate plan and interventions as ordered  Monitor patient's weight and dietary intake as ordered or per policy  Utilize nutrition screening tool and intervene as necessary  Determine patient's food preferences and provide high-protein, high-caloric foods as appropriate       INTERVENTIONS:  - Monitor oral intake, urinary output, labs, and treatment plans  - Assess nutrition and hydration status and recommend course of action  - Evaluate amount of meals eaten  - Assist patient with eating if necessary   - Allow adequate time for meals  - Recommend/ encourage appropriate diets, oral nutritional supplements, and vitamin/mineral supplements  - Order, calculate, and assess calorie counts as needed  - Assess need for intravenous fluids  - Provide specific nutrition/hydration education as appropriate  - Include patient/family/caregiver in decisions related to nutrition  Outcome: Progressing

## 2022-08-06 NOTE — ASSESSMENT & PLAN NOTE
Patient presented [facility] ED from [facility] for sudden onset respiratory distress  Per patient, had coughing episode on Friday with clear/white sputum, day prior to arrival around 1300 began having coughing episode and unable to clear airway from sputum  Chronically on 2 L at HS for comfort  · In ED, patient noted to have thick, dry secretions in trachea after suctioning  Initially required 6 L NC for continued hypoxia and respiratory distress  · CXR improved from previous admission  · Cause of acute on chronic hypoxic RF likely dried mucous  Patient's respiratory status overall stable, titrated down to 2L NC O2, occasionally needs tracheal suctioning with RT for mucus plugging  · Pulmonology following, recommendations appreciated  · Palliative care following:  · Increased pain after wound VAC change - Tylenol to around the clock, add oxycodone and Dilaudid PRN for pain  · Currently patient appears to be competent to make medical decisions, would like to continue level 3 DNR/DNI  · Continue home Xopenex and Sodium Chloride Neb treatments TID  · Humidify oxygen  · Patient on Mucinex outpatient, continue  · Respiratory protocol, IS, flutter valve  Patient states that she has not had any mucus plugging for over 24 hours and feels good about that  Pulmonology evaluation appreciated  They were considering bronchoscopy however that would only be short-term resolution so favor conservative management with chest PT/vest therapy and mucolytics for now  Case management working on placement as well  Patient will not go back to [facility]    Agree with palliative care consultation

## 2022-08-07 PROBLEM — E87.1 HYPONATREMIA: Status: RESOLVED | Noted: 2022-01-01 | Resolved: 2022-01-01

## 2022-08-07 LAB
BACTERIA BLD CULT: NORMAL
BACTERIA BLD CULT: NORMAL

## 2022-08-07 PROCEDURE — 94760 N-INVAS EAR/PLS OXIMETRY 1: CPT

## 2022-08-07 PROCEDURE — 94669 MECHANICAL CHEST WALL OSCILL: CPT

## 2022-08-07 PROCEDURE — 94640 AIRWAY INHALATION TREATMENT: CPT

## 2022-08-07 PROCEDURE — 99232 SBSQ HOSP IP/OBS MODERATE 35: CPT

## 2022-08-07 RX ADMIN — FOLIC ACID 1 MG: 1 TABLET ORAL at 08:44

## 2022-08-07 RX ADMIN — Medication 1000 UNITS: at 08:44

## 2022-08-07 RX ADMIN — SILDENAFIL CITRATE 10 MG: 20 TABLET ORAL at 08:43

## 2022-08-07 RX ADMIN — FLUTICASONE PROPIONATE 1 SPRAY: 50 SPRAY, METERED NASAL at 08:42

## 2022-08-07 RX ADMIN — HEPARIN SODIUM 5000 UNITS: 5000 INJECTION INTRAVENOUS; SUBCUTANEOUS at 06:05

## 2022-08-07 RX ADMIN — NYSTATIN: 100000 POWDER TOPICAL at 08:42

## 2022-08-07 RX ADMIN — IPRATROPIUM BROMIDE 0.5 MG: 0.5 SOLUTION RESPIRATORY (INHALATION) at 14:45

## 2022-08-07 RX ADMIN — Medication 250 MG: at 08:43

## 2022-08-07 RX ADMIN — ASPIRIN 81 MG: 81 TABLET, CHEWABLE ORAL at 08:43

## 2022-08-07 RX ADMIN — ATOVAQUONE 1500 MG: 750 SUSPENSION ORAL at 06:31

## 2022-08-07 RX ADMIN — GLYCERIN 2 DROP: .002; .002; .01 SOLUTION/ DROPS OPHTHALMIC at 08:42

## 2022-08-07 RX ADMIN — IPRATROPIUM BROMIDE 0.5 MG: 0.5 SOLUTION RESPIRATORY (INHALATION) at 19:17

## 2022-08-07 RX ADMIN — Medication 250 MG: at 16:19

## 2022-08-07 RX ADMIN — OXYCODONE HYDROCHLORIDE 5 MG: 5 SOLUTION ORAL at 15:02

## 2022-08-07 RX ADMIN — PREDNISONE 30 MG: 20 TABLET ORAL at 08:43

## 2022-08-07 RX ADMIN — LORATADINE 10 MG: 10 TABLET ORAL at 08:44

## 2022-08-07 RX ADMIN — LEVALBUTEROL HYDROCHLORIDE 1.25 MG: 1.25 SOLUTION RESPIRATORY (INHALATION) at 07:20

## 2022-08-07 RX ADMIN — MACITENTAN 10 MG: 10 TABLET, FILM COATED ORAL at 08:42

## 2022-08-07 RX ADMIN — HEPARIN SODIUM 5000 UNITS: 5000 INJECTION INTRAVENOUS; SUBCUTANEOUS at 19:57

## 2022-08-07 RX ADMIN — BUSPIRONE HYDROCHLORIDE 10 MG: 10 TABLET ORAL at 16:19

## 2022-08-07 RX ADMIN — LEVALBUTEROL HYDROCHLORIDE 1.25 MG: 1.25 SOLUTION RESPIRATORY (INHALATION) at 14:45

## 2022-08-07 RX ADMIN — NYSTATIN: 100000 POWDER TOPICAL at 19:57

## 2022-08-07 RX ADMIN — SILDENAFIL CITRATE 10 MG: 20 TABLET ORAL at 16:19

## 2022-08-07 RX ADMIN — GLYCERIN 2 DROP: .002; .002; .01 SOLUTION/ DROPS OPHTHALMIC at 19:57

## 2022-08-07 RX ADMIN — GUAIFENESIN 400 MG: 100 SOLUTION ORAL at 08:43

## 2022-08-07 RX ADMIN — HYDROMORPHONE HYDROCHLORIDE 0.2 MG: 0.2 INJECTION, SOLUTION INTRAMUSCULAR; INTRAVENOUS; SUBCUTANEOUS at 13:58

## 2022-08-07 RX ADMIN — LORAZEPAM 0.25 MG: 2 INJECTION INTRAMUSCULAR; INTRAVENOUS at 20:08

## 2022-08-07 RX ADMIN — LEVALBUTEROL HYDROCHLORIDE 1.25 MG: 1.25 SOLUTION RESPIRATORY (INHALATION) at 19:17

## 2022-08-07 RX ADMIN — Medication 975 MG: at 06:04

## 2022-08-07 RX ADMIN — LEVOTHYROXINE SODIUM 25 MCG: 25 TABLET ORAL at 06:05

## 2022-08-07 RX ADMIN — GUAIFENESIN 400 MG: 100 SOLUTION ORAL at 16:19

## 2022-08-07 RX ADMIN — BUSPIRONE HYDROCHLORIDE 10 MG: 10 TABLET ORAL at 08:43

## 2022-08-07 RX ADMIN — Medication 975 MG: at 15:01

## 2022-08-07 RX ADMIN — IPRATROPIUM BROMIDE 0.5 MG: 0.5 SOLUTION RESPIRATORY (INHALATION) at 07:20

## 2022-08-07 RX ADMIN — OMEPRAZOLE 20 MG: 20 CAPSULE, DELAYED RELEASE ORAL at 10:46

## 2022-08-07 RX ADMIN — HYOSCYAMINE SULFATE 1 APPLICATION: 16 SOLUTION at 08:47

## 2022-08-07 NOTE — ASSESSMENT & PLAN NOTE
Lab Results   Component Value Date    HSTNI0 69 (H) 07/31/2022    HSTNID2 10 07/31/2022    HSTNI2 79 (H) 07/31/2022    HSTNID4 3 08/01/2022    HSTNI4 72 (H) 08/01/2022      · Patient arrived to the ED in respiratory distress requiring 6 L nasal cannula  · HS troponins peaked at 79  Elevation likely due to hypoxia from respiratory distress  · EKG with no ischemic changes, less likely ACS as patient has no chest pain  · Continue home medication:  · 81 mg ASA    · Cardiology consulted-> Signed off

## 2022-08-07 NOTE — ASSESSMENT & PLAN NOTE
Lab Results   Component Value Date    SODIUM 137 08/06/2022    SODIUM 133 (L) 08/05/2022    SODIUM 135 08/04/2022     · Resolved  · Baseline creatinine appears to be between 0 2-0 4  Has remained stable at baseline    · Uric acid, urine sodium, serum and urine osmolality wnl  · AM cortisol: elevated at 26 8

## 2022-08-07 NOTE — ASSESSMENT & PLAN NOTE
· H/o dysphagia S/P PEG  · Continue cyclic tube feeds with no oral feeds  · Nutrition consulted  · Aspiration precautions

## 2022-08-07 NOTE — ASSESSMENT & PLAN NOTE
· Chronic anemia likely multifactorial    · Hgb baseline appears to be between 7-8    · Hgb has remained generally stable at known baseline, initial Hgb levels elevated likely due to hemoconcentration  · Continue iron and folic acid supplementation

## 2022-08-07 NOTE — ASSESSMENT & PLAN NOTE
· Patient met SIRS POA with leukocytosis, tachycardia, tachypnea  · Also with intermittent hypotension with MAP <65  · Leukocytosis trended up today but likely reactive s/p wound VAC placement yesterday  Still with intermittent tachycardia and tachypnea, BP now stable  · Initially suspected to be 2/2 PNA vs UTI  · CXR: Improvement in the diffuse parenchymal opacities  · Sputum cx: 3+ growth of Pseudomonas aeruginosa MDRO, 3+ growth of mixed respiratory ash  · UA: 3+ leukocytes, 30-50 WBCs, moderate bacteria and occasional calcium oxalate crystals  · Urine cx: >100,000 cfu/ml Klebsiella pneumoniae, Pseudomonas aeruginosa MDRO  · Lactic acid: 2 7->2 0 after IV fluid bolus  · Procalcitonin: 0 2->0 49->0  17  · BC x2 obtained, results pending  · Completed 3 days IV Rocephin, D/C'd per ID     · Infectious disease consulted:  · Pseudomonas in urine and sputum cultures suspect colonization vs active infection  · Monitor off antibiotics

## 2022-08-07 NOTE — ASSESSMENT & PLAN NOTE
· Crest syndrome with pulmonary hypertension  · Continue home regimen:   · Sildenafil 10 mg TID and Opsumit 10 mg daily  · Of note, there was question of safety of administration of Opsumit via PEG, unclear whether patient was actually receiving medication at SNF  · Per pharmacy medication can be crushed to be given via PEG, however there is risk with dust/particles being inhaled by staff when administering medications  · Cardiology was following->signed off

## 2022-08-07 NOTE — PROGRESS NOTES
[facility]  68   Progress Note Malvin Shore 1940, 80 y o  female MRN: [id number]  Unit/Bed#: -01 Encounter: [id number]  Primary Care Provider: Shanti Foster MD   Date and time admitted to hospital: 7/31/2022  7:53 PM    * Acute on chronic respiratory failure with hypoxia [facility])  Assessment & Plan  Patient presented to ED from SNF for sudden onset respiratory distress  Per patient, had coughing episode on Friday with clear/white sputum, day prior to arrival around 1300 began having coughing episode and unable to clear airway from sputum  Chronically on 2 L at HS for comfort  · In ED, patient noted to have thick, dry secretions in trachea after suctioning  Initially required 6 L NC for continued hypoxia and respiratory distress  · CXR improved from previous admission  · Cause of acute on chronic hypoxic RF likely dried mucous  · Patient's respiratory status overall stable, titrated down to 2L NC O2, occasionally needs tracheal suctioning with RT for mucus plugging  · Pulmonology following, recommendations appreciated  · Considering bronchoscopy however that would only be short-term resolution so favor conservative management with chest PT/vest therapy and mucolytics for now  · Palliative care following:  · Increased pain after wound VAC change - Tylenol to around the clock, add oxycodone and Dilaudid PRN for pain  · Currently patient appears to be competent to make medical decisions, would like to continue level 3 DNR/DNI  · Continue home Xopenex and Sodium Chloride Neb treatments TID  · Humidify oxygen  · Respiratory protocol, IS, flutter valve  · Patient states that she has not had any mucus plugging for over 24 hours and feels good about that  · Case management working on placement as well  · Patient will not go back to [facility]       SIRS (systemic inflammatory response syndrome) (HCC)  Assessment & Plan  · Patient met SIRS POA with leukocytosis, tachycardia, tachypnea  · Also with intermittent hypotension with MAP <65  · Leukocytosis trended up today but likely reactive s/p wound VAC placement yesterday  Still with intermittent tachycardia and tachypnea, BP now stable  · Initially suspected to be 2/2 PNA vs UTI  · CXR: Improvement in the diffuse parenchymal opacities  · Sputum cx: 3+ growth of Pseudomonas aeruginosa MDRO, 3+ growth of mixed respiratory ash  · UA: 3+ leukocytes, 30-50 WBCs, moderate bacteria and occasional calcium oxalate crystals  · Urine cx: >100,000 cfu/ml Klebsiella pneumoniae, Pseudomonas aeruginosa MDRO  · Lactic acid: 2 7->2 0 after IV fluid bolus  · Procalcitonin: 0 2->0 49->0  17  · BC x2 obtained, results pending  · Completed 3 days IV Rocephin, D/C'd per ID  · Infectious disease consulted:  · Pseudomonas in urine and sputum cultures suspect colonization vs active infection  · Monitor off antibiotics    Pressure injury of sacral region, stage 4 ([address] )  Assessment & Plan  · Previous admission 6/14-7/13 with I&D by General Surgery, S/P IV ABX, wound vac placement  · General surgery following:  · Placement of wound VAC 8/4, will need dressing changes 3 times a week  · CM on board for orders  · Wound care following, continue per wound care/general surgery recommendation  · Frequent repositioning  · Optimize nutritional status   Anemia  Assessment & Plan  · Chronic anemia likely multifactorial    · Hgb baseline appears to be between 7-8  · Hgb has remained generally stable at known baseline, initial Hgb levels elevated likely due to hemoconcentration  · Continue iron and folic acid supplementation     Elevated troponin  Assessment & Plan  Lab Results   Component Value Date    HSTNI0 69 (H) 07/31/2022    HSTNID2 10 07/31/2022    HSTNI2 79 (H) 07/31/2022    HSTNID4 3 08/01/2022    HSTNI4 72 (H) 08/01/2022      · Patient arrived to the ED in respiratory distress requiring 6 L nasal cannula  · HS troponins peaked at 79   Elevation likely due to hypoxia from respiratory distress  · EKG with no ischemic changes, less likely ACS as patient has no chest pain  · Continue home medication:  · 81 mg ASA  · Cardiology consulted-> Signed off    Urinary retention  Assessment & Plan  · Tejada catheter placed prior to admission  · Continue Outpatient Urology follow-up    Pulmonary hypertension ([facility] )  Assessment & Plan  · Crest syndrome with pulmonary hypertension  · Continue home regimen:   · Sildenafil 10 mg TID and Opsumit 10 mg daily  · Of note, there was question of safety of administration of Opsumit via PEG, unclear whether patient was actually receiving medication at SNF  · Per pharmacy medication can be crushed to be given via PEG, however there is risk with dust/particles being inhaled by staff when administering medications  · Cardiology was following->signed off  Scleroderma ([facility] )  Assessment & Plan  · With CREST syndrome, 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 for proximal muscle weakness  · Follows with Dr Khushboo Charles at [facility] in [place] for IVIG infusions  · Patient was getting prednisone 10 mg t i d  at home but has been getting 30 mg once daily here  · Continue Atovaquone for PCP prophylaxis  · PT/OT following, needs rehab placement  · Case management aware and working on placement  Dysphagia, oropharyngeal phase  Assessment & Plan  · H/o dysphagia S/P PEG  · Continue cyclic tube feeds with no oral feeds  · Nutrition consulted  · Aspiration precautions       Moderate protein-calorie malnutrition ([facility] )  Assessment & Plan  Malnutrition Findings:   Adult Malnutrition type: Acute illness  Adult Degree of Malnutrition: Malnutrition of moderate degree  Malnutrition Characteristics: Muscle loss, Fat loss              360 Statement: Moderate malnutrition in the constext of acute illness r/t inadequate energy intake as evidenced by moderate muscle wasting and subcutaneous fat loss (orbitals, clavicles, temples, cheeks)  Will treat with nutrition therapy and EN recommendation  BMI Findings: Body mass index is 19 29 kg/m²  · No oral diet with tube feeding  · Nutrition following  Hyponatremia-resolved as of 2022  Assessment & Plan  Lab Results   Component Value Date    SODIUM 137 2022    SODIUM 133 (L) 2022    SODIUM 135 2022     · Resolved  · Baseline creatinine appears to be between 0 2-0 4  Has remained stable at baseline  · Uric acid, urine sodium, serum and urine osmolality wnl  · AM cortisol: elevated at 26 8      VTE Pharmacologic Prophylaxis: VTE Score: 9 High Risk (Score >/= 5) - Pharmacological DVT Prophylaxis Ordered: heparin  Sequential Compression Devices Ordered  Patient Centered Rounds: I performed bedside rounds with nursing staff today  Discussions with Specialists or Other Care Team Provider: None    Education and Discussions with Family / Patient: Updated  (daughter) at bedside  Time Spent for Care: 30 minutes  More than 50% of total time spent on counseling and coordination of care as described above  Current Length of Stay: 7 day(s)  Current Patient Status: Inpatient   Certification Statement: The patient will continue to require additional inpatient hospital stay due to placement with plans for STR with LTC transition  Discharge Plan: Anticipate discharge in 48-72 hrs to rehab facility  Code Status: Level 3 - DNAR and DNI    Subjective:   Patient complaining of thick mucous and the need for frequent suctioning  Patient denies any chest pain, abdominal pain, nausea or vomiting  Objective:     Vitals:   Temp (24hrs), Av °F (36 7 °C), Min:97 5 °F (36 4 °C), Max:98 5 °F (36 9 °C)    Temp:  [97 5 °F (36 4 °C)-98 5 °F (36 9 °C)] 98 °F (36 7 °C)  HR:  [75-99] 93  Resp:  [16-17] 16  BP: (119-123)/(61-71) 119/61  SpO2:  [96 %-100 %] 97 %  Body mass index is 19 29 kg/m²  Input and Output Summary (last 24 hours):      Intake/Output Summary (Last 24 hours) at 8/7/2022 1243  Last data filed at 8/7/2022 0600  Gross per 24 hour   Intake 420 ml   Output 2005 ml   Net -1585 ml       Physical Exam:   Physical Exam  Vitals and nursing note reviewed  Constitutional:       General: She is not in acute distress  Appearance: She is ill-appearing (Chronically)  HENT:      Head: Normocephalic  Nose: Nose normal  No congestion  Mouth/Throat:      Mouth: Mucous membranes are dry  Pharynx: Oropharynx is clear  Cardiovascular:      Rate and Rhythm: Normal rate and regular rhythm  Pulses: Normal pulses  Heart sounds: No murmur heard  Pulmonary:      Effort: Pulmonary effort is normal  No respiratory distress  Breath sounds: Decreased breath sounds present  Abdominal:      General: Bowel sounds are normal  There is no distension  Palpations: Abdomen is soft  Tenderness: There is no abdominal tenderness  Musculoskeletal:         General: Normal range of motion  Cervical back: Normal range of motion  Right lower leg: No edema  Left lower leg: No edema  Skin:     General: Skin is warm and dry  Capillary Refill: Capillary refill takes less than 2 seconds  Comments: Sacral Wound Vac present   Neurological:      Mental Status: She is alert  Mental status is at baseline  Motor: Weakness (Generalized) present  Psychiatric:         Mood and Affect: Mood is anxious  Speech: Speech normal          Behavior: Behavior is cooperative  Judgment: Judgment is impulsive  Additional Data:     Labs:  Results from last 7 days   Lab Units 08/06/22  0514 08/05/22  0712 08/04/22  0454   WBC Thousand/uL 13 23*   < > 11 05*   HEMOGLOBIN g/dL 8 8*   < > 7 7*   HEMATOCRIT % 29 3*   < > 25 4*   PLATELETS Thousands/uL 306   < > 236   BANDS PCT %  --   --  9*   LYMPHO PCT %  --   --  11*   MONO PCT %  --   --  4   EOS PCT %  --   --  2    < > = values in this interval not displayed  Results from last 7 days   Lab Units 08/06/22  0514 08/03/22  0528 08/02/22  0459 08/01/22  0446 07/31/22 2011   SODIUM mmol/L 137   < > 132*   < > 127*   POTASSIUM mmol/L 4 3   < > 3 9   < > 4 8   CHLORIDE mmol/L 97   < > 97   < > 86*   CO2 mmol/L 32   < > 26   < > 31   BUN mg/dL 12   < > 18   < > 32*   CREATININE mg/dL 0 26*   < > 0 30*   < > 0 45*   ANION GAP mmol/L 8   < > 9   < > 10   CALCIUM mg/dL 8 9   < > 8 7   < > 9 9   ALBUMIN g/dL  --   --  2 6*  --  3 4*   TOTAL BILIRUBIN mg/dL  --   --   --   --  0 43   ALK PHOS U/L  --   --   --   --  109*   ALT U/L  --   --   --   --  14   AST U/L  --   --   --   --  22   GLUCOSE RANDOM mg/dL 78   < > 152*   < > 107    < > = values in this interval not displayed  Results from last 7 days   Lab Units 08/04/22  0454 08/02/22  1259 08/02/22  1042 08/02/22  0459 08/01/22  0446   LACTIC ACID mmol/L  --  2 0 2 7*  --   --    PROCALCITONIN ng/ml 0 17  --   --  0 49* 0 20       Lines/Drains:  Invasive Devices  Report    Peripheral Intravenous Line  Duration           Peripheral IV Dorsal (posterior); Right Forearm -- days          Drain  Duration           Gastrostomy/Enterostomy -- days    Urethral Catheter 7 days              Urinary Catheter:  Goal for removal: N/A- Discharging with Tejada               Imaging: Reviewed radiology reports from this admission including: chest xray    Recent Cultures (last 7 days):   Results from last 7 days   Lab Units 08/02/22  1257 08/01/22  0824 07/31/22 2033 07/31/22 2011   BLOOD CULTURE  No Growth After 4 Days  No Growth After 4 Days  --   --  No Growth After 5 Days  No Growth After 5 Days     SPUTUM CULTURE   --  3+ Growth of Pseudomonas aeruginosa MDR*  3+ Growth of   --   --    GRAM STAIN RESULT   --  No Polys or Bacteria seen  --   --    URINE CULTURE   --   --  >100,000 cfu/ml Klebsiella pneumoniae*  >100,000 cfu/ml Pseudomonas aeruginosa MDR*  --        Last 24 Hours Medication List:   Current Facility-Administered Medications   Medication Dose Route Frequency Provider Last Rate    acetaminophen  975 mg Per G Tube Q8H [address] Domenico Mann PA-C      aspirin  81 mg Per G Tube Daily BETTIE Brambila      atovaquone  1,500 mg Per G Tube Daily With Breakfast Arleen Capone MD      busPIRone  10 mg Per PEG Tube TID Domenico Mann PA-C      cholecalciferol  1,000 Units Per G Tube Daily BETTIE Brambila      fluticasone  1 spray Nasal BID Jerson Brambila [address]      folic acid  1 mg Per PEG Tube Daily Jerson Brambila [address]      glycerin-hypromellose-  2 drop Both Eyes BID Jerson Brambila [address]      guaiFENesin  400 mg Per G Tube TID Cheripatricia Kevin, DO      heparin (porcine)  5,000 Units Subcutaneous [address] [place], [address]      HYDROmorphone  0 2 mg Intravenous Q3H PRN Melissa Kuhn PA-C      ipratropium  0 5 mg Nebulization TID Hai Stark MD      levalbuterol  1 25 mg Nebulization TID BETTIE Brambila      levothyroxine  25 mcg Per G Tube Early Morning [address] Brambila [address]      loratadine  10 mg Per G Tube Daily Jerson Brambila [address]      LORazepam  0 25 mg Intravenous Q6H PRN Domenico Mann PA-C      Macitentan  10 mg Per G Tube Daily David Sanchez DO      melatonin  3 mg Per G Tube HS Nehal Quintero PA-C      nystatin   Topical BID BETTIE Brambila      omeprazole (PRILOSEC) oral suspension  20 mg Per PEG Tube Early Morning BETTIE Brambila      ondansetron  4 mg Intravenous Q6H PRN Jerson Brambila [address]      oxyCODONE  5 mg Per PEG Tube Q4H PRN BETTIE Santiago      predniSONE  30 mg Per G Tube Daily [address] Br[address]ila [address]      saccharomyces boulardii  250 mg Per G Tube BID [address], [address]      sildenafil  10 mg Per G Tube TID Cheripatricia Kevin, DO      sodium chloride  1 spray Each Nare Q1H PRN [address] [facility]      sodium hypochlorite  1 application Irrigation Daily David Sanchez DO          Today, Patient Was Seen By: BETTIE Chen    **Please Note: This note may have been constructed using a voice recognition system  **

## 2022-08-07 NOTE — ASSESSMENT & PLAN NOTE
Patient presented to ED from SNF for sudden onset respiratory distress  Per patient, had coughing episode on Friday with clear/white sputum, day prior to arrival around 1300 began having coughing episode and unable to clear airway from sputum  Chronically on 2 L at HS for comfort  · In ED, patient noted to have thick, dry secretions in trachea after suctioning  Initially required 6 L NC for continued hypoxia and respiratory distress  · CXR improved from previous admission  · Cause of acute on chronic hypoxic RF likely dried mucous  · Patient's respiratory status overall stable, titrated down to 2L NC O2, occasionally needs tracheal suctioning with RT for mucus plugging  · Pulmonology following, recommendations appreciated  · Considering bronchoscopy however that would only be short-term resolution so favor conservative management with chest PT/vest therapy and mucolytics for now  · Palliative care following:  · Increased pain after wound VAC change - Tylenol to around the clock, add oxycodone and Dilaudid PRN for pain  · Currently patient appears to be competent to make medical decisions, would like to continue level 3 DNR/DNI  · Continue home Xopenex and Sodium Chloride Neb treatments TID  · Humidify oxygen  · Respiratory protocol, IS, flutter valve  · Patient states that she has not had any mucus plugging for over 24 hours and feels good about that  · Case management working on placement as well  · Patient will not go back to [facility]

## 2022-08-07 NOTE — ASSESSMENT & PLAN NOTE
Malnutrition Findings:   Adult Malnutrition type: Acute illness  Adult Degree of Malnutrition: Malnutrition of moderate degree  Malnutrition Characteristics: Muscle loss, Fat loss              360 Statement: Moderate malnutrition in the constext of acute illness r/t inadequate energy intake as evidenced by moderate muscle wasting and subcutaneous fat loss (orbitals, clavicles, temples, cheeks)  Will treat with nutrition therapy and EN recommendation  BMI Findings: Body mass index is 19 29 kg/m²  · No oral diet with tube feeding  · Nutrition following

## 2022-08-07 NOTE — ASSESSMENT & PLAN NOTE
· With CREST syndrome, 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 for proximal muscle weakness  · Follows with Dr Carlton Christianson at [facility] in [place] for IVIG infusions  · Patient was getting prednisone 10 mg t i d  at home but has been getting 30 mg once daily here  · Continue Atovaquone for PCP prophylaxis  · PT/OT following, needs rehab placement  · Case management aware and working on placement

## 2022-08-07 NOTE — ASSESSMENT & PLAN NOTE
· Previous admission 6/14-7/13 with I&D by General Surgery, S/P IV ABX, wound vac placement  · General surgery following:  · Placement of wound VAC 8/4, will need dressing changes 3 times a week  · CM on board for orders  · Wound care following, continue per wound care/general surgery recommendation  · Frequent repositioning  · Optimize nutritional status

## 2022-08-07 NOTE — PLAN OF CARE
Problem: MOBILITY - ADULT  Goal: Maintain or return to baseline ADL function  Description: INTERVENTIONS:  -  Assess patient's ability to carry out ADLs; assess patient's baseline for ADL function and identify physical deficits which impact ability to perform ADLs (bathing, care of mouth/teeth, toileting, grooming, dressing, etc )  - Assess/evaluate cause of self-care deficits   - Assess range of motion  - Assess patient's mobility; develop plan if impaired  - Assess patient's need for assistive devices and provide as appropriate  - Encourage maximum independence but intervene and supervise when necessary  - Involve family in performance of ADLs  - Assess for home care needs following discharge   - Consider OT consult to assist with ADL evaluation and planning for discharge  - Provide patient education as appropriate  Outcome: Progressing  Goal: Maintains/Returns to pre admission functional level  Description: INTERVENTIONS:  - Perform BMAT or MOVE assessment daily    - Set and communicate daily mobility goal to care team and patient/family/caregiver  - Collaborate with rehabilitation services on mobility goals if consulted  - Perform Range of Motion 3  times a day  - Reposition patient every 2  hours    - Out of bed to chair 3 times a day   - Out of bed for meals 3  times a day  - Out of bed for toileting  - Record patient progress and toleration of activity level   Outcome: Progressing     Problem: Prexisting or High Potential for Compromised Skin Integrity  Goal: Skin integrity is maintained or improved  Description: INTERVENTIONS:  - Identify patients at risk for skin breakdown  - Assess and monitor skin integrity  - Assess and monitor nutrition and hydration status  - Monitor labs   - Assess for incontinence   - Turn and reposition patient  - Assist with mobility/ambulation  - Relieve pressure over bony prominences  - Avoid friction and shearing  - Provide appropriate hygiene as needed including keeping skin clean and dry  - Evaluate need for skin moisturizer/barrier cream  - Collaborate with interdisciplinary team   - Patient/family teaching  - Consider wound care consult   Outcome: Progressing     Problem: RESPIRATORY - ADULT  Goal: Achieves optimal ventilation and oxygenation  Description: INTERVENTIONS:  - Assess for changes in respiratory status  - Assess for changes in mentation and behavior  - Position to facilitate oxygenation and minimize respiratory effort  - Oxygen administered by appropriate delivery if ordered  - Encourage broncho-pulmonary hygiene including cough, deep breathe, Incentive Spirometry  - Assess the need for suctioning and aspirate as needed  - Assess and instruct to report SOB or any respiratory difficulty  - Respiratory Therapy support as indicated  Outcome: Progressing     Problem: METABOLIC, FLUID AND ELECTROLYTES - ADULT  Goal: Electrolytes maintained within normal limits  Description: INTERVENTIONS:  - Monitor labs and assess patient for signs and symptoms of electrolyte imbalances  - Administer electrolyte replacement as ordered  - Monitor response to electrolyte replacements, including repeat lab results as appropriate  - Instruct patient on fluid and nutrition as appropriate  Outcome: Progressing  Goal: Fluid balance maintained  Description: INTERVENTIONS:  - Monitor labs   - Monitor I/O and WT  - Instruct patient on fluid and nutrition as appropriate  - Assess for signs & symptoms of volume excess or deficit  Outcome: Progressing  Goal: Glucose maintained within target range  Description: INTERVENTIONS:  - Monitor Blood Glucose as ordered  - Assess for signs and symptoms of hyperglycemia and hypoglycemia  - Administer ordered medications to maintain glucose within target range  - Assess nutritional intake and initiate nutrition service referral as needed  Outcome: Progressing     Problem: Potential for Falls  Goal: Patient will remain free of falls  Description: INTERVENTIONS:  - Educate patient/family on patient safety including physical limitations  - Instruct patient to call for assistance with activity   - Consult OT/PT to assist with strengthening/mobility   - Keep Call bell within reach  - Keep bed low and locked with side rails adjusted as appropriate  - Keep care items and personal belongings within reach  - Initiate and maintain comfort rounds  - Make Fall Risk Sign visible to staff  - Offer Toileting every 2 Hours, in advance of need  - Initiate/Maintain  bed and chair alarm  - Apply yellow socks and bracelet for high fall risk patients  - Consider moving patient to room near nurses station  Outcome: Progressing     Problem: Nutrition/Hydration-ADULT  Goal: Nutrient/Hydration intake appropriate for improving, restoring or maintaining nutritional needs  Description: Monitor and assess patient's nutrition/hydration status for malnutrition  Collaborate with interdisciplinary team and initiate plan and interventions as ordered  Monitor patient's weight and dietary intake as ordered or per policy  Utilize nutrition screening tool and intervene as necessary  Determine patient's food preferences and provide high-protein, high-caloric foods as appropriate       INTERVENTIONS:  - Monitor oral intake, urinary output, labs, and treatment plans  - Assess nutrition and hydration status and recommend course of action  - Evaluate amount of meals eaten  - Assist patient with eating if necessary   - Allow adequate time for meals  - Recommend/ encourage appropriate diets, oral nutritional supplements, and vitamin/mineral supplements  - Order, calculate, and assess calorie counts as needed  - Assess need for intravenous fluids  - Provide specific nutrition/hydration education as appropriate  - Include patient/family/caregiver in decisions related to nutrition  Outcome: Progressing

## 2022-08-08 LAB
ANION GAP SERPL CALCULATED.3IONS-SCNC: 9 MMOL/L (ref 4–13)
ANISOCYTOSIS BLD QL SMEAR: PRESENT
BASOPHILS # BLD MANUAL: 0 THOUSAND/UL (ref 0–0.1)
BASOPHILS NFR MAR MANUAL: 0 % (ref 0–1)
BUN SERPL-MCNC: 17 MG/DL (ref 5–25)
CALCIUM SERPL-MCNC: 8.4 MG/DL (ref 8.4–10.2)
CHLORIDE SERPL-SCNC: 96 MMOL/L (ref 96–108)
CO2 SERPL-SCNC: 31 MMOL/L (ref 21–32)
CREAT SERPL-MCNC: 0.25 MG/DL (ref 0.6–1.3)
EOSINOPHIL # BLD MANUAL: 0 THOUSAND/UL (ref 0–0.4)
EOSINOPHIL NFR BLD MANUAL: 0 % (ref 0–6)
ERYTHROCYTE [DISTWIDTH] IN BLOOD BY AUTOMATED COUNT: 21.2 % (ref 11.6–15.1)
GFR SERPL CREATININE-BSD FRML MDRD: 116 ML/MIN/1.73SQ M
GLUCOSE SERPL-MCNC: 104 MG/DL (ref 65–140)
HCT VFR BLD AUTO: 26.4 % (ref 34.8–46.1)
HGB BLD-MCNC: 8.1 G/DL (ref 11.5–15.4)
LYMPHOCYTES # BLD AUTO: 1.07 THOUSAND/UL (ref 0.6–4.47)
LYMPHOCYTES # BLD AUTO: 7 % (ref 14–44)
MACROCYTES BLD QL AUTO: PRESENT
MCH RBC QN AUTO: 31.6 PG (ref 26.8–34.3)
MCHC RBC AUTO-ENTMCNC: 30.7 G/DL (ref 31.4–37.4)
MCV RBC AUTO: 103 FL (ref 82–98)
MONOCYTES # BLD AUTO: 0.91 THOUSAND/UL (ref 0–1.22)
MONOCYTES NFR BLD: 6 % (ref 4–12)
MYELOCYTES NFR BLD MANUAL: 1 % (ref 0–1)
NEUTROPHILS # BLD MANUAL: 13.1 THOUSAND/UL (ref 1.85–7.62)
NEUTS BAND NFR BLD MANUAL: 6 % (ref 0–8)
NEUTS SEG NFR BLD AUTO: 80 % (ref 43–75)
PLATELET # BLD AUTO: 357 THOUSANDS/UL (ref 149–390)
PLATELET BLD QL SMEAR: ADEQUATE
PMV BLD AUTO: 10.3 FL (ref 8.9–12.7)
POLYCHROMASIA BLD QL SMEAR: PRESENT
POTASSIUM SERPL-SCNC: 4 MMOL/L (ref 3.5–5.3)
PROCALCITONIN SERPL-MCNC: 0.12 NG/ML
RBC # BLD AUTO: 2.56 MILLION/UL (ref 3.81–5.12)
RBC MORPH BLD: PRESENT
SODIUM SERPL-SCNC: 136 MMOL/L (ref 135–147)
WBC # BLD AUTO: 15.23 THOUSAND/UL (ref 4.31–10.16)

## 2022-08-08 PROCEDURE — 84145 PROCALCITONIN (PCT): CPT

## 2022-08-08 PROCEDURE — 94640 AIRWAY INHALATION TREATMENT: CPT

## 2022-08-08 PROCEDURE — 85027 COMPLETE CBC AUTOMATED: CPT

## 2022-08-08 PROCEDURE — 94669 MECHANICAL CHEST WALL OSCILL: CPT

## 2022-08-08 PROCEDURE — 94760 N-INVAS EAR/PLS OXIMETRY 1: CPT

## 2022-08-08 PROCEDURE — 80048 BASIC METABOLIC PNL TOTAL CA: CPT

## 2022-08-08 PROCEDURE — 99233 SBSQ HOSP IP/OBS HIGH 50: CPT | Performed by: PHYSICIAN ASSISTANT

## 2022-08-08 PROCEDURE — 99232 SBSQ HOSP IP/OBS MODERATE 35: CPT

## 2022-08-08 PROCEDURE — 85007 BL SMEAR W/DIFF WBC COUNT: CPT

## 2022-08-08 RX ORDER — OXYCODONE HCL 5 MG/5 ML
5 SOLUTION, ORAL ORAL EVERY 4 HOURS PRN
Refills: 0
Start: 2022-08-08 | End: 2022-08-15

## 2022-08-08 RX ORDER — SILDENAFIL CITRATE 20 MG/1
10 TABLET ORAL 3 TIMES DAILY
Refills: 0
Start: 2022-08-08 | End: 2022-09-17

## 2022-08-08 RX ORDER — LANOLIN ALCOHOL/MO/W.PET/CERES
3 CREAM (GRAM) TOPICAL
Refills: 0
Start: 2022-08-08 | End: 2022-09-17

## 2022-08-08 RX ORDER — GUAIFENESIN 100 MG/5ML
400 SYRUP ORAL 3 TIMES DAILY
Qty: 120 ML | Refills: 0
Start: 2022-08-08 | End: 2022-09-17

## 2022-08-08 RX ORDER — ACETAMINOPHEN 160 MG/5ML
SUSPENSION, ORAL (FINAL DOSE FORM) ORAL
Status: DISPENSED
Start: 2022-08-08 | End: 2022-08-08

## 2022-08-08 RX ORDER — BUSPIRONE HYDROCHLORIDE 10 MG/1
10 TABLET ORAL 3 TIMES DAILY
Refills: 0
Start: 2022-08-08 | End: 2022-09-17

## 2022-08-08 RX ADMIN — Medication 250 MG: at 10:45

## 2022-08-08 RX ADMIN — GLYCERIN 2 DROP: .002; .002; .01 SOLUTION/ DROPS OPHTHALMIC at 18:49

## 2022-08-08 RX ADMIN — HEPARIN SODIUM 5000 UNITS: 5000 INJECTION INTRAVENOUS; SUBCUTANEOUS at 20:29

## 2022-08-08 RX ADMIN — GUAIFENESIN 400 MG: 100 SOLUTION ORAL at 10:45

## 2022-08-08 RX ADMIN — LEVALBUTEROL HYDROCHLORIDE 1.25 MG: 1.25 SOLUTION RESPIRATORY (INHALATION) at 19:25

## 2022-08-08 RX ADMIN — Medication 975 MG: at 21:33

## 2022-08-08 RX ADMIN — Medication 3 MG: at 22:41

## 2022-08-08 RX ADMIN — ASPIRIN 81 MG: 81 TABLET, CHEWABLE ORAL at 10:45

## 2022-08-08 RX ADMIN — BUSPIRONE HYDROCHLORIDE 10 MG: 10 TABLET ORAL at 00:15

## 2022-08-08 RX ADMIN — LEVOTHYROXINE SODIUM 25 MCG: 25 TABLET ORAL at 06:08

## 2022-08-08 RX ADMIN — Medication 975 MG: at 00:11

## 2022-08-08 RX ADMIN — Medication 3 MG: at 00:13

## 2022-08-08 RX ADMIN — HYOSCYAMINE SULFATE 1 APPLICATION: 16 SOLUTION at 12:08

## 2022-08-08 RX ADMIN — LEVALBUTEROL HYDROCHLORIDE 1.25 MG: 1.25 SOLUTION RESPIRATORY (INHALATION) at 13:57

## 2022-08-08 RX ADMIN — IPRATROPIUM BROMIDE 0.5 MG: 0.5 SOLUTION RESPIRATORY (INHALATION) at 19:25

## 2022-08-08 RX ADMIN — GLYCERIN 2 DROP: .002; .002; .01 SOLUTION/ DROPS OPHTHALMIC at 10:45

## 2022-08-08 RX ADMIN — SILDENAFIL CITRATE 10 MG: 20 TABLET ORAL at 10:44

## 2022-08-08 RX ADMIN — OXYCODONE HYDROCHLORIDE 5 MG: 5 SOLUTION ORAL at 16:40

## 2022-08-08 RX ADMIN — SILDENAFIL CITRATE 10 MG: 20 TABLET ORAL at 20:33

## 2022-08-08 RX ADMIN — ATOVAQUONE 1500 MG: 750 SUSPENSION ORAL at 10:45

## 2022-08-08 RX ADMIN — Medication 250 MG: at 18:49

## 2022-08-08 RX ADMIN — FLUTICASONE PROPIONATE 1 SPRAY: 50 SPRAY, METERED NASAL at 10:45

## 2022-08-08 RX ADMIN — IPRATROPIUM BROMIDE 0.5 MG: 0.5 SOLUTION RESPIRATORY (INHALATION) at 13:57

## 2022-08-08 RX ADMIN — BUSPIRONE HYDROCHLORIDE 10 MG: 10 TABLET ORAL at 16:41

## 2022-08-08 RX ADMIN — Medication 1000 UNITS: at 10:45

## 2022-08-08 RX ADMIN — LORAZEPAM 0.25 MG: 2 INJECTION INTRAMUSCULAR; INTRAVENOUS at 16:54

## 2022-08-08 RX ADMIN — OMEPRAZOLE 20 MG: 20 CAPSULE, DELAYED RELEASE ORAL at 12:07

## 2022-08-08 RX ADMIN — MACITENTAN 10 MG: 10 TABLET, FILM COATED ORAL at 10:46

## 2022-08-08 RX ADMIN — PREDNISONE 30 MG: 20 TABLET ORAL at 10:45

## 2022-08-08 RX ADMIN — NYSTATIN: 100000 POWDER TOPICAL at 18:49

## 2022-08-08 RX ADMIN — GUAIFENESIN 400 MG: 100 SOLUTION ORAL at 20:28

## 2022-08-08 RX ADMIN — NYSTATIN: 100000 POWDER TOPICAL at 10:45

## 2022-08-08 RX ADMIN — SILDENAFIL CITRATE 10 MG: 20 TABLET ORAL at 00:13

## 2022-08-08 RX ADMIN — OXYCODONE HYDROCHLORIDE 5 MG: 5 SOLUTION ORAL at 06:08

## 2022-08-08 RX ADMIN — HEPARIN SODIUM 5000 UNITS: 5000 INJECTION INTRAVENOUS; SUBCUTANEOUS at 06:08

## 2022-08-08 RX ADMIN — FLUTICASONE PROPIONATE 1 SPRAY: 50 SPRAY, METERED NASAL at 18:49

## 2022-08-08 RX ADMIN — FOLIC ACID 1 MG: 1 TABLET ORAL at 10:45

## 2022-08-08 RX ADMIN — LEVALBUTEROL HYDROCHLORIDE 1.25 MG: 1.25 SOLUTION RESPIRATORY (INHALATION) at 08:00

## 2022-08-08 RX ADMIN — SILDENAFIL CITRATE 10 MG: 20 TABLET ORAL at 16:41

## 2022-08-08 RX ADMIN — BUSPIRONE HYDROCHLORIDE 10 MG: 10 TABLET ORAL at 20:29

## 2022-08-08 RX ADMIN — IPRATROPIUM BROMIDE 0.5 MG: 0.5 SOLUTION RESPIRATORY (INHALATION) at 08:00

## 2022-08-08 RX ADMIN — GUAIFENESIN 400 MG: 100 SOLUTION ORAL at 16:40

## 2022-08-08 NOTE — ASSESSMENT & PLAN NOTE
· Previous admission 6/14-7/13 with I&D by General Surgery, S/P IV ABX, wound vac placement  · General surgery following:  · Placement of wound VAC 8/4, will need dressing changes 3 times a week  · CM on board for orders    · Wound care following, continue per wound care/general surgery recommendation  · Frequent repositioning  · Optimize nutritional status   · Patient medically stable for discharge to Rehab facility

## 2022-08-08 NOTE — NURSING NOTE
Patients previous foam dressings removed from bilateral elbows  Elbows cleansed with normal saline and pat dry   New Allevyn foam dressings applied

## 2022-08-08 NOTE — PROGRESS NOTES
[address]  Progress Note Neto Shore 1940, 80 y o  female MRN: [id number]  Unit/Bed#: -01 Encounter: [id number]  Primary Care Provider: Jj Stark MD   Date and time admitted to hospital: 7/31/2022  7:53 PM    * Acute on chronic respiratory failure with hypoxia [facility])  Assessment & Plan  Patient presented to ED from SNF for sudden onset respiratory distress  Per patient, had coughing episode on Friday with clear/white sputum, day prior to arrival around 1300 began having coughing episode and unable to clear airway from sputum  Chronically on 2 L at HS for comfort  · In ED, patient noted to have thick, dry secretions in trachea after suctioning  Initially required 6 L NC for continued hypoxia and respiratory distress  · CXR improved from previous admission  · Cause of acute on chronic hypoxic RF likely dried mucous  · Patient's respiratory status overall stable, titrated down to 2L NC O2, occasionally needs tracheal suctioning with RT for mucus plugging  · Pulmonology following, recommendations appreciated  · Considering bronchoscopy however that would only be short-term resolution so favor conservative management with chest PT/vest therapy and mucolytics   · Palliative care following:  · Increased pain after wound VAC change - Tylenol to around the clock, add oxycodone and Dilaudid PRN for pain  · Currently patient appears to be competent to make medical decisions, would like to continue level 3 DNR/DNI  · Continue home Xopenex and Sodium Chloride Neb treatments TID  · Humidify oxygen  · Respiratory protocol, IS, flutter valve  · Patient has not had any mucus plugging for over 24 hours but patient anxious and concerned for breathing issues upon discharge  · Case management working on placement   · Patient will not go back to [facility]-> Per Family  · Plan for patient to be discharged to [facility] in [place]    · Family refusing discharge to facility to [place], [address] locations too far  · Patient and Patient's sister updated at bedside regarding placement options  Insisting on closer placement, now requesting PA placement  · Patient accepted at [facility]-> Medically stable for discharge  · P/u time set for 1200 tomorrow  · Plan thoroughly discussed with both Daughter and Sister    SIRS (systemic inflammatory response syndrome) ([facility] )  Assessment & Plan  · Patient met SIRS POA with leukocytosis, tachycardia, tachypnea  · Also with intermittent hypotension with MAP <65  · Leukocytosis trended up today but likely reactive vs chronic steroid use contributory  Still with intermittent tachycardia and tachypnea, BP stable  · Initially suspected to be 2/2 PNA vs UTI  · CXR: Improvement in the diffuse parenchymal opacities  · Sputum cx: 3+ growth of Pseudomonas aeruginosa MDRO, 3+ growth of mixed respiratory ash  · UA: 3+ leukocytes, 30-50 WBCs, moderate bacteria and occasional calcium oxalate crystals  · Urine cx: >100,000 cfu/ml Klebsiella pneumoniae, Pseudomonas aeruginosa MDRO  · Lactic acid: 2 7->2 0 after IV fluid bolus  · Procalcitonin: 0 2->0 49->0  17  · BC x2: NGTD (after 5 days)  · Completed 3 days IV Rocephin, D/C'd per ID  · Repeat CXR: Concern for new consolidation in Left lobe  · Patient remains afebrile with stable leukocytosis  · Repeat Procal: Negative  · Discussed with ID, Likely atelectasis  No need for further abx therapy at this time  · Infectious disease consulted:  · Pseudomonas in urine and sputum cultures suspect colonization vs active infection  · Monitor off antibiotics    Pressure injury of sacral region, stage 4 ([facility] )  Assessment & Plan  · Previous admission 6/14-7/13 with I&D by General Surgery, S/P IV ABX, wound vac placement  · General surgery following:  · Placement of wound VAC 8/4, will need dressing changes 3 times a week  · CM on board for orders    · Wound care following, continue per wound care/general surgery recommendation  · Frequent repositioning  · Optimize nutritional status   · Patient medically stable for discharge to Rehab facility    Anemia  Assessment & Plan  · Chronic anemia likely multifactorial    · Hgb baseline appears to be between 7-8  · Hgb has remained generally stable at known baseline, initial Hgb levels elevated likely due to hemoconcentration  · Continue iron and folic acid supplementation   · Recommend repeat CBC upon discharge    Elevated troponin  Assessment & Plan  Lab Results   Component Value Date    HSTNI0 69 (H) 07/31/2022    HSTNID2 10 07/31/2022    HSTNI2 79 (H) 07/31/2022    HSTNID4 3 08/01/2022    HSTNI4 72 (H) 08/01/2022      · Patient arrived to the ED in respiratory distress requiring 6 L nasal cannula  · HS troponins peaked at 79  Elevation likely due to hypoxia from respiratory distress  · EKG with no ischemic changes, less likely ACS as patient has no chest pain  · Continue home medication:  · 81 mg ASA  · Cardiology consulted-> Signed off    Urinary retention  Assessment & Plan  · Tejada catheter placed prior to admission  · Continue Outpatient Urology follow-up    Pulmonary hypertension ([facility] )  Assessment & Plan  · Crest syndrome with pulmonary hypertension  · Continue home regimen:   · Sildenafil 10 mg TID and Opsumit 10 mg daily  · Of note, there was question of safety of administration of Opsumit via PEG, unclear whether patient was actually receiving medication at SNF  · Per pharmacy medication can be crushed to be given via PEG, however there is risk with dust/particles being inhaled by staff when administering medications  · Cardiology was following->signed off      Scleroderma (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 for proximal muscle weakness  · Follows with Dr Hossein Ramires at [facility] in [place] for IVIG infusions  · Patient was getting prednisone 10 mg t i d  at home but has been getting 30 mg once daily here  · Per Family, Patient due to receive additional IVIG treatment  Not able to be completed while inpatient  Recommending continued outpatient follow up with Dr Carlton Christianson    · Continue Atovaquone for PCP prophylaxis  · PT/OT following, needs rehab placement  Dysphagia, oropharyngeal phase  Assessment & Plan  · H/o dysphagia S/P PEG  · Continue cyclic tube feeds with no oral feeds  · Nutrition consulted  · Aspiration precautions   Moderate protein-calorie malnutrition ([address] )  Assessment & Plan  Body mass index is 18 37 kg/m²  · No oral diet with tube feeding  · Nutrition following  VTE Pharmacologic Prophylaxis: VTE Score: 9 High Risk (Score >/= 5) - Pharmacological DVT Prophylaxis Ordered: heparin  Sequential Compression Devices Ordered  Patient Centered Rounds: I performed bedside rounds with nursing staff today  Discussions with Specialists or Other Care Team Provider: Case Management    Education and Discussions with Family / Patient: Updated  (daughter) via phone  Discussed with Sister at Bedside  Time Spent for Care: 70 minutes  More than 50% of total time spent on counseling and coordination of care as described above  Current Length of Stay: 8 day(s)  Current Patient Status: Inpatient   Certification Statement: The patient will continue to require additional inpatient hospital stay due to Placement to Rehab  Discharge Plan: Anticipate discharge tomorrow to rehab facility  Code Status: Level 3 - DNAR and DNI    Subjective:   Patient anxious and worried about pain management upon transfer to rehab facility  Patient denies any chest pain, abdominal pain, nausea or vomiting       Objective:     Vitals:   Temp (24hrs), Av 8 °F (36 6 °C), Min:97 4 °F (36 3 °C), Max:98 2 °F (36 8 °C)    Temp:  [97 4 °F (36 3 °C)-98 2 °F (36 8 °C)] 98 2 °F (36 8 °C)  HR:  [80-96] 85  Resp:  [20-22] 22  BP: (105-132)/(57-78) 105/57  SpO2:  [93 %-97 %] 97 %  Body mass index is 18 37 kg/m²  Input and Output Summary (last 24 hours): Intake/Output Summary (Last 24 hours) at 8/8/2022 1929  Last data filed at 8/8/2022 1038  Gross per 24 hour   Intake 100 ml   Output 1050 ml   Net -950 ml       Physical Exam:   Physical Exam  Vitals and nursing note reviewed  Constitutional:       Appearance: She is cachectic  She is ill-appearing (Chronically)  HENT:      Head: Normocephalic  Nose: Nose normal  No congestion  Mouth/Throat:      Mouth: Mucous membranes are dry  Pharynx: Oropharynx is clear  Cardiovascular:      Rate and Rhythm: Normal rate and regular rhythm  Pulses: Normal pulses  Heart sounds: No murmur heard  Pulmonary:      Effort: Pulmonary effort is normal  No respiratory distress  Breath sounds: Decreased breath sounds present  Abdominal:      General: Bowel sounds are normal  There is no distension  Palpations: Abdomen is soft  Comments: PEG tube present   Skin:     General: Skin is warm and dry  Capillary Refill: Capillary refill takes less than 2 seconds  Comments: Sacral Wound, CDI   Neurological:      Mental Status: She is alert  Mental status is at baseline  Motor: Weakness (Generalized) present  Psychiatric:         Mood and Affect: Mood is anxious  Speech: Speech normal          Behavior: Behavior is cooperative  Judgment: Judgment is impulsive            Additional Data:     Labs:  Results from last 7 days   Lab Units 08/08/22  0512   WBC Thousand/uL 15 23*   HEMOGLOBIN g/dL 8 1*   HEMATOCRIT % 26 4*   PLATELETS Thousands/uL 357   BANDS PCT % 6   LYMPHO PCT % 7*   MONO PCT % 6   EOS PCT % 0     Results from last 7 days   Lab Units 08/08/22  0512 08/03/22  0528 08/02/22  0459   SODIUM mmol/L 136   < > 132*   POTASSIUM mmol/L 4 0   < > 3 9   CHLORIDE mmol/L 96   < > 97   CO2 mmol/L 31   < > 26   BUN mg/dL 17   < > 18   CREATININE mg/dL 0 25*   < > 0 30*   ANION GAP mmol/L 9   < > 9   CALCIUM mg/dL 8 4   < > 8 7   ALBUMIN g/dL  --   --  2 6*   GLUCOSE RANDOM mg/dL 104   < > 152*    < > = values in this interval not displayed  Results from last 7 days   Lab Units 08/08/22  0512 08/04/22  0454 08/02/22  1259 08/02/22  1042 08/02/22  0459   LACTIC ACID mmol/L  --   --  2 0 2 7*  --    PROCALCITONIN ng/ml 0 12 0 17  --   --  0 49*       Lines/Drains:  Invasive Devices  Report    Peripheral Intravenous Line  Duration           Peripheral IV Dorsal (posterior); Right Forearm -- days          Drain  Duration           Gastrostomy/Enterostomy -- days    Urethral Catheter 8 days              Urinary Catheter:  Goal for removal: N/A- Discharging with Tejada               Imaging: No pertinent imaging reviewed  Recent Cultures (last 7 days):   Results from last 7 days   Lab Units 08/02/22  1257   BLOOD CULTURE  No Growth After 5 Days  No Growth After 5 Days         Last 24 Hours Medication List:   Current Facility-Administered Medications   Medication Dose Route Frequency Provider Last Rate    acetaminophen  975 mg Per G Tube [facility] Axel Bob PA-C      aspirin  81 mg Per G Tube Daily BETTIE Brambila      atovaquone  1,500 mg Per G Tube Daily With Breakfast Myles Garcia MD      busPIRone  10 mg Per PEG Tube TID Axel Bob PA-C      cholecalciferol  1,000 Units Per G Tube Daily BETTIE Brambila      fluticasone  1 spray Nasal BID Jerson Brambila [address]      folic acid  1 mg Per PEG Tube Daily Jerson Brambila [address]      glycerin-hypromellose-  2 drop Both Eyes BID Jerson Brambila [address]      guaiFENesin  400 mg Per G Tube TID David Sanchez DO      heparin (porcine)  5,000 Units Subcutaneous [address]      HYDROmorphone  0 2 mg Intravenous Q3H PRN Melissa Kuhn PA-C      ipratropium  0 5 mg Nebulization TID Lona Nielsen MD      levalbuterol  1 25 mg Nebulization TID Dixie Winslow CRNP      levothyroxine  25 mcg Per G Tube Early Morning BETTIE Brambila      loratadine  10 mg Per G Tube Daily [facility] [address]      LORazepam  0 25 mg Intravenous Q6H PRN Axel Bob PA-C      Macitentan  10 mg Per G Tube Daily David Sanchez DO      melatonin  3 mg Per G Tube HS Nehal KiranASTRID huitron      nystatin   Topical BID BETTIE Brambila      omeprazole (PRILOSEC) oral suspension  20 mg Per PEG Tube Early Morning BETTIE Brambila      ondansetron  4 mg Intravenous Q6H PRN [facility] Barbara, J[address]      oxyCODONE  5 mg Per PEG Tube Q4H PRN BETTIE Horowitz      predniSONE  30 mg Per G Tube Daily [facility] Jerson Jim[address]      saccharomyces boulardii  250 mg Per G Tube BID [address] [facility], [address]      sildenafil  10 mg Per G Tube TID Arnoldo Avina DO      sodium chloride  1 spray Each Nare Q1H PRN [facility]      sodium hypochlorite  1 application Irrigation Daily David Sanchez DO          Today, Patient Was Seen By: BETTIE Haider    **Please Note: This note may have been constructed using a voice recognition system  **

## 2022-08-08 NOTE — ASSESSMENT & PLAN NOTE
· Chronic anemia likely multifactorial    · Hgb baseline appears to be between 7-8    · Hgb has remained generally stable at known baseline, initial Hgb levels elevated likely due to hemoconcentration  · Continue iron and folic acid supplementation   · Recommend repeat CBC upon discharge

## 2022-08-08 NOTE — ASSESSMENT & PLAN NOTE
· Patient met SIRS POA with leukocytosis, tachycardia, tachypnea  · Also with intermittent hypotension with MAP <65  · Leukocytosis trended up today but likely reactive vs chronic steroid use contributory  Still with intermittent tachycardia and tachypnea, BP stable  · Initially suspected to be 2/2 PNA vs UTI  · CXR: Improvement in the diffuse parenchymal opacities  · Sputum cx: 3+ growth of Pseudomonas aeruginosa MDRO, 3+ growth of mixed respiratory ash  · UA: 3+ leukocytes, 30-50 WBCs, moderate bacteria and occasional calcium oxalate crystals  · Urine cx: >100,000 cfu/ml Klebsiella pneumoniae, Pseudomonas aeruginosa MDRO  · Lactic acid: 2 7->2 0 after IV fluid bolus  · Procalcitonin: 0 2->0 49->0  17  · BC x2: NGTD (after 5 days)  · Completed 3 days IV Rocephin, D/C'd per ID  · Repeat CXR: Concern for new consolidation in Left lobe  · Patient remains afebrile with stable leukocytosis  · Repeat Procal: Negative  · Discussed with ID, Likely atelectasis   No need for further abx therapy at this time  · Infectious disease consulted:  · Pseudomonas in urine and sputum cultures suspect colonization vs active infection  · Monitor off antibiotics

## 2022-08-08 NOTE — PLAN OF CARE
Problem: MOBILITY - ADULT  Goal: Maintain or return to baseline ADL function  Description: INTERVENTIONS:  -  Assess patient's ability to carry out ADLs; assess patient's baseline for ADL function and identify physical deficits which impact ability to perform ADLs (bathing, care of mouth/teeth, toileting, grooming, dressing, etc )  - Assess/evaluate cause of self-care deficits   - Assess range of motion  - Assess patient's mobility; develop plan if impaired  - Assess patient's need for assistive devices and provide as appropriate  - Encourage maximum independence but intervene and supervise when necessary  - Involve family in performance of ADLs  - Assess for home care needs following discharge   - Consider OT consult to assist with ADL evaluation and planning for discharge  - Provide patient education as appropriate  Outcome: Progressing  Goal: Maintains/Returns to pre admission functional level  Description: INTERVENTIONS:  - Perform BMAT or MOVE assessment daily    - Set and communicate daily mobility goal to care team and patient/family/caregiver  - Collaborate with rehabilitation services on mobility goals if consulted  - Perform Range of Motion 3 times a day  - Reposition patient every 2 hours    - Dangle patient 0 times a day  - Stand patient 0 times a day  - Ambulate patient 0 times a day  - Out of bed to chair 0 times a day   - Out of bed for meals 0 times a day  - Out of bed for toileting  - Record patient progress and toleration of activity level   Outcome: Progressing     Problem: Prexisting or High Potential for Compromised Skin Integrity  Goal: Skin integrity is maintained or improved  Description: INTERVENTIONS:  - Identify patients at risk for skin breakdown  - Assess and monitor skin integrity  - Assess and monitor nutrition and hydration status  - Monitor labs   - Assess for incontinence   - Turn and reposition patient  - Assist with mobility/ambulation  - Relieve pressure over bony prominences  - Avoid friction and shearing  - Provide appropriate hygiene as needed including keeping skin clean and dry  - Evaluate need for skin moisturizer/barrier cream  - Collaborate with interdisciplinary team   - Patient/family teaching  - Consider wound care consult   Outcome: Progressing     Problem: RESPIRATORY - ADULT  Goal: Achieves optimal ventilation and oxygenation  Description: INTERVENTIONS:  - Assess for changes in respiratory status  - Assess for changes in mentation and behavior  - Position to facilitate oxygenation and minimize respiratory effort  - Oxygen administered by appropriate delivery if ordered  - Initiate smoking cessation education as indicated  - Encourage broncho-pulmonary hygiene including cough, deep breathe, Incentive Spirometry  - Assess the need for suctioning and aspirate as needed  - Assess and instruct to report SOB or any respiratory difficulty  - Respiratory Therapy support as indicated  Outcome: Progressing     Problem: METABOLIC, FLUID AND ELECTROLYTES - ADULT  Goal: Electrolytes maintained within normal limits  Description: INTERVENTIONS:  - Monitor labs and assess patient for signs and symptoms of electrolyte imbalances  - Administer electrolyte replacement as ordered  - Monitor response to electrolyte replacements, including repeat lab results as appropriate  - Instruct patient on fluid and nutrition as appropriate  Outcome: Progressing  Goal: Fluid balance maintained  Description: INTERVENTIONS:  - Monitor labs   - Monitor I/O and WT  - Instruct patient on fluid and nutrition as appropriate  - Assess for signs & symptoms of volume excess or deficit  Outcome: Progressing  Goal: Glucose maintained within target range  Description: INTERVENTIONS:  - Monitor Blood Glucose as ordered  - Assess for signs and symptoms of hyperglycemia and hypoglycemia  - Administer ordered medications to maintain glucose within target range  - Assess nutritional intake and initiate nutrition service referral as needed  Outcome: Progressing     Problem: Potential for Falls  Goal: Patient will remain free of falls  Description: INTERVENTIONS:  - Educate patient/family on patient safety including physical limitations  - Instruct patient to call for assistance with activity   - Consult OT/PT to assist with strengthening/mobility   - Keep Call bell within reach  - Keep bed low and locked with side rails adjusted as appropriate  - Keep care items and personal belongings within reach  - Initiate and maintain comfort rounds  - Make Fall Risk Sign visible to staff  - Offer Toileting every 2 Hours, in advance of need  - Initiate/Maintain bed alarm  - Obtain necessary fall risk management equipment  - Apply yellow socks and bracelet for high fall risk patients  - Consider moving patient to room near nurses station  Outcome: Progressing     Problem: Nutrition/Hydration-ADULT  Goal: Nutrient/Hydration intake appropriate for improving, restoring or maintaining nutritional needs  Description: Monitor and assess patient's nutrition/hydration status for malnutrition  Collaborate with interdisciplinary team and initiate plan and interventions as ordered  Monitor patient's weight and dietary intake as ordered or per policy  Utilize nutrition screening tool and intervene as necessary  Determine patient's food preferences and provide high-protein, high-caloric foods as appropriate       INTERVENTIONS:  - Monitor oral intake, urinary output, labs, and treatment plans  - Assess nutrition and hydration status and recommend course of action  - Evaluate amount of meals eaten  - Assist patient with eating if necessary   - Allow adequate time for meals  - Recommend/ encourage appropriate diets, oral nutritional supplements, and vitamin/mineral supplements  - Order, calculate, and assess calorie counts as needed  - Recommend, monitor, and adjust tube feedings and TPN/PPN based on assessed needs  - Assess need for intravenous fluids  - Provide specific nutrition/hydration education as appropriate  - Include patient/family/caregiver in decisions related to nutrition  Outcome: Progressing

## 2022-08-08 NOTE — ASSESSMENT & PLAN NOTE
Patient presented to ED from SNF for sudden onset respiratory distress  Per patient, had coughing episode on Friday with clear/white sputum, day prior to arrival around 1300 began having coughing episode and unable to clear airway from sputum  Chronically on 2 L at HS for comfort  · In ED, patient noted to have thick, dry secretions in trachea after suctioning  Initially required 6 L NC for continued hypoxia and respiratory distress  · CXR improved from previous admission  · Cause of acute on chronic hypoxic RF likely dried mucous  · Patient's respiratory status overall stable, titrated down to 2L NC O2, occasionally needs tracheal suctioning with RT for mucus plugging  · Pulmonology following, recommendations appreciated  · Considering bronchoscopy however that would only be short-term resolution so favor conservative management with chest PT/vest therapy and mucolytics   · Palliative care following:  · Increased pain after wound VAC change - Tylenol to around the clock, add oxycodone and Dilaudid PRN for pain  · Currently patient appears to be competent to make medical decisions, would like to continue level 3 DNR/DNI  · Continue home Xopenex and Sodium Chloride Neb treatments TID  · Humidify oxygen  · Respiratory protocol, IS, flutter valve  · Patient has not had any mucus plugging for over 24 hours but patient anxious and concerned for breathing issues upon discharge  · Case management working on placement   · [facility]- Per Family  · Plan for patient to be discharged to [facility] in [place]  · Family now refusing discharge to facility that far away  Insisting they were unaware, regardless of multiple updates provided to family both Sister at bedside and daughter  Insisting on closer placement, now requesting PA placement    · [facility]-> Medically stable for discharge  · P/u time set for 1200 tomorrow  · Plan thoroughly discussed with both Daughter and Sister

## 2022-08-08 NOTE — ASSESSMENT & PLAN NOTE
· With CREST syndrome, 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 for proximal muscle weakness  · Follows with Dr Korey Michelle at [facility] in [place] for IVIG infusions  · Patient was getting prednisone 10 mg t i d  at home but has been getting 30 mg once daily here  · Per Family, Patient due to receive additional IVIG treatment  Not able to be completed while inpatient  Recommending continued outpatient follow up with Dr Korey Michelle    · Continue Atovaquone for PCP prophylaxis  · PT/OT following, needs rehab placement

## 2022-08-08 NOTE — QUICK NOTE
Pt to be discharged to SNF with wet-to-dry dressing on sacral decubitus ulcer  Wound VAC to be continued at [facility] tomorrow for Tues/Thurs/Sat VAC changes  Follow up at wound care center  Discussed with attending      Jania Delgado PA-C  8/8/2022

## 2022-08-08 NOTE — PROGRESS NOTES
Progress Note - Infectious Disease   Angy Canales 80 y o  female MRN: [id number]  Unit/Bed#: -01 Encounter: [id number]      Impression/Plan:  1  SIRS syndrome, POA   Noted to have tachycardia and leukocytosis on presentation  Clinical picture is clouded by chronic steroids   Suspect presentation is multifactorial and possibly related to 2 and 3  Sputum cultures likely represent colonization   Blood cultures and urine cultures reviewed   Procalcitonin previously normalized and remains low  White count elevation today likely multifactorial with steroids, vac placement and volume  Given current stability, will maintain off antibiotics  Continue atovaquone prophylaxis as below  Continue to trend fever curve/WBC  Continue aggressive airway clearance  Ongoing follow-up by General surgery  Continue local wound care  Ongoing follow-up by Pulmonary  Ongoing follow-up by case management for placement  Further pain management as per palliative care  Additional supportive care/discharge as per primary     2  Abnormal UA, Tejada malfunction and possible urinary tract infection   UA noted to be abnormal on admission and was reported to have Tejada catheter malfunction in the ER and catheter was exchanged   Consideration for possible urinary tract infection as patient also reported some intermittent dysuria leading up to admission   Urine cultures polymicrobial in the setting of catheter  Ruchi Chalet possible pathogen and Pseudomonas likely colonizer   Patient completed 3 days of IV ceftriaxone   She has no urinary complaints, abdominal exam benign, and Tejada draining clear urine    No further antibiotics for this issue  Continue to trend fever curve/WBC  Maintain Tejada catheter  Monitor abdominal exam  Additional care as per primary     3  Acute on chronic hypoxic respiratory failure   Oxygen requirements rapidly improved with secretion clearance   She reports lack of devices at her facility   Lower suspicion at this point for pneumonia based on exam and with improvement in imaging compared to prior   Suspect overall poor secretion handling in the setting of 6  Procalcitonin normalized and white count fluctuation as above likely multifactorial   08/08/2022 portable chest x-ray:  Reports new left base opacity consistent with atelectasis in clinical setting  Maintain off antibiotics as above  Continue aggressive suctioning and chest PT  Case management following for placement  Continue chronic oxygen support  Ongoing follow-up by Pulmonary  Continue to trend fever curve/WBC  Ongoing pain management as per palliative care     4  Positive sputum culture with MDR Pseudomonas   Suspect that this represents colonization in the patient's sputum   Lower suspicion for active pneumonia  Maintain off antibiotics for now  Continue aggressive respiratory secretion clearance  Case management following for placement needs  Continue to trend fever curve/WBC     5  Stage IV pressure injury, POA   Clinical images reviewed  VAC dressing placed on 08/04  Maintain off antibiotics as above  Ongoing follow-up by surgery  Continue local wound  Continue to trend fever curve/WBC  Continue pressure offloading     6  Scleroderma and chronic steroids   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  Amanda Weller has had IVIG and IV steroids   Her course is also complicated by pulmonary hypertension and interstitial lung disease   Currently on 30 mg of steroids daily  Recommend outpatient follow-up with Rheumatology  Continue atovaquone for PCP prophylaxis 1500 mg daily  Ongoing follow-up by Pulmonary  Continue to trend fever curve/WBC     Above plan discussed with the patient at bedside, RN and Kevan Hernández NP  Disposition planning in progress      Antibiotics:  Off systemic antibiotic day 6     Subjective:  Patient currently denies having any nausea, vomiting, chest pain  She last reports a large green, thick sputum production on Friday night    Since then most of the secretion production has been phlegm like and seems to be around 5-6 p m  morning and evening  She is tolerating deep suction from respiratory therapist   She denies any fever, shaking chills or sweats at night  Objective:  Vitals:  Temp:  [97 3 °F (36 3 °C)-97 8 °F (36 6 °C)] 97 8 °F (36 6 °C)  HR:  [] 96  Resp:  [16-21] 20  BP: (124-132)/(65-78) 129/78  SpO2:  [93 %-98 %] 96 %  Temp (24hrs), Av 5 °F (36 4 °C), Min:97 3 °F (36 3 °C), Max:97 8 °F (36 6 °C)  Current: Temperature: 97 8 °F (36 6 °C)    Physical Exam:   General Appearance:  Alert, interactive, nontoxic appearance, no acute respiratory distress with patient propped in bed at about 85 degree head elevation  Throat: Oropharynx moist without lesions  Lungs:   Decreased breath sounds left greater than right base with few scattered rales, no active cough on exam    respirations unlabored with conversation   Heart:  RRR; no murmur   Abdomen:   Soft, non-tender, non-distended, positive bowel sounds  Extremities: No clubbing, cyanosis or edema, Prevalon boot to left foot in place  : Tejada with clear, yellow urine in bag, no SPT   Skin: No new rashes or lesions  IV site nontender  Labs, Imaging, & Other studies:   All pertinent labs and imaging studies were personally reviewed  Results from last 7 days   Lab Units 22  0514 22  0712   WBC Thousand/uL 15 23* 13 23* 18 00*   HEMOGLOBIN g/dL 8 1* 8 8* 9 0*   PLATELETS Thousands/uL 357 306 328     Results from last 7 days   Lab Units 22  0512 22  0514 22  0537   SODIUM mmol/L 136 137 133*   POTASSIUM mmol/L 4 0 4 3 4 7   CHLORIDE mmol/L 96 97 95*   CO2 mmol/L 31 32 31   BUN mg/dL 17 12 14   CREATININE mg/dL 0 25* 0 26* 0 24*   EGFR ml/min/1 73sq m 116 114 117   CALCIUM mg/dL 8 4 8 9 8 9     Results from last 7 days   Lab Units 22  1257   BLOOD CULTURE  No Growth After 5 Days  No Growth After 5 Days       Results from last 7 days Lab Units 08/08/22  0512 08/04/22  0454 08/02/22  0459   PROCALCITONIN ng/ml 0 12 0 17 0 49*                   08/08/2022 portable chest x-ray:  Reports new left base opacity consistent with atelectasis in clinical setting

## 2022-08-08 NOTE — DISCHARGE SUMMARY
Adithyashanek[address]anquita 47  Discharge- Lilli Flank Silviano 1940, 80 y o  female MRN: [id number]  Unit/Bed#: -Rena Encounter: [id number]  Primary Care Provider: Bishop Whittington MD   Date and time admitted to hospital: 7/31/2022  7:53 PM    * Acute on chronic respiratory failure with hypoxia [facility])  Assessment & Plan  Patient presented to ED from SNF for sudden onset respiratory distress  Per patient, had coughing episode on Friday with clear/white sputum, day prior to arrival around 1300 began having coughing episode and unable to clear airway from sputum  Chronically on 2 L at HS for comfort  · In ED, patient noted to have thick, dry secretions in trachea after suctioning  Initially required 6 L NC for continued hypoxia and respiratory distress  · CXR improved from previous admission  · Cause of acute on chronic hypoxic RF likely dried mucous  · Patient's respiratory status overall stable, titrated down to 2L NC O2, occasionally needs tracheal suctioning with RT for mucus plugging  · Pulmonology following, recommendations appreciated  · Considering bronchoscopy however that would only be short-term resolution so favor conservative management with chest PT/vest therapy and mucolytics   · Palliative care following:  · Increased pain after wound VAC change - Tylenol to around the clock, add oxycodone and Dilaudid PRN for pain  · Currently patient appears to be competent to make medical decisions, would like to continue level 3 DNR/DNI  · Continue home Xopenex and Sodium Chloride Neb treatments TID  · Humidify oxygen  · Respiratory protocol, IS, flutter valve  · Patient has not had any mucus plugging for over 24 hours but patient anxious and concerned for breathing issues upon discharge  · Case management working on placement   · Patient will not go back to [facility]-> Per Family  · Plan for patient to be discharged to [facility] in [place]    · Family refusing discharge to facility in [place], [place] [address] that it's too far away  · Patient accepted at [facility]-> Medically stable for discharge  · P/u time set for 1200 tomorrow  · Plan thoroughly discussed with both Daughter and Sister    SIRS (systemic inflammatory response syndrome) ([facility] [address] )  Assessment & Plan  · Patient met SIRS POA with leukocytosis, tachycardia, tachypnea  · Also with intermittent hypotension with MAP <65  · Leukocytosis trended up today but likely reactive vs chronic steroid use contributory  Still with intermittent tachycardia and tachypnea, BP stable  · Initially suspected to be 2/2 PNA vs UTI  · CXR: Improvement in the diffuse parenchymal opacities  · Sputum cx: 3+ growth of Pseudomonas aeruginosa MDRO, 3+ growth of mixed respiratory ash  · UA: 3+ leukocytes, 30-50 WBCs, moderate bacteria and occasional calcium oxalate crystals  · Urine cx: >100,000 cfu/ml Klebsiella pneumoniae, Pseudomonas aeruginosa MDRO  · Lactic acid: 2 7->2 0 after IV fluid bolus  · Procalcitonin: 0 2->0 49->0  17  · BC x2: NGTD (after 5 days)  · Completed 3 days IV Rocephin, D/C'd per ID  · Repeat CXR: Concern for new consolidation in Left lobe  · Patient remains afebrile with stable leukocytosis  · Repeat Procal: Negative  · Discussed with ID, Likely atelectasis  No need for further abx therapy at this time  · Infectious disease consulted:  · Pseudomonas in urine and sputum cultures suspect colonization vs active infection  · Monitor off antibiotics    Pressure injury of sacral region, stage 4 ([facility] [address] )  Assessment & Plan  · Previous admission 6/14-7/13 with I&D by General Surgery, S/P IV ABX, wound vac placement  · General surgery following:  · Placement of wound VAC 8/4, will need dressing changes 3 times a week  · CM on board for orders    · Wound care following, continue per wound care/general surgery recommendation  · Frequent repositioning  · Optimize nutritional status   · Patient medically stable for discharge to Rehab facility    Scleroderma [facility])  Assessment & Plan  · With CREST syndrome, 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 for proximal muscle weakness  · Follows with Dr Olegario Fuentes at [facility] in [place] for IVIG infusions  · Patient was getting prednisone 10 mg t i d  at home but has been getting 30 mg once daily here  · Per Family, Patient due to receive additional IVIG treatment  Not able to be completed while inpatient  Recommending continued outpatient follow up with Dr Olegario Fuentes    · Continue Atovaquone for PCP prophylaxis  · PT/OT following, needs rehab placement  Pulmonary hypertension (HCC)  Assessment & Plan  · Crest syndrome with pulmonary hypertension  · Continue home regimen:   · Sildenafil 10 mg TID and Opsumit 10 mg daily  · Of note, there was question of safety of administration of Opsumit via PEG, unclear whether patient was actually receiving medication at SNF  · Per pharmacy medication can be crushed to be given via PEG, however there is risk with dust/particles being inhaled by staff when administering medications  · Cardiology was following->signed off  Moderate protein-calorie malnutrition ([facility] [address] )  Assessment & Plan  Body mass index is 17 62 kg/m²  · No oral diet with tube feeding  · Nutrition following  Elevated troponin  Assessment & Plan  Lab Results   Component Value Date    HSTNI0 69 (H) 07/31/2022    HSTNID2 10 07/31/2022    HSTNI2 79 (H) 07/31/2022    HSTNID4 3 08/01/2022    HSTNI4 72 (H) 08/01/2022      · Patient arrived to the ED in respiratory distress requiring 6 L nasal cannula  · HS troponins peaked at 79  Elevation likely due to hypoxia from respiratory distress  · EKG with no ischemic changes, less likely ACS as patient has no chest pain  · Continue home medication:  · 81 mg ASA    · Cardiology consulted-> Signed off    Urinary retention  Assessment & Plan  · Tejada catheter placed prior to admission  · Continue Outpatient Urology follow-up    Anemia  Assessment & Plan  · Chronic anemia likely multifactorial    · Hgb baseline appears to be between 7-8  · Hgb has remained generally stable at known baseline, initial Hgb levels elevated likely due to hemoconcentration  · Continue iron and folic acid supplementation   · Recommend repeat CBC upon discharge    Dysphagia, oropharyngeal phase  Assessment & Plan  · H/o dysphagia S/P PEG  · Continue cyclic tube feeds with no oral feeds  · Nutrition consulted  · Aspiration precautions   Hyponatremia-resolved as of 8/7/2022  Assessment & Plan  Lab Results   Component Value Date    SODIUM 136 08/08/2022    SODIUM 137 08/06/2022    SODIUM 133 (L) 08/05/2022     · Resolved  · Baseline creatinine appears to be between 0 2-0 4  Has remained stable at baseline  · Uric acid, urine sodium, serum and urine osmolality wnl  · AM cortisol: elevated at 26 8    Medical Problems             Resolved Problems  Date Reviewed: 8/8/2022          Resolved    Hyponatremia 8/7/2022     Resolved by  BETTIE Forte              Discharging Physician / Practitioner: Luz Marcelino DO  PCP: Sandie Rodriguez MD  Admission Date:   Admission Orders (From admission, onward)     Ordered        07/31/22 2325  INPATIENT ADMISSION  Once                      Discharge Date: 08/09/22    Consultations During Hospital Stay:  · Cardiology  · Pulmonology  · General surgery  · Infectious disease  · Palliative medicine    Procedures Performed:   · Wound VAC dressing changes/replacement    Significant Findings / Test Results:     XR chest portable   Final Result by Yuniel Toussaint MD (08/08 0747)      1  New left basilar consolidation with effusion, atelectasis versus pneumonia  2   Vascular congestion with possible interstitial edema  The study was marked in [facility] for immediate notification                    Workstation performed: [id number]         XR chest 1 view portable   ED Interpretation by Jono Geller PA-C (07/31 2040)   Chronic disease, improvement from 6/29/2022 Final Result by Donato Guillen MD (08/01 0302)      Improvement in the diffuse parenchymal opacities                  Workstation performed: [id number]             Incidental Findings:   · CXR: New left basilar consolidation with effusion, atelectasis versus pneumonia  Test Results Pending at Discharge (will require follow up): · Nonef     Outpatient Tests Requested:  · Recommend outpatient follow up with PCP  · Recommend continued outpatient follow up with General Surgery  · Recommend continued outpatient follow up with Pulmonology  · Recommend continued outpatient follow up with Wound Care  · Recommend continued outpatient follow up with Palliative Medicine    Complications:  None    Reason for Admission: Hypoxic Respiratory failure    Hospital Course:   Paco Schuler is a 80 y o  female patient  with a PMH of scleroderma with crest syndrome, anemia, hypothyroid, urinary retention, dysphagia S/P PEG who originally presented to the hospital on 7/31/2022 due to acute on chronic respiratory failure  Per patient, on the Friday prior to admission she began having coughing episodes where she would could up clear-white sputum  Then one day prior to admission, around 1300 she began having another coughing fit but was unable to cough anything up  She was unable to stop coughing and her SNF placed her on oxygen and called EMS  Patient reports some congestion and rhinorrhea with chest tightness, continued SOB, and mucus production  In the ED, patient had bilateral rhonchi and noted to have large amounts of thick mucous in her trachea  She was placed on humidified oxygen and suctioned by RT for some improvement in her respiratory status  Her work-up revealed leukocytosis and a UTI from a blocked chronic lilly  She was started on IV Rocephin and her lilly catheter was exchanged  CXR concerned for pneumonia  Both urine culture and sputum culture positive for Psudomonas aeruginosa MDRO   Infectious disease consulted  Suspect colonization  Antibiotics discontinued  Patient remains afebrile with negative procals since  Patient with respiratory failure, secondary to mucous plugging  Pulmonology consulted  Continue conservative measures with mucolytic's and Chest PT  Will require placement that can manage suctioning at bedside  Patient was started on normal saline for her hyponatremia, since resolved  Patient with known stage IV sacral wound, wound vac present  General surgery was consulted for management  Orders provided to CM for outpatient wound vac, plan for dressing changes three times weekly  Patient with history of Crest syndrome and known pulmonary hypertension  Cardiology was consulted  Concern for medication administration via PEG tube, cleared by Pharmacy to administer Opsumit through tube  However risk to nursing staff with inhalation when administering to patient  Patient also with scleroderma and CREST syndrome  Follows with physician Dr Christine Calles [facility]  Continue with current regimen of Prednisone 30 mg Daily and outpatient IVIG treatments  Patient due for next IVIG treatment, unable to perform inpatient  Family aware they must contact patients provider for scheduling  PT/OT evaluated and recommending rehab  Patient medically stable for discharge  Please see above list of diagnoses and related plan for additional information  Condition at Discharge: stable    Discharge Day Visit / Exam:   Subjective:  Patient states that she has feeling well enough to be discharged currently offers no acute complaints  Patient denies any chest pain, abdominal pain, nausea or vomiting       Vitals: Blood Pressure: 125/73 (08/09/22 0731)  Pulse: 75 (08/09/22 0731)  Temperature: 97 5 °F (36 4 °C) (08/09/22 0731)  Temp Source: Oral (08/09/22 0731)  Respirations: 22 (08/09/22 0731)  Height: 5' 7 5" (171 5 cm) (08/01/22 0243)  Weight - Scale: 51 8 kg (114 lb 3 2 oz) (08/09/22 0600)  SpO2: 98 % (08/09/22 1545)  Exam:   Physical Exam  Vitals and nursing note reviewed  Constitutional:       Appearance: She is cachectic  Ill appearance: Chronically  Comments: Chronically ill appearing   HENT:      Head: Normocephalic  Nose: Nose normal  No congestion  Mouth/Throat:      Mouth: Mucous membranes are moist       Pharynx: Oropharynx is clear  Cardiovascular:      Rate and Rhythm: Normal rate and regular rhythm  Pulses: Normal pulses  Heart sounds: No murmur heard  Pulmonary:      Effort: Pulmonary effort is normal  No respiratory distress  Breath sounds: Decreased breath sounds present  No rhonchi  Abdominal:      General: Bowel sounds are normal  There is no distension  Palpations: Abdomen is soft  Comments: PEG tube present  Musculoskeletal:      Cervical back: Normal range of motion  Right lower leg: No edema  Left lower leg: No edema  Skin:     General: Skin is warm and dry  Capillary Refill: Capillary refill takes less than 2 seconds  Comments: Sacral Wound, Dressing CDI  Neurological:      Mental Status: She is alert and oriented to person, place, and time  Mental status is at baseline  Motor: Weakness: Generalized  Psychiatric:         Mood and Affect: Mood is anxious  Speech: Speech normal          Behavior: Behavior is cooperative  Judgment: Judgment is impulsive  Discussion with Family: Family aware of discharge plan   Discharge instructions/Information to patient and family:   See after visit summary for information provided to patient and family  Provisions for Follow-Up Care:  See after visit summary for information related to follow-up care and any pertinent home health orders  Disposition:   Acute Rehab at [facility]    Planned Readmission: No     Discharge Statement:  I spent 60 minutes discharging the patient  This time was spent on the day of discharge   I had direct contact with the patient on the day of discharge  Greater than 50% of the total time was spent examining patient, answering all patient questions, arranging and discussing plan of care with patient as well as directly providing post-discharge instructions  Additional time then spent on discharge activities  Discharge Medications:  See after visit summary for reconciled discharge medications provided to patient and/or family        **Please Note: This note may have been constructed using a voice recognition system**

## 2022-08-09 VITALS
SYSTOLIC BLOOD PRESSURE: 125 MMHG | TEMPERATURE: 97.5 F | WEIGHT: 114.2 LBS | DIASTOLIC BLOOD PRESSURE: 73 MMHG | HEART RATE: 75 BPM | OXYGEN SATURATION: 98 % | BODY MASS INDEX: 17.31 KG/M2 | RESPIRATION RATE: 22 BRPM | HEIGHT: 68 IN

## 2022-08-09 PROCEDURE — 94760 N-INVAS EAR/PLS OXIMETRY 1: CPT

## 2022-08-09 PROCEDURE — 99223 1ST HOSP IP/OBS HIGH 75: CPT | Performed by: PHYSICIAN ASSISTANT

## 2022-08-09 PROCEDURE — 99239 HOSP IP/OBS DSCHRG MGMT >30: CPT | Performed by: STUDENT IN AN ORGANIZED HEALTH CARE EDUCATION/TRAINING PROGRAM

## 2022-08-09 PROCEDURE — 94669 MECHANICAL CHEST WALL OSCILL: CPT

## 2022-08-09 PROCEDURE — 94640 AIRWAY INHALATION TREATMENT: CPT

## 2022-08-09 RX ORDER — LORAZEPAM 0.5 MG/1
0.5 TABLET ORAL ONCE
Status: COMPLETED | OUTPATIENT
Start: 2022-08-09 | End: 2022-08-09

## 2022-08-09 RX ORDER — ATOVAQUONE 750 MG/5ML
1500 SUSPENSION ORAL
Qty: 20 ML | Refills: 0
Start: 2022-08-09 | End: 2022-08-11

## 2022-08-09 RX ADMIN — GLYCERIN 2 DROP: .002; .002; .01 SOLUTION/ DROPS OPHTHALMIC at 10:02

## 2022-08-09 RX ADMIN — IPRATROPIUM BROMIDE 0.5 MG: 0.5 SOLUTION RESPIRATORY (INHALATION) at 07:46

## 2022-08-09 RX ADMIN — ASPIRIN 81 MG: 81 TABLET, CHEWABLE ORAL at 10:00

## 2022-08-09 RX ADMIN — HYOSCYAMINE SULFATE 1 APPLICATION: 16 SOLUTION at 10:03

## 2022-08-09 RX ADMIN — SILDENAFIL CITRATE 10 MG: 20 TABLET ORAL at 10:01

## 2022-08-09 RX ADMIN — NYSTATIN: 100000 POWDER TOPICAL at 10:02

## 2022-08-09 RX ADMIN — Medication 1000 UNITS: at 10:01

## 2022-08-09 RX ADMIN — OXYCODONE HYDROCHLORIDE 5 MG: 5 SOLUTION ORAL at 11:08

## 2022-08-09 RX ADMIN — FLUTICASONE PROPIONATE 1 SPRAY: 50 SPRAY, METERED NASAL at 10:02

## 2022-08-09 RX ADMIN — LORATADINE 10 MG: 10 TABLET ORAL at 10:01

## 2022-08-09 RX ADMIN — HEPARIN SODIUM 5000 UNITS: 5000 INJECTION INTRAVENOUS; SUBCUTANEOUS at 10:01

## 2022-08-09 RX ADMIN — FOLIC ACID 1 MG: 1 TABLET ORAL at 10:01

## 2022-08-09 RX ADMIN — OMEPRAZOLE 20 MG: 20 CAPSULE, DELAYED RELEASE ORAL at 05:48

## 2022-08-09 RX ADMIN — GUAIFENESIN 400 MG: 100 SOLUTION ORAL at 10:01

## 2022-08-09 RX ADMIN — LORAZEPAM 0.5 MG: 0.5 TABLET ORAL at 11:08

## 2022-08-09 RX ADMIN — MACITENTAN 10 MG: 10 TABLET, FILM COATED ORAL at 10:00

## 2022-08-09 RX ADMIN — LEVALBUTEROL HYDROCHLORIDE 1.25 MG: 1.25 SOLUTION RESPIRATORY (INHALATION) at 07:46

## 2022-08-09 RX ADMIN — Medication 250 MG: at 10:00

## 2022-08-09 RX ADMIN — Medication 975 MG: at 05:47

## 2022-08-09 RX ADMIN — LEVOTHYROXINE SODIUM 25 MCG: 25 TABLET ORAL at 05:47

## 2022-08-09 RX ADMIN — ATOVAQUONE 1500 MG: 750 SUSPENSION ORAL at 10:01

## 2022-08-09 RX ADMIN — BUSPIRONE HYDROCHLORIDE 10 MG: 10 TABLET ORAL at 10:00

## 2022-08-09 RX ADMIN — PREDNISONE 30 MG: 20 TABLET ORAL at 10:02

## 2022-08-09 NOTE — ASSESSMENT & PLAN NOTE
Lab Results   Component Value Date    SODIUM 136 08/08/2022    SODIUM 137 08/06/2022    SODIUM 133 (L) 08/05/2022     · Resolved  · Baseline creatinine appears to be between 0 2-0 4  Has remained stable at baseline    · Uric acid, urine sodium, serum and urine osmolality wnl  · AM cortisol: elevated at 26 8

## 2022-08-09 NOTE — ASSESSMENT & PLAN NOTE
Patient presented to ED from SNF for sudden onset respiratory distress  Per patient, had coughing episode on Friday with clear/white sputum, day prior to arrival around 1300 began having coughing episode and unable to clear airway from sputum  Chronically on 2 L at HS for comfort  · In ED, patient noted to have thick, dry secretions in trachea after suctioning  Initially required 6 L NC for continued hypoxia and respiratory distress  · CXR improved from previous admission  · Cause of acute on chronic hypoxic RF likely dried mucous  · Patient's respiratory status overall stable, titrated down to 2L NC O2, occasionally needs tracheal suctioning with RT for mucus plugging  · Pulmonology following, recommendations appreciated  · Considering bronchoscopy however that would only be short-term resolution so favor conservative management with chest PT/vest therapy and mucolytics   · Palliative care following:  · Increased pain after wound VAC change - Tylenol to around the clock, add oxycodone and Dilaudid PRN for pain  · Currently patient appears to be competent to make medical decisions, would like to continue level 3 DNR/DNI  · Continue home Xopenex and Sodium Chloride Neb treatments TID  · Humidify oxygen  · Respiratory protocol, IS, flutter valve  · Patient has not had any mucus plugging for over 24 hours but patient anxious and concerned for breathing issues upon discharge  · Case management working on placement   · Patient will not go back to [facility] Per Family  · Plan for patient to be discharged to [facility] in [place]  · Family refusing discharge to facility in [place], [place] [address] that it's too far away    · Patient accepted at [facility]-> Medically stable for discharge  · P/u time set for 1200 tomorrow  · Plan thoroughly discussed with both Daughter and Sister

## 2022-08-09 NOTE — PLAN OF CARE
Problem: MOBILITY - ADULT  Goal: Maintain or return to baseline ADL function  Description: INTERVENTIONS:  -  Assess patient's ability to carry out ADLs; assess patient's baseline for ADL function and identify physical deficits which impact ability to perform ADLs (bathing, care of mouth/teeth, toileting, grooming, dressing, etc )  - Assess/evaluate cause of self-care deficits   - Assess range of motion  - Assess patient's mobility; develop plan if impaired  - Assess patient's need for assistive devices and provide as appropriate  - Encourage maximum independence but intervene and supervise when necessary  - Involve family in performance of ADLs  - Assess for home care needs following discharge   - Consider OT consult to assist with ADL evaluation and planning for discharge  - Provide patient education as appropriate  Outcome: Progressing  Goal: Maintains/Returns to pre admission functional level  Description: INTERVENTIONS:  - Perform BMAT or MOVE assessment daily    - Set and communicate daily mobility goal to care team and patient/family/caregiver  - Collaborate with rehabilitation services on mobility goals if consulted  - Perform Range of Motion 3 times a day  - Reposition patient every 2 hours    - Record patient progress and toleration of activity level   Outcome: Progressing     Problem: RESPIRATORY - ADULT  Goal: Achieves optimal ventilation and oxygenation  Description: INTERVENTIONS:  - Assess for changes in respiratory status  - Assess for changes in mentation and behavior  - Position to facilitate oxygenation and minimize respiratory effort  - Oxygen administered by appropriate delivery if ordered  - Initiate smoking cessation education as indicated  - Encourage broncho-pulmonary hygiene including cough, deep breathe, Incentive Spirometry  - Assess the need for suctioning and aspirate as needed  - Assess and instruct to report SOB or any respiratory difficulty  - Respiratory Therapy support as indicated  Outcome: Progressing     Problem: Prexisting or High Potential for Compromised Skin Integrity  Goal: Skin integrity is maintained or improved  Description: INTERVENTIONS:  - Identify patients at risk for skin breakdown  - Assess and monitor skin integrity  - Assess and monitor nutrition and hydration status  - Monitor labs   - Assess for incontinence   - Turn and reposition patient  - Assist with mobility/ambulation  - Relieve pressure over bony prominences  - Avoid friction and shearing  - Provide appropriate hygiene as needed including keeping skin clean and dry  - Evaluate need for skin moisturizer/barrier cream  - Collaborate with interdisciplinary team   - Patient/family teaching  - Consider wound care consult   Outcome: Progressing     Problem: METABOLIC, FLUID AND ELECTROLYTES - ADULT  Goal: Electrolytes maintained within normal limits  Description: INTERVENTIONS:  - Monitor labs and assess patient for signs and symptoms of electrolyte imbalances  - Administer electrolyte replacement as ordered  - Monitor response to electrolyte replacements, including repeat lab results as appropriate  - Instruct patient on fluid and nutrition as appropriate  Outcome: Progressing  Goal: Fluid balance maintained  Description: INTERVENTIONS:  - Monitor labs   - Monitor I/O and WT  - Instruct patient on fluid and nutrition as appropriate  - Assess for signs & symptoms of volume excess or deficit  Outcome: Progressing  Goal: Glucose maintained within target range  Description: INTERVENTIONS:  - Monitor Blood Glucose as ordered  - Assess for signs and symptoms of hyperglycemia and hypoglycemia  - Administer ordered medications to maintain glucose within target range  - Assess nutritional intake and initiate nutrition service referral as needed  Outcome: Progressing     Problem: Potential for Falls  Goal: Patient will remain free of falls  Description: INTERVENTIONS:  - Educate patient/family on patient safety including physical limitations  - Instruct patient to call for assistance with activity   - Consult OT/PT to assist with strengthening/mobility   - Keep Call bell within reach  - Keep bed low and locked with side rails adjusted as appropriate  - Keep care items and personal belongings within reach  - Initiate and maintain comfort rounds  - Make Fall Risk Sign visible to staff  - Offer Toileting every 2 Hours, in advance of need  - Initiate/Maintain bed alarm  - Obtain necessary fall risk management equipment  - Apply yellow socks and bracelet for high fall risk patients  - Consider moving patient to room near nurses station  Outcome: Progressing     Problem: Nutrition/Hydration-ADULT  Goal: Nutrient/Hydration intake appropriate for improving, restoring or maintaining nutritional needs  Description: Monitor and assess patient's nutrition/hydration status for malnutrition  Collaborate with interdisciplinary team and initiate plan and interventions as ordered  Monitor patient's weight and dietary intake as ordered or per policy  Utilize nutrition screening tool and intervene as necessary  Determine patient's food preferences and provide high-protein, high-caloric foods as appropriate       INTERVENTIONS:  - Monitor oral intake, urinary output, labs, and treatment plans  - Assess nutrition and hydration status and recommend course of action  - Evaluate amount of meals eaten  - Assist patient with eating if necessary   - Allow adequate time for meals  - Recommend/ encourage appropriate diets, oral nutritional supplements, and vitamin/mineral supplements  - Order, calculate, and assess calorie counts as needed  - Recommend, monitor, and adjust tube feedings and TPN/PPN based on assessed needs  - Assess need for intravenous fluids  - Provide specific nutrition/hydration education as appropriate  - Include patient/family/caregiver in decisions related to nutrition  Outcome: Progressing

## 2022-08-09 NOTE — ASSESSMENT & PLAN NOTE
· With CREST syndrome, 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 for proximal muscle weakness  · Follows with Dr Evangelista Hicks at [facility] in [place] for IVIG infusions  · Patient was getting prednisone 10 mg t i d  at home but has been getting 30 mg once daily here  · Per Family, Patient due to receive additional IVIG treatment  Not able to be completed while inpatient  Recommending continued outpatient follow up with Dr Evangelista Hicks    · Continue Atovaquone for PCP prophylaxis  · PT/OT following, needs rehab placement

## 2022-08-09 NOTE — DISCHARGE INSTRUCTIONS
Dear Magan Styles,    General surgery is recommending wound VAC changes 3 times a week Monday Wednesday Friday  They will hook up the wound VAC wants to get to facility  Please follow-up with your rheumatologist his discharge    Will need to follow-up with Pulmonary and surgery once discharged

## 2022-08-09 NOTE — CASE MANAGEMENT
Case Management Progress Note    Patient name Heriberto Fermin  Location /-01 MRN [id number]  : 1940 Date 2022       LOS (days): 9  Geometric Mean LOS (GMLOS) (days): 3 60  Days to GMLOS:-4 8        OBJECTIVE:        Current admission status: Inpatient  Preferred Pharmacy:   PATIENT/FAMILY REPORTS NO PREFERRED PHARMACY  No address on file      [address], [facility] [address] [id number]  Phone: [phone] Fax: [phone]    Primary Care Provider: Mishel Mathew MD    Primary Insurance: [facility]  Secondary Insurance:     PROGRESS NOTE:    Weekly Care Management Length of Stay Review     Current LOS: 9 Days    Most Recent Labs:     Lab Results   Component Value Date/Time    WBC 15 23 (H) 2022 05:12 AM    HGB 8 1 (L) 2022 05:12 AM    HCT 26 4 (L) 2022 05:12 AM     2022 05:12 AM    BANDSPCT 6 2022 05:12 AM    SODIUM 136 2022 05:12 AM    K 4 0 2022 05:12 AM    CL 96 2022 05:12 AM    CO2 31 2022 05:12 AM    BUN 17 2022 05:12 AM    CREATININE 0 25 (L) 2022 05:12 AM    GLUC 104 2022 05:12 AM       Most Recent Vitals:   Vitals:    22 0749   BP:    Pulse:    Resp:    Temp:    SpO2: 98%        Identified Barriers to Discharge/Discharge Goals/Care Management Interventions: Patient admitted with AoC respiratory failure with hypoxia, on 6-8L [address]  Patient requiring tracheal suctioning with RT for mucus plugging  Wound vac placed for 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)  Consults made to pulmonology, palliative care, PT, OT, ST, cardiology, and surgery  Patient's family preferences taken into consideration during LOS  Original plan to return to [facility], then refusing return  Choice was for placement in [place], [facility]  [place] then unable to accept due to mucus plugging   Choice then was for placement at [facility]  At day of discharge, no bed available at [facility]  Family adamant about patient's return to state of [address] [place] [address]  Family provided with only accepting SNF/STR in [address] [place] [address]  Family adamant about not discharging to facility and then informed CM that would prefer placement in [place]  Family chose placement at [facility]       Intended Discharge Disposition: [facility]     Expected Discharge Date: Today @ 1200/noon    HOSSEIN [place], [place], ACM-SW  08/09/22 8:59 AM

## 2022-08-09 NOTE — NURSING NOTE
Patient asked to be deep suctioned before leaving to rehab  Respiratory therapist came to patient and suctioned patient per her request  Patient very anxious and tearful  Patient received one time dose of ativan through peg tube for her severe anxiety  Patient asked for oral care  Oral care provided by this RN  Patients teeth brushed, patients mouth swabbed with green swabs and mouthwash and suctioned  Patient given bed bath with the help of [employer] PCT  Patients diaper changed, dressing on coccyx changed  Patient has no personal belongings to pack

## 2022-08-09 NOTE — NURSING NOTE
Patient had a bottle of her home medication, Opsumit, in hospital pharmacy   Medication was picked up by this RN and given to transport team

## 2022-08-09 NOTE — CASE MANAGEMENT
Case Management Discharge Planning Note    Patient name Raghu Bee  Location /-01 MRN [id number]  : 1940 Date 2022       Current Admission Date: 2022  Current Admission Diagnosis:Acute on chronic respiratory failure with hypoxia [facility])   Patient Active Problem List    Diagnosis Date Noted    SIRS (systemic inflammatory response syndrome) ([address] ) 2022    UTI (urinary tract infection) 2022    Elevated troponin 2022    Moderate protein-calorie malnutrition ([address] ) 2022    Pressure injury of left elbow, unstageable ([address] ) 2022    Pressure injury of right elbow, unstageable ([address] ) 2022    Pressure injury of left heel, unstageable ([address] ) 2022    Chronic hypoxemic respiratory failure ([address] ) 2022    Pressure injury of sacral region, stage 4 ([address] ) 2022    Ambulatory dysfunction 2022    Proximal muscle weakness 2022    Pulmonary hypertension ([address] ) 2022    Hearing loss 2022    Severe protein-calorie malnutrition ([address] ) 2022    Pneumonia 2022    Sacral wound 2022    Dysphagia, oropharyngeal phase 2022    Scleroderma ([address] ) 2022    Acquired hypothyroidism 2022    Anemia 2022    Urinary retention 2022    Acute on chronic respiratory failure with hypoxia ([address] ) 2022      LOS (days): 9  Geometric Mean LOS (GMLOS) (days): 3 60  Days to GMLOS:-4 7     OBJECTIVE:  Risk of Unplanned Readmission Score: 32 24         Current admission status: Inpatient   Preferred Pharmacy:   PATIENT/FAMILY REPORTS NO PREFERRED PHARMACY  No address on file      [address], [address] [facility] [address] [address] [facility] M[address] [facility]  Phone: [phone] Fax: [phone]    Primary Care Provider: Mckenzie Zheng MD    Primary Insurance: [facility]  Secondary Insurance:     DISCHARGE DETAILS:    Discharge planning discussed with[de-identified] patient, pt's sister and pt's dtr  [place] of Choice: Yes  Comments - [place] of Choice: CM f[facility] with [facility] - spoke with Bryanna Causey (admission) - bed is not available at [facility] location only available at [place] location  per previous conversations with pt's dtr throughout pt's length of stay dtr only accepting of [address] [place] [address] facilities and did not want pt to return to [address]  multiple options both presented to and exhausted by dtr throughout LOS and would not accept [place] location  [facility] only accepting [address] [place] [address] facility able to meet pt's needs, CM and attending met with pt and pt's sister at bedside discussed care needs and [facility] as only available facility and would assist family in transition to LTC at facility closer to family in [address] [place] [address]  [address] contacted dtr, dtr expressed agitation re: only accepting [address] [place] [address] facility able to meet pt needs as dtr had not wanted [place] facility  Transportation was arranged for transport to facility, all parties made aware  Further discussion with HECTOR, dtr had relayed to [facility] now requesting [place] facility  CM contacted promedic liaison, bed available for tomorrow morning at [facility] facility - Dtr now accepting of [place] facility at [facility]  Transport canceled and arranged for [facility] tomorrow at noon via [facility]  All parties aware    CM contacted family/caregiver?: Yes  Were Treatment Team discharge recommendations reviewed with patient/caregiver?: Yes  Did patient/caregiver verbalize understanding of patient care needs?: N/A- going to facility  Were patient/caregiver advised of the risks associated with not following Treatment Team discharge recommendations?: Yes    Contacts  Patient Contacts: pt's daughter and pt's sister  Relationship to Patient[de-identified] Family  Contact Method: Phone  Phone Number: [phone]  Reason/Outcome: Discharge Planning              Other Referral/Resources/Interventions Provided:  Interventions: Short Term Rehab         Treatment Team Recommendation: Short Term Rehab  Discharge Destination Plan[de-identified] Short Term Rehab  Transport at Discharge : BLS Ambulance        Transported by [employer] and Unit #): Alexandre  ETA of Transport (Date): 08/09/22  ETA of Transport (Time): 1200              IMM Given (Date):: 08/08/22  IMM Given to[de-identified] Patient  Family notified[de-identified] pt and pt's sister   IMM was reviewed, pt and family aware of patient rights, chose not to appeal discharge at this time          Accepting Facility Name, [address] : [facility]  Receiving Facility/Agency Phone Number: [phone]  Facility/Agency Fax Number: [phone]

## 2022-08-09 NOTE — NURSING NOTE
Patient refused dressing change  She states that she will be getting a dressing change at her new facility and "does not want to go through the pain twice"  Patients current coccyx dressing is clean, dry and intact at this time

## 2022-08-10 NOTE — UTILIZATION REVIEW
Notification of Discharge   This is a Notification of Discharge from our facility [address]  Please be advised that this patient has been discharge from our facility  Below you will find the admission and discharge date and time including the patients disposition  UTILIZATION REVIEW CONTACT:  [address] [place]  Utilization   Network Utilization Review Department  Phone: [phone] x carefully listen to the prompts  All voicemails are confidential   Email: [email or web address]     PHYSICIAN ADVISORY SERVICES:  FOR IXDA-HE-GOYM REVIEW - MEDICAL NECESSITY DENIAL  Phone: [phone]  Fax: [phone]  Email: [email or web address]     PRESENTATION DATE: 7/31/2022  7:53 PM  OBERVATION ADMISSION DATE:   INPATIENT ADMISSION DATE: 7/31/22 11:25 PM   DISCHARGE DATE: 8/9/2022 12:45 PM  DISPOSITION: Non [facility] SN[facility]SNU Non [facility] SN[facility]SNU      IMPORTANT INFORMATION:  Send all requests for admission clinical reviews, approved or denied determinations and any other requests to dedicated fax number below belonging to the campus where the patient is receiving treatment   List of dedicated fax numbers:  [address] DENIALS (Administrative/Medical Necessity) [phone]   [address] (Maternity/NICU/Pediatrics) [phone]   [facility] [phone]   [facility] [phone]   [address] [place] [address] [phone]   [address] [place] [address],[address] Floor [address] [phone]   [facility]  [phone]   [address] [facility], [facility]  [address] [facility] 1000 [facility] [phone]

## 2022-08-11 ENCOUNTER — TELEPHONE (OUTPATIENT)
Dept: SURGERY | Facility: CLINIC | Age: 82
End: 2022-08-11

## 2022-08-11 NOTE — TELEPHONE ENCOUNTER
Received a phone call from Autumn at [facility] to schedule a 1 month appt for [facility]  She was discharged from the [facility]  Please assist in scheduling this appointment with Dr Hazel Orozco

## 2022-09-14 PROBLEM — I50.33 ACUTE ON CHRONIC DIASTOLIC CHF (CONGESTIVE HEART FAILURE) (HCC): Status: ACTIVE | Noted: 2022-01-01

## 2022-09-14 NOTE — ASSESSMENT & PLAN NOTE
- extensive sacral ulceration which has been present for months  - patient is bedbound  Comfort measures, holding home meds  Frequent turning/offloading

## 2022-09-14 NOTE — ASSESSMENT & PLAN NOTE
- patient with history of progressive scleroderma with muscular involvement, pulmonary hypertension, CHF, and pulmonary fibrosis    - presents with acute on chronic hypoxic respiratory failure, progressive over 3-4 days prior to presentation     respiratory failure is likely multifactorial in nature given the above issues    Comfort measures, holding home meds  - palliative care consult to help with discharge planning and evaluation for inpatient hospice  - oxygen via NC, no escalation to bipap    Family originally declining transfer to hospice house as they feel she is most comfortable here and are afraid of discomfort and decompensation on the ride to hospice house, consider revisiting in future

## 2022-09-14 NOTE — ASSESSMENT & PLAN NOTE
- patient with history of progressive scleroderma with muscular involvement, pulmonary hypertension, CHF, and pulmonary fibrosis  - presents with acute on chronic hypoxic respiratory failure, progressive over 3-4 days prior to presentation    - patient and family are very clear on their goals at the time of admission  They would like to see if patient can be stabilized over the next 48-72 hours in hopes of being able to bring her home or to an inpatient hospice facility  - respiratory failure is likely multifactorial in nature given the above issues    - will treat with empiric IV vancomycin/cefepime for coverage of pneumonia though suspicion for active infection is low given procalcitonin  Will repeat procalcitonin tomorrow  - IV Solu-Medrol 40 mg b i d   - IV Lasix 40 mg b i d   - palliative care consult to help with discharge planning and evaluation for inpatient hospice  - oxygen via non-rebreather; continuous pulse ox  Did not tolerate BiPAP in the ER    Patient confirmed DNR/DNI

## 2022-09-14 NOTE — ED PROVIDER NOTES
History  Chief Complaint   Patient presents with    Shortness of Breath     With increase secretions     37-year-old female with history of interstitial lung disease, pulmonary fibrosis, pulmonary hypertension, pneumonia, anemia, CHF presenting in respiratory distress  Patient says she has been ill for the past 3 days and this morning developed worsening shortness of breath with increased secretion production which she says she has a suction herself for  She says she is coughing up yellow-greenish mucus and feels fevers  She also endorses chest pain  Per EMS patient had oxygen saturation of 76% in the ambulance which brought up to 95% on non-rebreather  She denies any abdominal pain, recent leg swelling  Family is at bedside and they state that patient was admitted to the hospital for 2 weeks about a month ago for pneumonia  They say that at this time they would like to pursue hospice care, but like to stabilize the patient before discharge  Per patient's daughter patient has a significant ulcer on her sacrum that has been there for the past few weeks  She is being seen by wound care for this but daughter notes that it was recently cultured and found to be Pseudomonas positive  Prior to Admission Medications   Prescriptions Last Dose Informant Patient Reported? Taking?    Macitentan (OPSUMIT) 10 MG tablet   No No   Si tablet (10 mg total) by Per G Tube route daily   acetaminophen (TYLENOL) 160 mg/5 mL suspension   No No   Si 3 mL (650 mg total) by Per G Tube route every 6 (six) hours   aspirin 81 mg chewable tablet   No No   Si tablet (81 mg total) by Per G Tube route daily   busPIRone (BUSPAR) 10 mg tablet   No No   Si tablet (10 mg total) by Per G Tube route 3 (three) times a day   cholecalciferol (VITAMIN D3) 1,000 units tablet   No No   Si tablet (1,000 Units total) by Per G Tube route daily   fluticasone (FLONASE) 50 mcg/act nasal spray   No No   Si spray into each nostril 2 (two) times a day   folic acid (FOLVITE) 1 mg tablet   No No   Si tablet (1 mg total) by Per PEG Tube route daily   glycerin-hypromellose- (ARTIFICIAL TEARS) 0 2-0 2-1 % SOLN   No No   Sig: Administer 2 drops to both eyes 2 (two) times a day   guaiFENesin (ROBITUSSIN) 100 MG/5ML oral liquid   No No   Si mL (400 mg total) by Per G Tube route 3 (three) times a day   levalbuterol (XOPENEX) 1 25 mg/0 5 mL nebulizer solution   No No   Sig: Take 0 5 mL (1 25 mg total) by nebulization 3 (three) times a day   levothyroxine 25 mcg tablet   No No   Si tablet (25 mcg total) by Per G Tube route daily in the early morning   loratadine (CLARITIN) 10 mg tablet   No No   Si tablet (10 mg total) by Per G Tube route daily   melatonin 3 mg   No No   Si tablet (3 mg total) by Per G Tube route daily at bedtime   nystatin (MYCOSTATIN) powder   No No   Sig: Apply topically 2 (two) times a day   omeprazole 2 mg/mL in sodium bicarbonate   No No   Sig: 10 mL (20 mg total) by Per PEG Tube route daily in the early morning   predniSONE 10 mg tablet   No No   Sig: 3 tablets (30 mg total) by Per G Tube route daily   saccharomyces boulardii (FLORASTOR) 250 mg capsule   No No   Si capsule (250 mg total) by Per G Tube route 2 (two) times a day   sildenafil (REVATIO) 20 mg tablet   No No   Si 5 tablets (10 mg total) by Per G Tube route 3 (three) times a day   sodium chloride (OCEAN) 0 65 % nasal spray   No No   Si spray into each nostril every hour as needed for congestion   Patient not taking: Reported on 2022   sodium chloride 0 9 % nebulizer solution   No No   Sig: Take 3 mL by nebulization 3 (three) times a day      Facility-Administered Medications: None       Past Medical History:   Diagnosis Date    Anemia     CHF (congestive heart failure) (HCC)     Dysphagia, oropharyngeal phase 2022    Hypothyroidism     Protein-calorie malnutrition [facility]     Pulmonary hypertension ([facility] )     Sacral ulcer [facility]     Scleroderma ([facility] )     Urinary retention        Past Surgical History:   Procedure Laterality Date    WOUND DEBRIDEMENT N/A 6/14/2022    Procedure: DEBRIDEMENT WOUND [facility] OUT); SACRUM AND BUTTOCKS;  Surgeon: Ronan Lazo MD;  Location: [facility];  Service: General       No family history on file  I have reviewed and agree with the history as documented  E-Cigarette/Vaping     E-Cigarette/Vaping Substances     Social History     Tobacco Use    Smoking status: Never Smoker    Smokeless tobacco: Never Used   Substance Use Topics    Alcohol use: Never        Review of Systems   Constitutional: Positive for chills and fever  HENT: Positive for congestion and rhinorrhea  Eyes: Negative for photophobia and visual disturbance  Respiratory: Positive for cough and shortness of breath  Cardiovascular: Positive for chest pain  Negative for palpitations and leg swelling  Gastrointestinal: Negative for abdominal pain, nausea and vomiting  Genitourinary: Negative for dysuria and frequency  Musculoskeletal: Negative for back pain and neck pain  Skin: Positive for wound  Negative for pallor and rash  Neurological: Negative for dizziness and headaches  Psychiatric/Behavioral: Negative for confusion and decreased concentration  All other systems reviewed and are negative        Physical Exam  ED Triage Vitals   Temperature Pulse Respirations Blood Pressure SpO2   09/14/22 0855 09/14/22 0855 09/14/22 0855 09/14/22 0855 09/14/22 0855   100 4 °F (38 °C) (!) 116 (!) 25 133/62 98 %      Temp Source Heart Rate Source Patient Position - Orthostatic VS BP Location FiO2 (%)   09/14/22 0855 09/14/22 0855 09/14/22 0855 09/14/22 0855 09/14/22 1007   Tympanic Monitor Lying Right arm 100      Pain Score       09/14/22 0855       7             Orthostatic Vital Signs  Vitals:    09/14/22 1245 09/14/22 1400 09/14/22 1515 09/14/22 1545   BP: 97/55 150/77 136/57    Pulse: 92 (!) 118 (!) 114 (!) 112   Patient Position - Orthostatic VS:  Lying Lying        Physical Exam  Vitals and nursing note reviewed  Constitutional:       General: She is in acute distress  Appearance: She is normal weight  She is ill-appearing  She is not toxic-appearing or diaphoretic  HENT:      Head: Normocephalic and atraumatic  Cardiovascular:      Rate and Rhythm: Regular rhythm  Tachycardia present  No extrasystoles are present  Pulses: No decreased pulses  Heart sounds: No murmur heard  No friction rub  No gallop  Pulmonary:      Effort: Tachypnea present  Breath sounds: Examination of the right-lower field reveals decreased breath sounds  Examination of the left-lower field reveals decreased breath sounds  Decreased breath sounds present  No wheezing, rhonchi or rales  Comments: Increased effort with decreased breath sounds bilaterally  Patient is tired appearing in unable speak complete sentences  Lung sounds are coarse diffusely  Abdominal:      General: Bowel sounds are normal       Palpations: Abdomen is soft  Tenderness: There is no abdominal tenderness  There is no guarding  Comments: There is a PEG tube in place  No surrounding erythema, drainage, or warmth  Musculoskeletal:      Comments: Mild bilateral symmetric lower extremity edema   Skin:     General: Skin is warm and dry  Coloration: Skin is not cyanotic or pale  Comments: There is a pressure ulcer on the left heel  This skin of the foot is cold  There is erythema of the right heel, the foot is warm  There is a massive stage IV decubitus ulcer covered with multiple layers of gauze and dressings  Neurological:      Mental Status: She is alert  Psychiatric:         Mood and Affect: Mood normal  Mood is not anxious  Behavior: Behavior normal  Behavior is not agitated           ED Medications  Medications   methylPREDNISolone sodium succinate (Solu-MEDROL) injection 40 mg (40 mg Intravenous Given 9/14/22 1424)   cefepime (MAXIPIME) 2 g/50 mL dextrose IVPB (has no administration in time range)   furosemide (LASIX) injection 40 mg (has no administration in time range)   acetaminophen (TYLENOL) tablet 650 mg (has no administration in time range)   ondansetron (ZOFRAN) injection 4 mg (has no administration in time range)   heparin (porcine) subcutaneous injection 5,000 Units (5,000 Units Subcutaneous Given 9/14/22 1427)   aspirin chewable tablet 81 mg (81 mg Per G Tube Given 9/14/22 1433)   busPIRone (BUSPAR) tablet 10 mg (has no administration in time range)   guaiFENesin (ROBITUSSIN) oral solution 400 mg (has no administration in time range)   levothyroxine tablet 25 mcg (25 mcg Per G Tube Not Given 9/14/22 1303)   omeprazole (PriLOSEC) oral suspension 20 mg (has no administration in time range)   saccharomyces boulardii (FLORASTOR) capsule 250 mg (has no administration in time range)   sildenafil (REVATIO) tablet 10 mg (has no administration in time range)   sodium chloride 0 9 % inhalation solution 3 mL (3 mL Nebulization Given 9/14/22 1553)   morphine injection 2 mg (2 mg Intravenous Given 9/14/22 1359)   vancomycin (VANCOCIN) IVPB (premix in dextrose) 1,000 mg 200 mL (has no administration in time range)   cefepime (MAXIPIME) 2 g/50 mL dextrose IVPB (0 mg Intravenous Stopped 9/14/22 1004)   vancomycin (VANCOCIN) IVPB (premix in dextrose) 1,000 mg 200 mL (0 mg/kg × 51 8 kg Intravenous Stopped 9/14/22 1131)   LORazepam (ATIVAN) injection 0 5 mg (0 5 mg Intravenous Given 9/14/22 0953)       Diagnostic Studies  Results Reviewed     Procedure Component Value Units Date/Time    Lactic acid 2 Hours [[id number]]  (Abnormal) Collected: 09/14/22 1143    Lab Status: Final result Specimen: Blood from Arm, Right Updated: 09/14/22 1222     LACTIC ACID 2 1 mmol/L     Narrative:      Result may be elevated if tourniquet was used during collection      HS Troponin 0hr (reflex protocol) [[id number]]  (Normal) Collected: 09/14/22 1143    Lab Status: Final result Specimen: Blood from Arm, Right Updated: 09/14/22 1220     hs TnI 0hr 20 ng/L     Urine Microscopic [[id number]]  (Abnormal) Collected: 09/14/22 1143    Lab Status: Final result Specimen: Urine, Indwelling Tejada Catheter Updated: 09/14/22 1211     RBC, UA 2-4 /hpf      WBC, UA Innumerable /hpf      Epithelial Cells Occasional /hpf      Bacteria, UA Occasional /hpf      MUCUS THREADS Occasional     WBC Clumps Present    Urine culture [[id number]] Collected: 09/14/22 1143    Lab Status: In process Specimen: Urine, Indwelling Tejada Catheter Updated: 09/14/22 1211    Blood culture #1 [[id number]] Collected: 09/14/22 0910    Lab Status: Preliminary result Specimen: Blood from Arm, Right Updated: 09/14/22 1204     Blood Culture Received in Microbiology Lab  Culture in Progress  UA w Reflex to Microscopic w Reflex to Culture [[id number]]  (Abnormal) Collected: 09/14/22 1143    Lab Status: Final result Specimen: Urine, Indwelling Tejada Catheter Updated: 09/14/22 1200     Color, UA Yellow     Clarity, UA Turbid     Specific Gravity, UA 1 022     pH, UA 6 0     Leukocytes, UA Large     Nitrite, UA Positive     Protein, UA Trace mg/dl      Glucose, UA Negative mg/dl      Ketones, UA Negative mg/dl      Urobilinogen, UA <2 0 mg/dl      Bilirubin, UA Negative     Occult Blood, UA Negative    FLU/RSV/COVID - if FLU/RSV clinically relevant [[id number]]  (Normal) Collected: 09/14/22 0943    Lab Status: Final result Specimen: Nares from Nose Updated: 09/14/22 1035     SARS-CoV-2 Negative     INFLUENZA A PCR Negative     INFLUENZA B PCR Negative     RSV PCR Negative    Narrative:      FOR PEDIATRIC PATIENTS - copy/paste COVID Guidelines URL to browser: [email or web address]    SARS-CoV-2 assay is a Nucleic Acid Amplification assay intended for the  qualitative detection of nucleic acid from SARS-CoV-2 in nasopharyngeal  swabs   Results are for the presumptive identification of SARS-CoV-2 RNA  Positive results are indicative of infection with SARS-CoV-2, the virus  causing COVID-19, but do not rule out bacterial infection or co-infection  with other viruses  Laboratories within the [place] and its  territories are required to report all positive results to the appropriate  public health authorities  Negative results do not preclude SARS-CoV-2  infection and should not be used as the sole basis for treatment or other  patient management decisions  Negative results must be combined with  clinical observations, patient history, and epidemiological information  This test has not been FDA cleared or approved  This test has been authorized by FDA under an Emergency Use Authorization  (EUA)  This test is only authorized for the duration of time the  declaration that circumstances exist justifying the authorization of the  emergency use of an in vitro diagnostic tests for detection of SARS-CoV-2  virus and/or diagnosis of COVID-19 infection under section 564(b)(1) of  the Act, 21 U  S C  733MHY-2(A)(8), unless the authorization is terminated  or revoked sooner  The test has been validated but independent review by FDA  and CLIA is pending  Test performed using [employer] Gene[employer]pert: This RT-PCR assay targets N2,  a region unique to SARS-CoV-2  A conserved region in the E-gene was chosen  for pan-Sarbecovirus detection which includes SARS-CoV-2  Lactic acid [[id number]]  (Abnormal) Collected: 09/14/22 0910    Lab Status: Final result Specimen: Blood from Arm, Right Updated: 09/14/22 1013     LACTIC ACID 2 3 mmol/L     Narrative:      Result may be elevated if tourniquet was used during collection      Comprehensive metabolic panel [[id number]]  (Abnormal) Collected: 09/14/22 0910    Lab Status: Final result Specimen: Blood from Arm, Right Updated: 09/14/22 0953     Sodium 128 mmol/L      Potassium 4 3 mmol/L      Chloride 93 mmol/L      CO2 31 mmol/L ANION GAP 4 mmol/L      BUN 17 mg/dL      Creatinine 0 33 mg/dL      Glucose 142 mg/dL      Calcium 8 4 mg/dL      Corrected Calcium 10 2 mg/dL      AST 22 U/L      ALT 14 U/L      Alkaline Phosphatase 87 U/L      Total Protein 6 8 g/dL      Albumin 1 8 g/dL      Total Bilirubin 0 27 mg/dL      eGFR 105 ml/min/1 73sq m     Narrative:      [place] guidelines for Chronic Kidney Disease (CKD):     Stage 1 with normal or high GFR (GFR > 90 mL/min/1 73 square meters)    Stage 2 Mild CKD (GFR = 60-89 mL/min/1 73 square meters)    Stage 3A Moderate CKD (GFR = 45-59 mL/min/1 73 square meters)    Stage 3B Moderate CKD (GFR = 30-44 mL/min/1 73 square meters)    Stage 4 Severe CKD (GFR = 15-29 mL/min/1 73 square meters)    Stage 5 End Stage CKD (GFR <15 mL/min/1 73 square meters)  Note: GFR calculation is accurate only with a steady state creatinine    NT-BNP PRO [[id number]]  (Abnormal) Collected: 09/14/22 0910    Lab Status: Final result Specimen: Blood from Arm, Right Updated: 09/14/22 0953     NT-proBNP 1,755 pg/mL     Procalcitonin [[id number]]  (Normal) Collected: 09/14/22 0910    Lab Status: Final result Specimen: Blood from Arm, Right Updated: 09/14/22 0951     Procalcitonin 0 20 ng/ml     MRSA culture [[id number]] Collected: 09/14/22 0943    Lab Status:  In process Specimen: Nares from Nose Updated: 09/14/22 [facility]    Protime-INR [[id number]]  (Normal) Collected: 09/14/22 0910    Lab Status: Final result Specimen: Blood from Arm, Right Updated: 09/14/22 0944     Protime 12 6 seconds      INR 0 93    APTT [[id number]]  (Normal) Collected: 09/14/22 0910    Lab Status: Final result Specimen: Blood from Arm, Right Updated: 09/14/22 0944     PTT 32 seconds     CBC and differential [[id number]]  (Abnormal) Collected: 09/14/22 0910    Lab Status: Final result Specimen: Blood from Arm, Right Updated: 09/14/22 0938     WBC 18 37 Thousand/uL      RBC 2 82 Million/uL      Hemoglobin 8 6 g/dL Hematocrit 27 9 %      MCV 99 fL      MCH 30 5 pg      MCHC 30 8 g/dL      RDW 17 4 %      MPV 9 0 fL      Platelets 506 Thousands/uL      nRBC 0 /100 WBCs      Neutrophils Relative 87 %      Immat GRANS % 2 %      Lymphocytes Relative 4 %      Monocytes Relative 6 %      Eosinophils Relative 1 %      Basophils Relative 0 %      Neutrophils Absolute 16 20 Thousands/µL      Immature Grans Absolute 0 27 Thousand/uL      Lymphocytes Absolute 0 66 Thousands/µL      Monocytes Absolute 1 05 Thousand/µL      Eosinophils Absolute 0 16 Thousand/µL      Basophils Absolute 0 03 Thousands/µL     Blood gas, Venous [[id number]]  (Abnormal) Collected: 09/14/22 0910    Lab Status: Final result Specimen: Blood from Arm, Right Updated: 09/14/22 0921     pH, Nader 7 462     pCO2, Nader 44 7 mm Hg      pO2, Nader 81 3 mm Hg      HCO3, Nader 31 2 mmol/L      Base Excess, Nader 6 7 mmol/L      O2 Content, Nader 13 3 ml/dL      O2 HGB, VENOUS 94 6 %     Blood culture #2 [[id number]] Collected: 09/14/22 0910    Lab Status: No result Specimen: Blood from Arm, Right                  XR chest portable   Final Result by Carter Huang MD (09/06 4062)      Recurrent CHF   Persistent left basal infiltrate                  Workstation performed: [id number]               Procedures  ECG 12 Lead Documentation Only    Date/Time: 9/14/2022 9:19 AM  Performed by: Mary Hines MD  Authorized by: Mary Hines MD     Indications / Diagnosis:  Sob  ECG reviewed by me, the ED Provider: yes    Patient location:  ED  Previous ECG:     Previous ECG:  Compared to current    Comparison ECG info:  S1Q3T3 pattern, new Q waves in II and III, RSR' pattern in V1-3    Similarity:  Changes noted  Interpretation:     Interpretation: abnormal    Quality:     Tracing quality:  Limited by artifact  Rate:     ECG rate:  117    ECG rate assessment: tachycardic    Rhythm:     Rhythm: sinus rhythm    Ectopy:     Ectopy: none    QRS:     QRS axis:  Left    QRS intervals: Normal  Conduction:     Conduction: normal    ST segments:     ST segments:  Normal  Q waves:     Q waves:  II and III          ED Course  ED Course as of 09/14/22 1601   Wed Sep 14, 2022   0947 Patient on BiPAP, she had a transient episode of desaturation is 71% but immediately came back to 95%  Respiratory status appeared unchanged during this time  Respiratory called to bedside   0949 WBC(!): 18 37  Per chart review patient is on chronic steroids   0957 Per respiratory therapist, patient is not tolerating BiPAP, requested switched to high-flow nasal cannula  Will do so and continue to monitor patient's vital signs and respiratory status  1015 LACTIC ACID(!!): 2 3   1025 Sepsis alert initiated  IV abx already on board  In context of clinical signs of fluid overload and signs of CHF exacerbation with proBNP of 1755 will defer fluid resus at this time  She has received 1L fluids since presentation               Identification of Seniors at [address] [place] [address] Most Recent Value   (ISAR) Identification of Seniors at Risk    Before the illness or injury that brought you to the Emergency, did you need someone to help you on a regular basis? 1 Filed at: 09/14/2022 0916   In the last 24 hours, have you needed more help than usual? 1 Filed at: 09/14/2022 7263   Have you been hospitalized for one or more nights during the past 6 months? 1 Filed at: 09/14/2022 0916   In general, do you see well? 1 Filed at: 09/14/2022 0916   In general, do you have serious problems with your memory? 0 Filed at: 09/14/2022 7966   Do you take more than three different medications every day? 1 Filed at: 09/14/2022 0916   ISAR Score 5 Filed at: 09/14/2022 6018                    SBIRT 20yo+    Flowsheet Row Most Recent Value   SBIRT (23 yo +)    In order to provide better care to our patients, we are screening all of our patients for alcohol and drug use  Would it be okay to ask you these screening questions?  No Filed at: 09/14/2022 1219 MDM  Number of Diagnoses or Management Options  Acute exacerbation of CHF (congestive heart failure) ([facility] [address] ): new and requires workup  Hyponatremia: new and requires workup  Leukocytosis: new and requires workup  Sepsis due to pneumonia [facility]): new and requires workup  Diagnosis management comments: Patient is a 81 y/o female w/ hx of pulmonary htn, pulmonary fibrosis, PNA, CHF, and anemia who presents in respiratory distress  Ddx includes but is not limited to PNA, CHF exacerbation, ACS, and PTX  Patient also has a significant stage 4 decub ulcer that could be a source of infection, known to be Pseudomonas positive per daughter  Sepsis workup, troponin, patient placed in BIPAP now satting 98%, Patient had a liter of fluid running at time of presentation  Because of concern for fluid overload, will defer further fluids at this time  Empiric cefepime and vanc  CXR shows no focal consolidation, but has baseline ground glass opacities that could obscure PNA findings  WBC 18 37, febrile, likely PNA vs acute CHF exacerbation  Patient could not tolerate BIPAP,transitioned to high flow NC but desatted to 70's, ultimately resting comfortably on NRB  Lactate 2 3, sepsis alert called, fluids deferred due to concern for fluid overload  Abx onboard  Per ICU patient does not meet critical care needs and will be seeking hospice care while in the hospital  Patient admitted to [facility] and they are aware of patient's wishes           Amount and/or Complexity of Data Reviewed  Clinical lab tests: ordered and reviewed  Tests in the radiology section of CPT®: ordered and reviewed  Decide to obtain previous medical records or to obtain history from someone other than the patient: yes  Obtain history from someone other than the patient: yes  Review and summarize past medical records: yes  Discuss the patient with other providers: yes  Independent visualization of images, tracings, or specimens: yes    Patient Progress  Patient progress: stable      Disposition  Final diagnoses:   Sepsis due to pneumonia ([facility] )   Acute exacerbation of CHF (congestive heart failure) ([facility] )   Hyponatremia   Leukocytosis     Time reflects when diagnosis was documented in both MDM as applicable and the Disposition within this note     Time User Action Codes Description Comment    9/14/2022 12:15 PM Bg Roles Add [J18 9,  A41 9] Sepsis due to pneumonia ([place] [address] )     9/14/2022 12:15 PM Bg Roles Add [I50 9] Acute exacerbation of CHF (congestive heart failure) ([facility] )     9/14/2022 12:15 PM Bg Roles Add [E87 1] Hyponatremia     9/14/2022 12:16 PM Normata Pion [D72 829] Leukocytosis     9/14/2022 12:29 PM Ciara Chen Add [J96 21] Acute on chronic respiratory failure with hypoxia [facility])       ED Disposition     ED Disposition   Admit    Condition   Fair    Date/Time   Wed Sep 14, 2022 12:14 PM    Comment   Case was discussed with Dr Ashwin Avalos and the patient's admission status was agreed to be Admission Status: inpatient status to the service of Dr Ashwin Avalos              Follow-up Information    None         Current Discharge Medication List      CONTINUE these medications which have NOT CHANGED    Details   acetaminophen (TYLENOL) 160 mg/5 mL suspension 20 3 mL (650 mg total) by Per G Tube route every 6 (six) hours  Refills: 0    Associated Diagnoses: Pulmonary hypertension (HCC)      aspirin 81 mg chewable tablet 1 tablet (81 mg total) by Per G Tube route daily  Refills: 0    Associated Diagnoses: Pulmonary hypertension (HCC)      busPIRone (BUSPAR) 10 mg tablet 1 tablet (10 mg total) by Per G Tube route 3 (three) times a day  Refills: 0    Associated Diagnoses: Acute on chronic respiratory failure (HCC)      cholecalciferol (VITAMIN D3) 1,000 units tablet 1 tablet (1,000 Units total) by Per G Tube route daily  Refills: 0    Associated Diagnoses: Wound of sacral region, initial encounter      fluticasone (FLONASE) 50 mcg/act nasal spray 1 spray into each nostril 2 (two) times a day  Refills: 0    Associated Diagnoses: Acute respiratory failure with hypoxia (HCC)      folic acid (FOLVITE) 1 mg tablet 1 tablet (1 mg total) by Per PEG Tube route daily  Refills: 0    Associated Diagnoses: Pulmonary hypertension (HCC)      glycerin-hypromellose- (ARTIFICIAL TEARS) 0 2-0 2-1 % SOLN Administer 2 drops to both eyes 2 (two) times a day  Refills: 0    Associated Diagnoses: Acute respiratory failure with hypoxia (HCC)      guaiFENesin (ROBITUSSIN) 100 MG/5ML oral liquid 20 mL (400 mg total) by Per G Tube route 3 (three) times a day  Qty: 120 mL, Refills: 0    Associated Diagnoses: Acute on chronic respiratory failure (HCC)      levalbuterol (XOPENEX) 1 25 mg/0 5 mL nebulizer solution Take 0 5 mL (1 25 mg total) by nebulization 3 (three) times a day  Refills: 0    Associated Diagnoses: Acute respiratory failure with hypoxia (HCC)      levothyroxine 25 mcg tablet 1 tablet (25 mcg total) by Per G Tube route daily in the early morning  Refills: 0    Associated Diagnoses: Acquired hypothyroidism      loratadine (CLARITIN) 10 mg tablet 1 tablet (10 mg total) by Per G Tube route daily  Refills: 0    Associated Diagnoses: Acute respiratory failure with hypoxia (HCC)      Macitentan (OPSUMIT) 10 MG tablet 1 tablet (10 mg total) by Per G Tube route daily  Refills: 0    Associated Diagnoses: Pulmonary hypertension (HCC)      melatonin 3 mg 1 tablet (3 mg total) by Per G Tube route daily at bedtime  Refills: 0    Associated Diagnoses: SIRS (systemic inflammatory response syndrome) (HCC)      nystatin (MYCOSTATIN) powder Apply topically 2 (two) times a day  Qty: 15 g, Refills: 0    Associated Diagnoses: Pulmonary hypertension (HCC)      omeprazole 2 mg/mL in sodium bicarbonate 10 mL (20 mg total) by Per PEG Tube route daily in the early morning  Qty: 50 mL, Refills: 0    Associated Diagnoses: SIRS (systemic inflammatory response syndrome) (HCC) predniSONE 10 mg tablet 3 tablets (30 mg total) by Per G Tube route daily  Refills: 0    Associated Diagnoses: Acute respiratory failure with hypoxia (HCC)      saccharomyces boulardii (FLORASTOR) 250 mg capsule 1 capsule (250 mg total) by Per G Tube route 2 (two) times a day  Refills: 0    Associated Diagnoses: Acute respiratory failure with hypoxia (HCC)      sildenafil (REVATIO) 20 mg tablet 0 5 tablets (10 mg total) by Per G Tube route 3 (three) times a day  Refills: 0    Associated Diagnoses: Pulmonary hypertension (HCC)      sodium chloride (OCEAN) 0 65 % nasal spray 1 spray into each nostril every hour as needed for congestion  Refills: 0    Associated Diagnoses: Acute respiratory failure with hypoxia (HCC)      sodium chloride 0 9 % nebulizer solution Take 3 mL by nebulization 3 (three) times a day  Refills: 0    Associated Diagnoses: Acute respiratory failure with hypoxia (HCC)           No discharge procedures on file  PDMP Review       Value Time User    PDMP Reviewed  Yes 8/1/2022 10:54 PM Shawn Goldman PA-C           ED Provider  Attending physically available and evaluated [place]  I managed the patient along with the ED Attending      Electronically Signed by         Courtney Mcdowell MD  09/14/22 4524

## 2022-09-14 NOTE — ASSESSMENT & PLAN NOTE
Malnutrition Findings:                                 BMI Findings: There is no height or weight on file to calculate BMI

## 2022-09-14 NOTE — RESPIRATORY THERAPY NOTE
09/14/22 1710   Respiratory Assessment   Assessment Type Assess only   General Appearance Awake; Alert   Respiratory Pattern Labored   Chest Assessment Chest expansion symmetrical   Bilateral Breath Sounds Coarse;Crackles   Resp Comments Called to place patient on [address]  Upon my arrival, patient was labored,tachypniec and audibly gurgling  Placed patient on 15 L [address] and NT suctioned  Sx patient for moderate mt of thick yellow secretions  patient continues with a very weka, wet cough  Patient unable to hold sats and imrpove WOB, so placed patient on HFNC  50L/100%  Will continue to monittor     O2 Device HFNC   Oxygen Therapy/Pulse Ox   O2 Device High flow nasal cannula   O2 Therapy Oxygen humidified   FiO2 (%) 100   O2 Flow Rate (L/min) 50 L/min   SpO2 92 %   SpO2 Activity At Rest   $ Pulse Oximetry Spot Check Charge Completed

## 2022-09-14 NOTE — ASSESSMENT & PLAN NOTE
Malnutrition Findings:        Comfort measures, holding home meds                          BMI Findings: There is no height or weight on file to calculate BMI

## 2022-09-14 NOTE — ASSESSMENT & PLAN NOTE
- as evidenced by recurrent pulmonary edema, pulmonary infiltrates, respiratory failure, and elevated BNP  Comfort measures, holding home meds

## 2022-09-14 NOTE — ACP (ADVANCE CARE PLANNING)
Spoke with patient an family at length concerning [address] and prognosis  (approximately 11:15-11:45) Patient and family are educated on condition and correctly estimate patient prognosis    Patient would like to be on hospice Monday for her birthday on [birth year]    Goal: get home and party for 82th birthday  Patient loves her , birds, and nature    POLST on file, DNR/DNI, no indications for MICU admission at this time as patient remains HDS  Explained to family that patient likely not strong enough to return to home, which they understand, and they are amenable to Hospice house admission (if meets criteria after diuresis)  Patient and family would like to trial antibiotics at this time and are amenable to admission for both IV abx and aggressive diuresis at this time with shift to comfort care and symptom control once trailed/acheived  Advised for palliative consult in addition to [address] admission   Will be available for any further questions or concerns

## 2022-09-14 NOTE — ED NOTES
Pt o2 at 79% on high flow NC  Removed High flow and put on NRB placed back on PT at 15  Dr Clarence Martinez  And respiratory notified and at bedside   O2 back to 100%     Nickie Arzate  09/14/22 1055       Nickie Arzate  09/14/22 1058

## 2022-09-14 NOTE — ASSESSMENT & PLAN NOTE
- check criteria with tachypnea, tachycardia, respiratory failure  - findings probably suggestive of acute CHF, though pneumonia cannot be completely excluded  Comfort measures, holding home meds  NO ABX

## 2022-09-14 NOTE — ASSESSMENT & PLAN NOTE
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

## 2022-09-14 NOTE — ED ATTENDING ATTESTATION
9/14/2022  I, Fox Flores DO, saw and evaluated the patient  I have discussed the patient with the resident/non-physician practitioner and agree with the resident's/non-physician practitioner's findings, Plan of Care, and MDM as documented in the resident's/non-physician practitioner's note, except where noted  All available labs and Radiology studies were reviewed  I was present for key portions of any procedure(s) performed by the resident/non-physician practitioner and I was immediately available to provide assistance  At this point I agree with the current assessment done in the Emergency Department  I have conducted an independent evaluation of this patient a history and physical is as follows:    72-year-old female with shortness of breath  History of pulmonary fibrosis and pulmonary hypertension  Productive cough with yellow sputum for several weeks  Past Medical History:   Diagnosis Date    Anemia     CHF (congestive heart failure) (HCC)     Dysphagia, oropharyngeal phase 06/06/2022    Hypothyroidism     Protein-calorie malnutrition (HCC)     Pulmonary hypertension (HCC)     Sacral ulcer ([address] )     Scleroderma ([address] )     Urinary retention        /62 (BP Location: Right arm)   Pulse (!) 116   Temp 100 4 °F (38 °C) (Tympanic)   Resp (!) 25   SpO2 98%   In acute respiratory distress, alert oriented x3, febrile, tachycardic, diffusely coarse breath sounds, abdomen soft nontender, no peripheral edema  EKG with sinus tachycardia and new T-wave inversions in V2 and V3  Q-wave evident in lead 3 with mild artifact  Sepsis eval and tx, BiPAP now, DNR/DNI  Admit for further care          ED Course         Critical Care Time  CriticalCare Time  Performed by: Fox Flores DO  Authorized by: Fox Flores DO     Critical care provider statement:     Critical care time (minutes):  45    Critical care time was exclusive of:  Separately billable procedures and treating other patients and teaching time    Critical care was necessary to treat or prevent imminent or life-threatening deterioration of the following conditions:  Cardiac failure, sepsis and respiratory failure    Critical care was time spent personally by me on the following activities:  Obtaining history from patient or surrogate, development of treatment plan with patient or surrogate, evaluation of patient's response to treatment, examination of patient, ventilator management, review of old charts, re-evaluation of patient's condition, ordering and review of radiographic studies, ordering and review of laboratory studies and ordering and performing treatments and interventions

## 2022-09-14 NOTE — RESPIRATORY THERAPY NOTE
09/14/22 1545   Respiratory Assessment   Assessment Type Assess only   General Appearance Alert; Awake   Respiratory Pattern Labored   Chest Assessment Chest expansion symmetrical   Bilateral Breath Sounds Coarse   R Breath Sounds Coarse   Resp Comments Upon my assessement patient was wearing NRB  Patient placed on 15 L [address]  Patient NT suctioned for copious thick yellow secretions     O2 Device MFNC   Additional Assessments   Pulse (!) 112   Respirations (!) 32   SpO2 (!) 87 %

## 2022-09-14 NOTE — H&P
[facility]  H&P- Laura Kassie Shore 1940, 80 y o  female MRN: [id number]  Unit/Bed#: ED 09 Encounter: [id number]  Primary Care Provider: Annika Grewal MD   Date and time admitted to hospital: 9/14/2022  8:41 AM    * Acute on chronic respiratory failure with hypoxia [facility])  Assessment & Plan  - patient with history of progressive scleroderma with muscular involvement, pulmonary hypertension, CHF, and pulmonary fibrosis  - presents with acute on chronic hypoxic respiratory failure, progressive over 3-4 days prior to presentation    - patient and family are very clear on their goals at the time of admission  They would like to see if patient can be stabilized over the next 48-72 hours in hopes of being able to bring her home or to an inpatient hospice facility  - respiratory failure is likely multifactorial in nature given the above issues    - will treat with empiric IV vancomycin/cefepime for coverage of pneumonia though suspicion for active infection is low given procalcitonin  Will repeat procalcitonin tomorrow  - IV Solu-Medrol 40 mg b i d   - IV Lasix 40 mg b i d   - palliative care consult to help with discharge planning and evaluation for inpatient hospice  - oxygen via non-rebreather; continuous pulse ox  Did not tolerate BiPAP in the ER    Patient confirmed DNR/DNI      Sacral wound  Assessment & Plan  - extensive sacral ulceration which has been present for months  - patient is bedbound  - wound care consult    Acute on chronic diastolic CHF       - as evidenced by pulmonary edema, pulmonary infiltrates, respiratory failure, and elevated BNP       - IV Lasix 40 mg b i d        - daily weights, monitor intake/output    SIRS       - met criteria with tachypnea and tachycardia       - while I suspect that the patient's findings are consistent with acute CHF exacerbation and respiratory failure, cannot completely exclude pneumonia       - please see treatment plan as above    Pulmonary hypertension (HCC)  Assessment & Plan  - diuresis with IV Lasix 40 mg b i d   - continue home sildenafil    Acquired hypothyroidism  Assessment & Plan  - continue home Synthroid    Hyponatremia      - suspect this is related to volume overload and acute CHF      - monitor with diuresis; recheck BMP in the morning    Scleroderma ([facility] )  Assessment & Plan  - 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 including dysphagia, muscular involvement, pulmonary hypertension, pulmonary fibrosis  - on chronic prednisone as an outpatient    Severe protein-calorie malnutrition ([facility] )  Assessment & Plan  Malnutrition Findings:        BMI Findings: There is no height or weight on file to calculate BMI  VTE Pharmacologic Prophylaxis: VTE Score: 9 High Risk (Score >/= 5) - Pharmacological DVT Prophylaxis Ordered: heparin  Sequential Compression Devices Ordered  Code Status: Level 3 - DNAR and DNI   Discussion with family: Updated  (daughter and sister) at bedside  Anticipated Length of Stay: Patient will be admitted on an inpatient basis with an anticipated length of stay of greater than 2 midnights secondary to Acute respiratory failure  Total Time for Visit, including Counseling / Coordination of Care: 60 minutes Greater than 50% of this total time spent on direct patient counseling and coordination of care  Chief Complaint:  Acute respiratory failure    History of Present Illness:  Jazmine Mario is a 80 y o  female with a PMH of progressive scleroderma, dysphagia, pulmonary hypertension, pulmonary fibrosis, chronic respiratory failure, chronic sacral ulceration, recurrent pneumonia, ambulatory dysfunction/bedbound status, hypothyroidism, who presents with acute respiratory failure  Patient's family at bedside sister/daughter provide much of the history given patient's respiratory distress    They report that she has had a progressive decline in overall function an and health over the last 6 months  She does have chronic medical conditions as listed above that have progressed as well during that time  However, following a hospitalization about 6 months ago she has become bedbound and developed a large sacral ulceration  This has not healed despite surgical intervention and chronic wound care  She was recently hospitalized a couple months ago with pneumonia  She returns again with acute respiratory distress  Required non-rebreather with EMS and in the ER for adequate oxygen saturation  In obvious distress during my evaluation  Patient and family are very clear in terms of the goals of care  They are hopeful for medical stabilization so that she can return home or to an inpatient hospice facility for palliative care  In the emergency room finding suggest volume overload with pulmonary edema, acute CHF, likely exacerbation of pulmonary fibrosis, and possible pneumonia  She will be admitted to hospitalist service  She has a confirmed DNR/DNI  Review of Systems:  Limited review of systems given patient's respiratory distress  Admits to progressive shortness of breath  Admits to ambulatory dysfunction  Chronic sacral ulcer disease shin  Admits to cough and difficulty breathing  Past Medical and Surgical History:   Past Medical History:   Diagnosis Date    Anemia     CHF (congestive heart failure) (HCC)     Dysphagia, oropharyngeal phase 06/06/2022    Hypothyroidism     Protein-calorie malnutrition ([facility] )     Pulmonary hypertension (HCC)     Sacral ulcer ([facility] )     Scleroderma ([facility] )     Urinary retention        Past Surgical History:   Procedure Laterality Date    WOUND DEBRIDEMENT N/A 6/14/2022    Procedure: DEBRIDEMENT WOUND [facility] OUT); SACRUM AND BUTTOCKS;  Surgeon: Haroon Garcia MD;  Location: [facility];  Service: General       Meds/Allergies:  Prior to Admission medications    Medication Sig Start Date End Date Taking? Authorizing Provider   acetaminophen (TYLENOL) 160 mg/5 mL suspension 20 3 mL (650 mg total) by Per G Tube route every 6 (six) hours 7/13/22   Lisa Smith, DO   aspirin 81 mg chewable tablet 1 tablet (81 mg total) by Per G Tube route daily 7/14/22   Lisa Smith DO   busPIRone (BUSPAR) 10 mg tablet 1 tablet (10 mg total) by Per G Tube route 3 (three) times a day 8/8/22   BETTIE Orlando   cholecalciferol (VITAMIN D3) 1,000 units tablet 1 tablet (1,000 Units total) by Per G Tube route daily 7/14/22   Lisa Smith DO   fluticasone [facility]) 50 mcg/act nasal spray 1 spray into each nostril 2 (two) times a day 7/13/22   Lisa Smith DO   folic acid (FOLVITE) 1 mg tablet 1 tablet (1 mg total) by Per PEG Tube route daily 7/14/22   Lisa Smith DO   glycerin-hypromellose- (ARTIFICIAL TEARS) 0 2-0 2-1 % SOLN Administer 2 drops to both eyes 2 (two) times a day 7/13/22   Lisa Smith DO   guaiFENesin (ROBITUSSIN) 100 MG/5ML oral liquid 20 mL (400 mg total) by Per G Tube route 3 (three) times a day 8/8/22   BETTIE Orlando   levalbuterol (XOPENEX) 1 25 mg/0 5 mL nebulizer solution Take 0 5 mL (1 25 mg total) by nebulization 3 (three) times a day 7/13/22   Lisa Smith DO   levothyroxine 25 mcg tablet 1 tablet (25 mcg total) by Per G Tube route daily in the early morning 7/14/22   Lisa Smith DO   loratadine (CLARITIN) 10 mg tablet 1 tablet (10 mg total) by Per G Tube route daily 7/14/22   Lisa Smith DO   Macitentan (OPSUMIT) 10 MG tablet 1 tablet (10 mg total) by Per G Tube route daily 7/14/22   Lisa Smith, DO   melatonin 3 mg 1 tablet (3 mg total) by Per G Tube route daily at bedtime 8/8/22   BETTIE Orlando   nystatin (MYCOSTATIN) powder Apply topically 2 (two) times a day 7/13/22   Lisa Smith,    omeprazole 2 mg/mL in sodium bicarbonate 10 mL (20 mg total) by Per PEG Tube route daily in the early morning 8/9/22   BETTIE Orlando   predniSONE 10 mg tablet 3 tablets (30 mg total) by Per G Tube route daily 7/14/22   Kirit Hemp, DO   saccharomyces boulardii (FLORASTOR) 250 mg capsule 1 capsule (250 mg total) by Per G Tube route 2 (two) times a day 7/13/22   Kirit Hemp, DO   sildenafil (REVATIO) 20 mg tablet 0 5 tablets (10 mg total) by Per G Tube route 3 (three) times a day 8/8/22   Yvonna Shape, CRNP   sodium chloride (OCEAN) 0 65 % nasal spray 1 spray into each nostril every hour as needed for congestion  Patient not taking: Reported on 8/1/2022 7/13/22   Kirit Hemp, DO   sodium chloride 0 9 % nebulizer solution Take 3 mL by nebulization 3 (three) times a day 7/13/22   Kirit Hemp, DO     I have reviewed home medications using recent Epic encounter  Allergies: Allergies   Allergen Reactions   [place] [Selexipag] Anaphylaxis    Adempas [Riociguat] Other (See Comments)     Discontinued 2/2 hypotension    Sulfa Antibiotics Tachycardia    Penicillins Rash       Social History:  Marital Status: /Civil Union   Occupation: retired ICU nurse  Patient Pre-hospital Living Situation: Home  Patient Pre-hospital Level of Mobility: non-ambulatory/bed bound  Patient Pre-hospital Diet Restrictions:   Substance Use History:   Social History     Substance and Sexual Activity   Alcohol Use Never     Social History     Tobacco Use   Smoking Status Never Smoker   Smokeless Tobacco Never Used     Social History     Substance and Sexual Activity   Drug Use Not on file       Family History:  No family history on file  Physical Exam:     Vitals:   Blood Pressure: 113/58 (09/14/22 1130)  Pulse: 96 (09/14/22 1130)  Temperature: 100 4 °F (38 °C) (09/14/22 0855)  Temp Source: Tympanic (09/14/22 0855)  Respirations: 19 (09/14/22 1130)  SpO2: 100 % (09/14/22 1130)    PHYSICAL EXAM:    Vitals signs reviewed  Constitutional   In respiratory distress  Ill-appearing  Malnourished  Head/Neck   Normocephalic  Atraumatic  HEENT   No scleral icterus  EOMI     Heart   Regular rate and rhythm  No murmers  Lungs   Globally decreased breath sounds  Poor inspiratory effort  Crackles bilaterally  Tachypneic  Labored breathing  Abdomen   Soft  Nontender  Nondistended  Skin   Skin color pale  No rashes  Extremities   No deformities  No peripheral edema  Muscle atrophy  Neuro   Alert and oriented  No focal deficits  Psych   Mood stable  Affect normal          Additional Data:     Lab Results:  Results from last 7 days   Lab Units 09/14/22  0910   WBC Thousand/uL 18 37*   HEMOGLOBIN g/dL 8 6*   HEMATOCRIT % 27 9*   PLATELETS Thousands/uL 418*   NEUTROS PCT % 87*   LYMPHS PCT % 4*   MONOS PCT % 6   EOS PCT % 1     Results from last 7 days   Lab Units 09/14/22  0910   SODIUM mmol/L 128*   POTASSIUM mmol/L 4 3   CHLORIDE mmol/L 93*   CO2 mmol/L 31   BUN mg/dL 17   CREATININE mg/dL 0 33*   ANION GAP mmol/L 4   CALCIUM mg/dL 8 4   ALBUMIN g/dL 1 8*   TOTAL BILIRUBIN mg/dL 0 27   ALK PHOS U/L 87   ALT U/L 14   AST U/L 22   GLUCOSE RANDOM mg/dL 142*     Results from last 7 days   Lab Units 09/14/22  0910   INR  0 93             Results from last 7 days   Lab Units 09/14/22  1143 09/14/22  0910   LACTIC ACID mmol/L 2 1* 2 3*   PROCALCITONIN ng/ml  --  0 20       Imaging: Personally reviewed the following imaging: chest xray  XR chest portable   Final Result by Renato Rm MD (09/14 1783)      Recurrent CHF  Persistent left basal infiltrate                  Workstation performed: [id number]             EKG and Other Studies Reviewed on Admission:   · EKG: Sinus tachycardia  ** Please Note: This note has been constructed using a voice recognition system   **

## 2022-09-14 NOTE — ED NOTES
Pt received pre prehopitial fluids  No additional fluids ordered per Dr Mekhi Arzate  09/14/22 1133       Nickie Arzate  09/14/22 1136

## 2022-09-14 NOTE — CONSULTS
Consultation - Palliative and Supportive Care   Angy Hartford 80 y o  female [phone]    Patient Active Problem List   Diagnosis    Sacral wound    Severe protein-calorie malnutrition ([address] )    Dysphagia, oropharyngeal phase    Scleroderma (HCC)    Acquired hypothyroidism    Anemia    Urinary retention    Acute on chronic respiratory failure with hypoxia (HCC)    Pneumonia    Hearing loss    Pulmonary hypertension (HCC)    Proximal muscle weakness    Pressure injury of sacral region, stage 4 (HCC)    Ambulatory dysfunction    Chronic hypoxemic respiratory failure (HCC)    Pressure injury of left elbow, unstageable (HCC)    Pressure injury of right elbow, unstageable (HCC)    Pressure injury of left heel, unstageable (HCC)    UTI (urinary tract infection)    Elevated troponin    Moderate protein-calorie malnutrition (HCC)    SIRS (systemic inflammatory response syndrome) (HCC)     Active issues specifically addressed today include:   Acute on chronic hypoxic respiratory failure   Severe protein calorie malnutrition   Advanced scleroderma   Pressure ulcers, present on admission  Palliative care patient   Goals of care    Plan:  1  Symptom management - defer to primary at this time       2  Goals - attempt 24 to 48 hours of optimization  Otherwise plan to transition to comfort care  Likely would be IPU appropriate given severity of respiratory disease and care needs       3  Psychosocial support - support provided       [address] [place] [address] follow-up- will follow       Code Status:  DNR DNI - Level 3   Decisional apparatus:  Patient is not competent on my exam today  If competence is lost, patient's substitute decision maker would default to daughter by [place]     Advance Directive / Living Will / POLST:  No     I have reviewed the patient's controlled substance dispensing history in the Prescription Drug Monitoring Program in compliance with the [facility] regulations before prescribing any controlled substances  We appreciate the invitation to be involved in this patient's care  We will follow  Please do not hesitate to reach our on call provider through our clinic answering service at  should you have acute symptom control concerns  Jh Cuellar DO  Palliative and Supportive Care  Clinic/Answering Service: [phone]  You can find me on [employer]! IDENTIFICATION:  Inpatient consult to Palliative Care  Consult performed by: Jh Cuellar DO  Consult ordered by: Hans Paget, DO        Physician Requesting Consult: Hans Paget, *  Reason for Consult / Principal Problem:  Goals  Hx and PE limited by:  Patient in respiratory distress    HISTORY OF PRESENT ILLNESS:       Misty Mullins is a 80 y o  female who presented today with worsening respiratory distress  She is known to palliative care from admission in August for similar  She has known advanced scleroderma with severe protein calorie malnutrition with severe damage her lung secondary to underlying illness  Patient had been discharged to short-term rehab at last admission understanding of severity of illness  Patient now readmitted in distress  Family at bedside and clear that no heroic measures but attempt medical management to reverse reversible causes  Otherwise we will look at comfort care transition      Review of Systems   Unable to perform ROS: severe respiratory distress       Past Medical History:   Diagnosis Date    Anemia     CHF (congestive heart failure) (HCC)     Dysphagia, oropharyngeal phase 06/06/2022    Hypothyroidism     Protein-calorie malnutrition ([address] )     Pulmonary hypertension ([address] )     Sacral ulcer ([address] )     Scleroderma ([address] )     Urinary retention      Past Surgical History:   Procedure Laterality Date    WOUND DEBRIDEMENT N/A 6/14/2022    Procedure: DEBRIDEMENT WOUND [facility]); SACRUM AND BUTTOCKS;  Surgeon: Jenni Gutierrez MD;  Location: [facility] OR;  Service: General     Social History     Socioeconomic History    Marital status: /Civil Union     Spouse name: Not on file    Number of children: Not on file    Years of education: Not on file    Highest education level: Not on file   Occupational History    Not on file   Tobacco Use    Smoking status: Never Smoker    Smokeless tobacco: Never Used   Substance and Sexual Activity    Alcohol use: Never    Drug use: Not on file    Sexual activity: Not on file   Other Topics Concern    Not on file   Social History Narrative    Not on file     Social Determinants of Health     Financial Resource Strain: Not on file   Food Insecurity: No Food Insecurity    Worried About Running Out of Food in the Last Year: Never true    Bib of Food in the Last Year: Never true   Transportation Needs: No Transportation Needs    Lack of Transportation (Medical): No    Lack of Transportation (Non-Medical): No   Physical Activity: Not on file   Stress: Not on file   Social Connections: Not on file   Intimate Partner Violence: Not on file   Housing Stability: Low Risk     Unable to Pay for Housing in the Last Year: No    Number of Places Lived in the Last Year: 1    Unstable Housing in the Last Year: No     No family history on file      MEDICATIONS / ALLERGIES:    all current active meds have been reviewed and current meds:   Current Facility-Administered Medications   Medication Dose Route Frequency    acetaminophen (TYLENOL) tablet 650 mg  650 mg Oral Q6H PRN    aspirin chewable tablet 81 mg  81 mg Per G Tube Daily    busPIRone (BUSPAR) tablet 10 mg  10 mg Per G Tube TID    [START ON 9/15/2022] cefepime (MAXIPIME) 2 g/50 mL dextrose IVPB  2,000 mg Intravenous Q12H    furosemide (LASIX) injection 40 mg  40 mg Intravenous BID (diuretic)    guaiFENesin (ROBITUSSIN) oral solution 400 mg  400 mg Per G Tube TID    heparin (porcine) subcutaneous injection 5,000 Units  5,000 Units Subcutaneous Q8H [address]    levothyroxine tablet 25 mcg  25 mcg Per G Tube Early Morning    methylPREDNISolone sodium succinate (Solu-MEDROL) injection 40 mg  40 mg Intravenous Q12H [facility]    morphine injection 2 mg  2 mg Intravenous Q4H PRN    omeprazole (PriLOSEC) oral suspension 20 mg  20 mg Per PEG Tube Early Morning    ondansetron (ZOFRAN) injection 4 mg  4 mg Intravenous Q6H PRN    saccharomyces boulardii (FLORASTOR) capsule 250 mg  250 mg Per G Tube BID    sildenafil (REVATIO) tablet 10 mg  10 mg Per G Tube TID    sodium chloride 0 9 % inhalation solution 3 mL  3 mL Nebulization TID    [START ON 9/15/2022] vancomycin (VANCOCIN) IVPB (premix in dextrose) 750 mg 150 mL  15 mg/kg Intravenous Q12H       Allergies   Allergen Reactions   Hansa Du [Selexipag] Anaphylaxis    Adempas [Riociguat] Other (See Comments)     Discontinued 2/2 hypotension    Sulfa Antibiotics Tachycardia    Penicillins Rash       OBJECTIVE:    Physical Exam  Physical Exam  Vitals and nursing note reviewed  Constitutional:       General: She is in acute distress  Appearance: She is ill-appearing and toxic-appearing  HENT:      Head: Normocephalic and atraumatic  Right Ear: External ear normal       Left Ear: External ear normal    Eyes:      General:         Right eye: No discharge  Left eye: No discharge  Cardiovascular:      Rate and Rhythm: Regular rhythm  Tachycardia present  Pulmonary:      Effort: Respiratory distress present  Abdominal:      General: There is no distension  Palpations: Abdomen is soft  Skin:     Coloration: Skin is pale  Lab Results:   I have personally reviewed pertinent labs  , CBC:   Lab Results   Component Value Date    WBC 18 37 (H) 09/14/2022    HGB 8 6 (L) 09/14/2022    HCT 27 9 (L) 09/14/2022    MCV 99 (H) 09/14/2022     (H) 09/14/2022    MCH 30 5 09/14/2022    MCHC 30 8 (L) 09/14/2022    RDW 17 4 (H) 09/14/2022    MPV 9 0 09/14/2022    NRBC 0 09/14/2022   , CMP:   Lab Results Component Value Date    SODIUM 128 (L) 09/14/2022    K 4 3 09/14/2022    CL 93 (L) 09/14/2022    CO2 31 09/14/2022    BUN 17 09/14/2022    CREATININE 0 33 (L) 09/14/2022    CALCIUM 8 4 09/14/2022    AST 22 09/14/2022    ALT 14 09/14/2022    ALKPHOS 87 09/14/2022    EGFR 105 09/14/2022   , PT/PTT:  Lab Results   Component Value Date    PTT 32 09/14/2022     Imaging Studies:  Reviewed  EKG, Pathology, and Other Studies:  Reviewed    Counseling / Coordination of Care    Total floor / unit time spent today 35+ minutes  Greater than 50% of total time was spent with the patient and / or family counseling and / or coordination of care  A description of the counseling / coordination of care:  Chart review, medication review, goals of care, supportive listening  Portions of this document may have been created using dictation software and as such some "sound alike" terms may have been generated by the system  Do not hesitate to contact me with any questions or clarifications

## 2022-09-14 NOTE — PROGRESS NOTES
Vancomycin Assessment    Bridgett Beltrán is a 80 y o  female who is currently receiving vancomycin Vancomycin 1 gm IV q 12 hr for Pneumonia, skin-soft tissue infection     Relevant clinical data and objective history reviewed:  Creatinine   Date Value Ref Range Status   09/14/2022 0 33 (L) 0 60 - 1 30 mg/dL Final     Comment:     Standardized to IDMS reference method   08/08/2022 0 25 (L) 0 60 - 1 30 mg/dL Final     Comment:     Standardized to IDMS reference method   08/06/2022 0 26 (L) 0 60 - 1 30 mg/dL Final     Comment:     Standardized to IDMS reference method     Vancomycin Rm   Date Value Ref Range Status   06/17/2022 23 6 (H) 10 0 - 20 0 ug/mL Final     /57 (BP Location: Right arm)   Pulse (!) 114   Temp 100 4 °F (38 °C) (Tympanic)   Resp (!) 26   SpO2 91%   No intake/output data recorded  Lab Results   Component Value Date/Time    BUN 17 09/14/2022 09:10 AM    WBC 18 37 (H) 09/14/2022 09:10 AM    HGB 8 6 (L) 09/14/2022 09:10 AM    HCT 27 9 (L) 09/14/2022 09:10 AM    MCV 99 (H) 09/14/2022 09:10 AM     (H) 09/14/2022 09:10 AM     Temp Readings from Last 3 Encounters:   09/14/22 100 4 °F (38 °C) (Tympanic)   08/09/22 97 5 °F (36 4 °C) (Oral)   07/28/22 97 9 °F (36 6 °C)     Vancomycin Days of Therapy: 1    Assessment/Plan  The patient is currently on vancomycin utilizing scheduled dosing based on actual body weight  Baseline risks associated with therapy include: advanced age  The patient is currently receiving Vancomycin 1 gm IV q 12 hr (20mg/kg  based on ABW) and is clinically appropriate and dose will be continued  Pharmacy will also follow closely for s/sx of nephrotoxicity, infusion reactions, and appropriateness of therapy  BMP and CBC will be ordered per protocol  Plan for trough on Fri 9/15 at 2130 as patient approaches steady state, prior to the 4th dose due at approximately 2200  Due to infection severity, will target a trough of 15-20 (appropriate for most indications)   Pharmacy will continue to follow the patients culture results and clinical progress daily      Valiant Number, Pharmacist

## 2022-09-14 NOTE — LETTER
[facility] CRITICAL CARE UNIT  [address] [place] [address]  Dept: [phone]    September 16, 2022     To Whom it May Concern:    Darrian Daisy  may be excused from her obligations in setting of hospice services for her loved one  If you have any questions or concerns, please don't hesitate to call           Sincerely,          Jyotsna Inks, DO

## 2022-09-14 NOTE — ASSESSMENT & PLAN NOTE
- extensive sacral ulceration which has been present for months  - patient is bedbound  - wound care consult

## 2022-09-14 NOTE — ASSESSMENT & PLAN NOTE
- 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 including dysphagia, muscular involvement, pulmonary hypertension, pulmonary fibrosis  - on chronic prednisone as an outpatient  Comfort measures, holding home meds   Lorazepam, haldol, and opioids for SOB and agitation

## 2022-09-15 NOTE — ACP (ADVANCE CARE PLANNING)
Serious Illness Conversation    This conversation was held in person with son, Julissa Keen, and daughter Sanjuanita Marc and son Terese Juarez on the phone as well as sister on the phone, too  The group makes collective health care decisions for the patient when her mentation disallows her from participating  Formally the decision making falls to Sanjuanita Marc (daughter) since the spouse/POA Harley Duckworth) is in poor health and mind of his own  1  What is your understanding now of where you are with your illness? Prognostic Understanding: overestimates prognosis  Family decision makers over estimate prognosis presently with hypotension and respiratory failure hoping to have patient make it to her birthday 9/21/2020 she would need escalation of care most likely  2  How much information about what is likely to be ahead with your illness would you like to have? Information: patient wants to be fully informed  Patient wants to be involved in health care decision making and if she is not in the right mind to be involved, she has family members who are clearly aware of her decisions and values who can express them to the health care team to make plans as the status changes  3  What did you (clinician) communicate to the patient? Prognostic Communication: Time - I wish we were not in this situation, but I am worried that time may be as short as hours to days  I told patient's family health care decision makers that should they decide not to escalate her care my options would be limited for keeping her blood pressure an acceptable level while also administering the medications necessary to improve her breathing  This would result in her having quicker time to pass likely before her birthday and before she could get to an IP hospice house  4  If your health situation worsens, what are your most important goals? Patient still thinks she can get home and celebrate her birthday there and then pass which family knows is not possible   Family's goal is to have time together in honor of her birthday while ideally at the  hospice [place] which would be more comfortable setting than the hospital  Patient's family wish to respect the medical decisions which the patient has outlined and they have taken notes on over the course of multiple conversations in her life  Patient's high goal also is to be as comfortable as possible and limit pain  Family works with palliative care to achieve this goal and considered it in their confirmation of her level 3 code status DNR-DNI  5  What are the biggest fears and worries about the future and your health? Being in pain, not making it to her birthday  Daughter states, "She would be pissed if she didn't make it to her 82th birthday "     10  What abilities are so critical to your life that you cannot imagine living without them? Unacceptable Function: being in pain or very uncomfortable     7  What gives you strength as you think about the future with your illness? Family decision makers draw strength from each other, their shared understanding and memory of the patient as the person she once was and her values  8  If you become sicker, how much are you willing to go through for the possibility of gaining more time? Be in the hospital: Yes Have a feeding tube: Yes   Be in the ICU: Yes Live in a nursing home: Yes   Be on a ventilator: No Be uncomfortable: No   Be on dialysis: No Undergo aggressive test and/or procedures: No   Family member states a quote from the patient, "Nothing excessive " However they do not think that possible transfer to the ICU for pressors temporarily will defy her wishes for the end of life if it might make it possible for her to have the extra time  9  How much does your proxy and family know about your priorities and wishes? Sister is closest in location and her children are very closely knit and aware of her goals/values/ wishes       Navid John heard family members say that making it to the birthday is really important to everyone  Keeping that in mind, and what we know about the illness, I recommend that we continue with increasing level of care for blood pressure management including potential transfer to MICU as well as disposition planning with family bedside tomorrow  This will help us make sure that the treatment plans reflect whats important to the patient  Family health care decision makers have had all of their questions answered to their satisfaction  I have spent 45 minutes speaking with my patient's family health care decision makers on advanced care planning today or during this visit  No Advanced directives in the chart

## 2022-09-15 NOTE — PLAN OF CARE
Problem: Potential for Falls  Goal: Patient will remain free of falls  Description: INTERVENTIONS:  - Educate patient/family on patient safety including physical limitations  - Instruct patient to call for assistance with activity   - Consult OT/PT to assist with strengthening/mobility   - Keep Call bell within reach  - Keep bed low and locked with side rails adjusted as appropriate  - Keep care items and personal belongings within reach  - Initiate and maintain comfort rounds  - Make Fall Risk Sign visible to staff  - Offer Toileting every 2 Hours, in advance of need  - Initiate/Maintain bed alarm  - Obtain necessary fall risk management equipment: bed alarm  - Apply yellow socks and bracelet for high fall risk patients  - Consider moving patient to room near nurses station  Outcome: Progressing     Problem: MOBILITY - ADULT  Goal: Maintain or return to baseline ADL function  Description: INTERVENTIONS:  -  Assess patient's ability to carry out ADLs; assess patient's baseline for ADL function and identify physical deficits which impact ability to perform ADLs (bathing, care of mouth/teeth, toileting, grooming, dressing, etc )  - Assess/evaluate cause of self-care deficits   - Assess range of motion  - Assess patient's mobility; develop plan if impaired  - Assess patient's need for assistive devices and provide as appropriate  - Encourage maximum independence but intervene and supervise when necessary  - Involve family in performance of ADLs  - Assess for home care needs following discharge   - Consider OT consult to assist with ADL evaluation and planning for discharge  - Provide patient education as appropriate  Outcome: Progressing  Goal: Maintains/Returns to pre admission functional level  Description: INTERVENTIONS:  - Perform BMAT or MOVE assessment daily    - Set and communicate daily mobility goal to care team and patient/family/caregiver     - Collaborate with rehabilitation services on mobility goals if consulted  - Perform Range of Motion 2 times a day  - Reposition patient every 2 hours    - Record patient progress and toleration of activity level   Outcome: Progressing     Problem: Prexisting or High Potential for Compromised Skin Integrity  Goal: Skin integrity is maintained or improved  Description: INTERVENTIONS:  - Identify patients at risk for skin breakdown  - Assess and monitor skin integrity  - Assess and monitor nutrition and hydration status  - Monitor labs   - Assess for incontinence   - Turn and reposition patient  - Assist with mobility/ambulation  - Relieve pressure over bony prominences  - Avoid friction and shearing  - Provide appropriate hygiene as needed including keeping skin clean and dry  - Evaluate need for skin moisturizer/barrier cream  - Collaborate with interdisciplinary team   - Patient/family teaching  - Consider wound care consult   Outcome: Progressing     Problem: CARDIOVASCULAR - ADULT  Goal: Maintains optimal cardiac output and hemodynamic stability  Description: INTERVENTIONS:  - Monitor I/O, vital signs and rhythm  - Monitor for S/S and trends of decreased cardiac output  - Administer and titrate ordered vasoactive medications to optimize hemodynamic stability  - Assess quality of pulses, skin color and temperature  - Assess for signs of decreased coronary artery perfusion  - Instruct patient to report change in severity of symptoms  Outcome: Progressing  Goal: Absence of cardiac dysrhythmias or at baseline rhythm  Description: INTERVENTIONS:  - Continuous cardiac monitoring, vital signs, obtain 12 lead EKG if ordered  - Administer antiarrhythmic and heart rate control medications as ordered  - Monitor electrolytes and administer replacement therapy as ordered  Outcome: Progressing     Problem: RESPIRATORY - ADULT  Goal: Achieves optimal ventilation and oxygenation  Description: INTERVENTIONS:  - Assess for changes in respiratory status  - Assess for changes in mentation and behavior  - Position to facilitate oxygenation and minimize respiratory effort  - Oxygen administered by appropriate delivery if ordered  - Initiate smoking cessation education as indicated  - Encourage broncho-pulmonary hygiene including cough, deep breathe, Incentive Spirometry  - Assess the need for suctioning and aspirate as needed  - Assess and instruct to report SOB or any respiratory difficulty  - Respiratory Therapy support as indicated  Outcome: Progressing     Problem: GASTROINTESTINAL - ADULT  Goal: Maintains adequate nutritional intake  Description: INTERVENTIONS:  - Monitor percentage of each meal consumed  - Identify factors contributing to decreased intake, treat as appropriate  - Assist with meals as needed  - Monitor I&O, weight, and lab values if indicated  - Obtain nutrition services referral as needed  Outcome: Progressing     Problem: GENITOURINARY - ADULT  Goal: Urinary catheter remains patent  Description: INTERVENTIONS:  - Assess patency of urinary catheter  - If patient has a chronic lilly, consider changing catheter if non-functioning  - Follow guidelines for intermittent irrigation of non-functioning urinary catheter  Outcome: Progressing     Problem: METABOLIC, FLUID AND ELECTROLYTES - ADULT  Goal: Electrolytes maintained within normal limits  Description: INTERVENTIONS:  - Monitor labs and assess patient for signs and symptoms of electrolyte imbalances  - Administer electrolyte replacement as ordered  - Monitor response to electrolyte replacements, including repeat lab results as appropriate  - Instruct patient on fluid and nutrition as appropriate  Outcome: Progressing  Goal: Fluid balance maintained  Description: INTERVENTIONS:  - Monitor labs   - Monitor I/O and WT  - Instruct patient on fluid and nutrition as appropriate  - Assess for signs & symptoms of volume excess or deficit  Outcome: Progressing     Problem: SKIN/TISSUE INTEGRITY - ADULT  Goal: Pressure injury heals and does not worsen  Description: Interventions:  - Implement low air loss mattress or specialty surface (Criteria met)  - Apply silicone foam dressing  - Instruct/assist with weight shifting every 60 minutes when in chair   - Limit chair time to 2 hour intervals  - Use special pressure reducing interventions such as cushions when in chair   - Apply fecal or urinary incontinence containment device   - Perform passive or active ROM every 2 hours  - Turn and reposition patient & offload bony prominences every 2 hours   - Utilize friction reducing device or surface for transfers   - Consider consults to  interdisciplinary teams such as PT/OT  - Use incontinent care products after each incontinent episode such as perispray and barrier cream  - Consider nutrition services referral as needed  Outcome: Progressing     Problem: MUSCULOSKELETAL - ADULT  Goal: Maintain or return mobility to safest level of function  Description: INTERVENTIONS:  - Assess patient's ability to carry out ADLs; assess patient's baseline for ADL function and identify physical deficits which impact ability to perform ADLs (bathing, care of mouth/teeth, toileting, grooming, dressing, etc )  - Assess/evaluate cause of self-care deficits   - Assess range of motion  - Assess patient's mobility  - Assess patient's need for assistive devices and provide as appropriate  - Encourage maximum independence but intervene and supervise when necessary  - Involve family in performance of ADLs  - Assess for home care needs following discharge   - Consider OT consult to assist with ADL evaluation and planning for discharge  - Provide patient education as appropriate  Outcome: Progressing

## 2022-09-15 NOTE — PROGRESS NOTES
Progress note - Palliative and Supportive Care   Karan Doan 80 y o  female [id number]    Patient Active Problem List   Diagnosis    Sacral wound    Severe protein-calorie malnutrition (HCC)    Dysphagia, oropharyngeal phase    Scleroderma (HCC)    Acquired hypothyroidism    Anemia    Urinary retention    Acute on chronic respiratory failure with hypoxia (HCC)    Pneumonia    Hearing loss    Pulmonary hypertension (HCC)    Proximal muscle weakness    Pressure injury of sacral region, stage 4 (HCC)    Ambulatory dysfunction    Chronic hypoxemic respiratory failure (HCC)    Pressure injury of left elbow, unstageable (HCC)    Pressure injury of right elbow, unstageable (HCC)    Pressure injury of left heel, unstageable (HCC)    UTI (urinary tract infection)    Hyponatremia    Elevated troponin    Moderate protein-calorie malnutrition (HCC)    SIRS (systemic inflammatory response syndrome) (HCC)    Acute on chronic diastolic CHF (congestive heart failure) (HCC)     Active issues specifically addressed today include:   Scleroderma  Hypoxic respiratory failure  Septic shock secondary to UTI and wounds  Palliative care encounter  Goals of care discussion    Plan:  1  Symptom management -    Comfort orders to be placed by critical care team   Goal to use Morphine, Ativan, Haldol, Robinul on an as-needed basis to help patient tolerate current level of medical optimization until all family arrives  After they have exchanged "goodbyes", transition to comfort measures only   Upon comfort care transition recommend pre-medicating with morphine and/or ativan prior to weaning HFNC to 30L using further PRNs as needed for work of breathing  When patient appears comfortable on 30L recommend transitioning to NC (family may request O2 via NC given pre-existing use of O2)  If patient is requiring frequent PRNs consider morphine/dilaudid gtt and/or scheduled ativan doses    2   Goals -    Family meeting took place as detailed below  All parties agree to respect patient's previously verbalized and documented wishes to transition to comfort measures only after their arrival   Continue high-flow nasal cannula, vasopressors, and antibiotics with best efforts to optimize until  and other family members arrived this afternoon/evening   Patient's family is aware given critical nature of patient's condition that she could have imminent deterioration at any time  No escalation of care  Code Status: DNR - Level 3   Decisional apparatus:  Patient is not competent on my exam today  If competence is lost, patient's substitute decision maker would default to spouse by [place] Act 169  Advance Directive / Living Will / POLST:  Hold on, reviewed personally  Consistent with comfort measures only  3  Social support-   Time spent providing supportive listening the patient's family   Family emphasizes that Betzy Araya was very eager to celebrate her 82th birthday  They are coming together with a "celebration of life" today  4  Follow-up   Palliative care means available for symptom management and support while admitted   Conversation about transition to hospice has been deferred given concern of patient's imminent condition  Interval history:       Patient remains critically ill dependent on vasopressors, high-flow nasal cannula despite maximum medical management, broad-spectrum antibiotics  Patient herself has complaint of copious secretions are request suctioning  Her family she is more conversant today but continues to have poor short-term memory  Following family meeting revisited patient at which time I explained to her that she is unlikely to survive this hospital stay and that in keeping with her previously expressed and written wishes recommendation is for comfort focused care      Record of Family Meeting - Palliative and Supportive Care   [facility] 80 y o  female [id number]      Recommendations and Plan:  · Continue current level of medical care including high-flow nasal cannula, vasopressors, antibiotics without escalation of care  · Awaiting family to arrive from several hours away  Thereafter transition to comfort measures only  A family meeting was held to provide medical update and discussed goals of care  This meeting was necessary for determine the appropriate course of treatment  Time of Meetinpm  Meeting Location:  ICU conference room  Participants:   Dr Chuck Amaod, critical care  Florencio Cabrera PA-C, palliative Medicine  Spouse, Dr Katina Quintanilla - over the phone  Daughter, Lashay Brantley Mark Prince, Bre Asp the phone  Sister, Roxana Wheeler, Doug Desir, Jadiel the phone    Patient Participation:  Patient did not participate in discussion as her hearing is very poor and memory has been impaired and setting of critical illness  However, return to room following family meeting to provide update and concise terms  Patient Support System:  Patient is well supported by family as above     Advanced Directive of POLST available: yes    Topics of Discussion:  · Dr Chuck Amado provided summary of patient's chronic condition with acute complications including diagnosis of scleroderma complicated by recurrent hospitalizations for respiratory distress  This hospitalization particularly patient is profoundly hypotensive secondary to septic shock likely related to bacteriuria and sacral/feet wounds  Despite broad-spectrum antibiotics and maximum medical management patient remains critically ill with high risk of persistent deterioration  Discussed consideration of comfort care in keeping with patient's previously expressed in written wishes  · Patient's family expressed understanding of her critical state, were in support of her wishes, questions were answered regarding transition to comfort care      Other Content of Meeting:  · Described expectations from comfort care including discontinuation of any life-prolonging cares and initiation of comfort focused care  · Family is aware that both secondary to deteriorating medical condition and potential need for substantial comfort medications patient may be somewhat sedated upon initiation to comfort care  Time Involved in Meetin minutes, beginning at approximately  1pm and ending at approximately 2:00 p m  Priyank Whalen MEDICATIONS / ALLERGIES:     all current active meds have been reviewed    Allergies   Allergen Reactions   Elisa Griffiths [Selexipag] Anaphylaxis    Adempas [Riociguat] Other (See Comments)     Discontinued 2/2 hypotension    Sulfa Antibiotics Tachycardia    Penicillins Rash       OBJECTIVE:    Physical Exam  Physical Exam  HENT:      Head: Atraumatic  Comments: Temporal wasting  Eyes:      Conjunctiva/sclera: Conjunctivae normal    Cardiovascular:      Comments: Hypotensive, on vasopressor support  Pulmonary:      Comments: Congested breath sounds  Abdominal:      Tenderness: There is no guarding  Skin:     Comments: Wounds visualized via media images   Neurological:      Mental Status: She is alert        Comments: Alert, conversant, but forgetful regarding recently given information   Psychiatric:      Comments: Appropriately tearful         Lab Results:   Results from last 7 days   Lab Units 09/15/22  0514 22  0910   WBC Thousand/uL 27 85* 18 37*   HEMOGLOBIN g/dL 8 3* 8 6*   HEMATOCRIT % 27 4* 27 9*   PLATELETS Thousands/uL 395* 418*   NEUTROS PCT %  --  87*   MONOS PCT %  --  6     Results from last 7 days   Lab Units 09/15/22  0514 22  0910   POTASSIUM mmol/L 4 0 4 3   CHLORIDE mmol/L 93* 93*   CO2 mmol/L 32 31   BUN mg/dL 14 17   CREATININE mg/dL 0 24* 0 33*   CALCIUM mg/dL 9 4 8 4   ALK PHOS U/L 93 87   ALT U/L 12 14   AST U/L 15 22       Imaging Studies: reviewed pertinent studies   EKG, Pathology, and Other Studies: reviewed pertinent studies    Counseling / Coordination of Care    Total floor / unit time spent today 90+ minutes  Greater than 50% of total time was spent with the patient and / or family counseling and / or coordination of care  A description of the counseling / coordination of care:  Time spent in family meeting as detailed above from 1:00 p m  To 2:00 p m , coordination of care with RN, primary team, providing supportive listening      This note was not shared with the patient due to privacy exception: note includes other individuals

## 2022-09-15 NOTE — CASE MANAGEMENT
Case Management Assessment & Discharge Planning Note    Patient name Peg Pinon  [facility]  MRN [id number]  : 1940 Date 9/15/2022       Current Admission Date: 2022  Current Admission Diagnosis:Acute on chronic respiratory failure with hypoxia [facility])   Patient Active Problem List    Diagnosis Date Noted    Acute on chronic diastolic CHF (congestive heart failure) ([address] ) 2022    SIRS (systemic inflammatory response syndrome) ([address] ) 2022    UTI (urinary tract infection) 2022    Hyponatremia 2022    Elevated troponin 2022    Moderate protein-calorie malnutrition ([address] ) 2022    Pressure injury of left elbow, unstageable ([address] ) 2022    Pressure injury of right elbow, unstageable ([address] ) 2022    Pressure injury of left heel, unstageable ([address] ) 2022    Chronic hypoxemic respiratory failure ([address] ) 2022    Pressure injury of sacral region, stage 4 ([address] ) 2022    Ambulatory dysfunction 2022    Proximal muscle weakness 2022    Pulmonary hypertension ([address] ) 2022    Hearing loss 2022    Severe protein-calorie malnutrition ([address] ) 2022    Pneumonia 2022    Sacral wound 2022    Dysphagia, oropharyngeal phase 2022    Scleroderma ([address] ) 2022    Acquired hypothyroidism 2022    Anemia 2022    Urinary retention 2022    Acute on chronic respiratory failure with hypoxia ([address] ) 2022      LOS (days): 1  Geometric Mean LOS (GMLOS) (days): 3 60  Days to GMLOS:2 5     OBJECTIVE:    Risk of Unplanned Readmission Score: 31 25         Current admission status: Inpatient       Preferred Pharmacy:   PATIENT/FAMILY REPORTS NO PREFERRED PHARMACY  No address on file      [address] [place] [facility] [address] [facility] [address] [place] [address] [facility]  Phone: [phone] Fax: [phone]    Primary Care Provider: Yrn Lagos Jarocho Carrillo MD    Primary Insurance: MEDICARE  Secondary Insurance:     ASSESSMENT:  Bridget Foster Proxies    There are no active Health Care Proxies on file  Readmission Root Cause  30 Day Readmission: No    Patient Information  Admitted from[de-identified] Facility  Mental Status: Alert           DISCHARGE DETAILS:            Additional Comments: Per chart review and discussion with treatment team, family meeting held earlier today, plan is to transition patient to comfort care  CM will continue to follow

## 2022-09-15 NOTE — PROGRESS NOTES
Spiritual Care Progress Note    9/15/2022  Patient: Laureano Ornelas : 1940  Admission Date & Time: 2022 0841  MRN: [id number] CSN: [id number]                     Chaplaincy Interventions Utilized:     Relationship Building: Cultivated a relationship of care and support    Ritual: Celebrated with patient/family and Provided ritual

## 2022-09-15 NOTE — PROGRESS NOTES
Vancomycin IV Pharmacy-to-Dose Consultation    Waymond Simmonds is a 80 y o  female who is currently receiving Vancomycin IV with management by the Pharmacy Consult service for the treatment of Pneumonia  Assessment/Plan:    The patient's chart was reviewed  Renal function is stable  There are no signs or symptoms of nephrotoxicity and/or infusion reactions documented  Based on todays assessment, will continue current vancomycin (Day # 2) dosing of 1000 mg IV q12h, with a plan for trough to be drawn at 0930 on 9/16 (prior to the 5th dose)  Pharmacy will continue to follow closely for s/sx of nephrotoxicity, infusion reactions and appropriateness of therapy  BMP and CBC will be ordered per protocol  We will continue to follow the patients culture results and clinical progress daily        [place] Kalani, PharmD, [address] Clinical Pharmacist  [phone]

## 2022-09-15 NOTE — ACP (ADVANCE CARE PLANNING)
Family meeting proximally 1 hour in length [id number]  Attendance:  , 3 children Karen Borja  Also patient's sister and brother-in-law  And several grandchildren  Discussed with family hospital course and care plan  Expressed fears of patient acutely deteriorating despite blood pressure and antibiotics  Reviewed patient's pulse form which states she would like comfort measures and DNR DNI in this setting/circumstances  Discussed importance of celebrating patient's birthday and ability to both deck rate and keep patient comfortable  Unfortunately patient is not stable enough to survive ambulance ride to inpatient hospice unit at this time  Family understands  Family agreed to pursue comfort measures after  and other family members say final goodbyes  Placed comfort orders with the exception of remaining level 3 and not discontinuing pressors, antibiotics, or palliative respiratory interventions  Family tearful but appropriate  All in agreement and all questions answered to satisfaction

## 2022-09-15 NOTE — WOUND OSTOMY CARE
Progress Note - Wound   Paco Schuler 80 y o  female MRN: [id number]  Unit/Bed#: [facility] 205-01 Encounter: [id number]      Assessment:   Patient admitted due to acute on chronic respiratory failure with hypoxia  History of anemia CHF, hypothyroid, scleroderma, chronic sacral ulcer  Wound care consulted for sacral ulcer  Patient agreeable to assessment, alert and oriented x3, lilly catheter in place for urine management, incontinent of bowel, order placed for P-500 low air loss mattress, is an assist of 2 to turn for assessment, on high-flow nasal cannula, on vasopressors, has been hypotensive over past 24 hours, is a heavy assist with care  Primary RN made aware of assessment findings  Plan is to transition patient to comfort care  1  Pressure injury sacrum, large stage 4 -POA- Wound is oval in shape, full thickness, bone exposed, approx  10% brown adhered necrotic tissue, 40% yellow adhered slough and 50% beefy red tissue, with large amount of tan and serous foul smelling drainage  Romy-wound is dry, intact, blanchable pink skin  With plans for comfort care, will recommend a dressing to help manage wound odor and drainage  2  Pressure injury right elbow, unstageable-POA- Wound is round in shape, true depth unknown, 100% dry brown eschar well adhered with no drainage noted  Romy-wound is dry, intact, blanchable  3  Pressure injury left heel, DTI-POA- wound is round in shape, dry and intact skin, 100% non-blanchable purple skin  Romy-wound is dry, intact, blanchable  This wound has the potential to evolve to a full thickness injury, stage 3 or 4     4  Right heel noted with dry and intact calloused skin, no open aspect, no wound noted  5  Left elbow is dry, intact, blanchable  Educated patient on importance of frequent offloading of pressure via turning, repositioning, and weight-shifting  Verbalized understanding of plan of care      No induration, fluctuance, odor, warmth, redness noted to the above noted wounds  New dressings applied  Patient tolerated fairly well, reports pain to the right elbow and sacral wounds  Primary nurse aware of plan of care  See flow sheets for more detailed assessment findings  Will follow along  Skin care Plan:  1-Cleanse sacral wound with NSS, pat dry  Apply Maxorb Silver Alginate sheet over wound bed and pack lightly with Sujatha  Cover with Allevyn foam  Gianna with T for treatment  Change daily and as needed for soilage/dislodgment  2-Turn/reposition q2h for pressure re-distribution on skin   3-Elevate heels to offload pressure  4-Moisturize skin daily with skin nourishing cream  5-Ehob cushion in chair when out of bed  6-Allevyn foam to heels, gianna w/P, peel foam check skin integrity q-shift  Change q3d and as needed  7-Allevyn foam to heels, gianna w/T to left elbow, peel foam check skin integrity q-shift  Change q3d and as needed  8-P-500 low air loss mattress  Wound 06/14/22 Pressure Injury Buttocks N/A (Active)   Wound Image   09/15/22 1231   Wound Description Beefy red, brown; yellow; slough 09/15/22 1500   Pressure Injury Stage 4 09/15/22 1231   Romy-wound Assessment Erythema;Pink;Purple; White 09/15/22 1500   Wound Length (cm) 11 5 cm 09/15/22 1231   Wound Width (cm) 14 5 cm 09/15/22 1231   Wound Depth (cm) 4 cm 09/15/22 1231   Wound Surface Area (cm^2) 166 75 cm^2 09/15/22 1231   Wound Volume (cm^3) 667 cm^3 09/15/22 1231   Calculated Wound Volume (cm^3) 667 cm^3 09/15/22 1231   Change in Wound Size % -1 06 09/15/22 1231   Tunneling 0 cm 09/15/22 1231   Undermining 4 09/15/22 1231   Drainage Amount large 09/15/22 1500   Drainage Description Serous; tan, foul smelling 09/15/22 1500   Non-staged Wound Description Full thickness 09/15/22 1231   Treatments Cleansed;Irrigation with NSS;Site care 09/15/22 1231   Dressing Other (Comment);Gauze 09/15/22 1500   Wound packed?  Yes 09/15/22 1231   Packing- # removed 4 09/15/22 1231   Packing- # inserted 3 09/15/22 1231 Dressing Changed Reinforced 09/15/22 1231   Patient Tolerance Tolerated well 09/15/22 1231   Dressing Status Old drainage;Clean;Dry; Intact; Reinforced 09/15/22 1500       Wound 06/25/22 Pressure Injury Heel Left (Active)   Wound Image   09/15/22 1240   Wound Description Dry; Intact; Black;Fragile;Pink 09/15/22 1500   Pressure Injury Stage DTPI 09/15/22 1240   Romy-wound Assessment Dry; Intact; Pink 09/15/22 1240   Wound Length (cm) 0 3 cm 09/15/22 1240   Wound Width (cm) 0 4 cm 09/15/22 1240   Wound Depth (cm) 0 cm 09/15/22 1240   Wound Surface Area (cm^2) 0 12 cm^2 09/15/22 1240   Wound Volume (cm^3) 0 cm^3 09/15/22 1240   Calculated Wound Volume (cm^3) 0 cm^3 09/15/22 1240   Change in Wound Size % 100 09/15/22 1240   Tunneling 0 cm 09/15/22 1240   Undermining 0 09/15/22 1240   Drainage Amount None 09/15/22 1500   Non-staged Wound Description Not applicable 12/94/44 7169   Treatments Cleansed;Site care 09/15/22 1240   Dressing Foam, Silicon (eg  Allevyn, etc) 09/15/22 1500   Wound packed? No 09/15/22 1240   Dressing Changed Changed 09/15/22 1240   Patient Tolerance Tolerated well 09/15/22 1240   Dressing Status Clean; Intact;Dry 09/15/22 1500   Wound 07/13/22 Pressure Injury Elbow Posterior;Right (Active)   Wound Description Dry; Intact; Brown 09/15/22 1230   Pressure Injury Stage U 09/15/22 1230   Romy-wound Assessment Dry; Intact 09/15/22 1230   Wound Length (cm) 1 cm 09/15/22 1230   Wound Width (cm) 1 cm 09/15/22 1230   Wound Depth (cm) 0 cm 09/15/22 1230   Wound Surface Area (cm^2) 1 cm^2 09/15/22 1230   Wound Volume (cm^3) 0 cm^3 09/15/22 1230   Calculated Wound Volume (cm^3) 0 cm^3 09/15/22 1230   Change in Wound Size % 100 09/15/22 1230   Tunneling 0 cm 09/15/22 1230   Undermining 0 09/15/22 1230   Drainage Amount None 09/15/22 1230   Non-staged Wound Description Not applicable 06/13/28 5973   Treatments Cleansed;Site care 09/15/22 1230   Dressing Foam, Silicon (eg  Allevyn, etc) 09/15/22 1230   Wound packed?  No 09/15/22 1230   Dressing Changed Reinforced 09/15/22 1230   Patient Tolerance Tolerated poorly 09/15/22 1230   Dressing Status Clean;Dry; Intact 09/15/22 1230     Call or tigertext with any questions  Wound Care will continue to follow    Ignacio JEAN BAPTISTEN RN CWON  Wound and Ostomy care

## 2022-09-15 NOTE — H&P
H&P Exam - [facility] Silviano 80 y o  female MRN: [id number]  Unit/Bed#: ICCU 205-01 Encounter: [id number]      -------------------------------------------------------------------------------------------------------------  Chief Complaint: "Im tired"    History of Present Illness   HX and PE limited by: n/A  Sonya Weaver is a 80 y o  female, past medical history of scleroderma, pulmonary hypertension, pulmonary fibrosis, chronic respiratory failure, chronic sacral ulceration, recurrent pneumonia, and hypothyroidism, who initially presented to the emergency department on 09/14/2022 for acute shortness of breath  Per ED note, patient had been ill for the prior 3 days with an acute worsening of her shortness of breath that morning  Per EMS, patient had an oxygen saturation of 76% which improved to 95% on a non-rebreather  On arrival to the emergency department patient was placed on BiPAP but eventually switched to high-flow  She was febrile  Labs were significant for lactic acid of 2 3, white blood cell count of 18 37, and a BNP of 1755  Chest x-ray did not show any focal consolidation but did show pulmonary edema  Patient was started on broad-spectrum antibiotics  Due to concern for volume overload fluids were deferred  Patient was subsequently admitted to [facility] as plans were ultimately to place patient in hospice care  Late 09/14/2022, patient started to become hypotensive  There was some initial improvement with albumin but blood pressure continued to drop  After discussion with family, goal is for them to see patient in the morning and potentially get her to [facility]  She was subsequently started on peripheral vasopressors and upgraded to step-down level one      History obtained from chart review and the patient   -------------------------------------------------------------------------------------------------------------  Assessment and Plan:    Neuro:    Diagnosis:  No active issues  o Plan:   - CAM-ICU, delirium precautions      CV:    Diagnosis: Shock  o Plan:   - Unclear etiology, likely septic but source unclear  - Continue vasopressors for MAP >65  - Follow cultures  - Goals of care discussion   Diagnosis: Acute on chronic diastolic heart failure  o Plan:   - Continue lasix  - Monitor I/O  - Daily weights      Pulm:   Diagnosis: Acute on chronic respiratory failure w/ hypoxia  o Plan:   - Likely multifactorial  - Continue HFNC, wean as tolerated  - Continue IV soluemdrol   - Continue lasix   Diagnosis: Pulmonary hypertension  o Plan:   - Continue lasix  - Hold sildenafil in the setting of hypotension      GI:    Diagnosis: No active issues        :    Diagnosis: No active issues  o Plan:   - Trend BUN/Cr        F/E/N:    F: None   E: Replete Electrolytes prn goal K> 4, Mg >2, Phos >3 0   N: NPO      Heme/Onc:    Diagnosis: Leukocytosis  o Plan:   - Reactive vs 2/2 infection  - Continue abx  - Trend WBC/fever curve  - Follow cultures    Endo:    Diagnosis: Acquired Hypothyroidism  o Plan:   - Continue home synthroid      ID:    Diagnosis: SIRS  o Plan:   - Continue broad spectrum abx  - Follow cultures  - Trend fever/wbc curve      MSK/Skin:    Diagnosis: Scleroderma  o Plan:   - Continue home prednisone   Diagnosis: Sacral wound  o Plan:   - Chronic (has been present for months)  - Wound care consult      Disposition: Transfer to Stepdown Level 1   Code Status: Level 3 - DNAR and DNI  --------------------------------------------------------------------------------------------------------------  Review of Systems   Constitutional: Positive for fatigue  Respiratory: Positive for cough and shortness of breath  All other systems reviewed and are negative  A 12-point, complete review of systems was reviewed and negative except as stated above     Physical Exam  Vitals and nursing note reviewed  Constitutional:       General: She is not in acute distress  Appearance: She is well-developed  She is ill-appearing  She is not toxic-appearing or diaphoretic  HENT:      Head: Normocephalic and atraumatic  Right Ear: External ear normal       Left Ear: External ear normal       Nose: Nose normal    Eyes:      General: Lids are normal  No scleral icterus  Cardiovascular:      Rate and Rhythm: Normal rate and regular rhythm  Heart sounds: Normal heart sounds  No murmur heard  No friction rub  No gallop  Pulmonary:      Effort: Tachypnea and respiratory distress present  Breath sounds: Normal breath sounds  No wheezing or rales  Abdominal:      Palpations: Abdomen is soft  Tenderness: There is no abdominal tenderness  There is no guarding or rebound  Musculoskeletal:         General: No deformity  Normal range of motion  Cervical back: Normal range of motion and neck supple  Skin:     General: Skin is warm and dry  Neurological:      General: No focal deficit present  Mental Status: She is alert  GCS: GCS eye subscore is 3  GCS verbal subscore is 5  GCS motor subscore is 6            --------------------------------------------------------------------------------------------------------------  Vitals:   Vitals:    09/15/22 0245 09/15/22 0300 09/15/22 0315 09/15/22 0334   BP: 92/56 105/58 99/58 104/56   Pulse: 76 74 74 74   Resp:       Temp:  97 6 °F (36 4 °C)     TempSrc:  Axillary     SpO2: 100% 100% 100% 100%     Temp  Min: 97 6 °F (36 4 °C)  Max: 100 4 °F (38 °C)        There is no height or weight on file to calculate BMI    N/A    Laboratory and Diagnostics:  Results from last 7 days   Lab Units 09/14/22  0910   WBC Thousand/uL 18 37*   HEMOGLOBIN g/dL 8 6*   HEMATOCRIT % 27 9*   PLATELETS Thousands/uL 418*   NEUTROS PCT % 87*   MONOS PCT % 6     Results from last 7 days   Lab Units 09/14/22  0910   SODIUM mmol/L 128*   POTASSIUM mmol/L 4 3   CHLORIDE mmol/L 93*   CO2 mmol/L 31   ANION GAP mmol/L 4   BUN mg/dL 17 CREATININE mg/dL 0 33*   CALCIUM mg/dL 8 4   GLUCOSE RANDOM mg/dL 142*   ALT U/L 14   AST U/L 22   ALK PHOS U/L 87   ALBUMIN g/dL 1 8*   TOTAL BILIRUBIN mg/dL 0 27          Results from last 7 days   Lab Units 09/14/22  0910   INR  0 93   PTT seconds 32          Results from last 7 days   Lab Units 09/14/22  1143 09/14/22  0910   LACTIC ACID mmol/L 2 1* 2 3*     ABG:    VBG:  Results from last 7 days   Lab Units 09/14/22  0910   PH MICHAEL  7 462*   PCO2 MICHAEL mm Hg 44 7   PO2 MICHAEL mm Hg 81 3*   HCO3 MICHAEL mmol/L 31 2*   BASE EXC MICHAEL mmol/L 6 7     Results from last 7 days   Lab Units 09/14/22  0910   PROCALCITONIN ng/ml 0 20       Micro:  Results from last 7 days   Lab Units 09/14/22  0910   BLOOD CULTURE  Received in Microbiology Lab  Culture in Progress  Imaging: I have personally reviewed pertinent reports  Historical Information   Past Medical History:   Diagnosis Date    Anemia     CHF (congestive heart failure) (HCC)     Dysphagia, oropharyngeal phase 06/06/2022    Hypothyroidism     Protein-calorie malnutrition ([address] )     Pulmonary hypertension (HCC)     [facility] ulcer [facility] [address] [facility]     Scleroderma ([facility] [address] [facility]     Urinary retention      Past Surgical History:   Procedure Laterality Date    WOUND DEBRIDEMENT N/A 6/14/2022    Procedure: DEBRIDEMENT WOUND [facility] OUT); SACRUM AND BUTTOCKS;  Surgeon: Haroon Garcia MD;  Location: [facility];  Service: General     Social History   Social History     Substance and Sexual Activity   Alcohol Use Never     Social History     Substance and Sexual Activity   Drug Use Not on file     Social History     Tobacco Use   Smoking Status Never Smoker   Smokeless Tobacco Never Used     Exercise History: N/A  Family History:   No family history on file    I have reviewed this patient's family history and commented on sigificant items within the HPI      Medications:  Current Facility-Administered Medications   Medication Dose Route Frequency    acetaminophen (TYLENOL) tablet 650 mg 650 mg Oral Q8H [address]    aspirin chewable tablet 81 mg  81 mg Per G Tube Daily    busPIRone (BUSPAR) tablet 10 mg  10 mg Per G Tube TID    cefepime (MAXIPIME) 2 g/50 mL dextrose IVPB  2,000 mg Intravenous Q12H    furosemide (LASIX) injection 40 mg  40 mg Intravenous BID (diuretic)    glycerin-hypromellose- (ARTIFICIAL TEARS) ophthalmic solution 2 drop  2 drop Both Eyes BID    guaiFENesin (ROBITUSSIN) oral solution 400 mg  400 mg Per G Tube TID    heparin (porcine) subcutaneous injection 5,000 Units  5,000 Units Subcutaneous Q8H [address]    levalbuterol (XOPENEX) inhalation solution 1 25 mg  1 25 mg Nebulization TID    And    sodium chloride 0 9 % inhalation solution 3 mL  3 mL Nebulization TID    levothyroxine tablet 25 mcg  25 mcg Per G Tube Early Morning    LORazepam (ATIVAN) injection 0 5 mg  0 5 mg Intravenous Q6H PRN    methylPREDNISolone sodium succinate (Solu-MEDROL) injection 40 mg  40 mg Intravenous Q12H BELLA    morphine injection 2 mg  2 mg Intravenous Q4H PRN    norepinephrine (LEVOPHED) 4 mg (STANDARD CONCENTRATION) IV in sodium chloride 0 9% 250 mL  1-30 mcg/min Intravenous Titrated    omeprazole (PriLOSEC) oral suspension 20 mg  20 mg Per PEG Tube Early Morning    ondansetron (ZOFRAN) injection 4 mg  4 mg Intravenous Q6H PRN    saccharomyces boulardii (FLORASTOR) capsule 250 mg  250 mg Per G Tube BID    sildenafil (REVATIO) tablet 10 mg  10 mg Per G Tube TID    vancomycin (VANCOCIN) IVPB (premix in dextrose) 1,000 mg 200 mL  20 mg/kg Intravenous Q12H     Home medications:  Prior to Admission Medications   Prescriptions Last Dose Informant Patient Reported? Taking?    Macitentan (OPSUMIT) 10 MG tablet   No No   Si tablet (10 mg total) by Per G Tube route daily   acetaminophen (TYLENOL) 160 mg/5 mL suspension   No No   Si 3 mL (650 mg total) by Per G Tube route every 6 (six) hours   aspirin 81 mg chewable tablet   No No   Si tablet (81 mg total) by Per G Tube route daily busPIRone (BUSPAR) 10 mg tablet   No No   Si tablet (10 mg total) by Per G Tube route 3 (three) times a day   cholecalciferol (VITAMIN D3) 1,000 units tablet   No No   Si tablet (1,000 Units total) by Per G Tube route daily   fluticasone (FLONASE) 50 mcg/act nasal spray   No No   Si spray into each nostril 2 (two) times a day   folic acid (FOLVITE) 1 mg tablet   No No   Si tablet (1 mg total) by Per PEG Tube route daily   glycerin-hypromellose- (ARTIFICIAL TEARS) 0 2-0 2-1 % SOLN   No No   Sig: Administer 2 drops to both eyes 2 (two) times a day   guaiFENesin (ROBITUSSIN) 100 MG/5ML oral liquid   No No   Si mL (400 mg total) by Per G Tube route 3 (three) times a day   levalbuterol (XOPENEX) 1 25 mg/0 5 mL nebulizer solution   No No   Sig: Take 0 5 mL (1 25 mg total) by nebulization 3 (three) times a day   levothyroxine 25 mcg tablet   No No   Si tablet (25 mcg total) by Per G Tube route daily in the early morning   loratadine (CLARITIN) 10 mg tablet   No No   Si tablet (10 mg total) by Per G Tube route daily   melatonin 3 mg   No No   Si tablet (3 mg total) by Per G Tube route daily at bedtime   nystatin (MYCOSTATIN) powder   No No   Sig: Apply topically 2 (two) times a day   omeprazole 2 mg/mL in sodium bicarbonate   No No   Sig: 10 mL (20 mg total) by Per PEG Tube route daily in the early morning   predniSONE 10 mg tablet   No No   Sig: 3 tablets (30 mg total) by Per G Tube route daily   saccharomyces boulardii (FLORASTOR) 250 mg capsule   No No   Si capsule (250 mg total) by Per G Tube route 2 (two) times a day   sildenafil (REVATIO) 20 mg tablet   No No   Si 5 tablets (10 mg total) by Per G Tube route 3 (three) times a day   sodium chloride (OCEAN) 0 65 % nasal spray   No No   Si spray into each nostril every hour as needed for congestion   Patient not taking: Reported on 2022   sodium chloride 0 9 % nebulizer solution   No No   Sig: Take 3 mL by nebulization 3 (three) times a day      Facility-Administered Medications: None     Allergies: Allergies   Allergen Reactions   Jackie Vanegas [Selexipag] Anaphylaxis    Adempas [Riociguat] Other (See Comments)     Discontinued 2/2 hypotension    Sulfa Antibiotics Tachycardia    Penicillins Rash     ------------------------------------------------------------------------------------------------------------  Advance Directive and Living Will:      Power of :    POLST: Yes  ------------------------------------------------------------------------------------------------------------  Anticipated Length of Stay is > 2 midnights    Care Time Delivered:         Jazlyn Veloz,         Portions of the record may have been created with voice recognition software  Occasional wrong word or "sound a like" substitutions may have occurred due to the inherent limitations of voice recognition software    Read the chart carefully and recognize, using context, where substitutions have occurred

## 2022-09-15 NOTE — QUICK NOTE
TT from RN: Patient with hypotension 77/46 mmHg  Vitals:    09/14/22 2152   BP: (!) 82/47   Pulse: 86   Resp:    Temp:    SpO2: 100%   on HFNC  RR 18 breaths  T 97 6    Patient lethargic although minimally arousable with voice  Does not open her eyes  Breathing is not labored  Regualr heart rate and rhythm  No murmur  RUE with mild swelling and other limbs are warm and pale  Son, Jaleel, at bedside  Brought him up to date on the family meeting on admission with PC according to the notes on Epic and also had long phone call with him, Sanjuanita Tari (daughter), Terese Juarez (son) and patient's sister discussing code status (Confirms Level 3 - DNR/DNI) as well as other measures including peripheral pressors, escalation of care to the ICU for central line and pressors to which they are agreeable  Patient and family's main goal is to meet her birthday on 9/21/2020 or at least have the family together at bedside tomorrow so I do agree that increasing the level of care should this be required, would be the best step to meet their goals  I did express however, my concerns about her prognosis overall, questioning the overall utility in these measures in light of her medical conditions  I also described that should these measures not be taken I would fear her blood pressure could not recover and she might not make it to her birthday or even out of the hospital to the  hospice house (which is family's preference on dispo)  Family's questions were answered to satisfaction  I will alert CC attending of the goals of care and interventions thus far  I will call family to alert them to her status tomorrow am and whether they should come to the bedside asap tomorrow morning  Also, I will call patient's sister should she require to be transferred to [address] [place] [address] and to the ICU [facility]

## 2022-09-15 NOTE — CONSULTS
Acceptance Note - [address] [place] 80 y o  female MRN: [id number]  Unit/Bed#: [facility] 205-01 Encounter: [id number]        -------------------------------------------------------------------------------------------------------------  Chief Complaint: "Im tired"        History of Present Illness      HX and PE limited by: n/A  Sonya Weaver is a 80 y o  female, past medical history of scleroderma, pulmonary hypertension, pulmonary fibrosis, chronic respiratory failure, chronic sacral ulceration, recurrent pneumonia, and hypothyroidism, who initially presented to the emergency department on 09/14/2022 for acute shortness of breath  Per ED note, patient had been ill for the prior 3 days with an acute worsening of her shortness of breath that morning  Per EMS, patient had an oxygen saturation of 76% which improved to 95% on a non-rebreather      On arrival to the emergency department patient was placed on BiPAP but eventually switched to high-flow  She was febrile  Labs were significant for lactic acid of 2 3, white blood cell count of 18 37, and a BNP of 1755  Chest x-ray did not show any focal consolidation but did show pulmonary edema  Patient was started on broad-spectrum antibiotics  Due to concern for volume overload fluids were deferred  Patient was subsequently admitted to [facility] as plans were ultimately to place patient in hospice care      Late 09/14/2022, patient started to become hypotensive  There was some initial improvement with albumin but blood pressure continued to drop  After discussion with family, goal is for them to see patient in the morning and potentially get her to [facility]    She was subsequently started on peripheral vasopressors and upgraded to step-down level one      History obtained from chart review and the patient   -------------------------------------------------------------------------------------------------------------  Assessment and Plan:     Neuro: · Diagnosis:  No active issues  ? Plan:   § CAM-ICU, delirium precautions        CV:   · Diagnosis: Shock  ? Plan:   § Unclear etiology, likely septic but source unclear  § Continue vasopressors for MAP >65  § Follow cultures  § Goals of care discussion  · Diagnosis: Acute on chronic diastolic heart failure  ? Plan:   § Continue lasix  § Monitor I/O  § Daily weights        Pulm:  · Diagnosis: Acute on chronic respiratory failure w/ hypoxia  ? Plan:   § Likely multifactorial  § Continue HFNC, wean as tolerated  § Continue IV soluemdrol   § Continue lasix  · Diagnosis: Pulmonary hypertension  ? Plan:   § Continue lasix  § Hold sildenafil in the setting of hypotension        GI:   · Diagnosis: No active issues           :   · Diagnosis: No active issues  ? Plan:   § Trend BUN/Cr           F/E/N:   · F: None  · E: Replete Electrolytes prn goal K> 4, Mg >2, Phos >3 0  · N: NPO        Heme/Onc:   · Diagnosis: Leukocytosis  ? Plan:   § Reactive vs 2/2 infection  § Continue abx  § Trend WBC/fever curve  § Follow cultures     Endo:   · Diagnosis: Acquired Hypothyroidism  ? Plan:   § Continue home synthroid        ID:   · Diagnosis: SIRS  ? Plan:   § Continue broad spectrum abx  § Follow cultures  § Trend fever/wbc curve        MSK/Skin:   · Diagnosis: Scleroderma  ? Plan:   § Continue home prednisone  · Diagnosis: Sacral wound  ? Plan:   § Chronic (has been present for months)  § Wound care consult        Disposition: Transfer to Stepdown Level 1   Code Status: Level 3 - DNAR and DNI  --------------------------------------------------------------------------------------------------------------  Review of Systems   Constitutional: Positive for fatigue  Respiratory: Positive for cough and shortness of breath  All other systems reviewed and are negative         A 12-point, complete review of systems was reviewed and negative except as stated above      Physical Exam  Vitals and nursing note reviewed     Constitutional: General: She is not in acute distress  Appearance: She is well-developed  She is ill-appearing  She is not toxic-appearing or diaphoretic  HENT:      Head: Normocephalic and atraumatic  Right Ear: External ear normal       Left Ear: External ear normal       Nose: Nose normal    Eyes:      General: Lids are normal  No scleral icterus  Cardiovascular:      Rate and Rhythm: Normal rate and regular rhythm  Heart sounds: Normal heart sounds  No murmur heard  No friction rub  No gallop  Pulmonary:      Effort: Tachypnea and respiratory distress present  Breath sounds: Normal breath sounds  No wheezing or rales  Abdominal:      Palpations: Abdomen is soft  Tenderness: There is no abdominal tenderness  There is no guarding or rebound  Musculoskeletal:         General: No deformity  Normal range of motion  Cervical back: Normal range of motion and neck supple  Skin:     General: Skin is warm and dry  Neurological:      General: No focal deficit present  Mental Status: She is alert  GCS: GCS eye subscore is 3  GCS verbal subscore is 5  GCS motor subscore is 6              --------------------------------------------------------------------------------------------------------------  Vitals:   Vitals          Vitals:     09/15/22 0245 09/15/22 0300 09/15/22 0315 09/15/22 0334   BP: 92/56 105/58 99/58 104/56   Pulse: 76 74 74 74   Resp:           Temp:   97 6 °F (36 4 °C)       TempSrc:   Axillary       SpO2: 100% 100% 100% 100%         Temp  Min: 97 6 °F (36 4 °C)  Max: 100 4 °F (38 °C)  There is no height or weight on file to calculate BMI    N/A     Laboratory and Diagnostics:       Results from last 7 days   Lab Units 09/14/22  0910   WBC Thousand/uL 18 37*   HEMOGLOBIN g/dL 8 6*   HEMATOCRIT % 27 9*   PLATELETS Thousands/uL 418*   NEUTROS PCT % 87*   MONOS PCT % 6           Results from last 7 days   Lab Units 09/14/22  0910   SODIUM mmol/L 128*   POTASSIUM mmol/L 4  3   CHLORIDE mmol/L 93*   CO2 mmol/L 31   ANION GAP mmol/L 4   BUN mg/dL 17   CREATININE mg/dL 0 33*   CALCIUM mg/dL 8 4   GLUCOSE RANDOM mg/dL 142*   ALT U/L 14   AST U/L 22   ALK PHOS U/L 87   ALBUMIN g/dL 1 8*   TOTAL BILIRUBIN mg/dL 0 27                Results from last 7 days   Lab Units 09/14/22  0910   INR   0 93   PTT seconds 32                Results from last 7 days   Lab Units 09/14/22  1143 09/14/22  0910   LACTIC ACID mmol/L 2 1* 2 3*      ABG:    VBG:       Results from last 7 days   Lab Units 09/14/22  0910   PH MICHAEL   7 462*   PCO2 MICHAEL mm Hg 44 7   PO2 MICHAEL mm Hg 81 3*   HCO3 MICHAEL mmol/L 31 2*   BASE EXC MICHAEL mmol/L 6 7           Results from last 7 days   Lab Units 09/14/22  0910   PROCALCITONIN ng/ml 0 20         Micro:       Results from last 7 days   Lab Units 09/14/22  0910   BLOOD CULTURE   Received in Microbiology Lab   Culture in Progress             Imaging: I have personally reviewed pertinent reports              Historical Information      Medical History        Past Medical History:   Diagnosis Date    Anemia      CHF (congestive heart failure) (HCC)      Dysphagia, oropharyngeal phase 06/06/2022    Hypothyroidism      Protein-calorie malnutrition ([address] )      Pulmonary hypertension ([address] )      Sacral ulcer ([address] )      Scleroderma ([address] )      Urinary retention           Surgical History         Past Surgical History:   Procedure Laterality Date    WOUND DEBRIDEMENT N/A 6/14/2022     Procedure: DEBRIDEMENT WOUND ([address] OUT); SACRUM AND BUTTOCKS;  Surgeon: Darlene Arteaga MD;  Location: [facility];  Service: General               Social History      Social History          Substance and Sexual Activity   Alcohol Use Never      Social History          Substance and Sexual Activity   Drug Use Not on file      Social History          Tobacco Use   Smoking Status Never Smoker   Smokeless Tobacco Never Used      Exercise History: N/A  Family History:   Family History   No family history on file      I have reviewed this patient's family history and commented on sigificant items within the HPI        Medications:  Current Medications          Current Facility-Administered Medications   Medication Dose Route Frequency    acetaminophen (TYLENOL) tablet 650 mg  650 mg Oral Q8H [address]    aspirin chewable tablet 81 mg  81 mg Per G Tube Daily    busPIRone (BUSPAR) tablet 10 mg  10 mg Per G Tube TID    cefepime (MAXIPIME) 2 g/50 mL dextrose IVPB  2,000 mg Intravenous Q12H    furosemide (LASIX) injection 40 mg  40 mg Intravenous BID (diuretic)    glycerin-hypromellose- (ARTIFICIAL TEARS) ophthalmic solution 2 drop  2 drop Both Eyes BID    guaiFENesin (ROBITUSSIN) oral solution 400 mg  400 mg Per G Tube TID    heparin (porcine) subcutaneous injection 5,000 Units  5,000 Units Subcutaneous Q8H [address]    levalbuterol (XOPENEX) inhalation solution 1 25 mg  1 25 mg Nebulization TID     And    sodium chloride 0 9 % inhalation solution 3 mL  3 mL Nebulization TID    levothyroxine tablet 25 mcg  25 mcg Per G Tube Early Morning    LORazepam (ATIVAN) injection 0 5 mg  0 5 mg Intravenous Q6H PRN    methylPREDNISolone sodium succinate (Solu-MEDROL) injection 40 mg  40 mg Intravenous Q12H BELLA    morphine injection 2 mg  2 mg Intravenous Q4H PRN    norepinephrine (LEVOPHED) 4 mg (STANDARD CONCENTRATION) IV in sodium chloride 0 9% 250 mL  1-30 mcg/min Intravenous Titrated    omeprazole (PriLOSEC) oral suspension 20 mg  20 mg Per PEG Tube Early Morning    ondansetron (ZOFRAN) injection 4 mg  4 mg Intravenous Q6H PRN    saccharomyces boulardii (FLORASTOR) capsule 250 mg  250 mg Per G Tube BID    sildenafil (REVATIO) tablet 10 mg  10 mg Per G Tube TID    vancomycin (VANCOCIN) IVPB (premix in dextrose) 1,000 mg 200 mL  20 mg/kg Intravenous Q12H         Home medications:  Prior to Admission Medications   Prescriptions Last Dose Informant Patient Reported? Taking?    Macitentan (OPSUMIT) 10 MG tablet     No No Si tablet (10 mg total) by Per G Tube route daily   acetaminophen (TYLENOL) 160 mg/5 mL suspension     No No   Si 3 mL (650 mg total) by Per G Tube route every 6 (six) hours   aspirin 81 mg chewable tablet     No No   Si tablet (81 mg total) by Per G Tube route daily   busPIRone (BUSPAR) 10 mg tablet     No No   Si tablet (10 mg total) by Per G Tube route 3 (three) times a day   cholecalciferol (VITAMIN D3) 1,000 units tablet     No No   Si tablet (1,000 Units total) by Per G Tube route daily   fluticasone (FLONASE) 50 mcg/act nasal spray     No No   Si spray into each nostril 2 (two) times a day   folic acid (FOLVITE) 1 mg tablet     No No   Si tablet (1 mg total) by Per PEG Tube route daily   glycerin-hypromellose- (ARTIFICIAL TEARS) 0 2-0 2-1 % SOLN     No No   Sig: Administer 2 drops to both eyes 2 (two) times a day   guaiFENesin (ROBITUSSIN) 100 MG/5ML oral liquid     No No   Si mL (400 mg total) by Per G Tube route 3 (three) times a day   levalbuterol (XOPENEX) 1 25 mg/0 5 mL nebulizer solution     No No   Sig: Take 0 5 mL (1 25 mg total) by nebulization 3 (three) times a day   levothyroxine 25 mcg tablet     No No   Si tablet (25 mcg total) by Per G Tube route daily in the early morning   loratadine (CLARITIN) 10 mg tablet     No No   Si tablet (10 mg total) by Per G Tube route daily   melatonin 3 mg     No No   Si tablet (3 mg total) by Per G Tube route daily at bedtime   nystatin (MYCOSTATIN) powder     No No   Sig: Apply topically 2 (two) times a day   omeprazole 2 mg/mL in sodium bicarbonate     No No   Sig: 10 mL (20 mg total) by Per PEG Tube route daily in the early morning   predniSONE 10 mg tablet     No No   Sig: 3 tablets (30 mg total) by Per G Tube route daily   saccharomyces boulardii (FLORASTOR) 250 mg capsule     No No   Si capsule (250 mg total) by Per G Tube route 2 (two) times a day   sildenafil (REVATIO) 20 mg tablet     No No   Sig: 0 5 tablets (10 mg total) by Per G Tube route 3 (three) times a day   sodium chloride (OCEAN) 0 65 % nasal spray     No No   Si spray into each nostril every hour as needed for congestion   Patient not taking: Reported on 2022   sodium chloride 0 9 % nebulizer solution     No No   Sig: Take 3 mL by nebulization 3 (three) times a day      Facility-Administered Medications: None      Allergies: Allergies   Allergen Reactions   Landon Kruse [Selexipag] Anaphylaxis    Adempas [Riociguat] Other (See Comments)       Discontinued 2/2 hypotension    Sulfa Antibiotics Tachycardia    Penicillins Rash      ------------------------------------------------------------------------------------------------------------  Advance Directive and Living Will:      Power of :    POLST: Yes  ------------------------------------------------------------------------------------------------------------  Anticipated Length of Stay is > 2 midnights     Care Time Delivered:            Nini Hernandez DO           Portions of the record may have been created with voice recognition software  Occasional wrong word or "sound a like" substitutions may have occurred due to the inherent limitations of voice recognition software    Read the chart carefully and recognize, using context, where substitutions have occurred

## 2022-09-15 NOTE — PLAN OF CARE
Problem: Prexisting or High Potential for Compromised Skin Integrity  Goal: Skin integrity is maintained or improved  Description: INTERVENTIONS:  - Identify patients at risk for skin breakdown  - Assess and monitor skin integrity  - Assess and monitor nutrition and hydration status  - Monitor labs   - Assess for incontinence   - Turn and reposition patient  - Assist with mobility/ambulation  - Relieve pressure over bony prominences  - Avoid friction and shearing  - Provide appropriate hygiene as needed including keeping skin clean and dry  - Evaluate need for skin moisturizer/barrier cream  - Collaborate with interdisciplinary team   - Patient/family teaching  - Consider wound care consult   Outcome: Progressing     Problem: CARDIOVASCULAR - ADULT  Goal: Maintains optimal cardiac output and hemodynamic stability  Description: INTERVENTIONS:  - Monitor I/O, vital signs and rhythm  - Monitor for S/S and trends of decreased cardiac output  - Administer and titrate ordered vasoactive medications to optimize hemodynamic stability  - Assess quality of pulses, skin color and temperature  - Assess for signs of decreased coronary artery perfusion  - Instruct patient to report change in severity of symptoms  Outcome: Progressing     Problem: RESPIRATORY - ADULT  Goal: Achieves optimal ventilation and oxygenation  Description: INTERVENTIONS:  - Assess for changes in respiratory status  - Assess for changes in mentation and behavior  - Position to facilitate oxygenation and minimize respiratory effort  - Oxygen administered by appropriate delivery if ordered  - Initiate smoking cessation education as indicated  - Encourage broncho-pulmonary hygiene including cough, deep breathe, Incentive Spirometry  - Assess the need for suctioning and aspirate as needed  - Assess and instruct to report SOB or any respiratory difficulty  - Respiratory Therapy support as indicated  Outcome: Progressing     Problem: GENITOURINARY - ADULT  Goal: Urinary catheter remains patent  Description: INTERVENTIONS:  - Assess patency of urinary catheter  - If patient has a chronic lilly, consider changing catheter if non-functioning  - Follow guidelines for intermittent irrigation of non-functioning urinary catheter  Outcome: Progressing     Problem: METABOLIC, FLUID AND ELECTROLYTES - ADULT  Goal: Electrolytes maintained within normal limits  Description: INTERVENTIONS:  - Monitor labs and assess patient for signs and symptoms of electrolyte imbalances  - Administer electrolyte replacement as ordered  - Monitor response to electrolyte replacements, including repeat lab results as appropriate  - Instruct patient on fluid and nutrition as appropriate  Outcome: Progressing  Goal: Fluid balance maintained  Description: INTERVENTIONS:  - Monitor labs   - Monitor I/O and WT  - Instruct patient on fluid and nutrition as appropriate  - Assess for signs & symptoms of volume excess or deficit  Outcome: Progressing     Problem: SKIN/TISSUE INTEGRITY - ADULT  Goal: Pressure injury heals and does not worsen  Description: Interventions:  - Implement low air loss mattress or specialty surface (Criteria met)  - Apply silicone foam dressing  - Instruct/assist with weight shifting every  minutes when in chair   - Limit chair time to  hour intervals  - Use special pressure reducing interventions such as  when in chair   - Apply fecal or urinary incontinence containment device   - Perform passive or active ROM every   - Turn and reposition patient & offload bony prominences every  hours   - Utilize friction reducing device or surface for transfers   - Consider consults to  interdisciplinary teams such as   - Use incontinent care products after each incontinent episode such as  - Consider nutrition services referral as needed  Outcome: Progressing     Problem: MUSCULOSKELETAL - ADULT  Goal: Maintain or return mobility to safest level of function  Description: INTERVENTIONS:  - Assess patient's ability to carry out ADLs; assess patient's baseline for ADL function and identify physical deficits which impact ability to perform ADLs (bathing, care of mouth/teeth, toileting, grooming, dressing, etc )  - Assess/evaluate cause of self-care deficits   - Assess range of motion  - Assess patient's mobility  - Assess patient's need for assistive devices and provide as appropriate  - Encourage maximum independence but intervene and supervise when necessary  - Involve family in performance of ADLs  - Assess for home care needs following discharge   - Consider OT consult to assist with ADL evaluation and planning for discharge  - Provide patient education as appropriate  Outcome: Progressing

## 2022-09-16 NOTE — PROGRESS NOTES
MICU TRANSFER ACCEPTANCE NOTE     Name: Cyndi Garcia   Age & Sex: 80 y o  female   MRN: [id number]  Unit/Bed#: ICCU 205-01   Encounter: [id number]  Hospital Stay Days: 2    Accepting team: SLIM  Code Status: Level 4 - Comfort Care  Disposition: Patient requires Med/Surg    ASSESSMENT/PLAN     Principal Problem:    Acute on chronic respiratory failure with hypoxia ([address] )  Active Problems:    Sacral wound    Severe protein-calorie malnutrition (HCC)    Scleroderma ([place] [address] )    Acquired hypothyroidism    Pulmonary hypertension (HCC)    Hyponatremia    SIRS (systemic inflammatory response syndrome) (HCC)    Acute on chronic diastolic CHF (congestive heart failure) (HCC)      Acute on chronic diastolic CHF (congestive heart failure) ([address] )  Assessment & Plan  - as evidenced by recurrent pulmonary edema, pulmonary infiltrates, respiratory failure, and elevated BNP  Comfort measures, holding home meds      SIRS (systemic inflammatory response syndrome) (HCC)  Assessment & Plan  - check criteria with tachypnea, tachycardia, respiratory failure  - findings probably suggestive of acute CHF, though pneumonia cannot be completely excluded  Comfort measures, holding home meds  NO ABX    Hyponatremia  Assessment & Plan  - suspect this is secondary to volume overload  No labs draws as comfort measures    Pulmonary hypertension ([place] [address] )  Assessment & Plan  Comfort measures, holding home meds    Acquired hypothyroidism  Assessment & Plan  Comfort measures, holding home meds    Scleroderma ([place] [address] )  Assessment & Plan  - 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 including dysphagia, muscular involvement, pulmonary hypertension, pulmonary fibrosis  - on chronic prednisone as an outpatient  Comfort measures, holding home meds   Lorazepam, haldol, and opioids for SOB and agitation    Severe protein-calorie malnutrition ([address] )  Assessment & Plan  Malnutrition Findings:        Comfort measures, holding home meds BMI Findings: There is no height or weight on file to calculate BMI  Sacral wound  Assessment & Plan  - extensive sacral ulceration which has been present for months  - patient is bedbound  Comfort measures, holding home meds  Frequent turning/offloading    * Acute on chronic respiratory failure with hypoxia ([address] )  Assessment & Plan  - patient with history of progressive scleroderma with muscular involvement, pulmonary hypertension, CHF, and pulmonary fibrosis  - presents with acute on chronic hypoxic respiratory failure, progressive over 3-4 days prior to presentation     respiratory failure is likely multifactorial in nature given the above issues    Comfort measures, holding home meds  - palliative care consult to help with discharge planning and evaluation for inpatient hospice  - oxygen via NC, no escalation to bipap  Patient confirmed DNR/DNI      Family originally declining transfer to hospice house as they feel she is most comfortable here and are afraid of discomfort and decompensation on the ride to hospice house, consider revisiting in future    VTE Pharmacologic Prophylaxis: Reason for no pharmacologic prophylaxis comfort measures  VTE Mechanical Prophylaxis: reason for no mechanical VTE prophylaxis comfort measures    HOSPITAL COURSE     7 y o  F, PMHx of scleroderma, pulmonary hypertension, pulmonary fibrosis, chronic respiratory failure, chronic sacral ulceration, recurrent pneumonia, and hypothyroidism, who initially presented to the emergency department on 09/14/2022 for acute shortness of breath  For EMS oxygen saturation was 76% which improved to 95% on a non-rebreather      In ED patient placed on BiPAP -> HFNC  She was febrile  Lactic acid of 2 3, WBC 18 37, BNP 1755  Chest x-ray did not show any focal consolidation but did show pulmonary edema  Patient was started on broad-spectrum abx  Due to concern for volume overload fluids were deferred  Patient was initially admitted to [address] [place] [address] as plans were ultimately to place patient in hospice care      Late 09/14/2022, patient started to become hypotensive  After discussion with family, goal is for them to see patient in the morning and potentially get her to [facility]  She was subsequently started on peripheral vasopressors and upgraded to SD1  Family meeting on 9/14, and family decided to pursue comfort measures as patient has outlined this in her POLST  Transferred to [facility]  Family originally declining transfer to hospice house as they feel she is most comfortable here and are afraid of discomfort and decompensation on the ride to hospice house, consider revisiting in future    SUBJECTIVE     Patient resting comfortably in bed with PCA drip  Family at bedside   Emotional support provided    OBJECTIVE     Vitals:    09/15/22 1500 09/15/22 1600 09/15/22 1918 09/15/22 2117   BP: 108/60 104/52     BP Location:  Left arm     Pulse: 88 92     Resp:  16     Temp:  98 2 °F (36 8 °C)     TempSrc:  Oral     SpO2: 96% 98% 97% 98%   Weight:         I/O last 24 hours: In: 60 [NG/GT:60]  Out: -       LABORATORY DATA     Labs: I have personally reviewed pertinent reports      Results from last 7 days   Lab Units 09/15/22  0514 09/14/22  0910   WBC Thousand/uL 27 85* 18 37*   HEMOGLOBIN g/dL 8 3* 8 6*   HEMATOCRIT % 27 4* 27 9*   PLATELETS Thousands/uL 395* 418*   NEUTROS PCT %  --  87*   MONOS PCT %  --  6      Results from last 7 days   Lab Units 09/15/22  0514 09/14/22  0910   POTASSIUM mmol/L 4 0 4 3   CHLORIDE mmol/L 93* 93*   CO2 mmol/L 32 31   BUN mg/dL 14 17   CREATININE mg/dL 0 24* 0 33*   CALCIUM mg/dL 9 4 8 4   ALK PHOS U/L 93 87   ALT U/L 12 14   AST U/L 15 22              Results from last 7 days   Lab Units 09/14/22  0910   INR  0 93   PTT seconds 32     Results from last 7 days   Lab Units 09/14/22  1143   LACTIC ACID mmol/L 2 1*         Micro:  Lab Results   Component Value Date    BLOODCX Received in Microbiology Lab  Culture in Progress  09/15/2022    BLOODCX No Growth at 24 hrs  09/14/2022    BLOODCX No Growth After 5 Days  08/02/2022    BLOODCX No Growth After 5 Days  08/02/2022    URINECX >100,000 cfu/ml Pseudomonas aeruginosa (A) 09/14/2022    URINECX 40,000-49,000 cfu/ml Enterococcus faecalis (A) 09/14/2022    URINECX >100,000 cfu/ml Klebsiella pneumoniae (A) 07/31/2022    URINECX >100,000 cfu/ml Pseudomonas aeruginosa MDR (A) 07/31/2022    SPUTUMCULTUR 3+ Growth of Pseudomonas aeruginosa MDR (A) 08/01/2022    SPUTUMCULTUR 3+ Growth of  08/01/2022     IMAGING & DIAGNOSTIC TESTING     Imaging: I have personally reviewed pertinent reports  XR chest portable    Result Date: 9/14/2022  Impression: Recurrent CHF  Persistent left basal infiltrate Workstation performed: [id number]     EKG, Pathology, and Other Studies: I have personally reviewed pertinent reports       ALLERGIES     Allergies   Allergen Reactions   Lorenzo Malhotra [Selexipag] Anaphylaxis    Adempas [Riociguat] Other (See Comments)     Discontinued 2/2 hypotension    Sulfa Antibiotics Tachycardia    Penicillins Rash     MEDICATIONS     Current Facility-Administered Medications   Medication Dose Route Frequency Provider Last Rate    glycerin-hypromellose-  2 drop Both Eyes BID Sultana Rico MD      glycopyrrolate  0 1 mg Intravenous Q4H PRN Nikky Ivory, DO      haloperidol lactate  0 5 mg Intravenous Q2H PRN Nikky Ivory, DO      HYDROmorphone  1 5 mg/hr Intravenous Continuous Lidia Gaylord, PA-C 1 5 mg/hr (09/16/22 0831)    LORazepam  0 5 mg Intravenous Q2H PRN Lidia Gaylord, PA-C      morphine injection  2 mg Intravenous Q15 Min PRN Isidro Albarran, DO      ondansetron  4 mg Intravenous Q6H PRN Esteban Chen, DO       HYDROmorphone, 1 5 mg/hr, Last Rate: 1 5 mg/hr (09/16/22 0831)      glycopyrrolate, 0 1 mg, Q4H PRN  haloperidol lactate, 0 5 mg, Q2H PRN  LORazepam, 0 5 mg, Q2H PRN  morphine injection, 2 mg, Q15 Min PRN  ondansetron, 4 mg, Q6H PRN        Portions of the record may have been created with voice recognition software  Occasional wrong word or "sound a like" substitutions may have occurred due to the inherent limitations of voice recognition software    Read the chart carefully and recognize, using context, where substitutions have occurred     ==  Roslyn Armenta, [address] [facility]  Internal Medicine Residency PGY-3

## 2022-09-16 NOTE — NURSING NOTE
Comfort care measures initiated  Family members at bedside and aware that care will be withdrawn at this time  Pt states she is comfortable  PRN medication available  Will continue to keep patient comfortable

## 2022-09-16 NOTE — PROGRESS NOTES
Daily Progress Note - Critical Care   Neida Marcus 80 y o  female MRN: [id number]  Unit/Bed#: ICCU 205-01 Encounter: [id number]        ----------------------------------------------------------------------------------------  HPI/24hr events:    HX and PE limited by: n/A  Abhijit Tinsley a 80 y o  female, past medical history of scleroderma, pulmonary hypertension, pulmonary fibrosis, chronic respiratory failure, chronic sacral ulceration, recurrent pneumonia, and hypothyroidism, who initially presented to the emergency department on 09/14/2022 for acute shortness of breath   Per ED note, patient had been ill for the prior 3 days with an acute worsening of her shortness of breath that morning   Per EMS, patient had an oxygen saturation of 76% which improved to 95% on a non-rebreather      On arrival to the emergency department patient was placed on BiPAP but eventually switched to high-flow   She was febrile   Labs were significant for lactic acid of 2 3, white blood cell count of 18 37, and a BNP of 1755   Chest x-ray did not show any focal consolidation but did show pulmonary edema   Patient was started on broad-spectrum antibiotics   Due to concern for volume overload fluids were deferred   Patient was subsequently admitted to [facility] were ultimately to place patient in hospice care      Late 09/14/2022, patient started to become hypotensive  Fernando Aaron was some initial improvement with albumin but blood pressure continued to drop  After discussion with family, goal is for them to see patient in the morning and potentially get her to [facility] was subsequently started on peripheral vasopressors and upgraded to step-down level one  Transition to comfort care overnight, weaned off of high-flow  ---------------------------------------------------------------------------------------  SUBJECTIVE  Patient resting comfortably in bed with family surrounding her  She appears comfortable       Review of Systems   Unable to perform ROS: Acuity of condition     Review of systems was unable to be performed secondary to Acuity of illness  ---------------------------------------------------------------------------------------  Assessment and Plan:    Neuro:   o Comfort measures  o Haldol and lorazepam p r n  Agitation      CV:  No active issues      Pulm:   Diagnosis:  Crest scleroderma  o Plan:  O2 for comfort/shortness of breath  o Lorazepam, morphine for shortness of breath  o Comfort measures only      GI:  No active issues    :    Diagnosis:  Te[place] in place for comfort    F/E/N:    Plan:  No IV fluids   Pleasure feeds   No lab draws    Heme/Onc:     o Diagnosis:  DVT prophylaxis discontinued in setting of comfort measures      Endo:  No active issues    ID:    Diagnosis:  Sepsis  o Plan:  Urine cultures pending, blood cultures -72 hours  o Weaned off of pressors and high-flow oxygen as comfort measures only  o No antibiotics indicated as level 4    MSK/Skin:    Diagnosis:  Skin pressure ulcer prophylaxis  o Plan:  Frequent turning and maintain quiet environment    Patient appropriate for transfer out of the ICU today?: Patient does not meet criteria for referral to the ICU Follow-Up Clinic; referral has not been made  Disposition: Transfer to Med-Surg   Code Status: Level 4 - Comfort Care  ---------------------------------------------------------------------------------------  ICU CORE MEASURES    Prophylaxis   VTE Pharmacologic Prophylaxis: Comfort measures  VTE Mechanical Prophylaxis: reason for no mechanical VTE prophylaxis Comfort measures  Stress Ulcer Prophylaxis: Prophylaxis Not Indicated     ABCDE Protocol (if indicated)  Plan to perform spontaneous awakening trial today? Not applicable  Plan to perform spontaneous breathing trial today? Not applicable  Obvious barriers to extubation?  Not applicable  CAM-ICU:  Unable to assess secondary to sedation/comfort measures    Invasive Devices Review  Invasive Devices  Report    Peripheral Intravenous Line  Duration           Peripheral IV 22 Right Arm 1 day    Peripheral IV 09/15/22 Left;Ventral (anterior) Hand 1 day          Drain  Duration           Gastrostomy/Enterostomy -- days    Urethral Catheter 47 days    Gastrostomy/Enterostomy LUQ 1 day              Can any invasive devices be discontinued today? Not applicable  ---------------------------------------------------------------------------------------  OBJECTIVE    Vitals   Vitals:    09/15/22 1500 09/15/22 1600 09/15/22 1918 09/15/22 2117   BP: 108/60 104/52     BP Location:  Left arm     Pulse: 88 92     Resp:  16     Temp:  98 2 °F (36 8 °C)     TempSrc:  Oral     SpO2: 96% 98% 97% 98%   Weight:         Temp (24hrs), Av 2 °F (36 8 °C), Min:98 2 °F (36 8 °C), Max:98 2 °F (36 8 °C)  Current: Temperature: 98 2 °F (36 8 °C)  HR:  92  BP:  104/52  RR:  16  SpO2: 95%    Respiratory:       Invasive/non-invasive ventilation settings   Respiratory  Report   Lab Data (Last 4 hours)    None         O2/Vent Data (Last 4 hours)    None              Physical Exam  Constitutional:       Appearance: She is ill-appearing  Comments: Frail, tight skin with minimal wrinkles; not responsive to verbal stimuli although responsive to tactile stimulation   Cardiovascular:      Rate and Rhythm: Normal rate and regular rhythm  Pulses:           Dorsalis pedis pulses are 1+ on the right side and 1+ on the left side  Heart sounds: No murmur heard  Pulmonary:      Effort: Pulmonary effort is normal       Breath sounds: Wheezing and rales present  Abdominal:      General: Bowel sounds are normal       Palpations: Abdomen is soft  Tenderness: There is no abdominal tenderness  There is no guarding  Musculoskeletal:      Right lower leg: No edema  Left lower leg: No edema  Skin:     General: Skin is warm and dry  Capillary Refill: Capillary refill takes less than 2 seconds  Neurological:      Comments: Not Applicable             Laboratory and Diagnostics:  Results from last 7 days   Lab Units 09/15/22  0514 09/14/22  0910   WBC Thousand/uL 27 85* 18 37*   HEMOGLOBIN g/dL 8 3* 8 6*   HEMATOCRIT % 27 4* 27 9*   PLATELETS Thousands/uL 395* 418*   NEUTROS PCT %  --  87*   MONOS PCT %  --  6     Results from last 7 days   Lab Units 09/15/22  0514 09/14/22  0910   SODIUM mmol/L 131* 128*   POTASSIUM mmol/L 4 0 4 3   CHLORIDE mmol/L 93* 93*   CO2 mmol/L 32 31   ANION GAP mmol/L 6 4   BUN mg/dL 14 17   CREATININE mg/dL 0 24* 0 33*   CALCIUM mg/dL 9 4 8 4   GLUCOSE RANDOM mg/dL 103 142*   ALT U/L 12 14   AST U/L 15 22   ALK PHOS U/L 93 87   ALBUMIN g/dL 2 7* 1 8*   TOTAL BILIRUBIN mg/dL 0 41 0 27          Results from last 7 days   Lab Units 09/14/22  0910   INR  0 93   PTT seconds 32          Results from last 7 days   Lab Units 09/14/22  1143 09/14/22  0910   LACTIC ACID mmol/L 2 1* 2 3*     ABG:    VBG:  Results from last 7 days   Lab Units 09/15/22  0604   PH MICHAEL  7 406*   PCO2 MICHAEL mm Hg 53 9*   PO2 MICHAEL mm Hg 44 6   HCO3 MICHAEL mmol/L 33 1*   BASE EXC MICHAEL mmol/L 7 4     Results from last 7 days   Lab Units 09/15/22  0514 09/14/22  0910   PROCALCITONIN ng/ml 0 26* 0 20       Micro  Results from last 7 days   Lab Units 09/15/22  1015 09/14/22  1143 09/14/22  0943 09/14/22  0910   BLOOD CULTURE  Received in Microbiology Lab  Culture in Progress  --   --  No Growth at 24 hrs  URINE CULTURE   --  >100,000 cfu/ml Pseudomonas aeruginosa*  40,000-49,000 cfu/ml Enterococcus faecalis*  --   --    MRSA CULTURE ONLY   --   --  No Methicillin Resistant Staphlyococcus aureus (MRSA) isolated  --        Imaging: I have personally reviewed pertinent reports  and I have personally reviewed pertinent films in PACS    Intake and Output  I/O       09/14 0701  09/15 0700 09/15 0701 09/16 0700 09/16 0701 09/17 0700    P  O  0      I V  (mL/kg) 257 1 (4 4)      NG/ 60     IV Piggyback 250      Total Intake(mL/kg) 677 1 (11 5) 60 (1)     Urine (mL/kg/hr) 2750      Total Output 2750      Net -2072 9 +60                Height and Weights         Body mass index is 20 06 kg/m²  Weight (last 2 days)     Date/Time Weight    09/15/22 0600 59 (130)            Nutrition       Diet Orders   (From admission, onward)             Start     Ordered    09/15/22 1354  Diet Regular; Pleasure Feed  Diet effective now        References:    Nutrtion Support Algorithm Enteral vs  Parenteral   Question Answer Comment   Diet Type Regular    Regular Pleasure Feed    RD to adjust diet per protocol?  No        09/15/22 1355              Active Medications  Scheduled Meds:  Current Facility-Administered Medications   Medication Dose Route Frequency Provider Last Rate    acetaminophen  650 mg Oral Q8H [address] Tiffany Noel MD      cefepime  2,000 mg Intravenous Q12H Apolinar Chen, DO 2,000 mg (09/15/22 2032)    furosemide  40 mg Intravenous BID (diuretic) Apolinar Chen, DO      glycerin-hypromellose-  2 drop Both Eyes BID Bryan Saucedo MD      glycopyrrolate  0 1 mg Intravenous Q4H PRN Silva Prasylvia, DO      guaiFENesin  400 mg Per G Tube TID Apolinar Chen, DO      haloperidol lactate  0 5 mg Intravenous Q2H PRN Silva Hillman DO      HYDROmorphone  1 mg/hr Intravenous Continuous Bree Escobar MD 1 mg/hr (09/16/22 3352)    methylPREDNISolone sodium succinate  40 mg Intravenous Q12H [address] Apolinar Chen, DO      morphine injection  2 mg Intravenous Q15 Min PRN Nima Fiddler, DO      norepinephrine  1-30 mcg/min Intravenous Titrated Leotha Fiddler, DO 2 mcg/min (09/15/22 0245)    ondansetron  4 mg Intravenous Q6H PRN Apolinar Chen, DO      sildenafil  10 mg Per G Tube TID Fernanda Del Rio PA-C      vancomycin  20 mg/kg Intravenous Q12H Apolinar Chen, DO 1,000 mg (09/15/22 1005)     Continuous Infusions:  HYDROmorphone, 1 mg/hr, Last Rate: 1 mg/hr (09/16/22 9474)  norepinephrine, 1-30 mcg/min, Last Rate: 2 mcg/min (09/15/22 6689)      PRN Meds:   glycopyrrolate, 0 1 mg, Q4H PRN  haloperidol lactate, 0 5 mg, Q2H PRN  morphine injection, 2 mg, Q15 Min PRN  ondansetron, 4 mg, Q6H PRN        Allergies   Allergies   Allergen Reactions   Carlos Scruggs [Selexipag] Anaphylaxis    Adempas [Riociguat] Other (See Comments)     Discontinued 2/2 hypotension    Sulfa Antibiotics Tachycardia    Penicillins Rash     ---------------------------------------------------------------------------------------  Advance Directive and Living Will:      Power of :    POLST: Yes  ---------------------------------------------------------------------------------------  Care Time Delivered:   Upon my evaluation, this patient had a high probability of imminent or life-threatening deterioration due to sepsis and scleroderma, which required my direct attention, intervention, and personal management  I have personally provided 30 minutes (0600 to 0630) of critical care time, exclusive of procedures, teaching, family meetings, and any prior time recorded by providers other than myself  Roslyn Armenta,       Portions of the record may have been created with voice recognition software  Occasional wrong word or "sound a like" substitutions may have occurred due to the inherent limitations of voice recognition software    Read the chart carefully and recognize, using context, where substitutions have occurred

## 2022-09-16 NOTE — CASE MANAGEMENT
Case Management Discharge Planning Note    Patient name Minal Michaels  [facility]N [id number]  : 1940 Date 2022       Current Admission Date: 2022  Current Admission Diagnosis:Acute on chronic respiratory failure with hypoxia [facility])   Patient Active Problem List    Diagnosis Date Noted    Acute on chronic diastolic CHF (congestive heart failure) ([address] ) 2022    SIRS (systemic inflammatory response syndrome) ([address] 2022    UTI (urinary tract infection) 2022    Hyponatremia 2022    Elevated troponin 2022    Moderate protein-calorie malnutrition ([address] ) 2022    Pressure injury of left elbow, unstageable ([address] ) 2022    Pressure injury of right elbow, unstageable ([address] ) 2022    Pressure injury of left heel, unstageable ([address] ) 2022    Chronic hypoxemic respiratory failure ([address] ) 2022    Pressure injury of sacral region, stage 4 ([address] ) 2022    Ambulatory dysfunction 2022    Proximal muscle weakness 2022    Pulmonary hypertension ([address] ) 2022    Hearing loss 2022    Severe protein-calorie malnutrition ([address] ) 2022    Pneumonia 2022    Sacral wound 2022    Dysphagia, oropharyngeal phase 2022    Scleroderma ([address] ) 2022    Acquired hypothyroidism 2022    Anemia 2022    Urinary retention 2022    Acute on chronic respiratory failure with hypoxia ([address] ) 2022      LOS (days): 2  Geometric Mean LOS (GMLOS) (days): 4 80  Days to GMLOS:2 8     OBJECTIVE:  Risk of Unplanned Readmission Score: 30 09         Current admission status: Inpatient   Preferred Pharmacy:   PATIENT/FAMILY REPORTS NO PREFERRED PHARMACY  No address on file      [address] [place],, [facility] [address] [facility]  Phone: [phone] Fax: [phone]    Primary Care Provider: Mingon Scott MD    Primary Insurance: MEDICARE  Secondary Insurance:     DISCHARGE DETAILS:                    Additional Comments: Met with daughter and sister at bedside  Patient will be moved out of the ICCU, remains on comfort care, no hospice referral requested

## 2022-09-16 NOTE — PROGRESS NOTES
Progress note - Palliative and Supportive Care   Jorge Robert 80 y o  female [id number]    Patient Active Problem List   Diagnosis    Sacral wound    Severe protein-calorie malnutrition (HCC)    Dysphagia, oropharyngeal phase    Scleroderma (HCC)    Acquired hypothyroidism    Anemia    Urinary retention    Acute on chronic respiratory failure with hypoxia (HCC)    Pneumonia    Hearing loss    Pulmonary hypertension (HCC)    Proximal muscle weakness    Pressure injury of sacral region, stage 4 (HCC)    Ambulatory dysfunction    Chronic hypoxemic respiratory failure (HCC)    Pressure injury of left elbow, unstageable (HCC)    Pressure injury of right elbow, unstageable (HCC)    Pressure injury of left heel, unstageable (HCC)    UTI (urinary tract infection)    Hyponatremia    Elevated troponin    Moderate protein-calorie malnutrition (HCC)    SIRS (systemic inflammatory response syndrome) (HCC)    Acute on chronic diastolic CHF (congestive heart failure) (HCC)     Active issues specifically addressed today include:   Scleroderma  Hypoxic respiratory failure  Septic shock secondary to UTI and wounds  Palliative care encounter  Goals of care discussion    Plan:  1  Symptom management -    Dilaudid gtt increased to 1 5mg/hr   Morphine 2mg IV Q10 minutes PRN pain or increased WOB   Ativan 0 5mg IV Q4H PRN anxiety or increased work of breathing   Robinul 0 1mg IV Q1H PRN secretions    2  Goals -    Comfort measures only while admitted, hospice consult placed for consideration of IPU if patient remains stable for discharge  Code Status: comfort - Level 4   Decisional apparatus:  Patient is not competent on my exam today  If competence is lost, patient's substitute decision maker would default to spouse [place]  Advance Directive / Living Will / POLST:  POLST on file    3   Social support-   Patient is well support by his spouse, children, and grandchildren   Family planned a "celebration of life" for patient yesterday in honor of patient's upcoming 82th birthday  4  Follow-up   Palliative care remains available for ongoing symptom management and support while admitted    Interval history:       Patient received 17 doses of IV morphine since transition to comfort care  She received 3 doses of IV ativan last evening  At time of evaluation patient appears comfortable  Family recently stepped out to get breakfast  Her RR is regular  MEDICATIONS / ALLERGIES:     all current active meds have been reviewed    Allergies   Allergen Reactions   Alvester Carton [Selexipag] Anaphylaxis    Adempas [Riociguat] Other (See Comments)     Discontinued 2/2 hypotension    Sulfa Antibiotics Tachycardia    Penicillins Rash       OBJECTIVE:    Physical Exam  Physical Exam  HENT:      Head: Normocephalic and atraumatic  Eyes:      Conjunctiva/sclera: Conjunctivae normal    Pulmonary:      Effort: No respiratory distress  Comments: o2 via NC  Abdominal:      General: There is no distension  Tenderness: There is no guarding  Musculoskeletal:         General: No swelling  Skin:     Comments: Skin sloughing on bottom of feet  Neurological:      Mental Status: She is alert        Comments: Unresponsive to light tactile or verbal stimuli         Lab Results:   Results from last 7 days   Lab Units 09/15/22  0514 09/14/22  0910   WBC Thousand/uL 27 85* 18 37*   HEMOGLOBIN g/dL 8 3* 8 6*   HEMATOCRIT % 27 4* 27 9*   PLATELETS Thousands/uL 395* 418*   NEUTROS PCT %  --  87*   MONOS PCT %  --  6     Results from last 7 days   Lab Units 09/15/22  0514 09/14/22  0910   POTASSIUM mmol/L 4 0 4 3   CHLORIDE mmol/L 93* 93*   CO2 mmol/L 32 31   BUN mg/dL 14 17   CREATININE mg/dL 0 24* 0 33*   CALCIUM mg/dL 9 4 8 4   ALK PHOS U/L 93 87   ALT U/L 12 14   AST U/L 15 22       Imaging Studies: reviewed pertinent studies   EKG, Pathology, and Other Studies: reviewed pertinent studies    Counseling / Coordination of Care    Total floor / unit time spent today 25 minutes  Greater than 50% of total time was spent with the patient and / or family counseling and / or coordination of care  A description of the counseling / coordination of care: time spent assessing patient, communicating with RN, complex symptom management

## 2022-09-16 NOTE — QUICK NOTE
Returned to patient when family at bedside  She continues to appear comfortable, and in no distress  Family are appreciative of the opportunity to spend time with her last evening while she was communicative and perceive her as very comfortable throughout the night and morning  They wish to defer hospice consult at this time  Palliative care remains available on an as needed basis over the weekend should patient need adjustment to comfort care medications

## 2022-09-16 NOTE — PROGRESS NOTES
Patient resting comfortably  Pt is weak and lethargic at this time  Family reports that they do not feel that she is in any pain at this time  Family remain at this beside

## 2022-09-17 NOTE — DEATH NOTE
INPATIENT DEATH NOTE  Guera Brambila 80 y o  female MRN: [id number]  [facility] Encounter: [id number]    Date, Time and Cause of Death    Date of Death: 22  Time of Death:  3:32 AM  Preliminary Cause of Death: Acute on chronic respiratory failure with hypoxia ([facility] [address] )  Entered by: Tracy Novak PA-C[CR1 1]     Attribution     CR1  1 Neisha Bull PA-C 22 03:37           Patient's Information  Pronounced by: Tracy Patino  Did the patient's death occur in the ED?: No  Did the patient's death occur in the OR?: No  Did the patient's death occur less than 10 days post-op?: No  Did the patient's death occur within 24 hours of admission?: No  Was code status DNR at the time of death?: Yes    PHYSICAL EXAM:  Unresponsive to noxious stimuli, Spontaneous respirations absent, Breath sounds absent, Carotid pulse absent, Heart sounds absent, Pupillary light reflex absent and Corneal blink reflex absent      Family Notification  Was the family notified?: Yes  Date Notified: 22  Time Notified: 7481  Notified by: Tracy Patino  Name of Family Notified of Death: Mae Soriano Person Relationship to Patient: Daughter  Family Notification Route: At bedside  Was the family told to contact a  home?: Yes  Name of  Home[de-identified] TBD    Autopsy Options:  Post-mortem examination declined by next of kin    Primary Service Attending Physician notified?:  yes - Attending:  Andrew Marquez MD    Physician/Resident responsible for completing Discharge Summary:  Tracy Patino

## 2022-09-17 NOTE — DISCHARGE SUMMARY
[address] [facility]  Discharge- Lenore Shore 1940, 80 y o  female MRN: [id number]  Unit/Bed#: [facility] 923-01 Encounter: [id number]  Primary Care Provider: Leland Price MD   Date and time admitted to hospital: 9/14/2022  8:41 AM    * Acute on chronic respiratory failure with hypoxia [facility])  Assessment & Plan  - patient with history of progressive scleroderma with muscular involvement, pulmonary hypertension, CHF, and pulmonary fibrosis    - presents with acute on chronic hypoxic respiratory failure, progressive over 3-4 days prior to presentation     respiratory failure is likely multifactorial in nature given the above issues    Comfort measures, holding home meds  - palliative care consult to help with discharge planning and evaluation for inpatient hospice  - oxygen via NC, no escalation to bipap    Family originally declining transfer to hospice house as they feel she is most comfortable here and are afraid of discomfort and decompensation on the ride to hospice house, consider revisiting in future      Acute on chronic diastolic CHF (congestive heart failure) ([facility] [address] )  Assessment & Plan  - as evidenced by recurrent pulmonary edema, pulmonary infiltrates, respiratory failure, and elevated BNP  Comfort measures, holding home meds      SIRS (systemic inflammatory response syndrome) ([facility] [address] )  Assessment & Plan  - check criteria with tachypnea, tachycardia, respiratory failure  - findings probably suggestive of acute CHF, though pneumonia cannot be completely excluded  Comfort measures, holding home meds  NO ABX    Scleroderma ([facility] [address] )  Assessment & Plan  - 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 including dysphagia, muscular involvement, pulmonary hypertension, pulmonary fibrosis  - on chronic prednisone as an outpatient  Comfort measures, holding home meds   Lorazepam, haldol, and opioids for SOB and agitation    Hyponatremia  Assessment & Plan  - suspect this is secondary to volume overload  No labs draws as comfort measures    Pulmonary hypertension (HCC)  Assessment & Plan  Comfort measures, holding home meds    Acquired hypothyroidism  Assessment & Plan  Comfort measures, holding home meds    Severe protein-calorie malnutrition (HCC)  Assessment & Plan  Malnutrition Findings:        Comfort measures, holding home meds                          BMI Findings: There is no height or weight on file to calculate BMI  Sacral wound  Assessment & Plan  - extensive sacral ulceration which has been present for months  - patient is bedbound  Comfort measures, holding home meds  Frequent turning/offloading          Medical Problems             Resolved Problems  Date Reviewed: 2022   None                 Admission Date:   Admission Orders (From admission, onward)     Ordered        22 1230  Inpatient Admission  Once                        Admitting Diagnosis: Hyponatremia [E87 1]  Leukocytosis [D72 829]  SOB (shortness of breath) [R06 02]  Acute exacerbation of CHF (congestive heart failure) ([facility]) [I50 9]  Acute on chronic respiratory failure with hypoxia ([address] ) [J96 21]  Sepsis due to pneumonia ([facility] [address] ) [J18 9, A41 9]    HPI: 73yo female PMH of progressive scleroderma with muscular involvement, pulmonary HTN, CHF and pulmonary fibrosis admitted  for acute on chronic hypoxic respiratory failure  On admission family made it clear the goal was to stabilize the pt enough to bring her home or to send her to inpatient hospice if she did not improve over the following 48-72 hours  The pt was started on empiric antibiotics, steroids, lasix  Overnight she decompensated and was sent to the unit for vasopressors  Family made aware and pt was transitioned to comfort measures only  She  22 at [phone]      Procedures Performed:   Orders Placed This Encounter   Procedures   [address] ED ECG Documentation Only       Summary of Hospital Course: noted above    Final Diagnosis: Acute on chronic respiratory failure with hypoxia    Date, Time and Cause of Death    Date of Death: 22  Time of Death:  3:32 AM  Preliminary Cause of Death: [facility] on chronic [facility]  Entered by: Sandy Novak PA-C[CR1 1]     Attribution     CR1  1 Jayna Barnhart Hopeguillaume Hooks 22 03:37          PCP: Mishel Mathew MD    Disposition:

## 2022-09-19 ENCOUNTER — TELEPHONE (OUTPATIENT)
Dept: PULMONOLOGY | Facility: CLINIC | Age: 82
End: 2022-09-19

## 2022-09-19 NOTE — TELEPHONE ENCOUNTER
Garrick Jones is off all week and I cant addend another doctors death certificate  I would refer the FH to our medical records maybe they can reassign the death certificate to me?

## 2022-09-19 NOTE — TELEPHONE ENCOUNTER
Home was asking for Dr Luz Lincoln but I saw in the notes she saw  Dr Zion Valentino including her too

## 2022-09-19 NOTE — TELEPHONE ENCOUNTER
home called and stated they need Dr Jenna Harris check off box on Item 29 if patient was pregnant or not  Can this please be taking care of in order to complete Death Cert

## 2023-01-12 NOTE — PHYSICAL THERAPY NOTE
All done   Physical Therapy Treatment    Patient Name: Spaphire MEJÍA Date: 7/7/2022     Problem List  Principal Problem:    Sacral wound  Active Problems:    Severe protein-calorie malnutrition ([address] )    Dysphagia, oropharyngeal phase    Scleroderma ([facility] [address] )    Acquired hypothyroidism    Anemia    Urinary retention    Acute respiratory failure with hypoxia (HCC)    Pneumonia    Hearing loss    Pulmonary hypertension (HCC)    Proximal muscle weakness       Past Medical History  Past Medical History:   Diagnosis Date    Dysphagia, oropharyngeal phase 06/06/2022        Past Surgical History  Past Surgical History:   Procedure Laterality Date    WOUND DEBRIDEMENT N/A 6/14/2022    Procedure: DEBRIDEMENT WOUND [facility] OUT); SACRUM AND BUTTOCKS;  Surgeon: Hali Rodrigez MD;  Location: [facility];  Service: General           07/07/22 1151   PT Last Visit   PT Visit Date 07/07/22   Pain Assessment   Pain Assessment Tool 0-10   Pain Score 10 - Worst Possible Pain   Pain Location/Orientation Orientation: Mid;Location: Back; Other (Comment)  (sacrum, left heel)   Hospital Pain Intervention(s) Medication (See MAR); Rest;Repositioned   Restrictions/Precautions   Weight Bearing Precautions Per Order No   Other Precautions Pain;O2;Fall Risk;Hard of hearing;Multiple lines;Telemetry; Bed Alarm;Cognitive  (2L O2 nc)   General   Family/Caregiver Present No   Cognition   Overall Cognitive Status WFL   Orientation Level Oriented X4   Following Commands Follows one step commands with increased time or repetition   Subjective   Subjective Pt was supine in bed upon PT arrival   Pt reports 10/10 pain in her back, sacrum and L heel  Pt requested to get pain meds  Bed Mobility   Rolling R 2  Maximal assistance   Additional items Assist x 1;Bedrails;Verbal cues; Increased time required   Rolling L 2  Maximal assistance   Additional items Assist x 1; Increased time required;Verbal cues; Bedrails Additional Comments Pt unable to sit up due to pain and not agreeable to get up from bed   Transfers   Sit to Stand Unable to assess   Additional Comments not appropriate   Balance   Static Sitting Zero   Endurance Deficit   Endurance Deficit Yes   Endurance Deficit Description weakness, pain   Activity Tolerance   Activity Tolerance Patient limited by pain   Medical Staff Made Aware OT   Nurse Made Aware RN cleared pt for therapy   Assessment   Prognosis Fair   Problem List Decreased strength;Decreased range of motion;Decreased endurance; Impaired balance;Decreased mobility; Impaired hearing;Pain;Decreased skin integrity; Decreased cognition   Assessment Pt is seen today for therapy session  Pt tolerated therapeutic exercises well with O2 status decreasing to 85% but did not complained of SOB  During the session, pt demonstrated decrease BLE strength (L>R), decrease BLE ROM (L>R), impaired endurance, O2 dependent, decrease activity tolerance, and pain which limits pt to return to PLOF and increase pts risk for fall  Pt was educated on HEP and to continue to perform them throughout the day  Pt also educated on performing deep breathing techniques when performing exercises  Pt will continue to benefit with skilled PT interventions while in the hospital to address the impairments stated above  Pt is recommended to rehab upon D/C  At the end of the session, pt was left supine in bed with call bell within reach  RN present and gave medication to pt, also made aware of O2 sat  Barriers to Discharge Other (Comment)  (underlying conditions)   Goals   STG Expiration Date 07/13/22   PT Treatment Day 3   Plan   Treatment/Interventions LE strengthening/ROM; Bed mobility; Endurance training; Therapeutic exercise;OT   PT Frequency 2-3x/wk   Recommendation   PT Discharge Recommendation Post acute rehabilitation services   AM-PAC Basic Mobility Inpatient   Turning in Bed Without Bedrails 1   Lying on Back to Sitting on Edge of Flat Bed 1   Moving Bed to Chair 1   Standing Up From Chair 1   Walk in Room 1   Climb 3-5 Stairs 1   Basic Mobility Inpatient Raw Score 6   Turning Head Towards Sound 4   Follow Simple Instructions 4   Low Function Basic Mobility Raw Score 14   Low Function Basic Mobility Standardized Score 22 01   Highest Level Of Mobility   -HLM Goal 2: Bed activities/Dependent transfer   JH-HLM Achieved 2: Bed activities/Dependent transfer   Exercises   Quad Sets Supine;AROM; Bilateral  ([id number] w/ 5 sec hold)   Hip Flexion Supine;AAROM; Bilateral  ([id number])   Hip Abduction Supine;AAROM; Bilateral  ([id number])   Ankle Pumps Supine;20 reps;Right;AROM; Left;AAROM  (L foot AAROM; R foot AROM)     Dillon Triana, SPT

## 2023-10-10 NOTE — PHYSICAL THERAPY NOTE
Pt refuses session, citing diarrhea and pain  Will follow    Mansoor Mora PT, DPT CSRS T3 Tennille Burt ([employer])

## 2024-09-13 NOTE — ASSESSMENT & PLAN NOTE
----- Message from GILBERT Huizar sent at 9/12/2024  4:09 PM CDT -----  Carrie HUNT patient. Please notify patient of lab results:  1. Lipid panel shows borderline high total cholesterol and triglycerides.   2. Complete blood count is normal with the exception of mildly elevated RBC and Hct. May be secondary to dehydration, however recommend to repeat labs in 2 weeks to assess for resolution.  3. Negative STI screening for chlamydia, gonorrhea, trichomonas, HIV, and syphilis.  4. Kidney function, electrolytes, liver enzymes, and thyroid are normal.    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  Underwent surgical debridement on 06/14/2022 with general surgery; transferred to medicine service for chronic illness  Initially placed on vanc and cefepime for antibiotic therapy; however ID evaluate and stabbed antibiotics  Repositioning; wound care consult  Supportive care, pain medications  Nutritional support
